# Patient Record
Sex: FEMALE | Race: WHITE | NOT HISPANIC OR LATINO | Employment: FULL TIME | URBAN - METROPOLITAN AREA
[De-identification: names, ages, dates, MRNs, and addresses within clinical notes are randomized per-mention and may not be internally consistent; named-entity substitution may affect disease eponyms.]

---

## 2017-02-01 ENCOUNTER — TRANSCRIBE ORDERS (OUTPATIENT)
Dept: ADMINISTRATIVE | Facility: HOSPITAL | Age: 62
End: 2017-02-01

## 2017-02-01 DIAGNOSIS — Z12.31 SCREENING MAMMOGRAM, ENCOUNTER FOR: Primary | ICD-10-CM

## 2017-03-30 ENCOUNTER — HOSPITAL ENCOUNTER (OUTPATIENT)
Dept: MAMMOGRAPHY | Facility: HOSPITAL | Age: 62
Discharge: HOME | End: 2017-03-30
Attending: NURSE PRACTITIONER | Admitting: NURSE PRACTITIONER

## 2017-03-30 DIAGNOSIS — Z12.31 SCREENING MAMMOGRAM, ENCOUNTER FOR: ICD-10-CM

## 2017-03-30 PROCEDURE — 77063 BREAST TOMOSYNTHESIS BI: CPT

## 2017-03-30 PROCEDURE — G0202 SCR MAMMO BI INCL CAD: HCPCS

## 2017-05-01 ENCOUNTER — APPOINTMENT (OUTPATIENT)
Dept: GENERAL RADIOLOGY | Facility: HOSPITAL | Age: 62
End: 2017-05-01

## 2017-05-01 ENCOUNTER — HOSPITAL ENCOUNTER (INPATIENT)
Facility: HOSPITAL | Age: 62
LOS: 6 days | Discharge: HOME | End: 2017-05-07
Attending: FAMILY MEDICINE | Admitting: FAMILY MEDICINE

## 2017-05-01 PROBLEM — K52.9 GE (GASTROENTERITIS): Status: ACTIVE | Noted: 2017-05-01

## 2017-05-01 LAB
ALBUMIN SERPL-MCNC: 3.2 G/DL (ref 3.5–5.2)
ALBUMIN/GLOB SERPL: 1 G/DL
ALP SERPL-CCNC: 98 U/L (ref 40–129)
ALT SERPL W P-5'-P-CCNC: <5 U/L (ref 5–33)
ANION GAP SERPL CALCULATED.3IONS-SCNC: 13.2 MMOL/L
AST SERPL-CCNC: 7 U/L (ref 5–32)
BASOPHILS # BLD AUTO: 0.08 10*3/MM3 (ref 0–0.2)
BASOPHILS NFR BLD AUTO: 0.4 % (ref 0–2)
BILIRUB SERPL-MCNC: 0.3 MG/DL (ref 0.2–1.2)
BUN BLD-MCNC: 5 MG/DL (ref 6–20)
BUN/CREAT SERPL: 6.9 (ref 7–25)
CALCIUM SPEC-SCNC: 8.3 MG/DL (ref 8.6–10.5)
CHLORIDE SERPL-SCNC: 98 MMOL/L (ref 98–107)
CO2 SERPL-SCNC: 25.8 MMOL/L (ref 22–29)
CREAT BLD-MCNC: 0.72 MG/DL (ref 0.57–1)
D-LACTATE SERPL-SCNC: 0.9 MMOL/L (ref 0.5–2)
DEPRECATED RDW RBC AUTO: 45 FL (ref 37–54)
EOSINOPHIL # BLD AUTO: 0.28 10*3/MM3 (ref 0.1–0.3)
EOSINOPHIL NFR BLD AUTO: 1.5 % (ref 0–4)
ERYTHROCYTE [DISTWIDTH] IN BLOOD BY AUTOMATED COUNT: 12.9 % (ref 11.5–14.5)
GFR SERPL CREATININE-BSD FRML MDRD: 83 ML/MIN/1.73
GLOBULIN UR ELPH-MCNC: 3.2 GM/DL
GLUCOSE BLD-MCNC: 107 MG/DL (ref 65–99)
HCT VFR BLD AUTO: 33.5 % (ref 37–47)
HGB BLD-MCNC: 11 G/DL (ref 12–16)
IMM GRANULOCYTES # BLD: 0.06 10*3/MM3 (ref 0–0.03)
IMM GRANULOCYTES NFR BLD: 0.3 % (ref 0–0.5)
LYMPHOCYTES # BLD AUTO: 2.81 10*3/MM3 (ref 0.6–4.8)
LYMPHOCYTES NFR BLD AUTO: 15.6 % (ref 20–45)
MCH RBC QN AUTO: 30.8 PG (ref 27–31)
MCHC RBC AUTO-ENTMCNC: 32.8 G/DL (ref 31–37)
MCV RBC AUTO: 93.8 FL (ref 81–99)
MONOCYTES # BLD AUTO: 1.15 10*3/MM3 (ref 0–1)
MONOCYTES NFR BLD AUTO: 6.4 % (ref 3–8)
NEUTROPHILS # BLD AUTO: 13.69 10*3/MM3 (ref 1.5–8.3)
NEUTROPHILS NFR BLD AUTO: 75.8 % (ref 45–70)
NRBC BLD MANUAL-RTO: 0 /100 WBC (ref 0–0)
PLATELET # BLD AUTO: 379 10*3/MM3 (ref 140–500)
PMV BLD AUTO: 9.5 FL (ref 7.4–10.4)
POTASSIUM BLD-SCNC: 3 MMOL/L (ref 3.5–5.2)
PROT SERPL-MCNC: 6.4 G/DL (ref 6–8.5)
RBC # BLD AUTO: 3.57 10*6/MM3 (ref 4.2–5.4)
SODIUM BLD-SCNC: 137 MMOL/L (ref 136–145)
WBC NRBC COR # BLD: 18.07 10*3/MM3 (ref 4.8–10.8)

## 2017-05-01 PROCEDURE — 25810000003 SODIUM CHLORIDE 0.9 % WITH KCL 40 MEQ/L 40-0.9 MEQ/L-% SOLUTION 1,000 ML FLEX CONT: Performed by: FAMILY MEDICINE

## 2017-05-01 PROCEDURE — 83605 ASSAY OF LACTIC ACID: CPT | Performed by: FAMILY MEDICINE

## 2017-05-01 PROCEDURE — 25010000002 ENOXAPARIN PER 10 MG: Performed by: FAMILY MEDICINE

## 2017-05-01 PROCEDURE — 94799 UNLISTED PULMONARY SVC/PX: CPT

## 2017-05-01 PROCEDURE — 80053 COMPREHEN METABOLIC PANEL: CPT | Performed by: FAMILY MEDICINE

## 2017-05-01 PROCEDURE — 87493 C DIFF AMPLIFIED PROBE: CPT | Performed by: FAMILY MEDICINE

## 2017-05-01 PROCEDURE — 25810000003 SODIUM CHLORIDE 0.9 % WITH KCL 40 MEQ/L 40-0.9 MEQ/L-% SOLUTION

## 2017-05-01 PROCEDURE — 25010000002 ONDANSETRON PER 1 MG: Performed by: FAMILY MEDICINE

## 2017-05-01 PROCEDURE — 85025 COMPLETE CBC W/AUTO DIFF WBC: CPT | Performed by: FAMILY MEDICINE

## 2017-05-01 PROCEDURE — 74022 RADEX COMPL AQT ABD SERIES: CPT

## 2017-05-01 PROCEDURE — G0378 HOSPITAL OBSERVATION PER HR: HCPCS

## 2017-05-01 RX ORDER — ONDANSETRON 4 MG/1
4 TABLET, FILM COATED ORAL EVERY 6 HOURS PRN
Status: DISCONTINUED | OUTPATIENT
Start: 2017-05-01 | End: 2017-05-02

## 2017-05-01 RX ORDER — SODIUM CHLORIDE 0.9 % (FLUSH) 0.9 %
1-10 SYRINGE (ML) INJECTION AS NEEDED
Status: CANCELLED | OUTPATIENT
Start: 2017-05-01

## 2017-05-01 RX ORDER — SODIUM CHLORIDE AND POTASSIUM CHLORIDE 150; 900 MG/100ML; MG/100ML
100 INJECTION, SOLUTION INTRAVENOUS CONTINUOUS
Status: DISCONTINUED | OUTPATIENT
Start: 2017-05-01 | End: 2017-05-01

## 2017-05-01 RX ORDER — ACETAMINOPHEN 325 MG/1
650 TABLET ORAL EVERY 4 HOURS PRN
Status: CANCELLED | OUTPATIENT
Start: 2017-05-01

## 2017-05-01 RX ORDER — ONDANSETRON 4 MG/1
4 TABLET, ORALLY DISINTEGRATING ORAL EVERY 6 HOURS PRN
Status: DISCONTINUED | OUTPATIENT
Start: 2017-05-01 | End: 2017-05-02

## 2017-05-01 RX ORDER — DULOXETIN HYDROCHLORIDE 60 MG/1
60 CAPSULE, DELAYED RELEASE ORAL NIGHTLY
Status: DISCONTINUED | OUTPATIENT
Start: 2017-05-01 | End: 2017-05-07 | Stop reason: HOSPADM

## 2017-05-01 RX ORDER — ONDANSETRON 2 MG/ML
4 INJECTION INTRAMUSCULAR; INTRAVENOUS EVERY 6 HOURS PRN
Status: CANCELLED | OUTPATIENT
Start: 2017-05-01

## 2017-05-01 RX ORDER — IPRATROPIUM BROMIDE AND ALBUTEROL SULFATE 2.5; .5 MG/3ML; MG/3ML
3 SOLUTION RESPIRATORY (INHALATION)
Status: CANCELLED | OUTPATIENT
Start: 2017-05-01

## 2017-05-01 RX ORDER — DIAZEPAM 5 MG/1
10 TABLET ORAL NIGHTLY
Status: DISCONTINUED | OUTPATIENT
Start: 2017-05-01 | End: 2017-05-07 | Stop reason: HOSPADM

## 2017-05-01 RX ORDER — CALCIUM CARBONATE 200(500)MG
1 TABLET,CHEWABLE ORAL 2 TIMES DAILY PRN
Status: CANCELLED | OUTPATIENT
Start: 2017-05-01

## 2017-05-01 RX ORDER — HYDROCODONE BITARTRATE AND ACETAMINOPHEN 10; 325 MG/1; MG/1
1 TABLET ORAL EVERY 6 HOURS PRN
Status: DISCONTINUED | OUTPATIENT
Start: 2017-05-01 | End: 2017-05-07 | Stop reason: HOSPADM

## 2017-05-01 RX ORDER — ONDANSETRON 4 MG/1
4 TABLET, FILM COATED ORAL EVERY 6 HOURS PRN
Status: CANCELLED | OUTPATIENT
Start: 2017-05-01

## 2017-05-01 RX ORDER — SODIUM CHLORIDE AND POTASSIUM CHLORIDE 150; 900 MG/100ML; MG/100ML
100 INJECTION, SOLUTION INTRAVENOUS CONTINUOUS
Status: CANCELLED | OUTPATIENT
Start: 2017-05-01

## 2017-05-01 RX ORDER — POTASSIUM CHLORIDE 20 MEQ/1
40 TABLET, EXTENDED RELEASE ORAL ONCE
Status: COMPLETED | OUTPATIENT
Start: 2017-05-01 | End: 2017-05-01

## 2017-05-01 RX ORDER — ONDANSETRON 2 MG/ML
4 INJECTION INTRAMUSCULAR; INTRAVENOUS EVERY 6 HOURS PRN
Status: DISCONTINUED | OUTPATIENT
Start: 2017-05-01 | End: 2017-05-02

## 2017-05-01 RX ORDER — DULOXETIN HYDROCHLORIDE 60 MG/1
60 CAPSULE, DELAYED RELEASE ORAL NIGHTLY
COMMUNITY
End: 2020-08-04

## 2017-05-01 RX ORDER — SODIUM CHLORIDE 0.9 % (FLUSH) 0.9 %
1-10 SYRINGE (ML) INJECTION AS NEEDED
Status: DISCONTINUED | OUTPATIENT
Start: 2017-05-01 | End: 2017-05-07 | Stop reason: HOSPADM

## 2017-05-01 RX ORDER — BISACODYL 5 MG/1
5 TABLET, DELAYED RELEASE ORAL DAILY PRN
Status: CANCELLED | OUTPATIENT
Start: 2017-05-01

## 2017-05-01 RX ORDER — ALENDRONATE SODIUM 70 MG/1
70 TABLET ORAL
COMMUNITY
Start: 2017-05-04 | End: 2020-08-04

## 2017-05-01 RX ORDER — CALCIUM CARBONATE 200(500)MG
1 TABLET,CHEWABLE ORAL 2 TIMES DAILY PRN
Status: DISCONTINUED | OUTPATIENT
Start: 2017-05-01 | End: 2017-05-07 | Stop reason: HOSPADM

## 2017-05-01 RX ORDER — PANTOPRAZOLE SODIUM 40 MG/10ML
40 INJECTION, POWDER, LYOPHILIZED, FOR SOLUTION INTRAVENOUS EVERY MORNING
Status: DISCONTINUED | OUTPATIENT
Start: 2017-05-01 | End: 2017-05-04

## 2017-05-01 RX ORDER — HYDROCODONE BITARTRATE AND ACETAMINOPHEN 10; 325 MG/1; MG/1
1 TABLET ORAL 3 TIMES DAILY PRN
COMMUNITY

## 2017-05-01 RX ORDER — ONDANSETRON 4 MG/1
4 TABLET, ORALLY DISINTEGRATING ORAL EVERY 6 HOURS PRN
Status: CANCELLED | OUTPATIENT
Start: 2017-05-01

## 2017-05-01 RX ORDER — IPRATROPIUM BROMIDE AND ALBUTEROL SULFATE 2.5; .5 MG/3ML; MG/3ML
3 SOLUTION RESPIRATORY (INHALATION)
Status: DISCONTINUED | OUTPATIENT
Start: 2017-05-01 | End: 2017-05-04

## 2017-05-01 RX ORDER — ACETAMINOPHEN 325 MG/1
650 TABLET ORAL EVERY 4 HOURS PRN
Status: DISCONTINUED | OUTPATIENT
Start: 2017-05-01 | End: 2017-05-07 | Stop reason: HOSPADM

## 2017-05-01 RX ORDER — PANTOPRAZOLE SODIUM 40 MG/10ML
40 INJECTION, POWDER, LYOPHILIZED, FOR SOLUTION INTRAVENOUS
Status: CANCELLED | OUTPATIENT
Start: 2017-05-02

## 2017-05-01 RX ORDER — BISACODYL 5 MG/1
5 TABLET, DELAYED RELEASE ORAL DAILY PRN
Status: DISCONTINUED | OUTPATIENT
Start: 2017-05-01 | End: 2017-05-07 | Stop reason: HOSPADM

## 2017-05-01 RX ORDER — SODIUM CHLORIDE AND POTASSIUM CHLORIDE 300; 900 MG/100ML; MG/100ML
INJECTION, SOLUTION INTRAVENOUS
Status: COMPLETED
Start: 2017-05-01 | End: 2017-05-01

## 2017-05-01 RX ORDER — DIAZEPAM 10 MG/1
10 TABLET ORAL 2 TIMES DAILY PRN
COMMUNITY

## 2017-05-01 RX ADMIN — DULOXETINE HYDROCHLORIDE 60 MG: 60 CAPSULE, DELAYED RELEASE ORAL at 21:56

## 2017-05-01 RX ADMIN — ENOXAPARIN SODIUM 40 MG: 40 INJECTION SUBCUTANEOUS at 14:43

## 2017-05-01 RX ADMIN — POTASSIUM CHLORIDE 40 MEQ: 1500 TABLET, EXTENDED RELEASE ORAL at 14:42

## 2017-05-01 RX ADMIN — ONDANSETRON 4 MG: 2 INJECTION, SOLUTION INTRAMUSCULAR; INTRAVENOUS at 20:37

## 2017-05-01 RX ADMIN — IPRATROPIUM BROMIDE AND ALBUTEROL SULFATE 3 ML: .5; 3 SOLUTION RESPIRATORY (INHALATION) at 15:38

## 2017-05-01 RX ADMIN — PANTOPRAZOLE SODIUM 40 MG: 40 INJECTION, POWDER, FOR SOLUTION INTRAVENOUS at 14:43

## 2017-05-01 RX ADMIN — IPRATROPIUM BROMIDE AND ALBUTEROL SULFATE 3 ML: .5; 3 SOLUTION RESPIRATORY (INHALATION) at 19:51

## 2017-05-01 RX ADMIN — ONDANSETRON 4 MG: 2 INJECTION, SOLUTION INTRAMUSCULAR; INTRAVENOUS at 12:59

## 2017-05-01 RX ADMIN — DIAZEPAM 10 MG: 5 TABLET ORAL at 21:56

## 2017-05-01 RX ADMIN — POTASSIUM CHLORIDE AND SODIUM CHLORIDE 1000 ML: 900; 300 INJECTION, SOLUTION INTRAVENOUS at 14:14

## 2017-05-01 RX ADMIN — SODIUM CHLORIDE, POTASSIUM CHLORIDE, SODIUM LACTATE AND CALCIUM CHLORIDE 500 ML: 600; 310; 30; 20 INJECTION, SOLUTION INTRAVENOUS at 12:54

## 2017-05-01 RX ADMIN — HYDROCODONE BITARTRATE AND ACETAMINOPHEN 1 TABLET: 10; 325 TABLET ORAL at 20:42

## 2017-05-01 RX ADMIN — POTASSIUM CHLORIDE AND SODIUM CHLORIDE 100 ML/HR: 900; 300 INJECTION, SOLUTION INTRAVENOUS at 14:17

## 2017-05-02 ENCOUNTER — APPOINTMENT (OUTPATIENT)
Dept: CT IMAGING | Facility: HOSPITAL | Age: 62
End: 2017-05-02
Attending: FAMILY MEDICINE

## 2017-05-02 PROBLEM — K52.9 COLITIS: Status: ACTIVE | Noted: 2017-05-02

## 2017-05-02 LAB
ANION GAP SERPL CALCULATED.3IONS-SCNC: 12.8 MMOL/L
BACTERIA UR QL AUTO: ABNORMAL /HPF
BILIRUB UR QL STRIP: NEGATIVE
BUN BLD-MCNC: 4 MG/DL (ref 6–20)
BUN/CREAT SERPL: 5.9 (ref 7–25)
C DIFF TOX GENS STL QL NAA+PROBE: POSITIVE
CALCIUM SPEC-SCNC: 7.9 MG/DL (ref 8.6–10.5)
CHLORIDE SERPL-SCNC: 105 MMOL/L (ref 98–107)
CLARITY UR: CLEAR
CO2 SERPL-SCNC: 22.2 MMOL/L (ref 22–29)
COLOR UR: YELLOW
CREAT BLD-MCNC: 0.68 MG/DL (ref 0.57–1)
DEPRECATED RDW RBC AUTO: 46.2 FL (ref 37–54)
EOSINOPHIL # BLD MANUAL: 0.57 10*3/MM3 (ref 0.1–0.3)
EOSINOPHIL NFR BLD MANUAL: 3 % (ref 0–4)
ERYTHROCYTE [DISTWIDTH] IN BLOOD BY AUTOMATED COUNT: 13 % (ref 11.5–14.5)
GFR SERPL CREATININE-BSD FRML MDRD: 89 ML/MIN/1.73
GLUCOSE BLD-MCNC: 88 MG/DL (ref 65–99)
GLUCOSE UR STRIP-MCNC: NEGATIVE MG/DL
HCT VFR BLD AUTO: 34.9 % (ref 37–47)
HGB BLD-MCNC: 11.4 G/DL (ref 12–16)
HGB UR QL STRIP.AUTO: ABNORMAL
HYALINE CASTS UR QL AUTO: ABNORMAL /LPF
KETONES UR QL STRIP: NEGATIVE
LEUKOCYTE ESTERASE UR QL STRIP.AUTO: ABNORMAL
LYMPHOCYTES # BLD MANUAL: 2.46 10*3/MM3 (ref 0.6–4.8)
LYMPHOCYTES NFR BLD MANUAL: 13 % (ref 20–45)
LYMPHOCYTES NFR BLD MANUAL: 8 % (ref 3–8)
MCH RBC QN AUTO: 31.3 PG (ref 27–31)
MCHC RBC AUTO-ENTMCNC: 32.7 G/DL (ref 31–37)
MCV RBC AUTO: 95.9 FL (ref 81–99)
MONOCYTES # BLD AUTO: 1.52 10*3/MM3 (ref 0–1)
NEUTROPHILS # BLD AUTO: 14.4 10*3/MM3 (ref 1.5–8.3)
NEUTROPHILS NFR BLD MANUAL: 76 % (ref 45–70)
NITRITE UR QL STRIP: NEGATIVE
PH UR STRIP.AUTO: <=5 [PH] (ref 4.5–8)
PLAT MORPH BLD: NORMAL
PLATELET # BLD AUTO: 375 10*3/MM3 (ref 140–500)
PMV BLD AUTO: 10.2 FL (ref 7.4–10.4)
POTASSIUM BLD-SCNC: 4.5 MMOL/L (ref 3.5–5.2)
PROT UR QL STRIP: NEGATIVE
RBC # BLD AUTO: 3.64 10*6/MM3 (ref 4.2–5.4)
RBC # UR: ABNORMAL /HPF
RBC MORPH BLD: NORMAL
REF LAB TEST METHOD: ABNORMAL
SODIUM BLD-SCNC: 140 MMOL/L (ref 136–145)
SP GR UR STRIP: <=1.005 (ref 1–1.03)
SQUAMOUS #/AREA URNS HPF: ABNORMAL /HPF
UROBILINOGEN UR QL STRIP: ABNORMAL
WBC MORPH BLD: NORMAL
WBC NRBC COR # BLD: 18.95 10*3/MM3 (ref 4.8–10.8)
WBC UR QL AUTO: ABNORMAL /HPF

## 2017-05-02 PROCEDURE — 25810000003 SODIUM CHLORIDE 0.9 % WITH KCL 40 MEQ/L 40-0.9 MEQ/L-% SOLUTION: Performed by: FAMILY MEDICINE

## 2017-05-02 PROCEDURE — 25810000003 SODIUM CHLORIDE 0.9 % WITH KCL 40 MEQ/L 40-0.9 MEQ/L-% SOLUTION 1,000 ML FLEX CONT: Performed by: FAMILY MEDICINE

## 2017-05-02 PROCEDURE — 25010000002 ENOXAPARIN PER 10 MG: Performed by: FAMILY MEDICINE

## 2017-05-02 PROCEDURE — 85025 COMPLETE CBC W/AUTO DIFF WBC: CPT | Performed by: FAMILY MEDICINE

## 2017-05-02 PROCEDURE — 81001 URINALYSIS AUTO W/SCOPE: CPT | Performed by: FAMILY MEDICINE

## 2017-05-02 PROCEDURE — 80048 BASIC METABOLIC PNL TOTAL CA: CPT | Performed by: FAMILY MEDICINE

## 2017-05-02 PROCEDURE — 74176 CT ABD & PELVIS W/O CONTRAST: CPT

## 2017-05-02 PROCEDURE — 85007 BL SMEAR W/DIFF WBC COUNT: CPT | Performed by: FAMILY MEDICINE

## 2017-05-02 PROCEDURE — 94799 UNLISTED PULMONARY SVC/PX: CPT

## 2017-05-02 PROCEDURE — 25010000002 ONDANSETRON PER 1 MG: Performed by: FAMILY MEDICINE

## 2017-05-02 RX ORDER — ONDANSETRON 4 MG/1
4 TABLET, ORALLY DISINTEGRATING ORAL EVERY 6 HOURS PRN
Status: DISCONTINUED | OUTPATIENT
Start: 2017-05-02 | End: 2017-05-07 | Stop reason: HOSPADM

## 2017-05-02 RX ORDER — ONDANSETRON 2 MG/ML
4 INJECTION INTRAMUSCULAR; INTRAVENOUS EVERY 4 HOURS PRN
Status: DISCONTINUED | OUTPATIENT
Start: 2017-05-02 | End: 2017-05-02

## 2017-05-02 RX ORDER — ONDANSETRON 4 MG/1
4 TABLET, FILM COATED ORAL EVERY 4 HOURS PRN
Status: DISCONTINUED | OUTPATIENT
Start: 2017-05-02 | End: 2017-05-07 | Stop reason: HOSPADM

## 2017-05-02 RX ORDER — SODIUM CHLORIDE AND POTASSIUM CHLORIDE 300; 900 MG/100ML; MG/100ML
75 INJECTION, SOLUTION INTRAVENOUS CONTINUOUS
Status: DISCONTINUED | OUTPATIENT
Start: 2017-05-02 | End: 2017-05-04

## 2017-05-02 RX ORDER — ONDANSETRON 2 MG/ML
4 INJECTION INTRAMUSCULAR; INTRAVENOUS EVERY 4 HOURS PRN
Status: DISCONTINUED | OUTPATIENT
Start: 2017-05-02 | End: 2017-05-07 | Stop reason: HOSPADM

## 2017-05-02 RX ORDER — ONDANSETRON 4 MG/1
4 TABLET, FILM COATED ORAL EVERY 6 HOURS PRN
Status: DISCONTINUED | OUTPATIENT
Start: 2017-05-02 | End: 2017-05-02

## 2017-05-02 RX ORDER — ONDANSETRON 4 MG/1
4 TABLET, ORALLY DISINTEGRATING ORAL EVERY 6 HOURS PRN
Status: DISCONTINUED | OUTPATIENT
Start: 2017-05-02 | End: 2017-05-02

## 2017-05-02 RX ADMIN — IPRATROPIUM BROMIDE AND ALBUTEROL SULFATE 3 ML: .5; 3 SOLUTION RESPIRATORY (INHALATION) at 11:51

## 2017-05-02 RX ADMIN — POTASSIUM CHLORIDE AND SODIUM CHLORIDE 100 ML/HR: 900; 300 INJECTION, SOLUTION INTRAVENOUS at 08:56

## 2017-05-02 RX ADMIN — PANTOPRAZOLE SODIUM 40 MG: 40 INJECTION, POWDER, FOR SOLUTION INTRAVENOUS at 06:21

## 2017-05-02 RX ADMIN — IPRATROPIUM BROMIDE AND ALBUTEROL SULFATE 3 ML: .5; 3 SOLUTION RESPIRATORY (INHALATION) at 19:21

## 2017-05-02 RX ADMIN — POTASSIUM CHLORIDE AND SODIUM CHLORIDE 75 ML/HR: 900; 300 INJECTION, SOLUTION INTRAVENOUS at 22:01

## 2017-05-02 RX ADMIN — DIAZEPAM 10 MG: 5 TABLET ORAL at 22:14

## 2017-05-02 RX ADMIN — ONDANSETRON 4 MG: 2 INJECTION, SOLUTION INTRAMUSCULAR; INTRAVENOUS at 22:58

## 2017-05-02 RX ADMIN — POTASSIUM CHLORIDE AND SODIUM CHLORIDE 100 ML/HR: 900; 300 INJECTION, SOLUTION INTRAVENOUS at 00:15

## 2017-05-02 RX ADMIN — ACETAMINOPHEN 650 MG: 325 TABLET, FILM COATED ORAL at 22:15

## 2017-05-02 RX ADMIN — METRONIDAZOLE 500 MG: 500 INJECTION, SOLUTION INTRAVENOUS at 16:54

## 2017-05-02 RX ADMIN — IPRATROPIUM BROMIDE AND ALBUTEROL SULFATE 3 ML: .5; 3 SOLUTION RESPIRATORY (INHALATION) at 15:42

## 2017-05-02 RX ADMIN — ONDANSETRON 4 MG: 2 INJECTION, SOLUTION INTRAMUSCULAR; INTRAVENOUS at 10:25

## 2017-05-02 RX ADMIN — METRONIDAZOLE 500 MG: 500 INJECTION, SOLUTION INTRAVENOUS at 08:56

## 2017-05-02 RX ADMIN — DULOXETINE HYDROCHLORIDE 60 MG: 60 CAPSULE, DELAYED RELEASE ORAL at 22:15

## 2017-05-02 RX ADMIN — HYDROCODONE BITARTRATE AND ACETAMINOPHEN 1 TABLET: 10; 325 TABLET ORAL at 04:40

## 2017-05-02 RX ADMIN — ENOXAPARIN SODIUM 40 MG: 40 INJECTION SUBCUTANEOUS at 14:55

## 2017-05-02 RX ADMIN — ONDANSETRON 4 MG: 2 INJECTION, SOLUTION INTRAMUSCULAR; INTRAVENOUS at 04:40

## 2017-05-02 RX ADMIN — ONDANSETRON 4 MG: 2 INJECTION, SOLUTION INTRAMUSCULAR; INTRAVENOUS at 18:50

## 2017-05-03 LAB
ANION GAP SERPL CALCULATED.3IONS-SCNC: 11.4 MMOL/L
BUN BLD-MCNC: 3 MG/DL (ref 6–20)
BUN/CREAT SERPL: 5 (ref 7–25)
CALCIUM SPEC-SCNC: 8.1 MG/DL (ref 8.6–10.5)
CHLORIDE SERPL-SCNC: 104 MMOL/L (ref 98–107)
CO2 SERPL-SCNC: 23.6 MMOL/L (ref 22–29)
CREAT BLD-MCNC: 0.6 MG/DL (ref 0.57–1)
DEPRECATED RDW RBC AUTO: 44.7 FL (ref 37–54)
ERYTHROCYTE [DISTWIDTH] IN BLOOD BY AUTOMATED COUNT: 13 % (ref 11.5–14.5)
GFR SERPL CREATININE-BSD FRML MDRD: 102 ML/MIN/1.73
GLUCOSE BLD-MCNC: 113 MG/DL (ref 65–99)
HCT VFR BLD AUTO: 32.1 % (ref 37–47)
HGB BLD-MCNC: 10.7 G/DL (ref 12–16)
MCH RBC QN AUTO: 31.4 PG (ref 27–31)
MCHC RBC AUTO-ENTMCNC: 33.3 G/DL (ref 31–37)
MCV RBC AUTO: 94.1 FL (ref 81–99)
PLATELET # BLD AUTO: 379 10*3/MM3 (ref 140–500)
PMV BLD AUTO: 9.8 FL (ref 7.4–10.4)
POTASSIUM BLD-SCNC: 4.2 MMOL/L (ref 3.5–5.2)
RBC # BLD AUTO: 3.41 10*6/MM3 (ref 4.2–5.4)
SODIUM BLD-SCNC: 139 MMOL/L (ref 136–145)
WBC NRBC COR # BLD: 24.57 10*3/MM3 (ref 4.8–10.8)

## 2017-05-03 PROCEDURE — 25810000003 SODIUM CHLORIDE 0.9 % WITH KCL 40 MEQ/L 40-0.9 MEQ/L-% SOLUTION: Performed by: FAMILY MEDICINE

## 2017-05-03 PROCEDURE — 80048 BASIC METABOLIC PNL TOTAL CA: CPT | Performed by: FAMILY MEDICINE

## 2017-05-03 PROCEDURE — 94799 UNLISTED PULMONARY SVC/PX: CPT

## 2017-05-03 PROCEDURE — 25010000002 ONDANSETRON PER 1 MG: Performed by: FAMILY MEDICINE

## 2017-05-03 PROCEDURE — 25010000002 ENOXAPARIN PER 10 MG: Performed by: FAMILY MEDICINE

## 2017-05-03 PROCEDURE — 85027 COMPLETE CBC AUTOMATED: CPT | Performed by: FAMILY MEDICINE

## 2017-05-03 RX ORDER — CHOLESTYRAMINE LIGHT 4 G/5.7G
1 POWDER, FOR SUSPENSION ORAL EVERY 8 HOURS SCHEDULED
Status: DISCONTINUED | OUTPATIENT
Start: 2017-05-03 | End: 2017-05-07 | Stop reason: HOSPADM

## 2017-05-03 RX ADMIN — ONDANSETRON 4 MG: 2 INJECTION, SOLUTION INTRAMUSCULAR; INTRAVENOUS at 03:54

## 2017-05-03 RX ADMIN — POTASSIUM CHLORIDE AND SODIUM CHLORIDE 75 ML/HR: 900; 300 INJECTION, SOLUTION INTRAVENOUS at 12:58

## 2017-05-03 RX ADMIN — Medication 250 MG: at 08:48

## 2017-05-03 RX ADMIN — HYDROCODONE BITARTRATE AND ACETAMINOPHEN 1 TABLET: 10; 325 TABLET ORAL at 20:41

## 2017-05-03 RX ADMIN — CHOLESTYRAMINE 4 G: 4 POWDER, FOR SUSPENSION ORAL at 08:48

## 2017-05-03 RX ADMIN — IPRATROPIUM BROMIDE AND ALBUTEROL SULFATE 3 ML: .5; 3 SOLUTION RESPIRATORY (INHALATION) at 07:52

## 2017-05-03 RX ADMIN — DIAZEPAM 10 MG: 5 TABLET ORAL at 22:02

## 2017-05-03 RX ADMIN — METRONIDAZOLE 500 MG: 500 INJECTION, SOLUTION INTRAVENOUS at 08:48

## 2017-05-03 RX ADMIN — CHOLESTYRAMINE 4 G: 4 POWDER, FOR SUSPENSION ORAL at 22:03

## 2017-05-03 RX ADMIN — Medication 250 MG: at 13:04

## 2017-05-03 RX ADMIN — CHOLESTYRAMINE 4 G: 4 POWDER, FOR SUSPENSION ORAL at 13:01

## 2017-05-03 RX ADMIN — METRONIDAZOLE 500 MG: 500 INJECTION, SOLUTION INTRAVENOUS at 00:32

## 2017-05-03 RX ADMIN — DULOXETINE HYDROCHLORIDE 60 MG: 60 CAPSULE, DELAYED RELEASE ORAL at 22:03

## 2017-05-03 RX ADMIN — HYDROCODONE BITARTRATE AND ACETAMINOPHEN 1 TABLET: 10; 325 TABLET ORAL at 11:43

## 2017-05-03 RX ADMIN — PANTOPRAZOLE SODIUM 40 MG: 40 INJECTION, POWDER, FOR SOLUTION INTRAVENOUS at 06:15

## 2017-05-03 RX ADMIN — Medication 250 MG: at 17:29

## 2017-05-03 RX ADMIN — METRONIDAZOLE 500 MG: 500 INJECTION, SOLUTION INTRAVENOUS at 16:01

## 2017-05-03 RX ADMIN — ENOXAPARIN SODIUM 40 MG: 40 INJECTION SUBCUTANEOUS at 11:45

## 2017-05-04 LAB
ANION GAP SERPL CALCULATED.3IONS-SCNC: 11 MMOL/L
BUN BLD-MCNC: 2 MG/DL (ref 6–20)
BUN/CREAT SERPL: 3.3 (ref 7–25)
CALCIUM SPEC-SCNC: 8.6 MG/DL (ref 8.6–10.5)
CHLORIDE SERPL-SCNC: 106 MMOL/L (ref 98–107)
CO2 SERPL-SCNC: 24 MMOL/L (ref 22–29)
CREAT BLD-MCNC: 0.61 MG/DL (ref 0.57–1)
DEPRECATED RDW RBC AUTO: 45.3 FL (ref 37–54)
ERYTHROCYTE [DISTWIDTH] IN BLOOD BY AUTOMATED COUNT: 13.1 % (ref 11.5–14.5)
GFR SERPL CREATININE-BSD FRML MDRD: 100 ML/MIN/1.73
GLUCOSE BLD-MCNC: 89 MG/DL (ref 65–99)
HCT VFR BLD AUTO: 33.5 % (ref 37–47)
HGB BLD-MCNC: 11 G/DL (ref 12–16)
MCH RBC QN AUTO: 31.1 PG (ref 27–31)
MCHC RBC AUTO-ENTMCNC: 32.8 G/DL (ref 31–37)
MCV RBC AUTO: 94.6 FL (ref 81–99)
PLATELET # BLD AUTO: 403 10*3/MM3 (ref 140–500)
PMV BLD AUTO: 10 FL (ref 7.4–10.4)
POTASSIUM BLD-SCNC: 4.2 MMOL/L (ref 3.5–5.2)
RBC # BLD AUTO: 3.54 10*6/MM3 (ref 4.2–5.4)
SODIUM BLD-SCNC: 141 MMOL/L (ref 136–145)
WBC NRBC COR # BLD: 14.4 10*3/MM3 (ref 4.8–10.8)

## 2017-05-04 PROCEDURE — 94799 UNLISTED PULMONARY SVC/PX: CPT

## 2017-05-04 PROCEDURE — 85027 COMPLETE CBC AUTOMATED: CPT | Performed by: FAMILY MEDICINE

## 2017-05-04 PROCEDURE — 25810000003 SODIUM CHLORIDE 0.9 % WITH KCL 40 MEQ/L 40-0.9 MEQ/L-% SOLUTION: Performed by: FAMILY MEDICINE

## 2017-05-04 PROCEDURE — 25010000002 ENOXAPARIN PER 10 MG: Performed by: FAMILY MEDICINE

## 2017-05-04 PROCEDURE — 80048 BASIC METABOLIC PNL TOTAL CA: CPT | Performed by: FAMILY MEDICINE

## 2017-05-04 PROCEDURE — 25010000002 ONDANSETRON PER 1 MG: Performed by: FAMILY MEDICINE

## 2017-05-04 RX ORDER — BUDESONIDE AND FORMOTEROL FUMARATE DIHYDRATE 160; 4.5 UG/1; UG/1
2 AEROSOL RESPIRATORY (INHALATION)
Status: DISCONTINUED | OUTPATIENT
Start: 2017-05-04 | End: 2017-05-07 | Stop reason: HOSPADM

## 2017-05-04 RX ORDER — IPRATROPIUM BROMIDE AND ALBUTEROL SULFATE 2.5; .5 MG/3ML; MG/3ML
3 SOLUTION RESPIRATORY (INHALATION) EVERY 4 HOURS PRN
Status: DISCONTINUED | OUTPATIENT
Start: 2017-05-04 | End: 2017-05-07 | Stop reason: HOSPADM

## 2017-05-04 RX ADMIN — METRONIDAZOLE 500 MG: 500 INJECTION, SOLUTION INTRAVENOUS at 00:21

## 2017-05-04 RX ADMIN — ENOXAPARIN SODIUM 40 MG: 40 INJECTION SUBCUTANEOUS at 12:05

## 2017-05-04 RX ADMIN — ONDANSETRON 4 MG: 2 INJECTION, SOLUTION INTRAMUSCULAR; INTRAVENOUS at 12:06

## 2017-05-04 RX ADMIN — DIAZEPAM 10 MG: 5 TABLET ORAL at 21:38

## 2017-05-04 RX ADMIN — ONDANSETRON 4 MG: 2 INJECTION, SOLUTION INTRAMUSCULAR; INTRAVENOUS at 03:22

## 2017-05-04 RX ADMIN — Medication 250 MG: at 12:05

## 2017-05-04 RX ADMIN — CHOLESTYRAMINE 4 G: 4 POWDER, FOR SUSPENSION ORAL at 14:01

## 2017-05-04 RX ADMIN — Medication 250 MG: at 00:21

## 2017-05-04 RX ADMIN — PANTOPRAZOLE SODIUM 40 MG: 40 INJECTION, POWDER, FOR SOLUTION INTRAVENOUS at 06:20

## 2017-05-04 RX ADMIN — METRONIDAZOLE 500 MG: 500 INJECTION, SOLUTION INTRAVENOUS at 16:19

## 2017-05-04 RX ADMIN — POTASSIUM CHLORIDE AND SODIUM CHLORIDE 75 ML/HR: 900; 300 INJECTION, SOLUTION INTRAVENOUS at 03:16

## 2017-05-04 RX ADMIN — HYDROCODONE BITARTRATE AND ACETAMINOPHEN 1 TABLET: 10; 325 TABLET ORAL at 13:52

## 2017-05-04 RX ADMIN — Medication 250 MG: at 18:12

## 2017-05-04 RX ADMIN — Medication 250 MG: at 06:19

## 2017-05-04 RX ADMIN — DULOXETINE HYDROCHLORIDE 60 MG: 60 CAPSULE, DELAYED RELEASE ORAL at 21:38

## 2017-05-04 RX ADMIN — METRONIDAZOLE 500 MG: 500 INJECTION, SOLUTION INTRAVENOUS at 08:21

## 2017-05-04 RX ADMIN — BUDESONIDE AND FORMOTEROL FUMARATE DIHYDRATE 2 PUFF: 160; 4.5 AEROSOL RESPIRATORY (INHALATION) at 10:52

## 2017-05-04 RX ADMIN — CHOLESTYRAMINE 4 G: 4 POWDER, FOR SUSPENSION ORAL at 06:20

## 2017-05-04 RX ADMIN — BUDESONIDE AND FORMOTEROL FUMARATE DIHYDRATE 2 PUFF: 160; 4.5 AEROSOL RESPIRATORY (INHALATION) at 20:45

## 2017-05-05 LAB
DEPRECATED RDW RBC AUTO: 44.7 FL (ref 37–54)
ERYTHROCYTE [DISTWIDTH] IN BLOOD BY AUTOMATED COUNT: 13 % (ref 11.5–14.5)
HCT VFR BLD AUTO: 36.6 % (ref 37–47)
HGB BLD-MCNC: 11.9 G/DL (ref 12–16)
MCH RBC QN AUTO: 30.7 PG (ref 27–31)
MCHC RBC AUTO-ENTMCNC: 32.5 G/DL (ref 31–37)
MCV RBC AUTO: 94.6 FL (ref 81–99)
PLATELET # BLD AUTO: 497 10*3/MM3 (ref 140–500)
PMV BLD AUTO: 9.9 FL (ref 7.4–10.4)
RBC # BLD AUTO: 3.87 10*6/MM3 (ref 4.2–5.4)
WBC NRBC COR # BLD: 11.03 10*3/MM3 (ref 4.8–10.8)

## 2017-05-05 PROCEDURE — 25010000002 ENOXAPARIN PER 10 MG: Performed by: FAMILY MEDICINE

## 2017-05-05 PROCEDURE — 94799 UNLISTED PULMONARY SVC/PX: CPT

## 2017-05-05 PROCEDURE — 85027 COMPLETE CBC AUTOMATED: CPT | Performed by: FAMILY MEDICINE

## 2017-05-05 RX ADMIN — BUDESONIDE AND FORMOTEROL FUMARATE DIHYDRATE 2 PUFF: 160; 4.5 AEROSOL RESPIRATORY (INHALATION) at 20:20

## 2017-05-05 RX ADMIN — Medication 250 MG: at 18:27

## 2017-05-05 RX ADMIN — METRONIDAZOLE 500 MG: 500 INJECTION, SOLUTION INTRAVENOUS at 00:30

## 2017-05-05 RX ADMIN — Medication 250 MG: at 12:14

## 2017-05-05 RX ADMIN — ENOXAPARIN SODIUM 40 MG: 40 INJECTION SUBCUTANEOUS at 12:15

## 2017-05-05 RX ADMIN — HYDROCODONE BITARTRATE AND ACETAMINOPHEN 1 TABLET: 10; 325 TABLET ORAL at 18:27

## 2017-05-05 RX ADMIN — Medication 250 MG: at 23:21

## 2017-05-05 RX ADMIN — CHOLESTYRAMINE 4 G: 4 POWDER, FOR SUSPENSION ORAL at 06:29

## 2017-05-05 RX ADMIN — DIAZEPAM 10 MG: 5 TABLET ORAL at 21:01

## 2017-05-05 RX ADMIN — DULOXETINE HYDROCHLORIDE 60 MG: 60 CAPSULE, DELAYED RELEASE ORAL at 21:01

## 2017-05-05 RX ADMIN — CHOLESTYRAMINE 4 G: 4 POWDER, FOR SUSPENSION ORAL at 14:31

## 2017-05-05 RX ADMIN — Medication 250 MG: at 06:27

## 2017-05-05 RX ADMIN — CHOLESTYRAMINE 4 G: 4 POWDER, FOR SUSPENSION ORAL at 21:01

## 2017-05-05 RX ADMIN — METRONIDAZOLE 500 MG: 500 INJECTION, SOLUTION INTRAVENOUS at 16:25

## 2017-05-05 RX ADMIN — HYDROCODONE BITARTRATE AND ACETAMINOPHEN 1 TABLET: 10; 325 TABLET ORAL at 07:30

## 2017-05-05 RX ADMIN — BUDESONIDE AND FORMOTEROL FUMARATE DIHYDRATE 2 PUFF: 160; 4.5 AEROSOL RESPIRATORY (INHALATION) at 09:25

## 2017-05-05 RX ADMIN — Medication 250 MG: at 00:30

## 2017-05-05 RX ADMIN — METRONIDAZOLE 500 MG: 500 INJECTION, SOLUTION INTRAVENOUS at 23:21

## 2017-05-05 RX ADMIN — METRONIDAZOLE 500 MG: 500 INJECTION, SOLUTION INTRAVENOUS at 07:58

## 2017-05-06 PROCEDURE — 94799 UNLISTED PULMONARY SVC/PX: CPT

## 2017-05-06 PROCEDURE — 25010000002 ENOXAPARIN PER 10 MG: Performed by: FAMILY MEDICINE

## 2017-05-06 RX ORDER — METRONIDAZOLE 500 MG/1
500 TABLET ORAL EVERY 8 HOURS SCHEDULED
Status: DISCONTINUED | OUTPATIENT
Start: 2017-05-06 | End: 2017-05-07 | Stop reason: HOSPADM

## 2017-05-06 RX ADMIN — Medication 250 MG: at 13:12

## 2017-05-06 RX ADMIN — METRONIDAZOLE 500 MG: 500 TABLET ORAL at 22:50

## 2017-05-06 RX ADMIN — ENOXAPARIN SODIUM 40 MG: 40 INJECTION SUBCUTANEOUS at 13:13

## 2017-05-06 RX ADMIN — METRONIDAZOLE 500 MG: 500 TABLET ORAL at 13:12

## 2017-05-06 RX ADMIN — BUDESONIDE AND FORMOTEROL FUMARATE DIHYDRATE 2 PUFF: 160; 4.5 AEROSOL RESPIRATORY (INHALATION) at 09:06

## 2017-05-06 RX ADMIN — CHOLESTYRAMINE 4 G: 4 POWDER, FOR SUSPENSION ORAL at 22:50

## 2017-05-06 RX ADMIN — DULOXETINE HYDROCHLORIDE 60 MG: 60 CAPSULE, DELAYED RELEASE ORAL at 20:33

## 2017-05-06 RX ADMIN — Medication 250 MG: at 18:20

## 2017-05-06 RX ADMIN — HYDROCODONE BITARTRATE AND ACETAMINOPHEN 1 TABLET: 10; 325 TABLET ORAL at 18:23

## 2017-05-06 RX ADMIN — HYDROCODONE BITARTRATE AND ACETAMINOPHEN 1 TABLET: 10; 325 TABLET ORAL at 09:47

## 2017-05-06 RX ADMIN — DIAZEPAM 10 MG: 5 TABLET ORAL at 20:33

## 2017-05-06 RX ADMIN — CHOLESTYRAMINE 4 G: 4 POWDER, FOR SUSPENSION ORAL at 06:05

## 2017-05-06 RX ADMIN — Medication 250 MG: at 06:05

## 2017-05-06 RX ADMIN — BUDESONIDE AND FORMOTEROL FUMARATE DIHYDRATE 2 PUFF: 160; 4.5 AEROSOL RESPIRATORY (INHALATION) at 19:52

## 2017-05-06 RX ADMIN — CHOLESTYRAMINE 4 G: 4 POWDER, FOR SUSPENSION ORAL at 15:15

## 2017-05-06 RX ADMIN — METRONIDAZOLE 500 MG: 500 INJECTION, SOLUTION INTRAVENOUS at 09:47

## 2017-05-07 VITALS
HEART RATE: 77 BPM | BODY MASS INDEX: 19.53 KG/M2 | DIASTOLIC BLOOD PRESSURE: 74 MMHG | WEIGHT: 106.1 LBS | SYSTOLIC BLOOD PRESSURE: 117 MMHG | TEMPERATURE: 97.1 F | OXYGEN SATURATION: 97 % | RESPIRATION RATE: 16 BRPM | HEIGHT: 62 IN

## 2017-05-07 PROCEDURE — 94799 UNLISTED PULMONARY SVC/PX: CPT

## 2017-05-07 RX ORDER — CHOLESTYRAMINE LIGHT 4 G/5.7G
1 POWDER, FOR SUSPENSION ORAL 3 TIMES DAILY
Qty: 60 PACKET | Refills: 0 | Status: ON HOLD | OUTPATIENT
Start: 2017-05-07 | End: 2020-09-22

## 2017-05-07 RX ORDER — VANCOMYCIN HYDROCHLORIDE 250 MG/1
250 CAPSULE ORAL 4 TIMES DAILY
Qty: 40 CAPSULE | Refills: 0 | Status: SHIPPED | OUTPATIENT
Start: 2017-05-07 | End: 2020-08-04

## 2017-05-07 RX ORDER — BUDESONIDE AND FORMOTEROL FUMARATE DIHYDRATE 160; 4.5 UG/1; UG/1
2 AEROSOL RESPIRATORY (INHALATION)
Refills: 12
Start: 2017-05-07

## 2017-05-07 RX ORDER — METRONIDAZOLE 500 MG/1
500 TABLET ORAL 3 TIMES DAILY
Qty: 30 TABLET | Refills: 0 | Status: SHIPPED | OUTPATIENT
Start: 2017-05-07 | End: 2020-08-04

## 2017-05-07 RX ADMIN — Medication 250 MG: at 05:19

## 2017-05-07 RX ADMIN — CHOLESTYRAMINE 4 G: 4 POWDER, FOR SUSPENSION ORAL at 05:20

## 2017-05-07 RX ADMIN — METRONIDAZOLE 500 MG: 500 TABLET ORAL at 05:20

## 2017-05-07 RX ADMIN — BUDESONIDE AND FORMOTEROL FUMARATE DIHYDRATE 2 PUFF: 160; 4.5 AEROSOL RESPIRATORY (INHALATION) at 09:54

## 2017-05-07 RX ADMIN — Medication 250 MG: at 00:34

## 2017-05-07 RX ADMIN — HYDROCODONE BITARTRATE AND ACETAMINOPHEN 1 TABLET: 10; 325 TABLET ORAL at 10:09

## 2017-06-02 ENCOUNTER — TRANSCRIBE ORDERS (OUTPATIENT)
Dept: ADMINISTRATIVE | Facility: HOSPITAL | Age: 62
End: 2017-06-02

## 2017-06-02 ENCOUNTER — LAB (OUTPATIENT)
Dept: LAB | Facility: HOSPITAL | Age: 62
End: 2017-06-02
Attending: FAMILY MEDICINE

## 2017-06-02 DIAGNOSIS — A04.72 CLOSTRIDIUM DIFFICILE COLITIS: Primary | ICD-10-CM

## 2017-06-02 DIAGNOSIS — A04.72 CLOSTRIDIUM DIFFICILE COLITIS: ICD-10-CM

## 2017-06-02 LAB — C DIFF TOX GENS STL QL NAA+PROBE: POSITIVE

## 2017-06-02 PROCEDURE — 87493 C DIFF AMPLIFIED PROBE: CPT

## 2017-09-27 ENCOUNTER — TRANSCRIBE ORDERS (OUTPATIENT)
Dept: ADMINISTRATIVE | Facility: HOSPITAL | Age: 62
End: 2017-09-27

## 2017-09-27 ENCOUNTER — LAB (OUTPATIENT)
Dept: LAB | Facility: HOSPITAL | Age: 62
End: 2017-09-27
Attending: FAMILY MEDICINE

## 2017-09-27 DIAGNOSIS — K52.9 COLITIS: ICD-10-CM

## 2017-09-27 DIAGNOSIS — R19.7 DIARRHEA, UNSPECIFIED TYPE: ICD-10-CM

## 2017-09-27 DIAGNOSIS — K52.9 GE (GASTROENTERITIS): Primary | ICD-10-CM

## 2017-09-27 DIAGNOSIS — R19.7 DIARRHEA, UNSPECIFIED TYPE: Primary | ICD-10-CM

## 2017-09-27 LAB — C DIFF TOX GENS STL QL NAA+PROBE: NEGATIVE

## 2017-09-27 PROCEDURE — 87493 C DIFF AMPLIFIED PROBE: CPT

## 2018-07-17 ENCOUNTER — TRANSCRIBE ORDERS (OUTPATIENT)
Dept: ADMINISTRATIVE | Facility: HOSPITAL | Age: 63
End: 2018-07-17

## 2018-07-17 DIAGNOSIS — Z12.31 VISIT FOR SCREENING MAMMOGRAM: Primary | ICD-10-CM

## 2018-07-20 ENCOUNTER — HOSPITAL ENCOUNTER (OUTPATIENT)
Dept: MAMMOGRAPHY | Facility: HOSPITAL | Age: 63
Discharge: HOME | End: 2018-07-20
Attending: FAMILY MEDICINE | Admitting: FAMILY MEDICINE

## 2018-07-20 DIAGNOSIS — Z12.31 VISIT FOR SCREENING MAMMOGRAM: ICD-10-CM

## 2018-07-20 PROCEDURE — 77063 BREAST TOMOSYNTHESIS BI: CPT

## 2018-07-20 PROCEDURE — 77067 SCR MAMMO BI INCL CAD: CPT

## 2018-09-04 ENCOUNTER — HOSPITAL ENCOUNTER (OUTPATIENT)
Dept: GENERAL RADIOLOGY | Facility: HOSPITAL | Age: 63
Discharge: HOME | End: 2018-09-04
Attending: FAMILY MEDICINE | Admitting: FAMILY MEDICINE

## 2018-09-04 ENCOUNTER — TRANSCRIBE ORDERS (OUTPATIENT)
Dept: ADMINISTRATIVE | Facility: HOSPITAL | Age: 63
End: 2018-09-04

## 2018-09-04 DIAGNOSIS — R07.9 CHEST PAIN, UNSPECIFIED TYPE: ICD-10-CM

## 2018-09-04 DIAGNOSIS — J18.9 PNEUMONIA DUE TO INFECTIOUS ORGANISM, UNSPECIFIED LATERALITY, UNSPECIFIED PART OF LUNG: ICD-10-CM

## 2018-09-04 DIAGNOSIS — J18.9 PNEUMONIA DUE TO INFECTIOUS ORGANISM, UNSPECIFIED LATERALITY, UNSPECIFIED PART OF LUNG: Primary | ICD-10-CM

## 2018-09-04 PROCEDURE — 71046 X-RAY EXAM CHEST 2 VIEWS: CPT

## 2018-09-09 ENCOUNTER — APPOINTMENT (OUTPATIENT)
Dept: GENERAL RADIOLOGY | Facility: HOSPITAL | Age: 63
End: 2018-09-09

## 2018-09-09 ENCOUNTER — APPOINTMENT (OUTPATIENT)
Dept: CT IMAGING | Facility: HOSPITAL | Age: 63
End: 2018-09-09

## 2018-09-09 ENCOUNTER — HOSPITAL ENCOUNTER (EMERGENCY)
Facility: HOSPITAL | Age: 63
Discharge: HOME | End: 2018-09-09
Attending: EMERGENCY MEDICINE | Admitting: EMERGENCY MEDICINE

## 2018-09-09 VITALS
DIASTOLIC BLOOD PRESSURE: 84 MMHG | BODY MASS INDEX: 25.69 KG/M2 | TEMPERATURE: 98.6 F | HEIGHT: 63 IN | HEART RATE: 64 BPM | OXYGEN SATURATION: 97 % | SYSTOLIC BLOOD PRESSURE: 128 MMHG | WEIGHT: 145 LBS | RESPIRATION RATE: 20 BRPM

## 2018-09-09 DIAGNOSIS — S20.212A CONTUSION OF LEFT CHEST WALL, INITIAL ENCOUNTER: ICD-10-CM

## 2018-09-09 DIAGNOSIS — S16.1XXA STRAIN OF NECK MUSCLE, INITIAL ENCOUNTER: ICD-10-CM

## 2018-09-09 DIAGNOSIS — S80.11XA CONTUSION OF RIGHT LEG, INITIAL ENCOUNTER: ICD-10-CM

## 2018-09-09 DIAGNOSIS — S06.0X1A CONCUSSION WITH LOSS OF CONSCIOUSNESS OF 30 MINUTES OR LESS, INITIAL ENCOUNTER: Primary | ICD-10-CM

## 2018-09-09 DIAGNOSIS — S29.019A STRAIN OF THORACIC REGION, INITIAL ENCOUNTER: ICD-10-CM

## 2018-09-09 DIAGNOSIS — S50.01XA CONTUSION OF RIGHT ELBOW, INITIAL ENCOUNTER: ICD-10-CM

## 2018-09-09 PROCEDURE — 73080 X-RAY EXAM OF ELBOW: CPT

## 2018-09-09 PROCEDURE — 99284 EMERGENCY DEPT VISIT MOD MDM: CPT

## 2018-09-09 PROCEDURE — 96372 THER/PROPH/DIAG INJ SC/IM: CPT

## 2018-09-09 PROCEDURE — 72170 X-RAY EXAM OF PELVIS: CPT

## 2018-09-09 PROCEDURE — 25010000002 KETOROLAC TROMETHAMINE PER 15 MG: Performed by: EMERGENCY MEDICINE

## 2018-09-09 PROCEDURE — 72125 CT NECK SPINE W/O DYE: CPT

## 2018-09-09 PROCEDURE — 99284 EMERGENCY DEPT VISIT MOD MDM: CPT | Performed by: EMERGENCY MEDICINE

## 2018-09-09 PROCEDURE — 70450 CT HEAD/BRAIN W/O DYE: CPT

## 2018-09-09 PROCEDURE — 71101 X-RAY EXAM UNILAT RIBS/CHEST: CPT

## 2018-09-09 PROCEDURE — 72072 X-RAY EXAM THORAC SPINE 3VWS: CPT

## 2018-09-09 RX ORDER — KETOROLAC TROMETHAMINE 30 MG/ML
30 INJECTION, SOLUTION INTRAMUSCULAR; INTRAVENOUS ONCE
Status: COMPLETED | OUTPATIENT
Start: 2018-09-09 | End: 2018-09-09

## 2018-09-09 RX ORDER — CYCLOBENZAPRINE HCL 10 MG
10 TABLET ORAL 3 TIMES DAILY PRN
Qty: 21 TABLET | Refills: 0 | Status: SHIPPED | OUTPATIENT
Start: 2018-09-09 | End: 2018-09-16

## 2018-09-09 RX ORDER — NABUMETONE 750 MG/1
750 TABLET, FILM COATED ORAL 2 TIMES DAILY PRN
Qty: 14 TABLET | Refills: 0 | Status: SHIPPED | OUTPATIENT
Start: 2018-09-09 | End: 2018-09-16

## 2018-09-09 RX ORDER — CYCLOBENZAPRINE HCL 10 MG
10 TABLET ORAL ONCE
Status: COMPLETED | OUTPATIENT
Start: 2018-09-09 | End: 2018-09-09

## 2018-09-09 RX ADMIN — CYCLOBENZAPRINE HYDROCHLORIDE 10 MG: 10 TABLET, FILM COATED ORAL at 18:22

## 2018-09-09 RX ADMIN — KETOROLAC TROMETHAMINE 30 MG: 30 INJECTION, SOLUTION INTRAMUSCULAR; INTRAVENOUS at 16:06

## 2018-09-09 NOTE — DISCHARGE INSTRUCTIONS
Medications as directed.  Apply ice to areas of pain and swelling.  Apply heat to sore stiff muscles.  Gentle stretching.  Follow up with Dr. De Paz as above.  Return to ED for any signs of worsening concussion, mental status changes, protracted nausea and vomiting medical emergencies.

## 2018-09-09 NOTE — ED PROVIDER NOTES
Subjective   Ms. Pili Arechiga is a 64 yo WF who resent secondary to injury sustained from a fall.  Patient was standing in a chair when she fell striking her head on the concrete floor.  Patient had a brief loss of consciousness.  She complains of headache and a knot in the occipital region of her scalp.  Pain in left ribs, pain in right elbow.  Patient is also concerned about her left hip as she has undergone arthroplasty.  She presents for evaluation.        History provided by:  Patient  Fall   Mechanism of injury: fall    Injury location:  Head/neck, shoulder/arm and torso  Head/neck injury location:  Head  Shoulder/arm injury location:  R elbow  Torso injury location:  L chest  Incident location:  Home  Arrived directly from scene: yes    Fall:     Fall occurred: patient was standing in a chair at time of fall.    Impact surface:  [place]    Point of impact:  Head    Entrapped after fall: no    Suspicion of alcohol use: no    Suspicion of drug use: no    Prior to arrival data:     Patient ambulatory at scene: yes      Blood loss:  None    Loss of consciousness: yes      LOC duration: 1 minute as reported to patient by her son.    Amnesic to event: no    Associated symptoms: chest pain and loss of consciousness    Associated symptoms: no abdominal pain, no back pain, no blindness, no difficulty breathing, no headaches, no hearing loss, no nausea, no seizures and no vomiting    Risk factors: COPD    Risk factors: no AICD, no anticoagulation therapy, no asthma, no beta blocker therapy, no CABG, no CAD, no CHF, no diabetes, no dialysis, no past MI and no steroid use        Review of Systems   Constitutional: Negative.  Negative for appetite change, diaphoresis and fever.   HENT: Negative.  Negative for hearing loss.    Eyes: Negative.  Negative for blindness.   Respiratory: Negative for cough, chest tightness, shortness of breath and wheezing.    Cardiovascular: Positive for chest pain. Negative for palpitations and  leg swelling.   Gastrointestinal: Negative for abdominal pain, nausea and vomiting.   Genitourinary: Negative.  Negative for difficulty urinating, flank pain, frequency and hematuria.   Musculoskeletal: Negative.  Negative for back pain.   Skin: Negative.  Negative for color change, pallor and rash.   Neurological: Positive for loss of consciousness. Negative for dizziness, seizures, syncope and headaches.   Psychiatric/Behavioral: Negative.  Negative for agitation, behavioral problems and hallucinations.   All other systems reviewed and are negative.      Past Medical History:   Diagnosis Date   • COPD (chronic obstructive pulmonary disease) (CMS/[facility])        Allergies   Allergen Reactions   • Codeine GI Intolerance       Past Surgical History:   Procedure Laterality Date   •  SECTION     • CHOLECYSTECTOMY     • ECTOPIC PREGNANCY SURGERY     • TONSILLECTOMY     • TOTAL HIP ARTHROPLASTY         No family history on file.    Social History     Social History   • Marital status:      Social History Main Topics   • Smoking status: Current Every Day Smoker     Packs/day: 1.00     Types: Cigarettes   • Alcohol use No      Comment: once a year    • Drug use: No   • Sexual activity: Defer     Other Topics Concern   • Not on file           Objective   Physical Exam   Constitutional: She is oriented to person, place, and time. She appears well-developed and well-nourished. No distress.   64 yo WF lying in bed.  Patient is tearful due to pain.  She appears in good health for age.   HENT:   Head: Normocephalic. Head is with contusion. Head is without raccoon's eyes, without Garcia's sign, without abrasion, without laceration, without right periorbital erythema and without left periorbital erythema. Hair is normal.       Right Ear: External ear normal.   Left Ear: External ear normal.   Nose: Nose normal.   Mouth/Throat: Oropharynx is clear and moist.   Eyes: Conjunctivae and EOM are normal. Right pupil is round  "and reactive. Left pupil is not reactive. Left pupil is round. Pupils are unequal.   Patient reports her pupils are never equal.  States she went to Dr. Puri several years ago for an eye exam.  They dilated her eyes and her left eye never \"went back to normal\".   Neck: Neck supple. Spinous process tenderness present. No neck rigidity. Decreased range of motion present. No edema and no erythema present.       Cardiovascular: Normal rate, regular rhythm, normal heart sounds and intact distal pulses.  Exam reveals no gallop and no friction rub.    No murmur heard.  Pulmonary/Chest: Effort normal and breath sounds normal.   Abdominal: Soft. Bowel sounds are normal. She exhibits no distension. There is no tenderness.   Musculoskeletal:        Right elbow: She exhibits normal range of motion, no swelling and no deformity. Tenderness found. Medial epicondyle tenderness noted. No radial head, no lateral epicondyle and no olecranon process tenderness noted.        Thoracic back: She exhibits tenderness, bony tenderness and pain. She exhibits normal range of motion, no swelling, no edema and no deformity.        Back:         Arms:       Right lower leg: She exhibits tenderness (minimal soft tissue). She exhibits no bony tenderness, no swelling, no edema and no deformity.        Legs:  Neurological: She is alert and oriented to person, place, and time. She has normal reflexes.   Skin: Skin is warm and dry. She is not diaphoretic.   Psychiatric: She has a normal mood and affect. Her behavior is normal.   Nursing note and vitals reviewed.      Procedures           ED Course  ED Course as of Sep 10 0006   Sun Sep 09, 2018   1553 Patient has scalp contusion.  Pain and tenderness in C-spine and T-spine, left ribs and right elbow.  Giving Toradol for pain.  [SS]   1810 CT and x-rays are all unremarkable.  No acute injury seen.  With loss of consciousness patient has a mild concussion.  Patient lives with her son.  He can keep an " eye on her tonight.  Patient has taken Flexeril before without any sedation.  Will prescribe as she'll have considerable musculoskeletal pain the next couple of days.  Also prescribe NSAIDs for home.  Discussed at length with patient all results, diagnosis and treatment.  [SS]      ED Course User Index  [SS] Husam Rodriguez MD                  MDM  Number of Diagnoses or Management Options  Concussion with loss of consciousness of 30 minutes or less, initial encounter: new and requires workup  Contusion of left chest wall, initial encounter: new and requires workup  Contusion of right elbow, initial encounter: new and requires workup  Contusion of right leg, initial encounter: new and does not require workup  Strain of neck muscle, initial encounter: new and requires workup  Strain of thoracic region, initial encounter: new and requires workup     Amount and/or Complexity of Data Reviewed  Tests in the radiology section of CPT®: reviewed and ordered  Independent visualization of images, tracings, or specimens: yes (X-rays independently read and interpreted by me.)    Risk of Complications, Morbidity, and/or Mortality  Presenting problems: moderate  Diagnostic procedures: high  Management options: moderate    Patient Progress  Patient progress: improved        Final diagnoses:   Concussion with loss of consciousness of 30 minutes or less, initial encounter   Strain of neck muscle, initial encounter   Strain of thoracic region, initial encounter   Contusion of left chest wall, initial encounter   Contusion of right elbow, initial encounter   Contusion of right leg, initial encounter            Husam Rodriguez MD  09/10/18 0006

## 2018-09-24 ENCOUNTER — HOSPITAL ENCOUNTER (OUTPATIENT)
Dept: GENERAL RADIOLOGY | Facility: HOSPITAL | Age: 63
Discharge: HOME | End: 2018-09-24
Attending: FAMILY MEDICINE | Admitting: FAMILY MEDICINE

## 2018-09-24 ENCOUNTER — TRANSCRIBE ORDERS (OUTPATIENT)
Dept: ADMINISTRATIVE | Facility: HOSPITAL | Age: 63
End: 2018-09-24

## 2018-09-24 DIAGNOSIS — R05.9 COUGH: Primary | ICD-10-CM

## 2018-09-24 DIAGNOSIS — R05.9 COUGH: ICD-10-CM

## 2018-09-24 DIAGNOSIS — R07.9 CHEST PAIN, UNSPECIFIED TYPE: ICD-10-CM

## 2018-09-24 PROCEDURE — 71046 X-RAY EXAM CHEST 2 VIEWS: CPT

## 2019-09-05 ENCOUNTER — TRANSCRIBE ORDERS (OUTPATIENT)
Dept: ADMINISTRATIVE | Facility: HOSPITAL | Age: 64
End: 2019-09-05

## 2019-09-05 DIAGNOSIS — Z12.39 SCREENING BREAST EXAMINATION: Primary | ICD-10-CM

## 2019-09-17 ENCOUNTER — HOSPITAL ENCOUNTER (OUTPATIENT)
Dept: MAMMOGRAPHY | Facility: HOSPITAL | Age: 64
Discharge: HOME | End: 2019-09-17
Admitting: FAMILY MEDICINE

## 2019-09-17 DIAGNOSIS — Z12.39 SCREENING BREAST EXAMINATION: ICD-10-CM

## 2019-09-17 PROCEDURE — 77063 BREAST TOMOSYNTHESIS BI: CPT

## 2019-09-17 PROCEDURE — 77067 SCR MAMMO BI INCL CAD: CPT

## 2020-02-26 ENCOUNTER — TRANSCRIBE ORDERS (OUTPATIENT)
Dept: ADMINISTRATIVE | Facility: HOSPITAL | Age: 65
End: 2020-02-26

## 2020-02-26 DIAGNOSIS — Z78.0 MENOPAUSE: Primary | ICD-10-CM

## 2020-06-29 ENCOUNTER — APPOINTMENT (OUTPATIENT)
Dept: BONE DENSITY | Facility: HOSPITAL | Age: 65
End: 2020-06-29

## 2020-06-29 DIAGNOSIS — Z78.0 MENOPAUSE: ICD-10-CM

## 2020-06-29 PROCEDURE — 77080 DXA BONE DENSITY AXIAL: CPT

## 2020-08-04 ENCOUNTER — HOSPITAL ENCOUNTER (OUTPATIENT)
Dept: INFUSION THERAPY | Facility: HOSPITAL | Age: 65
Discharge: HOME | End: 2020-08-04
Admitting: FAMILY MEDICINE

## 2020-08-04 VITALS
TEMPERATURE: 97.6 F | SYSTOLIC BLOOD PRESSURE: 97 MMHG | RESPIRATION RATE: 16 BRPM | HEIGHT: 63 IN | BODY MASS INDEX: 20.55 KG/M2 | WEIGHT: 116 LBS | OXYGEN SATURATION: 92 % | HEART RATE: 80 BPM | DIASTOLIC BLOOD PRESSURE: 61 MMHG

## 2020-08-04 DIAGNOSIS — M81.0 AGE RELATED OSTEOPOROSIS, UNSPECIFIED PATHOLOGICAL FRACTURE PRESENCE: Primary | ICD-10-CM

## 2020-08-04 PROCEDURE — 25010000002 DENOSUMAB 60 MG/ML SOLUTION PREFILLED SYRINGE: Performed by: FAMILY MEDICINE

## 2020-08-04 PROCEDURE — 96372 THER/PROPH/DIAG INJ SC/IM: CPT

## 2020-08-04 RX ADMIN — DENOSUMAB 60 MG: 60 INJECTION SUBCUTANEOUS at 09:21

## 2020-08-04 NOTE — NURSING NOTE
NURSING PROGRESS NOTE:    PATIENT ARRIVED AMBULATORY TO [facility] AT 0825 FOR SCHEDULED INJECTION.  PROCEDURE PERFORMED WITHOUT INCIDENT. AVS REVIEWED WITH PATIENT AND A COPY PROVIDED.  DISCHARGED HOME AT 0945.  KAYCEE ROMERO

## 2020-08-04 NOTE — PATIENT INSTRUCTIONS
"[facility] [phone] if you have any problems or concerns.    We know you have a Choice in healthcare and appreciate you using [facility].  Our purpose is to provide you \"Excellent Care\".  We hope that you will always choose us in the future and continue to recommend us to your family and friends.    Denosumab injection  What is this medicine?  DENOSUMAB (den oh josé mab) slows bone breakdown. Prolia is used to treat osteoporosis in women after menopause and in men, and in people who are taking corticosteroids for 6 months or more. Xgeva is used to treat a high calcium level due to cancer and to prevent bone fractures and other bone problems caused by multiple myeloma or cancer bone metastases. Xgeva is also used to treat giant cell tumor of the bone.  This medicine may be used for other purposes; ask your health care provider or pharmacist if you have questions.  COMMON BRAND NAME(S): Prolia, XGEVA  What should I tell my health care provider before I take this medicine?  They need to know if you have any of these conditions:  · dental disease  · having surgery or tooth extraction  · infection  · kidney disease  · low levels of calcium or Vitamin D in the blood  · malnutrition  · on hemodialysis  · skin conditions or sensitivity  · thyroid or parathyroid disease  · an unusual reaction to denosumab, other medicines, foods, dyes, or preservatives  · pregnant or trying to get pregnant  · breast-feeding  How should I use this medicine?  This medicine is for injection under the skin. It is given by a health care professional in a hospital or clinic setting.  A special MedGuide will be given to you before each treatment. Be sure to read this information carefully each time.  For Prolia, talk to your pediatrician regarding the use of this medicine in children. Special care may be needed. For Xgeva, talk to your pediatrician regarding the use of this medicine " in children. While this drug may be prescribed for children as young as 13 years for selected conditions, precautions do apply.  Overdosage: If you think you have taken too much of this medicine contact a poison control center or emergency room at once.  NOTE: This medicine is only for you. Do not share this medicine with others.  What if I miss a dose?  It is important not to miss your dose. Call your doctor or health care professional if you are unable to keep an appointment.  What may interact with this medicine?  Do not take this medicine with any of the following medications:  · other medicines containing denosumab  This medicine may also interact with the following medications:  · medicines that lower your chance of fighting infection  · steroid medicines like prednisone or cortisone  This list may not describe all possible interactions. Give your health care provider a list of all the medicines, herbs, non-prescription drugs, or dietary supplements you use. Also tell them if you smoke, drink alcohol, or use illegal drugs. Some items may interact with your medicine.  What should I watch for while using this medicine?  Visit your doctor or health care professional for regular checks on your progress. Your doctor or health care professional may order blood tests and other tests to see how you are doing.  Call your doctor or health care professional for advice if you get a fever, chills or sore throat, or other symptoms of a cold or flu. Do not treat yourself. This drug may decrease your body's ability to fight infection. Try to avoid being around people who are sick.  You should make sure you get enough calcium and vitamin D while you are taking this medicine, unless your doctor tells you not to. Discuss the foods you eat and the vitamins you take with your health care professional.  See your dentist regularly. Brush and floss your teeth as directed. Before you have any dental work done, tell your dentist you  are receiving this medicine.  Do not become pregnant while taking this medicine or for 5 months after stopping it. Talk with your doctor or health care professional about your birth control options while taking this medicine. Women should inform their doctor if they wish to become pregnant or think they might be pregnant. There is a potential for serious side effects to an unborn child. Talk to your health care professional or pharmacist for more information.  What side effects may I notice from receiving this medicine?  Side effects that you should report to your doctor or health care professional as soon as possible:  · allergic reactions like skin rash, itching or hives, swelling of the face, lips, or tongue  · bone pain  · breathing problems  · dizziness  · jaw pain, especially after dental work  · redness, blistering, peeling of the skin  · signs and symptoms of infection like fever or chills; cough; sore throat; pain or trouble passing urine  · signs of low calcium like fast heartbeat, muscle cramps or muscle pain; pain, tingling, numbness in the hands or feet; seizures  · unusual bleeding or bruising  · unusually weak or tired  Side effects that usually do not require medical attention (report to your doctor or health care professional if they continue or are bothersome):  · constipation  · diarrhea  · headache  · joint pain  · loss of appetite  · muscle pain  · runny nose  · tiredness  · upset stomach  This list may not describe all possible side effects. Call your doctor for medical advice about side effects. You may report side effects to FDA at 3-021-FDA-3513.  Where should I keep my medicine?  This medicine is only given in a clinic, doctor's office, or other health care setting and will not be stored at home.  NOTE: This sheet is a summary. It may not cover all possible information. If you have questions about this medicine, talk to your doctor, pharmacist, or health care provider.  © 2020 [employer] (2019-04-26 16:10:44)

## 2020-09-14 ENCOUNTER — HOSPITAL ENCOUNTER (OUTPATIENT)
Dept: CT IMAGING | Facility: HOSPITAL | Age: 65
Discharge: HOME | End: 2020-09-14

## 2020-09-14 ENCOUNTER — CLINICAL SUPPORT (OUTPATIENT)
Dept: OTHER | Facility: HOSPITAL | Age: 65
End: 2020-09-14

## 2020-09-14 VITALS — BODY MASS INDEX: 19.85 KG/M2 | HEIGHT: 64 IN | WEIGHT: 116.3 LBS

## 2020-09-14 DIAGNOSIS — Z12.2 SCREENING FOR MALIGNANT NEOPLASM OF RESPIRATORY ORGAN: ICD-10-CM

## 2020-09-14 DIAGNOSIS — Z12.2 SCREENING FOR MALIGNANT NEOPLASM OF RESPIRATORY ORGAN: Primary | ICD-10-CM

## 2020-09-14 DIAGNOSIS — F17.210 SMOKING GREATER THAN 30 PACK YEARS: ICD-10-CM

## 2020-09-14 PROCEDURE — G0296 VISIT TO DETERM LDCT ELIG: HCPCS | Performed by: CLINICAL NURSE SPECIALIST

## 2020-09-14 PROCEDURE — G0297 LDCT FOR LUNG CA SCREEN: HCPCS

## 2020-09-15 NOTE — PROGRESS NOTES
[facility] Low-Dose Lung Cancer CT Screening Visit    Pili Arechiga is a pleasant 65 y.o. female seen today at the request of Dr. De Paz in our Multidisciplinary Clinic, being seen for Lung Cancer Screening Counseling and a Shared Decision Making Visit prior to Low-Dose Lung Cancer Screening CT exam.    SMOKING HISTORY:   Social History     Tobacco Use   Smoking Status Current Every Day Smoker   • Packs/day: 1.00   • Types: Cigarettes   Smokeless Tobacco Never Used   Tobacco Comment    Began smoking at age 11.  Smoked .5 ppd for 6 years, 2.5 ppd for a year, 2 ppd for 5 years, and 1 ppd for the past 43 years for a 58.5 pack year history.       Pili Arechiga is currently smoking 1 pack per day with a 58.5 pack year history.  Reports no use of alternate forms of tobacco, electronic cigarettes, marijuana or other substances.  Based on the recommendation of the [place] Preventive Services Task Force, this patient is at high risk for lung cancer and a low-dose CT screening scan is recommended.     We discussed the connection between Radon and Lung Cancer and the availability of free test kits.  She has a walk-out basement that has never been tested for Radon.  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  Some second-hand smoke exposure as a child with her father smoking in their home.  She is currently exposed to second-hand smoke as well.    The patient has had no hemoptysis, unintentional weight loss or increasing shortness of breath. The patient is asymptomatic and has no signs or symptoms of lung cancer.   The patient reports NO personal history of cancer.  Additionally, reports family history of lung cancer in her father.  She reports having pneumonia and chronic bronchitis annually.  We discussed the findings on her most recent CT chest of mild emphysema and a stable 6 mm lung  nodule in the right lower lobe.    Together we discussed the potential benefits and potential harms of being screened for lung cancer including the potential for follow-up diagnostic testing, risk for over diagnosis, false positive rate and total radiation exposure using the [facility] standardized decision aid. We reviewed the patient's smoking history and counseled on the importance and health benefits of quitting smoking.    Smoking Cessation  DISCUSSION HELD TODAY:     We discussed that there are a number of resources and tobacco cessation interventions to assist with smoking cessation including the 1-800-Quit Now line, Health Department programs, [place] [facility] resources, and use of the U.S. Department of Health and Human Services five keys for quitting and quit plan worksheet.  On a scale of zero to ten, the patient rates their motivation and readiness to quit at a 6 or 7 out of 10 today.  She is quite stressed now so she does not think this is the right time.  Her son is incarcerated and she has been taking care of his home and business for the past three years until he returns.  She works daily trying to sustain his [employer] business.    Recommendations for continued lung cancer screening:      We discussed the NCCN guidelines for lung cancer screening and the patient verbalized understanding that annual screening is recommended until fifteen years beyond smoking as long as they have no other disease or comorbidity that would prevent them from receiving cancer treatments such as surgery should a lung cancer be detected. The [facility] Lung Cancer Screening Shared Decision-Making Tool was utilized as an aid in discussing whether or not screening was right. The patient has decided to proceed with a Low Dose Lung Cancer Screening CT today. The patient is aware that the results of his screening will be shared with the referring provider or PCP as well.       The patient verbalizes  understanding that results of this low dose lung CT exam will be called and that assistance will be provided in arranging any necessary follow-up.    After review of the NCCN guidelines and recommendations for ongoing screening, the patient verbalized understanding of recommendations for follow-up. We discussed the importance of adherence to continued annual screening until 15 years beyond smoking or until other life-limiting comorbidities are present. We discussed the impact of comorbidities and ability and willing to undergo diagnosis and treatment.    The patient has been counseled on the health benefits of quitting tobacco, smoking cessation strategies and resources, as well as the importance of adherence to annual lung cancer low-dose CT screening.     Aylin Davis, MSN, APRN, ACNS-BC, AOCN, CHPN  Clinical Nurse Specialist  [facility]

## 2020-09-17 NOTE — PROGRESS NOTES
I have called the patient in follow-up with results - LungRADS Category 1 Negative.  Nothing concerning for cancer but we discussed the findings of emphysema and fibrosis as well as the slight increase in the right lower lobe opacity/nodule from 2014 thought to be chronic inflammation.  She verbalizes understanding that her next low-dose lung cancer screening CT is due in 12 months.  She should be screened annually through age 79.

## 2020-09-22 ENCOUNTER — APPOINTMENT (OUTPATIENT)
Dept: CT IMAGING | Facility: HOSPITAL | Age: 65
End: 2020-09-22

## 2020-09-22 ENCOUNTER — HOSPITAL ENCOUNTER (INPATIENT)
Facility: HOSPITAL | Age: 65
LOS: 6 days | Discharge: HOME | End: 2020-09-28
Attending: EMERGENCY MEDICINE | Admitting: FAMILY MEDICINE

## 2020-09-22 ENCOUNTER — APPOINTMENT (OUTPATIENT)
Dept: GENERAL RADIOLOGY | Facility: HOSPITAL | Age: 65
End: 2020-09-22

## 2020-09-22 DIAGNOSIS — R65.20 SEPSIS WITH ACUTE RENAL FAILURE WITHOUT SEPTIC SHOCK, DUE TO UNSPECIFIED ORGANISM, UNSPECIFIED ACUTE RENAL FAILURE TYPE (HCC): ICD-10-CM

## 2020-09-22 DIAGNOSIS — J18.9 PNEUMONIA OF BOTH LUNGS DUE TO INFECTIOUS ORGANISM, UNSPECIFIED PART OF LUNG: Primary | ICD-10-CM

## 2020-09-22 DIAGNOSIS — A41.9 SEPSIS WITH ACUTE RENAL FAILURE WITHOUT SEPTIC SHOCK, DUE TO UNSPECIFIED ORGANISM, UNSPECIFIED ACUTE RENAL FAILURE TYPE (HCC): ICD-10-CM

## 2020-09-22 DIAGNOSIS — N17.9 SEPSIS WITH ACUTE RENAL FAILURE WITHOUT SEPTIC SHOCK, DUE TO UNSPECIFIED ORGANISM, UNSPECIFIED ACUTE RENAL FAILURE TYPE (HCC): ICD-10-CM

## 2020-09-22 LAB
ALBUMIN SERPL-MCNC: 3.7 G/DL (ref 3.5–5.2)
ALBUMIN/GLOB SERPL: 1 G/DL
ALP SERPL-CCNC: 108 U/L (ref 39–117)
ALT SERPL W P-5'-P-CCNC: 13 U/L (ref 1–33)
ANION GAP SERPL CALCULATED.3IONS-SCNC: 15.2 MMOL/L (ref 5–15)
ANION GAP SERPL CALCULATED.3IONS-SCNC: 15.9 MMOL/L (ref 5–15)
ARTERIAL PATENCY WRIST A: ABNORMAL
AST SERPL-CCNC: 48 U/L (ref 1–32)
ATMOSPHERIC PRESS: 744 MMHG
B PARAPERT DNA SPEC QL NAA+PROBE: NOT DETECTED
B PERT DNA SPEC QL NAA+PROBE: NOT DETECTED
BASE EXCESS BLDA CALC-SCNC: -1.4 MMOL/L (ref 0–2)
BASOPHILS # BLD AUTO: 0.04 10*3/MM3 (ref 0–0.2)
BASOPHILS # BLD AUTO: 0.04 10*3/MM3 (ref 0–0.2)
BASOPHILS NFR BLD AUTO: 0.2 % (ref 0–1.5)
BASOPHILS NFR BLD AUTO: 0.2 % (ref 0–1.5)
BDY SITE: ABNORMAL
BILIRUB SERPL-MCNC: 1.1 MG/DL (ref 0–1.2)
BODY TEMPERATURE: 37 C
BUN SERPL-MCNC: 25 MG/DL (ref 8–23)
BUN SERPL-MCNC: 27 MG/DL (ref 8–23)
BUN/CREAT SERPL: 13.8 (ref 7–25)
BUN/CREAT SERPL: 15.8 (ref 7–25)
C PNEUM DNA NPH QL NAA+NON-PROBE: NOT DETECTED
CALCIUM SPEC-SCNC: 7.1 MG/DL (ref 8.6–10.5)
CALCIUM SPEC-SCNC: 7.6 MG/DL (ref 8.6–10.5)
CHLORIDE SERPL-SCNC: 89 MMOL/L (ref 98–107)
CHLORIDE SERPL-SCNC: 93 MMOL/L (ref 98–107)
CO2 SERPL-SCNC: 22.8 MMOL/L (ref 22–29)
CO2 SERPL-SCNC: 25.1 MMOL/L (ref 22–29)
CREAT SERPL-MCNC: 1.71 MG/DL (ref 0.57–1)
CREAT SERPL-MCNC: 1.81 MG/DL (ref 0.57–1)
CRP SERPL-MCNC: 47.74 MG/DL (ref 0–0.5)
D DIMER PPP FEU-MCNC: 2.25 MCGFEU/ML (ref 0–0.46)
D-LACTATE SERPL-SCNC: 1 MMOL/L (ref 0.5–2)
DEPRECATED RDW RBC AUTO: 45.9 FL (ref 37–54)
DEPRECATED RDW RBC AUTO: 46.9 FL (ref 37–54)
EOSINOPHIL # BLD AUTO: 0 10*3/MM3 (ref 0–0.4)
EOSINOPHIL # BLD AUTO: 0.02 10*3/MM3 (ref 0–0.4)
EOSINOPHIL NFR BLD AUTO: 0 % (ref 0.3–6.2)
EOSINOPHIL NFR BLD AUTO: 0.1 % (ref 0.3–6.2)
ERYTHROCYTE [DISTWIDTH] IN BLOOD BY AUTOMATED COUNT: 13 % (ref 12.3–15.4)
ERYTHROCYTE [DISTWIDTH] IN BLOOD BY AUTOMATED COUNT: 13.2 % (ref 12.3–15.4)
FERRITIN SERPL-MCNC: 409 NG/ML (ref 13–150)
FLUAV H1 2009 PAND RNA NPH QL NAA+PROBE: NOT DETECTED
FLUAV H1 HA GENE NPH QL NAA+PROBE: NOT DETECTED
FLUAV H3 RNA NPH QL NAA+PROBE: NOT DETECTED
FLUAV SUBTYP SPEC NAA+PROBE: NOT DETECTED
FLUBV RNA ISLT QL NAA+PROBE: NOT DETECTED
GAS FLOW AIRWAY: 3 LPM
GFR SERPL CREATININE-BSD FRML MDRD: 28 ML/MIN/1.73
GFR SERPL CREATININE-BSD FRML MDRD: 30 ML/MIN/1.73
GLOBULIN UR ELPH-MCNC: 3.6 GM/DL
GLUCOSE SERPL-MCNC: 117 MG/DL (ref 65–99)
GLUCOSE SERPL-MCNC: 122 MG/DL (ref 65–99)
HADV DNA SPEC NAA+PROBE: NOT DETECTED
HCO3 BLDA-SCNC: 23.7 MMOL/L (ref 20–26)
HCOV 229E RNA SPEC QL NAA+PROBE: NOT DETECTED
HCOV HKU1 RNA SPEC QL NAA+PROBE: NOT DETECTED
HCOV NL63 RNA SPEC QL NAA+PROBE: NOT DETECTED
HCOV OC43 RNA SPEC QL NAA+PROBE: NOT DETECTED
HCT VFR BLD AUTO: 35.7 % (ref 34–46.6)
HCT VFR BLD AUTO: 39.4 % (ref 34–46.6)
HGB BLD-MCNC: 11.3 G/DL (ref 12–15.9)
HGB BLD-MCNC: 12.7 G/DL (ref 12–15.9)
HGB BLDA-MCNC: 11.5 G/DL (ref 13.5–17.5)
HMPV RNA NPH QL NAA+NON-PROBE: NOT DETECTED
HOLD SPECIMEN: NORMAL
HOLD SPECIMEN: NORMAL
HPIV1 RNA SPEC QL NAA+PROBE: NOT DETECTED
HPIV2 RNA SPEC QL NAA+PROBE: NOT DETECTED
HPIV3 RNA NPH QL NAA+PROBE: NOT DETECTED
HPIV4 P GENE NPH QL NAA+PROBE: NOT DETECTED
IMM GRANULOCYTES # BLD AUTO: 0.04 10*3/MM3 (ref 0–0.05)
IMM GRANULOCYTES # BLD AUTO: 0.08 10*3/MM3 (ref 0–0.05)
IMM GRANULOCYTES NFR BLD AUTO: 0.2 % (ref 0–0.5)
IMM GRANULOCYTES NFR BLD AUTO: 0.5 % (ref 0–0.5)
LYMPHOCYTES # BLD AUTO: 0.83 10*3/MM3 (ref 0.7–3.1)
LYMPHOCYTES # BLD AUTO: 2.11 10*3/MM3 (ref 0.7–3.1)
LYMPHOCYTES NFR BLD AUTO: 10.7 % (ref 19.6–45.3)
LYMPHOCYTES NFR BLD AUTO: 5.1 % (ref 19.6–45.3)
Lab: ABNORMAL
M PNEUMO IGG SER IA-ACNC: NOT DETECTED
MCH RBC QN AUTO: 30.5 PG (ref 26.6–33)
MCH RBC QN AUTO: 30.5 PG (ref 26.6–33)
MCHC RBC AUTO-ENTMCNC: 31.7 G/DL (ref 31.5–35.7)
MCHC RBC AUTO-ENTMCNC: 32.2 G/DL (ref 31.5–35.7)
MCV RBC AUTO: 94.5 FL (ref 79–97)
MCV RBC AUTO: 96.2 FL (ref 79–97)
MODALITY: ABNORMAL
MONOCYTES # BLD AUTO: 0.41 10*3/MM3 (ref 0.1–0.9)
MONOCYTES # BLD AUTO: 1.19 10*3/MM3 (ref 0.1–0.9)
MONOCYTES NFR BLD AUTO: 2.5 % (ref 5–12)
MONOCYTES NFR BLD AUTO: 6 % (ref 5–12)
NEUTROPHILS NFR BLD AUTO: 14.83 10*3/MM3 (ref 1.7–7)
NEUTROPHILS NFR BLD AUTO: 16.29 10*3/MM3 (ref 1.7–7)
NEUTROPHILS NFR BLD AUTO: 82.8 % (ref 42.7–76)
NEUTROPHILS NFR BLD AUTO: 91.7 % (ref 42.7–76)
NOTIFIED BY: ABNORMAL
NOTIFIED WHO: ABNORMAL
NRBC BLD AUTO-RTO: 0 /100 WBC (ref 0–0.2)
NT-PROBNP SERPL-MCNC: ABNORMAL PG/ML (ref 0–900)
PCO2 BLDA: 40.3 MM HG (ref 35–45)
PCO2 TEMP ADJ BLD: 40.3 MM HG (ref 35–45)
PH BLDA: 7.38 PH UNITS (ref 7.35–7.45)
PH, TEMP CORRECTED: 7.38 PH UNITS (ref 7.35–7.45)
PLATELET # BLD AUTO: 235 10*3/MM3 (ref 140–450)
PLATELET # BLD AUTO: 269 10*3/MM3 (ref 140–450)
PMV BLD AUTO: 10 FL (ref 6–12)
PMV BLD AUTO: 10.2 FL (ref 6–12)
PO2 BLDA: 53.8 MM HG (ref 83–108)
PO2 TEMP ADJ BLD: 53.8 MM HG (ref 83–108)
POTASSIUM SERPL-SCNC: 4.6 MMOL/L (ref 3.5–5.2)
POTASSIUM SERPL-SCNC: 4.7 MMOL/L (ref 3.5–5.2)
PROCALCITONIN SERPL-MCNC: 1.87 NG/ML (ref 0–0.25)
PROT SERPL-MCNC: 7.3 G/DL (ref 6–8.5)
RBC # BLD AUTO: 3.71 10*6/MM3 (ref 3.77–5.28)
RBC # BLD AUTO: 4.17 10*6/MM3 (ref 3.77–5.28)
RHINOVIRUS RNA SPEC NAA+PROBE: NOT DETECTED
RSV RNA NPH QL NAA+NON-PROBE: NOT DETECTED
SAO2 % BLDCOA: 88.4 % (ref 94–99)
SARS-COV-2 RNA PNL SPEC NAA+PROBE: NOT DETECTED
SODIUM SERPL-SCNC: 130 MMOL/L (ref 136–145)
SODIUM SERPL-SCNC: 131 MMOL/L (ref 136–145)
TROPONIN T SERPL-MCNC: 0.02 NG/ML (ref 0–0.03)
TROPONIN T SERPL-MCNC: 0.03 NG/ML (ref 0–0.03)
VENTILATOR MODE: ABNORMAL
WBC # BLD AUTO: 16.19 10*3/MM3 (ref 3.4–10.8)
WBC # BLD AUTO: 19.69 10*3/MM3 (ref 3.4–10.8)
WHOLE BLOOD HOLD SPECIMEN: NORMAL
WHOLE BLOOD HOLD SPECIMEN: NORMAL

## 2020-09-22 PROCEDURE — G0378 HOSPITAL OBSERVATION PER HR: HCPCS

## 2020-09-22 PROCEDURE — 25010000002 ENOXAPARIN PER 10 MG: Performed by: FAMILY MEDICINE

## 2020-09-22 PROCEDURE — 85025 COMPLETE CBC W/AUTO DIFF WBC: CPT

## 2020-09-22 PROCEDURE — 87635 SARS-COV-2 COVID-19 AMP PRB: CPT

## 2020-09-22 PROCEDURE — 83880 ASSAY OF NATRIURETIC PEPTIDE: CPT

## 2020-09-22 PROCEDURE — 84145 PROCALCITONIN (PCT): CPT | Performed by: EMERGENCY MEDICINE

## 2020-09-22 PROCEDURE — 25010000002 METHYLPREDNISOLONE PER 125 MG: Performed by: FAMILY MEDICINE

## 2020-09-22 PROCEDURE — 94799 UNLISTED PULMONARY SVC/PX: CPT

## 2020-09-22 PROCEDURE — 94640 AIRWAY INHALATION TREATMENT: CPT

## 2020-09-22 PROCEDURE — 86140 C-REACTIVE PROTEIN: CPT | Performed by: EMERGENCY MEDICINE

## 2020-09-22 PROCEDURE — 82728 ASSAY OF FERRITIN: CPT | Performed by: EMERGENCY MEDICINE

## 2020-09-22 PROCEDURE — 85379 FIBRIN DEGRADATION QUANT: CPT | Performed by: EMERGENCY MEDICINE

## 2020-09-22 PROCEDURE — 93010 ELECTROCARDIOGRAM REPORT: CPT | Performed by: INTERNAL MEDICINE

## 2020-09-22 PROCEDURE — 83605 ASSAY OF LACTIC ACID: CPT | Performed by: EMERGENCY MEDICINE

## 2020-09-22 PROCEDURE — 87040 BLOOD CULTURE FOR BACTERIA: CPT | Performed by: EMERGENCY MEDICINE

## 2020-09-22 PROCEDURE — 99285 EMERGENCY DEPT VISIT HI MDM: CPT

## 2020-09-22 PROCEDURE — 25010000002 METHYLPREDNISOLONE PER 125 MG: Performed by: EMERGENCY MEDICINE

## 2020-09-22 PROCEDURE — 85025 COMPLETE CBC W/AUTO DIFF WBC: CPT | Performed by: FAMILY MEDICINE

## 2020-09-22 PROCEDURE — 80048 BASIC METABOLIC PNL TOTAL CA: CPT | Performed by: FAMILY MEDICINE

## 2020-09-22 PROCEDURE — 25010000002 CEFTRIAXONE SODIUM-DEXTROSE 1-3.74 GM-%(50ML) RECONSTITUTED SOLUTION: Performed by: EMERGENCY MEDICINE

## 2020-09-22 PROCEDURE — 87070 CULTURE OTHR SPECIMN AEROBIC: CPT | Performed by: EMERGENCY MEDICINE

## 2020-09-22 PROCEDURE — 84484 ASSAY OF TROPONIN QUANT: CPT | Performed by: EMERGENCY MEDICINE

## 2020-09-22 PROCEDURE — 87077 CULTURE AEROBIC IDENTIFY: CPT | Performed by: EMERGENCY MEDICINE

## 2020-09-22 PROCEDURE — 80053 COMPREHEN METABOLIC PANEL: CPT

## 2020-09-22 PROCEDURE — 84484 ASSAY OF TROPONIN QUANT: CPT

## 2020-09-22 PROCEDURE — 82803 BLOOD GASES ANY COMBINATION: CPT

## 2020-09-22 PROCEDURE — 93005 ELECTROCARDIOGRAM TRACING: CPT | Performed by: EMERGENCY MEDICINE

## 2020-09-22 PROCEDURE — 0100U HC BIOFIRE FILMARRAY RESP PANEL 2: CPT | Performed by: FAMILY MEDICINE

## 2020-09-22 PROCEDURE — 71045 X-RAY EXAM CHEST 1 VIEW: CPT

## 2020-09-22 PROCEDURE — 87205 SMEAR GRAM STAIN: CPT | Performed by: EMERGENCY MEDICINE

## 2020-09-22 PROCEDURE — 87185 SC STD ENZYME DETCJ PER NZM: CPT | Performed by: EMERGENCY MEDICINE

## 2020-09-22 PROCEDURE — 94761 N-INVAS EAR/PLS OXIMETRY MLT: CPT

## 2020-09-22 PROCEDURE — 93005 ELECTROCARDIOGRAM TRACING: CPT

## 2020-09-22 PROCEDURE — 36600 WITHDRAWAL OF ARTERIAL BLOOD: CPT

## 2020-09-22 PROCEDURE — 71250 CT THORAX DX C-: CPT

## 2020-09-22 PROCEDURE — 99291 CRITICAL CARE FIRST HOUR: CPT | Performed by: EMERGENCY MEDICINE

## 2020-09-22 RX ORDER — ACETAMINOPHEN 325 MG/1
650 TABLET ORAL EVERY 4 HOURS PRN
Status: DISCONTINUED | OUTPATIENT
Start: 2020-09-22 | End: 2020-09-28 | Stop reason: HOSPADM

## 2020-09-22 RX ORDER — SODIUM CHLORIDE 9 MG/ML
75 INJECTION, SOLUTION INTRAVENOUS CONTINUOUS
Status: DISCONTINUED | OUTPATIENT
Start: 2020-09-22 | End: 2020-09-24

## 2020-09-22 RX ORDER — CEFTRIAXONE 1 G/50ML
1 INJECTION, SOLUTION INTRAVENOUS ONCE
Status: COMPLETED | OUTPATIENT
Start: 2020-09-22 | End: 2020-09-22

## 2020-09-22 RX ORDER — SODIUM CHLORIDE 9 MG/ML
INJECTION, SOLUTION INTRAVENOUS
Status: DISPENSED
Start: 2020-09-22 | End: 2020-09-23

## 2020-09-22 RX ORDER — MIRTAZAPINE 15 MG/1
15 TABLET, FILM COATED ORAL NIGHTLY
Status: DISCONTINUED | OUTPATIENT
Start: 2020-09-22 | End: 2020-09-28 | Stop reason: HOSPADM

## 2020-09-22 RX ORDER — ACETAMINOPHEN 650 MG/1
650 SUPPOSITORY RECTAL EVERY 4 HOURS PRN
Status: DISCONTINUED | OUTPATIENT
Start: 2020-09-22 | End: 2020-09-28 | Stop reason: HOSPADM

## 2020-09-22 RX ORDER — METHYLPREDNISOLONE SODIUM SUCCINATE 125 MG/2ML
60 INJECTION, POWDER, LYOPHILIZED, FOR SOLUTION INTRAMUSCULAR; INTRAVENOUS EVERY 8 HOURS
Status: DISCONTINUED | OUTPATIENT
Start: 2020-09-22 | End: 2020-09-24

## 2020-09-22 RX ORDER — DIAZEPAM 5 MG/1
10 TABLET ORAL NIGHTLY
Status: DISCONTINUED | OUTPATIENT
Start: 2020-09-22 | End: 2020-09-24

## 2020-09-22 RX ORDER — CEFTRIAXONE 1 G/50ML
1 INJECTION, SOLUTION INTRAVENOUS EVERY 24 HOURS
Status: DISCONTINUED | OUTPATIENT
Start: 2020-09-22 | End: 2020-09-22

## 2020-09-22 RX ORDER — METHYLPREDNISOLONE SODIUM SUCCINATE 125 MG/2ML
125 INJECTION, POWDER, LYOPHILIZED, FOR SOLUTION INTRAMUSCULAR; INTRAVENOUS ONCE
Status: COMPLETED | OUTPATIENT
Start: 2020-09-22 | End: 2020-09-22

## 2020-09-22 RX ORDER — GUAIFENESIN 600 MG/1
600 TABLET, EXTENDED RELEASE ORAL EVERY 12 HOURS SCHEDULED
Status: DISCONTINUED | OUTPATIENT
Start: 2020-09-22 | End: 2020-09-28 | Stop reason: HOSPADM

## 2020-09-22 RX ORDER — IPRATROPIUM BROMIDE AND ALBUTEROL SULFATE 2.5; .5 MG/3ML; MG/3ML
3 SOLUTION RESPIRATORY (INHALATION)
Status: DISCONTINUED | OUTPATIENT
Start: 2020-09-22 | End: 2020-09-22 | Stop reason: ALTCHOICE

## 2020-09-22 RX ORDER — SODIUM CHLORIDE 0.9 % (FLUSH) 0.9 %
10 SYRINGE (ML) INJECTION AS NEEDED
Status: DISCONTINUED | OUTPATIENT
Start: 2020-09-22 | End: 2020-09-28 | Stop reason: HOSPADM

## 2020-09-22 RX ORDER — FAMOTIDINE 10 MG/ML
20 INJECTION, SOLUTION INTRAVENOUS EVERY 12 HOURS SCHEDULED
Status: DISCONTINUED | OUTPATIENT
Start: 2020-09-22 | End: 2020-09-24

## 2020-09-22 RX ORDER — HYDROCODONE BITARTRATE AND ACETAMINOPHEN 10; 325 MG/1; MG/1
1 TABLET ORAL EVERY 6 HOURS PRN
Status: DISCONTINUED | OUTPATIENT
Start: 2020-09-22 | End: 2020-09-28 | Stop reason: HOSPADM

## 2020-09-22 RX ORDER — MIRTAZAPINE 15 MG/1
15 TABLET, FILM COATED ORAL NIGHTLY
Status: ON HOLD | COMMUNITY
Start: 2014-12-01 | End: 2023-01-21

## 2020-09-22 RX ORDER — ACETAMINOPHEN 160 MG/5ML
650 SOLUTION ORAL EVERY 4 HOURS PRN
Status: DISCONTINUED | OUTPATIENT
Start: 2020-09-22 | End: 2020-09-28 | Stop reason: HOSPADM

## 2020-09-22 RX ORDER — BUDESONIDE AND FORMOTEROL FUMARATE DIHYDRATE 160; 4.5 UG/1; UG/1
2 AEROSOL RESPIRATORY (INHALATION)
Status: DISCONTINUED | OUTPATIENT
Start: 2020-09-22 | End: 2020-09-24

## 2020-09-22 RX ORDER — CALCIUM CARBONATE 200(500)MG
1 TABLET,CHEWABLE ORAL 2 TIMES DAILY PRN
Status: DISCONTINUED | OUTPATIENT
Start: 2020-09-22 | End: 2020-09-27

## 2020-09-22 RX ORDER — SODIUM CHLORIDE 9 MG/ML
40 INJECTION, SOLUTION INTRAVENOUS AS NEEDED
Status: DISCONTINUED | OUTPATIENT
Start: 2020-09-22 | End: 2020-09-28 | Stop reason: HOSPADM

## 2020-09-22 RX ORDER — ALBUTEROL SULFATE 90 UG/1
2 AEROSOL, METERED RESPIRATORY (INHALATION)
Status: DISCONTINUED | OUTPATIENT
Start: 2020-09-22 | End: 2020-09-24

## 2020-09-22 RX ORDER — ALBUTEROL SULFATE 90 UG/1
4 AEROSOL, METERED RESPIRATORY (INHALATION)
Status: DISCONTINUED | OUTPATIENT
Start: 2020-09-22 | End: 2020-09-28 | Stop reason: HOSPADM

## 2020-09-22 RX ORDER — NITROGLYCERIN 0.4 MG/1
0.4 TABLET SUBLINGUAL
Status: DISCONTINUED | OUTPATIENT
Start: 2020-09-22 | End: 2020-09-28 | Stop reason: HOSPADM

## 2020-09-22 RX ORDER — SODIUM CHLORIDE 0.9 % (FLUSH) 0.9 %
1-10 SYRINGE (ML) INJECTION AS NEEDED
Status: DISCONTINUED | OUTPATIENT
Start: 2020-09-22 | End: 2020-09-28 | Stop reason: HOSPADM

## 2020-09-22 RX ORDER — ONDANSETRON 4 MG/1
4 TABLET, FILM COATED ORAL EVERY 6 HOURS PRN
Status: DISCONTINUED | OUTPATIENT
Start: 2020-09-22 | End: 2020-09-28 | Stop reason: HOSPADM

## 2020-09-22 RX ORDER — CEFTRIAXONE 1 G/50ML
1 INJECTION, SOLUTION INTRAVENOUS EVERY 24 HOURS
Status: DISCONTINUED | OUTPATIENT
Start: 2020-09-23 | End: 2020-09-27

## 2020-09-22 RX ORDER — ARFORMOTEROL TARTRATE 15 UG/2ML
15 SOLUTION RESPIRATORY (INHALATION)
Status: DISCONTINUED | OUTPATIENT
Start: 2020-09-22 | End: 2020-09-22 | Stop reason: CLARIF

## 2020-09-22 RX ORDER — ASPIRIN 81 MG/1
324 TABLET, CHEWABLE ORAL ONCE
Status: COMPLETED | OUTPATIENT
Start: 2020-09-22 | End: 2020-09-22

## 2020-09-22 RX ORDER — BISACODYL 5 MG/1
5 TABLET, DELAYED RELEASE ORAL DAILY PRN
Status: DISCONTINUED | OUTPATIENT
Start: 2020-09-22 | End: 2020-09-28 | Stop reason: HOSPADM

## 2020-09-22 RX ORDER — ONDANSETRON 2 MG/ML
4 INJECTION INTRAMUSCULAR; INTRAVENOUS EVERY 6 HOURS PRN
Status: DISCONTINUED | OUTPATIENT
Start: 2020-09-22 | End: 2020-09-28 | Stop reason: HOSPADM

## 2020-09-22 RX ORDER — SODIUM CHLORIDE 0.9 % (FLUSH) 0.9 %
10 SYRINGE (ML) INJECTION EVERY 12 HOURS SCHEDULED
Status: DISCONTINUED | OUTPATIENT
Start: 2020-09-22 | End: 2020-09-28 | Stop reason: HOSPADM

## 2020-09-22 RX ORDER — ALUMINA, MAGNESIA, AND SIMETHICONE 2400; 2400; 240 MG/30ML; MG/30ML; MG/30ML
15 SUSPENSION ORAL EVERY 6 HOURS PRN
Status: DISCONTINUED | OUTPATIENT
Start: 2020-09-22 | End: 2020-09-28 | Stop reason: HOSPADM

## 2020-09-22 RX ADMIN — ALBUTEROL SULFATE 2 PUFF: 90 AEROSOL, METERED RESPIRATORY (INHALATION) at 20:05

## 2020-09-22 RX ADMIN — FAMOTIDINE 20 MG: 10 INJECTION INTRAVENOUS at 20:32

## 2020-09-22 RX ADMIN — IPRATROPIUM BROMIDE 2 PUFF: 17 AEROSOL, METERED RESPIRATORY (INHALATION) at 20:04

## 2020-09-22 RX ADMIN — ASPIRIN 81 MG CHEWABLE TABLET 324 MG: 81 TABLET CHEWABLE at 12:54

## 2020-09-22 RX ADMIN — DIAZEPAM 10 MG: 5 TABLET ORAL at 20:32

## 2020-09-22 RX ADMIN — ENOXAPARIN SODIUM 30 MG: 30 INJECTION SUBCUTANEOUS at 20:34

## 2020-09-22 RX ADMIN — DOXYCYCLINE 100 MG: 100 INJECTION, POWDER, LYOPHILIZED, FOR SOLUTION INTRAVENOUS at 12:58

## 2020-09-22 RX ADMIN — CEFTRIAXONE 1 G: 1 INJECTION, SOLUTION INTRAVENOUS at 15:07

## 2020-09-22 RX ADMIN — ALBUTEROL SULFATE 4 PUFF: 90 AEROSOL, METERED RESPIRATORY (INHALATION) at 12:27

## 2020-09-22 RX ADMIN — METHYLPREDNISOLONE SODIUM SUCCINATE 125 MG: 125 INJECTION, POWDER, FOR SOLUTION INTRAMUSCULAR; INTRAVENOUS at 12:11

## 2020-09-22 RX ADMIN — BUDESONIDE AND FORMOTEROL FUMARATE DIHYDRATE 2 PUFF: 160; 4.5 AEROSOL RESPIRATORY (INHALATION) at 20:10

## 2020-09-22 RX ADMIN — GUAIFENESIN 600 MG: 600 TABLET, EXTENDED RELEASE ORAL at 20:32

## 2020-09-22 RX ADMIN — METHYLPREDNISOLONE SODIUM SUCCINATE 60 MG: 125 INJECTION, POWDER, FOR SOLUTION INTRAMUSCULAR; INTRAVENOUS at 20:33

## 2020-09-22 RX ADMIN — IPRATROPIUM BROMIDE 2 PUFF: 17 AEROSOL, METERED RESPIRATORY (INHALATION) at 23:53

## 2020-09-22 RX ADMIN — ALBUTEROL SULFATE 2 PUFF: 90 AEROSOL, METERED RESPIRATORY (INHALATION) at 23:53

## 2020-09-22 RX ADMIN — MIRTAZAPINE 15 MG: 15 TABLET, FILM COATED ORAL at 20:33

## 2020-09-22 RX ADMIN — SODIUM CHLORIDE 1000 ML: 9 INJECTION, SOLUTION INTRAVENOUS at 12:10

## 2020-09-23 ENCOUNTER — APPOINTMENT (OUTPATIENT)
Dept: ULTRASOUND IMAGING | Facility: HOSPITAL | Age: 65
End: 2020-09-23

## 2020-09-23 LAB
ANION GAP SERPL CALCULATED.3IONS-SCNC: 14.1 MMOL/L (ref 5–15)
BASOPHILS # BLD AUTO: 0.01 10*3/MM3 (ref 0–0.2)
BASOPHILS NFR BLD AUTO: 0.1 % (ref 0–1.5)
BUN SERPL-MCNC: 33 MG/DL (ref 8–23)
BUN/CREAT SERPL: 21 (ref 7–25)
CALCIUM SPEC-SCNC: 6.8 MG/DL (ref 8.6–10.5)
CHLORIDE SERPL-SCNC: 100 MMOL/L (ref 98–107)
CHLORIDE UR-SCNC: 20 MMOL/L
CO2 SERPL-SCNC: 21.9 MMOL/L (ref 22–29)
CREAT SERPL-MCNC: 1.57 MG/DL (ref 0.57–1)
DEPRECATED RDW RBC AUTO: 48.5 FL (ref 37–54)
EOSINOPHIL # BLD AUTO: 0 10*3/MM3 (ref 0–0.4)
EOSINOPHIL NFR BLD AUTO: 0 % (ref 0.3–6.2)
ERYTHROCYTE [DISTWIDTH] IN BLOOD BY AUTOMATED COUNT: 13.5 % (ref 12.3–15.4)
FERRITIN SERPL-MCNC: 395 NG/ML (ref 13–150)
GFR SERPL CREATININE-BSD FRML MDRD: 33 ML/MIN/1.73
GLUCOSE SERPL-MCNC: 140 MG/DL (ref 65–99)
HCT VFR BLD AUTO: 36.2 % (ref 34–46.6)
HGB BLD-MCNC: 11.3 G/DL (ref 12–15.9)
IMM GRANULOCYTES # BLD AUTO: 0.05 10*3/MM3 (ref 0–0.05)
IMM GRANULOCYTES NFR BLD AUTO: 0.4 % (ref 0–0.5)
LYMPHOCYTES # BLD AUTO: 0.92 10*3/MM3 (ref 0.7–3.1)
LYMPHOCYTES NFR BLD AUTO: 7.1 % (ref 19.6–45.3)
MCH RBC QN AUTO: 30.2 PG (ref 26.6–33)
MCHC RBC AUTO-ENTMCNC: 31.2 G/DL (ref 31.5–35.7)
MCV RBC AUTO: 96.8 FL (ref 79–97)
MONOCYTES # BLD AUTO: 0.5 10*3/MM3 (ref 0.1–0.9)
MONOCYTES NFR BLD AUTO: 3.9 % (ref 5–12)
NEUTROPHILS NFR BLD AUTO: 11.39 10*3/MM3 (ref 1.7–7)
NEUTROPHILS NFR BLD AUTO: 88.5 % (ref 42.7–76)
NRBC BLD AUTO-RTO: 0 /100 WBC (ref 0–0.2)
PLATELET # BLD AUTO: 244 10*3/MM3 (ref 140–450)
PMV BLD AUTO: 10.1 FL (ref 6–12)
POTASSIUM SERPL-SCNC: 5.2 MMOL/L (ref 3.5–5.2)
POTASSIUM UR-SCNC: 28.5 MMOL/L
PROCALCITONIN SERPL-MCNC: 1.59 NG/ML (ref 0–0.25)
RBC # BLD AUTO: 3.74 10*6/MM3 (ref 3.77–5.28)
SARS-COV-2 RNA PNL SPEC NAA+PROBE: NOT DETECTED
SODIUM SERPL-SCNC: 136 MMOL/L (ref 136–145)
SODIUM UR-SCNC: <20 MMOL/L
WBC # BLD AUTO: 12.87 10*3/MM3 (ref 3.4–10.8)

## 2020-09-23 PROCEDURE — 94799 UNLISTED PULMONARY SVC/PX: CPT

## 2020-09-23 PROCEDURE — 87635 SARS-COV-2 COVID-19 AMP PRB: CPT | Performed by: FAMILY MEDICINE

## 2020-09-23 PROCEDURE — 76775 US EXAM ABDO BACK WALL LIM: CPT

## 2020-09-23 PROCEDURE — 82436 ASSAY OF URINE CHLORIDE: CPT | Performed by: FAMILY MEDICINE

## 2020-09-23 PROCEDURE — 80048 BASIC METABOLIC PNL TOTAL CA: CPT | Performed by: FAMILY MEDICINE

## 2020-09-23 PROCEDURE — 82728 ASSAY OF FERRITIN: CPT | Performed by: FAMILY MEDICINE

## 2020-09-23 PROCEDURE — 25010000002 CEFTRIAXONE SODIUM-DEXTROSE 1-3.74 GM-%(50ML) RECONSTITUTED SOLUTION: Performed by: FAMILY MEDICINE

## 2020-09-23 PROCEDURE — 84133 ASSAY OF URINE POTASSIUM: CPT | Performed by: FAMILY MEDICINE

## 2020-09-23 PROCEDURE — 25010000002 ENOXAPARIN PER 10 MG: Performed by: FAMILY MEDICINE

## 2020-09-23 PROCEDURE — 84300 ASSAY OF URINE SODIUM: CPT | Performed by: FAMILY MEDICINE

## 2020-09-23 PROCEDURE — 25010000002 METHYLPREDNISOLONE PER 125 MG: Performed by: FAMILY MEDICINE

## 2020-09-23 PROCEDURE — 84145 PROCALCITONIN (PCT): CPT | Performed by: FAMILY MEDICINE

## 2020-09-23 PROCEDURE — 80306 DRUG TEST PRSMV INSTRMNT: CPT | Performed by: FAMILY MEDICINE

## 2020-09-23 PROCEDURE — 85025 COMPLETE CBC W/AUTO DIFF WBC: CPT | Performed by: FAMILY MEDICINE

## 2020-09-23 RX ADMIN — IPRATROPIUM BROMIDE 2 PUFF: 17 AEROSOL, METERED RESPIRATORY (INHALATION) at 15:36

## 2020-09-23 RX ADMIN — DIAZEPAM 10 MG: 5 TABLET ORAL at 20:13

## 2020-09-23 RX ADMIN — ALBUTEROL SULFATE 2 PUFF: 90 AEROSOL, METERED RESPIRATORY (INHALATION) at 23:21

## 2020-09-23 RX ADMIN — DOXYCYCLINE 100 MG: 100 INJECTION, POWDER, LYOPHILIZED, FOR SOLUTION INTRAVENOUS at 01:34

## 2020-09-23 RX ADMIN — ALBUTEROL SULFATE 2 PUFF: 90 AEROSOL, METERED RESPIRATORY (INHALATION) at 15:36

## 2020-09-23 RX ADMIN — METHYLPREDNISOLONE SODIUM SUCCINATE 60 MG: 125 INJECTION, POWDER, FOR SOLUTION INTRAMUSCULAR; INTRAVENOUS at 12:45

## 2020-09-23 RX ADMIN — IPRATROPIUM BROMIDE 2 PUFF: 17 AEROSOL, METERED RESPIRATORY (INHALATION) at 07:49

## 2020-09-23 RX ADMIN — GUAIFENESIN 600 MG: 600 TABLET, EXTENDED RELEASE ORAL at 20:13

## 2020-09-23 RX ADMIN — BUDESONIDE AND FORMOTEROL FUMARATE DIHYDRATE 2 PUFF: 160; 4.5 AEROSOL RESPIRATORY (INHALATION) at 20:07

## 2020-09-23 RX ADMIN — HYDROCODONE BITARTRATE AND ACETAMINOPHEN 1 TABLET: 10; 325 TABLET ORAL at 11:04

## 2020-09-23 RX ADMIN — DOXYCYCLINE 100 MG: 100 INJECTION, POWDER, LYOPHILIZED, FOR SOLUTION INTRAVENOUS at 12:53

## 2020-09-23 RX ADMIN — IPRATROPIUM BROMIDE 2 PUFF: 17 AEROSOL, METERED RESPIRATORY (INHALATION) at 23:21

## 2020-09-23 RX ADMIN — ALBUTEROL SULFATE 2 PUFF: 90 AEROSOL, METERED RESPIRATORY (INHALATION) at 11:15

## 2020-09-23 RX ADMIN — GUAIFENESIN 600 MG: 600 TABLET, EXTENDED RELEASE ORAL at 09:11

## 2020-09-23 RX ADMIN — METHYLPREDNISOLONE SODIUM SUCCINATE 60 MG: 125 INJECTION, POWDER, FOR SOLUTION INTRAMUSCULAR; INTRAVENOUS at 20:14

## 2020-09-23 RX ADMIN — FAMOTIDINE 20 MG: 10 INJECTION INTRAVENOUS at 09:11

## 2020-09-23 RX ADMIN — BUDESONIDE AND FORMOTEROL FUMARATE DIHYDRATE 2 PUFF: 160; 4.5 AEROSOL RESPIRATORY (INHALATION) at 07:51

## 2020-09-23 RX ADMIN — ALBUTEROL SULFATE 2 PUFF: 90 AEROSOL, METERED RESPIRATORY (INHALATION) at 20:06

## 2020-09-23 RX ADMIN — IPRATROPIUM BROMIDE 2 PUFF: 17 AEROSOL, METERED RESPIRATORY (INHALATION) at 20:07

## 2020-09-23 RX ADMIN — ALBUTEROL SULFATE 2 PUFF: 90 AEROSOL, METERED RESPIRATORY (INHALATION) at 07:49

## 2020-09-23 RX ADMIN — IPRATROPIUM BROMIDE 2 PUFF: 17 AEROSOL, METERED RESPIRATORY (INHALATION) at 11:15

## 2020-09-23 RX ADMIN — SODIUM CHLORIDE 250 ML: 9 INJECTION, SOLUTION INTRAVENOUS at 09:11

## 2020-09-23 RX ADMIN — SODIUM CHLORIDE, PRESERVATIVE FREE 10 ML: 5 INJECTION INTRAVENOUS at 09:11

## 2020-09-23 RX ADMIN — MIRTAZAPINE 15 MG: 15 TABLET, FILM COATED ORAL at 20:13

## 2020-09-23 RX ADMIN — ENOXAPARIN SODIUM 30 MG: 30 INJECTION SUBCUTANEOUS at 15:24

## 2020-09-23 RX ADMIN — METHYLPREDNISOLONE SODIUM SUCCINATE 60 MG: 125 INJECTION, POWDER, FOR SOLUTION INTRAMUSCULAR; INTRAVENOUS at 03:48

## 2020-09-23 RX ADMIN — CEFTRIAXONE 1 G: 1 INJECTION, SOLUTION INTRAVENOUS at 15:26

## 2020-09-23 RX ADMIN — SODIUM CHLORIDE 100 ML/HR: 9 INJECTION, SOLUTION INTRAVENOUS at 10:29

## 2020-09-24 ENCOUNTER — APPOINTMENT (OUTPATIENT)
Dept: GENERAL RADIOLOGY | Facility: HOSPITAL | Age: 65
End: 2020-09-24

## 2020-09-24 ENCOUNTER — APPOINTMENT (OUTPATIENT)
Dept: CT IMAGING | Facility: HOSPITAL | Age: 65
End: 2020-09-24

## 2020-09-24 LAB
ALBUMIN SERPL-MCNC: 3.2 G/DL (ref 3.5–5.2)
ALBUMIN/GLOB SERPL: 1 G/DL
ALP SERPL-CCNC: 86 U/L (ref 39–117)
ALT SERPL W P-5'-P-CCNC: 12 U/L (ref 1–33)
AMPHET+METHAMPHET UR QL: NEGATIVE
AMPHET+METHAMPHET UR QL: NEGATIVE
AMPHETAMINES UR QL: NEGATIVE
AMPHETAMINES UR QL: NEGATIVE
ANION GAP SERPL CALCULATED.3IONS-SCNC: 10.5 MMOL/L (ref 5–15)
ARTERIAL PATENCY WRIST A: POSITIVE
AST SERPL-CCNC: 30 U/L (ref 1–32)
ATMOSPHERIC PRESS: 737 MMHG
BACTERIA SPEC RESP CULT: ABNORMAL
BACTERIA SPEC RESP CULT: ABNORMAL
BARBITURATES UR QL SCN: NEGATIVE
BARBITURATES UR QL SCN: NEGATIVE
BASE EXCESS BLDA CALC-SCNC: -3.2 MMOL/L (ref 0–2)
BASOPHILS # BLD AUTO: 0.04 10*3/MM3 (ref 0–0.2)
BASOPHILS NFR BLD AUTO: 0.2 % (ref 0–1.5)
BDY SITE: ABNORMAL
BENZODIAZ UR QL SCN: POSITIVE
BENZODIAZ UR QL SCN: POSITIVE
BILIRUB SERPL-MCNC: 0.2 MG/DL (ref 0–1.2)
BODY TEMPERATURE: 37 C
BUN SERPL-MCNC: 27 MG/DL (ref 8–23)
BUN/CREAT SERPL: 22.3 (ref 7–25)
BUPRENORPHINE SERPL-MCNC: NEGATIVE NG/ML
BUPRENORPHINE SERPL-MCNC: NEGATIVE NG/ML
CALCIUM SPEC-SCNC: 6.4 MG/DL (ref 8.6–10.5)
CANNABINOIDS SERPL QL: NEGATIVE
CANNABINOIDS SERPL QL: NEGATIVE
CHLORIDE SERPL-SCNC: 105 MMOL/L (ref 98–107)
CO2 SERPL-SCNC: 21.5 MMOL/L (ref 22–29)
COCAINE UR QL: NEGATIVE
COCAINE UR QL: NEGATIVE
CREAT SERPL-MCNC: 1.21 MG/DL (ref 0.57–1)
CRP SERPL-MCNC: 16.54 MG/DL (ref 0–0.5)
D DIMER PPP FEU-MCNC: 1.18 MCGFEU/ML (ref 0–0.46)
DEPRECATED RDW RBC AUTO: 50.3 FL (ref 37–54)
EOSINOPHIL # BLD AUTO: 0.03 10*3/MM3 (ref 0–0.4)
EOSINOPHIL NFR BLD AUTO: 0.1 % (ref 0.3–6.2)
ERYTHROCYTE [DISTWIDTH] IN BLOOD BY AUTOMATED COUNT: 13.9 % (ref 12.3–15.4)
FERRITIN SERPL-MCNC: 327 NG/ML (ref 13–150)
GAS FLOW AIRWAY: 5 LPM
GFR SERPL CREATININE-BSD FRML MDRD: 45 ML/MIN/1.73
GLOBULIN UR ELPH-MCNC: 3.3 GM/DL
GLUCOSE BLDC GLUCOMTR-MCNC: 121 MG/DL (ref 70–130)
GLUCOSE BLDC GLUCOMTR-MCNC: 125 MG/DL (ref 70–130)
GLUCOSE BLDC GLUCOMTR-MCNC: 134 MG/DL (ref 70–130)
GLUCOSE BLDC GLUCOMTR-MCNC: 147 MG/DL (ref 70–130)
GLUCOSE SERPL-MCNC: 154 MG/DL (ref 65–99)
GRAM STN SPEC: ABNORMAL
HCO3 BLDA-SCNC: 22.9 MMOL/L (ref 20–26)
HCT VFR BLD AUTO: 35 % (ref 34–46.6)
HGB BLD-MCNC: 10.9 G/DL (ref 12–15.9)
HGB BLDA-MCNC: 10.2 G/DL (ref 13.5–17.5)
IMM GRANULOCYTES # BLD AUTO: 0.3 10*3/MM3 (ref 0–0.05)
IMM GRANULOCYTES NFR BLD AUTO: 1.4 % (ref 0–0.5)
LDH SERPL-CCNC: 698 U/L (ref 135–214)
LYMPHOCYTES # BLD AUTO: 0.67 10*3/MM3 (ref 0.7–3.1)
LYMPHOCYTES NFR BLD AUTO: 3.2 % (ref 19.6–45.3)
Lab: ABNORMAL
MCH RBC QN AUTO: 30.9 PG (ref 26.6–33)
MCHC RBC AUTO-ENTMCNC: 31.1 G/DL (ref 31.5–35.7)
MCV RBC AUTO: 99.2 FL (ref 79–97)
METHADONE UR QL SCN: NEGATIVE
METHADONE UR QL SCN: NEGATIVE
MODALITY: ABNORMAL
MONOCYTES # BLD AUTO: 0.74 10*3/MM3 (ref 0.1–0.9)
MONOCYTES NFR BLD AUTO: 3.6 % (ref 5–12)
NEUTROPHILS NFR BLD AUTO: 19 10*3/MM3 (ref 1.7–7)
NEUTROPHILS NFR BLD AUTO: 91.5 % (ref 42.7–76)
NOTIFIED BY: ABNORMAL
NOTIFIED WHO: ABNORMAL
NRBC BLD AUTO-RTO: 0.1 /100 WBC (ref 0–0.2)
OPIATES UR QL: POSITIVE
OPIATES UR QL: POSITIVE
OXYCODONE UR QL SCN: NEGATIVE
OXYCODONE UR QL SCN: NEGATIVE
PCO2 BLDA: 45.1 MM HG (ref 35–45)
PCO2 TEMP ADJ BLD: 45.1 MM HG (ref 35–45)
PCP UR QL SCN: NEGATIVE
PCP UR QL SCN: NEGATIVE
PH BLDA: 7.32 PH UNITS (ref 7.35–7.45)
PH, TEMP CORRECTED: 7.32 PH UNITS (ref 7.35–7.45)
PLATELET # BLD AUTO: 296 10*3/MM3 (ref 140–450)
PMV BLD AUTO: 10.3 FL (ref 6–12)
PO2 BLDA: 34.1 MM HG (ref 83–108)
PO2 TEMP ADJ BLD: 34.1 MM HG (ref 83–108)
POTASSIUM SERPL-SCNC: 4.8 MMOL/L (ref 3.5–5.2)
PROPOXYPH UR QL: NEGATIVE
PROPOXYPH UR QL: NEGATIVE
PROT SERPL-MCNC: 6.5 G/DL (ref 6–8.5)
RBC # BLD AUTO: 3.53 10*6/MM3 (ref 3.77–5.28)
SAO2 % BLDCOA: 65.1 % (ref 94–99)
SODIUM SERPL-SCNC: 137 MMOL/L (ref 136–145)
TRICYCLICS UR QL SCN: NEGATIVE
TRICYCLICS UR QL SCN: NEGATIVE
VENTILATOR MODE: ABNORMAL
WBC # BLD AUTO: 20.78 10*3/MM3 (ref 3.4–10.8)

## 2020-09-24 PROCEDURE — 25010000002 ENOXAPARIN PER 10 MG: Performed by: FAMILY MEDICINE

## 2020-09-24 PROCEDURE — 82803 BLOOD GASES ANY COMBINATION: CPT

## 2020-09-24 PROCEDURE — 85025 COMPLETE CBC W/AUTO DIFF WBC: CPT | Performed by: FAMILY MEDICINE

## 2020-09-24 PROCEDURE — 94640 AIRWAY INHALATION TREATMENT: CPT

## 2020-09-24 PROCEDURE — 94799 UNLISTED PULMONARY SVC/PX: CPT

## 2020-09-24 PROCEDURE — 71045 X-RAY EXAM CHEST 1 VIEW: CPT

## 2020-09-24 PROCEDURE — 25010000002 HALOPERIDOL LACTATE PER 5 MG: Performed by: FAMILY MEDICINE

## 2020-09-24 PROCEDURE — 83615 LACTATE (LD) (LDH) ENZYME: CPT | Performed by: FAMILY MEDICINE

## 2020-09-24 PROCEDURE — 25010000002 LORAZEPAM PER 2 MG: Performed by: FAMILY MEDICINE

## 2020-09-24 PROCEDURE — 80306 DRUG TEST PRSMV INSTRMNT: CPT | Performed by: FAMILY MEDICINE

## 2020-09-24 PROCEDURE — 86140 C-REACTIVE PROTEIN: CPT | Performed by: FAMILY MEDICINE

## 2020-09-24 PROCEDURE — 82728 ASSAY OF FERRITIN: CPT | Performed by: FAMILY MEDICINE

## 2020-09-24 PROCEDURE — 85379 FIBRIN DEGRADATION QUANT: CPT | Performed by: FAMILY MEDICINE

## 2020-09-24 PROCEDURE — 36600 WITHDRAWAL OF ARTERIAL BLOOD: CPT

## 2020-09-24 PROCEDURE — 82962 GLUCOSE BLOOD TEST: CPT

## 2020-09-24 PROCEDURE — 25010000002 METHYLPREDNISOLONE PER 40 MG: Performed by: FAMILY MEDICINE

## 2020-09-24 PROCEDURE — 70450 CT HEAD/BRAIN W/O DYE: CPT

## 2020-09-24 PROCEDURE — 25010000002 THIAMINE PER 100 MG: Performed by: FAMILY MEDICINE

## 2020-09-24 PROCEDURE — 80053 COMPREHEN METABOLIC PANEL: CPT | Performed by: FAMILY MEDICINE

## 2020-09-24 PROCEDURE — 94770: CPT

## 2020-09-24 PROCEDURE — 25010000002 CEFTRIAXONE SODIUM-DEXTROSE 1-3.74 GM-%(50ML) RECONSTITUTED SOLUTION: Performed by: FAMILY MEDICINE

## 2020-09-24 PROCEDURE — 25010000002 METHYLPREDNISOLONE PER 125 MG: Performed by: FAMILY MEDICINE

## 2020-09-24 RX ORDER — HALOPERIDOL 5 MG/ML
1 INJECTION INTRAMUSCULAR EVERY 4 HOURS PRN
Status: DISCONTINUED | OUTPATIENT
Start: 2020-09-24 | End: 2020-09-25

## 2020-09-24 RX ORDER — OLANZAPINE 2.5 MG/1
2.5 TABLET ORAL NIGHTLY
Status: DISCONTINUED | OUTPATIENT
Start: 2020-09-24 | End: 2020-09-24

## 2020-09-24 RX ORDER — LORAZEPAM 2 MG/ML
1 INJECTION INTRAMUSCULAR EVERY 8 HOURS
Status: DISCONTINUED | OUTPATIENT
Start: 2020-09-30 | End: 2020-09-24

## 2020-09-24 RX ORDER — LORAZEPAM 2 MG/ML
1 INJECTION INTRAMUSCULAR EVERY 4 HOURS PRN
Status: DISCONTINUED | OUTPATIENT
Start: 2020-09-24 | End: 2020-09-24

## 2020-09-24 RX ORDER — DEXTROSE MONOHYDRATE 25 G/50ML
25 INJECTION, SOLUTION INTRAVENOUS
Status: DISCONTINUED | OUTPATIENT
Start: 2020-09-24 | End: 2020-09-27

## 2020-09-24 RX ORDER — METHYLPREDNISOLONE SODIUM SUCCINATE 40 MG/ML
40 INJECTION, POWDER, LYOPHILIZED, FOR SOLUTION INTRAMUSCULAR; INTRAVENOUS EVERY 8 HOURS
Status: DISCONTINUED | OUTPATIENT
Start: 2020-09-24 | End: 2020-09-25

## 2020-09-24 RX ORDER — LORAZEPAM 2 MG/ML
1 INJECTION INTRAMUSCULAR EVERY 8 HOURS
Status: DISCONTINUED | OUTPATIENT
Start: 2020-09-25 | End: 2020-09-24

## 2020-09-24 RX ORDER — ALBUTEROL SULFATE 2.5 MG/3ML
2.5 SOLUTION RESPIRATORY (INHALATION) ONCE AS NEEDED
Status: DISCONTINUED | OUTPATIENT
Start: 2020-09-24 | End: 2020-09-28 | Stop reason: HOSPADM

## 2020-09-24 RX ORDER — LORAZEPAM 2 MG/ML
1 INJECTION INTRAMUSCULAR EVERY 6 HOURS
Status: DISCONTINUED | OUTPATIENT
Start: 2020-09-24 | End: 2020-09-24

## 2020-09-24 RX ORDER — IPRATROPIUM BROMIDE AND ALBUTEROL SULFATE 2.5; .5 MG/3ML; MG/3ML
3 SOLUTION RESPIRATORY (INHALATION)
Status: DISCONTINUED | OUTPATIENT
Start: 2020-09-24 | End: 2020-09-28 | Stop reason: HOSPADM

## 2020-09-24 RX ORDER — SODIUM CHLORIDE FOR INHALATION 7 %
4 VIAL, NEBULIZER (ML) INHALATION ONCE
Status: COMPLETED | OUTPATIENT
Start: 2020-09-24 | End: 2020-09-24

## 2020-09-24 RX ORDER — IPRATROPIUM BROMIDE AND ALBUTEROL SULFATE 2.5; .5 MG/3ML; MG/3ML
3 SOLUTION RESPIRATORY (INHALATION) EVERY 4 HOURS PRN
Status: DISCONTINUED | OUTPATIENT
Start: 2020-09-24 | End: 2020-09-28 | Stop reason: HOSPADM

## 2020-09-24 RX ORDER — ARFORMOTEROL TARTRATE 15 UG/2ML
15 SOLUTION RESPIRATORY (INHALATION)
Status: DISCONTINUED | OUTPATIENT
Start: 2020-09-24 | End: 2020-09-28 | Stop reason: HOSPADM

## 2020-09-24 RX ORDER — NICOTINE POLACRILEX 4 MG
15 LOZENGE BUCCAL
Status: DISCONTINUED | OUTPATIENT
Start: 2020-09-24 | End: 2020-09-27

## 2020-09-24 RX ADMIN — HALOPERIDOL LACTATE 1 MG: 5 INJECTION, SOLUTION INTRAMUSCULAR at 21:47

## 2020-09-24 RX ADMIN — FOLIC ACID 75 ML/HR: 5 INJECTION, SOLUTION INTRAMUSCULAR; INTRAVENOUS; SUBCUTANEOUS at 10:21

## 2020-09-24 RX ADMIN — SODIUM CHLORIDE, PRESERVATIVE FREE 10 ML: 5 INJECTION INTRAVENOUS at 08:51

## 2020-09-24 RX ADMIN — METHYLPREDNISOLONE SODIUM SUCCINATE 60 MG: 125 INJECTION, POWDER, FOR SOLUTION INTRAMUSCULAR; INTRAVENOUS at 07:30

## 2020-09-24 RX ADMIN — FAMOTIDINE 20 MG: 10 INJECTION INTRAVENOUS at 01:08

## 2020-09-24 RX ADMIN — METHYLPREDNISOLONE SODIUM SUCCINATE 40 MG: 40 INJECTION, POWDER, FOR SOLUTION INTRAMUSCULAR; INTRAVENOUS at 20:21

## 2020-09-24 RX ADMIN — BUDESONIDE AND FORMOTEROL FUMARATE DIHYDRATE 2 PUFF: 160; 4.5 AEROSOL RESPIRATORY (INHALATION) at 09:49

## 2020-09-24 RX ADMIN — ALBUTEROL SULFATE 2 PUFF: 90 AEROSOL, METERED RESPIRATORY (INHALATION) at 11:19

## 2020-09-24 RX ADMIN — CEFTRIAXONE 1 G: 1 INJECTION, SOLUTION INTRAVENOUS at 16:06

## 2020-09-24 RX ADMIN — ENOXAPARIN SODIUM 30 MG: 30 INJECTION SUBCUTANEOUS at 16:06

## 2020-09-24 RX ADMIN — FAMOTIDINE 20 MG: 10 INJECTION INTRAVENOUS at 10:18

## 2020-09-24 RX ADMIN — DOXYCYCLINE 100 MG: 100 INJECTION, POWDER, LYOPHILIZED, FOR SOLUTION INTRAVENOUS at 01:15

## 2020-09-24 RX ADMIN — ALBUTEROL SULFATE 2 PUFF: 90 AEROSOL, METERED RESPIRATORY (INHALATION) at 07:12

## 2020-09-24 RX ADMIN — METHYLPREDNISOLONE SODIUM SUCCINATE 40 MG: 40 INJECTION, POWDER, FOR SOLUTION INTRAMUSCULAR; INTRAVENOUS at 12:53

## 2020-09-24 RX ADMIN — IPRATROPIUM BROMIDE 2 PUFF: 17 AEROSOL, METERED RESPIRATORY (INHALATION) at 07:12

## 2020-09-24 RX ADMIN — GUAIFENESIN 600 MG: 600 TABLET, EXTENDED RELEASE ORAL at 20:21

## 2020-09-24 RX ADMIN — IPRATROPIUM BROMIDE AND ALBUTEROL SULFATE 3 ML: .5; 3 SOLUTION RESPIRATORY (INHALATION) at 20:56

## 2020-09-24 RX ADMIN — IPRATROPIUM BROMIDE 2 PUFF: 17 AEROSOL, METERED RESPIRATORY (INHALATION) at 15:26

## 2020-09-24 RX ADMIN — LORAZEPAM 1 MG: 2 INJECTION INTRAMUSCULAR; INTRAVENOUS at 08:48

## 2020-09-24 RX ADMIN — MIRTAZAPINE 15 MG: 15 TABLET, FILM COATED ORAL at 20:21

## 2020-09-24 RX ADMIN — IPRATROPIUM BROMIDE 2 PUFF: 17 AEROSOL, METERED RESPIRATORY (INHALATION) at 11:19

## 2020-09-24 RX ADMIN — SODIUM CHLORIDE 4 ML: 7 NEBU SOLN,3 % NEBU at 20:58

## 2020-09-24 RX ADMIN — ALBUTEROL SULFATE 2 PUFF: 90 AEROSOL, METERED RESPIRATORY (INHALATION) at 15:26

## 2020-09-24 RX ADMIN — GUAIFENESIN 600 MG: 600 TABLET, EXTENDED RELEASE ORAL at 08:48

## 2020-09-24 RX ADMIN — ARFORMOTEROL TARTRATE 15 MCG: 15 SOLUTION RESPIRATORY (INHALATION) at 20:56

## 2020-09-24 RX ADMIN — SODIUM CHLORIDE, PRESERVATIVE FREE 10 ML: 5 INJECTION INTRAVENOUS at 20:21

## 2020-09-25 LAB
ANION GAP SERPL CALCULATED.3IONS-SCNC: 11.3 MMOL/L (ref 5–15)
BASOPHILS # BLD AUTO: 0.02 10*3/MM3 (ref 0–0.2)
BASOPHILS NFR BLD AUTO: 0.1 % (ref 0–1.5)
BILIRUB UR QL STRIP: NEGATIVE
BUN SERPL-MCNC: 18 MG/DL (ref 8–23)
BUN/CREAT SERPL: 18.8 (ref 7–25)
CALCIUM SPEC-SCNC: 7 MG/DL (ref 8.6–10.5)
CHLORIDE SERPL-SCNC: 106 MMOL/L (ref 98–107)
CLARITY UR: CLEAR
CO2 SERPL-SCNC: 22.7 MMOL/L (ref 22–29)
COLOR UR: YELLOW
CREAT SERPL-MCNC: 0.96 MG/DL (ref 0.57–1)
DEPRECATED RDW RBC AUTO: 51.7 FL (ref 37–54)
EOSINOPHIL # BLD AUTO: 0 10*3/MM3 (ref 0–0.4)
EOSINOPHIL NFR BLD AUTO: 0 % (ref 0.3–6.2)
ERYTHROCYTE [DISTWIDTH] IN BLOOD BY AUTOMATED COUNT: 14.2 % (ref 12.3–15.4)
GFR SERPL CREATININE-BSD FRML MDRD: 59 ML/MIN/1.73
GLUCOSE BLDC GLUCOMTR-MCNC: 114 MG/DL (ref 70–130)
GLUCOSE BLDC GLUCOMTR-MCNC: 173 MG/DL (ref 70–130)
GLUCOSE SERPL-MCNC: 120 MG/DL (ref 65–99)
GLUCOSE UR STRIP-MCNC: NEGATIVE MG/DL
HCT VFR BLD AUTO: 35.8 % (ref 34–46.6)
HGB BLD-MCNC: 10.9 G/DL (ref 12–15.9)
HGB UR QL STRIP.AUTO: NEGATIVE
IMM GRANULOCYTES # BLD AUTO: 0.12 10*3/MM3 (ref 0–0.05)
IMM GRANULOCYTES NFR BLD AUTO: 0.8 % (ref 0–0.5)
KETONES UR QL STRIP: NEGATIVE
LEUKOCYTE ESTERASE UR QL STRIP.AUTO: NEGATIVE
LYMPHOCYTES # BLD AUTO: 1 10*3/MM3 (ref 0.7–3.1)
LYMPHOCYTES NFR BLD AUTO: 6.3 % (ref 19.6–45.3)
MCH RBC QN AUTO: 30.1 PG (ref 26.6–33)
MCHC RBC AUTO-ENTMCNC: 30.4 G/DL (ref 31.5–35.7)
MCV RBC AUTO: 98.9 FL (ref 79–97)
MONOCYTES # BLD AUTO: 0.92 10*3/MM3 (ref 0.1–0.9)
MONOCYTES NFR BLD AUTO: 5.8 % (ref 5–12)
NEUTROPHILS NFR BLD AUTO: 13.79 10*3/MM3 (ref 1.7–7)
NEUTROPHILS NFR BLD AUTO: 87 % (ref 42.7–76)
NITRITE UR QL STRIP: NEGATIVE
NRBC BLD AUTO-RTO: 0.2 /100 WBC (ref 0–0.2)
NT-PROBNP SERPL-MCNC: ABNORMAL PG/ML (ref 0–900)
PH UR STRIP.AUTO: 6 [PH] (ref 4.5–8)
PLATELET # BLD AUTO: 339 10*3/MM3 (ref 140–450)
PMV BLD AUTO: 9.5 FL (ref 6–12)
POTASSIUM SERPL-SCNC: 3.9 MMOL/L (ref 3.5–5.2)
PROCALCITONIN SERPL-MCNC: 0.39 NG/ML (ref 0–0.25)
PROT UR QL STRIP: NEGATIVE
RBC # BLD AUTO: 3.62 10*6/MM3 (ref 3.77–5.28)
SODIUM SERPL-SCNC: 140 MMOL/L (ref 136–145)
SP GR UR STRIP: 1.01 (ref 1–1.03)
UROBILINOGEN UR QL STRIP: NORMAL
WBC # BLD AUTO: 15.85 10*3/MM3 (ref 3.4–10.8)

## 2020-09-25 PROCEDURE — 80048 BASIC METABOLIC PNL TOTAL CA: CPT | Performed by: FAMILY MEDICINE

## 2020-09-25 PROCEDURE — 25010000002 METHYLPREDNISOLONE PER 40 MG: Performed by: FAMILY MEDICINE

## 2020-09-25 PROCEDURE — 94799 UNLISTED PULMONARY SVC/PX: CPT

## 2020-09-25 PROCEDURE — 25010000002 ENOXAPARIN PER 10 MG: Performed by: FAMILY MEDICINE

## 2020-09-25 PROCEDURE — 84145 PROCALCITONIN (PCT): CPT | Performed by: FAMILY MEDICINE

## 2020-09-25 PROCEDURE — 25010000002 HALOPERIDOL LACTATE PER 5 MG: Performed by: FAMILY MEDICINE

## 2020-09-25 PROCEDURE — 83880 ASSAY OF NATRIURETIC PEPTIDE: CPT | Performed by: FAMILY MEDICINE

## 2020-09-25 PROCEDURE — 63710000001 INSULIN ASPART PER 5 UNITS: Performed by: FAMILY MEDICINE

## 2020-09-25 PROCEDURE — 25010000002 CEFTRIAXONE SODIUM-DEXTROSE 1-3.74 GM-%(50ML) RECONSTITUTED SOLUTION: Performed by: FAMILY MEDICINE

## 2020-09-25 PROCEDURE — 82962 GLUCOSE BLOOD TEST: CPT

## 2020-09-25 PROCEDURE — 81003 URINALYSIS AUTO W/O SCOPE: CPT | Performed by: FAMILY MEDICINE

## 2020-09-25 PROCEDURE — 85025 COMPLETE CBC W/AUTO DIFF WBC: CPT | Performed by: FAMILY MEDICINE

## 2020-09-25 PROCEDURE — 25010000002 LORAZEPAM PER 2 MG: Performed by: FAMILY MEDICINE

## 2020-09-25 RX ORDER — PREDNISONE 10 MG/1
10 TABLET ORAL 3 TIMES DAILY
Status: DISCONTINUED | OUTPATIENT
Start: 2020-09-25 | End: 2020-09-28 | Stop reason: HOSPADM

## 2020-09-25 RX ORDER — LORAZEPAM 2 MG/ML
1 INJECTION INTRAMUSCULAR
Status: DISCONTINUED | OUTPATIENT
Start: 2020-09-25 | End: 2020-09-25

## 2020-09-25 RX ORDER — HALOPERIDOL 5 MG/ML
5 INJECTION INTRAMUSCULAR ONCE
Status: DISCONTINUED | OUTPATIENT
Start: 2020-09-25 | End: 2020-09-25

## 2020-09-25 RX ORDER — DIAZEPAM 5 MG/1
5 TABLET ORAL EVERY 6 HOURS PRN
Status: DISCONTINUED | OUTPATIENT
Start: 2020-09-25 | End: 2020-09-28 | Stop reason: HOSPADM

## 2020-09-25 RX ORDER — HALOPERIDOL 5 MG/ML
5 INJECTION INTRAMUSCULAR ONCE
Status: COMPLETED | OUTPATIENT
Start: 2020-09-25 | End: 2020-09-25

## 2020-09-25 RX ADMIN — HALOPERIDOL LACTATE 1 MG: 5 INJECTION, SOLUTION INTRAMUSCULAR at 06:00

## 2020-09-25 RX ADMIN — CEFTRIAXONE 1 G: 1 INJECTION, SOLUTION INTRAVENOUS at 15:26

## 2020-09-25 RX ADMIN — METHYLPREDNISOLONE SODIUM SUCCINATE 40 MG: 40 INJECTION, POWDER, FOR SOLUTION INTRAMUSCULAR; INTRAVENOUS at 13:35

## 2020-09-25 RX ADMIN — HALOPERIDOL LACTATE 5 MG: 5 INJECTION, SOLUTION INTRAMUSCULAR at 07:06

## 2020-09-25 RX ADMIN — METHYLPREDNISOLONE SODIUM SUCCINATE 40 MG: 40 INJECTION, POWDER, FOR SOLUTION INTRAMUSCULAR; INTRAVENOUS at 04:01

## 2020-09-25 RX ADMIN — GUAIFENESIN 600 MG: 600 TABLET, EXTENDED RELEASE ORAL at 08:42

## 2020-09-25 RX ADMIN — IPRATROPIUM BROMIDE AND ALBUTEROL SULFATE 3 ML: .5; 3 SOLUTION RESPIRATORY (INHALATION) at 15:30

## 2020-09-25 RX ADMIN — DIAZEPAM 5 MG: 5 TABLET ORAL at 17:54

## 2020-09-25 RX ADMIN — IPRATROPIUM BROMIDE AND ALBUTEROL SULFATE 3 ML: .5; 3 SOLUTION RESPIRATORY (INHALATION) at 11:51

## 2020-09-25 RX ADMIN — ARFORMOTEROL TARTRATE 15 MCG: 15 SOLUTION RESPIRATORY (INHALATION) at 19:39

## 2020-09-25 RX ADMIN — SODIUM CHLORIDE, PRESERVATIVE FREE 10 ML: 5 INJECTION INTRAVENOUS at 21:31

## 2020-09-25 RX ADMIN — INSULIN ASPART 2 UNITS: 100 INJECTION, SOLUTION INTRAVENOUS; SUBCUTANEOUS at 17:54

## 2020-09-25 RX ADMIN — ARFORMOTEROL TARTRATE 15 MCG: 15 SOLUTION RESPIRATORY (INHALATION) at 09:25

## 2020-09-25 RX ADMIN — MIRTAZAPINE 15 MG: 15 TABLET, FILM COATED ORAL at 21:31

## 2020-09-25 RX ADMIN — IPRATROPIUM BROMIDE AND ALBUTEROL SULFATE 3 ML: .5; 3 SOLUTION RESPIRATORY (INHALATION) at 07:18

## 2020-09-25 RX ADMIN — ENOXAPARIN SODIUM 30 MG: 30 INJECTION SUBCUTANEOUS at 15:25

## 2020-09-25 RX ADMIN — LORAZEPAM 1 MG: 2 INJECTION INTRAMUSCULAR; INTRAVENOUS at 14:19

## 2020-09-25 RX ADMIN — IPRATROPIUM BROMIDE AND ALBUTEROL SULFATE 3 ML: .5; 3 SOLUTION RESPIRATORY (INHALATION) at 19:33

## 2020-09-25 RX ADMIN — GUAIFENESIN 600 MG: 600 TABLET, EXTENDED RELEASE ORAL at 21:31

## 2020-09-25 RX ADMIN — HYDROCODONE BITARTRATE AND ACETAMINOPHEN 1 TABLET: 10; 325 TABLET ORAL at 21:38

## 2020-09-25 RX ADMIN — SODIUM CHLORIDE, PRESERVATIVE FREE 10 ML: 5 INJECTION INTRAVENOUS at 08:43

## 2020-09-25 RX ADMIN — HALOPERIDOL LACTATE 1 MG: 5 INJECTION, SOLUTION INTRAMUSCULAR at 01:40

## 2020-09-26 ENCOUNTER — APPOINTMENT (OUTPATIENT)
Dept: GENERAL RADIOLOGY | Facility: HOSPITAL | Age: 65
End: 2020-09-26

## 2020-09-26 LAB
ALBUMIN SERPL-MCNC: 3 G/DL (ref 3.5–5.2)
ALBUMIN/GLOB SERPL: 1.2 G/DL
ALP SERPL-CCNC: 67 U/L (ref 39–117)
ALT SERPL W P-5'-P-CCNC: 10 U/L (ref 1–33)
ANION GAP SERPL CALCULATED.3IONS-SCNC: 7.6 MMOL/L (ref 5–15)
AST SERPL-CCNC: 16 U/L (ref 1–32)
BASOPHILS # BLD AUTO: 0.02 10*3/MM3 (ref 0–0.2)
BASOPHILS NFR BLD AUTO: 0.2 % (ref 0–1.5)
BILIRUB SERPL-MCNC: 0.2 MG/DL (ref 0–1.2)
BUN SERPL-MCNC: 12 MG/DL (ref 8–23)
BUN/CREAT SERPL: 14.1 (ref 7–25)
CALCIUM SPEC-SCNC: 6.7 MG/DL (ref 8.6–10.5)
CHLORIDE SERPL-SCNC: 110 MMOL/L (ref 98–107)
CO2 SERPL-SCNC: 26.4 MMOL/L (ref 22–29)
CREAT SERPL-MCNC: 0.85 MG/DL (ref 0.57–1)
DEPRECATED RDW RBC AUTO: 50.1 FL (ref 37–54)
EOSINOPHIL # BLD AUTO: 0.04 10*3/MM3 (ref 0–0.4)
EOSINOPHIL NFR BLD AUTO: 0.3 % (ref 0.3–6.2)
ERYTHROCYTE [DISTWIDTH] IN BLOOD BY AUTOMATED COUNT: 14.1 % (ref 12.3–15.4)
GFR SERPL CREATININE-BSD FRML MDRD: 68 ML/MIN/1.73
GLOBULIN UR ELPH-MCNC: 2.6 GM/DL
GLUCOSE BLDC GLUCOMTR-MCNC: 219 MG/DL (ref 70–130)
GLUCOSE SERPL-MCNC: 85 MG/DL (ref 65–99)
HCT VFR BLD AUTO: 30.5 % (ref 34–46.6)
HGB BLD-MCNC: 9.7 G/DL (ref 12–15.9)
IMM GRANULOCYTES # BLD AUTO: 0.08 10*3/MM3 (ref 0–0.05)
IMM GRANULOCYTES NFR BLD AUTO: 0.7 % (ref 0–0.5)
LYMPHOCYTES # BLD AUTO: 3.02 10*3/MM3 (ref 0.7–3.1)
LYMPHOCYTES NFR BLD AUTO: 25.5 % (ref 19.6–45.3)
MCH RBC QN AUTO: 30.9 PG (ref 26.6–33)
MCHC RBC AUTO-ENTMCNC: 31.8 G/DL (ref 31.5–35.7)
MCV RBC AUTO: 97.1 FL (ref 79–97)
MONOCYTES # BLD AUTO: 0.78 10*3/MM3 (ref 0.1–0.9)
MONOCYTES NFR BLD AUTO: 6.6 % (ref 5–12)
NEUTROPHILS NFR BLD AUTO: 66.7 % (ref 42.7–76)
NEUTROPHILS NFR BLD AUTO: 7.91 10*3/MM3 (ref 1.7–7)
NRBC BLD AUTO-RTO: 0 /100 WBC (ref 0–0.2)
PLATELET # BLD AUTO: 341 10*3/MM3 (ref 140–450)
PMV BLD AUTO: 9.4 FL (ref 6–12)
POTASSIUM SERPL-SCNC: 3.9 MMOL/L (ref 3.5–5.2)
PROT SERPL-MCNC: 5.6 G/DL (ref 6–8.5)
RBC # BLD AUTO: 3.14 10*6/MM3 (ref 3.77–5.28)
SODIUM SERPL-SCNC: 144 MMOL/L (ref 136–145)
WBC # BLD AUTO: 11.85 10*3/MM3 (ref 3.4–10.8)

## 2020-09-26 PROCEDURE — 63710000001 PREDNISONE PER 5 MG: Performed by: FAMILY MEDICINE

## 2020-09-26 PROCEDURE — 85025 COMPLETE CBC W/AUTO DIFF WBC: CPT | Performed by: FAMILY MEDICINE

## 2020-09-26 PROCEDURE — 87147 CULTURE TYPE IMMUNOLOGIC: CPT | Performed by: FAMILY MEDICINE

## 2020-09-26 PROCEDURE — 63710000001 INSULIN ASPART PER 5 UNITS: Performed by: FAMILY MEDICINE

## 2020-09-26 PROCEDURE — 87070 CULTURE OTHR SPECIMN AEROBIC: CPT | Performed by: FAMILY MEDICINE

## 2020-09-26 PROCEDURE — 25010000002 ENOXAPARIN PER 10 MG: Performed by: FAMILY MEDICINE

## 2020-09-26 PROCEDURE — 87205 SMEAR GRAM STAIN: CPT | Performed by: FAMILY MEDICINE

## 2020-09-26 PROCEDURE — 94799 UNLISTED PULMONARY SVC/PX: CPT

## 2020-09-26 PROCEDURE — 82962 GLUCOSE BLOOD TEST: CPT

## 2020-09-26 PROCEDURE — 80053 COMPREHEN METABOLIC PANEL: CPT | Performed by: FAMILY MEDICINE

## 2020-09-26 PROCEDURE — 71046 X-RAY EXAM CHEST 2 VIEWS: CPT

## 2020-09-26 PROCEDURE — 25010000002 CEFTRIAXONE SODIUM-DEXTROSE 1-3.74 GM-%(50ML) RECONSTITUTED SOLUTION: Performed by: FAMILY MEDICINE

## 2020-09-26 RX ADMIN — IPRATROPIUM BROMIDE AND ALBUTEROL SULFATE 3 ML: .5; 3 SOLUTION RESPIRATORY (INHALATION) at 15:34

## 2020-09-26 RX ADMIN — MIRTAZAPINE 15 MG: 15 TABLET, FILM COATED ORAL at 21:26

## 2020-09-26 RX ADMIN — IPRATROPIUM BROMIDE AND ALBUTEROL SULFATE 3 ML: .5; 3 SOLUTION RESPIRATORY (INHALATION) at 11:08

## 2020-09-26 RX ADMIN — SODIUM CHLORIDE, PRESERVATIVE FREE 10 ML: 5 INJECTION INTRAVENOUS at 09:23

## 2020-09-26 RX ADMIN — ARFORMOTEROL TARTRATE 15 MCG: 15 SOLUTION RESPIRATORY (INHALATION) at 11:09

## 2020-09-26 RX ADMIN — CEFTRIAXONE 1 G: 1 INJECTION, SOLUTION INTRAVENOUS at 15:43

## 2020-09-26 RX ADMIN — GUAIFENESIN 600 MG: 600 TABLET, EXTENDED RELEASE ORAL at 21:26

## 2020-09-26 RX ADMIN — SODIUM CHLORIDE, PRESERVATIVE FREE 10 ML: 5 INJECTION INTRAVENOUS at 21:00

## 2020-09-26 RX ADMIN — PREDNISONE 10 MG: 10 TABLET ORAL at 21:26

## 2020-09-26 RX ADMIN — DIAZEPAM 5 MG: 5 TABLET ORAL at 21:26

## 2020-09-26 RX ADMIN — ARFORMOTEROL TARTRATE 15 MCG: 15 SOLUTION RESPIRATORY (INHALATION) at 19:42

## 2020-09-26 RX ADMIN — HYDROCODONE BITARTRATE AND ACETAMINOPHEN 1 TABLET: 10; 325 TABLET ORAL at 21:26

## 2020-09-26 RX ADMIN — IPRATROPIUM BROMIDE AND ALBUTEROL SULFATE 3 ML: .5; 3 SOLUTION RESPIRATORY (INHALATION) at 19:36

## 2020-09-26 RX ADMIN — PREDNISONE 10 MG: 10 TABLET ORAL at 15:43

## 2020-09-26 RX ADMIN — INSULIN ASPART 3 UNITS: 100 INJECTION, SOLUTION INTRAVENOUS; SUBCUTANEOUS at 17:45

## 2020-09-26 RX ADMIN — ENOXAPARIN SODIUM 30 MG: 30 INJECTION SUBCUTANEOUS at 15:43

## 2020-09-26 RX ADMIN — GUAIFENESIN 600 MG: 600 TABLET, EXTENDED RELEASE ORAL at 09:24

## 2020-09-26 RX ADMIN — PREDNISONE 10 MG: 10 TABLET ORAL at 09:24

## 2020-09-27 LAB
ANION GAP SERPL CALCULATED.3IONS-SCNC: 9.6 MMOL/L (ref 5–15)
BACTERIA SPEC AEROBE CULT: NORMAL
BACTERIA SPEC AEROBE CULT: NORMAL
BASOPHILS # BLD AUTO: 0.01 10*3/MM3 (ref 0–0.2)
BASOPHILS NFR BLD AUTO: 0.1 % (ref 0–1.5)
BUN SERPL-MCNC: 7 MG/DL (ref 8–23)
BUN/CREAT SERPL: 8.5 (ref 7–25)
CALCIUM SPEC-SCNC: 6.7 MG/DL (ref 8.6–10.5)
CHLORIDE SERPL-SCNC: 105 MMOL/L (ref 98–107)
CO2 SERPL-SCNC: 27.4 MMOL/L (ref 22–29)
CREAT SERPL-MCNC: 0.82 MG/DL (ref 0.57–1)
DEPRECATED RDW RBC AUTO: 49.9 FL (ref 37–54)
EOSINOPHIL # BLD AUTO: 0.03 10*3/MM3 (ref 0–0.4)
EOSINOPHIL NFR BLD AUTO: 0.2 % (ref 0.3–6.2)
ERYTHROCYTE [DISTWIDTH] IN BLOOD BY AUTOMATED COUNT: 13.9 % (ref 12.3–15.4)
GFR SERPL CREATININE-BSD FRML MDRD: 70 ML/MIN/1.73
GLUCOSE SERPL-MCNC: 122 MG/DL (ref 65–99)
HCT VFR BLD AUTO: 34.2 % (ref 34–46.6)
HGB BLD-MCNC: 10.8 G/DL (ref 12–15.9)
IMM GRANULOCYTES # BLD AUTO: 0.1 10*3/MM3 (ref 0–0.05)
IMM GRANULOCYTES NFR BLD AUTO: 0.8 % (ref 0–0.5)
LYMPHOCYTES # BLD AUTO: 1.36 10*3/MM3 (ref 0.7–3.1)
LYMPHOCYTES NFR BLD AUTO: 11 % (ref 19.6–45.3)
MCH RBC QN AUTO: 30.5 PG (ref 26.6–33)
MCHC RBC AUTO-ENTMCNC: 31.6 G/DL (ref 31.5–35.7)
MCV RBC AUTO: 96.6 FL (ref 79–97)
MONOCYTES # BLD AUTO: 0.59 10*3/MM3 (ref 0.1–0.9)
MONOCYTES NFR BLD AUTO: 4.8 % (ref 5–12)
NEUTROPHILS NFR BLD AUTO: 10.3 10*3/MM3 (ref 1.7–7)
NEUTROPHILS NFR BLD AUTO: 83.1 % (ref 42.7–76)
NRBC BLD AUTO-RTO: 0 /100 WBC (ref 0–0.2)
PLATELET # BLD AUTO: 417 10*3/MM3 (ref 140–450)
PMV BLD AUTO: 9.4 FL (ref 6–12)
POTASSIUM SERPL-SCNC: 4.3 MMOL/L (ref 3.5–5.2)
RBC # BLD AUTO: 3.54 10*6/MM3 (ref 3.77–5.28)
SODIUM SERPL-SCNC: 142 MMOL/L (ref 136–145)
WBC # BLD AUTO: 12.39 10*3/MM3 (ref 3.4–10.8)

## 2020-09-27 PROCEDURE — 63710000001 PREDNISONE PER 5 MG: Performed by: FAMILY MEDICINE

## 2020-09-27 PROCEDURE — 94799 UNLISTED PULMONARY SVC/PX: CPT

## 2020-09-27 PROCEDURE — 25010000002 ENOXAPARIN PER 10 MG: Performed by: FAMILY MEDICINE

## 2020-09-27 PROCEDURE — 85025 COMPLETE CBC W/AUTO DIFF WBC: CPT | Performed by: FAMILY MEDICINE

## 2020-09-27 PROCEDURE — 80048 BASIC METABOLIC PNL TOTAL CA: CPT | Performed by: FAMILY MEDICINE

## 2020-09-27 RX ORDER — L.ACID,PARA/B.BIFIDUM/S.THERM 8B CELL
1 CAPSULE ORAL 2 TIMES DAILY
Status: DISCONTINUED | OUTPATIENT
Start: 2020-09-27 | End: 2020-09-28 | Stop reason: HOSPADM

## 2020-09-27 RX ORDER — CEFDINIR 300 MG/1
300 CAPSULE ORAL EVERY 12 HOURS SCHEDULED
Status: DISCONTINUED | OUTPATIENT
Start: 2020-09-27 | End: 2020-09-28 | Stop reason: HOSPADM

## 2020-09-27 RX ORDER — NICOTINE 21 MG/24HR
1 PATCH, TRANSDERMAL 24 HOURS TRANSDERMAL
Status: DISCONTINUED | OUTPATIENT
Start: 2020-09-27 | End: 2020-09-28 | Stop reason: HOSPADM

## 2020-09-27 RX ADMIN — IPRATROPIUM BROMIDE AND ALBUTEROL SULFATE 3 ML: .5; 3 SOLUTION RESPIRATORY (INHALATION) at 07:25

## 2020-09-27 RX ADMIN — SODIUM CHLORIDE, PRESERVATIVE FREE 10 ML: 5 INJECTION INTRAVENOUS at 21:31

## 2020-09-27 RX ADMIN — IPRATROPIUM BROMIDE AND ALBUTEROL SULFATE 3 ML: .5; 3 SOLUTION RESPIRATORY (INHALATION) at 19:47

## 2020-09-27 RX ADMIN — DIAZEPAM 5 MG: 5 TABLET ORAL at 21:24

## 2020-09-27 RX ADMIN — IPRATROPIUM BROMIDE AND ALBUTEROL SULFATE 3 ML: .5; 3 SOLUTION RESPIRATORY (INHALATION) at 10:59

## 2020-09-27 RX ADMIN — MIRTAZAPINE 15 MG: 15 TABLET, FILM COATED ORAL at 21:24

## 2020-09-27 RX ADMIN — HYDROCODONE BITARTRATE AND ACETAMINOPHEN 1 TABLET: 10; 325 TABLET ORAL at 19:00

## 2020-09-27 RX ADMIN — CEFDINIR 300 MG: 300 CAPSULE ORAL at 21:36

## 2020-09-27 RX ADMIN — GUAIFENESIN 600 MG: 600 TABLET, EXTENDED RELEASE ORAL at 21:23

## 2020-09-27 RX ADMIN — Medication 1 CAPSULE: at 21:36

## 2020-09-27 RX ADMIN — Medication 1 CAPSULE: at 13:30

## 2020-09-27 RX ADMIN — CEFDINIR 300 MG: 300 CAPSULE ORAL at 13:30

## 2020-09-27 RX ADMIN — SODIUM CHLORIDE, PRESERVATIVE FREE 10 ML: 5 INJECTION INTRAVENOUS at 09:00

## 2020-09-27 RX ADMIN — PREDNISONE 10 MG: 10 TABLET ORAL at 16:21

## 2020-09-27 RX ADMIN — ARFORMOTEROL TARTRATE 15 MCG: 15 SOLUTION RESPIRATORY (INHALATION) at 22:52

## 2020-09-27 RX ADMIN — PREDNISONE 10 MG: 10 TABLET ORAL at 21:23

## 2020-09-27 RX ADMIN — PREDNISONE 10 MG: 10 TABLET ORAL at 09:00

## 2020-09-27 RX ADMIN — ARFORMOTEROL TARTRATE 15 MCG: 15 SOLUTION RESPIRATORY (INHALATION) at 07:33

## 2020-09-27 RX ADMIN — IPRATROPIUM BROMIDE AND ALBUTEROL SULFATE 3 ML: .5; 3 SOLUTION RESPIRATORY (INHALATION) at 14:59

## 2020-09-27 RX ADMIN — ENOXAPARIN SODIUM 40 MG: 40 INJECTION SUBCUTANEOUS at 16:21

## 2020-09-27 RX ADMIN — NICOTINE 1 PATCH: 21 PATCH, EXTENDED RELEASE TRANSDERMAL at 08:59

## 2020-09-27 RX ADMIN — GUAIFENESIN 600 MG: 600 TABLET, EXTENDED RELEASE ORAL at 08:59

## 2020-09-28 VITALS
SYSTOLIC BLOOD PRESSURE: 114 MMHG | OXYGEN SATURATION: 91 % | HEART RATE: 68 BPM | RESPIRATION RATE: 18 BRPM | HEIGHT: 64 IN | TEMPERATURE: 98.4 F | WEIGHT: 118 LBS | BODY MASS INDEX: 20.14 KG/M2 | DIASTOLIC BLOOD PRESSURE: 59 MMHG

## 2020-09-28 PROBLEM — J44.1 COPD WITH ACUTE EXACERBATION: Status: ACTIVE | Noted: 2020-09-28

## 2020-09-28 PROBLEM — K52.9 COLITIS: Status: RESOLVED | Noted: 2017-05-02 | Resolved: 2020-09-28

## 2020-09-28 PROBLEM — K52.9 GE (GASTROENTERITIS): Status: RESOLVED | Noted: 2017-05-01 | Resolved: 2020-09-28

## 2020-09-28 LAB
BACTERIA SPEC RESP CULT: NORMAL
GRAM STN SPEC: NORMAL

## 2020-09-28 PROCEDURE — 94618 PULMONARY STRESS TESTING: CPT

## 2020-09-28 PROCEDURE — 63710000001 PREDNISONE PER 5 MG: Performed by: FAMILY MEDICINE

## 2020-09-28 PROCEDURE — 94799 UNLISTED PULMONARY SVC/PX: CPT

## 2020-09-28 RX ORDER — NICOTINE 21 MG/24HR
1 PATCH, TRANSDERMAL 24 HOURS TRANSDERMAL
Qty: 30 PATCH | Refills: 0 | Status: SHIPPED | OUTPATIENT
Start: 2020-09-28 | End: 2022-02-14

## 2020-09-28 RX ORDER — CEFDINIR 300 MG/1
300 CAPSULE ORAL EVERY 12 HOURS SCHEDULED
Qty: 12 CAPSULE | Refills: 0 | Status: SHIPPED | OUTPATIENT
Start: 2020-09-28 | End: 2020-10-04

## 2020-09-28 RX ORDER — PREDNISONE 10 MG/1
10 TABLET ORAL 3 TIMES DAILY
Qty: 9 TABLET | Refills: 0 | Status: SHIPPED | OUTPATIENT
Start: 2020-09-28 | End: 2021-02-10

## 2020-09-28 RX ADMIN — ARFORMOTEROL TARTRATE 15 MCG: 15 SOLUTION RESPIRATORY (INHALATION) at 07:38

## 2020-09-28 RX ADMIN — PREDNISONE 10 MG: 10 TABLET ORAL at 08:56

## 2020-09-28 RX ADMIN — GUAIFENESIN 600 MG: 600 TABLET, EXTENDED RELEASE ORAL at 08:56

## 2020-09-28 RX ADMIN — Medication 1 CAPSULE: at 08:56

## 2020-09-28 RX ADMIN — NICOTINE 1 PATCH: 21 PATCH, EXTENDED RELEASE TRANSDERMAL at 08:57

## 2020-09-28 RX ADMIN — IPRATROPIUM BROMIDE AND ALBUTEROL SULFATE 3 ML: .5; 3 SOLUTION RESPIRATORY (INHALATION) at 07:19

## 2020-09-28 RX ADMIN — IPRATROPIUM BROMIDE AND ALBUTEROL SULFATE 3 ML: .5; 3 SOLUTION RESPIRATORY (INHALATION) at 11:21

## 2020-09-28 RX ADMIN — CEFDINIR 300 MG: 300 CAPSULE ORAL at 08:56

## 2020-09-29 ENCOUNTER — READMISSION MANAGEMENT (OUTPATIENT)
Dept: CALL CENTER | Facility: HOSPITAL | Age: 65
End: 2020-09-29

## 2020-09-29 NOTE — OUTREACH NOTE
Prep Survey      Responses   [facility] facility patient discharged from?  [place]   Is LACE score < 7 ?  No   Eligibility  Readm Mgmt   Discharge diagnosis  Bilateral interstitial pneumonia highly suspicious for his covid   COVID-19 Test Status  Negative   Does the patient have one of the following disease processes/diagnoses(primary or secondary)?  COPD/Pneumonia   Does the patient have Home health ordered?  No   Is there a DME ordered?  Yes   What DME was ordered?  Oxygen per [facility]    Prep survey completed?  Yes          Candace Banks RN

## 2020-10-01 ENCOUNTER — READMISSION MANAGEMENT (OUTPATIENT)
Dept: CALL CENTER | Facility: HOSPITAL | Age: 65
End: 2020-10-01

## 2020-10-01 NOTE — OUTREACH NOTE
COPD/PN Week 1 Survey      Responses   [facility] patient discharged from?  [place]   Does the patient have one of the following disease processes/diagnoses(primary or secondary)?  COPD/Pneumonia   Week 1 attempt successful?  No   Unsuccessful attempts  Attempt 1          Radha Taylor RN

## 2020-10-04 NOTE — DISCHARGE SUMMARY
Pili Arechiga  1955  [id number]        Discharge Summary    Date of Admission: 9/22/2020  Date of Discharge:  9/28/2020      Primary Discharge Diagnoses:     Interstitial pneumonia COVID-19 infection rule out  Acute hypoxic respiratory failure  Acute kidney injury resolved  COPD exacerbation  Metabolic encephalopathy resolved    Secondary Discharge Diagnoses:     Lumbar degenerative disc disease  Hyperlipidemia  Osteoporosis  Tobacco abuse    PCP  Patient Care Team:  Nigel De Paz MD as PCP - General  Nigel De Paz MD as PCP - Family Medicine  Aylin Davis APRN as Nurse Practitioner (Oncology)    Consults:   Consults     Date and Time Order Name Status Description    9/22/2020 1542 Inpatient Infectious Diseases Consult Completed             History of Present Illness:    65-year-old white female with a history of COPD presented emergency room with a 48-hour history of cough congestion fever chills shortness of breath and sweats.  Patient states Saturday evening she felt bad had some chills and broke out in a sweat and was extremely fatigued and stayed in bed for much her whole day on Sunday.  She continues have drenching sweats and extreme fatigue with nonproductive cough progressive shortness of breath which got worse throughout the day on Monday therefore she came to emergency room.  She has had generalized aches and pleuritic chest pain.  She felt febrile but did not take her temperature until this morning was 103 at home.  She denies any exposure to covid.  She did have a low-dose CT chest screening a week ago.  She is on Symbicort and albuterol at home and continued use of but she continued to be short of breath.    Hospital Course       Patient admitted to telemetry bed started pulmonary toilet duo nebs IV steroids IV Rocephin and doxycycline.  Code testing done initially was negative was repeated next day still negative.  She continued be hypoxic requiring 4 to 5 L of  O2.  Infectious disease was consulted and concurred with our management.  Her acute kidney injury was treated with IV fluids and completely resolved likely due to hypotension and decreased p.o. intake.  Evening of 924 patient started becoming confused about the same.  There is some question of alcohol use at home she was started on PRN IV Ativan her confusion persisted and worsened over the next 24 to 48 hours.  She had been given several dose of IV Haldol without any improvement.  She had a CT scan done which was negative.  Films were reviewed and denied the patient consume any severe amounts of alcohol at home.  Thought metabolic encephalopathy was likely due to her hypoxia and her underlying pneumonia.  Hypoxemia improved and she is down to 2 L at time of discharge.  Her shortness of breath improved chest x-ray showed improvement her sputum cultures came back with Haemophilus influenza.  She was changed over to p.o. Omnicef which should complete a 5-day course.  Patient due to desaturation on exercise and required 1 L of O2 to maintain sats above 90 she is being discharged on 1 L.      Operations and Procedures Performed:       Xr Chest 2 View    Result Date: 9/26/2020  Narrative: CR Chest 2 Vws INDICATION:  Follow-up pneumonia COMPARISON:  9/24/2020 FINDINGS: PA and lateral views of the chest.  Marked improvement but persistent diffuse interstitial infiltrates. Atelectasis at the lung bases. Cardiomediastinal contours are within normal limits. No pleural effusion or pneumothorax. Surgical clips in the right upper quadrant. No acute bony pathology.     Impression: Persistent but improved diffuse interstitial infiltrates. Signer Name: GALO GONZALEZ MD  Signed: 9/26/2020 12:30 PM  Workstation Name: [email or web address]    Ct Head Without Contrast    Result Date: 9/24/2020  Narrative: CT Head WO HISTORY: Acute onset of confusion today TECHNIQUE: Axial unenhanced head CT. Radiation dose  reduction techniques included automated exposure control or exposure modulation based on body size. Count of known CT and cardiac nuc med studies performed in previous 12 months: 2. Time of scan: 4:43 PM COMPARISON: September 9, 2018 FINDINGS: No intracranial hemorrhage, mass, or infarct. No hydrocephalus or extra-axial fluid collection. Brain parenchymal density is normal. The skull base, calvarium, and extracranial soft tissues are normal. Mastoid air cells are clear. Visualized paranasal sinuses are clear.     Impression: No acute intracranial findings. No significant interval change from prior study. Signer Name: Nikita Cote MD  Signed: 9/24/2020 5:22 PM  Workstation Name: [id number]  [facility]    Ct Chest Without Contrast    Result Date: 9/22/2020  Narrative: CT Chest WO INDICATION: 2 day history of fever and shortness of breath. Negative COVID test. Long-term smoker TECHNIQUE: CT of the thorax without IV contrast. Coronal and sagittal reconstructions were obtained.  Radiation dose reduction techniques included automated exposure control or exposure modulation based on body size. Count of known CT and cardiac nuc med studies performed in previous 12 months: 1. COMPARISON: September 14, 2020 FINDINGS: Today's exam demonstrates extensive bilateral groundglass infiltrates. Some of these infiltrates are peripheral. The left lung is slightly more involved than the right. These infiltrates have developed in the interval from 9/14/2020. Commonly reported imaging features of COVID-19 pneumonia are present. Other processes such as influenza pneumonia and organizing pneumonia can cause a similar imaging pattern. No pleural fluid is seen. No pneumothorax. The heart size is within normal limits. No pericardial fluid. No threshold mediastinal or hilar adenopathy. No endobronchial lesions.     Impression: Extensive bilateral groundglass infiltrates, left side slightly greater than right.  Commonly reported imaging features of COVID-19 pneumonia are present. Other processes such as influenza pneumonia and organizing pneumonia can cause a similar imaging pattern. These findings have developed in the interval from the prior study of 9/14/2020. Signer Name: Nikita Cote MD  Signed: 9/22/2020 4:02 PM  Workstation Name: [id number]  [facility]    Xr Chest 1 View    Result Date: 9/24/2020  Narrative: AP PORTABLE CHEST, 09/24/2020  HISTORY: Pneumonia, shortness of air cough today  COMPARISON: 09/22/2020  TECHNIQUE: AP portable chest x-ray.  FINDINGS: Heart size normal. There are diffuse fairly dense interstitial infiltrates which have some slightly more pronounced on the prior study. There are no effusions. Bones are normal      Impression: Slight increase in diffuse interstitial infiltrates as compared with 09/22/2020  This report was finalized on 9/24/2020 8:46 AM by Dr. Ashish Hunter MD.      Xr Chest 1 View    Result Date: 9/22/2020  Narrative: CHEST X-RAY, 09/22/2020     HISTORY: 65-year-old female the ED with 2 day history shortness of air and fever. COVID-19 testing has been initiated, result pending.  TECHNIQUE: AP portable upright chest x-ray.  FINDINGS: Moderately dense diffuse patchy airspace infiltrate is present throughout both lungs. The findings are compatible with diffuse multifocal pneumonitis.  Heart size normal. No visible pleural effusion. Of note, multifocal pulmonary infiltrates were not present on the recent CT study of 09/14/2020.      Impression: Moderately dense diffuse multifocal pulmonary airspace infiltrates concerning for COVID-19 pneumonia.  This report was finalized on 9/22/2020 12:39 PM by Dr. Wesley Jimenez MD.      Us Renal Bilateral    Result Date: 9/23/2020  Narrative: BILATERAL RENAL ULTRASOUND, 09/23/2020  HISTORY: 65-year-old female hospital inpatient with acute kidney injury.  TECHNIQUE: Grayscale ultrasound imaging of both kidneys and the  urinary bladder was performed.  FINDINGS: Both kidneys are normal in size and ultrasound appearance. No evidence of urinary obstruction. No visible nephrolithiasis, renal mass, perinephric fluid collection or renal parenchymal scarring. The urinary bladder is incompletely filled but grossly negative. No bladder distention.  Right renal length: 10.2 cm. Left renal length: 10.2 cm.      Impression: Negative bilateral renal ultrasound examination.  This report was finalized on 9/23/2020 10:02 AM by Dr. Wesley Jimenez MD.      Ct Chest Low Dose Wo    Result Date: 9/14/2020  Narrative: CT CHEST LOW DOSE WO HISTORY:       65-year-old female current smoker  referred for LDCT screening. 59 pack year smoking history. Ordering physician has documented shared decision making process including risks and benefits of LDCT screening and smoking cessation counseling if appropriate. TECHNIQUE: Low dose CT examination of the chest was performed without IV contrast.  Coronal and Sagittal MPR reconstructions were obtained. CTDIvol: 2.74 mGy.  DLP: 83.19 mGy-cm. Radiation dose reduction techniques included automated exposure control or exposure modulation based on body size. Count of known CT and cardiac nuc med studies performed in previous 12 months: 0. COMPARISON: CT chest 12/18/2014 FINDINGS: This is a negative lung cancer screening. No suspicious pulmonary findings. Diffuse lung disease with pulmonary hyperinflation with scattered cystic areas compatible with emphysema and fibrosis. Subpleural parenchymal opacity/nodule right lower lobe has minimally increased from 2014 but imaging features are not suggestive of neoplasm. No active pneumonitis. No effusions. Trachea and bronchi appear normal. Stable apical pleural thickening bilaterally. Heart size within normal limits. Calcified left hilar and mediastinal nodes. Aorta and pulmonary vessels unremarkable. Upper abdomen unremarkable. No aggressive bone lesion.     Impression:  Negative lung cancer screening. Diffuse lung disease consistent with background emphysema and fibrosis. Subpleural parenchymal opacity/nodule right lower lobe has minimally increased 2014 but likely represents chronic inflammatory change and atelectasis. ACR Lung-RADS category: 1: Negative lung cancer screening. RECOMMENDATIONS: Annual lung cancer screening with a low dose chest CT. Signer Name: ADAN Carvajal MD  Signed: 9/14/2020 4:37 PM  Workstation Name: [id number]  [facility] [place]      Labs:    Labs:             Results from last 7 days   Lab Units 09/27/20  0408 09/26/20  0426 09/25/20  0507 09/24/20  0506 09/23/20  0450 09/22/20  1628 09/22/20  1200   WBC 10*3/mm3 12.39* 11.85* 15.85* 20.78* 12.87* 16.19* 19.69*   HEMOGLOBIN g/dL 10.8* 9.7* 10.9* 10.9* 11.3* 11.3* 12.7   HEMATOCRIT % 34.2 30.5* 35.8 35.0 36.2 35.7 39.4   PLATELETS 10*3/mm3 417 341 339 296 244 235 269                 Results from last 7 days   Lab Units 09/27/20  0408 09/26/20  0426 09/25/20  0507 09/24/20  0506 09/23/20  0450 09/22/20  1353 09/22/20  1200   SODIUM mmol/L 142 144 140 137 136 131* 130*   POTASSIUM mmol/L 4.3 3.9 3.9 4.8 5.2 4.6 4.7   CHLORIDE mmol/L 105 110* 106 105 100 93* 89*   CO2 mmol/L 27.4 26.4 22.7 21.5* 21.9* 22.8 25.1   BUN mg/dL 7* 12 18 27* 33* 27* 25*   CREATININE mg/dL 0.82 0.85 0.96 1.21* 1.57* 1.71* 1.81*   CALCIUM mg/dL 6.7* 6.7* 7.0* 6.4* 6.8* 7.1* 7.6*   BILIRUBIN mg/dL  --  0.2  --  0.2  --   --  1.1   ALK PHOS U/L  --  67  --  86  --   --  108   ALT (SGPT) U/L  --  10  --  12  --   --  13   AST (SGOT) U/L  --  16  --  30  --   --  48*   GLUCOSE mg/dL 122* 85 120* 154* 140* 122* 117*                      Lab Results (last 24 hours)      Procedure Component Value Units Date/Time     Blood Culture - Blood, Arm, Right [[id number]] Collected: 09/22/20 1200     Specimen: Blood from Arm, Right Updated: 09/27/20 1330       Blood Culture No growth at 5 days     Blood Culture - Blood, Arm, Right  [[id number]] Collected: 09/22/20 1205     Specimen: Blood from Arm, Right Updated: 09/27/20 1215       Blood Culture No growth at 5 days                 Results from last 7 days   Lab Units 09/24/20  0506 09/22/20  1353 09/22/20  1200   TROPONIN T ng/mL  --  0.022 0.033*   D DIMER QUANT MCGFEU/mL 1.18*  --  2.25*                Results from last 7 days   Lab Units 09/25/20  0507 09/22/20  1200   PROBNP pg/mL 10,186.0* 10,819.0*                     Invalid input(s): LDLCALC        Results from last 7 days   Lab Units 09/24/20  1855 09/22/20  1345   PH, ARTERIAL pH units 7.315* 7.378   PCO2, ARTERIAL mm Hg 45.1* 40.3   PO2 ART mm Hg 34.1* 53.8*   HCO3 ART mmol/L 22.9 23.7                Glucose   Date/Time Value Ref Range Status   09/26/2020 1739 219 (H) 70 - 130 mg/dL Final   09/25/2020 1640 173 (H) 70 - 130 mg/dL Final   09/25/2020 0802 114 70 - 130 mg/dL Final             Results from last 7 days   Lab Units 09/25/20  0507 09/23/20  0450 09/22/20  1200   PROCALCITONIN ng/mL 0.39* 1.59* 1.87*   LACTATE mmol/L  --   --  1.0             Results from last 7 days   Lab Units 09/22/20  1618 09/22/20  1205 09/22/20  1200   BLOODCX    --  No growth at 5 days No growth at 5 days   RESPCX   Heavy growth (4+) Haemophilus parainfluenzae*  Light growth (2+) Normal Respiratory Lili: NO S.aureus/MRSA or Pseudomonas aeruginosa  --   --            Results from last 7 days   Lab Units 09/25/20  1857   NITRITE UA   Negative           Results from last 7 days   Lab Units 09/22/20  1630   ADENOVIRUS DETECTION BY PCR   Not Detected   CORONAVIRUS 229E   Not Detected   CORONAVIRUS HKU1   Not Detected   CORONAVIRUS NL63   Not Detected   CORONAVIRUS OC43   Not Detected   HUMAN METAPNEUMOVIRUS   Not Detected   HUMAN RHINOVIRUS/ENTEROVIRUS   Not Detected   INFLUENZA B PCR   Not Detected   PARAINFLUENZA 1   Not Detected   PARAINFLUENZA VIRUS 2   Not Detected   PARAINFLUENZA VIRUS 3   Not Detected   PARAINFLUENZA VIRUS 4   Not Detected    BORDETELLA PERTUSSIS PCR   Not Detected   CHLAMYDOPHILA PNEUMONIAE PCR   Not Detected   MYCOPLAMA PNEUMO PCR   Not Detected   INFLUENZA A PCR   Not Detected   INFLUENZA A H3   Not Detected   INFLUENZA A H1   Not Detected   RSV, PCR   Not Detected                   PROCEDURES          Allergies:  is allergic to codeine.      Discharge Medications:     Your medication list      START taking these medications      Instructions Last Dose Given Next Dose Due   cefdinir 300 MG capsule  Commonly known as: OMNICEF      Take 1 capsule by mouth Every 12 (Twelve) Hours for 12 doses. Indications: Pneumonia       nicotine 21 MG/24HR patch  Commonly known as: NICODERM CQ      Place 1 patch on the skin as directed by provider Daily.       predniSONE 10 MG tablet  Commonly known as: DELTASONE      Take 1 tablet by mouth 3 (Three) Times a Day.          CONTINUE taking these medications      Instructions Last Dose Given Next Dose Due   budesonide-formoterol 160-4.5 MCG/ACT inhaler  Commonly known as: SYMBICORT      Inhale 2 puffs 2 (Two) Times a Day.       diazePAM 10 MG tablet  Commonly known as: VALIUM      Take 10 mg by mouth Every Night.       HYDROcodone-acetaminophen  MG per tablet  Commonly known as: NORCO      Take 1 tablet by mouth Every 6 (Six) Hours As Needed for Moderate Pain (4-6).       mirtazapine 15 MG tablet  Commonly known as: REMERON      Take 15 mg by mouth Every Night.       mometasone-formoterol 100-5 MCG/ACT inhaler  Commonly known as: DULERA 100      Inhale 2 puffs As Needed.       vitamin D3 125 MCG (5000 UT) capsule capsule      Take 5,000 Units by mouth 1 (One) Time Per Week.             Where to Get Your Medications      These medications were sent to [facility] [address] [facility] [place] [employer] [facility] [phone] [facility] [phone]   [address] [id number]    Phone: [phone]   · cefdinir 300 MG capsule  · nicotine 21 MG/24HR  patch  · predniSONE 10 MG tablet           Condition on Discharge: Stable    Discharge Disposition  Home    Visiting Nurse:    No     Home PT/OT:  No     Home Safety Evaluation:  No     DME  None    Discharge Diet:        Activity at Discharge:    As tolerated      Follow-up Appointments:  Additional Instructions for the Follow-ups that You Need to Schedule     Call MD for problems / concerns.   As directed      Discharge Follow-up with PCP   As directed       Currently Documented PCP:    Nigel De Paz MD    PCP Phone Number:    [phone]     Follow Up Details: thursday pm            Contact information for follow-up providers     Nigel De Paz MD .    Specialty: Family Medicine  Why: thursday pm  Contact information:  [address]  [place] [address] [phone]             Nigel De Paz MD .    Specialty: Family Medicine  Contact information:  [address]  [place] [address] [phone]                   Contact information for after-discharge care     Durable Medical Equipment     [facility] .    Service: Durable Medical Equipment  Contact information:  [id number]  [facility] [address]  [phone]                             Test Results Pending at Discharge       Nigel De Paz MD  10/04/20  12:43 EDT    Much of this encounter note is an electronic transcription/translation of spoken language to printed text using Dragon Software

## 2020-10-05 ENCOUNTER — HOSPITAL ENCOUNTER (OUTPATIENT)
Dept: MAMMOGRAPHY | Facility: HOSPITAL | Age: 65
Discharge: HOME | End: 2020-10-05

## 2020-10-05 ENCOUNTER — HOSPITAL ENCOUNTER (OUTPATIENT)
Dept: GENERAL RADIOLOGY | Facility: HOSPITAL | Age: 65
Discharge: HOME | End: 2020-10-05

## 2020-10-05 ENCOUNTER — READMISSION MANAGEMENT (OUTPATIENT)
Dept: CALL CENTER | Facility: HOSPITAL | Age: 65
End: 2020-10-05

## 2020-10-05 ENCOUNTER — TRANSCRIBE ORDERS (OUTPATIENT)
Dept: ADMINISTRATIVE | Facility: HOSPITAL | Age: 65
End: 2020-10-05

## 2020-10-05 DIAGNOSIS — J84.9 TROPICAL PULMONARY ALVEOLITIS (HCC): ICD-10-CM

## 2020-10-05 DIAGNOSIS — Z12.39 SCREENING BREAST EXAMINATION: Primary | ICD-10-CM

## 2020-10-05 DIAGNOSIS — Z12.39 SCREENING BREAST EXAMINATION: ICD-10-CM

## 2020-10-05 PROCEDURE — 77063 BREAST TOMOSYNTHESIS BI: CPT

## 2020-10-05 PROCEDURE — 71046 X-RAY EXAM CHEST 2 VIEWS: CPT

## 2020-10-05 PROCEDURE — 77067 SCR MAMMO BI INCL CAD: CPT

## 2020-10-05 NOTE — OUTREACH NOTE
COPD/PN Week 1 Survey      Responses   [facility] facility patient discharged from?  [place]   Does the patient have one of the following disease processes/diagnoses(primary or secondary)?  COPD/Pneumonia   Was the primary reason for admission:  Pneumonia   Week 1 attempt successful?  No   Unsuccessful attempts  Attempt 2          Gabrielle Griffin RN

## 2020-10-09 ENCOUNTER — READMISSION MANAGEMENT (OUTPATIENT)
Dept: CALL CENTER | Facility: HOSPITAL | Age: 65
End: 2020-10-09

## 2020-10-09 NOTE — OUTREACH NOTE
COPD/PN Week 1 Survey      Responses   [facility] patient discharged from?  [place]   Does the patient have one of the following disease processes/diagnoses(primary or secondary)?  COPD/Pneumonia   Was the primary reason for admission:  Pneumonia   Week 1 attempt successful?  No   Unsuccessful attempts  Attempt 3   Rescheduled  Revoked   Revoke  Decline to participate          Raina Ortez RN

## 2021-02-04 ENCOUNTER — TRANSCRIBE ORDERS (OUTPATIENT)
Dept: ADMINISTRATIVE | Facility: HOSPITAL | Age: 66
End: 2021-02-04

## 2021-02-04 ENCOUNTER — HOSPITAL ENCOUNTER (OUTPATIENT)
Dept: GENERAL RADIOLOGY | Facility: HOSPITAL | Age: 66
Discharge: HOME | End: 2021-02-04
Admitting: FAMILY MEDICINE

## 2021-02-04 DIAGNOSIS — R05.9 COUGH: ICD-10-CM

## 2021-02-04 DIAGNOSIS — R05.9 COUGH: Primary | ICD-10-CM

## 2021-02-04 PROCEDURE — 71046 X-RAY EXAM CHEST 2 VIEWS: CPT

## 2021-02-09 NOTE — PATIENT INSTRUCTIONS
"Call Dr. Nigel De Paz, [facility] at [phone] if you have any problems or concerns.    We know you have a Choice in healthcare and appreciate you using [facility].  Our purpose is to provide you \"Excellent Care\".  We hope that you will always choose us in the future and continue to recommend us to your family and friends.      "

## 2021-02-10 ENCOUNTER — HOSPITAL ENCOUNTER (OUTPATIENT)
Dept: INFUSION THERAPY | Facility: HOSPITAL | Age: 66
Discharge: HOME | End: 2021-02-10
Admitting: FAMILY MEDICINE

## 2021-02-10 VITALS
BODY MASS INDEX: 23.55 KG/M2 | OXYGEN SATURATION: 97 % | RESPIRATION RATE: 16 BRPM | TEMPERATURE: 98 F | WEIGHT: 128 LBS | HEIGHT: 62 IN | DIASTOLIC BLOOD PRESSURE: 68 MMHG | SYSTOLIC BLOOD PRESSURE: 112 MMHG | HEART RATE: 73 BPM

## 2021-02-10 DIAGNOSIS — M81.0 AGE RELATED OSTEOPOROSIS, UNSPECIFIED PATHOLOGICAL FRACTURE PRESENCE: Primary | ICD-10-CM

## 2021-02-10 PROCEDURE — 25010000002 DENOSUMAB 60 MG/ML SOLUTION PREFILLED SYRINGE: Performed by: FAMILY MEDICINE

## 2021-02-10 PROCEDURE — 96372 THER/PROPH/DIAG INJ SC/IM: CPT

## 2021-02-10 RX ORDER — ASPIRIN 325 MG
325 TABLET ORAL DAILY
COMMUNITY

## 2021-02-10 RX ADMIN — DENOSUMAB 60 MG: 60 INJECTION SUBCUTANEOUS at 09:23

## 2021-02-10 NOTE — NURSING NOTE
NURSING PROGRESS NOTE      0900 Pt to [facility] per  scheduled appointment.  Pt ID verified by (2) identifiers. Allergies, medications and medical history reviewed and verified. Tolerated prescribed treatment without incident. Patient received AVS, Pt verbalized understanding.  Discharged to home @ 0945. Condition Satisfactory .  Roseanne Cason RN

## 2021-04-07 ENCOUNTER — TRANSCRIBE ORDERS (OUTPATIENT)
Dept: ADMINISTRATIVE | Facility: HOSPITAL | Age: 66
End: 2021-04-07

## 2021-04-07 DIAGNOSIS — H91.20 SUDDEN HEARING LOSS, UNSPECIFIED LATERALITY: Primary | ICD-10-CM

## 2021-04-07 DIAGNOSIS — H93.19 TINNITUS, UNSPECIFIED LATERALITY: ICD-10-CM

## 2021-04-21 ENCOUNTER — HOSPITAL ENCOUNTER (OUTPATIENT)
Dept: MRI IMAGING | Facility: HOSPITAL | Age: 66
Discharge: HOME | End: 2021-04-21
Admitting: OTOLARYNGOLOGY

## 2021-04-21 DIAGNOSIS — H91.20 SUDDEN HEARING LOSS, UNSPECIFIED LATERALITY: ICD-10-CM

## 2021-04-21 DIAGNOSIS — H93.19 TINNITUS, UNSPECIFIED LATERALITY: ICD-10-CM

## 2021-04-21 LAB — CREAT BLDA-MCNC: 1.2 MG/DL (ref 0.6–1.3)

## 2021-04-21 PROCEDURE — A9577 INJ MULTIHANCE: HCPCS | Performed by: OTOLARYNGOLOGY

## 2021-04-21 PROCEDURE — 0 GADOBENATE DIMEGLUMINE 529 MG/ML SOLUTION: Performed by: OTOLARYNGOLOGY

## 2021-04-21 PROCEDURE — 82565 ASSAY OF CREATININE: CPT

## 2021-04-21 PROCEDURE — 70553 MRI BRAIN STEM W/O & W/DYE: CPT

## 2021-04-21 RX ADMIN — GADOBENATE DIMEGLUMINE 11 ML: 529 INJECTION, SOLUTION INTRAVENOUS at 10:59

## 2021-08-11 ENCOUNTER — HOSPITAL ENCOUNTER (OUTPATIENT)
Dept: INFUSION THERAPY | Facility: HOSPITAL | Age: 66
Discharge: HOME | End: 2021-08-11
Admitting: FAMILY MEDICINE

## 2021-08-11 VITALS
SYSTOLIC BLOOD PRESSURE: 125 MMHG | BODY MASS INDEX: 24.84 KG/M2 | RESPIRATION RATE: 20 BRPM | OXYGEN SATURATION: 94 % | HEIGHT: 62 IN | HEART RATE: 62 BPM | DIASTOLIC BLOOD PRESSURE: 71 MMHG | TEMPERATURE: 97.5 F | WEIGHT: 135 LBS

## 2021-08-11 DIAGNOSIS — M81.0 AGE RELATED OSTEOPOROSIS, UNSPECIFIED PATHOLOGICAL FRACTURE PRESENCE: Primary | ICD-10-CM

## 2021-08-11 PROCEDURE — 96372 THER/PROPH/DIAG INJ SC/IM: CPT

## 2021-08-11 PROCEDURE — 25010000002 DENOSUMAB 60 MG/ML SOLUTION PREFILLED SYRINGE: Performed by: FAMILY MEDICINE

## 2021-08-11 RX ADMIN — DENOSUMAB 60 MG: 60 INJECTION SUBCUTANEOUS at 13:19

## 2021-08-11 NOTE — NURSING NOTE
1250  Pt here ambulatory to [facility] for Prolia injection.  Dr. De Paz office sent labwork.  1325  Pt discharged ambulatory and does not want paperwork.    Reminded pt to call Dr Maldonado in Feb for next injection.

## 2021-08-11 NOTE — PATIENT INSTRUCTIONS
"[facility] at [phone] if you have any problems or concerns.    We know you have a Choice in healthcare and appreciate you using [facility].  Our purpose is to provide you \"Excellent Care\".  We hope that you will always choose us in the future and continue to recommend us to your family and friends.    Denosumab injection  What is this medicine?  DENOSUMAB (den oh josé mab) slows bone breakdown. Prolia is used to treat osteoporosis in women after menopause and in men, and in people who are taking corticosteroids for 6 months or more. Xgeva is used to treat a high calcium level due to cancer and to prevent bone fractures and other bone problems caused by multiple myeloma or cancer bone metastases. Xgeva is also used to treat giant cell tumor of the bone.  This medicine may be used for other purposes; ask your health care provider or pharmacist if you have questions.  COMMON BRAND NAME(S): Prolia, XGEVA  What should I tell my health care provider before I take this medicine?  They need to know if you have any of these conditions:  · dental disease  · having surgery or tooth extraction  · infection  · kidney disease  · low levels of calcium or Vitamin D in the blood  · malnutrition  · on hemodialysis  · skin conditions or sensitivity  · thyroid or parathyroid disease  · an unusual reaction to denosumab, other medicines, foods, dyes, or preservatives  · pregnant or trying to get pregnant  · breast-feeding  How should I use this medicine?  This medicine is for injection under the skin. It is given by a health care professional in a hospital or clinic setting.  A special MedGuide will be given to you before each treatment. Be sure to read this information carefully each time.  For Prolia, talk to your pediatrician regarding the use of this medicine in children. Special care may be needed. For Xgeva, talk to your pediatrician regarding the use of this medicine " in children. While this drug may be prescribed for children as young as 13 years for selected conditions, precautions do apply.  Overdosage: If you think you have taken too much of this medicine contact a poison control center or emergency room at once.  NOTE: This medicine is only for you. Do not share this medicine with others.  What if I miss a dose?  It is important not to miss your dose. Call your doctor or health care professional if you are unable to keep an appointment.  What may interact with this medicine?  Do not take this medicine with any of the following medications:  · other medicines containing denosumab  This medicine may also interact with the following medications:  · medicines that lower your chance of fighting infection  · steroid medicines like prednisone or cortisone  This list may not describe all possible interactions. Give your health care provider a list of all the medicines, herbs, non-prescription drugs, or dietary supplements you use. Also tell them if you smoke, drink alcohol, or use illegal drugs. Some items may interact with your medicine.  What should I watch for while using this medicine?  Visit your doctor or health care professional for regular checks on your progress. Your doctor or health care professional may order blood tests and other tests to see how you are doing.  Call your doctor or health care professional for advice if you get a fever, chills or sore throat, or other symptoms of a cold or flu. Do not treat yourself. This drug may decrease your body's ability to fight infection. Try to avoid being around people who are sick.  You should make sure you get enough calcium and vitamin D while you are taking this medicine, unless your doctor tells you not to. Discuss the foods you eat and the vitamins you take with your health care professional.  See your dentist regularly. Brush and floss your teeth as directed. Before you have any dental work done, tell your dentist you  are receiving this medicine.  Do not become pregnant while taking this medicine or for 5 months after stopping it. Talk with your doctor or health care professional about your birth control options while taking this medicine. Women should inform their doctor if they wish to become pregnant or think they might be pregnant. There is a potential for serious side effects to an unborn child. Talk to your health care professional or pharmacist for more information.  What side effects may I notice from receiving this medicine?  Side effects that you should report to your doctor or health care professional as soon as possible:  · allergic reactions like skin rash, itching or hives, swelling of the face, lips, or tongue  · bone pain  · breathing problems  · dizziness  · jaw pain, especially after dental work  · redness, blistering, peeling of the skin  · signs and symptoms of infection like fever or chills; cough; sore throat; pain or trouble passing urine  · signs of low calcium like fast heartbeat, muscle cramps or muscle pain; pain, tingling, numbness in the hands or feet; seizures  · unusual bleeding or bruising  · unusually weak or tired  Side effects that usually do not require medical attention (report to your doctor or health care professional if they continue or are bothersome):  · constipation  · diarrhea  · headache  · joint pain  · loss of appetite  · muscle pain  · runny nose  · tiredness  · upset stomach  This list may not describe all possible side effects. Call your doctor for medical advice about side effects. You may report side effects to FDA at 0-830-FDA-1037.  Where should I keep my medicine?  This medicine is only given in a clinic, doctor's office, or other health care setting and will not be stored at home.  NOTE: This sheet is a summary. It may not cover all possible information. If you have questions about this medicine, talk to your doctor, pharmacist, or health care provider.  © 2021 [employer] (2019-04-26 16:10:44)

## 2021-09-27 ENCOUNTER — APPOINTMENT (OUTPATIENT)
Dept: OTHER | Facility: HOSPITAL | Age: 66
End: 2021-09-27

## 2021-10-11 ENCOUNTER — APPOINTMENT (OUTPATIENT)
Dept: OTHER | Facility: HOSPITAL | Age: 66
End: 2021-10-11

## 2022-02-14 ENCOUNTER — HOSPITAL ENCOUNTER (OUTPATIENT)
Dept: CT IMAGING | Facility: HOSPITAL | Age: 67
Discharge: HOME | End: 2022-02-14

## 2022-02-14 ENCOUNTER — CLINICAL SUPPORT (OUTPATIENT)
Dept: OTHER | Facility: HOSPITAL | Age: 67
End: 2022-02-14

## 2022-02-14 ENCOUNTER — HOSPITAL ENCOUNTER (OUTPATIENT)
Dept: INFUSION THERAPY | Facility: HOSPITAL | Age: 67
Setting detail: INFUSION SERIES
Discharge: HOME | End: 2022-02-14

## 2022-02-14 VITALS — BODY MASS INDEX: 18.98 KG/M2 | WEIGHT: 113.9 LBS | HEIGHT: 65 IN

## 2022-02-14 VITALS
DIASTOLIC BLOOD PRESSURE: 71 MMHG | SYSTOLIC BLOOD PRESSURE: 106 MMHG | TEMPERATURE: 97 F | HEART RATE: 85 BPM | RESPIRATION RATE: 20 BRPM | OXYGEN SATURATION: 95 %

## 2022-02-14 DIAGNOSIS — F17.210 SMOKING GREATER THAN 30 PACK YEARS: ICD-10-CM

## 2022-02-14 DIAGNOSIS — M81.0 AGE RELATED OSTEOPOROSIS, UNSPECIFIED PATHOLOGICAL FRACTURE PRESENCE: Primary | ICD-10-CM

## 2022-02-14 DIAGNOSIS — Z12.2 SCREENING FOR MALIGNANT NEOPLASM OF RESPIRATORY ORGAN: Primary | ICD-10-CM

## 2022-02-14 DIAGNOSIS — Z12.2 SCREENING FOR MALIGNANT NEOPLASM OF RESPIRATORY ORGAN: ICD-10-CM

## 2022-02-14 PROCEDURE — G0296 VISIT TO DETERM LDCT ELIG: HCPCS | Performed by: CLINICAL NURSE SPECIALIST

## 2022-02-14 PROCEDURE — 96372 THER/PROPH/DIAG INJ SC/IM: CPT

## 2022-02-14 PROCEDURE — 71271 CT THORAX LUNG CANCER SCR C-: CPT

## 2022-02-14 PROCEDURE — 25010000002 DENOSUMAB 60 MG/ML SOLUTION PREFILLED SYRINGE: Performed by: FAMILY MEDICINE

## 2022-02-14 RX ADMIN — DENOSUMAB 60 MG: 60 INJECTION SUBCUTANEOUS at 12:17

## 2022-02-14 NOTE — PROGRESS NOTES
[facility] Low-Dose Lung Cancer CT Screening Visit    Pili Arechiga is a pleasant 66 y.o. female seen today at the request of Dr. De Paz in our Lung Cancer Screening Clinic, being seen for Lung Cancer Screening Counseling and a Shared Decision Making Visit prior to Low-Dose Lung Cancer Screening CT exam.    SMOKING HISTORY:   Social History     Tobacco Use   Smoking Status Current Every Day Smoker   • Packs/day: 1.00   • Types: Cigarettes   Smokeless Tobacco Never Used   Tobacco Comment    Began smoking at age 11.  Smoked .5 ppd for 6 years, 2.5 ppd for a year, 2 ppd for 5 years, and 1 ppd for the past 43 years for a 58.5 pack year history.       Pili Arechiga is currently smoking 1 pack per day with a 58.5 pack year history.  Reports no use of alternate forms of tobacco, electronic cigarettes, marijuana or other substances.  Based on the recommendation of the United [place] Preventive Services Task Force, this patient is at high risk for lung cancer and a low-dose CT screening scan is recommended.     We discussed the connection between Radon and Lung Cancer and the availability of free test kits.  She has a basement that has never been tested.  She is interested in obtaining a test kit from her local health department and I have provided her that information.  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  Some second-hand smoke exposure as a child with her father smoking in their home.  She is currently around second-hand smoke as well.    The patient has had no hemoptysis, unintentional weight loss or increasing shortness of breath. The patient is asymptomatic and has no signs or symptoms of lung cancer.   The patient reports NO personal history of cancer.  Additionally, reports family history of lung cancer in a niece who  of the disease at age 50.  We reviewed the findings on a CT chest that she had 2020  including pneumonia, extensive bilateral groundglass infiltrates with left greater than right.  I asked if she had been able to get her COVID-19 vaccinations.  She stated that she has been unable to schedule since she has no computer access.  Together we scheduled an appointment at the [facility] in [place] to receive her first Moderna vaccination at 2pm today.    Together we discussed the potential benefits and potential harms of being screened for lung cancer including the potential for follow-up diagnostic testing, risk for over diagnosis, false positive rate and total radiation exposure using the [facility] standardized decision aid. We reviewed the patient's smoking history and counseled on the importance and health benefits of quitting smoking.    Smoking Cessation  DISCUSSION HELD TODAY:     We discussed that there are a number of resources and tobacco cessation interventions to assist with smoking cessation including the 1-800-Quit Now line, Health Department programs, [place] [facility] resources, and use of the U.S. Department of Health and Human Services five keys for quitting and quit plan worksheet.  On a scale of zero to ten, the patient rates their motivation and readiness to quit at a 0 out of 10 today.  She is not interested in developing a tobacco quit plan currently.    Recommendations for continued lung cancer screening:      We discussed the NCCN guidelines for lung cancer screening and the patient verbalized understanding that annual screening is recommended until fifteen years beyond smoking as long as they have no other disease or comorbidity that would prevent them from receiving cancer treatments such as surgery should a lung cancer be detected. The [facility] Lung Cancer Screening Shared Decision-Making Tool was utilized as an aid in discussing whether or not screening was right. The patient has decided to proceed with a Low Dose Lung Cancer Screening CT today. The  patient is aware that the results of his screening will be shared with the referring provider or PCP as well.       The patient verbalizes understanding that results of this low dose lung CT exam will be called and that assistance will be provided in arranging any necessary follow-up.    After review of the NCCN guidelines and recommendations for ongoing screening, the patient verbalized understanding of recommendations for follow-up. We discussed the importance of adherence to continued annual screening until 15 years beyond smoking or until other life-limiting comorbidities are present. We discussed the impact of comorbidities and ability and willing to undergo diagnosis and treatment.    The patient has been counseled on the health benefits of quitting tobacco, smoking cessation strategies and resources, as well as the importance of adherence to annual lung cancer low-dose CT screening.     Aylin Davis, MSN, APRN, ACNS-BC, AOCN, CHPN  Clinical Nurse Specialist  [facility]

## 2022-02-14 NOTE — NURSING NOTE
1205  Pt here ambulatory to [facility] for Prolia injection.  Pt had forgotten about appt this am. Pt has a  and brought at this time.  1225  Pt discharged ambulatory post injection and next appt given.  Pt denies any adverse effects from medication.  Pt escorted to ER entrance for discharge.

## 2022-02-14 NOTE — PATIENT INSTRUCTIONS
Patient verbalizes understanding that annual low-dose lung cancer screening CT scans are recommended until 15 years beyond smoking or age 80 as she is at high risk for lung cancer development based on her tobacco use history.  She is aware of her appointment at the [facility] for her first COVID-19 vaccination.

## 2022-02-18 NOTE — PROGRESS NOTES
I have called the patient several times in an attempt to discuss the results of her scan however with her permission, I did leave a message today that there was a slight irregularity in one area that will need a repeat CT chest in 6 months.  She is aware that I have spoken with Dr. De Paz and he plans to schedule that for her.  She has known COPD and coronary artery calcifications are present. She is not ready to develop a tobacco quit plan currently.

## 2022-06-09 ENCOUNTER — TRANSCRIBE ORDERS (OUTPATIENT)
Dept: CT IMAGING | Facility: HOSPITAL | Age: 67
End: 2022-06-09

## 2022-06-09 DIAGNOSIS — K52.9 DIARRHEA, SECRETORY: Primary | ICD-10-CM

## 2022-06-22 ENCOUNTER — HOSPITAL ENCOUNTER (OUTPATIENT)
Dept: CT IMAGING | Facility: HOSPITAL | Age: 67
Discharge: HOME | End: 2022-06-22
Admitting: FAMILY MEDICINE

## 2022-06-22 DIAGNOSIS — K52.9 DIARRHEA, SECRETORY: ICD-10-CM

## 2022-06-22 LAB — CREAT BLDA-MCNC: 0.7 MG/DL (ref 0.6–1.3)

## 2022-06-22 PROCEDURE — 82565 ASSAY OF CREATININE: CPT

## 2022-06-22 PROCEDURE — 74177 CT ABD & PELVIS W/CONTRAST: CPT

## 2022-06-22 PROCEDURE — 0 DIATRIZOATE MEGLUMINE & SODIUM PER 1 ML: Performed by: FAMILY MEDICINE

## 2022-06-22 RX ADMIN — DIATRIZOATE MEGLUMINE AND DIATRIZOATE SODIUM 30 ML: 600; 100 SOLUTION ORAL; RECTAL at 07:54

## 2022-06-23 ENCOUNTER — LAB (OUTPATIENT)
Dept: LAB | Facility: HOSPITAL | Age: 67
End: 2022-06-23

## 2022-06-23 ENCOUNTER — TRANSCRIBE ORDERS (OUTPATIENT)
Dept: ADMINISTRATIVE | Facility: HOSPITAL | Age: 67
End: 2022-06-23

## 2022-06-23 DIAGNOSIS — K52.9 COLITIS: ICD-10-CM

## 2022-06-23 DIAGNOSIS — K52.9 COLITIS: Primary | ICD-10-CM

## 2022-06-23 LAB
ADV 40+41 DNA STL QL NAA+NON-PROBE: NOT DETECTED
ASTRO TYP 1-8 RNA STL QL NAA+NON-PROBE: NOT DETECTED
C CAYETANENSIS DNA STL QL NAA+NON-PROBE: NOT DETECTED
C COLI+JEJ+UPSA DNA STL QL NAA+NON-PROBE: NOT DETECTED
C DIFF GDH STL QL: POSITIVE
CRYPTOSP DNA STL QL NAA+NON-PROBE: NOT DETECTED
E HISTOLYT DNA STL QL NAA+NON-PROBE: NOT DETECTED
EAEC PAA PLAS AGGR+AATA ST NAA+NON-PRB: NOT DETECTED
EC STX1+STX2 GENES STL QL NAA+NON-PROBE: NOT DETECTED
EPEC EAE GENE STL QL NAA+NON-PROBE: NOT DETECTED
ETEC LTA+ST1A+ST1B TOX ST NAA+NON-PROBE: NOT DETECTED
G LAMBLIA DNA STL QL NAA+NON-PROBE: NOT DETECTED
LACTOFERRIN STL QL LA: NEGATIVE
NOROVIRUS GI+II RNA STL QL NAA+NON-PROBE: NOT DETECTED
P SHIGELLOIDES DNA STL QL NAA+NON-PROBE: NOT DETECTED
RVA RNA STL QL NAA+NON-PROBE: NOT DETECTED
S ENT+BONG DNA STL QL NAA+NON-PROBE: DETECTED
SAPO I+II+IV+V RNA STL QL NAA+NON-PROBE: NOT DETECTED
SHIGELLA SP+EIEC IPAH ST NAA+NON-PROBE: NOT DETECTED
V CHOL+PARA+VUL DNA STL QL NAA+NON-PROBE: NOT DETECTED
V CHOLERAE DNA STL QL NAA+NON-PROBE: NOT DETECTED
Y ENTEROCOL DNA STL QL NAA+NON-PROBE: NOT DETECTED

## 2022-06-23 PROCEDURE — 87081 CULTURE SCREEN ONLY: CPT

## 2022-06-23 PROCEDURE — 87507 IADNA-DNA/RNA PROBE TQ 12-25: CPT

## 2022-06-23 PROCEDURE — 83630 LACTOFERRIN FECAL (QUAL): CPT

## 2022-06-23 PROCEDURE — 87324 CLOSTRIDIUM AG IA: CPT

## 2022-06-23 PROCEDURE — 87449 NOS EACH ORGANISM AG IA: CPT

## 2022-06-23 PROCEDURE — 87077 CULTURE AEROBIC IDENTIFY: CPT

## 2022-07-15 ENCOUNTER — HOSPITAL ENCOUNTER (OUTPATIENT)
Dept: ULTRASOUND IMAGING | Facility: HOSPITAL | Age: 67
Discharge: HOME | End: 2022-07-15
Admitting: FAMILY MEDICINE

## 2022-07-15 ENCOUNTER — TRANSCRIBE ORDERS (OUTPATIENT)
Dept: ADMINISTRATIVE | Facility: HOSPITAL | Age: 67
End: 2022-07-15

## 2022-07-15 DIAGNOSIS — R60.0 LOCALIZED EDEMA: Primary | ICD-10-CM

## 2022-07-15 DIAGNOSIS — R60.0 LOCALIZED EDEMA: ICD-10-CM

## 2022-07-15 PROCEDURE — 93971 EXTREMITY STUDY: CPT

## 2022-08-04 ENCOUNTER — LAB (OUTPATIENT)
Dept: LAB | Facility: HOSPITAL | Age: 67
End: 2022-08-04

## 2022-08-04 ENCOUNTER — HOSPITAL ENCOUNTER (OUTPATIENT)
Dept: GENERAL RADIOLOGY | Facility: HOSPITAL | Age: 67
Discharge: HOME | End: 2022-08-04

## 2022-08-04 ENCOUNTER — TRANSCRIBE ORDERS (OUTPATIENT)
Dept: ADMINISTRATIVE | Facility: HOSPITAL | Age: 67
End: 2022-08-04

## 2022-08-04 DIAGNOSIS — R07.9 CHEST PAIN, UNSPECIFIED TYPE: Primary | ICD-10-CM

## 2022-08-04 LAB — TROPONIN T SERPL-MCNC: <0.01 NG/ML (ref 0–0.03)

## 2022-08-04 PROCEDURE — 71046 X-RAY EXAM CHEST 2 VIEWS: CPT

## 2022-08-04 PROCEDURE — 84484 ASSAY OF TROPONIN QUANT: CPT | Performed by: FAMILY MEDICINE

## 2022-08-04 PROCEDURE — 36415 COLL VENOUS BLD VENIPUNCTURE: CPT | Performed by: FAMILY MEDICINE

## 2022-08-22 ENCOUNTER — LAB (OUTPATIENT)
Dept: LAB | Facility: HOSPITAL | Age: 67
End: 2022-08-22

## 2022-08-22 ENCOUNTER — TRANSCRIBE ORDERS (OUTPATIENT)
Dept: ADMINISTRATIVE | Facility: HOSPITAL | Age: 67
End: 2022-08-22

## 2022-08-22 DIAGNOSIS — K52.9 CHRONIC DIARRHEA: Primary | ICD-10-CM

## 2022-08-22 DIAGNOSIS — K52.9 CHRONIC DIARRHEA: ICD-10-CM

## 2022-08-22 LAB
ADV 40+41 DNA STL QL NAA+NON-PROBE: NOT DETECTED
ASTRO TYP 1-8 RNA STL QL NAA+NON-PROBE: NOT DETECTED
C CAYETANENSIS DNA STL QL NAA+NON-PROBE: NOT DETECTED
C COLI+JEJ+UPSA DNA STL QL NAA+NON-PROBE: NOT DETECTED
C DIFF TOX GENS STL QL NAA+PROBE: NEGATIVE
CRYPTOSP DNA STL QL NAA+NON-PROBE: NOT DETECTED
E HISTOLYT DNA STL QL NAA+NON-PROBE: NOT DETECTED
EAEC PAA PLAS AGGR+AATA ST NAA+NON-PRB: NOT DETECTED
EC STX1+STX2 GENES STL QL NAA+NON-PROBE: NOT DETECTED
EPEC EAE GENE STL QL NAA+NON-PROBE: NOT DETECTED
ETEC LTA+ST1A+ST1B TOX ST NAA+NON-PROBE: NOT DETECTED
G LAMBLIA DNA STL QL NAA+NON-PROBE: NOT DETECTED
NOROVIRUS GI+II RNA STL QL NAA+NON-PROBE: DETECTED
P SHIGELLOIDES DNA STL QL NAA+NON-PROBE: NOT DETECTED
RVA RNA STL QL NAA+NON-PROBE: NOT DETECTED
S ENT+BONG DNA STL QL NAA+NON-PROBE: NOT DETECTED
SAPO I+II+IV+V RNA STL QL NAA+NON-PROBE: NOT DETECTED
SHIGELLA SP+EIEC IPAH ST NAA+NON-PROBE: NOT DETECTED
V CHOL+PARA+VUL DNA STL QL NAA+NON-PROBE: NOT DETECTED
V CHOLERAE DNA STL QL NAA+NON-PROBE: NOT DETECTED
Y ENTEROCOL DNA STL QL NAA+NON-PROBE: NOT DETECTED

## 2022-08-22 PROCEDURE — 87507 IADNA-DNA/RNA PROBE TQ 12-25: CPT

## 2022-08-22 PROCEDURE — 87493 C DIFF AMPLIFIED PROBE: CPT

## 2022-10-12 ENCOUNTER — TRANSCRIBE ORDERS (OUTPATIENT)
Dept: ADMINISTRATIVE | Facility: HOSPITAL | Age: 67
End: 2022-10-12

## 2022-10-12 DIAGNOSIS — Z12.31 SCREENING MAMMOGRAM, ENCOUNTER FOR: Primary | ICD-10-CM

## 2022-10-20 ENCOUNTER — TRANSCRIBE ORDERS (OUTPATIENT)
Dept: CT IMAGING | Facility: HOSPITAL | Age: 67
End: 2022-10-20

## 2022-10-20 ENCOUNTER — TRANSCRIBE ORDERS (OUTPATIENT)
Dept: BONE DENSITY | Facility: HOSPITAL | Age: 67
End: 2022-10-20

## 2022-10-20 DIAGNOSIS — R91.1 PULMONARY NODULE, LEFT: Primary | ICD-10-CM

## 2022-10-20 DIAGNOSIS — Z78.0 MENOPAUSE: Primary | ICD-10-CM

## 2022-10-27 ENCOUNTER — APPOINTMENT (OUTPATIENT)
Dept: BONE DENSITY | Facility: HOSPITAL | Age: 67
End: 2022-10-27

## 2022-10-27 ENCOUNTER — HOSPITAL ENCOUNTER (OUTPATIENT)
Dept: CT IMAGING | Facility: HOSPITAL | Age: 67
Discharge: HOME | End: 2022-10-27

## 2022-10-27 DIAGNOSIS — R91.1 PULMONARY NODULE, LEFT: ICD-10-CM

## 2022-10-27 DIAGNOSIS — Z78.0 MENOPAUSE: ICD-10-CM

## 2022-10-27 PROCEDURE — 71260 CT THORAX DX C+: CPT

## 2022-10-27 PROCEDURE — 0 IOPAMIDOL PER 1 ML: Performed by: FAMILY MEDICINE

## 2022-10-27 PROCEDURE — 77080 DXA BONE DENSITY AXIAL: CPT

## 2022-10-27 PROCEDURE — 82565 ASSAY OF CREATININE: CPT | Performed by: FAMILY MEDICINE

## 2022-10-27 RX ADMIN — IOPAMIDOL 100 ML: 755 INJECTION, SOLUTION INTRAVENOUS at 17:53

## 2022-10-28 LAB — CREAT BLDA-MCNC: 1.1 MG/DL (ref 0.6–1.3)

## 2022-11-17 ENCOUNTER — HOSPITAL ENCOUNTER (OUTPATIENT)
Dept: MAMMOGRAPHY | Facility: HOSPITAL | Age: 67
Discharge: HOME | End: 2022-11-17
Admitting: FAMILY MEDICINE

## 2022-11-17 DIAGNOSIS — Z12.31 SCREENING MAMMOGRAM, ENCOUNTER FOR: ICD-10-CM

## 2022-11-17 PROCEDURE — 77067 SCR MAMMO BI INCL CAD: CPT

## 2022-11-17 PROCEDURE — 77063 BREAST TOMOSYNTHESIS BI: CPT

## 2022-11-21 ENCOUNTER — TRANSCRIBE ORDERS (OUTPATIENT)
Dept: MAMMOGRAPHY | Facility: HOSPITAL | Age: 67
End: 2022-11-21

## 2022-11-21 DIAGNOSIS — R92.8 ABNORMAL MAMMOGRAM OF LEFT BREAST: Primary | ICD-10-CM

## 2022-11-22 ENCOUNTER — TRANSCRIBE ORDERS (OUTPATIENT)
Dept: ADMINISTRATIVE | Facility: HOSPITAL | Age: 67
End: 2022-11-22

## 2022-11-22 DIAGNOSIS — R92.8 ABNORMAL MAMMOGRAM: Primary | ICD-10-CM

## 2022-12-13 ENCOUNTER — HOSPITAL ENCOUNTER (OUTPATIENT)
Dept: MAMMOGRAPHY | Facility: HOSPITAL | Age: 67
Discharge: HOME | End: 2022-12-13

## 2022-12-13 ENCOUNTER — HOSPITAL ENCOUNTER (OUTPATIENT)
Dept: ULTRASOUND IMAGING | Facility: HOSPITAL | Age: 67
Discharge: HOME | End: 2022-12-13

## 2022-12-13 DIAGNOSIS — R92.8 ABNORMAL MAMMOGRAM OF LEFT BREAST: ICD-10-CM

## 2022-12-13 DIAGNOSIS — R92.8 ABNORMAL MAMMOGRAM: ICD-10-CM

## 2022-12-13 PROCEDURE — G0279 TOMOSYNTHESIS, MAMMO: HCPCS

## 2022-12-13 PROCEDURE — 76642 ULTRASOUND BREAST LIMITED: CPT

## 2022-12-13 PROCEDURE — 77065 DX MAMMO INCL CAD UNI: CPT

## 2022-12-20 ENCOUNTER — TRANSCRIBE ORDERS (OUTPATIENT)
Dept: HOSPITALIST | Facility: HOSPITAL | Age: 67
End: 2022-12-20

## 2022-12-20 ENCOUNTER — HOSPITAL ENCOUNTER (OUTPATIENT)
Dept: GENERAL RADIOLOGY | Facility: HOSPITAL | Age: 67
Discharge: HOME | End: 2022-12-20
Admitting: FAMILY MEDICINE

## 2022-12-20 DIAGNOSIS — J40 BRONCHITIS: Primary | ICD-10-CM

## 2022-12-20 DIAGNOSIS — J40 BRONCHITIS: ICD-10-CM

## 2022-12-20 PROCEDURE — 71046 X-RAY EXAM CHEST 2 VIEWS: CPT

## 2023-01-21 ENCOUNTER — HOSPITAL ENCOUNTER (OUTPATIENT)
Facility: HOSPITAL | Age: 68
Setting detail: OBSERVATION
Discharge: HOME | End: 2023-01-24
Attending: FAMILY MEDICINE | Admitting: FAMILY MEDICINE
Payer: MEDICARE

## 2023-01-21 ENCOUNTER — PREP FOR SURGERY (OUTPATIENT)
Dept: OTHER | Facility: HOSPITAL | Age: 68
End: 2023-01-21
Payer: MEDICARE

## 2023-01-21 ENCOUNTER — APPOINTMENT (OUTPATIENT)
Dept: CT IMAGING | Facility: HOSPITAL | Age: 68
End: 2023-01-21
Payer: MEDICARE

## 2023-01-21 PROBLEM — D72.829 LEUCOCYTOSIS: Status: ACTIVE | Noted: 2023-01-21

## 2023-01-21 LAB
ALBUMIN SERPL-MCNC: 4.1 G/DL (ref 3.5–5.2)
ALBUMIN/GLOB SERPL: 1.4 G/DL
ALP SERPL-CCNC: 88 U/L (ref 39–117)
ALT SERPL W P-5'-P-CCNC: 8 U/L (ref 1–33)
ANION GAP SERPL CALCULATED.3IONS-SCNC: 11.2 MMOL/L (ref 5–15)
AST SERPL-CCNC: 13 U/L (ref 1–32)
BASOPHILS # BLD AUTO: 0.04 10*3/MM3 (ref 0–0.2)
BASOPHILS NFR BLD AUTO: 0.3 % (ref 0–1.5)
BILIRUB SERPL-MCNC: 0.2 MG/DL (ref 0–1.2)
BUN SERPL-MCNC: 12 MG/DL (ref 8–23)
BUN/CREAT SERPL: 11.7 (ref 7–25)
CALCIUM SPEC-SCNC: 9.1 MG/DL (ref 8.6–10.5)
CHLORIDE SERPL-SCNC: 92 MMOL/L (ref 98–107)
CO2 SERPL-SCNC: 28.8 MMOL/L (ref 22–29)
CREAT SERPL-MCNC: 1.03 MG/DL (ref 0.57–1)
D-LACTATE SERPL-SCNC: 1.7 MMOL/L (ref 0.5–2)
DEPRECATED RDW RBC AUTO: 46.5 FL (ref 37–54)
EGFRCR SERPLBLD CKD-EPI 2021: 60.5 ML/MIN/1.73
EOSINOPHIL # BLD AUTO: 0.22 10*3/MM3 (ref 0–0.4)
EOSINOPHIL NFR BLD AUTO: 1.4 % (ref 0.3–6.2)
ERYTHROCYTE [DISTWIDTH] IN BLOOD BY AUTOMATED COUNT: 12.3 % (ref 12.3–15.4)
ERYTHROCYTE [SEDIMENTATION RATE] IN BLOOD: 23 MM/HR (ref 0–30)
GLOBULIN UR ELPH-MCNC: 3 GM/DL
GLUCOSE SERPL-MCNC: 103 MG/DL (ref 65–99)
HCT VFR BLD AUTO: 42.2 % (ref 34–46.6)
HGB BLD-MCNC: 13.4 G/DL (ref 12–15.9)
IMM GRANULOCYTES # BLD AUTO: 0.02 10*3/MM3 (ref 0–0.05)
IMM GRANULOCYTES NFR BLD AUTO: 0.1 % (ref 0–0.5)
LYMPHOCYTES # BLD AUTO: 4.23 10*3/MM3 (ref 0.7–3.1)
LYMPHOCYTES NFR BLD AUTO: 26.5 % (ref 19.6–45.3)
MCH RBC QN AUTO: 31.7 PG (ref 26.6–33)
MCHC RBC AUTO-ENTMCNC: 31.8 G/DL (ref 31.5–35.7)
MCV RBC AUTO: 99.8 FL (ref 79–97)
MONOCYTES # BLD AUTO: 0.82 10*3/MM3 (ref 0.1–0.9)
MONOCYTES NFR BLD AUTO: 5.1 % (ref 5–12)
NEUTROPHILS NFR BLD AUTO: 10.65 10*3/MM3 (ref 1.7–7)
NEUTROPHILS NFR BLD AUTO: 66.6 % (ref 42.7–76)
PLATELET # BLD AUTO: 219 10*3/MM3 (ref 140–450)
PMV BLD AUTO: 9.9 FL (ref 6–12)
POTASSIUM SERPL-SCNC: 3.6 MMOL/L (ref 3.5–5.2)
PROCALCITONIN SERPL-MCNC: 8.79 NG/ML (ref 0–0.25)
PROT SERPL-MCNC: 7.1 G/DL (ref 6–8.5)
RBC # BLD AUTO: 4.23 10*6/MM3 (ref 3.77–5.28)
SODIUM SERPL-SCNC: 132 MMOL/L (ref 136–145)
WBC NRBC COR # BLD: 15.98 10*3/MM3 (ref 3.4–10.8)

## 2023-01-21 PROCEDURE — 86140 C-REACTIVE PROTEIN: CPT | Performed by: FAMILY MEDICINE

## 2023-01-21 PROCEDURE — 96367 TX/PROPH/DG ADDL SEQ IV INF: CPT

## 2023-01-21 PROCEDURE — 94640 AIRWAY INHALATION TREATMENT: CPT

## 2023-01-21 PROCEDURE — G0379 DIRECT REFER HOSPITAL OBSERV: HCPCS

## 2023-01-21 PROCEDURE — 87070 CULTURE OTHR SPECIMN AEROBIC: CPT | Performed by: FAMILY MEDICINE

## 2023-01-21 PROCEDURE — 83605 ASSAY OF LACTIC ACID: CPT | Performed by: FAMILY MEDICINE

## 2023-01-21 PROCEDURE — 87040 BLOOD CULTURE FOR BACTERIA: CPT | Performed by: FAMILY MEDICINE

## 2023-01-21 PROCEDURE — 71260 CT THORAX DX C+: CPT

## 2023-01-21 PROCEDURE — 87641 MR-STAPH DNA AMP PROBE: CPT | Performed by: FAMILY MEDICINE

## 2023-01-21 PROCEDURE — 0202U NFCT DS 22 TRGT SARS-COV-2: CPT | Performed by: FAMILY MEDICINE

## 2023-01-21 PROCEDURE — 85025 COMPLETE CBC W/AUTO DIFF WBC: CPT | Performed by: FAMILY MEDICINE

## 2023-01-21 PROCEDURE — 85652 RBC SED RATE AUTOMATED: CPT | Performed by: FAMILY MEDICINE

## 2023-01-21 PROCEDURE — 25010000002 CEFTRIAXONE SODIUM-DEXTROSE 1-3.74 GM-%(50ML) RECONSTITUTED SOLUTION: Performed by: FAMILY MEDICINE

## 2023-01-21 PROCEDURE — 94799 UNLISTED PULMONARY SVC/PX: CPT

## 2023-01-21 PROCEDURE — G0378 HOSPITAL OBSERVATION PER HR: HCPCS

## 2023-01-21 PROCEDURE — 80053 COMPREHEN METABOLIC PANEL: CPT | Performed by: FAMILY MEDICINE

## 2023-01-21 PROCEDURE — 87186 SC STD MICRODIL/AGAR DIL: CPT | Performed by: FAMILY MEDICINE

## 2023-01-21 PROCEDURE — 96365 THER/PROPH/DIAG IV INF INIT: CPT

## 2023-01-21 PROCEDURE — 0 IOPAMIDOL PER 1 ML: Performed by: FAMILY MEDICINE

## 2023-01-21 PROCEDURE — 87205 SMEAR GRAM STAIN: CPT | Performed by: FAMILY MEDICINE

## 2023-01-21 PROCEDURE — 84145 PROCALCITONIN (PCT): CPT | Performed by: FAMILY MEDICINE

## 2023-01-21 RX ORDER — SODIUM CHLORIDE 0.9 % (FLUSH) 0.9 %
1-10 SYRINGE (ML) INJECTION AS NEEDED
Status: DISCONTINUED | OUTPATIENT
Start: 2023-01-21 | End: 2023-01-24 | Stop reason: HOSPADM

## 2023-01-21 RX ORDER — ONDANSETRON 4 MG/1
4 TABLET, FILM COATED ORAL EVERY 6 HOURS PRN
Status: CANCELLED | OUTPATIENT
Start: 2023-01-21

## 2023-01-21 RX ORDER — HYDROCODONE BITARTRATE AND ACETAMINOPHEN 10; 325 MG/1; MG/1
1 TABLET ORAL 3 TIMES DAILY PRN
Status: DISCONTINUED | OUTPATIENT
Start: 2023-01-21 | End: 2023-01-23

## 2023-01-21 RX ORDER — AMOXICILLIN 250 MG
2 CAPSULE ORAL NIGHTLY PRN
Status: CANCELLED | OUTPATIENT
Start: 2023-01-21

## 2023-01-21 RX ORDER — CEFTRIAXONE 1 G/50ML
1 INJECTION, SOLUTION INTRAVENOUS EVERY 24 HOURS
Status: DISCONTINUED | OUTPATIENT
Start: 2023-01-21 | End: 2023-01-21

## 2023-01-21 RX ORDER — AMOXICILLIN 250 MG
2 CAPSULE ORAL NIGHTLY PRN
Status: DISCONTINUED | OUTPATIENT
Start: 2023-01-21 | End: 2023-01-24 | Stop reason: HOSPADM

## 2023-01-21 RX ORDER — IPRATROPIUM BROMIDE AND ALBUTEROL SULFATE 2.5; .5 MG/3ML; MG/3ML
3 SOLUTION RESPIRATORY (INHALATION) EVERY 6 HOURS PRN
Status: DISCONTINUED | OUTPATIENT
Start: 2023-01-21 | End: 2023-01-24 | Stop reason: HOSPADM

## 2023-01-21 RX ORDER — ACETAMINOPHEN 325 MG/1
650 TABLET ORAL EVERY 4 HOURS PRN
Status: DISCONTINUED | OUTPATIENT
Start: 2023-01-21 | End: 2023-01-24 | Stop reason: HOSPADM

## 2023-01-21 RX ORDER — BISACODYL 10 MG
10 SUPPOSITORY, RECTAL RECTAL DAILY PRN
Status: CANCELLED | OUTPATIENT
Start: 2023-01-21

## 2023-01-21 RX ORDER — SODIUM CHLORIDE 0.9 % (FLUSH) 0.9 %
10 SYRINGE (ML) INJECTION EVERY 12 HOURS SCHEDULED
Status: DISCONTINUED | OUTPATIENT
Start: 2023-01-21 | End: 2023-01-24 | Stop reason: HOSPADM

## 2023-01-21 RX ORDER — ALBUTEROL SULFATE 90 UG/1
2 AEROSOL, METERED RESPIRATORY (INHALATION) EVERY 4 HOURS PRN
COMMUNITY

## 2023-01-21 RX ORDER — ONDANSETRON 2 MG/ML
4 INJECTION INTRAMUSCULAR; INTRAVENOUS EVERY 6 HOURS PRN
Status: DISCONTINUED | OUTPATIENT
Start: 2023-01-21 | End: 2023-01-24 | Stop reason: HOSPADM

## 2023-01-21 RX ORDER — CEFTRIAXONE 1 G/50ML
1 INJECTION, SOLUTION INTRAVENOUS EVERY 24 HOURS
Status: DISCONTINUED | OUTPATIENT
Start: 2023-01-21 | End: 2023-01-23

## 2023-01-21 RX ORDER — BISACODYL 10 MG
10 SUPPOSITORY, RECTAL RECTAL DAILY PRN
Status: DISCONTINUED | OUTPATIENT
Start: 2023-01-21 | End: 2023-01-24 | Stop reason: HOSPADM

## 2023-01-21 RX ORDER — ASPIRIN 325 MG
325 TABLET ORAL DAILY
Status: DISCONTINUED | OUTPATIENT
Start: 2023-01-22 | End: 2023-01-24 | Stop reason: HOSPADM

## 2023-01-21 RX ORDER — L.ACID,PARA/B.BIFIDUM/S.THERM 8B CELL
1 CAPSULE ORAL 2 TIMES DAILY
Status: DISCONTINUED | OUTPATIENT
Start: 2023-01-21 | End: 2023-01-24 | Stop reason: HOSPADM

## 2023-01-21 RX ORDER — ACETAMINOPHEN 325 MG/1
650 TABLET ORAL EVERY 4 HOURS PRN
Status: CANCELLED | OUTPATIENT
Start: 2023-01-21

## 2023-01-21 RX ORDER — ENOXAPARIN SODIUM 100 MG/ML
30 INJECTION SUBCUTANEOUS DAILY
Status: CANCELLED | OUTPATIENT
Start: 2023-01-21

## 2023-01-21 RX ORDER — SODIUM CHLORIDE 9 MG/ML
40 INJECTION, SOLUTION INTRAVENOUS AS NEEDED
Status: DISCONTINUED | OUTPATIENT
Start: 2023-01-21 | End: 2023-01-24 | Stop reason: HOSPADM

## 2023-01-21 RX ORDER — ENOXAPARIN SODIUM 100 MG/ML
30 INJECTION SUBCUTANEOUS DAILY
Status: DISCONTINUED | OUTPATIENT
Start: 2023-01-22 | End: 2023-01-24 | Stop reason: HOSPADM

## 2023-01-21 RX ORDER — CALCIUM CARBONATE 200(500)MG
1 TABLET,CHEWABLE ORAL 2 TIMES DAILY PRN
Status: CANCELLED | OUTPATIENT
Start: 2023-01-21

## 2023-01-21 RX ORDER — BISACODYL 5 MG/1
5 TABLET, DELAYED RELEASE ORAL DAILY PRN
Status: CANCELLED | OUTPATIENT
Start: 2023-01-21

## 2023-01-21 RX ORDER — ACETAMINOPHEN 160 MG/5ML
650 SOLUTION ORAL EVERY 4 HOURS PRN
Status: DISCONTINUED | OUTPATIENT
Start: 2023-01-21 | End: 2023-01-24 | Stop reason: HOSPADM

## 2023-01-21 RX ORDER — NICOTINE 21 MG/24HR
1 PATCH, TRANSDERMAL 24 HOURS TRANSDERMAL
Status: DISCONTINUED | OUTPATIENT
Start: 2023-01-22 | End: 2023-01-24 | Stop reason: HOSPADM

## 2023-01-21 RX ORDER — ALUMINA, MAGNESIA, AND SIMETHICONE 2400; 2400; 240 MG/30ML; MG/30ML; MG/30ML
15 SUSPENSION ORAL EVERY 6 HOURS PRN
Status: CANCELLED | OUTPATIENT
Start: 2023-01-21

## 2023-01-21 RX ORDER — POLYETHYLENE GLYCOL 3350 17 G/17G
17 POWDER, FOR SOLUTION ORAL DAILY PRN
Status: CANCELLED | OUTPATIENT
Start: 2023-01-21

## 2023-01-21 RX ORDER — BUDESONIDE AND FORMOTEROL FUMARATE DIHYDRATE 160; 4.5 UG/1; UG/1
2 AEROSOL RESPIRATORY (INHALATION)
Status: DISCONTINUED | OUTPATIENT
Start: 2023-01-21 | End: 2023-01-24 | Stop reason: HOSPADM

## 2023-01-21 RX ORDER — DIAZEPAM 5 MG/1
10 TABLET ORAL 2 TIMES DAILY PRN
Status: DISCONTINUED | OUTPATIENT
Start: 2023-01-21 | End: 2023-01-24 | Stop reason: HOSPADM

## 2023-01-21 RX ORDER — SODIUM CHLORIDE 0.9 % (FLUSH) 0.9 %
10 SYRINGE (ML) INJECTION EVERY 12 HOURS SCHEDULED
Status: CANCELLED | OUTPATIENT
Start: 2023-01-21

## 2023-01-21 RX ORDER — SODIUM CHLORIDE 0.9 % (FLUSH) 0.9 %
1-10 SYRINGE (ML) INJECTION AS NEEDED
Status: CANCELLED | OUTPATIENT
Start: 2023-01-21

## 2023-01-21 RX ORDER — PANTOPRAZOLE SODIUM 40 MG/10ML
40 INJECTION, POWDER, LYOPHILIZED, FOR SOLUTION INTRAVENOUS
Status: DISCONTINUED | OUTPATIENT
Start: 2023-01-22 | End: 2023-01-24 | Stop reason: HOSPADM

## 2023-01-21 RX ORDER — POLYETHYLENE GLYCOL 3350 17 G/17G
17 POWDER, FOR SOLUTION ORAL DAILY PRN
Status: DISCONTINUED | OUTPATIENT
Start: 2023-01-21 | End: 2023-01-24 | Stop reason: HOSPADM

## 2023-01-21 RX ORDER — ACETAMINOPHEN 650 MG/1
650 SUPPOSITORY RECTAL EVERY 4 HOURS PRN
Status: CANCELLED | OUTPATIENT
Start: 2023-01-21

## 2023-01-21 RX ORDER — ALUMINA, MAGNESIA, AND SIMETHICONE 2400; 2400; 240 MG/30ML; MG/30ML; MG/30ML
15 SUSPENSION ORAL EVERY 6 HOURS PRN
Status: DISCONTINUED | OUTPATIENT
Start: 2023-01-21 | End: 2023-01-24 | Stop reason: HOSPADM

## 2023-01-21 RX ORDER — BISACODYL 5 MG/1
5 TABLET, DELAYED RELEASE ORAL DAILY PRN
Status: DISCONTINUED | OUTPATIENT
Start: 2023-01-21 | End: 2023-01-24 | Stop reason: HOSPADM

## 2023-01-21 RX ORDER — SODIUM CHLORIDE 9 MG/ML
40 INJECTION, SOLUTION INTRAVENOUS AS NEEDED
Status: CANCELLED | OUTPATIENT
Start: 2023-01-21

## 2023-01-21 RX ORDER — MIDODRINE HYDROCHLORIDE 5 MG/1
2.5 TABLET ORAL
Status: DISCONTINUED | OUTPATIENT
Start: 2023-01-22 | End: 2023-01-24 | Stop reason: HOSPADM

## 2023-01-21 RX ORDER — ONDANSETRON 4 MG/1
4 TABLET, FILM COATED ORAL EVERY 6 HOURS PRN
Status: DISCONTINUED | OUTPATIENT
Start: 2023-01-21 | End: 2023-01-24 | Stop reason: HOSPADM

## 2023-01-21 RX ORDER — ACETAMINOPHEN 160 MG/5ML
650 SOLUTION ORAL EVERY 4 HOURS PRN
Status: CANCELLED | OUTPATIENT
Start: 2023-01-21

## 2023-01-21 RX ORDER — MIDODRINE HYDROCHLORIDE 2.5 MG/1
2.5 TABLET ORAL
COMMUNITY

## 2023-01-21 RX ORDER — ONDANSETRON 2 MG/ML
4 INJECTION INTRAMUSCULAR; INTRAVENOUS EVERY 6 HOURS PRN
Status: CANCELLED | OUTPATIENT
Start: 2023-01-21

## 2023-01-21 RX ORDER — DOXYCYCLINE 100 MG/10ML
INJECTION, POWDER, LYOPHILIZED, FOR SOLUTION INTRAVENOUS
Status: DISPENSED
Start: 2023-01-21 | End: 2023-01-22

## 2023-01-21 RX ORDER — CALCIUM CARBONATE 200(500)MG
1 TABLET,CHEWABLE ORAL 2 TIMES DAILY PRN
Status: DISCONTINUED | OUTPATIENT
Start: 2023-01-21 | End: 2023-01-24 | Stop reason: HOSPADM

## 2023-01-21 RX ORDER — ACETAMINOPHEN 650 MG/1
650 SUPPOSITORY RECTAL EVERY 4 HOURS PRN
Status: DISCONTINUED | OUTPATIENT
Start: 2023-01-21 | End: 2023-01-24 | Stop reason: HOSPADM

## 2023-01-21 RX ADMIN — DOXYCYCLINE 100 MG: 100 INJECTION, POWDER, LYOPHILIZED, FOR SOLUTION INTRAVENOUS at 23:06

## 2023-01-21 RX ADMIN — DIAZEPAM 10 MG: 5 TABLET ORAL at 22:35

## 2023-01-21 RX ADMIN — Medication 1 CAPSULE: at 22:35

## 2023-01-21 RX ADMIN — Medication 10 ML: at 21:38

## 2023-01-21 RX ADMIN — CEFTRIAXONE 1 G: 1 INJECTION, SOLUTION INTRAVENOUS at 22:36

## 2023-01-21 RX ADMIN — IOPAMIDOL 100 ML: 755 INJECTION, SOLUTION INTRAVENOUS at 20:55

## 2023-01-21 RX ADMIN — HYDROCODONE BITARTRATE AND ACETAMINOPHEN 1 TABLET: 10; 325 TABLET ORAL at 22:35

## 2023-01-21 RX ADMIN — BUDESONIDE AND FORMOTEROL FUMARATE DIHYDRATE 2 PUFF: 160; 4.5 AEROSOL RESPIRATORY (INHALATION) at 21:27

## 2023-01-22 LAB
ANION GAP SERPL CALCULATED.3IONS-SCNC: 9.4 MMOL/L (ref 5–15)
B PARAPERT DNA SPEC QL NAA+PROBE: NOT DETECTED
B PERT DNA SPEC QL NAA+PROBE: NOT DETECTED
BASOPHILS # BLD AUTO: 0.04 10*3/MM3 (ref 0–0.2)
BASOPHILS NFR BLD AUTO: 0.4 % (ref 0–1.5)
BUN SERPL-MCNC: 9 MG/DL (ref 8–23)
BUN/CREAT SERPL: 9.6 (ref 7–25)
C PNEUM DNA NPH QL NAA+NON-PROBE: NOT DETECTED
CALCIUM SPEC-SCNC: 9.1 MG/DL (ref 8.6–10.5)
CHLORIDE SERPL-SCNC: 100 MMOL/L (ref 98–107)
CO2 SERPL-SCNC: 28.6 MMOL/L (ref 22–29)
CREAT SERPL-MCNC: 0.94 MG/DL (ref 0.57–1)
CRP SERPL-MCNC: 17.34 MG/DL (ref 0–0.5)
DEPRECATED RDW RBC AUTO: 46.6 FL (ref 37–54)
EGFRCR SERPLBLD CKD-EPI 2021: 67.5 ML/MIN/1.73
EOSINOPHIL # BLD AUTO: 0.23 10*3/MM3 (ref 0–0.4)
EOSINOPHIL NFR BLD AUTO: 2.3 % (ref 0.3–6.2)
ERYTHROCYTE [DISTWIDTH] IN BLOOD BY AUTOMATED COUNT: 12.5 % (ref 12.3–15.4)
FLUAV SUBTYP SPEC NAA+PROBE: NOT DETECTED
FLUBV RNA ISLT QL NAA+PROBE: NOT DETECTED
GLUCOSE SERPL-MCNC: 85 MG/DL (ref 65–99)
HADV DNA SPEC NAA+PROBE: NOT DETECTED
HCOV 229E RNA SPEC QL NAA+PROBE: NOT DETECTED
HCOV HKU1 RNA SPEC QL NAA+PROBE: NOT DETECTED
HCOV NL63 RNA SPEC QL NAA+PROBE: NOT DETECTED
HCOV OC43 RNA SPEC QL NAA+PROBE: NOT DETECTED
HCT VFR BLD AUTO: 40.3 % (ref 34–46.6)
HGB BLD-MCNC: 12.7 G/DL (ref 12–15.9)
HMPV RNA NPH QL NAA+NON-PROBE: NOT DETECTED
HPIV1 RNA ISLT QL NAA+PROBE: NOT DETECTED
HPIV2 RNA SPEC QL NAA+PROBE: NOT DETECTED
HPIV3 RNA NPH QL NAA+PROBE: NOT DETECTED
HPIV4 P GENE NPH QL NAA+PROBE: NOT DETECTED
IMM GRANULOCYTES # BLD AUTO: 0.01 10*3/MM3 (ref 0–0.05)
IMM GRANULOCYTES NFR BLD AUTO: 0.1 % (ref 0–0.5)
LYMPHOCYTES # BLD AUTO: 3.21 10*3/MM3 (ref 0.7–3.1)
LYMPHOCYTES NFR BLD AUTO: 31.6 % (ref 19.6–45.3)
M PNEUMO IGG SER IA-ACNC: NOT DETECTED
MCH RBC QN AUTO: 31.8 PG (ref 26.6–33)
MCHC RBC AUTO-ENTMCNC: 31.5 G/DL (ref 31.5–35.7)
MCV RBC AUTO: 100.8 FL (ref 79–97)
MONOCYTES # BLD AUTO: 0.7 10*3/MM3 (ref 0.1–0.9)
MONOCYTES NFR BLD AUTO: 6.9 % (ref 5–12)
MRSA DNA SPEC QL NAA+PROBE: NORMAL
NEUTROPHILS NFR BLD AUTO: 5.98 10*3/MM3 (ref 1.7–7)
NEUTROPHILS NFR BLD AUTO: 58.7 % (ref 42.7–76)
PLATELET # BLD AUTO: 224 10*3/MM3 (ref 140–450)
PMV BLD AUTO: 9.7 FL (ref 6–12)
POTASSIUM SERPL-SCNC: 4.5 MMOL/L (ref 3.5–5.2)
RBC # BLD AUTO: 4 10*6/MM3 (ref 3.77–5.28)
RHINOVIRUS RNA SPEC NAA+PROBE: NOT DETECTED
RSV RNA NPH QL NAA+NON-PROBE: NOT DETECTED
SARS-COV-2 RNA NPH QL NAA+NON-PROBE: NOT DETECTED
SODIUM SERPL-SCNC: 138 MMOL/L (ref 136–145)
WBC NRBC COR # BLD: 10.17 10*3/MM3 (ref 3.4–10.8)

## 2023-01-22 PROCEDURE — G0378 HOSPITAL OBSERVATION PER HR: HCPCS

## 2023-01-22 PROCEDURE — 80048 BASIC METABOLIC PNL TOTAL CA: CPT | Performed by: FAMILY MEDICINE

## 2023-01-22 PROCEDURE — 96372 THER/PROPH/DIAG INJ SC/IM: CPT

## 2023-01-22 PROCEDURE — 85025 COMPLETE CBC W/AUTO DIFF WBC: CPT | Performed by: FAMILY MEDICINE

## 2023-01-22 PROCEDURE — 94799 UNLISTED PULMONARY SVC/PX: CPT

## 2023-01-22 PROCEDURE — 96375 TX/PRO/DX INJ NEW DRUG ADDON: CPT

## 2023-01-22 PROCEDURE — 25010000002 CEFTRIAXONE SODIUM-DEXTROSE 1-3.74 GM-%(50ML) RECONSTITUTED SOLUTION: Performed by: FAMILY MEDICINE

## 2023-01-22 PROCEDURE — 25010000002 ENOXAPARIN PER 10 MG: Performed by: FAMILY MEDICINE

## 2023-01-22 PROCEDURE — 96366 THER/PROPH/DIAG IV INF ADDON: CPT

## 2023-01-22 RX ADMIN — Medication 1 CAPSULE: at 08:20

## 2023-01-22 RX ADMIN — DIAZEPAM 10 MG: 5 TABLET ORAL at 22:19

## 2023-01-22 RX ADMIN — ENOXAPARIN SODIUM 30 MG: 30 INJECTION SUBCUTANEOUS at 08:20

## 2023-01-22 RX ADMIN — BUDESONIDE AND FORMOTEROL FUMARATE DIHYDRATE 2 PUFF: 160; 4.5 AEROSOL RESPIRATORY (INHALATION) at 20:54

## 2023-01-22 RX ADMIN — CEFTRIAXONE 1 G: 1 INJECTION, SOLUTION INTRAVENOUS at 21:45

## 2023-01-22 RX ADMIN — ASPIRIN 325 MG: 325 TABLET ORAL at 08:21

## 2023-01-22 RX ADMIN — Medication 1 CAPSULE: at 20:06

## 2023-01-22 RX ADMIN — Medication 10 ML: at 20:06

## 2023-01-22 RX ADMIN — MIDODRINE HYDROCHLORIDE 2.5 MG: 5 TABLET ORAL at 08:20

## 2023-01-22 RX ADMIN — DOXYCYCLINE 100 MG: 100 INJECTION, POWDER, LYOPHILIZED, FOR SOLUTION INTRAVENOUS at 22:19

## 2023-01-22 RX ADMIN — MIDODRINE HYDROCHLORIDE 2.5 MG: 5 TABLET ORAL at 11:14

## 2023-01-22 RX ADMIN — BUDESONIDE AND FORMOTEROL FUMARATE DIHYDRATE 2 PUFF: 160; 4.5 AEROSOL RESPIRATORY (INHALATION) at 09:13

## 2023-01-22 RX ADMIN — PANTOPRAZOLE SODIUM 40 MG: 40 INJECTION, POWDER, FOR SOLUTION INTRAVENOUS at 05:15

## 2023-01-22 RX ADMIN — Medication 1 PATCH: at 08:20

## 2023-01-22 RX ADMIN — HYDROCODONE BITARTRATE AND ACETAMINOPHEN 1 TABLET: 10; 325 TABLET ORAL at 08:24

## 2023-01-22 RX ADMIN — HYDROCODONE BITARTRATE AND ACETAMINOPHEN 1 TABLET: 10; 325 TABLET ORAL at 16:30

## 2023-01-22 RX ADMIN — DOXYCYCLINE 100 MG: 100 INJECTION, POWDER, LYOPHILIZED, FOR SOLUTION INTRAVENOUS at 11:15

## 2023-01-22 RX ADMIN — MIDODRINE HYDROCHLORIDE 2.5 MG: 5 TABLET ORAL at 16:30

## 2023-01-22 RX ADMIN — Medication 10 ML: at 08:21

## 2023-01-23 ENCOUNTER — APPOINTMENT (OUTPATIENT)
Dept: GENERAL RADIOLOGY | Facility: HOSPITAL | Age: 68
End: 2023-01-23
Payer: MEDICARE

## 2023-01-23 PROBLEM — E43 SEVERE MALNUTRITION: Status: ACTIVE | Noted: 2023-01-23

## 2023-01-23 LAB
ANION GAP SERPL CALCULATED.3IONS-SCNC: 9.5 MMOL/L (ref 5–15)
BUN SERPL-MCNC: 11 MG/DL (ref 8–23)
BUN/CREAT SERPL: 11.8 (ref 7–25)
CALCIUM SPEC-SCNC: 9 MG/DL (ref 8.6–10.5)
CHLORIDE SERPL-SCNC: 99 MMOL/L (ref 98–107)
CO2 SERPL-SCNC: 27.5 MMOL/L (ref 22–29)
CREAT SERPL-MCNC: 0.93 MG/DL (ref 0.57–1)
DEPRECATED RDW RBC AUTO: 47.4 FL (ref 37–54)
EGFRCR SERPLBLD CKD-EPI 2021: 68.3 ML/MIN/1.73
ERYTHROCYTE [DISTWIDTH] IN BLOOD BY AUTOMATED COUNT: 12.4 % (ref 12.3–15.4)
GLUCOSE SERPL-MCNC: 91 MG/DL (ref 65–99)
HCT VFR BLD AUTO: 38.9 % (ref 34–46.6)
HGB BLD-MCNC: 12.3 G/DL (ref 12–15.9)
MCH RBC QN AUTO: 32.1 PG (ref 26.6–33)
MCHC RBC AUTO-ENTMCNC: 31.6 G/DL (ref 31.5–35.7)
MCV RBC AUTO: 101.6 FL (ref 79–97)
PLATELET # BLD AUTO: 237 10*3/MM3 (ref 140–450)
PMV BLD AUTO: 9.7 FL (ref 6–12)
POTASSIUM SERPL-SCNC: 4.5 MMOL/L (ref 3.5–5.2)
PROCALCITONIN SERPL-MCNC: 3.44 NG/ML (ref 0–0.25)
RBC # BLD AUTO: 3.83 10*6/MM3 (ref 3.77–5.28)
SODIUM SERPL-SCNC: 136 MMOL/L (ref 136–145)
WBC NRBC COR # BLD: 7.96 10*3/MM3 (ref 3.4–10.8)

## 2023-01-23 PROCEDURE — 71046 X-RAY EXAM CHEST 2 VIEWS: CPT

## 2023-01-23 PROCEDURE — 84145 PROCALCITONIN (PCT): CPT | Performed by: FAMILY MEDICINE

## 2023-01-23 PROCEDURE — G0378 HOSPITAL OBSERVATION PER HR: HCPCS

## 2023-01-23 PROCEDURE — 96366 THER/PROPH/DIAG IV INF ADDON: CPT

## 2023-01-23 PROCEDURE — 96376 TX/PRO/DX INJ SAME DRUG ADON: CPT

## 2023-01-23 PROCEDURE — 80048 BASIC METABOLIC PNL TOTAL CA: CPT | Performed by: FAMILY MEDICINE

## 2023-01-23 PROCEDURE — 96372 THER/PROPH/DIAG INJ SC/IM: CPT

## 2023-01-23 PROCEDURE — 85027 COMPLETE CBC AUTOMATED: CPT | Performed by: FAMILY MEDICINE

## 2023-01-23 PROCEDURE — 94799 UNLISTED PULMONARY SVC/PX: CPT

## 2023-01-23 PROCEDURE — 25010000002 ENOXAPARIN PER 10 MG: Performed by: FAMILY MEDICINE

## 2023-01-23 PROCEDURE — 94664 DEMO&/EVAL PT USE INHALER: CPT

## 2023-01-23 RX ORDER — HYDROCODONE BITARTRATE AND ACETAMINOPHEN 10; 325 MG/1; MG/1
1 TABLET ORAL EVERY 6 HOURS PRN
Status: DISCONTINUED | OUTPATIENT
Start: 2023-01-23 | End: 2023-01-24 | Stop reason: HOSPADM

## 2023-01-23 RX ORDER — CEFDINIR 300 MG/1
300 CAPSULE ORAL EVERY 12 HOURS SCHEDULED
Status: DISCONTINUED | OUTPATIENT
Start: 2023-01-23 | End: 2023-01-24 | Stop reason: HOSPADM

## 2023-01-23 RX ORDER — CEFDINIR 300 MG/1
300 CAPSULE ORAL EVERY 12 HOURS SCHEDULED
Status: DISCONTINUED | OUTPATIENT
Start: 2023-01-23 | End: 2023-01-23

## 2023-01-23 RX ORDER — CEFTRIAXONE 1 G/50ML
1 INJECTION, SOLUTION INTRAVENOUS EVERY 24 HOURS
Status: DISCONTINUED | OUTPATIENT
Start: 2023-01-23 | End: 2023-01-23

## 2023-01-23 RX ADMIN — MIDODRINE HYDROCHLORIDE 2.5 MG: 5 TABLET ORAL at 12:22

## 2023-01-23 RX ADMIN — HYDROCODONE BITARTRATE AND ACETAMINOPHEN 1 TABLET: 10; 325 TABLET ORAL at 20:51

## 2023-01-23 RX ADMIN — BUDESONIDE AND FORMOTEROL FUMARATE DIHYDRATE 2 PUFF: 160; 4.5 AEROSOL RESPIRATORY (INHALATION) at 08:05

## 2023-01-23 RX ADMIN — Medication 1 CAPSULE: at 20:41

## 2023-01-23 RX ADMIN — CEFDINIR 300 MG: 300 CAPSULE ORAL at 14:46

## 2023-01-23 RX ADMIN — DIAZEPAM 10 MG: 5 TABLET ORAL at 20:51

## 2023-01-23 RX ADMIN — CEFDINIR 300 MG: 300 CAPSULE ORAL at 20:41

## 2023-01-23 RX ADMIN — PANTOPRAZOLE SODIUM 40 MG: 40 INJECTION, POWDER, FOR SOLUTION INTRAVENOUS at 06:46

## 2023-01-23 RX ADMIN — Medication 1 PATCH: at 08:42

## 2023-01-23 RX ADMIN — DOXYCYCLINE 100 MG: 100 INJECTION, POWDER, LYOPHILIZED, FOR SOLUTION INTRAVENOUS at 09:34

## 2023-01-23 RX ADMIN — BUDESONIDE AND FORMOTEROL FUMARATE DIHYDRATE 2 PUFF: 160; 4.5 AEROSOL RESPIRATORY (INHALATION) at 20:33

## 2023-01-23 RX ADMIN — Medication 10 ML: at 08:40

## 2023-01-23 RX ADMIN — Medication 1 CAPSULE: at 08:40

## 2023-01-23 RX ADMIN — HYDROCODONE BITARTRATE AND ACETAMINOPHEN 1 TABLET: 10; 325 TABLET ORAL at 09:33

## 2023-01-23 RX ADMIN — MIDODRINE HYDROCHLORIDE 2.5 MG: 5 TABLET ORAL at 17:20

## 2023-01-23 RX ADMIN — ASPIRIN 325 MG: 325 TABLET ORAL at 08:40

## 2023-01-23 RX ADMIN — MIDODRINE HYDROCHLORIDE 2.5 MG: 5 TABLET ORAL at 06:45

## 2023-01-23 RX ADMIN — ENOXAPARIN SODIUM 30 MG: 30 INJECTION SUBCUTANEOUS at 08:40

## 2023-01-23 RX ADMIN — HYDROCODONE BITARTRATE AND ACETAMINOPHEN 1 TABLET: 10; 325 TABLET ORAL at 03:23

## 2023-01-24 VITALS
TEMPERATURE: 97.9 F | WEIGHT: 94.6 LBS | RESPIRATION RATE: 20 BRPM | OXYGEN SATURATION: 94 % | HEIGHT: 62 IN | DIASTOLIC BLOOD PRESSURE: 82 MMHG | BODY MASS INDEX: 17.41 KG/M2 | HEART RATE: 64 BPM | SYSTOLIC BLOOD PRESSURE: 128 MMHG

## 2023-01-24 PROBLEM — E43 SEVERE MALNUTRITION: Status: RESOLVED | Noted: 2023-01-23 | Resolved: 2023-01-24

## 2023-01-24 LAB
ANION GAP SERPL CALCULATED.3IONS-SCNC: 9.4 MMOL/L (ref 5–15)
BUN SERPL-MCNC: 9 MG/DL (ref 8–23)
BUN/CREAT SERPL: 9.5 (ref 7–25)
CALCIUM SPEC-SCNC: 9.3 MG/DL (ref 8.6–10.5)
CHLORIDE SERPL-SCNC: 99 MMOL/L (ref 98–107)
CO2 SERPL-SCNC: 28.6 MMOL/L (ref 22–29)
CREAT SERPL-MCNC: 0.95 MG/DL (ref 0.57–1)
DEPRECATED RDW RBC AUTO: 45.3 FL (ref 37–54)
EGFRCR SERPLBLD CKD-EPI 2021: 66.6 ML/MIN/1.73
ERYTHROCYTE [DISTWIDTH] IN BLOOD BY AUTOMATED COUNT: 12.4 % (ref 12.3–15.4)
GLUCOSE SERPL-MCNC: 82 MG/DL (ref 65–99)
HCT VFR BLD AUTO: 38.1 % (ref 34–46.6)
HGB BLD-MCNC: 12.6 G/DL (ref 12–15.9)
MCH RBC QN AUTO: 32.6 PG (ref 26.6–33)
MCHC RBC AUTO-ENTMCNC: 33.1 G/DL (ref 31.5–35.7)
MCV RBC AUTO: 98.4 FL (ref 79–97)
PLATELET # BLD AUTO: 262 10*3/MM3 (ref 140–450)
PMV BLD AUTO: 10 FL (ref 6–12)
POTASSIUM SERPL-SCNC: 4.7 MMOL/L (ref 3.5–5.2)
RBC # BLD AUTO: 3.87 10*6/MM3 (ref 3.77–5.28)
SODIUM SERPL-SCNC: 137 MMOL/L (ref 136–145)
WBC NRBC COR # BLD: 8.18 10*3/MM3 (ref 3.4–10.8)

## 2023-01-24 PROCEDURE — 25010000002 ENOXAPARIN PER 10 MG: Performed by: FAMILY MEDICINE

## 2023-01-24 PROCEDURE — G0378 HOSPITAL OBSERVATION PER HR: HCPCS

## 2023-01-24 PROCEDURE — 94799 UNLISTED PULMONARY SVC/PX: CPT

## 2023-01-24 PROCEDURE — 80048 BASIC METABOLIC PNL TOTAL CA: CPT | Performed by: FAMILY MEDICINE

## 2023-01-24 PROCEDURE — 85027 COMPLETE CBC AUTOMATED: CPT | Performed by: FAMILY MEDICINE

## 2023-01-24 PROCEDURE — 96372 THER/PROPH/DIAG INJ SC/IM: CPT

## 2023-01-24 RX ORDER — L.ACID,PARA/B.BIFIDUM/S.THERM 8B CELL
1 CAPSULE ORAL 2 TIMES DAILY
Qty: 60 CAPSULE | Refills: 0 | Status: SHIPPED | OUTPATIENT
Start: 2023-01-24

## 2023-01-24 RX ORDER — GUAIFENESIN 600 MG/1
1200 TABLET, EXTENDED RELEASE ORAL 2 TIMES DAILY
Qty: 60 TABLET | Refills: 0 | Status: SHIPPED | OUTPATIENT
Start: 2023-01-24 | End: 2023-03-31

## 2023-01-24 RX ORDER — CEFDINIR 300 MG/1
300 CAPSULE ORAL EVERY 12 HOURS SCHEDULED
Qty: 12 CAPSULE | Refills: 0 | Status: SHIPPED | OUTPATIENT
Start: 2023-01-24 | End: 2023-01-30

## 2023-01-24 RX ADMIN — ASPIRIN 325 MG: 325 TABLET ORAL at 08:36

## 2023-01-24 RX ADMIN — MIDODRINE HYDROCHLORIDE 2.5 MG: 5 TABLET ORAL at 08:35

## 2023-01-24 RX ADMIN — CEFDINIR 300 MG: 300 CAPSULE ORAL at 08:36

## 2023-01-24 RX ADMIN — ENOXAPARIN SODIUM 30 MG: 30 INJECTION SUBCUTANEOUS at 08:36

## 2023-01-24 RX ADMIN — BUDESONIDE AND FORMOTEROL FUMARATE DIHYDRATE 2 PUFF: 160; 4.5 AEROSOL RESPIRATORY (INHALATION) at 08:46

## 2023-01-24 RX ADMIN — HYDROCODONE BITARTRATE AND ACETAMINOPHEN 1 TABLET: 10; 325 TABLET ORAL at 08:36

## 2023-01-24 RX ADMIN — Medication 1 PATCH: at 08:35

## 2023-01-24 RX ADMIN — Medication 1 CAPSULE: at 08:36

## 2023-01-25 ENCOUNTER — READMISSION MANAGEMENT (OUTPATIENT)
Dept: CALL CENTER | Facility: HOSPITAL | Age: 68
End: 2023-01-25
Payer: MEDICARE

## 2023-01-25 LAB
BACTERIA SPEC RESP CULT: ABNORMAL
BACTERIA SPEC RESP CULT: ABNORMAL
GRAM STN SPEC: ABNORMAL
GRAM STN SPEC: ABNORMAL

## 2023-01-25 NOTE — OUTREACH NOTE
Prep Survey    Flowsheet Row Responses   [facility] facility patient discharged from? [place]   Is LACE score < 7 ? Yes   Eligibility Readm Mgmt   Discharge diagnosis Night sweats, fatigue, productive coughleft lower lobe pneumonia    Does the patient have one of the following disease processes/diagnoses(primary or secondary)? Pneumonia   Does the patient have Home health ordered? No   Is there a DME ordered? No   Prep survey completed? Yes          LAURA MCGARRY - Registered Nurse

## 2023-01-26 LAB — BACTERIA SPEC AEROBE CULT: NORMAL

## 2023-02-20 ENCOUNTER — TRANSCRIBE ORDERS (OUTPATIENT)
Dept: ADMINISTRATIVE | Facility: HOSPITAL | Age: 68
End: 2023-02-20
Payer: MEDICARE

## 2023-02-20 ENCOUNTER — HOSPITAL ENCOUNTER (OUTPATIENT)
Dept: GENERAL RADIOLOGY | Facility: HOSPITAL | Age: 68
Discharge: HOME | End: 2023-02-20
Admitting: FAMILY MEDICINE
Payer: MEDICARE

## 2023-02-20 DIAGNOSIS — J18.9 PNEUMONIA DUE TO INFECTIOUS ORGANISM, UNSPECIFIED LATERALITY, UNSPECIFIED PART OF LUNG: Primary | ICD-10-CM

## 2023-02-20 DIAGNOSIS — J18.9 PNEUMONIA DUE TO INFECTIOUS ORGANISM, UNSPECIFIED LATERALITY, UNSPECIFIED PART OF LUNG: ICD-10-CM

## 2023-02-20 PROCEDURE — 71046 X-RAY EXAM CHEST 2 VIEWS: CPT

## 2023-03-09 ENCOUNTER — TRANSCRIBE ORDERS (OUTPATIENT)
Dept: ADMINISTRATIVE | Facility: HOSPITAL | Age: 68
End: 2023-03-09
Payer: MEDICARE

## 2023-03-09 ENCOUNTER — HOSPITAL ENCOUNTER (OUTPATIENT)
Dept: GENERAL RADIOLOGY | Facility: HOSPITAL | Age: 68
Discharge: HOME | End: 2023-03-09
Admitting: FAMILY MEDICINE
Payer: MEDICARE

## 2023-03-09 DIAGNOSIS — R05.9 COUGH, UNSPECIFIED TYPE: ICD-10-CM

## 2023-03-09 DIAGNOSIS — R05.9 COUGH, UNSPECIFIED TYPE: Primary | ICD-10-CM

## 2023-03-09 PROCEDURE — 71046 X-RAY EXAM CHEST 2 VIEWS: CPT

## 2023-03-31 ENCOUNTER — HOSPITAL ENCOUNTER (OUTPATIENT)
Dept: INFUSION THERAPY | Facility: HOSPITAL | Age: 68
Discharge: HOME | End: 2023-03-31
Admitting: FAMILY MEDICINE
Payer: MEDICARE

## 2023-03-31 VITALS
WEIGHT: 102 LBS | OXYGEN SATURATION: 94 % | SYSTOLIC BLOOD PRESSURE: 130 MMHG | BODY MASS INDEX: 18.77 KG/M2 | TEMPERATURE: 97.6 F | HEART RATE: 72 BPM | DIASTOLIC BLOOD PRESSURE: 76 MMHG | RESPIRATION RATE: 16 BRPM | HEIGHT: 62 IN

## 2023-03-31 DIAGNOSIS — M81.0 AGE RELATED OSTEOPOROSIS, UNSPECIFIED PATHOLOGICAL FRACTURE PRESENCE: Primary | ICD-10-CM

## 2023-03-31 PROCEDURE — 25010000002 DENOSUMAB 60 MG/ML SOLUTION PREFILLED SYRINGE: Performed by: FAMILY MEDICINE

## 2023-03-31 PROCEDURE — 96372 THER/PROPH/DIAG INJ SC/IM: CPT

## 2023-03-31 RX ADMIN — DENOSUMAB 60 MG: 60 INJECTION SUBCUTANEOUS at 15:03

## 2023-03-31 NOTE — NURSING NOTE
Patient arrived to [facility] at 1450.  History and medications reviewed with patient.  Medication administered as ordered without complications.  AVS refused by patient.  Patient discharged at 1514 in stable condition.

## 2023-05-02 ENCOUNTER — HOSPITAL ENCOUNTER (EMERGENCY)
Facility: HOSPITAL | Age: 68
Discharge: HOME | End: 2023-05-02
Attending: EMERGENCY MEDICINE | Admitting: EMERGENCY MEDICINE
Payer: MEDICARE

## 2023-05-02 ENCOUNTER — APPOINTMENT (OUTPATIENT)
Dept: GENERAL RADIOLOGY | Facility: HOSPITAL | Age: 68
End: 2023-05-02
Payer: MEDICARE

## 2023-05-02 VITALS
DIASTOLIC BLOOD PRESSURE: 76 MMHG | SYSTOLIC BLOOD PRESSURE: 133 MMHG | TEMPERATURE: 98.7 F | RESPIRATION RATE: 18 BRPM | HEART RATE: 79 BPM | WEIGHT: 102 LBS | HEIGHT: 62 IN | OXYGEN SATURATION: 95 % | BODY MASS INDEX: 18.77 KG/M2

## 2023-05-02 DIAGNOSIS — M25.532 LEFT WRIST PAIN: Primary | ICD-10-CM

## 2023-05-02 PROCEDURE — 99283 EMERGENCY DEPT VISIT LOW MDM: CPT

## 2023-05-02 PROCEDURE — 73110 X-RAY EXAM OF WRIST: CPT

## 2023-05-02 PROCEDURE — 73130 X-RAY EXAM OF HAND: CPT

## 2023-05-02 RX ORDER — OXYCODONE AND ACETAMINOPHEN 10; 325 MG/1; MG/1
1 TABLET ORAL ONCE
Status: DISCONTINUED | OUTPATIENT
Start: 2023-05-02 | End: 2023-05-02

## 2023-05-02 NOTE — ED PROVIDER NOTES
Subjective   History of Present Illness  66 yo female with PMHX of chronic back pain and COPD presents to the ER with c/o L forearm pain when dog tripped her and she had FOOSH injury to her L wrist.  Pt denies other injury. No LOC.        Review of Systems   Constitutional: Negative for activity change, appetite change, chills, diaphoresis, fatigue and fever.   HENT: Negative for congestion, rhinorrhea and sore throat.    Eyes: Negative for photophobia and visual disturbance.   Respiratory: Negative for cough and shortness of breath.    Cardiovascular: Negative for chest pain, palpitations and leg swelling.   Gastrointestinal: Negative for abdominal distention, abdominal pain, diarrhea, nausea and vomiting.   Genitourinary: Negative for dysuria and flank pain.   Musculoskeletal: Negative for arthralgias and back pain.        L wrist pain   Skin: Negative for rash.   Neurological: Negative for dizziness, weakness and headaches.   Psychiatric/Behavioral: Negative for agitation and behavioral problems.       Past Medical History:   Diagnosis Date   • COPD (chronic obstructive pulmonary disease)    • COPD with acute exacerbation 2020       Allergies   Allergen Reactions   • Codeine GI Intolerance   • Percocet [Oxycodone-Acetaminophen] Hives       Past Surgical History:   Procedure Laterality Date   •  SECTION     • CHOLECYSTECTOMY     • COLONOSCOPY     • ECTOPIC PREGNANCY SURGERY     • HYSTERECTOMY     • TONSILLECTOMY     • TOTAL HIP ARTHROPLASTY Left        Family History   Problem Relation Age of Onset   • Lung cancer Niece 50   • Throat cancer Father    • Breast cancer Neg Hx        Social History     Socioeconomic History   • Marital status:    Tobacco Use   • Smoking status: Every Day     Packs/day: 1.00     Types: Cigarettes   • Smokeless tobacco: Never   • Tobacco comments:     Began smoking at age 11.  Smoked .5 ppd for 6 years, 2.5 ppd for a year, 2 ppd for 5 years, and 1 ppd for the past 43  years for a 58.5 pack year history.   Vaping Use   • Vaping Use: Some days   • Substances: Nicotine, Flavoring   • Devices: Disposable   Substance and Sexual Activity   • Alcohol use: Yes     Comment: once a year    • Drug use: No   • Sexual activity: Defer           Objective   Physical Exam  Constitutional:       General: She is not in acute distress.     Appearance: Normal appearance. She is not ill-appearing, toxic-appearing or diaphoretic.   HENT:      Head: Normocephalic and atraumatic.      Nose: Nose normal.      Mouth/Throat:      Mouth: Mucous membranes are moist.   Eyes:      Conjunctiva/sclera: Conjunctivae normal.   Cardiovascular:      Rate and Rhythm: Normal rate and regular rhythm.      Pulses: Normal pulses.   Pulmonary:      Effort: Pulmonary effort is normal.      Breath sounds: Normal breath sounds.   Abdominal:      General: Abdomen is flat. There is no distension.      Tenderness: There is no abdominal tenderness.   Musculoskeletal:         General: No swelling. Normal range of motion.        Arms:       Cervical back: Normal range of motion and neck supple.   Skin:     General: Skin is warm and dry.   Neurological:      General: No focal deficit present.      Mental Status: She is alert and oriented to person, place, and time.   Psychiatric:         Mood and Affect: Mood normal.         Procedures           ED Course  ED Course as of 05/02/23 2036   Tue May 02, 2023   2035 X-ray left wrist:  IMPRESSION:  No acute traumatic findings. Advanced first CMC joint  degenerative arthropathy.    [BH]   2035 X-ray left hand:  IMPRESSION:  No acute traumatic findings. Advanced first CMC joint  degenerative arthropathy. [BH]   2035 Discussed with patient radiologic findings specifically no fracture or dislocation noted.  Will place patient in Velcro splint for comfort and protection and follow-up with primary care as needed.  Patient states she understands agrees with plan.  Patient can return emergency  room for new persistent or worsening symptoms.  Patient has pain medication at home which she takes for her back she can continue to use this for her wrist pain as well. []      ED Course User Index  [] Santosh Macias MD                                           [facility]    Final diagnoses:   Left wrist pain       ED Disposition  ED Disposition     ED Disposition   Discharge    Condition   Stable    Comment   --             Nigel De Paz MD  [address]  [address]  [facility] [address] [phone]    Schedule an appointment as soon as possible for a visit   As needed    [facility] Emergency Department  [address] [facility] [address] [phone]  Go to   As needed         Medication List      No changes were made to your prescriptions during this visit.          Santosh Macias MD  05/02/23 2036

## 2023-05-02 NOTE — ED TRIAGE NOTES
Pt to ED via [facility]. Pt c/o left forearm pain after getting tripped by dog and landing on left side. Pt denies hitting head, denies loc, denies blood thinners.     Pt states took prescribed norco 10mg PTA.

## 2023-05-16 ENCOUNTER — OFFICE VISIT (OUTPATIENT)
Dept: ORTHOPEDIC SURGERY | Facility: CLINIC | Age: 68
End: 2023-05-16
Payer: MEDICARE

## 2023-05-16 VITALS
HEART RATE: 71 BPM | HEIGHT: 63 IN | SYSTOLIC BLOOD PRESSURE: 119 MMHG | DIASTOLIC BLOOD PRESSURE: 74 MMHG | BODY MASS INDEX: 18.43 KG/M2 | WEIGHT: 104 LBS

## 2023-05-16 DIAGNOSIS — S52.572A OTHER CLOSED INTRA-ARTICULAR FRACTURE OF DISTAL END OF LEFT RADIUS, INITIAL ENCOUNTER: Primary | ICD-10-CM

## 2023-05-16 NOTE — PROGRESS NOTES
Subjective:     Patient ID: Pili Arechiga is a 67 y.o. female.    Chief Complaint:    History of Present Illness  Pili Arechiga presents to clinic today for evaluation of left wrist injury sustained on 2023 when she tripped over her son's dog.  She fell directly onto an outstretched left hand.  The patient is right-hand dominant.  She went to the ER and had x-rays which were negative she was given a removable Velcro brace and told to follow-up with orthopedics.  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  She says that she is able to extend and flex her fingers and thumb much better than before but still is unable to move her wrist.  She denies any numbness or tingling in that hand.  She has been taking ibuprofen and her Lortabs prescribed by Dr. De Paz     Social History     Occupational History   • Not on file   Tobacco Use   • Smoking status: Every Day     Packs/day: 1.00     Years: 30.00     Pack years: 30.00     Types: Cigarettes     Passive exposure: Current   • Smokeless tobacco: Never   • Tobacco comments:     Began smoking at age 11.  Smoked .5 ppd for 6 years, 2.5 ppd for a year, 2 ppd for 5 years, and 1 ppd for the past 43 years for a 58.5 pack year history.   Vaping Use   • Vaping Use: Former   • Substances: Nicotine, Flavoring   • Devices: Disposable   Substance and Sexual Activity   • Alcohol use: Yes     Comment: once a year    • Drug use: No   • Sexual activity: Defer      Past Medical History:   Diagnosis Date   • COPD (chronic obstructive pulmonary disease)    • COPD with acute exacerbation 2020     Past Surgical History:   Procedure Laterality Date   •  SECTION     • CHOLECYSTECTOMY     • COLONOSCOPY     • ECTOPIC PREGNANCY SURGERY     • HYSTERECTOMY     • TONSILLECTOMY     • TOTAL HIP ARTHROPLASTY Left        Family History   Problem Relation Age of Onset   • Lung cancer Niece 50   • Throat  "cancer Father    • Breast cancer Neg Hx                  Objective:  Vitals:    05/16/23 1411   BP: 119/74   Pulse: 71   Weight: 47.2 kg (104 lb)   Height: 160 cm (63\")         05/16/23  1411   Weight: 47.2 kg (104 lb)     Body mass index is 18.42 kg/m².        Left Hand Exam     Tenderness   The patient is experiencing tenderness in the radial area, dorsal area and palmar area.     Range of Motion   Wrist   Extension: abnormal   Flexion: abnormal   Pronation: normal   Supination: normal     Muscle Strength   Wrist extension: 3/5   Wrist flexion: 3/5   :  3/5     Other   Erythema: absent  Scars: absent  Sensation: normal  Pulse: present    Comments:  Tenderness to palpation directly over distal radius volarly and dorsally.  Mild bruising no significant swelling no numbness or tingling no deformity               Imaging: 3 views of the left wrist taken on 5/2/2023 and left hand were reviewed by myself in the office today  Indication: Left wrist injury  Findings: X-rays demonstrate a nondisplaced fracture of the lunate facet of the distal radius without significant displacement and no angulation.  This is evident on the AP and oblique views of both wrist and the hand and is quite subtle.    2 views the left wrist were ordered and reviewed by myself in the office today  Indication: Left wrist fracture  Findings: X-rays demonstrate no increased displacement of the lunate facet fracture of the distal radius compared to x-rays taken on 5/2/2023    XR Wrist 3+ View Left    Result Date: 5/2/2023  No acute traumatic findings. Advanced first CMC joint degenerative arthropathy.  This report was finalized on 5/2/2023 8:12 PM by Dr. DANNY Carvajal MD.      XR Hand 3+ View Left    Result Date: 5/2/2023  No acute traumatic findings. Advanced first CMC joint degenerative arthropathy.  This report was finalized on 5/2/2023 8:12 PM by Dr. DANNY Carvajal MD.          Assessment:        1. Other closed intra-articular " fracture of distal end of left radius, initial encounter           Plan:          1. Discussed treatment options at length with patient at today's visit.  I discussed with the patient that at this point in time she has sustained a distal radial fracture that is well displaced.  I recommend that she ice and elevate her wrist and we will place her in a short arm Exos brace today.  I would like the patient to return to work with one-handed duty or light duty.  I discussed with her that she should continue taking the ibuprofen and the Lortabs prescribed by Dr. De Paz as needed for pain.  I am not significantly worried about the potential for fracture displacement since it is already been 2 weeks and the fracture has not migrated in a suboptimal splint.  Expected to make a full recovery but will need to follow her closely.  2. Follow up: 2 to 3 weeks with 3 views of the left wrist out of the Exos brace      Pili Arechiga was in agreement with plan and had all questions answered.     Medications:  No orders of the defined types were placed in this encounter.      Followup:  No follow-ups on file.          Dictated utilizing Dragon dictation

## 2023-05-31 ENCOUNTER — TELEPHONE (OUTPATIENT)
Dept: ORTHOPEDIC SURGERY | Facility: CLINIC | Age: 68
End: 2023-05-31

## 2023-05-31 NOTE — TELEPHONE ENCOUNTER
Called patient to reschedule her appointment with Dr. Serna from 6-6-23 to 6-13-23 at 10:15, due to Dr. Serna being out of office that week. Told patient if that time and date did not work, to call us back and she can be rescheduled.

## 2023-06-02 ENCOUNTER — TRANSCRIBE ORDERS (OUTPATIENT)
Dept: ADMINISTRATIVE | Facility: HOSPITAL | Age: 68
End: 2023-06-02
Payer: MEDICARE

## 2023-06-02 ENCOUNTER — HOSPITAL ENCOUNTER (OUTPATIENT)
Dept: GENERAL RADIOLOGY | Facility: HOSPITAL | Age: 68
Discharge: HOME | End: 2023-06-02

## 2023-06-02 DIAGNOSIS — J40 BRONCHITIS, NOT SPECIFIED AS ACUTE OR CHRONIC: ICD-10-CM

## 2023-06-02 DIAGNOSIS — J40 BRONCHITIS, NOT SPECIFIED AS ACUTE OR CHRONIC: Primary | ICD-10-CM

## 2023-06-02 PROCEDURE — 71046 X-RAY EXAM CHEST 2 VIEWS: CPT

## 2023-06-13 ENCOUNTER — OFFICE VISIT (OUTPATIENT)
Dept: ORTHOPEDIC SURGERY | Facility: CLINIC | Age: 68
End: 2023-06-13
Payer: MEDICARE

## 2023-06-13 VITALS — WEIGHT: 104 LBS | HEIGHT: 63 IN | BODY MASS INDEX: 18.43 KG/M2

## 2023-06-13 DIAGNOSIS — S52.572A OTHER CLOSED INTRA-ARTICULAR FRACTURE OF DISTAL END OF LEFT RADIUS, INITIAL ENCOUNTER: Primary | ICD-10-CM

## 2023-06-13 RX ORDER — NALOXONE HYDROCHLORIDE 4 MG/.1ML
SPRAY NASAL
COMMUNITY
Start: 2023-05-22

## 2023-06-13 NOTE — PROGRESS NOTES
Subjective:     Patient ID: Pili Arechiga is a 67 y.o. female.    Chief Complaint:    History of Present Illness  Pili Arechiga returns to clinic today for evaluation of left wrist injury sustained roughly 6 weeks ago.  The patient was seen by myself 4 weeks ago and was placed into a Exos short arm splint.  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  She states that the pain and swelling have significantly decreased and she has been taking the brace off intermittently to work on hand and wrist range of motion exercises.  She denies any numbness or tingling in her hand.  Overall she is very pleased with the outcome up to this point.  She is hopeful to discontinue the brace today.     Social History     Occupational History    Not on file   Tobacco Use    Smoking status: Every Day     Packs/day: 1.00     Years: 30.00     Pack years: 30.00     Types: Cigarettes     Passive exposure: Current    Smokeless tobacco: Never    Tobacco comments:     Began smoking at age 11.  Smoked .5 ppd for 6 years, 2.5 ppd for a year, 2 ppd for 5 years, and 1 ppd for the past 43 years for a 58.5 pack year history.   Vaping Use    Vaping Use: Former    Substances: Nicotine, Flavoring    Devices: Disposable   Substance and Sexual Activity    Alcohol use: Yes     Comment: once a year     Drug use: No    Sexual activity: Defer      Past Medical History:   Diagnosis Date    COPD (chronic obstructive pulmonary disease)     COPD with acute exacerbation 2020     Past Surgical History:   Procedure Laterality Date     SECTION      CHOLECYSTECTOMY      COLONOSCOPY      ECTOPIC PREGNANCY SURGERY      HYSTERECTOMY      TONSILLECTOMY      TOTAL HIP ARTHROPLASTY Left        Family History   Problem Relation Age of Onset    Lung cancer Niece 50    Throat cancer Father     Breast cancer Neg Hx                  Objective:  Vitals:    23 1031   Weight:  "47.2 kg (104 lb)   Height: 160 cm (63\")         06/13/23  1031   Weight: 47.2 kg (104 lb)     Body mass index is 18.42 kg/m².        Left Hand Exam     Tenderness   The patient is experiencing no tenderness.     Range of Motion   Wrist   Extension:  normal   Flexion:  normal   Pronation:  normal   Supination:  normal   Hand   MP Thumb: abnormal   DIP Thumb: abnormal     Muscle Strength   Wrist extension: 4/5   Wrist flexion: 4/5   :  4/5     Other   Erythema: absent  Scars: absent  Sensation: normal  Pulse: present    Comments:  Mild global weakness 4+ out of 5.  Obvious deformity at base of first CMC from chronic OA.             Imaging: 3 views of the left wrist were ordered and reviewed by myself in the office today  Indication: Left wrist injury  Findings: X-rays demonstrate a healed nondisplaced lunate facet fracture with no signs of displacement.  There is severe first CMC joint osteoarthritis.  No new acute osseous abnormality  Comparative studies: X-rays on 5/16/2023    Assessment:        1. Other closed intra-articular fracture of distal end of left radius, initial encounter           Plan:          Discussed treatment options at length with patient at today's visit.  Discussed with the patient that at this point time she is more than 6 weeks out from her injury and she has no pain.  The patient may discontinue the use of the Exos brace.  I offered occupational therapy to her but she did not wish to pursue that option at this point.  She states that she think she has great motion and minimal pain because she is still been working in the factory manufacturing small parts.  I discussed with her that at this point time she is doing so well she does not need to schedule further follow-up.  I discussed with her that I am happy to see her back at any point time if issues arise she stated that she agreed with the plan since is difficult to get time off work.  Follow up: As needed      Pili Arechiga was in " agreement with plan and had all questions answered.     Medications:  No orders of the defined types were placed in this encounter.      Followup:  No follow-ups on file.    Diagnoses and all orders for this visit:    1. Other closed intra-articular fracture of distal end of left radius, initial encounter (Primary)  -     XR Wrist 3+ View Left          Dictated utilizing Dragon dictation

## 2023-07-20 ENCOUNTER — TRANSCRIBE ORDERS (OUTPATIENT)
Dept: ADMINISTRATIVE | Facility: HOSPITAL | Age: 68
End: 2023-07-20
Payer: MEDICARE

## 2023-07-20 DIAGNOSIS — J18.9 PNEUMONIA DUE TO INFECTIOUS ORGANISM, UNSPECIFIED LATERALITY, UNSPECIFIED PART OF LUNG: Primary | ICD-10-CM

## 2023-07-24 ENCOUNTER — TRANSCRIBE ORDERS (OUTPATIENT)
Dept: CT IMAGING | Facility: HOSPITAL | Age: 68
End: 2023-07-24
Payer: MEDICARE

## 2023-07-24 DIAGNOSIS — J18.9 PERSISTENT PNEUMONIA: Primary | ICD-10-CM

## 2023-07-28 ENCOUNTER — HOSPITAL ENCOUNTER (OUTPATIENT)
Dept: CT IMAGING | Facility: HOSPITAL | Age: 68
Discharge: HOME | End: 2023-07-28
Admitting: FAMILY MEDICINE
Payer: MEDICARE

## 2023-07-28 DIAGNOSIS — J18.9 PERSISTENT PNEUMONIA: ICD-10-CM

## 2023-07-28 LAB — CREAT BLDA-MCNC: 0.9 MG/DL (ref 0.6–1.3)

## 2023-07-28 PROCEDURE — 25510000001 IOPAMIDOL PER 1 ML: Performed by: FAMILY MEDICINE

## 2023-07-28 PROCEDURE — 71260 CT THORAX DX C+: CPT

## 2023-07-28 PROCEDURE — 82565 ASSAY OF CREATININE: CPT

## 2023-07-28 RX ADMIN — IOPAMIDOL 100 ML: 755 INJECTION, SOLUTION INTRAVENOUS at 15:33

## 2023-08-01 ENCOUNTER — TRANSCRIBE ORDERS (OUTPATIENT)
Dept: PET IMAGING | Facility: HOSPITAL | Age: 68
End: 2023-08-01
Payer: MEDICARE

## 2023-08-01 DIAGNOSIS — R91.1 PULMONARY NODULE, LEFT: Primary | ICD-10-CM

## 2023-08-09 ENCOUNTER — HOSPITAL ENCOUNTER (OUTPATIENT)
Dept: PET IMAGING | Facility: HOSPITAL | Age: 68
Discharge: HOME | End: 2023-08-09
Payer: MEDICARE

## 2023-08-09 DIAGNOSIS — R91.1 PULMONARY NODULE, LEFT: ICD-10-CM

## 2023-08-09 LAB — GLUCOSE BLDC GLUCOMTR-MCNC: 89 MG/DL (ref 70–130)

## 2023-08-09 PROCEDURE — 78815 PET IMAGE W/CT SKULL-THIGH: CPT

## 2023-08-09 PROCEDURE — 82948 REAGENT STRIP/BLOOD GLUCOSE: CPT

## 2023-08-09 PROCEDURE — A9552 F18 FDG: HCPCS | Performed by: FAMILY MEDICINE

## 2023-08-09 PROCEDURE — 0 FLUDEOXYGLUCOSE F18 SOLUTION: Performed by: FAMILY MEDICINE

## 2023-08-09 RX ADMIN — FLUDEOXYGLUCOSE F18 1 DOSE: 300 INJECTION INTRAVENOUS at 07:55

## 2023-08-16 ENCOUNTER — TELEPHONE (OUTPATIENT)
Dept: SURGERY | Facility: CLINIC | Age: 68
End: 2023-08-16
Payer: MEDICARE

## 2023-08-16 NOTE — TELEPHONE ENCOUNTER
CALLED REGARDING NEW PATIENT APPOINTMENT ON 8/21. NO ANSWER LEFT MESSAGE WITH APPOINTMENT DATE TIME AND ADDRESS. ALSO LEFT CALL BACK NUMBER FOR HER TO CONFIRM OR RESCHEDULE IF NEEDED.

## 2023-08-21 ENCOUNTER — OFFICE VISIT (OUTPATIENT)
Dept: SURGERY | Facility: CLINIC | Age: 68
End: 2023-08-21
Payer: MEDICARE

## 2023-08-21 VITALS
OXYGEN SATURATION: 90 % | HEIGHT: 63 IN | HEART RATE: 74 BPM | SYSTOLIC BLOOD PRESSURE: 102 MMHG | WEIGHT: 102 LBS | DIASTOLIC BLOOD PRESSURE: 62 MMHG | BODY MASS INDEX: 18.07 KG/M2

## 2023-08-21 DIAGNOSIS — R59.0 MEDIASTINAL LYMPHADENOPATHY: Primary | ICD-10-CM

## 2023-08-21 PROCEDURE — 99204 OFFICE O/P NEW MOD 45 MIN: CPT | Performed by: THORACIC SURGERY (CARDIOTHORACIC VASCULAR SURGERY)

## 2023-08-21 PROCEDURE — 1160F RVW MEDS BY RX/DR IN RCRD: CPT | Performed by: THORACIC SURGERY (CARDIOTHORACIC VASCULAR SURGERY)

## 2023-08-21 PROCEDURE — 1159F MED LIST DOCD IN RCRD: CPT | Performed by: THORACIC SURGERY (CARDIOTHORACIC VASCULAR SURGERY)

## 2023-08-21 NOTE — PROGRESS NOTES
"Chief Complaint  Lymphadenopathy, lung nodule    Subjective        Pili Arechiga presents to [facility] THORACIC SURGERY  History of Present Illness  Ms. Pili Arechiga is a pleasant 67-year-old lady who presents with hypermetabolic mediastinal and hilar lymphadenopathy as well as a pleural based opacity in the left chest that is quite small. She is accompanied by her brother.    The patient reports she had a CT scan and a PET scan underwent by Dr. Figueora. She states when he talked to the lung specialist, they told her it is where they are unable to do it with the biopsy and that is when he set her up with me. She reports she does not know what is going on. She states she was told it could be in her lung or in her lymph nodes. The patient's brother states she has a bad history of pneumonia. She states she still has pneumonia. She notes she was told her right lung was full; however, she notes it is better now. She reports she has been fighting it since the second week of 05/2023. She states she was in the hospital for 2 weeks or a week before that and was given 8 bags of antibiotics. He notes she does not like water. She reports she has a history of COPD and is on inhalers. She states she still smokes. She denies any trouble with anesthesia. She states she has a family history of lung cancer in her niece. She reports her father had throat cancer. She states when he passed away, it was all over his body. She notes when they found out, they said it was in his throat.   Objective   Vital Signs:  /62 (BP Location: Left arm, Patient Position: Sitting, Cuff Size: Adult)   Pulse 74   Ht 160 cm (63\")   Wt 46.3 kg (102 lb)   SpO2 90%   BMI 18.07 kg/m²   Estimated body mass index is 18.07 kg/m² as calculated from the following:    Height as of this encounter: 160 cm (63\").    Weight as of this encounter: 46.3 kg (102 lb).             Physical Exam  Vitals and nursing note reviewed.   Constitutional:  "      Appearance: She is well-developed.   HENT:      Head: Normocephalic and atraumatic.      Nose: Nose normal.   Eyes:      Conjunctiva/sclera: Conjunctivae normal.   Cardiovascular:      Rate and Rhythm: Normal rate.   Pulmonary:      Effort: Pulmonary effort is normal.   Abdominal:      Palpations: Abdomen is soft.   Musculoskeletal:      Cervical back: Neck supple.   Skin:     General: Skin is warm and dry.   Neurological:      Mental Status: She is alert and oriented to person, place, and time.   Psychiatric:         Behavior: Behavior normal.         Thought Content: Thought content normal.         Judgment: Judgment normal.      Result Review :        Result Review   I have independently reviewed the PET CT scan performed on 09/09/2023 which demonstrates hypermetabolic left hilar and left lower paratracheal lymphadenopathy with an SUV of 4.7. There is a 1.1 cm pleural based nodular in the posterior aspect of the left lower lobe with an SUV of 2.8.           Assessment and Plan   Diagnoses and all orders for this visit:    1. Mediastinal lymphadenopathy (Primary)      Ms. Pili Arechiga is a pleasant 67-year-old lady with an approximately 40-pack-year history of tobacco use and a current everyday smoker who presents with mediastinal-hilar lymphadenopathy that is hypermetabolic as well as a 1 cm pleural based nodule that is mildly hypermetabolic. This pleural based nodule will be difficult to biopsy with IR robotic bronchoscopy as well as with CT guided bronchoscopy given its location she would do well with. I have spoken to my colleague, Dr. Jeremie Rao, who is our interventional pulmonologist, and he would be amenable to biopsying her mediastinal and hilar lymph nodes with endobronchial ultrasound and he would be able to do this Friday.  I will plan to see her back after her biopsy.     I spent 45 minutes caring for Pili on this date of service. This time includes time spent by me in the following  activities:preparing for the visit, reviewing tests, obtaining and/or reviewing a separately obtained history, performing a medically appropriate examination and/or evaluation , counseling and educating the patient/family/caregiver, ordering medications, tests, or procedures, documenting information in the medical record, and independently interpreting results and communicating that information with the patient/family/caregiver  Follow Up   No follow-ups on file.  Patient was given instructions and counseling regarding her condition or for health maintenance advice. Please see specific information pulled into the AVS if appropriate.         Transcribed from ambient dictation for Alexia Velásquez MD by Jasper Vann.  08/21/23   16:56 EDT    Patient or patient representative verbalized consent to the visit recording.  I have personally performed the services described in this document as transcribed by the above individual, and it is both accurate and complete.

## 2023-08-21 NOTE — Clinical Note
August 21, 2023     Nigel De Paz MD  [address]  [address]  [place] [address]    Patient: Pili Arechiga   YOB: 1955   Date of Visit: 8/21/2023       Dear Nigel De Paz MD,    Pili Arechiga was in my office today. Below are the relevant portions of my assessment and plan of care.           If you have questions, please do not hesitate to call me. I look forward to following Pili along with you.         Sincerely,        Alexia Velásquez MD        CC: No Recipients

## 2023-08-21 NOTE — LETTER
September 9, 2023     Nigel De Paz MD  [address]  [address]  [place] [address]    Patient: Pili Arechiga   YOB: 1955   Date of Visit: 8/21/2023     Dear Nigel De Paz MD:       Thank you for referring Pili Arechiga to me for evaluation. Below are the relevant portions of my assessment and plan of care.    If you have questions, please do not hesitate to call me. I look forward to following Pili along with you.         Sincerely,        Alexia Velásquez MD        CC: No Recipients    Alexia Velásquez MD  09/09/23 1115  Sign when Signing Visit  Chief Complaint  Lymphadenopathy, lung nodule    Subjective        Pili Arechiga presents to [facility] THORACIC SURGERY  History of Present Illness  Ms. Pili Arechiga is a pleasant 67-year-old lady who presents with hypermetabolic mediastinal and hilar lymphadenopathy as well as a pleural based opacity in the left chest that is quite small. She is accompanied by her brother.    The patient reports she had a CT scan and a PET scan underwent by Dr. Figueroa. She states when he talked to the lung specialist, they told her it is where they are unable to do it with the biopsy and that is when he set her up with me. She reports she does not know what is going on. She states she was told it could be in her lung or in her lymph nodes. The patient's brother states she has a bad history of pneumonia. She states she still has pneumonia. She notes she was told her right lung was full; however, she notes it is better now. She reports she has been fighting it since the second week of 05/2023. She states she was in the hospital for 2 weeks or a week before that and was given 8 bags of antibiotics. He notes she does not like water. She reports she has a history of COPD and is on inhalers. She states she still smokes. She denies any trouble with anesthesia. She states she has a family history of lung cancer in her niece. She reports her father  "had throat cancer. She states when he passed away, it was all over his body. She notes when they found out, they said it was in his throat.   Objective   Vital Signs:  /62 (BP Location: Left arm, Patient Position: Sitting, Cuff Size: Adult)   Pulse 74   Ht 160 cm (63\")   Wt 46.3 kg (102 lb)   SpO2 90%   BMI 18.07 kg/m²   Estimated body mass index is 18.07 kg/m² as calculated from the following:    Height as of this encounter: 160 cm (63\").    Weight as of this encounter: 46.3 kg (102 lb).             Physical Exam  Vitals and nursing note reviewed.   Constitutional:       Appearance: She is well-developed.   HENT:      Head: Normocephalic and atraumatic.      Nose: Nose normal.   Eyes:      Conjunctiva/sclera: Conjunctivae normal.   Cardiovascular:      Rate and Rhythm: Normal rate.   Pulmonary:      Effort: Pulmonary effort is normal.   Abdominal:      Palpations: Abdomen is soft.   Musculoskeletal:      Cervical back: Neck supple.   Skin:     General: Skin is warm and dry.   Neurological:      Mental Status: She is alert and oriented to person, place, and time.   Psychiatric:         Behavior: Behavior normal.         Thought Content: Thought content normal.         Judgment: Judgment normal.      Result Review :        Result Review   I have independently reviewed the PET CT scan performed on 09/09/2023 which demonstrates hypermetabolic left hilar and left lower paratracheal lymphadenopathy with an SUV of 4.7. There is a 1.1 cm pleural based nodular in the posterior aspect of the left lower lobe with an SUV of 2.8.           Assessment and Plan   Diagnoses and all orders for this visit:    1. Mediastinal lymphadenopathy (Primary)      Ms. Pili Arechiga is a pleasant 67-year-old lady with an approximately 40-pack-year history of tobacco use and a current everyday smoker who presents with mediastinal-hilar lymphadenopathy that is hypermetabolic as well as a 1 cm pleural based nodule that is mildly " hypermetabolic. This pleural based nodule will be difficult to biopsy with IR robotic bronchoscopy as well as with CT guided bronchoscopy given its location she would do well with. I have spoken to my colleague, Dr. Jeremie Rao, who is our interventional pulmonologist, and he would be amenable to biopsying her mediastinal and hilar lymph nodes with endobronchial ultrasound and he would be able to do this Friday.  I will plan to see her back after her biopsy.     I spent 45 minutes caring for Pili on this date of service. This time includes time spent by me in the following activities:preparing for the visit, reviewing tests, obtaining and/or reviewing a separately obtained history, performing a medically appropriate examination and/or evaluation , counseling and educating the patient/family/caregiver, ordering medications, tests, or procedures, documenting information in the medical record, and independently interpreting results and communicating that information with the patient/family/caregiver  Follow Up   No follow-ups on file.  Patient was given instructions and counseling regarding her condition or for health maintenance advice. Please see specific information pulled into the AVS if appropriate.         Transcribed from ambient dictation for Alexia Velásquez MD by Jasper Vann.  08/21/23   16:56 EDT    Patient or patient representative verbalized consent to the visit recording.  I have personally performed the services described in this document as transcribed by the above individual, and it is both accurate and complete.

## 2023-08-25 ENCOUNTER — HOSPITAL ENCOUNTER (OUTPATIENT)
Facility: HOSPITAL | Age: 68
Setting detail: HOSPITAL OUTPATIENT SURGERY
Discharge: HOME | End: 2023-08-25
Attending: HOSPITALIST | Admitting: HOSPITALIST
Payer: MEDICARE

## 2023-08-25 ENCOUNTER — ANESTHESIA (OUTPATIENT)
Dept: GASTROENTEROLOGY | Facility: HOSPITAL | Age: 68
End: 2023-08-25
Payer: MEDICARE

## 2023-08-25 ENCOUNTER — ANESTHESIA EVENT (OUTPATIENT)
Dept: GASTROENTEROLOGY | Facility: HOSPITAL | Age: 68
End: 2023-08-25
Payer: MEDICARE

## 2023-08-25 ENCOUNTER — PATIENT ROUNDING (BHMG ONLY) (OUTPATIENT)
Dept: SURGERY | Facility: CLINIC | Age: 68
End: 2023-08-25
Payer: MEDICARE

## 2023-08-25 ENCOUNTER — APPOINTMENT (OUTPATIENT)
Dept: GENERAL RADIOLOGY | Facility: HOSPITAL | Age: 68
End: 2023-08-25
Payer: MEDICARE

## 2023-08-25 VITALS
HEART RATE: 78 BPM | DIASTOLIC BLOOD PRESSURE: 62 MMHG | WEIGHT: 99 LBS | BODY MASS INDEX: 17.54 KG/M2 | OXYGEN SATURATION: 95 % | SYSTOLIC BLOOD PRESSURE: 116 MMHG | TEMPERATURE: 98.3 F | RESPIRATION RATE: 16 BRPM | HEIGHT: 63 IN

## 2023-08-25 DIAGNOSIS — R59.1 LYMPHADENOPATHY: ICD-10-CM

## 2023-08-25 PROCEDURE — 25010000002 PHENYLEPHRINE 10 MG/ML SOLUTION: Performed by: ANESTHESIOLOGY

## 2023-08-25 PROCEDURE — 88305 TISSUE EXAM BY PATHOLOGIST: CPT | Performed by: HOSPITALIST

## 2023-08-25 PROCEDURE — 71045 X-RAY EXAM CHEST 1 VIEW: CPT

## 2023-08-25 PROCEDURE — 25010000002 PROPOFOL 10 MG/ML EMULSION: Performed by: ANESTHESIOLOGY

## 2023-08-25 PROCEDURE — 88173 CYTOPATH EVAL FNA REPORT: CPT | Performed by: HOSPITALIST

## 2023-08-25 RX ORDER — LIDOCAINE HYDROCHLORIDE 20 MG/ML
INJECTION, SOLUTION INFILTRATION; PERINEURAL AS NEEDED
Status: DISCONTINUED | OUTPATIENT
Start: 2023-08-25 | End: 2023-08-25 | Stop reason: SURG

## 2023-08-25 RX ORDER — PROPOFOL 10 MG/ML
VIAL (ML) INTRAVENOUS CONTINUOUS PRN
Status: DISCONTINUED | OUTPATIENT
Start: 2023-08-25 | End: 2023-08-25 | Stop reason: SURG

## 2023-08-25 RX ORDER — PHENYLEPHRINE HYDROCHLORIDE 10 MG/ML
INJECTION INTRAVENOUS AS NEEDED
Status: DISCONTINUED | OUTPATIENT
Start: 2023-08-25 | End: 2023-08-25 | Stop reason: SURG

## 2023-08-25 RX ORDER — SODIUM CHLORIDE, SODIUM LACTATE, POTASSIUM CHLORIDE, CALCIUM CHLORIDE 600; 310; 30; 20 MG/100ML; MG/100ML; MG/100ML; MG/100ML
1000 INJECTION, SOLUTION INTRAVENOUS CONTINUOUS
Status: DISCONTINUED | OUTPATIENT
Start: 2023-08-25 | End: 2023-08-25 | Stop reason: HOSPADM

## 2023-08-25 RX ORDER — LIDOCAINE HYDROCHLORIDE 20 MG/ML
INJECTION, SOLUTION EPIDURAL; INFILTRATION; INTRACAUDAL; PERINEURAL AS NEEDED
Status: DISCONTINUED | OUTPATIENT
Start: 2023-08-25 | End: 2023-08-25 | Stop reason: HOSPADM

## 2023-08-25 RX ADMIN — PHENYLEPHRINE HYDROCHLORIDE 100 MCG: 10 INJECTION INTRAVENOUS at 11:06

## 2023-08-25 RX ADMIN — LIDOCAINE HYDROCHLORIDE 60 MG: 20 INJECTION, SOLUTION INFILTRATION; PERINEURAL at 10:39

## 2023-08-25 RX ADMIN — SODIUM CHLORIDE, POTASSIUM CHLORIDE, SODIUM LACTATE AND CALCIUM CHLORIDE 1000 ML: 600; 310; 30; 20 INJECTION, SOLUTION INTRAVENOUS at 10:22

## 2023-08-25 RX ADMIN — PROPOFOL 100 MCG/KG/MIN: 10 INJECTION, EMULSION INTRAVENOUS at 10:40

## 2023-08-25 RX ADMIN — PHENYLEPHRINE HYDROCHLORIDE 100 MCG: 10 INJECTION INTRAVENOUS at 11:10

## 2023-08-25 RX ADMIN — PHENYLEPHRINE HYDROCHLORIDE 100 MCG: 10 INJECTION INTRAVENOUS at 10:57

## 2023-08-25 RX ADMIN — PHENYLEPHRINE HYDROCHLORIDE 100 MCG: 10 INJECTION INTRAVENOUS at 10:43

## 2023-08-25 RX ADMIN — PHENYLEPHRINE HYDROCHLORIDE 100 MCG: 10 INJECTION INTRAVENOUS at 10:46

## 2023-08-25 RX ADMIN — PHENYLEPHRINE HYDROCHLORIDE 100 MCG: 10 INJECTION INTRAVENOUS at 10:51

## 2023-08-25 NOTE — DISCHARGE INSTRUCTIONS
For the next 24 hours patient needs to be with a responsible adult.    For 24 hours DO NOT drive, operate machinery, appliances, drink alcohol, make important decisions or sign legal documents.    Start with a light or bland diet if you are feeling sick to your stomach otherwise advance to regular diet as tolerated.    Follow recommendations on procedure report if provided by your doctor.    Call Dr Mccarthy for problems [phone]    Problems may include but not limited to: large amounts of bleeding, trouble breathing, repeated vomiting, severe unrelieved pain, fever or chills.

## 2023-08-25 NOTE — ANESTHESIA PREPROCEDURE EVALUATION
Anesthesia Evaluation     NPO Solid Status: > 8 hours  NPO Liquid Status: > 8 hours           Airway   Mallampati: I  TM distance: >3 FB  Neck ROM: full  No difficulty expected  Dental      Comment: 4 lower teeth only    Pulmonary    (+) pneumonia , a smoker, COPD,  Cardiovascular         Neuro/Psych  GI/Hepatic/Renal/Endo      Musculoskeletal     (+) back pain, chronic pain  Abdominal    Substance History      OB/GYN          Other                      Anesthesia Plan    ASA 3     general     intravenous induction     Anesthetic plan, risks, benefits, and alternatives have been provided, discussed and informed consent has been obtained with: patient.    CODE STATUS:

## 2023-08-25 NOTE — ANESTHESIA POSTPROCEDURE EVALUATION
Patient: Pili Arechiga    Procedure Summary       Date: 08/25/23 Room / Location:  [facility]    Anesthesia Start: 1030 Anesthesia Stop: 1222    Procedure: BRONCHOSCOPY WITH ENDOBRONCHIAL ULTRASOUND (EBUS) with FNA (Bronchus) Diagnosis:     Surgeons: Coy Mccarthy MD Provider: Aylin Hooks MD    Anesthesia Type: general ASA Status: 3            Anesthesia Type: general    Vitals  No vitals data found for the desired time range.          Post Anesthesia Care and Evaluation    Patient location during evaluation: bedside  Patient participation: complete - patient participated  Level of consciousness: awake  Pain management: adequate    Airway patency: patent  Anesthetic complications: No anesthetic complications    Cardiovascular status: acceptable  Respiratory status: acceptable  Hydration status: acceptable

## 2023-08-25 NOTE — H&P
[facility]  HISTORY AND PHYSICAL   [facility]        Patient Identification:  Name: Pili Arechiga  Age: 67 y.o.  Sex: female  :  1955  MRN: [id number]                     Primary Care Physician: Nigel De Paz MD    History of Present Illness:   Ms. Pili Arechiga is a 67 year old female with chronic obstructive pulmonary disease and tobacco use who presents for bronchoscopy to evaluate hypermetabolic mediastinal and hilar lymphadenopathy.    Past Medical History:  Past Medical History:   Diagnosis Date    COPD (chronic obstructive pulmonary disease)     COPD with acute exacerbation 2020     Past Surgical History:  Past Surgical History:   Procedure Laterality Date     SECTION      CHOLECYSTECTOMY      COLONOSCOPY      ECTOPIC PREGNANCY SURGERY      HYSTERECTOMY      TONSILLECTOMY      TOTAL HIP ARTHROPLASTY Left       Home Meds:  Medications Prior to Admission   Medication Sig Dispense Refill Last Dose    albuterol sulfate  (90 Base) MCG/ACT inhaler Inhale 2 puffs Every 4 (Four) Hours As Needed for Wheezing. Takes as needed.       aspirin 325 MG tablet Take 1 tablet by mouth Daily. Not taking for 6 months       budesonide-formoterol (SYMBICORT) 160-4.5 MCG/ACT inhaler Inhale 2 puffs 2 (Two) Times a Day.  12     Cholecalciferol (VITAMIN D3) 125 MCG (5000 UT) capsule capsule Take 1 capsule by mouth 1 (One) Time Per Week. Takes two pills weekly but doesn't know the units.       diazePAM (VALIUM) 10 MG tablet Take 1 tablet by mouth 2 (Two) Times a Day As Needed for Anxiety (RLS.). Takes 20 mg at bedtime at times when she needs.       HYDROcodone-acetaminophen (NORCO)  MG per tablet Take 1 tablet by mouth 3 (Three) Times a Day As Needed for Moderate Pain.       lactobacillus acidophilus (RISAQUAD) capsule capsule Take 1 capsule by mouth 2 (Two) Times a Day. 60 capsule 0     midodrine (PROAMATINE) 2.5 MG tablet Take 1 tablet by mouth 3 (Three) Times  a Day Before Meals.       naloxone (NARCAN) 4 MG/0.1ML nasal spray  (Patient not taking: Reported on 8/21/2023)          Allergies:  Allergies   Allergen Reactions    Codeine GI Intolerance    Percocet [Oxycodone-Acetaminophen] Hives     Immunizations:  Immunization History   Administered Date(s) Administered    Influenza, Unspecified 11/04/2016, 10/22/2019, 09/24/2021, 01/20/2023     Social History:   Social History     Social History Narrative    Not on file     Social History     Tobacco Use    Smoking status: Every Day     Packs/day: 1.00     Years: 30.00     Pack years: 30.00     Types: Cigarettes     Passive exposure: Current    Smokeless tobacco: Never    Tobacco comments:     Began smoking at age 11.  Smoked .5 ppd for 6 years, 2.5 ppd for a year, 2 ppd for 5 years, and 1 ppd for the past 43 years for a 58.5 pack year history.   Substance Use Topics    Alcohol use: Yes     Comment: once a year      Family History:  Family History   Problem Relation Age of Onset    Lung cancer Niece 50    Throat cancer Father     Breast cancer Neg Hx       Objective:  tMax 24 hrs: No data recorded.    Vitals Ranges:      Exam:  There were no vitals taken for this visit.    General: Alert, nontoxic, NAD  HEENT: NC/AT, EOMI, MMM  Neck: Supple, trachea midline  Cardiac: RRR, no murmur, gallops, rubs  Pulmonary: Diminished bilaterally  GI: Soft, non-tender, non-distended, normal bowel sounds  Extremities: Warm, well perfused, no LE edema  Skin: no visible rash  Neuro: CN II - XII grossly intact  Psychiatry: Normal mood and affect    Data Review:  Chest imaging reviewed    Assessment / Plan:    Left upper lobe pleural-based nodule  Mediastinal and hilar lymphadenopathy - PET avid  Obstructive pulmonary disease  Tobacco use disorder  History of pneumonia    Patient with left upper lobe pleural-based nodule which would be difficult to biopsy from IR robotic standpoint and CT-guided standpoint.  Patient was initially seen by   Alexia Velásquez in thoracic clinic and referred to IP for bronchoscopy and EBUS/TBNA to ascertain diagnosis.  High risk for malignancy based on 40+ pack year smoking history.    Proceed with bronchoscopy today.    Coy Mccarthy MD  [facility], [facility]  Pulmonary and Critical Care Medicine, Interventional Pulmonology    Parts of this note may be an electronic transcription/translation of spoken language to printed text using the Dragon dictation system.

## 2023-08-25 NOTE — PROGRESS NOTES
August 25, 2023    DONE IN OFFICE                    We're always looking for ways to make our patients' experiences even better. Do you have recommendations on ways we may improve?  no    Overall were you satisfied with your first visit to our practice? yes

## 2023-08-28 LAB
CYTO UR: NORMAL
LAB AP CASE REPORT: NORMAL
LAB AP DIAGNOSIS COMMENT: NORMAL
LAB AP NON-GYN SPECIMEN ADEQUACY: NORMAL
PATH REPORT.FINAL DX SPEC: NORMAL
PATH REPORT.GROSS SPEC: NORMAL

## 2023-09-01 ENCOUNTER — TRANSCRIBE ORDERS (OUTPATIENT)
Dept: ADMINISTRATIVE | Facility: HOSPITAL | Age: 68
End: 2023-09-01
Payer: MEDICARE

## 2023-09-01 ENCOUNTER — TELEPHONE (OUTPATIENT)
Dept: SURGERY | Facility: CLINIC | Age: 68
End: 2023-09-01
Payer: MEDICARE

## 2023-09-01 DIAGNOSIS — J18.9 PNEUMONIA DUE TO INFECTIOUS ORGANISM, UNSPECIFIED LATERALITY, UNSPECIFIED PART OF LUNG: Primary | ICD-10-CM

## 2023-09-05 ENCOUNTER — OFFICE VISIT (OUTPATIENT)
Dept: SURGERY | Facility: CLINIC | Age: 68
End: 2023-09-05
Payer: MEDICARE

## 2023-09-05 VITALS
HEART RATE: 65 BPM | WEIGHT: 102 LBS | DIASTOLIC BLOOD PRESSURE: 68 MMHG | BODY MASS INDEX: 18.07 KG/M2 | OXYGEN SATURATION: 93 % | SYSTOLIC BLOOD PRESSURE: 106 MMHG

## 2023-09-05 DIAGNOSIS — R59.0 MEDIASTINAL LYMPHADENOPATHY: Primary | ICD-10-CM

## 2023-09-05 DIAGNOSIS — R91.1 LUNG NODULE: ICD-10-CM

## 2023-09-05 PROCEDURE — 99213 OFFICE O/P EST LOW 20 MIN: CPT | Performed by: THORACIC SURGERY (CARDIOTHORACIC VASCULAR SURGERY)

## 2023-09-05 PROCEDURE — 1159F MED LIST DOCD IN RCRD: CPT | Performed by: THORACIC SURGERY (CARDIOTHORACIC VASCULAR SURGERY)

## 2023-09-05 PROCEDURE — 1160F RVW MEDS BY RX/DR IN RCRD: CPT | Performed by: THORACIC SURGERY (CARDIOTHORACIC VASCULAR SURGERY)

## 2023-09-05 NOTE — PROGRESS NOTES
"Chief Complaint  Hilar and mediastinal lymphadenopathy    Subjective        Pili Arechiga presents to [facility] THORACIC SURGERY  History of Present Illness    Pili Arechiga presents with hypermetabolic mediastinal and hilar lymphadenopathy as well as a pleural based opacity in the left chest. She underwent an endobronchial ultrasound.     The patient reports Dr. Rao wants her to have another CT scan done prior to her visit with him on 11/06/2023. Dr. Rao told her after the CT scan he and another doctor would conference with this office and then he would talk to the patient at her next visit.     She is still smoking, but states she is trying to \"slack off.\" If the patient has not stopped smoking by her next appointment here in 11/2023, she will be referred to the nurse practitioner for smoking cessation counseling.     The patient will return in 8 weeks after her CT scan.     Objective   Vital Signs:  /68 (BP Location: Left arm, Patient Position: Sitting, Cuff Size: Adult)   Pulse 65   Ht (P) 160 cm (63\")   Wt 46.3 kg (102 lb)   SpO2 93%   BMI (P) 18.07 kg/m²   Estimated body mass index is 18.07 kg/m² (pended) as calculated from the following:    Height as of this encounter: (P) 160 cm (63\").    Weight as of this encounter: 46.3 kg (102 lb).             Physical Exam  Vitals and nursing note reviewed.   Constitutional:       Appearance: She is well-developed.   HENT:      Head: Normocephalic and atraumatic.      Nose: Nose normal.   Eyes:      Conjunctiva/sclera: Conjunctivae normal.   Cardiovascular:      Rate and Rhythm: Normal rate.   Pulmonary:      Effort: Pulmonary effort is normal.   Abdominal:      Palpations: Abdomen is soft.   Musculoskeletal:      Cervical back: Neck supple.   Skin:     General: Skin is warm and dry.   Neurological:      Mental Status: She is alert and oriented to person, place, and time.   Psychiatric:         Behavior: Behavior normal.         Thought " Content: Thought content normal.         Judgment: Judgment normal.      Result Review :          I have independently reviewed the PET CT scan performed on 8/9/2023, which demonstrated hypermetabolic left hilar and left level four lymph nodes the pleural based opacity is not hypermetabolic.    EBUS pathology demonstrates no evidence of malignant cells and station 11 R, station 4R, station seven, station 10 L station 11 L and station 12 L.           Assessment and Plan   Diagnoses and all orders for this visit:    1. Mediastinal lymphadenopathy (Primary)  -     CT Chest Without Contrast; Future    2. Lung nodule  -     CT Chest Without Contrast; Future      Miss Arechiga is a very pleasant 68-year-old lady with a lung nodule, mediastinal lymphadenopathy. All of her biopsies have been negative for malignancy. She will need close surveillance secondary to this. We will plan to see her again in 8 weeks with a CT of the chest.         I spent 20 minutes caring for Pili on this date of service. This time includes time spent by me in the following activities:preparing for the visit, reviewing tests, obtaining and/or reviewing a separately obtained history, performing a medically appropriate examination and/or evaluation , counseling and educating the patient/family/caregiver, ordering medications, tests, or procedures, documenting information in the medical record, and independently interpreting results and communicating that information with the patient/family/caregiver  Follow Up   No follow-ups on file.  Patient was given instructions and counseling regarding her condition or for health maintenance advice. Please see specific information pulled into the AVS if appropriate.       Transcribed from ambient dictation for Alexia Velásquez MD by Anibal Glez.  09/05/23   17:11 EDT    Patient or patient representative verbalized consent to the visit recording.  I have personally performed the services described in this document as  transcribed by the above individual, and it is both accurate and complete.

## 2023-09-05 NOTE — LETTER
"September 10, 2023     Nigel De Paz MD  [address]  [address]  [place] [address]    Patient: Pili Arechiga   YOB: 1955   Date of Visit: 9/5/2023     Dear Nigel De Paz MD:       Thank you for referring Pili Arechiga to me for evaluation. Below are the relevant portions of my assessment and plan of care.    If you have questions, please do not hesitate to call me. I look forward to following Pili along with you.         Sincerely,        Alexia Velásquez MD        CC: No Recipients    Alexia Velásquez MD  09/10/23 1402  Sign when Signing Visit  Chief Complaint  Hilar and mediastinal lymphadenopathy    Subjective        Pili Arechiga presents to [facility] THORACIC SURGERY  History of Present Illness    Pili Arechiga presents with hypermetabolic mediastinal and hilar lymphadenopathy as well as a pleural based opacity in the left chest. She underwent an endobronchial ultrasound.     The patient reports Dr. Rao wants her to have another CT scan done prior to her visit with him on 11/06/2023. Dr. Rao told her after the CT scan he and another doctor would conference with this office and then he would talk to the patient at her next visit.     She is still smoking, but states she is trying to \"slack off.\" If the patient has not stopped smoking by her next appointment here in 11/2023, she will be referred to the nurse practitioner for smoking cessation counseling.     The patient will return in 8 weeks after her CT scan.     Objective   Vital Signs:  /68 (BP Location: Left arm, Patient Position: Sitting, Cuff Size: Adult)   Pulse 65   Ht (P) 160 cm (63\")   Wt 46.3 kg (102 lb)   SpO2 93%   BMI (P) 18.07 kg/m²   Estimated body mass index is 18.07 kg/m² (pended) as calculated from the following:    Height as of this encounter: (P) 160 cm (63\").    Weight as of this encounter: 46.3 kg (102 lb).             Physical Exam  Vitals and nursing note reviewed. "   Constitutional:       Appearance: She is well-developed.   HENT:      Head: Normocephalic and atraumatic.      Nose: Nose normal.   Eyes:      Conjunctiva/sclera: Conjunctivae normal.   Cardiovascular:      Rate and Rhythm: Normal rate.   Pulmonary:      Effort: Pulmonary effort is normal.   Abdominal:      Palpations: Abdomen is soft.   Musculoskeletal:      Cervical back: Neck supple.   Skin:     General: Skin is warm and dry.   Neurological:      Mental Status: She is alert and oriented to person, place, and time.   Psychiatric:         Behavior: Behavior normal.         Thought Content: Thought content normal.         Judgment: Judgment normal.      Result Review :          I have independently reviewed the PET CT scan performed on 8/9/2023, which demonstrated hypermetabolic left hilar and left level four lymph nodes the pleural based opacity is not hypermetabolic.    EBUS pathology demonstrates no evidence of malignant cells and station 11 R, station 4R, station seven, station 10 L station 11 L and station 12 L.           Assessment and Plan   Diagnoses and all orders for this visit:    1. Mediastinal lymphadenopathy (Primary)  -     CT Chest Without Contrast; Future    2. Lung nodule  -     CT Chest Without Contrast; Future      Miss Arechiga is a very pleasant 68-year-old lady with a lung nodule, mediastinal lymphadenopathy. All of her biopsies have been negative for malignancy. She will need close surveillance secondary to this. We will plan to see her again in 8 weeks with a CT of the chest.         I spent 20 minutes caring for Pili on this date of service. This time includes time spent by me in the following activities:preparing for the visit, reviewing tests, obtaining and/or reviewing a separately obtained history, performing a medically appropriate examination and/or evaluation , counseling and educating the patient/family/caregiver, ordering medications, tests, or procedures, documenting information in  the medical record, and independently interpreting results and communicating that information with the patient/family/caregiver  Follow Up   No follow-ups on file.  Patient was given instructions and counseling regarding her condition or for health maintenance advice. Please see specific information pulled into the AVS if appropriate.       Transcribed from ambient dictation for Alexia Velásquez MD by Anibal Glez.  09/05/23   17:11 EDT    Patient or patient representative verbalized consent to the visit recording.  I have personally performed the services described in this document as transcribed by the above individual, and it is both accurate and complete.

## 2023-09-06 NOTE — OP NOTE
Bronchoscopy Procedure Note    Procedure:  Bronchoscopy, Diagnostic  EBUS/TBNA    Pre-Operative Diagnosis:  Mediastinal / Hilar Lymphadenopathy    Post-Operative Diagnosis: Mediastinal / Hilar Lymphadenopathy    Indication:  Mediastinal / Hilar Lymphadenopathy    Anesthesia: General Anesthesia    Procedure Details: Patient was consented for the procedure with all risk and benefit of the procedure explained in detail.  Patient was given the opportunity to ask questions and all concerns were answered.  The bronchocope was inserted into the main airway via the LMA. An anatomical survey was done of the main airways and the subsegmental bronchus to at least the first subsegmental level of all five lobes of both lungs.  The findings are reported below.     Findings:  Bronchoscope passed through LMA to the level of the vocal cords.  Lidocaine used for local anesthetic over vocal cords.  Bronchoscope was passed between the vocal cords into the trachea.  All airways were visualized to at least the first subsegment level of all 5 lobes of both lungs.  Airways were of normal size and caliber.  No endobronchial lesions seen. Next bronchoscope was removed and linear EBUS scope was introduced. Mediastinal survey was performed which demonstrated enlarged lymph nodes at station 11Ri, 11Rs, 4R, 7, 10L, 11L, and 12L. All lymph nodes were greater then 8 mm. 4 passes were obtained at each station and 11Ri and 11Rs were combined. No evidence of bleeding at completion of procedure and bronchoscope was removed.     Estimated Blood Loss:  less than 50 mL           Specimens:  FNA of LN station 11Ri, 11Rs, 4R, 7, 10L, 11L, and 12L                Complications:  None; patient tolerated the procedure well.           Disposition: PACU - hemodynamically stable.    Patient tolerated the procedure well.    Coy Mccarthy MD  [facility], [facility]  Pulmonary and Critical Care Medicine, Interventional Pulmonology

## 2023-10-06 ENCOUNTER — HOSPITAL ENCOUNTER (OUTPATIENT)
Dept: INFUSION THERAPY | Facility: HOSPITAL | Age: 68
Discharge: HOME | End: 2023-10-06
Payer: MEDICARE

## 2023-10-06 VITALS
WEIGHT: 100 LBS | SYSTOLIC BLOOD PRESSURE: 145 MMHG | RESPIRATION RATE: 20 BRPM | OXYGEN SATURATION: 93 % | HEART RATE: 78 BPM | DIASTOLIC BLOOD PRESSURE: 86 MMHG | BODY MASS INDEX: 17.71 KG/M2 | TEMPERATURE: 97.2 F

## 2023-10-06 DIAGNOSIS — M81.0 AGE RELATED OSTEOPOROSIS, UNSPECIFIED PATHOLOGICAL FRACTURE PRESENCE: Primary | ICD-10-CM

## 2023-10-06 PROCEDURE — 96372 THER/PROPH/DIAG INJ SC/IM: CPT

## 2023-10-06 PROCEDURE — 25010000002 DENOSUMAB 60 MG/ML SOLUTION PREFILLED SYRINGE: Performed by: FAMILY MEDICINE

## 2023-10-06 RX ADMIN — DENOSUMAB 60 MG: 60 INJECTION SUBCUTANEOUS at 14:31

## 2023-11-01 ENCOUNTER — HOSPITAL ENCOUNTER (OUTPATIENT)
Dept: CT IMAGING | Facility: HOSPITAL | Age: 68
Discharge: HOME | End: 2023-11-01
Admitting: THORACIC SURGERY (CARDIOTHORACIC VASCULAR SURGERY)
Payer: MEDICARE

## 2023-11-01 DIAGNOSIS — R91.1 LUNG NODULE: ICD-10-CM

## 2023-11-01 DIAGNOSIS — R59.0 MEDIASTINAL LYMPHADENOPATHY: ICD-10-CM

## 2023-11-01 PROCEDURE — 71250 CT THORAX DX C-: CPT

## 2023-11-09 ENCOUNTER — TELEPHONE (OUTPATIENT)
Dept: OTHER | Facility: HOSPITAL | Age: 68
End: 2023-11-09
Payer: MEDICARE

## 2023-11-13 ENCOUNTER — OFFICE VISIT (OUTPATIENT)
Dept: SURGERY | Facility: CLINIC | Age: 68
End: 2023-11-13
Payer: MEDICARE

## 2023-11-13 VITALS
HEART RATE: 80 BPM | SYSTOLIC BLOOD PRESSURE: 98 MMHG | WEIGHT: 103 LBS | HEIGHT: 63 IN | OXYGEN SATURATION: 91 % | BODY MASS INDEX: 18.25 KG/M2 | DIASTOLIC BLOOD PRESSURE: 72 MMHG

## 2023-11-13 DIAGNOSIS — R91.1 LUNG NODULE: Primary | ICD-10-CM

## 2023-11-13 PROCEDURE — 99213 OFFICE O/P EST LOW 20 MIN: CPT | Performed by: THORACIC SURGERY (CARDIOTHORACIC VASCULAR SURGERY)

## 2023-11-13 PROCEDURE — 1159F MED LIST DOCD IN RCRD: CPT | Performed by: THORACIC SURGERY (CARDIOTHORACIC VASCULAR SURGERY)

## 2023-11-13 PROCEDURE — 1160F RVW MEDS BY RX/DR IN RCRD: CPT | Performed by: THORACIC SURGERY (CARDIOTHORACIC VASCULAR SURGERY)

## 2023-11-13 NOTE — LETTER
"November 25, 2023     Nigel De Paz MD  [address]  [address]  [place] [address]    Patient: Pili Arechiga   YOB: 1955   Date of Visit: 11/13/2023     Dear Nigel De Paz MD:       Thank you for referring Pili Arechiga to me for evaluation. Below are the relevant portions of my assessment and plan of care.    If you have questions, please do not hesitate to call me. I look forward to following Pili along with you.         Sincerely,        Alexia Velásquez MD        CC: No Recipients    Alexia Velásquez MD  11/25/23 1451  Sign when Signing Visit  Chief Complaint  Mediastinal lymphadenopathy    Subjective       Pili Arechiga presents to [facility] THORACIC SURGERY  History of Present Illness  Ms. Pili Arechiga is a pleasant 68-year-old lady who is status post endobronchial ultrasound and biopsy for mediastinal lymphadenopathy. She presents in follow-up with a CT of the chest.    The patient reports she is doing well. She states it has been a long time since she has had a sore throat. She notes she has pneumonia and bronchitis every fall.   Objective  Vital Signs:  BP 98/72 (BP Location: Left arm, Patient Position: Sitting, Cuff Size: Adult)   Pulse 80   Ht 160 cm (63\")   Wt 46.7 kg (103 lb)   SpO2 91%   BMI 18.25 kg/m²   Estimated body mass index is 18.25 kg/m² as calculated from the following:    Height as of this encounter: 160 cm (63\").    Weight as of this encounter: 46.7 kg (103 lb).             Physical Exam  Vitals and nursing note reviewed.   Constitutional:       Appearance: She is well-developed.   HENT:      Head: Normocephalic and atraumatic.      Nose: Nose normal.   Eyes:      Conjunctiva/sclera: Conjunctivae normal.   Cardiovascular:      Rate and Rhythm: Normal rate.   Pulmonary:      Effort: Pulmonary effort is normal.   Abdominal:      Palpations: Abdomen is soft.   Musculoskeletal:      Cervical back: Neck supple.   Skin:     General: Skin is warm " and dry.   Neurological:      Mental Status: She is alert and oriented to person, place, and time.   Psychiatric:         Behavior: Behavior normal.         Thought Content: Thought content normal.         Judgment: Judgment normal.        Result Review:        I have independently reviewed the CT of the chest performed on 11/01/2023 which demonstrates stable mediastinal lymphadenopathy, stable pleural base left lower lobe nodule, and micro nodule in the right side.           Assessment and Plan   Diagnoses and all orders for this visit:    1. Lung nodule (Primary)  -     CT Chest Without Contrast; Future    Ms. Arechiga is a pleasant 68-year-old lady with mediastinal lymphadenopathy that was biopsied and was benign. Her CT of the chest with short term surveillance does not demonstrate significant change. We will plan to see her in 4 months with a CT of the chest for continued surveillance.       I spent 20 minutes caring for Pili on this date of service. This time includes time spent by me in the following activities:preparing for the visit, reviewing tests, obtaining and/or reviewing a separately obtained history, performing a medically appropriate examination and/or evaluation , counseling and educating the patient/family/caregiver, ordering medications, tests, or procedures, documenting information in the medical record, and independently interpreting results and communicating that information with the patient/family/caregiver  Follow Up   No follow-ups on file.  Patient was given instructions and counseling regarding her condition or for health maintenance advice. Please see specific information pulled into the AVS if appropriate.       Transcribed from ambient dictation for Alexia Velásquez MD by Mae Abbott.  11/13/23   16:32 EST    Patient or patient representative verbalized consent to the visit recording.  I have personally performed the services described in this document as transcribed by the above  individual, and it is both accurate and complete.

## 2023-11-13 NOTE — PROGRESS NOTES
"Chief Complaint  Mediastinal lymphadenopathy    Subjective        Pili Arechiga presents to [facility] THORACIC SURGERY  History of Present Illness  Ms. Pili Arechiga is a pleasant 68-year-old lady who is status post endobronchial ultrasound and biopsy for mediastinal lymphadenopathy. She presents in follow-up with a CT of the chest.    The patient reports she is doing well. She states it has been a long time since she has had a sore throat. She notes she has pneumonia and bronchitis every fall.   Objective   Vital Signs:  BP 98/72 (BP Location: Left arm, Patient Position: Sitting, Cuff Size: Adult)   Pulse 80   Ht 160 cm (63\")   Wt 46.7 kg (103 lb)   SpO2 91%   BMI 18.25 kg/m²   Estimated body mass index is 18.25 kg/m² as calculated from the following:    Height as of this encounter: 160 cm (63\").    Weight as of this encounter: 46.7 kg (103 lb).             Physical Exam  Vitals and nursing note reviewed.   Constitutional:       Appearance: She is well-developed.   HENT:      Head: Normocephalic and atraumatic.      Nose: Nose normal.   Eyes:      Conjunctiva/sclera: Conjunctivae normal.   Cardiovascular:      Rate and Rhythm: Normal rate.   Pulmonary:      Effort: Pulmonary effort is normal.   Abdominal:      Palpations: Abdomen is soft.   Musculoskeletal:      Cervical back: Neck supple.   Skin:     General: Skin is warm and dry.   Neurological:      Mental Status: She is alert and oriented to person, place, and time.   Psychiatric:         Behavior: Behavior normal.         Thought Content: Thought content normal.         Judgment: Judgment normal.        Result Review :        I have independently reviewed the CT of the chest performed on 11/01/2023 which demonstrates stable mediastinal lymphadenopathy, stable pleural base left lower lobe nodule, and micro nodule in the right side.           Assessment and Plan   Diagnoses and all orders for this visit:    1. Lung nodule (Primary)  -     CT " Chest Without Contrast; Future    Ms. Arechiga is a pleasant 68-year-old lady with mediastinal lymphadenopathy that was biopsied and was benign. Her CT of the chest with short term surveillance does not demonstrate significant change. We will plan to see her in 4 months with a CT of the chest for continued surveillance.       I spent 20 minutes caring for Pili on this date of service. This time includes time spent by me in the following activities:preparing for the visit, reviewing tests, obtaining and/or reviewing a separately obtained history, performing a medically appropriate examination and/or evaluation , counseling and educating the patient/family/caregiver, ordering medications, tests, or procedures, documenting information in the medical record, and independently interpreting results and communicating that information with the patient/family/caregiver  Follow Up   No follow-ups on file.  Patient was given instructions and counseling regarding her condition or for health maintenance advice. Please see specific information pulled into the AVS if appropriate.       Transcribed from ambient dictation for Alexia Velásquez MD by Mae Abbott.  11/13/23   16:32 EST    Patient or patient representative verbalized consent to the visit recording.  I have personally performed the services described in this document as transcribed by the above individual, and it is both accurate and complete.

## 2024-02-06 ENCOUNTER — HOSPITAL ENCOUNTER (OUTPATIENT)
Dept: CT IMAGING | Facility: HOSPITAL | Age: 69
Discharge: HOME | End: 2024-02-06
Admitting: THORACIC SURGERY (CARDIOTHORACIC VASCULAR SURGERY)
Payer: MEDICARE

## 2024-02-06 DIAGNOSIS — R91.1 LUNG NODULE: ICD-10-CM

## 2024-02-06 PROCEDURE — 71250 CT THORAX DX C-: CPT

## 2024-02-22 ENCOUNTER — OFFICE VISIT (OUTPATIENT)
Dept: OTHER | Facility: HOSPITAL | Age: 69
End: 2024-02-22
Payer: MEDICARE

## 2024-02-22 ENCOUNTER — PREP FOR SURGERY (OUTPATIENT)
Dept: OTHER | Facility: HOSPITAL | Age: 69
End: 2024-02-22
Payer: MEDICARE

## 2024-02-22 ENCOUNTER — LAB (OUTPATIENT)
Dept: LAB | Facility: HOSPITAL | Age: 69
End: 2024-02-22
Payer: MEDICARE

## 2024-02-22 ENCOUNTER — HOSPITAL ENCOUNTER (OUTPATIENT)
Dept: PET IMAGING | Facility: HOSPITAL | Age: 69
Discharge: HOME | End: 2024-02-22
Payer: MEDICARE

## 2024-02-22 ENCOUNTER — HOSPITAL ENCOUNTER (OUTPATIENT)
Dept: CARDIOLOGY | Facility: HOSPITAL | Age: 69
Discharge: HOME | End: 2024-02-22
Payer: MEDICARE

## 2024-02-22 VITALS
OXYGEN SATURATION: 94 % | HEART RATE: 80 BPM | HEIGHT: 63 IN | DIASTOLIC BLOOD PRESSURE: 66 MMHG | WEIGHT: 106.8 LBS | BODY MASS INDEX: 18.92 KG/M2 | SYSTOLIC BLOOD PRESSURE: 105 MMHG

## 2024-02-22 DIAGNOSIS — R91.1 LUNG NODULE: Primary | ICD-10-CM

## 2024-02-22 DIAGNOSIS — R91.1 LUNG NODULE: ICD-10-CM

## 2024-02-22 DIAGNOSIS — F17.210 SMOKING GREATER THAN 40 PACK YEARS: ICD-10-CM

## 2024-02-22 LAB
DEPRECATED RDW RBC AUTO: 40.9 FL (ref 37–54)
ERYTHROCYTE [DISTWIDTH] IN BLOOD BY AUTOMATED COUNT: 11.8 % (ref 12.3–15.4)
HCT VFR BLD AUTO: 40 % (ref 34–46.6)
HGB BLD-MCNC: 13.6 G/DL (ref 12–15.9)
MCH RBC QN AUTO: 32.3 PG (ref 26.6–33)
MCHC RBC AUTO-ENTMCNC: 34 G/DL (ref 31.5–35.7)
MCV RBC AUTO: 95 FL (ref 79–97)
PLATELET # BLD AUTO: 320 10*3/MM3 (ref 140–450)
PMV BLD AUTO: 9.9 FL (ref 6–12)
QT INTERVAL: 403 MS
QTC INTERVAL: 438 MS
RBC # BLD AUTO: 4.21 10*6/MM3 (ref 3.77–5.28)
WBC NRBC COR # BLD AUTO: 12.44 10*3/MM3 (ref 3.4–10.8)

## 2024-02-22 PROCEDURE — 1159F MED LIST DOCD IN RCRD: CPT | Performed by: THORACIC SURGERY (CARDIOTHORACIC VASCULAR SURGERY)

## 2024-02-22 PROCEDURE — 93005 ELECTROCARDIOGRAM TRACING: CPT | Performed by: THORACIC SURGERY (CARDIOTHORACIC VASCULAR SURGERY)

## 2024-02-22 PROCEDURE — 1160F RVW MEDS BY RX/DR IN RCRD: CPT | Performed by: THORACIC SURGERY (CARDIOTHORACIC VASCULAR SURGERY)

## 2024-02-22 PROCEDURE — 99214 OFFICE O/P EST MOD 30 MIN: CPT | Performed by: THORACIC SURGERY (CARDIOTHORACIC VASCULAR SURGERY)

## 2024-02-22 PROCEDURE — 93010 ELECTROCARDIOGRAM REPORT: CPT | Performed by: INTERNAL MEDICINE

## 2024-02-22 PROCEDURE — G0463 HOSPITAL OUTPT CLINIC VISIT: HCPCS

## 2024-02-22 PROCEDURE — 71250 CT THORAX DX C-: CPT

## 2024-02-22 PROCEDURE — 85027 COMPLETE CBC AUTOMATED: CPT

## 2024-02-22 RX ORDER — SODIUM CHLORIDE 0.9 % (FLUSH) 0.9 %
3-10 SYRINGE (ML) INJECTION AS NEEDED
OUTPATIENT
Start: 2024-02-22

## 2024-02-22 RX ORDER — SODIUM CHLORIDE 0.9 % (FLUSH) 0.9 %
3 SYRINGE (ML) INJECTION EVERY 12 HOURS SCHEDULED
OUTPATIENT
Start: 2024-02-22

## 2024-02-22 RX ORDER — SODIUM CHLORIDE 9 MG/ML
40 INJECTION, SOLUTION INTRAVENOUS AS NEEDED
OUTPATIENT
Start: 2024-02-22

## 2024-02-22 NOTE — LETTER
"February 28, 2024       No Recipients    Patient: Pili Arechiga   YOB: 1955   Date of Visit: 2/22/2024     Dear Nigel De Paz MD:       Thank you for referring Pili Arechiga to me for evaluation. Below are the relevant portions of my assessment and plan of care.    If you have questions, please do not hesitate to call me. I look forward to following Pili along with you.         Sincerely,        Alexia Velásquez MD        CC:   No Recipients    Alexia Velásquez MD  02/28/24 0837  Sign when Signing Visit  Chief Complaint  Enlarging lung nodule    Subjective       Pili Arechiga presents to [facility]  History of Present Illness    The patient is a 68-year-old female who presents to the clinic for a follow-up of lung nodules.    She has been very tired. She has experienced a throbbing pain in her chest occasionally.     The patient continues to smoke cigarettes. She has tried to quit smoking, but when she does not smoke, she shakes and becomes upset. She has not met with our smoking cessation nurse navigator, Gege Waite DNP.     Objective  Vital Signs:  /66 (BP Location: Left arm, Patient Position: Sitting, Cuff Size: Adult)   Pulse 80   Ht 160 cm (63\")   Wt 48.4 kg (106 lb 12.8 oz)   SpO2 94%   BMI 18.92 kg/m²   Estimated body mass index is 18.92 kg/m² as calculated from the following:    Height as of this encounter: 160 cm (63\").    Weight as of this encounter: 48.4 kg (106 lb 12.8 oz).       BMI is within normal parameters. No other follow-up for BMI required.      Physical Exam  Vitals and nursing note reviewed.   Constitutional:       Appearance: She is well-developed.   HENT:      Head: Normocephalic and atraumatic.      Nose: Nose normal.   Eyes:      Conjunctiva/sclera: Conjunctivae normal.   Cardiovascular:      Rate and Rhythm: Normal rate.   Pulmonary:      Effort: Pulmonary effort is normal.   Abdominal:      Palpations: Abdomen is " soft.   Musculoskeletal:      Cervical back: Neck supple.   Skin:     General: Skin is warm and dry.   Neurological:      Mental Status: She is alert and oriented to person, place, and time.   Psychiatric:         Behavior: Behavior normal.         Thought Content: Thought content normal.         Judgment: Judgment normal.        Result Review:    Imaging  I have independently reviewed the CT of the chest performed on 02/06/2024, which demonstrates an enlarging left lower lobe lung nodule measuring 1.6 x 0.9 cm from 1.1 cm previously. There is a fissural lymph node that has slightly increased in size, and there is a precarinal lymph node measuring 1.6 cm.                   Assessment and Plan     Diagnoses and all orders for this visit:    1. Lung nodule (Primary)  -     NM PET/CT Skull Base to Mid Thigh; Future  -     Complete PFT - Pre & Post Bronchodilator; Future  -     Hemoglobin; Future  -     CT Chest Without Contrast; Future    2. Smoking greater than 40 pack years  -     Ambulatory Referral to Multi-Disciplinary Clinic      The patient is a 68-year-old female who presents to the clinic for a follow-up of lung nodules  with an enlarging left lower lobe nodule concerning for bronchogenic malignancy.    1. Left lower lobe pleural based nodule.  - She will need a work-up with a biopsy, PET scan, and pulmonary function studies.     2. Nicotine dependence.   - I will refer her to Gege Waite DNP, to discuss smoking cessation.         I spent 30 minutes caring for Pili on this date of service. This time includes time spent by me in the following activities:preparing for the visit, reviewing tests, obtaining and/or reviewing a separately obtained history, performing a medically appropriate examination and/or evaluation , counseling and educating the patient/family/caregiver, ordering medications, tests, or procedures, documenting information in the medical record, and independently interpreting results and  communicating that information with the patient/family/caregiver  Follow Up     No follow-ups on file.  Patient was given instructions and counseling regarding her condition or for health maintenance advice. Please see specific information pulled into the AVS if appropriate.         Transcribed from ambient dictation for Alexia Velásquez MD by Griselda Madden.  02/22/24   14:54 EST    Patient or patient representative verbalized consent to the visit recording.  I have personally performed the services described in this document as transcribed by the above individual, and it is both accurate and complete.

## 2024-02-22 NOTE — H&P (VIEW-ONLY)
"Chief Complaint  Enlarging lung nodule    Subjective        Pili Arechiga presents to [facility]  History of Present Illness    The patient is a 68-year-old female who presents to the clinic for a follow-up of lung nodules.    She has been very tired. She has experienced a throbbing pain in her chest occasionally.     The patient continues to smoke cigarettes. She has tried to quit smoking, but when she does not smoke, she shakes and becomes upset. She has not met with our smoking cessation nurse navigator, Gege Waite DNP.     Objective   Vital Signs:  /66 (BP Location: Left arm, Patient Position: Sitting, Cuff Size: Adult)   Pulse 80   Ht 160 cm (63\")   Wt 48.4 kg (106 lb 12.8 oz)   SpO2 94%   BMI 18.92 kg/m²   Estimated body mass index is 18.92 kg/m² as calculated from the following:    Height as of this encounter: 160 cm (63\").    Weight as of this encounter: 48.4 kg (106 lb 12.8 oz).       BMI is within normal parameters. No other follow-up for BMI required.      Physical Exam  Vitals and nursing note reviewed.   Constitutional:       Appearance: She is well-developed.   HENT:      Head: Normocephalic and atraumatic.      Nose: Nose normal.   Eyes:      Conjunctiva/sclera: Conjunctivae normal.   Cardiovascular:      Rate and Rhythm: Normal rate.   Pulmonary:      Effort: Pulmonary effort is normal.   Abdominal:      Palpations: Abdomen is soft.   Musculoskeletal:      Cervical back: Neck supple.   Skin:     General: Skin is warm and dry.   Neurological:      Mental Status: She is alert and oriented to person, place, and time.   Psychiatric:         Behavior: Behavior normal.         Thought Content: Thought content normal.         Judgment: Judgment normal.        Result Review :    Imaging  I have independently reviewed the CT of the chest performed on 02/06/2024, which demonstrates an enlarging left lower lobe lung nodule measuring 1.6 x 0.9 cm from 1.1 cm " previously. There is a fissural lymph node that has slightly increased in size, and there is a precarinal lymph node measuring 1.6 cm.                   Assessment and Plan     Diagnoses and all orders for this visit:    1. Lung nodule (Primary)  -     NM PET/CT Skull Base to Mid Thigh; Future  -     Complete PFT - Pre & Post Bronchodilator; Future  -     Hemoglobin; Future  -     CT Chest Without Contrast; Future    2. Smoking greater than 40 pack years  -     Ambulatory Referral to Multi-Disciplinary Clinic      The patient is a 68-year-old female who presents to the clinic for a follow-up of lung nodules  with an enlarging left lower lobe nodule concerning for bronchogenic malignancy.    1. Left lower lobe pleural based nodule.  - She will need a work-up with a biopsy, PET scan, and pulmonary function studies.     2. Nicotine dependence.   - I will refer her to Gege Waite DNP, to discuss smoking cessation.         I spent 30 minutes caring for Pili on this date of service. This time includes time spent by me in the following activities:preparing for the visit, reviewing tests, obtaining and/or reviewing a separately obtained history, performing a medically appropriate examination and/or evaluation , counseling and educating the patient/family/caregiver, ordering medications, tests, or procedures, documenting information in the medical record, and independently interpreting results and communicating that information with the patient/family/caregiver  Follow Up     No follow-ups on file.  Patient was given instructions and counseling regarding her condition or for health maintenance advice. Please see specific information pulled into the AVS if appropriate.         Transcribed from ambient dictation for Alexia Velásquez MD by Griselda Madden.  02/22/24   14:54 EST    Patient or patient representative verbalized consent to the visit recording.  I have personally performed the services described in this  document as transcribed by the above individual, and it is both accurate and complete.

## 2024-02-22 NOTE — PROGRESS NOTES
"Chief Complaint  No chief complaint on file.    Subjective    {Problem List  Visit Diagnosis   Encounters  Notes  Medications  Labs  Result Review Imaging  Media :23}    Pili Arechiga presents to [facility]  History of Present Illness    The patient is a 68-year-old female who presents to the clinic for a follow-up of lung nodules.    She has been very tired. She has experienced a throbbing pain in her chest occasionally.     The patient continues to smoke cigarettes. She has tried to quit smoking, but when she does not smoke, she shakes and becomes upset. She has not met with our smoking cessation nurse navigator, Gege Waite DNP.     Objective   Vital Signs:  /66 (BP Location: Left arm, Patient Position: Sitting, Cuff Size: Adult)   Pulse 80   Ht 160 cm (63\")   Wt 48.4 kg (106 lb 12.8 oz)   SpO2 94%   BMI 18.92 kg/m²   Estimated body mass index is 18.92 kg/m² as calculated from the following:    Height as of this encounter: 160 cm (63\").    Weight as of this encounter: 48.4 kg (106 lb 12.8 oz).       BMI is within normal parameters. No other follow-up for BMI required.      Physical Exam   Result Review :{Labs  Result Review  Imaging  Med Tab  Media  Procedures :23}    Imaging  I have independently reviewed the CT of the chest performed on 02/06/2024, which demonstrates an enlarging left lower lobe lung nodule measuring 1.6 x 0.9 cm from 1.1 cm previously. There is a fissural lymph node that has slightly increased in size, and there is a precarinal lymph node measuring 1.6 cm.    {The following data was reviewed by (Optional):94957}  {Ambulatory Labs (Optional):93717}  {Data reviewed (Optional):87947:::1}           Assessment and Plan {CC Problem List  Visit Diagnosis   ROS  Review (Popup)  [facility] Maintenance  Quality  BestPractice  Medications  SmartSets  SnapShot Encounters  Media :23}    Diagnoses and all orders for this visit:    1. " "Lung nodule (Primary)  -     NM PET/CT Skull Base to Mid Thigh; Future  -     Complete PFT - Pre & Post Bronchodilator; Future  -     Hemoglobin; Future  -     CT Chest Without Contrast; Future    2. Smoking greater than 40 pack years  -     Ambulatory Referral to Multi-Disciplinary Clinic      The patient is a 68-year-old female who presents to the clinic for a follow-up of lung nodules.    1. Left lower lobe pleural based nodule.  - She will need a work-up with a biopsy, PET scan, and pulmonary function studies.     2. Nicotine dependence.   - I will refer her to Gege Waite DNP, to discuss smoking cessation.         {Time Spent (Optional):27483}  Follow Up {Instructions Charge Capture  Follow-up Communications :23}    No follow-ups on file.  Patient was given instructions and counseling regarding her condition or for health maintenance advice. Please see specific information pulled into the AVS if appropriate.         Transcribed from ambient dictation for Alexia Velásquez MD by Griselda Madden.  02/22/24   14:54 EST    {JEREMY Provider Statement:23566::\"Patient or patient representative verbalized consent to the visit recording.\",\"I have personally performed the services described in this document as transcribed by the above individual, and it is both accurate and complete.\"}   " document as transcribed by the above individual, and it is both accurate and complete.

## 2024-02-23 NOTE — PAT
Pt had cmp at her PCP.  Call to PCP, s/w Dr De Paz who stated he will give message to medical records

## 2024-02-27 ENCOUNTER — HOSPITAL ENCOUNTER (OUTPATIENT)
Dept: PULMONOLOGY | Facility: HOSPITAL | Age: 69
Discharge: HOME | End: 2024-02-27
Admitting: THORACIC SURGERY (CARDIOTHORACIC VASCULAR SURGERY)
Payer: MEDICARE

## 2024-02-27 DIAGNOSIS — R91.1 LUNG NODULE: ICD-10-CM

## 2024-02-27 PROCEDURE — 94729 DIFFUSING CAPACITY: CPT

## 2024-02-27 PROCEDURE — 94726 PLETHYSMOGRAPHY LUNG VOLUMES: CPT

## 2024-02-27 PROCEDURE — 94060 EVALUATION OF WHEEZING: CPT

## 2024-02-27 RX ORDER — ALBUTEROL SULFATE 2.5 MG/3ML
2.5 SOLUTION RESPIRATORY (INHALATION) ONCE
Status: COMPLETED | OUTPATIENT
Start: 2024-02-27 | End: 2024-02-27

## 2024-02-27 RX ADMIN — ALBUTEROL SULFATE 2.5 MG: 0.83 SOLUTION RESPIRATORY (INHALATION) at 14:53

## 2024-02-28 ENCOUNTER — HOSPITAL ENCOUNTER (INPATIENT)
Facility: HOSPITAL | Age: 69
LOS: 1 days | Discharge: HOME | DRG: 200 | End: 2024-02-29
Attending: THORACIC SURGERY (CARDIOTHORACIC VASCULAR SURGERY) | Admitting: THORACIC SURGERY (CARDIOTHORACIC VASCULAR SURGERY)
Payer: MEDICARE

## 2024-02-28 ENCOUNTER — APPOINTMENT (OUTPATIENT)
Dept: GENERAL RADIOLOGY | Facility: HOSPITAL | Age: 69
DRG: 200 | End: 2024-02-28
Payer: MEDICARE

## 2024-02-28 ENCOUNTER — ANESTHESIA EVENT (OUTPATIENT)
Dept: GASTROENTEROLOGY | Facility: HOSPITAL | Age: 69
End: 2024-02-28
Payer: MEDICARE

## 2024-02-28 ENCOUNTER — ANESTHESIA (OUTPATIENT)
Dept: GASTROENTEROLOGY | Facility: HOSPITAL | Age: 69
End: 2024-02-28
Payer: MEDICARE

## 2024-02-28 DIAGNOSIS — R91.1 LUNG NODULE: ICD-10-CM

## 2024-02-28 PROBLEM — J93.9 PNEUMOTHORAX: Status: ACTIVE | Noted: 2024-02-28

## 2024-02-28 PROCEDURE — 25010000002 MIDAZOLAM PER 1 MG: Performed by: NURSE ANESTHETIST, CERTIFIED REGISTERED

## 2024-02-28 PROCEDURE — 94799 UNLISTED PULMONARY SVC/PX: CPT

## 2024-02-28 PROCEDURE — 71045 X-RAY EXAM CHEST 1 VIEW: CPT

## 2024-02-28 PROCEDURE — 31629 BRONCHOSCOPY/NEEDLE BX EACH: CPT | Performed by: THORACIC SURGERY (CARDIOTHORACIC VASCULAR SURGERY)

## 2024-02-28 PROCEDURE — 88172 CYTP DX EVAL FNA 1ST EA SITE: CPT | Performed by: THORACIC SURGERY (CARDIOTHORACIC VASCULAR SURGERY)

## 2024-02-28 PROCEDURE — 88177 CYTP FNA EVAL EA ADDL: CPT | Performed by: THORACIC SURGERY (CARDIOTHORACIC VASCULAR SURGERY)

## 2024-02-28 PROCEDURE — 25010000002 ONDANSETRON PER 1 MG: Performed by: NURSE ANESTHETIST, CERTIFIED REGISTERED

## 2024-02-28 PROCEDURE — 25010000002 GLYCOPYRROLATE 1 MG/5ML SOLUTION: Performed by: NURSE ANESTHETIST, CERTIFIED REGISTERED

## 2024-02-28 PROCEDURE — 25810000003 SODIUM CHLORIDE 0.9 % SOLUTION 1,000 ML FLEX CONT: Performed by: THORACIC SURGERY (CARDIOTHORACIC VASCULAR SURGERY)

## 2024-02-28 PROCEDURE — 31627 NAVIGATIONAL BRONCHOSCOPY: CPT | Performed by: THORACIC SURGERY (CARDIOTHORACIC VASCULAR SURGERY)

## 2024-02-28 PROCEDURE — 88305 TISSUE EXAM BY PATHOLOGIST: CPT | Performed by: THORACIC SURGERY (CARDIOTHORACIC VASCULAR SURGERY)

## 2024-02-28 PROCEDURE — 25010000002 SUCCINYLCHOLINE PER 20 MG: Performed by: NURSE ANESTHETIST, CERTIFIED REGISTERED

## 2024-02-28 PROCEDURE — 88108 CYTOPATH CONCENTRATE TECH: CPT | Performed by: THORACIC SURGERY (CARDIOTHORACIC VASCULAR SURGERY)

## 2024-02-28 PROCEDURE — 31641 BRONCHOSCOPY TREAT BLOCKAGE: CPT | Performed by: THORACIC SURGERY (CARDIOTHORACIC VASCULAR SURGERY)

## 2024-02-28 PROCEDURE — 88341 IMHCHEM/IMCYTCHM EA ADD ANTB: CPT | Performed by: THORACIC SURGERY (CARDIOTHORACIC VASCULAR SURGERY)

## 2024-02-28 PROCEDURE — 88342 IMHCHEM/IMCYTCHM 1ST ANTB: CPT | Performed by: THORACIC SURGERY (CARDIOTHORACIC VASCULAR SURGERY)

## 2024-02-28 PROCEDURE — 94664 DEMO&/EVAL PT USE INHALER: CPT

## 2024-02-28 PROCEDURE — 0B9B8ZX DRAINAGE OF LEFT LOWER LOBE BRONCHUS, VIA NATURAL OR ARTIFICIAL OPENING ENDOSCOPIC, DIAGNOSTIC: ICD-10-PCS | Performed by: THORACIC SURGERY (CARDIOTHORACIC VASCULAR SURGERY)

## 2024-02-28 PROCEDURE — 25010000002 DEXAMETHASONE PER 1 MG: Performed by: NURSE ANESTHETIST, CERTIFIED REGISTERED

## 2024-02-28 PROCEDURE — 88333 PATH CONSLTJ SURG CYTO XM 1: CPT | Performed by: THORACIC SURGERY (CARDIOTHORACIC VASCULAR SURGERY)

## 2024-02-28 PROCEDURE — 25010000002 SUGAMMADEX 200 MG/2ML SOLUTION: Performed by: NURSE ANESTHETIST, CERTIFIED REGISTERED

## 2024-02-28 PROCEDURE — 25810000003 SODIUM CHLORIDE 0.9 % SOLUTION: Performed by: NURSE ANESTHETIST, CERTIFIED REGISTERED

## 2024-02-28 PROCEDURE — 07D78ZX EXTRACTION OF THORAX LYMPHATIC, VIA NATURAL OR ARTIFICIAL OPENING ENDOSCOPIC, DIAGNOSTIC: ICD-10-PCS | Performed by: THORACIC SURGERY (CARDIOTHORACIC VASCULAR SURGERY)

## 2024-02-28 PROCEDURE — 94640 AIRWAY INHALATION TREATMENT: CPT

## 2024-02-28 PROCEDURE — 25010000002 PROPOFOL 10 MG/ML EMULSION: Performed by: NURSE ANESTHETIST, CERTIFIED REGISTERED

## 2024-02-28 PROCEDURE — 88334 PATH CONSLTJ SURG CYTO XM EA: CPT | Performed by: THORACIC SURGERY (CARDIOTHORACIC VASCULAR SURGERY)

## 2024-02-28 PROCEDURE — 31624 DX BRONCHOSCOPE/LAVAGE: CPT | Performed by: THORACIC SURGERY (CARDIOTHORACIC VASCULAR SURGERY)

## 2024-02-28 PROCEDURE — 76000 FLUOROSCOPY <1 HR PHYS/QHP: CPT

## 2024-02-28 PROCEDURE — C1729 CATH, DRAINAGE: HCPCS | Performed by: THORACIC SURGERY (CARDIOTHORACIC VASCULAR SURGERY)

## 2024-02-28 PROCEDURE — 25010000002 FENTANYL CITRATE (PF) 100 MCG/2ML SOLUTION: Performed by: NURSE ANESTHETIST, CERTIFIED REGISTERED

## 2024-02-28 PROCEDURE — 8E0W8CZ ROBOTIC ASSISTED PROCEDURE OF TRUNK REGION, VIA NATURAL OR ARTIFICIAL OPENING ENDOSCOPIC: ICD-10-PCS | Performed by: THORACIC SURGERY (CARDIOTHORACIC VASCULAR SURGERY)

## 2024-02-28 PROCEDURE — 0BDJ8ZX EXTRACTION OF LEFT LOWER LUNG LOBE, VIA NATURAL OR ARTIFICIAL OPENING ENDOSCOPIC, DIAGNOSTIC: ICD-10-PCS | Performed by: THORACIC SURGERY (CARDIOTHORACIC VASCULAR SURGERY)

## 2024-02-28 RX ORDER — DIAZEPAM 5 MG/1
20 TABLET ORAL NIGHTLY PRN
Status: DISCONTINUED | OUTPATIENT
Start: 2024-02-28 | End: 2024-02-29 | Stop reason: HOSPADM

## 2024-02-28 RX ORDER — IPRATROPIUM BROMIDE AND ALBUTEROL SULFATE 2.5; .5 MG/3ML; MG/3ML
3 SOLUTION RESPIRATORY (INHALATION) ONCE AS NEEDED
Status: DISCONTINUED | OUTPATIENT
Start: 2024-02-28 | End: 2024-02-28

## 2024-02-28 RX ORDER — ONDANSETRON 2 MG/ML
4 INJECTION INTRAMUSCULAR; INTRAVENOUS ONCE AS NEEDED
Status: DISCONTINUED | OUTPATIENT
Start: 2024-02-28 | End: 2024-02-28

## 2024-02-28 RX ORDER — SODIUM CHLORIDE 0.9 % (FLUSH) 0.9 %
3-10 SYRINGE (ML) INJECTION AS NEEDED
Status: DISCONTINUED | OUTPATIENT
Start: 2024-02-28 | End: 2024-02-28

## 2024-02-28 RX ORDER — HYDRALAZINE HYDROCHLORIDE 20 MG/ML
5 INJECTION INTRAMUSCULAR; INTRAVENOUS
Status: DISCONTINUED | OUTPATIENT
Start: 2024-02-28 | End: 2024-02-28

## 2024-02-28 RX ORDER — BUDESONIDE AND FORMOTEROL FUMARATE DIHYDRATE 160; 4.5 UG/1; UG/1
2 AEROSOL RESPIRATORY (INHALATION)
Status: DISCONTINUED | OUTPATIENT
Start: 2024-02-28 | End: 2024-02-28

## 2024-02-28 RX ORDER — PROPOFOL 10 MG/ML
VIAL (ML) INTRAVENOUS AS NEEDED
Status: DISCONTINUED | OUTPATIENT
Start: 2024-02-28 | End: 2024-02-28 | Stop reason: SURG

## 2024-02-28 RX ORDER — LABETALOL HYDROCHLORIDE 5 MG/ML
5 INJECTION, SOLUTION INTRAVENOUS
Status: DISCONTINUED | OUTPATIENT
Start: 2024-02-28 | End: 2024-02-28

## 2024-02-28 RX ORDER — GLYCOPYRROLATE 0.2 MG/ML
INJECTION INTRAMUSCULAR; INTRAVENOUS AS NEEDED
Status: DISCONTINUED | OUTPATIENT
Start: 2024-02-28 | End: 2024-02-28 | Stop reason: SURG

## 2024-02-28 RX ORDER — MIDAZOLAM HYDROCHLORIDE 1 MG/ML
INJECTION INTRAMUSCULAR; INTRAVENOUS AS NEEDED
Status: DISCONTINUED | OUTPATIENT
Start: 2024-02-28 | End: 2024-02-28 | Stop reason: SURG

## 2024-02-28 RX ORDER — ONDANSETRON 2 MG/ML
4 INJECTION INTRAMUSCULAR; INTRAVENOUS EVERY 6 HOURS PRN
Status: DISCONTINUED | OUTPATIENT
Start: 2024-02-28 | End: 2024-02-29 | Stop reason: HOSPADM

## 2024-02-28 RX ORDER — AMOXICILLIN 250 MG
2 CAPSULE ORAL NIGHTLY
Status: DISCONTINUED | OUTPATIENT
Start: 2024-02-28 | End: 2024-02-29 | Stop reason: HOSPADM

## 2024-02-28 RX ORDER — HEPARIN SODIUM 5000 [USP'U]/ML
5000 INJECTION, SOLUTION INTRAVENOUS; SUBCUTANEOUS EVERY 8 HOURS SCHEDULED
Status: DISCONTINUED | OUTPATIENT
Start: 2024-02-29 | End: 2024-02-29 | Stop reason: HOSPADM

## 2024-02-28 RX ORDER — DIAZEPAM 5 MG/1
10 TABLET ORAL EVERY 12 HOURS PRN
Status: DISCONTINUED | OUTPATIENT
Start: 2024-02-28 | End: 2024-02-29 | Stop reason: HOSPADM

## 2024-02-28 RX ORDER — SODIUM CHLORIDE 9 MG/ML
40 INJECTION, SOLUTION INTRAVENOUS AS NEEDED
Status: DISCONTINUED | OUTPATIENT
Start: 2024-02-28 | End: 2024-02-28

## 2024-02-28 RX ORDER — SODIUM CHLORIDE 0.9 % (FLUSH) 0.9 %
3 SYRINGE (ML) INJECTION EVERY 12 HOURS SCHEDULED
Status: DISCONTINUED | OUTPATIENT
Start: 2024-02-28 | End: 2024-02-28

## 2024-02-28 RX ORDER — DEXTROSE MONOHYDRATE, SODIUM CHLORIDE, AND POTASSIUM CHLORIDE 50; 1.49; 4.5 G/1000ML; G/1000ML; G/1000ML
125 INJECTION, SOLUTION INTRAVENOUS CONTINUOUS
Status: DISPENSED | OUTPATIENT
Start: 2024-02-28 | End: 2024-02-28

## 2024-02-28 RX ORDER — FLUMAZENIL 0.1 MG/ML
0.2 INJECTION INTRAVENOUS AS NEEDED
Status: DISCONTINUED | OUTPATIENT
Start: 2024-02-28 | End: 2024-02-28

## 2024-02-28 RX ORDER — BUDESONIDE AND FORMOTEROL FUMARATE DIHYDRATE 160; 4.5 UG/1; UG/1
2 AEROSOL RESPIRATORY (INHALATION)
Status: DISCONTINUED | OUTPATIENT
Start: 2024-02-28 | End: 2024-02-29 | Stop reason: HOSPADM

## 2024-02-28 RX ORDER — ONDANSETRON 2 MG/ML
INJECTION INTRAMUSCULAR; INTRAVENOUS AS NEEDED
Status: DISCONTINUED | OUTPATIENT
Start: 2024-02-28 | End: 2024-02-28 | Stop reason: SURG

## 2024-02-28 RX ORDER — NITROGLYCERIN 0.4 MG/1
0.4 TABLET SUBLINGUAL
Status: DISCONTINUED | OUTPATIENT
Start: 2024-02-28 | End: 2024-02-29 | Stop reason: HOSPADM

## 2024-02-28 RX ORDER — DIPHENHYDRAMINE HYDROCHLORIDE 50 MG/ML
12.5 INJECTION INTRAMUSCULAR; INTRAVENOUS ONCE AS NEEDED
Status: DISCONTINUED | OUTPATIENT
Start: 2024-02-28 | End: 2024-02-28

## 2024-02-28 RX ORDER — MIDODRINE HYDROCHLORIDE 5 MG/1
2.5 TABLET ORAL
Status: DISCONTINUED | OUTPATIENT
Start: 2024-02-28 | End: 2024-02-29 | Stop reason: HOSPADM

## 2024-02-28 RX ORDER — ROCURONIUM BROMIDE 10 MG/ML
INJECTION, SOLUTION INTRAVENOUS AS NEEDED
Status: DISCONTINUED | OUTPATIENT
Start: 2024-02-28 | End: 2024-02-28 | Stop reason: SURG

## 2024-02-28 RX ORDER — EPHEDRINE SULFATE 5 MG/ML
5 INJECTION INTRAVENOUS ONCE AS NEEDED
Status: DISCONTINUED | OUTPATIENT
Start: 2024-02-28 | End: 2024-02-28

## 2024-02-28 RX ORDER — DIPHENHYDRAMINE HYDROCHLORIDE 50 MG/ML
12.5 INJECTION INTRAMUSCULAR; INTRAVENOUS
Status: DISCONTINUED | OUTPATIENT
Start: 2024-02-28 | End: 2024-02-28

## 2024-02-28 RX ORDER — DEXAMETHASONE SODIUM PHOSPHATE 4 MG/ML
INJECTION, SOLUTION INTRA-ARTICULAR; INTRALESIONAL; INTRAMUSCULAR; INTRAVENOUS; SOFT TISSUE AS NEEDED
Status: DISCONTINUED | OUTPATIENT
Start: 2024-02-28 | End: 2024-02-28 | Stop reason: SURG

## 2024-02-28 RX ORDER — LIDOCAINE HYDROCHLORIDE 20 MG/ML
INJECTION, SOLUTION INFILTRATION; PERINEURAL AS NEEDED
Status: DISCONTINUED | OUTPATIENT
Start: 2024-02-28 | End: 2024-02-28 | Stop reason: SURG

## 2024-02-28 RX ORDER — LIDOCAINE 50 MG/G
OINTMENT TOPICAL AS NEEDED
Status: DISCONTINUED | OUTPATIENT
Start: 2024-02-28 | End: 2024-02-28 | Stop reason: HOSPADM

## 2024-02-28 RX ORDER — SUCCINYLCHOLINE CHLORIDE 20 MG/ML
INJECTION INTRAMUSCULAR; INTRAVENOUS AS NEEDED
Status: DISCONTINUED | OUTPATIENT
Start: 2024-02-28 | End: 2024-02-28 | Stop reason: SURG

## 2024-02-28 RX ORDER — NALOXONE HCL 0.4 MG/ML
0.4 VIAL (ML) INJECTION AS NEEDED
Status: DISCONTINUED | OUTPATIENT
Start: 2024-02-28 | End: 2024-02-28

## 2024-02-28 RX ORDER — BISACODYL 10 MG
10 SUPPOSITORY, RECTAL RECTAL DAILY PRN
Status: DISCONTINUED | OUTPATIENT
Start: 2024-02-28 | End: 2024-02-29 | Stop reason: HOSPADM

## 2024-02-28 RX ORDER — ALBUTEROL SULFATE 2.5 MG/3ML
2.5 SOLUTION RESPIRATORY (INHALATION) EVERY 4 HOURS PRN
Status: DISCONTINUED | OUTPATIENT
Start: 2024-02-28 | End: 2024-02-29 | Stop reason: HOSPADM

## 2024-02-28 RX ORDER — SODIUM CHLORIDE 9 MG/ML
INJECTION, SOLUTION INTRAVENOUS CONTINUOUS PRN
Status: DISCONTINUED | OUTPATIENT
Start: 2024-02-28 | End: 2024-02-28 | Stop reason: SURG

## 2024-02-28 RX ORDER — IPRATROPIUM BROMIDE AND ALBUTEROL SULFATE 2.5; .5 MG/3ML; MG/3ML
3 SOLUTION RESPIRATORY (INHALATION)
Status: DISCONTINUED | OUTPATIENT
Start: 2024-02-28 | End: 2024-02-29 | Stop reason: HOSPADM

## 2024-02-28 RX ORDER — HYDROCODONE BITARTRATE AND ACETAMINOPHEN 10; 325 MG/1; MG/1
1 TABLET ORAL 3 TIMES DAILY PRN
Status: DISCONTINUED | OUTPATIENT
Start: 2024-02-28 | End: 2024-02-29 | Stop reason: HOSPADM

## 2024-02-28 RX ORDER — NITROGLYCERIN 0.4 MG/1
0.4 TABLET SUBLINGUAL
Status: DISCONTINUED | OUTPATIENT
Start: 2024-02-28 | End: 2024-02-28

## 2024-02-28 RX ORDER — FENTANYL CITRATE 50 UG/ML
INJECTION, SOLUTION INTRAMUSCULAR; INTRAVENOUS AS NEEDED
Status: DISCONTINUED | OUTPATIENT
Start: 2024-02-28 | End: 2024-02-28 | Stop reason: SURG

## 2024-02-28 RX ORDER — ONDANSETRON 4 MG/1
4 TABLET, ORALLY DISINTEGRATING ORAL EVERY 6 HOURS PRN
Status: DISCONTINUED | OUTPATIENT
Start: 2024-02-28 | End: 2024-02-29 | Stop reason: HOSPADM

## 2024-02-28 RX ORDER — ACETAMINOPHEN 500 MG
1000 TABLET ORAL 3 TIMES DAILY
Status: DISCONTINUED | OUTPATIENT
Start: 2024-02-28 | End: 2024-02-29 | Stop reason: HOSPADM

## 2024-02-28 RX ADMIN — MIDAZOLAM HYDROCHLORIDE 2 MG: 2 INJECTION, SOLUTION INTRAMUSCULAR; INTRAVENOUS at 09:05

## 2024-02-28 RX ADMIN — MIDODRINE HYDROCHLORIDE 2.5 MG: 5 TABLET ORAL at 12:55

## 2024-02-28 RX ADMIN — ROCURONIUM BROMIDE 20 MG: 10 INJECTION, SOLUTION INTRAVENOUS at 09:38

## 2024-02-28 RX ADMIN — SODIUM CHLORIDE: 9 INJECTION, SOLUTION INTRAVENOUS at 08:54

## 2024-02-28 RX ADMIN — POTASSIUM CHLORIDE, DEXTROSE MONOHYDRATE AND SODIUM CHLORIDE 125 ML/HR: 150; 5; 450 INJECTION, SOLUTION INTRAVENOUS at 12:55

## 2024-02-28 RX ADMIN — DEXAMETHASONE SODIUM PHOSPHATE 8 MG: 4 INJECTION, SOLUTION INTRAMUSCULAR; INTRAVENOUS at 09:07

## 2024-02-28 RX ADMIN — ROCURONIUM BROMIDE 50 MG: 10 INJECTION, SOLUTION INTRAVENOUS at 09:00

## 2024-02-28 RX ADMIN — DIAZEPAM 20 MG: 5 TABLET ORAL at 21:23

## 2024-02-28 RX ADMIN — ACETAMINOPHEN 1000 MG: 500 TABLET, FILM COATED ORAL at 21:23

## 2024-02-28 RX ADMIN — PROPOFOL 150 MG: 10 INJECTION, EMULSION INTRAVENOUS at 08:57

## 2024-02-28 RX ADMIN — SUGAMMADEX 200 MG: 100 INJECTION, SOLUTION INTRAVENOUS at 10:13

## 2024-02-28 RX ADMIN — ROCURONIUM BROMIDE 10 MG: 10 INJECTION, SOLUTION INTRAVENOUS at 09:53

## 2024-02-28 RX ADMIN — FENTANYL CITRATE 50 MCG: 50 INJECTION, SOLUTION INTRAMUSCULAR; INTRAVENOUS at 09:52

## 2024-02-28 RX ADMIN — LIDOCAINE HYDROCHLORIDE 100 MG: 20 INJECTION, SOLUTION INFILTRATION; PERINEURAL at 08:57

## 2024-02-28 RX ADMIN — MIDODRINE HYDROCHLORIDE 2.5 MG: 5 TABLET ORAL at 17:29

## 2024-02-28 RX ADMIN — Medication 10 ML: at 07:25

## 2024-02-28 RX ADMIN — HYDROCODONE BITARTRATE AND ACETAMINOPHEN 1 TABLET: 10; 325 TABLET ORAL at 12:54

## 2024-02-28 RX ADMIN — IPRATROPIUM BROMIDE AND ALBUTEROL SULFATE 3 ML: .5; 3 SOLUTION RESPIRATORY (INHALATION) at 23:12

## 2024-02-28 RX ADMIN — ACETAMINOPHEN 1000 MG: 500 TABLET, FILM COATED ORAL at 17:29

## 2024-02-28 RX ADMIN — FENTANYL CITRATE 50 MCG: 50 INJECTION, SOLUTION INTRAMUSCULAR; INTRAVENOUS at 09:36

## 2024-02-28 RX ADMIN — SODIUM CHLORIDE 40 ML: 9 INJECTION, SOLUTION INTRAVENOUS at 07:52

## 2024-02-28 RX ADMIN — IPRATROPIUM BROMIDE AND ALBUTEROL SULFATE 3 ML: .5; 3 SOLUTION RESPIRATORY (INHALATION) at 15:46

## 2024-02-28 RX ADMIN — SUCCINYLCHOLINE CHLORIDE 160 MG: 20 INJECTION, SOLUTION INTRAMUSCULAR; INTRAVENOUS at 08:57

## 2024-02-28 RX ADMIN — HYDROCODONE BITARTRATE AND ACETAMINOPHEN 1 TABLET: 10; 325 TABLET ORAL at 21:23

## 2024-02-28 RX ADMIN — PROPOFOL 130 MCG/KG/MIN: 10 INJECTION, EMULSION INTRAVENOUS at 08:57

## 2024-02-28 RX ADMIN — GLYCOPYRROLATE 0.2 MG: 0.2 INJECTION INTRAMUSCULAR; INTRAVENOUS at 08:55

## 2024-02-28 RX ADMIN — ONDANSETRON 4 MG: 2 INJECTION INTRAMUSCULAR; INTRAVENOUS at 09:07

## 2024-02-28 NOTE — DISCHARGE INSTRUCTIONS
Endoscopic Bronchial Ultrasound (EBUS)  Endoscopic Bronchial Ultrasound (EBUS) allows for sampling of lymph nodes in your chest during a bronchoscopy procedure.  Samples (biopsies) of the lymph nodes are taken from inside the lungs and sent for diagnostic testing.  Final results of your biopsy take up to 5 business days to process; your endoscopic physician will contact you with your results.  EBUS is recommended in the following instances:  To diagnose different types of lung disorders (such as sarcoidosis or tuberculosis)  To diagnose or 'stage' cancer   To investigate enlarged lymph  nodes in the chest  Due to effects of sedation, do not drive or operate heavy machinery for 24 hours.  Avoid heavy lifting (>10 lbs.) or strenuous activity for 48 hours.  Continue to avoid non-steroidal anti-inflammatory medications (NSAIDS) for 5-7 days after the procedure unless told otherwise by your endoscopic and primary care physicians.  Some patients have a temporary sore throat after the procedure; this is a normal finding and over-the-counter lozenges help soothe symptoms.  You may resume normal diet once you are discharged.   Avoid red-colored foods for 24 hours.   You may resume your blood thinner medications (if applicable) as directed by your endoscopic and primary care physicians.  It is normal to have a very small amount of blood-tinged sputum immediately after the procedure. This should resolve within a few hours.  Although complications are rare, there is a small risk of pain, collapsed lung, bleeding and infection. Contact your endoscopic physician if you have these signs or symptoms (Dr. Velásquez @ [phone]):  Temperature greater than 102°F  Worsening pain not relieved by medications  Go to your nearest emergency room is you experience:  Shaking, chills or a temperature over 102°F.    New, sudden difficulty breathing.    New pain when taking a deep breath.    New, sudden chest pain.  Worsening cough that produces  large amounts of blood.

## 2024-02-28 NOTE — PROCEDURES
Chest Tube Insertion Procedure Note    Indications:  Clinically significant Pneumothorax    Pre-op Diagnosis:   Lung nodule [R91.1]    Post-Op Diagnosis Codes:     * Lung nodule [R91.1]    Surgeon:    Assistant:    Anesthesia: Local    Procedure Details:   Informed consent was obtained for the procedure, including sedation.  Risks of lung perforation, hemorrhage, arrhythmia, and adverse drug reaction were discussed.     After sterile skin prep, using standard technique, a 14 [place] tube was placed in the left anterior 3rd rib space.    Findings:  Rush of air    Estimated Blood Loss:  Minimal           Specimens: None                Complications:  None; patient tolerated the procedure well.           Disposition:  To the floor for monitoring           Condition: stable

## 2024-02-28 NOTE — ANESTHESIA PROCEDURE NOTES
Airway  Urgency: elective    Date/Time: 2/28/2024 8:58 AM  Airway not difficult    General Information and Staff    Patient location during procedure: OR  CRNA/CAA: Jasmina Smith CRNA    Indications and Patient Condition  Indications for airway management: airway protection    Preoxygenated: yes  Mask difficulty assessment: 0 - not attempted    Final Airway Details  Final airway type: endotracheal airway      Successful airway: ETT  Cuffed: yes   Successful intubation technique: direct laryngoscopy  Facilitating devices/methods: cricoid pressure  Endotracheal tube insertion site: oral  Blade: Loyd  Blade size: 2  ETT size (mm): 8.5  Cormack-Lehane Classification: grade I - full view of glottis  Placement verified by: chest auscultation and capnometry   Measured from: gums  Number of attempts at approach: 1  Assessment: lips, teeth, and gum same as pre-op and atraumatic intubation

## 2024-02-28 NOTE — PLAN OF CARE
Goal Outcome Evaluation:                   New admission to [facility], stable with a chest tube, complaints of pain in chest wall, able to make needs known. Plan of care ongoin.

## 2024-02-28 NOTE — ANESTHESIA PREPROCEDURE EVALUATION
Anesthesia Evaluation     Patient summary reviewed and Nursing notes reviewed   NPO Solid Status: > 8 hours  NPO Liquid Status: > 8 hours           Airway   Mallampati: II  TM distance: >3 FB  Neck ROM: full  No difficulty expected  Dental - normal exam     Pulmonary    (+) COPD,  Cardiovascular         Neuro/Psych  GI/Hepatic/Renal/Endo      Musculoskeletal     Abdominal    Substance History      OB/GYN          Other                    Anesthesia Plan    ASA 3     general     intravenous induction     Anesthetic plan, risks, benefits, and alternatives have been provided, discussed and informed consent has been obtained with: patient.    Plan discussed with CRNA.    CODE STATUS:

## 2024-02-28 NOTE — OP NOTE
BRONCHOSCOPY WITH ION ROBOT  Procedure Report    Patient Name:  Pili Arechiga  YOB: 1955    Date of Surgery:  2/28/2024     Indications:  enlarging left lower lobe nodule    Pre-op Diagnosis:   Lung nodule [R91.1]       Post-Op Diagnosis Codes:     * Lung nodule [R91.1]    Procedure/CPT® Codes:      Procedure(s):  BRONCHOSCOPY WITH ION ROBOT,  ENDOBRONCHIAL ULTRASOUND, FINE NEEDLE ASPIRATIONS,  CRYOTHERAPY BIOPSIES, AND LEFT LOWER LOBE BRONCHOALVEOLAR LAVAGE.    Staff:  Surgeon(s):  Alexia Velásquez MD    Anesthesia: General    Estimated Blood Loss: minimal    Implants:    Nothing was implanted during the procedure    Specimen:          Specimens       ID Source Type Tests Collected By Collected At Frozen?    A Lung, Left Lower Lobe Fine Needle Aspirate FINE NEEDLE ASPIRATION   Alexia Velásquez MD 2/28/24 0913 No    Description: slides    B Lung, Left Lower Lobe Fine Needle Aspirate FINE NEEDLE ASPIRATION   Alexia Velásquez MD 2/28/24 0913 No    Description: formalin    C Lung, Left Lower Lobe Tissue TISSUE PATHOLOGY EXAM   Alexia Velásquez MD 2/28/24 0917 No    Description: LLL cryo biopsies    D Lung, Left Lower Lobe Lavage NON-GYNECOLOGIC CYTOLOGY   Alexia Velásquez MD 2/28/24 0933 No    Description: left lower lobe BAL    E Lung, Left Lower Lobe Fine Needle Aspirate FINE NEEDLE ASPIRATION   Alexia Velásquez MD 2/28/24 0959 No    Description: level 4 lymph node-slides    F Lung, Left Lower Lobe Fine Needle Aspirate FINE NEEDLE ASPIRATION   Alexia Velásquez MD 2/28/24 1000 No    Description: level 4 lymph node-formalin              Findings: DAHIANA with lesional cells, definitive diagnosis pending    Complications: None    Description of Procedure:  Pili Arechiga was identified in the preoperative holding area and again her consent for the procedure was verified.  Prior to bringing the patient to the endoscopy suite, planning was performed to allow navigation to the left lower lobe mass.  She was  transferred to the endoscopy suite placed on the endoscopy table in supine position.  A general anesthetic was successfully administered and She was intubated without difficulty.  A timeout was performed.    The Olympus endoscope was introduced in the patient's airway and examination was conducted to the secondary xiomara.  All the secretions were evacuated to facilitate robotic navigation.  The Ion robotic navigation system was docked to the patient in standard fashion.  The airway was registered.  We were able to navigate to the lesion.  Radial endobronchial ultrasound was used to confirm that we were in the lesion we had good concentric signal.  Multiple biopsies of the mass were performed using a 21-gauge biopsy needle as well as cryobiopsy in a concentric location around the entire mass.  Fluoroscopy was used while passing instruments and tools.  Rapid onsite evaluation confirmed malignancy.  A bronchoalveolar lavage was performed.    The scope was withdrawn and the endobronchial ultrasound scope was placed in patient's airway.  The left paratracheal space and subcarinal space were examined and there was no significantly enlarged lymph node in either space.  Biopsies were taken of the left paratracheal space.    The endobronchial ultrasound scope was withdrawn and replaced with the Olympus video endoscope.  A small amount of blood was evacuated from the airway.  There was no evidence of significant bleeding.  The patient tolerated this procedure well, was extubated and transferred to recovery room in stable condition.      Alexia Velásquez MD     Date: 2/28/2024  Time: 10:12 EST

## 2024-02-28 NOTE — ANESTHESIA POSTPROCEDURE EVALUATION
Patient: Pili Arechiga    Procedure Summary       Date: 02/28/24 Room / Location: [facility] ENDOSCOPY 3 / [facility] ENDOSCOPY    Anesthesia Start: 0854 Anesthesia Stop: 1026    Procedures:       BRONCHOSCOPY WITH ION ROBOT,  ENDOBRONCHIAL ULTRASOUND, FINE NEEDLE ASPIRATIONS,  CRYOTHERAPY BIOPSIES, AND LEFT LOWER LOBE BRONCHOALVEOLAR LAVAGE. (Bronchus)      CHEST TUBE INSERTION AT BEDSIDE (Left: Chest) Diagnosis:       Lung nodule      (Lung nodule [R91.1])    Surgeons: Alexia Velásquez MD Provider: Augusto Hunter MD    Anesthesia Type: general ASA Status: 3            Anesthesia Type: general    Vitals  Vitals Value Taken Time   /73 02/28/24 1146   Temp 98.4 °F (36.9 °C) 02/28/24 1100   Pulse 67 02/28/24 1148   Resp 16 02/28/24 1110   SpO2 98 % 02/28/24 1148   Vitals shown include unfiled device data.        Post Anesthesia Care and Evaluation    Patient location during evaluation: PACU  Patient participation: complete - patient participated  Level of consciousness: awake  Pain scale: See nurse's notes for pain score.  Pain management: adequate    Airway patency: patent  Anesthetic complications: No anesthetic complications  PONV Status: none  Cardiovascular status: acceptable  Respiratory status: acceptable and spontaneous ventilation  Hydration status: acceptable    Comments: Patient seen and examined postoperatively; vital signs stable; SpO2 greater than or equal to 90%; cardiopulmonary status stable; nausea/vomiting adequately controlled; pain adequately controlled; no apparent anesthesia complications; patient discharged from anesthesia care when discharge criteria were met

## 2024-02-29 ENCOUNTER — APPOINTMENT (OUTPATIENT)
Dept: GENERAL RADIOLOGY | Facility: HOSPITAL | Age: 69
DRG: 200 | End: 2024-02-29
Payer: MEDICARE

## 2024-02-29 VITALS
BODY MASS INDEX: 18.91 KG/M2 | RESPIRATION RATE: 16 BRPM | SYSTOLIC BLOOD PRESSURE: 92 MMHG | DIASTOLIC BLOOD PRESSURE: 56 MMHG | HEART RATE: 67 BPM | TEMPERATURE: 97.8 F | WEIGHT: 106.7 LBS | OXYGEN SATURATION: 93 % | HEIGHT: 63 IN

## 2024-02-29 LAB
ANION GAP SERPL CALCULATED.3IONS-SCNC: 7 MMOL/L (ref 5–15)
BASOPHILS # BLD AUTO: 0 10*3/MM3 (ref 0–0.2)
BASOPHILS NFR BLD AUTO: 0.2 % (ref 0–1.5)
BEAKER LAB AP INTRAOPERATIVE CONSULTATION: NORMAL
BUN SERPL-MCNC: 7 MG/DL (ref 8–23)
BUN/CREAT SERPL: 8.9 (ref 7–25)
CALCIUM SPEC-SCNC: 8.5 MG/DL (ref 8.6–10.5)
CHLORIDE SERPL-SCNC: 97 MMOL/L (ref 98–107)
CO2 SERPL-SCNC: 26 MMOL/L (ref 22–29)
CREAT SERPL-MCNC: 0.79 MG/DL (ref 0.57–1)
DEPRECATED RDW RBC AUTO: 50.3 FL (ref 37–54)
EGFRCR SERPLBLD CKD-EPI 2021: 82.6 ML/MIN/1.73
EOSINOPHIL # BLD AUTO: 0 10*3/MM3 (ref 0–0.4)
EOSINOPHIL NFR BLD AUTO: 0.1 % (ref 0.3–6.2)
ERYTHROCYTE [DISTWIDTH] IN BLOOD BY AUTOMATED COUNT: 13.4 % (ref 12.3–15.4)
GLUCOSE SERPL-MCNC: 101 MG/DL (ref 65–99)
HCT VFR BLD AUTO: 42.9 % (ref 34–46.6)
HGB BLD-MCNC: 13.4 G/DL (ref 12–15.9)
LAB AP CASE REPORT: NORMAL
LAB AP CASE REPORT: NORMAL
LAB AP DIAGNOSIS COMMENT: NORMAL
LYMPHOCYTES # BLD AUTO: 2.3 10*3/MM3 (ref 0.7–3.1)
LYMPHOCYTES NFR BLD AUTO: 11.1 % (ref 19.6–45.3)
Lab: NORMAL
MCH RBC QN AUTO: 31.4 PG (ref 26.6–33)
MCHC RBC AUTO-ENTMCNC: 31.3 G/DL (ref 31.5–35.7)
MCV RBC AUTO: 100.4 FL (ref 79–97)
MONOCYTES # BLD AUTO: 0.9 10*3/MM3 (ref 0.1–0.9)
MONOCYTES NFR BLD AUTO: 4.5 % (ref 5–12)
NEUTROPHILS NFR BLD AUTO: 17.4 10*3/MM3 (ref 1.7–7)
NEUTROPHILS NFR BLD AUTO: 84.1 % (ref 42.7–76)
NRBC BLD AUTO-RTO: 0.1 /100 WBC (ref 0–0.2)
PATH REPORT.FINAL DX SPEC: NORMAL
PATH REPORT.FINAL DX SPEC: NORMAL
PATH REPORT.GROSS SPEC: NORMAL
PATH REPORT.GROSS SPEC: NORMAL
PLATELET # BLD AUTO: 361 10*3/MM3 (ref 140–450)
PMV BLD AUTO: 6.9 FL (ref 6–12)
POTASSIUM SERPL-SCNC: 5.1 MMOL/L (ref 3.5–5.2)
RBC # BLD AUTO: 4.28 10*6/MM3 (ref 3.77–5.28)
SODIUM SERPL-SCNC: 130 MMOL/L (ref 136–145)
WBC NRBC COR # BLD AUTO: 20.7 10*3/MM3 (ref 3.4–10.8)

## 2024-02-29 PROCEDURE — 94799 UNLISTED PULMONARY SVC/PX: CPT

## 2024-02-29 PROCEDURE — 25010000002 HEPARIN (PORCINE) PER 1000 UNITS: Performed by: THORACIC SURGERY (CARDIOTHORACIC VASCULAR SURGERY)

## 2024-02-29 PROCEDURE — 80048 BASIC METABOLIC PNL TOTAL CA: CPT | Performed by: THORACIC SURGERY (CARDIOTHORACIC VASCULAR SURGERY)

## 2024-02-29 PROCEDURE — 25010000002 HYDROMORPHONE 1 MG/ML SOLUTION: Performed by: THORACIC SURGERY (CARDIOTHORACIC VASCULAR SURGERY)

## 2024-02-29 PROCEDURE — 85025 COMPLETE CBC W/AUTO DIFF WBC: CPT | Performed by: THORACIC SURGERY (CARDIOTHORACIC VASCULAR SURGERY)

## 2024-02-29 PROCEDURE — 71045 X-RAY EXAM CHEST 1 VIEW: CPT

## 2024-02-29 PROCEDURE — 94664 DEMO&/EVAL PT USE INHALER: CPT

## 2024-02-29 PROCEDURE — 94761 N-INVAS EAR/PLS OXIMETRY MLT: CPT

## 2024-02-29 RX ADMIN — HYDROCODONE BITARTRATE AND ACETAMINOPHEN 1 TABLET: 10; 325 TABLET ORAL at 05:30

## 2024-02-29 RX ADMIN — BUDESONIDE AND FORMOTEROL FUMARATE DIHYDRATE 2 PUFF: 160; 4.5 AEROSOL RESPIRATORY (INHALATION) at 08:16

## 2024-02-29 RX ADMIN — HYDROMORPHONE HYDROCHLORIDE 0.5 MG: 1 INJECTION, SOLUTION INTRAMUSCULAR; INTRAVENOUS; SUBCUTANEOUS at 09:48

## 2024-02-29 RX ADMIN — IPRATROPIUM BROMIDE AND ALBUTEROL SULFATE 3 ML: .5; 3 SOLUTION RESPIRATORY (INHALATION) at 08:08

## 2024-02-29 RX ADMIN — MIDODRINE HYDROCHLORIDE 2.5 MG: 5 TABLET ORAL at 09:43

## 2024-02-29 RX ADMIN — HEPARIN SODIUM 5000 UNITS: 5000 INJECTION INTRAVENOUS; SUBCUTANEOUS at 05:13

## 2024-02-29 RX ADMIN — ACETAMINOPHEN 1000 MG: 500 TABLET, FILM COATED ORAL at 09:43

## 2024-02-29 NOTE — PLAN OF CARE
Goal Outcome Evaluation:       Pt complaints of pain managed with PO medication. Pt ambulated to bathroom. Chest tube changed to water seal at 0300. Plan of care ongoing.

## 2024-02-29 NOTE — DISCHARGE INSTR - ACTIVITY
No heavy lifting, pushing, pulling greater than 10 pounds.  No driving up until 2 weeks after surgery and no longer taking narcotics.  Resume home diet as tolerated.  Continue incentive spirometer at least 4 times per day.  Remove dressing from post chest tube site after 48 hours, may shower and clean surgical sites with antibacterial soap or hydrogen peroxide, and apply gauze dressing or band-aid as needed for any drainage.  No dressing needed once no longer draining

## 2024-02-29 NOTE — DISCHARGE SUMMARY
Patient Care Team:  Nigel De Paz MD as PCP - General (Family Medicine)    Date of Admission: 2/28/2024   Date of Discharge:  2/29/2024    Discharge Diagnosis: Postprocedure pneumothorax    Presenting Problem  Lung nodule [R91.1]  Pneumothorax [J93.9]     History of Present Illness  Pili Arechiga is a 68 y.o. female who presented with is a 68-year-old lady who is an established patient of Dr. Velásquez, she underwent a robotic bronchoscopy and biopsy of an enlarging left lower lobe pulmonary nodule.  Due to Ms. Arechiga's significant smoking history this was concerning for a primary lung malignancy.  She presented to [facility] on 2/28/2024 for a scheduled robotic bronchoscopy and biopsy of this enlarging left lower lobe nodule.  Hospital Course  Ms. Arechiga underwent robotic bronchoscopy and biopsy on 2/28/2024 for an enlarging left lower lobe nodule.  Unfortunately, her postprocedural x-ray showed a significant left sided pneumothorax.  This was treated with a small bore chest tube placement with excellent reexpansion of her left lung.  She was placed on waterseal at 3 AM on 2/29/2024.  Her a.m. chest x-ray on 2/29/2024 was on waterseal and showed adequate reexpansion of her lung with a minuscule apical pneumothorax.  She was satting appropriately.  On physical exam on the morning of 2/29/2024 she did not appreciate an air leak on her chest tube.  Her chest tube was removed at the bedside without issue.  She had a slight bump in her leukocytosis on a.m. labs, this is attributed to the biopsy and dropped lung yesterday with procedure involving chest tube placement.  She is asymptomatic without fever.  She is appropriate for discharge on 2/29/2024.  She will follow-up in clinic next week to discuss pathology results.    Procedures Performed  Procedure(s):  BRONCHOSCOPY WITH ION ROBOT,  ENDOBRONCHIAL ULTRASOUND, FINE NEEDLE ASPIRATIONS,  CRYOTHERAPY BIOPSIES, AND LEFT LOWER LOBE BRONCHOALVEOLAR  LAVAGE.  CHEST TUBE INSERTION AT BEDSIDE  02/28 1149 Note By: Alexia Velásquez MD  02/28 0850 BRONCHOSCOPY    Consults:   Consults       No orders found for last 30 day(s).            Pertinent Test Results:     Imaging Results (Last 24 Hours)       Procedure Component Value Units Date/Time    XR Chest 1 View [[id number]] Collected: 02/29/24 0907     Updated: 02/29/24 0909    Narrative:      XR CHEST 1 VW    Date of Exam: 2/29/2024 5:35 AM EST    Indication: chest tube management    Comparison: AP pleural chest 2/28/2024.    Findings:  Left chest pigtail drainage catheter appears similarly positioned. The left apical pneumothorax appears resolved in the interval. There is mild left basilar atelectasis. Aeration throughout the left mid lung and left lower lung zone appears significantly   improved. The right lung remains clear. The heart size is normal. No pneumomediastinum. No significant pleural fluid collection. Osseous structures are normal.      Impression:      Impression:  Significantly improved aeration in the left lung compared to 1 day prior with some mild residual left basilar atelectasis.  Interval resolution of left apical pneumothorax.      Electronically Signed: Marj Spence MD    2/29/2024 9:07 AM EST    Workstation ID: [id number]    FL < 1 Hour [[id number]] Resulted: 02/28/24 1258     Updated: 02/28/24 1258    Narrative:      This procedure was auto-finalized with no dictation required.    XR Chest 1 View [[id number]] Collected: 02/28/24 1202     Updated: 02/28/24 1207    Narrative:      XR CHEST 1 VW    Date of Exam: 2/28/2024 11:52 AM EST    Indication: chest tube management    Comparison: AP portable chest 2/28/2024 at 1025.    Findings:  Small bore pigtail catheter has been placed, looped over the left mid thorax abutting the mediastinum. There has been interval reduction of the large left pneumothorax, although moderate left apical component remains. There is dense airspace disease   within the  left lung, although aeration has improved, suggesting partial reexpansion of compressive left lung atelectasis. Right lung remains clear. No mediastinal shift. No significant pleural fluid collection is identified. No subcutaneous gas is   evident        Impression:      Impression:    1. Suspected moderate residual left apical pneumothorax. The pneumothorax has been significantly reduced since placement of the smallbore pigtail drain.  2. Density opacification in the left lung, most likely representing atelectasis, with partial reexpansion since today's earlier exam      Electronically Signed: Marj Spence MD    2/28/2024 12:04 PM EST    Workstation ID: [id number]            Lab Results (last 24 hours)       Procedure Component Value Units Date/Time    Non-gynecologic Cytology [[id number]] Collected: 02/28/24 0933    Specimen: Lavage from Lung, Left Lower Lobe Updated: 02/29/24 0841    Basic Metabolic Panel [[id number]]  (Abnormal) Collected: 02/29/24 0534    Specimen: Blood Updated: 02/29/24 0606     Glucose 101 mg/dL      BUN 7 mg/dL      Creatinine 0.79 mg/dL      Sodium 130 mmol/L      Potassium 5.1 mmol/L      Comment: Slight hemolysis detected by analyzer. Result may be falsely elevated.        Chloride 97 mmol/L      CO2 26.0 mmol/L      Calcium 8.5 mg/dL      BUN/Creatinine Ratio 8.9     Anion Gap 7.0 mmol/L      eGFR 82.6 mL/min/1.73     Narrative:      GFR Normal >60  Chronic Kidney Disease <60  Kidney Failure <15      CBC & Differential [[id number]]  (Abnormal) Collected: 02/29/24 0534    Specimen: Blood Updated: 02/29/24 0541    Narrative:      The following orders were created for panel order CBC & Differential.  Procedure                               Abnormality         Status                     ---------                               -----------         ------                     CBC Auto Differential[[id number]]        Abnormal            Final result                 Please view results for these  tests on the individual orders.    CBC Auto Differential [[id number]]  (Abnormal) Collected: 02/29/24 0534    Specimen: Blood Updated: 02/29/24 0541     WBC 20.70 10*3/mm3      RBC 4.28 10*6/mm3      Hemoglobin 13.4 g/dL      Hematocrit 42.9 %      .4 fL      MCH 31.4 pg      MCHC 31.3 g/dL      RDW 13.4 %      RDW-SD 50.3 fl      MPV 6.9 fL      Platelets 361 10*3/mm3      Neutrophil % 84.1 %      Lymphocyte % 11.1 %      Monocyte % 4.5 %      Eosinophil % 0.1 %      Basophil % 0.2 %      Neutrophils, Absolute 17.40 10*3/mm3      Lymphocytes, Absolute 2.30 10*3/mm3      Monocytes, Absolute 0.90 10*3/mm3      Eosinophils, Absolute 0.00 10*3/mm3      Basophils, Absolute 0.00 10*3/mm3      nRBC 0.1 /100 WBC     Tissue Pathology Exam [[id number]] Collected: 02/28/24 0917    Specimen: Tissue from Lung, Left Lower Lobe Updated: 02/28/24 1402     Case Report --     Surgical Pathology Report                         Case: [id number]                                  Authorizing Provider:  Alexia Velásquez MD        Collected:           02/28/2024 09:17 AM          Ordering Location:     [facility]  Received:            02/28/2024 11:35 AM                                 SUITES                                                                       Pathologist:           Nikita Herrera MD                                                             Intraop:               Fernando Nava MD                                                           Specimen:    Lung, Left Lower Lobe, LLL cryo biopsies                                                    Synoptic Checklist --     Intraoperative Consultation --     Touch prep diagnosis #1: Histiocytes and inflammation, no evidence of malignancy   Touch prep diagnosis #2: Histiocytes and inflammation, no evidence of malignancy  Touch prep diagnosis #3: Histiocytes and inflammation, no evidence of malignancy  Touch prep diagnosis #4: Lesional    Dr. Herrera      Fine  Needle Aspiration [[id number]] Collected: 02/28/24 0913    Specimen: Fine Needle Aspirate from Lung, Left Lower Lobe; Fine Needle Aspirate from Lung, Left Lower Lobe; Fine Needle Aspirate from Lung, Left Lower Lobe; Fine Needle Aspirate from Lung, Left Lower Lobe Updated: 02/28/24 1130              Condition on Discharge:  Stable     Vital Signs  Temp:  [97.3 °F (36.3 °C)-98.4 °F (36.9 °C)] 97.8 °F (36.6 °C)  Heart Rate:  [48-80] 67  Resp:  [11-24] 16  BP: ()/(54-92) 92/56    Physical Exam:    General Appearance:  Alert, cooperative, in no acute distress   Head:  Normocephalic, without obvious abnormality, atraumatic   Eyes:  Lids and lashes normal, conjunctivae and sclerae normal, no icterus, no pallor, corneas clear, PERRLA   Ears:  Ears appear intact with no abnormalities noted   Throat:  No oral lesions, no thrush, oral mucosa moist   Neck:  No adenopathy, supple, trachea midline, no thyromegaly, no carotid bruit, no JVD   Back:  No kyphosis present, no scoliosis present, no skin lesions, erythema or scars, no tenderness to percussion, or palpation, range of motion normal   Lungs:  Clear to auscultation,respirations regular, even and unlabored    Heart:  Regular rhythm and normal rate, normal S1 and S2, no murmur, no gallop, no rub, no click   Breast Exam:  Deferred   Abdomen:  Normal bowel sounds, no masses, no organomegaly, soft non-tender, non-distended, no guarding, no rebound tenderness   Genitalia:  Deferred   Extremities:  Moves all extremities well, no edema, no cyanosis, no redness   Pulses:  Pulses palpable and equal bilaterally   Skin:  No bleeding, bruising or rash   Lymph nodes:  No palpable adenopathy   Neurologic:  Cranial nerves 2 - 12 grossly intact, sensation intact, DTR present and equal bilaterally       Discharge Disposition  Home today    Discharge Medications     Discharge Medications        Continue These Medications        Instructions Start Date   albuterol sulfate  (90  Base) MCG/ACT inhaler  Commonly known as: PROVENTIL HFA;VENTOLIN HFA;PROAIR HFA   2 puffs, Inhalation, Every 4 Hours PRN, Takes as needed.      budesonide-formoterol 160-4.5 MCG/ACT inhaler  Commonly known as: SYMBICORT   2 puffs, Inhalation, 2 Times Daily - RT      diazePAM 10 MG tablet  Commonly known as: VALIUM   10 mg, Oral, 2 Times Daily PRN, Takes 20 mg at bedtime at times when she needs.      HYDROcodone-acetaminophen  MG per tablet  Commonly known as: NORCO   1 tablet, Oral, 3 Times Daily PRN      midodrine 2.5 MG tablet  Commonly known as: PROAMATINE   2.5 mg, Oral, 3 Times Daily Before Meals      naloxone 4 MG/0.1ML nasal spray  Commonly known as: NARCAN   1 spray into the nostril(s) as directed by provider As Needed.               Discharge Instructions:  No heavy lifting, pushing, pulling greater than 10 pounds.  No driving up until 2 weeks after surgery and no longer taking narcotics.  Resume home diet as tolerated.  Continue incentive spirometer at least 4 times per day.  Remove dressing from post chest tube site after 48 hours, may shower and clean surgical sites with antibacterial soap or hydrogen peroxide, and apply gauze dressing or band-aid as needed for any drainage.  No dressing needed once no longer draining.          Follow-up Appointments  No future appointments.  Additional Instructions for the Follow-ups that You Need to Schedule       Discharge Follow-up with Specified Provider: Christen; 1 Week   As directed      To: Christen   Follow Up: 1 Week                Test Results Pending at Discharge  Pending Labs       Order Current Status    Fine Needle Aspiration In process    Non-gynecologic Cytology In process    Tissue Pathology Exam Preliminary result            For any questions regarding patient's stay, please refer to patient's chart.    Vineet Eric MD PhD  [facility]  02/29/24  11:19 EST

## 2024-02-29 NOTE — CASE MANAGEMENT/SOCIAL WORK
Discharge Planning Assessment   Nave[place]     Patient Name: Pili Arechiga  MRN: [id number]  Today's Date: 2/29/2024    Admit Date: 2/28/2024    Plan: Home. Family can transport at discharge.   Discharge Needs Assessment       Row Name 02/29/24 1359       Living Environment    People in Home child(gulshan), adult    Name(s) of People in Home son    Current Living Arrangements home    Potentially Unsafe Housing Conditions none    In the past 12 months has the electric, gas, oil, or water company threatened to shut off services in your home? No    Primary Care Provided by self    Provides Primary Care For no one    Family Caregiver if Needed child(gulshan), adult    Family Caregiver Names son    Quality of Family Relationships helpful;involved;supportive    Able to Return to Prior Arrangements yes       Resource/Environmental Concerns    Resource/Environmental Concerns none    Transportation Concerns none       Transportation Needs    In the past 12 months, has lack of transportation kept you from medical appointments or from getting medications? no    In the past 12 months, has lack of transportation kept you from meetings, work, or from getting things needed for daily living? No       Food Insecurity    Within the past 12 months, you worried that your food would run out before you got the money to buy more. Never true    Within the past 12 months, the food you bought just didn't last and you didn't have money to get more. Never true       Transition Planning    Patient/Family Anticipates Transition to home    Patient/Family Anticipated Services at Transition none    Transportation Anticipated car, drives self;family or friend will provide       Discharge Needs Assessment    Readmission Within the Last 30 Days no previous admission in last 30 days    Equipment Currently Used at Home none    Concerns to be Addressed care coordination/care conferences;discharge planning;denies needs/concerns at this time    Anticipated Changes  Related to Illness none    Equipment Needed After Discharge none    Provided Post Acute Provider List? N/A    N/A Provider List Comment denies dc needs                   Discharge Plan       Row Name 02/29/24 1400       Plan    Plan Home. Family can transport at discharge.    Patient/Family in Agreement with Plan yes    Plan Comments Patient lives at home with son  Patient drives, family  will transport at discharge. Patient performs ADL. PCP and pharmacy confirmed. Denies financial assistance needs for medication and/or food. Denies HH and/or rehab needs.                  Continued Care and Services - Admitted Since 2/28/2024    Coordination has not been started for this encounter.       Expected Discharge Date and Time       Expected Discharge Date Expected Discharge Time    Feb 29, 2024            Demographic Summary       Row Name 02/29/24 135       General Information    Admission Type inpatient    Arrived From home    Required Notices Provided Important Message from Medicare    Referral Source admission list    Reason for Consult discharge planning    Preferred Language English       Contact Information    Permission Granted to Share Info With                    Functional Status       Row Name 02/29/24 5142       Functional Status    Usual Activity Tolerance excellent    Current Activity Tolerance excellent       Functional Status, IADL    Medications independent    Meal Preparation independent    Housekeeping independent    Laundry independent    Shopping independent       Mental Status    General Appearance WDL WDL       Mental Status Summary    Recent Changes in Mental Status/Cognitive Functioning no changes                    Katiuska Ty, RN

## 2024-02-29 NOTE — PLAN OF CARE
Goal Outcome Evaluation:           Progress: no change  Outcome Evaluation: pt. to be d/c home this shift. Family to transport at d/c

## 2024-02-29 NOTE — CASE MANAGEMENT/SOCIAL WORK
Case Management Discharge Note      Final Note: HOME    Provided Post Acute Provider List?: N/A  N/A Provider List Comment: denies dc needs    Selected Continued Care - Discharged on 2/29/2024 Admission date: 2/28/2024 - Discharge disposition: Home or Self Care            Transportation Services  Private: Car    Final Discharge Disposition Code: 01 - home or self-care

## 2024-03-01 LAB
LAB AP CASE REPORT: NORMAL
PATH REPORT.FINAL DX SPEC: NORMAL
PATH REPORT.GROSS SPEC: NORMAL

## 2024-03-01 NOTE — PROGRESS NOTES
"Enter Query Response Below      Query Response: underweight             If applicable, please update the problem list.     Patient: Pili Arechiga \"Ivet\"        : 1955  Account: [id number]           Admit Date: 2024        How to Respond to this query:       a. Click New Note     b. Answer query within the yellow box.                c. Update the Problem List, if applicable.      If you have any questions about this query contact me at: [email or web address]     Dr. Velásquez,    68-year-old female admitted with lung nodule that underwent robotic bronchoscopy and biopsy. H&P documents \"Height 160 cm (63\") and weight 48.4 kg (106lb 12.8 oz) with BMI 18.92, physical exam appearance: she is well-developed, BMI is within normal parameters and no other follow-up for BMI required.\" The patient was treated with a regular diet.    Please clarify if patient treated/monitored for the following:    - Underweight  - Normal body habitus  - Other- specify ___________  - Unable to determine    By submitting this query, we are merely seeking further clarification of documentation to accurately reflect all conditions that you are monitoring, evaluating, treating or that extend the hospitalization or utilize additional resources of care. Please utilize your independent clinical judgment when addressing the question(s) above.     This query and your response, once completed, will be entered into the legal medical record.    Sincerely,  Bernard Louie RN   Clinical Documentation Integrity Program   "

## 2024-03-01 NOTE — PROGRESS NOTES
"Enter Query Response Below      Query Response: yes             If applicable, please update the problem list.     Patient: Pili Arechiga \"Ivet\"        : 1955  Account: [id number]           Admit Date: 2024        How to Respond to this query:       a. Click New Note     b. Answer query within the yellow box.                c. Update the Problem List, if applicable.      If you have any questions about this query contact me at: [email or web address]     Dr. Velásquez,     Based on inpatient coding guidelines, [facility] are not allowed to use diagnoses from pathology reports as the sole basis for billing.  Any findings noted on a pathology report must be affirmed by the treating physician before billing.     The pathologist reported that the specimen shows \"Lung, left lower lobe, Ion guided cryobiopsy:  Positive for malignancy, small cell carcinoma and Lymph node Positive for malignancy, small cell carcinoma.\"    The patient was treated with bronchoscopy with ION robot, endobronchial ultrasound, fine needle aspirationsm cryotherapy biopsies and left lower lobe bronchoalveolar lavage.     Is this finding consistent with your clinical impression and treatment plan for this patient?    - Yes, left lower lobe lung: positive for malignancy, small cell carcinoma and lymph node positive for malignancy, small cell carcinoma  - No, please specify ___________  - Other explanation for clinical impression and treatment plan ____________  - Unable to determine    By submitting this query, we are merely seeking further clarification of documentation to accurately reflect all conditions that you are monitoring, evaluating, treating or that extend the hospitalization or utilize additional resources of care. Please utilize your independent clinical judgment when addressing the question(s) above.     This query and your response, once completed, will be entered into the legal medical " record.    Sincerely,  Bernard Louie RN   Clinical Documentation Integrity Program

## 2024-03-06 ENCOUNTER — PATIENT OUTREACH (OUTPATIENT)
Dept: OTHER | Facility: HOSPITAL | Age: 69
End: 2024-03-06
Payer: MEDICARE

## 2024-03-06 DIAGNOSIS — R91.1 LUNG NODULE: Primary | ICD-10-CM

## 2024-03-07 ENCOUNTER — OFFICE VISIT (OUTPATIENT)
Dept: OTHER | Facility: HOSPITAL | Age: 69
End: 2024-03-07
Payer: MEDICARE

## 2024-03-07 ENCOUNTER — HOSPITAL ENCOUNTER (OUTPATIENT)
Dept: PET IMAGING | Facility: HOSPITAL | Age: 69
Discharge: HOME | End: 2024-03-07
Payer: MEDICARE

## 2024-03-07 ENCOUNTER — PATIENT OUTREACH (OUTPATIENT)
Dept: OTHER | Facility: HOSPITAL | Age: 69
End: 2024-03-07
Payer: MEDICARE

## 2024-03-07 ENCOUNTER — PREP FOR SURGERY (OUTPATIENT)
Dept: OTHER | Facility: HOSPITAL | Age: 69
End: 2024-03-07
Payer: MEDICARE

## 2024-03-07 VITALS
TEMPERATURE: 96.6 F | HEART RATE: 63 BPM | WEIGHT: 103.5 LBS | OXYGEN SATURATION: 98 % | DIASTOLIC BLOOD PRESSURE: 67 MMHG | RESPIRATION RATE: 16 BRPM | BODY MASS INDEX: 18.33 KG/M2 | SYSTOLIC BLOOD PRESSURE: 116 MMHG

## 2024-03-07 VITALS
HEART RATE: 63 BPM | TEMPERATURE: 96.6 F | BODY MASS INDEX: 18.33 KG/M2 | DIASTOLIC BLOOD PRESSURE: 67 MMHG | WEIGHT: 103.5 LBS | RESPIRATION RATE: 16 BRPM | OXYGEN SATURATION: 98 % | SYSTOLIC BLOOD PRESSURE: 116 MMHG

## 2024-03-07 DIAGNOSIS — C34.92 SMALL CELL CARCINOMA OF LEFT LUNG: Primary | ICD-10-CM

## 2024-03-07 DIAGNOSIS — C80.1 SMALL CELL CARCINOMA: Primary | ICD-10-CM

## 2024-03-07 DIAGNOSIS — R91.1 LUNG NODULE: ICD-10-CM

## 2024-03-07 LAB — GLUCOSE BLDC GLUCOMTR-MCNC: 95 MG/DL (ref 70–130)

## 2024-03-07 PROCEDURE — 78815 PET IMAGE W/CT SKULL-THIGH: CPT

## 2024-03-07 PROCEDURE — 1126F AMNT PAIN NOTED NONE PRSNT: CPT | Performed by: INTERNAL MEDICINE

## 2024-03-07 PROCEDURE — 1160F RVW MEDS BY RX/DR IN RCRD: CPT | Performed by: THORACIC SURGERY (CARDIOTHORACIC VASCULAR SURGERY)

## 2024-03-07 PROCEDURE — 0 FLUDEOXYGLUCOSE F18 SOLUTION: Performed by: THORACIC SURGERY (CARDIOTHORACIC VASCULAR SURGERY)

## 2024-03-07 PROCEDURE — 82948 REAGENT STRIP/BLOOD GLUCOSE: CPT

## 2024-03-07 PROCEDURE — A9552 F18 FDG: HCPCS | Performed by: THORACIC SURGERY (CARDIOTHORACIC VASCULAR SURGERY)

## 2024-03-07 PROCEDURE — 1159F MED LIST DOCD IN RCRD: CPT | Performed by: THORACIC SURGERY (CARDIOTHORACIC VASCULAR SURGERY)

## 2024-03-07 RX ORDER — SODIUM CHLORIDE 9 MG/ML
40 INJECTION, SOLUTION INTRAVENOUS AS NEEDED
OUTPATIENT
Start: 2024-03-07

## 2024-03-07 RX ORDER — SODIUM CHLORIDE 0.9 % (FLUSH) 0.9 %
3 SYRINGE (ML) INJECTION EVERY 12 HOURS SCHEDULED
OUTPATIENT
Start: 2024-03-07

## 2024-03-07 RX ORDER — CEFAZOLIN SODIUM 2 G/100ML
2000 INJECTION, SOLUTION INTRAVENOUS ONCE
OUTPATIENT
Start: 2024-03-07 | End: 2024-03-07

## 2024-03-07 RX ORDER — SODIUM CHLORIDE 0.9 % (FLUSH) 0.9 %
3-10 SYRINGE (ML) INJECTION AS NEEDED
OUTPATIENT
Start: 2024-03-07

## 2024-03-07 RX ADMIN — FLUDEOXYGLUCOSE F 18 1 DOSE: 200 INJECTION, SOLUTION INTRAVENOUS at 09:12

## 2024-03-07 NOTE — PROGRESS NOTES
Referral received from Dr. Velásquez.    Met patient and granddaughter on [facility] today.  The patient met with Dr. Burns and Dr. 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 Dr. Burns noted that the patient needs additional staging with a PET scan which is scheduled later this morning and MRI of the brain, scheduled 3/13 in [place].  Dr. Burns also noted that if the patient has disease limited to the chest she would be a candidate for definitive chemoradiation. If she extensive disease on the chest she would be a candidate for palliative therapy utilizing chemotherapy combined with immunotherapy. Dr. Burns will contact the patient following her scans to discuss results and further treatment recommendations.  The patient has a good understanding of her pathology; she was able to teach back the next steps in her care.     The patient is alert and oriented. She ambulates without assistive devices, denies falls and is independent with ADLs.  The patient lives with her so in [place].     The patient is currently smoking 1 ppd.  She reports smoking since age 11.  We discussed smoking cessation visit with [facility] (scheduled 3/11) and the [place] quit line.     The patient has Medicare and [employer].  She denies any current BHE claim issues. We discussed financial navigation, cost estimates and possible financial assistance if needed in the future.     The patient denies transportation, financial or other resource needs at this time.     The patient states her weight dropped to 98 lbs following a hospitalization 1 year ago.  Her weight had increased to 108 as of 2/22, although was 103.5 lbs today.  We discussed a referral to oncology nutrition, which se declined.    The patient has an ACP on file.    The patient is processing through her diagnosis; she is visibly upset after being informed of her lung cancer diagnosis. Provided active  listening and emotional support, validating and normalizing patient's feelings. We discussed OSW and Behavioral oncology services.  Patient expressed gratitude for my support and denied any additional needs at this time. We also discussed [employer]'s [employer] and [employer] [facility]. The patient declined referrals to either organization at this time.    We discussed integrative therapies and other services at the [facility].  She received a navigation folder with the following information at her appointment today: [facility], Cancer and Restorative Exercise - CARE, [facility] exercise program, Living with a Diagnosis of Lung Cancer, Lung Cancer [place] Support Services, [facility], Message Therapy, Reiki Therapy, [employer] [employer] [facility], Cancer Nutrition, Smoking Cessation and Survivorship Clinic.      I will call in a few weeks; provided my name and number and encouraged patient to call if any needs arise.

## 2024-03-07 NOTE — PROGRESS NOTES
"Chief Complaint  No chief complaint on file.    Subjective    {Problem List  Visit Diagnosis   Encounters  Notes  Medications  Labs  Result Review Imaging  Media :23}    Pili Arechiga presents to [facility]  History of Present Illness    The patient is a 68-year-old female who presents to the clinic for a follow-up after [place] bronchoscopy. She is accompanied by an adult female.    Her breathing has been well since she had the tube removed. The last time they did the biopsy on the lymph node, it was not there. She is scheduled for a PET scan.    She has a family history of cancer.    Objective   Vital Signs:  /67   Pulse 63   Temp 96.6 °F (35.9 °C)   Resp 16   Wt 46.9 kg (103 lb 8 oz)   SpO2 98%   BMI 18.33 kg/m²   Estimated body mass index is 18.33 kg/m² as calculated from the following:    Height as of 2/28/24: 160 cm (63\").    Weight as of this encounter: 46.9 kg (103 lb 8 oz).       {BMI is below normal parameters (malnutrition). Recommendations (Optional):21397}      Physical Exam   Result Review :{Labs  Result Review  Imaging  Med Tab  Media  Procedures :23}    Imaging  I have independently reviewed the CT of the chest performed on 02/22/2024 which demonstrates mediastinal and hilar lymphadenopathy with a subpleural solid pulmonary nodule in the superior segment of the left lower lobe. Lung biopsy as well as a level 4 lymph node demonstrates small cell carcinoma.    {The following data was reviewed by (Optional):91175}  {Ambulatory Labs (Optional):96391}  {Data reviewed (Optional):76905:::1}           Assessment and Plan {CC Problem List  Visit Diagnosis   ROS  Review (Popup)  Health Maintenance  Quality  BestPractice  Medications  SmartSets  SnapShot Encounters  Media :23}    The patient is a 68-year-old female with following up for mediastinal lymphadenopathy. She is status post biopsy which demonstrates small cell " "carcinoma.    Mediastinal lymphadenopathy.  - She will need a port placement, chemotherapy, and an MRI of the brain.     There are no diagnoses linked to this encounter.       {Time Spent (Optional):11394}  Follow Up {Instructions Charge Capture  Follow-up Communications :23}    No follow-ups on file.  Patient was given instructions and counseling regarding her condition or for health maintenance advice. Please see specific information pulled into the AVS if appropriate.         Transcribed from ambient dictation for Alexia Velásquez MD by Griselda Madden.  03/07/24   09:44 EST    {JEREMY Provider Statement:46820::\"Patient or patient representative verbalized consent to the visit recording.\",\"I have personally performed the services described in this document as transcribed by the above individual, and it is both accurate and complete.\"}   "

## 2024-03-07 NOTE — LETTER
"March 21, 2024       No Recipients    Patient: Pili Arechiga   YOB: 1955   Date of Visit: 3/7/2024     Dear Nigel De Paz MD:       Thank you for referring Pili Arechiga to me for evaluation. Below are the relevant portions of my assessment and plan of care.    If you have questions, please do not hesitate to call me. I look forward to following Pili along with you.         Sincerely,        Alexia Velásquez MD        CC:   No Recipients    Alexia Velásquez MD  03/21/24 1728  Sign when Signing Visit  Chief Complaint  No chief complaint on file.    Subjective       Pili Arechiga presents to [facility] CLINIC  History of Present Illness    The patient is a 68-year-old female who presents to the clinic for a follow-up after [place] bronchoscopy. She is accompanied by an adult female.    Her breathing has been well since she had the tube removed. The last time they did the biopsy on the lymph node, it was not there. She is scheduled for a PET scan.    She has a family history of cancer.    Objective  Vital Signs:  /67   Pulse 63   Temp 96.6 °F (35.9 °C)   Resp 16   Wt 46.9 kg (103 lb 8 oz)   SpO2 98%   BMI 18.33 kg/m²   Estimated body mass index is 18.33 kg/m² as calculated from the following:    Height as of 2/28/24: 160 cm (63\").    Weight as of this encounter: 46.9 kg (103 lb 8 oz).             Physical Exam  Vitals and nursing note reviewed.   Constitutional:       Appearance: She is well-developed.   HENT:      Head: Normocephalic and atraumatic.      Nose: Nose normal.   Eyes:      Conjunctiva/sclera: Conjunctivae normal.   Cardiovascular:      Rate and Rhythm: Normal rate.   Pulmonary:      Effort: Pulmonary effort is normal.   Abdominal:      Palpations: Abdomen is soft.   Musculoskeletal:      Cervical back: Neck supple.   Skin:     General: Skin is warm and dry.   Neurological:      Mental Status: She is alert and oriented to person, place, and time. "   Psychiatric:         Behavior: Behavior normal.         Thought Content: Thought content normal.         Judgment: Judgment normal.        Result Review:    Imaging  I have independently reviewed the CT of the chest performed on 02/22/2024 which demonstrates mediastinal and hilar lymphadenopathy with a subpleural solid pulmonary nodule in the superior segment of the left lower lobe. Lung biopsy as well as a level 4 lymph node demonstrates small cell carcinoma.                   Assessment and Plan     The patient is a 68-year-old female with following up for mediastinal lymphadenopathy in our multidisciplinary clinic. She is status post biopsy which demonstrates small cell carcinoma.    Mediastinal lymphadenopathy.  - She will need a port placement, chemotherapy, and an MRI of the brain.   Risks of the port were discussed with her today and she would like to proceed.    Diagnoses and all orders for this visit:    1. Small cell carcinoma (Primary)           I spent 30 minutes caring for Pili on this date of service. This time includes time spent by me in the following activities:preparing for the visit, reviewing tests, obtaining and/or reviewing a separately obtained history, performing a medically appropriate examination and/or evaluation , counseling and educating the patient/family/caregiver, ordering medications, tests, or procedures, documenting information in the medical record, and independently interpreting results and communicating that information with the patient/family/caregiver  Follow Up     No follow-ups on file.  Patient was given instructions and counseling regarding her condition or for health maintenance advice. Please see specific information pulled into the AVS if appropriate.         Transcribed from ambient dictation for Alexia Velásquez MD by Griselda Madden.  03/07/24   09:44 EST    Patient or patient representative verbalized consent to the visit recording.  I have personally performed  the services described in this document as transcribed by the above individual, and it is both accurate and complete.

## 2024-03-07 NOTE — PROGRESS NOTES
Subjective     REASON FOR CONSULTATION:  small cell lung cancer  Provide an opinion on any further workup or treatment                             REQUESTING PHYSICIAN:  James    RECORDS OBTAINED:  Records of the patients history including those obtained from the referring provider were reviewed and summarized in detail.    HISTORY OF PRESENT ILLNESS:  The patient is a 68 y.o. year old female who is here for an opinion about the above issue.    History of Present Illness   This is a 68-year-old woman with long history of tobacco use and COPD, degenerative disease of the spine on narcotic therapy, depression/anxiety, orthostatic hypotension, hyperlipidemia who has been followed with serial imaging for a left lower lobe lung nodule and mediastinal lymphadenopathy.  A CT of the chest 1/21/2023 showed a masslike consolidation in the left lower lobe 2.5 x 2.6 cm in size with a potential cavitary focus centrally surrounding haziness and otherwise groundglass opacities in the right middle lobe and right lower lobe and enlarged mediastinal lymph nodes up to 10 mm.  The findings were favored to be infectious or inflammatory.  A follow-up CT chest 7/28/2023 showed pleural-based area of density in the left upper lung 11 x 5 mm new from the previous exam and resolution of the consolidation in the left lower lobe.  A PET scan was performed 8/9/2023 which showed the 1.1 cm pleural-based density in the posterior left lower lobe to blend into dependent atelectasis but have more intense activity than the atelectasis with maximum SUV 2.8.  There was hypermetabolic lymphadenopathy in the left hilum and left lower paratracheal regions for example lymph node posterior to the left pulmonary artery SUV 4.7 measuring 1.6 cm and another area of soft tissue lateral to the left mainstem bronchus SUV 4.4, ill-defined lymphadenopathy left lower paratracheal region SUV 3.7.  She underwent bronchoscopy with biopsies on 8/25/2023  with samples from stations 11 R, 4R, 7, 10 L, 11 L, 12 L all negative for malignancy; station 4R showed rare atypical reactive epithelial cells.  A follow-up CT of the chest on 2024 showed the left lower lobe nodule to be enlarging at 1.6 x 0.9 cm and enlarging precarinal lymphadenopathy concerning for metastatic disease.  The patient underwent repeat bronchoscopy with Ion navigation on 2024; biopsies from the left lower lobe nodule were positive for small cell carcinoma and a level 4 lymph node biopsied also positive for small cell carcinoma.    The patient has comorbidities of COPD but minimally symptomatic, no known cardiac disease, kidney disease, liver disease, diabetes etc.  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      Past Medical History:   Diagnosis Date    COPD (chronic obstructive pulmonary disease)     COPD with acute exacerbation 2020        Past Surgical History:   Procedure Laterality Date    BRONCHOSCOPY N/A 2023    Procedure: BRONCHOSCOPY WITH ENDOBRONCHIAL ULTRASOUND (EBUS) with FNA;  Surgeon: Coy Mccarthy MD;  Location: [facility] ENDOSCOPY;  Service: Pulmonary;  Laterality: N/A;  Pre/Post - mediastinal and hilar lymphadenopathy.    BRONCHOSCOPY WITH ION ROBOTIC ASSIST N/A 2024    Procedure: BRONCHOSCOPY WITH ION ROBOT,  ENDOBRONCHIAL ULTRASOUND, FINE NEEDLE ASPIRATIONS,  CRYOTHERAPY BIOPSIES, AND LEFT LOWER LOBE BRONCHOALVEOLAR LAVAGE.;  Surgeon: Alexia Velásquez MD;  Location: [facility] ENDOSCOPY;  Service: Robotics - Pulmonary;  Laterality: N/A;     SECTION      CHEST TUBE INSERTION Left 2024    Procedure: CHEST TUBE INSERTION AT BEDSIDE;  Surgeon: Alexia Velásquez MD;  Location: [facility] ENDOSCOPY;  Service: Thoracic;  Laterality: Left;    CHOLECYSTECTOMY      COLONOSCOPY      ECTOPIC PREGNANCY SURGERY      HYSTERECTOMY      TONSILLECTOMY      TOTAL HIP ARTHROPLASTY Left         Current Outpatient Medications on File Prior to Visit   Medication Sig Dispense  Refill    albuterol sulfate  (90 Base) MCG/ACT inhaler Inhale 2 puffs Every 4 (Four) Hours As Needed for Wheezing. Takes as needed.      budesonide-formoterol (SYMBICORT) 160-4.5 MCG/ACT inhaler Inhale 2 puffs 2 (Two) Times a Day.  12    diazePAM (VALIUM) 10 MG tablet Take 1 tablet by mouth 2 (Two) Times a Day As Needed for Anxiety (RLS.). Takes 20 mg at bedtime at times when she needs.      HYDROcodone-acetaminophen (NORCO)  MG per tablet Take 1 tablet by mouth 3 (Three) Times a Day As Needed for Moderate Pain.      midodrine (PROAMATINE) 2.5 MG tablet Take 1 tablet by mouth 3 (Three) Times a Day Before Meals.      naloxone (NARCAN) 4 MG/0.1ML nasal spray 1 spray into the nostril(s) as directed by provider As Needed.       No current facility-administered medications on file prior to visit.        ALLERGIES:    Allergies   Allergen Reactions    Codeine GI Intolerance    Percocet [Oxycodone-Acetaminophen] Hives        Social History     Socioeconomic History    Marital status:    Tobacco Use    Smoking status: Every Day     Current packs/day: 1.00     Average packs/day: 1 pack/day for 30.0 years (30.0 ttl pk-yrs)     Types: Cigarettes     Passive exposure: Current    Smokeless tobacco: Never    Tobacco comments:     Began smoking at age 11.  Smoked .5 ppd for 6 years, 2.5 ppd for a year, 2 ppd for 5 years, and 1 ppd for the past 43 years for a 58.5 pack year history.   Vaping Use    Vaping status: Former    Substances: Nicotine, Flavoring    Devices: Disposable   Substance and Sexual Activity    Alcohol use: Yes     Comment: once a year     Drug use: No    Sexual activity: Defer        Family History   Problem Relation Age of Onset    Lung cancer Niece 50    Throat cancer Father     Breast cancer Neg Hx         Review of Systems   Constitutional: Negative.    HENT: Negative.     Eyes: Negative.    Respiratory: Negative.     Cardiovascular: Negative.    Neurological: Negative.    Hematological:  Negative.    Psychiatric/Behavioral:  Positive for dysphoric mood. The patient is nervous/anxious.         Objective     There were no vitals filed for this visit.       No data to display                Physical Exam    CONSTITUTIONAL: pleasant well-developed adult woman  HEENT: no icterus, no thrush, moist membranes  LYMPH: no cervical or supraclavicular lad  CV: RRR, S1S2, no murmur  RESP: cta bilat, no wheezing, no rales  GI: soft, non-tender, no splenomegaly, +bs  MUSC: no edema, normal gait  NEURO: alert and oriented x3, normal strength  PSYCH: normal mood and affect for situation  Skin: No rash or induration noted      RECENT LABS:  Hematology WBC   Date Value Ref Range Status   02/29/2024 20.70 (H) 3.40 - 10.80 10*3/mm3 Final     RBC   Date Value Ref Range Status   02/29/2024 4.28 3.77 - 5.28 10*6/mm3 Final     Hemoglobin   Date Value Ref Range Status   02/29/2024 13.4 12.0 - 15.9 g/dL Final     Hematocrit   Date Value Ref Range Status   02/29/2024 42.9 34.0 - 46.6 % Final     Platelets   Date Value Ref Range Status   02/29/2024 361 140 - 450 10*3/mm3 Final        Lab Results   Component Value Date    GLUCOSE 101 (H) 02/29/2024    BUN 7 (L) 02/29/2024    CREATININE 0.79 02/29/2024    EGFR 82.6 02/29/2024    BCR 8.9 02/29/2024    K 5.1 02/29/2024    CO2 26.0 02/29/2024    CALCIUM 8.5 (L) 02/29/2024    ALBUMIN 4.1 01/21/2023    BILITOT 0.2 01/21/2023    AST 13 01/21/2023    ALT 8 01/21/2023     F-18 FDG PET SKULL BASE TO MID THIGH WITH PET CT FUSION.     HISTORY: 67-year-old female with a left lower lobe pulmonary nodule.     TECHNIQUE: Radiation dose reduction techniques were utilized, including  automated exposure control and exposure modulation based on body size.   Blood glucose level at time of injection was 89 mg/dL. 6.5 mCi of F-18  FDG were injected and PET was performed from skull base to mid thigh. CT  was obtained for localization and attenuation correction. Time at  injection 7:45 AM. PET start  time 9:06 AM. Compared with outside  facility chest CT 07/28/2023.     FINDINGS: The 1.1 cm pleural-based nodular density at the posterior  aspect of the left lower lobe blends into the dependent atelectatic  change adjacently, but does have slightly more intense activity than the  dependent atelectasis with a maximal SUV of 2.8. There is hypermetabolic  lymphadenopathy at the left hilum and left lower paratracheal regions.  The 1.6 x 1.6 cm node posterior to the left pulmonary artery has a  maximal SUV of 4.7 and a smaller ill-defined soft tissue lateral to the  left mainstem bronchus has a maximal SUV of 4.4 and ill-defined  lymphadenopathy in the left lower paratracheal region has a maximal SUV  of 3.7. There is no suspicious hypermetabolic activity at the right  aspect of the chest. There is no suspicious hypermetabolic activity at  the supraclavicular regions or neck. There is no suspicious  hypermetabolic activity at the abdomen or pelvis. There is no  hypermetabolic lymphadenopathy. There is nonspecific bowel activity.  There is very extensive sigmoid diverticulosis without evidence for  diverticulitis.     IMPRESSION:  1. Hypermetabolic left hilar and left lower paratracheal lymphadenopathy  as described. There is otherwise no hypermetabolic lymphadenopathy at  the chest, neck, abdomen, or pelvis.  2. The pleural-based opacity at the left lower lobe blends in with  dependent atelectatic change, but does have slightly more hypermetabolic  activity which is nonspecific. Conservative surveillance is recommended  with a follow-up chest CT in 3 months.    CT chest 2/22/2024:  FINDINGS:      Chest wall: No lymphadenopathy.     Thyroid: Normal.     Mediastinum: Three-vessel coronary artery atherosclerotic  calcifications. Heart is normal in size. No pericardial effusion.  Calcified mediastinal and left hilar lymph nodes, consistent with prior  granulomatous infection. Enlarged 4L lymph node, series 2, axial  mage  139, measures 2.5 cm, previously 2.6 cm. Enlarged AP window lymph node,  series 2, axial image 137, measures 1.4 cm, previously 1.2 cm. Poorly  defined left hilar lymphadenopathy appears stable. Right  pericardiophrenic angle lymph node, series 2, axial image 311, measures  9 mm in short axis, unchanged.     Lung/pleura: No effusions. Secretions in the xiomara, extending into the  left central airways. Airways wall thickening. Mild centrilobular and  paraseptal emphysema. Biapical pleural-parenchymal thickening. Calcified  pulmonary nodules in the superior segment right lower lobe, consistent  with prior granulomatous infection, benign. Solid pulmonary nodule in  the anterior left upper lobe, series 3, axial image 185, measures 3 mm,  unchanged. Subpleural pulmonary nodule in the superior segment left  lower lobe, with broad base abutment to the pleura, series 3, axial mage  188, measures 1.7 cm, previously measuring 1.6 cm, not significantly  changed in the interval. Adjacent subpleural solid pulmonary nodule seen  more medially, series 3, axial mage 195, measures 5 mm, unchanged.  Juxtapleural solid pulmonary nodule along the left major fissure, series  3, axial mage 186, measures 1.0 cm, unchanged, suspect fissural lymph  node. Subpleural interlobular septal thickening with distortion and  areas of traction bronchiolectasis, suspect fibrotic changes, appears  diffuse. Scattered poorly defined groundglass lung opacities seen in the  lower lungs, most pronounced in the anterior lateral basilar left lower  lobe, series 3, axial image 244, more pronounced in the interval.     Upper abdomen: Unremarkable.     Osseous structures: Severe spondylosis/degenerative disc disease at  C6/C7.     IMPRESSION:     1. Airways disease and emphysema with superimposed fibrotic lung  changes.  2. Mediastinal and left hilar lymphadenopathy is stable in the interval  though suspicious for kylah metastatic disease. Consider  sampling.  3. Subpleural solid pulmonary nodule in the superior segment left lower  lobe is stable in the interval though suspicious for primary lung  malignancy. Consider sampling.  4. Poorly defined groundglass opacity seen in the lower lungs, most  pronounced in the basilar left lower lobe is more conspicuous in the  interval. Favor infectious or inflammatory etiology though follow-up to  resolution advised.  I personally reviewed the CT chest 2/22/2024-there are emphysematous changes in the lungs.  There are enlarged left hilar and mediastinal lymph nodes and a nodule in the left lower lobe of the lung measuring 1.7 cm    Assessment & Plan     *Small cell lung cancer left lower lobe of the lung with mediastinal involvement  CT chest 2/22/2024-enlarged for ill lymph nodes 2.5 cm, enlarged AP window lymph node 1.4 cm, poorly defined left hilar lymphadenopathy, left lower lobe pulmonary nodule 1.7 cm  Bronchoscopy with Ion navigation performed 2/28/2024-pathology positive for small cell carcinoma from the left lower lobe and station 4L  *Comorbidities of tobacco abuse/COPD, anxiety, atherosclerotic calcifications by CT but no definitive diagnosis of CAD, degenerative disease of the spine, hyperlipidemia; no known autoimmune disorders    Oncology plan/recommendations:  Case discussed with Dr. Velásquez thoracic oncology and multidisciplinary clinic today.  Imaging and pathology discussed with the patient and her granddaughter.  The patient has small cell lung cancer.  She needs additional staging with a PET scan which is scheduled later this morning and MRI of the brain.  If the patient has disease limited to the chest she would be a candidate for definitive chemoradiation.  If she has disease extensive stage B on the chest she would be a candidate for palliative therapy utilizing chemotherapy combined with immunotherapy.  I will await further staging and contact the patient with results and further treatment  recommendations.  The patient would need a port assuming she decides to proceed with treatment  We will consider a cardiology evaluation for the atherosclerotic coronary disease seen on CT pending her decision regarding treatment    Thank you for allowing me to participate in the care of this pleasant patient.

## 2024-03-11 ENCOUNTER — TELEPHONE (OUTPATIENT)
Dept: ONCOLOGY | Facility: CLINIC | Age: 69
End: 2024-03-11
Payer: MEDICARE

## 2024-03-11 NOTE — TELEPHONE ENCOUNTER
Patient verbalized understanding of Dr. Burns's message. She'd like to know if Dr. Burns would be willing to see her on Wednesday after her Brain MRI so she can talk with him directly about this.

## 2024-03-11 NOTE — TELEPHONE ENCOUNTER
----- Message from Nikita Burns MD sent at 3/11/2024  9:46 AM EDT -----  PIP her PET in addition to the lung nodule and lymph nodes we knew about from CT, shows a small nodule in the right lung that could be spread of her cancer but unclear because so small.  I would recommend to treat with chemo-immunotherapy without radiation to start and rescan after two cycles of treatment.  We could always add radiation later if needed.  If she is agreeable she would need port with dr. Velásquez or we can send to surgeon in [facility], [place] if she prefers.  If she wants treatment would need chemo ed and set up for carbo/-16/atezo (3 day regiment) after port placed see MD/NP day of first treatment in [facility], [place].  ----- Message -----  From: Nikita Burns MD  Sent: 3/7/2024   9:07 AM EDT  To: Nikita Burns MD    PET/MRI and treatment plan-call pt

## 2024-03-11 NOTE — PROGRESS NOTES
Subjective     REASON FOR CONSULTATION:  small cell lung cancer  Provide an opinion on any further workup or treatment                             REQUESTING PHYSICIAN:  James    RECORDS OBTAINED:  Records of the patients history including those obtained from the referring provider were reviewed and summarized in detail.    HISTORY OF PRESENT ILLNESS:  The patient is a 68 y.o. year old female who is here for an opinion about the above issue.    History of Present Illness   This is a 68-year-old woman with long history of tobacco use and COPD, degenerative disease of the spine on narcotic therapy, depression/anxiety, orthostatic hypotension, hyperlipidemia who has been followed with serial imaging for a left lower lobe lung nodule and mediastinal lymphadenopathy.  A CT of the chest 1/21/2023 showed a masslike consolidation in the left lower lobe 2.5 x 2.6 cm in size with a potential cavitary focus centrally surrounding haziness and otherwise groundglass opacities in the right middle lobe and right lower lobe and enlarged mediastinal lymph nodes up to 10 mm.  The findings were favored to be infectious or inflammatory.  A follow-up CT chest 7/28/2023 showed pleural-based area of density in the left upper lung 11 x 5 mm new from the previous exam and resolution of the consolidation in the left lower lobe.  A PET scan was performed 8/9/2023 which showed the 1.1 cm pleural-based density in the posterior left lower lobe to blend into dependent atelectasis but have more intense activity than the atelectasis with maximum SUV 2.8.  There was hypermetabolic lymphadenopathy in the left hilum and left lower paratracheal regions for example lymph node posterior to the left pulmonary artery SUV 4.7 measuring 1.6 cm and another area of soft tissue lateral to the left mainstem bronchus SUV 4.4, ill-defined lymphadenopathy left lower paratracheal region SUV 3.7.  She underwent bronchoscopy with biopsies on 8/25/2023  with samples from stations 11 R, 4R, 7, 10 L, 11 L, 12 L all negative for malignancy; station 4R showed rare atypical reactive epithelial cells.  A follow-up CT of the chest on 2/6/2024 showed the left lower lobe nodule to be enlarging at 1.6 x 0.9 cm and enlarging precarinal lymphadenopathy concerning for metastatic disease.  The patient underwent repeat bronchoscopy with Ion navigation on 2/28/2024; biopsies from the left lower lobe nodule were positive for small cell carcinoma and a level 4 lymph node biopsied also positive for small cell carcinoma.    Full body PET scan on 3/7/2024 showed significantly avid bilateral hilar and mediastinal lymph nodes for example kylah conglomerate AP window 8.3 SUV measuring 2.5 x 1.8 cm, left hilar lymph node SUV 7 1.4 cm, pleural-based nodularity left major fissure newly hypermetabolic SUV 2.2, pleural-based partially cavitary mass left lower lobe SUV five 1.5 x 1 cm, new right lung nodule 8 mm in the right lower lobe suspicious.  MRI of the brain negative.    The patient has comorbidities of COPD but minimally symptomatic, no known cardiac disease, kidney disease, liver disease, diabetes etc.  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      Past Medical History:   Diagnosis Date    COPD (chronic obstructive pulmonary disease)     COPD with acute exacerbation 09/28/2020    Small cell carcinoma 3/13/2024        Past Surgical History:   Procedure Laterality Date    BRONCHOSCOPY N/A 8/25/2023    Procedure: BRONCHOSCOPY WITH ENDOBRONCHIAL ULTRASOUND (EBUS) with FNA;  Surgeon: Coy Mccarthy MD;  Location: [facility];  Service: Pulmonary;  Laterality: N/A;  Pre/Post - mediastinal and hilar lymphadenopathy.    BRONCHOSCOPY WITH ION ROBOTIC ASSIST N/A 2/28/2024    Procedure: BRONCHOSCOPY WITH ION ROBOT,  ENDOBRONCHIAL ULTRASOUND, FINE NEEDLE ASPIRATIONS,  CRYOTHERAPY BIOPSIES, AND LEFT LOWER LOBE BRONCHOALVEOLAR LAVAGE.;  Surgeon: Alexia Velásquez MD;  Location: [facility] ENDOSCOPY;   Service: Robotics - Pulmonary;  Laterality: N/A;     SECTION      CHEST TUBE INSERTION Left 2024    Procedure: CHEST TUBE INSERTION;  Surgeon: Alexia Velásquez MD;  Location: [facility] ENDOSCOPY;  Service: Thoracic;  Laterality: Left;    CHOLECYSTECTOMY      COLONOSCOPY      ECTOPIC PREGNANCY SURGERY      HYSTERECTOMY      TONSILLECTOMY      TOTAL HIP ARTHROPLASTY Left         Current Outpatient Medications on File Prior to Visit   Medication Sig Dispense Refill    albuterol sulfate  (90 Base) MCG/ACT inhaler Inhale 2 puffs Every 4 (Four) Hours As Needed for Wheezing. Takes as needed.      budesonide-formoterol (SYMBICORT) 160-4.5 MCG/ACT inhaler Inhale 2 puffs 2 (Two) Times a Day.  12    diazePAM (VALIUM) 10 MG tablet Take 1 tablet by mouth 2 (Two) Times a Day As Needed for Anxiety (RLS.). Takes 20 mg at bedtime at times when she needs.      HYDROcodone-acetaminophen (NORCO)  MG per tablet Take 1 tablet by mouth 3 (Three) Times a Day As Needed for Moderate Pain.      midodrine (PROAMATINE) 2.5 MG tablet Take 1 tablet by mouth 3 (Three) Times a Day Before Meals.      naloxone (NARCAN) 4 MG/0.1ML nasal spray 1 spray into the nostril(s) as directed by provider As Needed.       Current Facility-Administered Medications on File Prior to Visit   Medication Dose Route Frequency Provider Last Rate Last Admin    [COMPLETED] gadobenate dimeglumine (MULTIHANCE) injection 9 mL  9 mL Intravenous Once in imaging Alexia Velásquez MD   9 mL at 24 1223        ALLERGIES:    Allergies   Allergen Reactions    Codeine GI Intolerance    Percocet [Oxycodone-Acetaminophen] Hives        Social History     Socioeconomic History    Marital status:    Tobacco Use    Smoking status: Every Day     Current packs/day: 1.00     Average packs/day: 1 pack/day for 30.0 years (30.0 ttl pk-yrs)     Types: Cigarettes     Passive exposure: Current    Smokeless tobacco: Never    Tobacco comments:     Began smoking at  "age 11.  Smoked .5 ppd for 6 years, 2.5 ppd for a year, 2 ppd for 5 years, and 1 ppd for the past 43 years for a 58.5 pack year history.   Vaping Use    Vaping status: Former    Substances: Nicotine, Flavoring    Devices: Disposable   Substance and Sexual Activity    Alcohol use: Yes     Comment: once a year     Drug use: No    Sexual activity: Defer        Family History   Problem Relation Age of Onset    Lung cancer Niece 50    Throat cancer Father     Breast cancer Neg Hx         Review of Systems   Constitutional: Negative.    HENT: Negative.     Eyes: Negative.    Respiratory: Negative.     Cardiovascular: Negative.    Neurological: Negative.    Hematological: Negative.    Psychiatric/Behavioral:  Positive for dysphoric mood. The patient is nervous/anxious.    ROS unchanged-3/13/2024    Objective     Vitals:    03/13/24 1326   BP: 112/52   Pulse: 85   Resp: 16   Temp: 98.2 °F (36.8 °C)   TempSrc: Infrared   SpO2: 95%   Weight: 48 kg (105 lb 12.8 oz)   Height: 160 cm (62.99\")   PainSc: 0-No pain  Comment: recently took a pain med         3/13/2024     1:26 PM   Current Status   ECOG score 0       Physical Exam    CONSTITUTIONAL: pleasant well-developed adult woman  HEENT: no icterus, no thrush, moist membranes, anisocoria  LYMPH: no cervical or supraclavicular lad  CV: RRR, S1S2, no murmur  RESP: cta bilat, no wheezing, no rales  GI: soft, non-tender, no splenomegaly, +bs  MUSC: no edema, normal gait  NEURO: alert and oriented x3, normal strength  PSYCH: normal mood and affect for situation  Skin: No rash or induration noted      RECENT LABS:  Hematology WBC   Date Value Ref Range Status   02/29/2024 20.70 (H) 3.40 - 10.80 10*3/mm3 Final     RBC   Date Value Ref Range Status   02/29/2024 4.28 3.77 - 5.28 10*6/mm3 Final     Hemoglobin   Date Value Ref Range Status   02/29/2024 13.4 12.0 - 15.9 g/dL Final     Hematocrit   Date Value Ref Range Status   02/29/2024 42.9 34.0 - 46.6 % Final     Platelets   Date Value " Ref Range Status   02/29/2024 361 140 - 450 10*3/mm3 Final        Lab Results   Component Value Date    GLUCOSE 101 (H) 02/29/2024    BUN 7 (L) 02/29/2024    CREATININE 0.79 02/29/2024    EGFR 82.6 02/29/2024    BCR 8.9 02/29/2024    K 5.1 02/29/2024    CO2 26.0 02/29/2024    CALCIUM 8.5 (L) 02/29/2024    ALBUMIN 4.1 01/21/2023    BILITOT 0.2 01/21/2023    AST 13 01/21/2023    ALT 8 01/21/2023     F-18 FDG PET SKULL BASE TO MID THIGH WITH PET CT FUSION.     HISTORY: 67-year-old female with a left lower lobe pulmonary nodule.     TECHNIQUE: Radiation dose reduction techniques were utilized, including  automated exposure control and exposure modulation based on body size.   Blood glucose level at time of injection was 89 mg/dL. 6.5 mCi of F-18  FDG were injected and PET was performed from skull base to mid thigh. CT  was obtained for localization and attenuation correction. Time at  injection 7:45 AM. PET start time 9:06 AM. Compared with outside  facility chest CT 07/28/2023.     FINDINGS: The 1.1 cm pleural-based nodular density at the posterior  aspect of the left lower lobe blends into the dependent atelectatic  change adjacently, but does have slightly more intense activity than the  dependent atelectasis with a maximal SUV of 2.8. There is hypermetabolic  lymphadenopathy at the left hilum and left lower paratracheal regions.  The 1.6 x 1.6 cm node posterior to the left pulmonary artery has a  maximal SUV of 4.7 and a smaller ill-defined soft tissue lateral to the  left mainstem bronchus has a maximal SUV of 4.4 and ill-defined  lymphadenopathy in the left lower paratracheal region has a maximal SUV  of 3.7. There is no suspicious hypermetabolic activity at the right  aspect of the chest. There is no suspicious hypermetabolic activity at  the supraclavicular regions or neck. There is no suspicious  hypermetabolic activity at the abdomen or pelvis. There is no  hypermetabolic lymphadenopathy. There is nonspecific  bowel activity.  There is very extensive sigmoid diverticulosis without evidence for  diverticulitis.     IMPRESSION:  1. Hypermetabolic left hilar and left lower paratracheal lymphadenopathy  as described. There is otherwise no hypermetabolic lymphadenopathy at  the chest, neck, abdomen, or pelvis.  2. The pleural-based opacity at the left lower lobe blends in with  dependent atelectatic change, but does have slightly more hypermetabolic  activity which is nonspecific. Conservative surveillance is recommended  with a follow-up chest CT in 3 months.    CT chest 2/22/2024:  FINDINGS:      Chest wall: No lymphadenopathy.     Thyroid: Normal.     Mediastinum: Three-vessel coronary artery atherosclerotic  calcifications. Heart is normal in size. No pericardial effusion.  Calcified mediastinal and left hilar lymph nodes, consistent with prior  granulomatous infection. Enlarged 4L lymph node, series 2, axial mage  139, measures 2.5 cm, previously 2.6 cm. Enlarged AP window lymph node,  series 2, axial image 137, measures 1.4 cm, previously 1.2 cm. Poorly  defined left hilar lymphadenopathy appears stable. Right  pericardiophrenic angle lymph node, series 2, axial image 311, measures  9 mm in short axis, unchanged.     Lung/pleura: No effusions. Secretions in the xiomara, extending into the  left central airways. Airways wall thickening. Mild centrilobular and  paraseptal emphysema. Biapical pleural-parenchymal thickening. Calcified  pulmonary nodules in the superior segment right lower lobe, consistent  with prior granulomatous infection, benign. Solid pulmonary nodule in  the anterior left upper lobe, series 3, axial image 185, measures 3 mm,  unchanged. Subpleural pulmonary nodule in the superior segment left  lower lobe, with broad base abutment to the pleura, series 3, axial mage  188, measures 1.7 cm, previously measuring 1.6 cm, not significantly  changed in the interval. Adjacent subpleural solid pulmonary nodule  seen  more medially, series 3, axial mage 195, measures 5 mm, unchanged.  Juxtapleural solid pulmonary nodule along the left major fissure, series  3, axial mage 186, measures 1.0 cm, unchanged, suspect fissural lymph  node. Subpleural interlobular septal thickening with distortion and  areas of traction bronchiolectasis, suspect fibrotic changes, appears  diffuse. Scattered poorly defined groundglass lung opacities seen in the  lower lungs, most pronounced in the anterior lateral basilar left lower  lobe, series 3, axial image 244, more pronounced in the interval.     Upper abdomen: Unremarkable.     Osseous structures: Severe spondylosis/degenerative disc disease at  C6/C7.     IMPRESSION:     1. Airways disease and emphysema with superimposed fibrotic lung  changes.  2. Mediastinal and left hilar lymphadenopathy is stable in the interval  though suspicious for kylah metastatic disease. Consider sampling.  3. Subpleural solid pulmonary nodule in the superior segment left lower  lobe is stable in the interval though suspicious for primary lung  malignancy. Consider sampling.  4. Poorly defined groundglass opacity seen in the lower lungs, most  pronounced in the basilar left lower lobe is more conspicuous in the  interval. Favor infectious or inflammatory etiology though follow-up to  resolution advised.  I personally reviewed the CT chest 2/22/2024-there are emphysematous changes in the lungs.  There are enlarged left hilar and mediastinal lymph nodes and a nodule in the left lower lobe of the lung measuring 1.7 cm    Assessment & Plan     *Small cell lung cancer left lower lobe of the lung with mediastinal involvement  CT chest 2/22/2024-enlarged for ill lymph nodes 2.5 cm, enlarged AP window lymph node 1.4 cm, poorly defined left hilar lymphadenopathy, left lower lobe pulmonary nodule 1.7 cm  Bronchoscopy with Ion navigation performed 2/28/2024-pathology positive for small cell carcinoma from the left lower lobe  and station 4L  PET scan on 3/7/2024 showed significantly avid bilateral hilar and mediastinal lymph nodes for example kylah conglomerate AP window 8.3 SUV measuring 2.5 x 1.8 cm, left hilar lymph node SUV 7 1.4 cm, pleural-based nodularity left major fissure newly hypermetabolic SUV 2.2, pleural-based partially cavitary mass left lower lobe SUV five 1.5 x 1 cm, new right lung nodule 8 mm in the right lower lobe suspicious.-Results discussed with Dr. Velásquez and we both feel the contralateral right lung nodule is highly suspicious for metastatic disease  MRI of the brain negative.  *Comorbidities of tobacco abuse/COPD, anxiety, atherosclerotic calcifications by CT but no definitive diagnosis of CAD, degenerative disease of the spine, hyperlipidemia; no known autoimmune disorders, chronic pain on opioid medicines    Oncology plan/recommendations:  Imaging further reviewed with the patient and her granddaughter today in clinic.  She likely has extensive stage small cell lung cancer with new development of a contralateral right lung nodule.  Even in absence of the right lung nodule field of treatment in the left lung for a radiation field would be extremely large and have high toxicity.  I recommended to the patient palliative treatment with chemoimmunotherapy utilizing carboplatin/-16/Tecentriq every 3 weeks for 4 cycles with repeat imaging after 2 to ensure responsive disease.  If she had excellent response to upfront chemotherapy consolidative radiation and PCI to be considered.  I discussed with the patient and her granddaughter possible risk and side effects of chemoimmunotherapy and formal education also requested.  The patient has poor venous access and I discussed with Dr. Eldon Garner port placement  Hopefully we will be able to start the patient on treatment next week; weekly CBC after chemotherapy for chiki check  Referral for smoking cessation made previously  I spent 60 minutes of time today on this case  reviewing the patient's imaging studies, pathology again, discussing treatment recommendations, side effects of treatment, intent of treatment, writing chemotherapy orders, documenting care etc.

## 2024-03-13 ENCOUNTER — LAB (OUTPATIENT)
Dept: LAB | Facility: HOSPITAL | Age: 69
End: 2024-03-13
Payer: MEDICARE

## 2024-03-13 ENCOUNTER — OFFICE VISIT (OUTPATIENT)
Dept: ONCOLOGY | Facility: CLINIC | Age: 69
End: 2024-03-13
Payer: MEDICARE

## 2024-03-13 ENCOUNTER — HOSPITAL ENCOUNTER (OUTPATIENT)
Dept: MRI IMAGING | Facility: HOSPITAL | Age: 69
Discharge: HOME | End: 2024-03-13
Payer: MEDICARE

## 2024-03-13 ENCOUNTER — TELEPHONE (OUTPATIENT)
Dept: SURGERY | Facility: CLINIC | Age: 69
End: 2024-03-13
Payer: MEDICARE

## 2024-03-13 VITALS
SYSTOLIC BLOOD PRESSURE: 112 MMHG | TEMPERATURE: 98.2 F | RESPIRATION RATE: 16 BRPM | DIASTOLIC BLOOD PRESSURE: 52 MMHG | WEIGHT: 105.8 LBS | OXYGEN SATURATION: 95 % | BODY MASS INDEX: 18.75 KG/M2 | HEIGHT: 63 IN | HEART RATE: 85 BPM

## 2024-03-13 DIAGNOSIS — C80.1 SMALL CELL CARCINOMA: Primary | ICD-10-CM

## 2024-03-13 DIAGNOSIS — Z79.899 ENCOUNTER FOR LONG-TERM (CURRENT) USE OF OTHER MEDICATIONS: Primary | ICD-10-CM

## 2024-03-13 DIAGNOSIS — C80.1 SMALL CELL CARCINOMA: ICD-10-CM

## 2024-03-13 PROCEDURE — 70553 MRI BRAIN STEM W/O & W/DYE: CPT

## 2024-03-13 PROCEDURE — A9577 INJ MULTIHANCE: HCPCS | Performed by: THORACIC SURGERY (CARDIOTHORACIC VASCULAR SURGERY)

## 2024-03-13 PROCEDURE — 0 GADOBENATE DIMEGLUMINE 529 MG/ML SOLUTION: Performed by: THORACIC SURGERY (CARDIOTHORACIC VASCULAR SURGERY)

## 2024-03-13 RX ADMIN — GADOBENATE DIMEGLUMINE 9 ML: 529 INJECTION, SOLUTION INTRAVENOUS at 12:23

## 2024-03-13 NOTE — TELEPHONE ENCOUNTER
Left message informing patient of surgery instructions for tomorrow. Her instructions were also emailed to her [facility] account.

## 2024-03-14 ENCOUNTER — HOSPITAL ENCOUNTER (OUTPATIENT)
Facility: HOSPITAL | Age: 69
Setting detail: HOSPITAL OUTPATIENT SURGERY
Discharge: HOME | End: 2024-03-14
Attending: SURGERY | Admitting: SURGERY
Payer: MEDICARE

## 2024-03-14 ENCOUNTER — ANESTHESIA (OUTPATIENT)
Dept: PERIOP | Facility: HOSPITAL | Age: 69
End: 2024-03-14
Payer: MEDICARE

## 2024-03-14 ENCOUNTER — ANESTHESIA EVENT (OUTPATIENT)
Dept: PERIOP | Facility: HOSPITAL | Age: 69
End: 2024-03-14
Payer: MEDICARE

## 2024-03-14 ENCOUNTER — APPOINTMENT (OUTPATIENT)
Dept: GENERAL RADIOLOGY | Facility: HOSPITAL | Age: 69
End: 2024-03-14
Payer: MEDICARE

## 2024-03-14 ENCOUNTER — TELEPHONE (OUTPATIENT)
Dept: ONCOLOGY | Facility: CLINIC | Age: 69
End: 2024-03-14
Payer: MEDICARE

## 2024-03-14 VITALS
SYSTOLIC BLOOD PRESSURE: 122 MMHG | OXYGEN SATURATION: 90 % | BODY MASS INDEX: 19.07 KG/M2 | DIASTOLIC BLOOD PRESSURE: 72 MMHG | WEIGHT: 107.6 LBS | RESPIRATION RATE: 22 BRPM | HEIGHT: 63 IN | TEMPERATURE: 98.2 F | HEART RATE: 63 BPM

## 2024-03-14 DIAGNOSIS — C80.1 SMALL CELL CARCINOMA: ICD-10-CM

## 2024-03-14 PROCEDURE — 25010000002 CEFAZOLIN PER 500 MG: Performed by: SURGERY

## 2024-03-14 PROCEDURE — 25810000003 LACTATED RINGERS PER 1000 ML

## 2024-03-14 PROCEDURE — 25010000002 DEXAMETHASONE PER 1 MG

## 2024-03-14 PROCEDURE — 25010000002 HEPARIN (PORCINE) PER 1000 UNITS: Performed by: SURGERY

## 2024-03-14 PROCEDURE — S0260 H&P FOR SURGERY: HCPCS | Performed by: SURGERY

## 2024-03-14 PROCEDURE — 25010000002 ONDANSETRON PER 1 MG

## 2024-03-14 PROCEDURE — 76000 FLUOROSCOPY <1 HR PHYS/QHP: CPT

## 2024-03-14 PROCEDURE — C1788 PORT, INDWELLING, IMP: HCPCS | Performed by: SURGERY

## 2024-03-14 PROCEDURE — 36561 INSERT TUNNELED CV CATH: CPT | Performed by: SURGERY

## 2024-03-14 PROCEDURE — 25010000002 PROPOFOL 200 MG/20ML EMULSION: Performed by: NURSE ANESTHETIST, CERTIFIED REGISTERED

## 2024-03-14 PROCEDURE — 25010000002 MIDAZOLAM PER 1MG

## 2024-03-14 PROCEDURE — 76937 US GUIDE VASCULAR ACCESS: CPT | Performed by: SURGERY

## 2024-03-14 PROCEDURE — 77001 FLUOROGUIDE FOR VEIN DEVICE: CPT | Performed by: SURGERY

## 2024-03-14 DEVICE — PRT INTRO VASC/INTERV VORTEX FILL/HL DETACH/POLYURET/CATH 8F: Type: IMPLANTABLE DEVICE | Site: CLAVICLE | Status: FUNCTIONAL

## 2024-03-14 RX ORDER — LIDOCAINE HYDROCHLORIDE 20 MG/ML
INJECTION, SOLUTION EPIDURAL; INFILTRATION; INTRACAUDAL; PERINEURAL AS NEEDED
Status: DISCONTINUED | OUTPATIENT
Start: 2024-03-14 | End: 2024-03-14 | Stop reason: SURG

## 2024-03-14 RX ORDER — SODIUM CHLORIDE, SODIUM LACTATE, POTASSIUM CHLORIDE, CALCIUM CHLORIDE 600; 310; 30; 20 MG/100ML; MG/100ML; MG/100ML; MG/100ML
100 INJECTION, SOLUTION INTRAVENOUS ONCE
Status: DISCONTINUED | OUTPATIENT
Start: 2024-03-14 | End: 2024-03-14 | Stop reason: HOSPADM

## 2024-03-14 RX ORDER — SODIUM CHLORIDE 0.9 % (FLUSH) 0.9 %
10 SYRINGE (ML) INJECTION EVERY 12 HOURS SCHEDULED
Status: DISCONTINUED | OUTPATIENT
Start: 2024-03-14 | End: 2024-03-14 | Stop reason: HOSPADM

## 2024-03-14 RX ORDER — LIDOCAINE HYDROCHLORIDE 10 MG/ML
0.5 INJECTION, SOLUTION INFILTRATION; PERINEURAL ONCE AS NEEDED
Status: DISCONTINUED | OUTPATIENT
Start: 2024-03-14 | End: 2024-03-14 | Stop reason: HOSPADM

## 2024-03-14 RX ORDER — BUPIVACAINE HYDROCHLORIDE AND EPINEPHRINE 5; 5 MG/ML; UG/ML
INJECTION, SOLUTION EPIDURAL; INTRACAUDAL; PERINEURAL AS NEEDED
Status: DISCONTINUED | OUTPATIENT
Start: 2024-03-14 | End: 2024-03-14 | Stop reason: HOSPADM

## 2024-03-14 RX ORDER — MIDAZOLAM HYDROCHLORIDE 2 MG/2ML
0.5 INJECTION, SOLUTION INTRAMUSCULAR; INTRAVENOUS
Status: DISCONTINUED | OUTPATIENT
Start: 2024-03-14 | End: 2024-03-14 | Stop reason: HOSPADM

## 2024-03-14 RX ORDER — ONDANSETRON 2 MG/ML
4 INJECTION INTRAMUSCULAR; INTRAVENOUS ONCE AS NEEDED
Status: DISCONTINUED | OUTPATIENT
Start: 2024-03-14 | End: 2024-03-14 | Stop reason: HOSPADM

## 2024-03-14 RX ORDER — SODIUM CHLORIDE 9 MG/ML
40 INJECTION, SOLUTION INTRAVENOUS AS NEEDED
Status: DISCONTINUED | OUTPATIENT
Start: 2024-03-14 | End: 2024-03-14 | Stop reason: HOSPADM

## 2024-03-14 RX ORDER — DEXMEDETOMIDINE HYDROCHLORIDE 100 UG/ML
INJECTION, SOLUTION INTRAVENOUS AS NEEDED
Status: DISCONTINUED | OUTPATIENT
Start: 2024-03-14 | End: 2024-03-14 | Stop reason: SURG

## 2024-03-14 RX ORDER — SODIUM CHLORIDE, SODIUM LACTATE, POTASSIUM CHLORIDE, CALCIUM CHLORIDE 600; 310; 30; 20 MG/100ML; MG/100ML; MG/100ML; MG/100ML
9 INJECTION, SOLUTION INTRAVENOUS CONTINUOUS
Status: DISCONTINUED | OUTPATIENT
Start: 2024-03-14 | End: 2024-03-14 | Stop reason: HOSPADM

## 2024-03-14 RX ORDER — FAMOTIDINE 10 MG/ML
20 INJECTION, SOLUTION INTRAVENOUS
Status: COMPLETED | OUTPATIENT
Start: 2024-03-14 | End: 2024-03-14

## 2024-03-14 RX ORDER — SODIUM CHLORIDE 0.9 % (FLUSH) 0.9 %
10 SYRINGE (ML) INJECTION AS NEEDED
Status: DISCONTINUED | OUTPATIENT
Start: 2024-03-14 | End: 2024-03-14 | Stop reason: HOSPADM

## 2024-03-14 RX ORDER — ONDANSETRON 2 MG/ML
4 INJECTION INTRAMUSCULAR; INTRAVENOUS ONCE AS NEEDED
Status: COMPLETED | OUTPATIENT
Start: 2024-03-14 | End: 2024-03-14

## 2024-03-14 RX ORDER — FENTANYL CITRATE 50 UG/ML
50 INJECTION, SOLUTION INTRAMUSCULAR; INTRAVENOUS
Status: DISCONTINUED | OUTPATIENT
Start: 2024-03-14 | End: 2024-03-14 | Stop reason: HOSPADM

## 2024-03-14 RX ORDER — DEXAMETHASONE SODIUM PHOSPHATE 4 MG/ML
8 INJECTION, SOLUTION INTRA-ARTICULAR; INTRALESIONAL; INTRAMUSCULAR; INTRAVENOUS; SOFT TISSUE ONCE AS NEEDED
Status: COMPLETED | OUTPATIENT
Start: 2024-03-14 | End: 2024-03-14

## 2024-03-14 RX ORDER — PROPOFOL 10 MG/ML
INJECTION, EMULSION INTRAVENOUS AS NEEDED
Status: DISCONTINUED | OUTPATIENT
Start: 2024-03-14 | End: 2024-03-14 | Stop reason: SURG

## 2024-03-14 RX ADMIN — SODIUM CHLORIDE, POTASSIUM CHLORIDE, SODIUM LACTATE AND CALCIUM CHLORIDE: 600; 310; 30; 20 INJECTION, SOLUTION INTRAVENOUS at 15:47

## 2024-03-14 RX ADMIN — LIDOCAINE HYDROCHLORIDE 40 MG: 20 INJECTION, SOLUTION EPIDURAL; INFILTRATION; INTRACAUDAL; PERINEURAL at 15:47

## 2024-03-14 RX ADMIN — PROPOFOL 30 MG: 10 INJECTION, EMULSION INTRAVENOUS at 15:47

## 2024-03-14 RX ADMIN — CEFAZOLIN 2000 MG: 2 INJECTION, POWDER, FOR SOLUTION INTRAVENOUS at 15:49

## 2024-03-14 RX ADMIN — ONDANSETRON 4 MG: 2 INJECTION INTRAMUSCULAR; INTRAVENOUS at 15:33

## 2024-03-14 RX ADMIN — PROPOFOL 100 MCG/KG/MIN: 10 INJECTION, EMULSION INTRAVENOUS at 15:48

## 2024-03-14 RX ADMIN — FAMOTIDINE 20 MG: 10 INJECTION, SOLUTION INTRAVENOUS at 15:33

## 2024-03-14 RX ADMIN — DEXMEDETOMIDINE 8 MCG: 100 INJECTION, SOLUTION INTRAVENOUS at 15:47

## 2024-03-14 RX ADMIN — DEXAMETHASONE SODIUM PHOSPHATE 8 MG: 4 INJECTION, SOLUTION INTRAMUSCULAR; INTRAVENOUS at 15:33

## 2024-03-14 RX ADMIN — MIDAZOLAM HYDROCHLORIDE 0.5 MG: 1 INJECTION, SOLUTION INTRAMUSCULAR; INTRAVENOUS at 15:33

## 2024-03-14 NOTE — ANESTHESIA PREPROCEDURE EVALUATION
Anesthesia Evaluation     Patient summary reviewed and Nursing notes reviewed   NPO Solid Status: > 8 hours  NPO Liquid Status: > 2 hours           Airway   Mallampati: II  TM distance: >3 FB  Neck ROM: full  No difficulty expected  Dental - normal exam     Comment: Missing all teeth except 4 on bottom row      Pulmonary - normal exam   (+) a smoker, COPD,    ROS comment: Last cigarette last PM. Uses both inhalers intermittently about x2 per week.   Cardiovascular - normal exam  Exercise tolerance: poor (<4 METS)    ECG reviewed  Rhythm: regular  Rate: normal    (+) hyperlipidemia    ROS comment: Date and Time of Study: 2024-02-22 16:55:59  HEART RATE= 71  bpm  RR Interval= 848  ms  NV Interval= 217  ms  P Horizontal Axis= -14  deg  P Front Axis= 74  deg  QRSD Interval= 88  ms  QT Interval= 403  ms  QTcB= 438  ms  QRS Axis= 10  deg  T Wave Axis= 39  deg  - BORDERLINE ECG -  Sinus rhythm  Borderline prolonged NV interval  Probable left atrial enlargement  No change from prior tracing    Takes midodrine for low blood pressure        Neuro/Psych  (+) psychiatric history Anxiety  GI/Hepatic/Renal/Endo      Musculoskeletal     (+) back pain      ROS comment: Takes hydrocodone for back and left leg  Abdominal  - normal exam   Substance History - negative use     OB/GYN negative ob/gyn ROS         Other      history of cancer      Other Comment: Small cell carcinoma              Anesthesia Plan    ASA 3     MAC     intravenous induction     Anesthetic plan, risks, benefits, and alternatives have been provided, discussed and informed consent has been obtained with: patient.    Use of blood products discussed with patient  Consented to blood products.    Plan discussed with CRNA.    CODE STATUS:

## 2024-03-14 NOTE — H&P
Colorectal & General Surgery  History and Physical    Patient: Pili Arechiga  YOB: 1955  MRN: [id number]      Assessment  Pili Arechiga is a 68 y.o. female with small cell lung cancer with mediastinal involvement in need of intravenous access for chemotherapy.  We discussed the role of port placement.  We discussed the risk, benefits, alternatives, occluding the risk of pneumothorax.  Informed consent was obtained we will proceed with port placement today.    Chief Complaint: Intravenous access for chemotherapy    History of Present Illness   Pili Arechiga is a 68 y.o. female who was recently diagnosed with small cell lung cancer with mediastinal involvement and now needs intravenous access for chemotherapy.  She had some questions about a port today that we discussed.  Otherwise, she feels well.    Past Medical History   Past Medical History:   Diagnosis Date    COPD (chronic obstructive pulmonary disease)     COPD with acute exacerbation 2020    Small cell carcinoma 3/13/2024        Past Surgical History   Past Surgical History:   Procedure Laterality Date    BRONCHOSCOPY N/A 2023    Procedure: BRONCHOSCOPY WITH ENDOBRONCHIAL ULTRASOUND (EBUS) with FNA;  Surgeon: Coy Mccarthy MD;  Location: [facility] ENDOSCOPY;  Service: Pulmonary;  Laterality: N/A;  Pre/Post - mediastinal and hilar lymphadenopathy.    BRONCHOSCOPY WITH ION ROBOTIC ASSIST N/A 2024    Procedure: BRONCHOSCOPY WITH ION ROBOT,  ENDOBRONCHIAL ULTRASOUND, FINE NEEDLE ASPIRATIONS,  CRYOTHERAPY BIOPSIES, AND LEFT LOWER LOBE BRONCHOALVEOLAR LAVAGE.;  Surgeon: Alexia Velásquez MD;  Location: [facility] ENDOSCOPY;  Service: Robotics - Pulmonary;  Laterality: N/A;     SECTION      CHEST TUBE INSERTION Left 2024    Procedure: CHEST TUBE INSERTION;  Surgeon: Alexia Velásquez MD;  Location: [facility] ENDOSCOPY;  Service: Thoracic;  Laterality: Left;    CHOLECYSTECTOMY      COLONOSCOPY      ECTOPIC PREGNANCY SURGERY      HYSTERECTOMY       TONSILLECTOMY      TOTAL HIP ARTHROPLASTY Left        Social History  Social History     Socioeconomic History    Marital status:    Tobacco Use    Smoking status: Every Day     Current packs/day: 1.00     Average packs/day: 1 pack/day for 30.0 years (30.0 ttl pk-yrs)     Types: Cigarettes     Passive exposure: Current    Smokeless tobacco: Never    Tobacco comments:     Began smoking at age 11.  Smoked .5 ppd for 6 years, 2.5 ppd for a year, 2 ppd for 5 years, and 1 ppd for the past 43 years for a 58.5 pack year history.   Vaping Use    Vaping status: Former    Substances: Nicotine, Flavoring    Devices: Disposable   Substance and Sexual Activity    Alcohol use: Yes     Comment: once a year     Drug use: No    Sexual activity: Defer       Family History  Family History   Problem Relation Age of Onset    Lung cancer Niece 50    Throat cancer Father     Breast cancer Neg Hx        Colorectal cancer family history: None    Review of Systems  Negative except as documented in the HPI.     Allergies  Allergies   Allergen Reactions    Codeine GI Intolerance    Percocet [Oxycodone-Acetaminophen] Hives       Medications    Current Facility-Administered Medications:     ceFAZolin 2000 mg IVPB in 100 mL NS (MBP), 2,000 mg, Intravenous, Once, Jonas Garner MD    dexAMETHasone (DECADRON) injection 8 mg, 8 mg, Intravenous, Once PRN, Lexa, Aashish, CRNA    famotidine (PEPCID) injection 20 mg, 20 mg, Intravenous, 60 Min Pre-Op, Lexa, Aashish, CRNA    lactated ringers infusion, 9 mL/hr, Intravenous, Continuous, Lexa, Aashish, CRNA    lidocaine (XYLOCAINE) 1 % injection 0.5 mL, 0.5 mL, Intradermal, Once PRN, Lexa, Aashish, CRNA    Midazolam HCl (PF) (VERSED) injection 0.5 mg, 0.5 mg, Intravenous, Q10 Min PRN, Lexa, Aashish, CRNA    ondansetron (ZOFRAN) injection 4 mg, 4 mg, Intravenous, Once PRN, [place], Aashish, CRNA    sodium chloride 0.9 % flush 10 mL, 10 mL, Intravenous, Q12H, Lexa, Aashish, CRNA    sodium chloride  0.9 % flush 10 mL, 10 mL, Intravenous, PRN, Lexa, Aashish, CRNA    sodium chloride 0.9 % infusion 40 mL, 40 mL, Intravenous, PRN, Lexa, Aashish, CRNA    Vital Signs  Vitals:    03/14/24 1409   BP: 120/83   Pulse: 67   Resp: 10   Temp: 98 °F (36.7 °C)   SpO2: 95%        Physical Exam  Constitutional: Resting comfortably, no acute distress  Neck: Supple, trachea midline  Respiratory: No increased work of breathing, Symmetric excursion  Cardiovascular: Well pefursed, no jugular venous distention evident   Abdominal: Soft, non-tender, non-distended  Lymphatics: No cervical or suprascapular adenopathy  Skin: Warm, dry, no rash on visualized skin surfaces  Musculoskeletal: Symmetric strength, no obvious gross abnormalities  Psychiatric: Alert and oriented ×3, normal affect   BMI is within normal parameters. No other follow-up for BMI required.       Laboratory Results  I have personally reviewed CBC with WC 20, hemoglobin 13, platelets 361.  BMP with creatinine 0.79, bicarb 26.         Eldon Garner MD  Colorectal & General Surgery  [facility]    [address], [address]  [place], [place], [address]  P: [phone]  F: [phone]

## 2024-03-14 NOTE — TELEPHONE ENCOUNTER
Informed patient that Dr. Burns wants her to now her MRI Brain was negative for cancer. She verbalized understanding and relief.

## 2024-03-14 NOTE — OP NOTE
Colorectal & General Surgery  Operative Report    PREOPERATIVE DIAGNOSIS:  Small cell lung cancer    POSTOPERATIVE DIAGNOSIS:  Same    PROCEDURE:  Powerport placement with fluoroscopic guidance  Ultrasound guided access of the right internal jugular vein    DATE OF PROCEDURE:   03/14/24     SURGEON:  Eldon Garner MD    ASSISTANT:  None    ANESTHESIA:  Monitored anesthesia care  Local anesthetic    EBL:  Minimal    SPECIMEN:  None    DESCRIPTION:  All risks, benefits, and alternatives were explained and informed consent was obtained.  The patient was taken to the operating room under the care of the nursing staff.  The patient was placed on the operating room table in the supine position where anesthesia was induced.  The patient was then prepped and draped in the usual sterile fashion.  Local anesthesic was administered.  The right internal jugular vein was accessed with an 18-gauge needle under ultrasound guiddance, and a guidewire was inserted under fluoroscopic guidance into the superior vena cava.  A subcutaneous pocket was then created with electrocautery on the right chest wall. The port was placed in the pocket and secured in two points with 2-0 PDS. The tubing was then tunneled from the subcutaneous pocket to the location of the wire. The vein dilator and sheath were introduced, and the dilator and guidewire were removed.  The port tubing was inserted to the cavoatrial junction, and position of tip was confirmed with fluoroscopic guidance.  Venous blood was easily aspirated from the port, and the port was flushed with heparinized saline. There was good hemostasis noted. The skin was then closed with 3-0 Vicryl deep dermal and 4-0 monocryl subcuticular sutures. Dermabond was placed over the wound. The patient tolerated the procedure well and was transferred to the recovery area in stable condition. Recovery room CXR was ordered to confirm placement.    Eldon Garner M.D.  Colorectal & General  Surgery  [facility]    [address], [address]  [place], [place], [address]  P: [phone]  F: [phone]

## 2024-03-14 NOTE — ANESTHESIA POSTPROCEDURE EVALUATION
Patient: Pili Arechiga    Procedure Summary       Date: 03/14/24 Room / Location:  [facility] 2 /  [facility]    Anesthesia Start: 1544 Anesthesia Stop: 1632    Procedure: INSERTION VENOUS ACCESS DEVICE Diagnosis:       Small cell carcinoma      (Small cell carcinoma [C80.1])    Surgeons: Jonas Garner MD Provider: Danuta Maynard CRNA    Anesthesia Type: MAC ASA Status: 3            Anesthesia Type: MAC    Vitals  Vitals Value Taken Time   /66 03/14/24 1650   Temp     Pulse 66 03/14/24 1654   Resp 22 03/14/24 1636   SpO2 94 % 03/14/24 1654   Vitals shown include unfiled device data.        Post Anesthesia Care and Evaluation    Patient location during evaluation: PHASE II  Patient participation: complete - patient participated  Level of consciousness: awake and alert  Pain score: 0  Pain management: adequate    Airway patency: patent  Anesthetic complications: No anesthetic complications  PONV Status: none  Cardiovascular status: acceptable  Respiratory status: acceptable  Hydration status: acceptable

## 2024-03-14 NOTE — DISCHARGE INSTRUCTIONS
POST OP RECOMMENDATIONS  Dr. Eldon Garner  [phone]  Discharge Instructions for Port Placement    Go home, rest and take it easy today; however, you should get up and move about several times today to reduce the risk of developing a blood clot in your legs.   You may experience some dizziness or memory loss from the anesthesia. This may last for the next 24 hours. Someone should plan on staying with you for the first 24 hours for your safety.   Do not make any important legal decisions or sign any legal papers for the next 24 hours.   Eat and drink lightly today. Start off with liquids, jello, soup, crackers or other bland foods at first. You may advance your diet tomorrow as tolerated as long as you do not experience any nausea or vomiting.   If present, you may remove your outer dressings in 3 days. The white tapes called steri-strips should stay in place. They will fall off on their own in 1-2 weeks. Do not worry if they come off sooner. Otherwise, glue covering your incisions will fall off in the next 1-2 weeks.  You may notice some bleeding/drainage on your outer dressings. A little bloody drainage is normal. If the bleeding/drainage is such that the bandage cannot absorb it, remove the dressing, apply clean gauze and apply firm pressure for a full 15 minutes. If the bleeding continues, please call me.   You may shower tomorrow. No tub baths until your incisions are completely healed.   You will have some pain and discomfort after surgery.  You will not be totally pain free, but tylenol and ibuprofen should make the pain more tolerable.  Unless you have been previously instructed to not take tylenol or ibuprofen, you can alternate these medications every 4 hours and take as instructed on each bottle. Please take any ibuprofen with food to avoid nausea and GI upset. If you are having severe pain that cannot be controlled by OTC pain medicine, please contact me.   You have also received a prescription for an  anti-nausea medicine. Please take this as prescribed for any nausea or vomiting. Nausea could be a result of the anesthesia. If you experience severe nausea and vomiting that cannot be controlled by the nausea medicine, please call me.   No driving for 24 hours and until you fell comfortable making sudden movements with your neck and arms.   If the port is to be used within 10-14 days of placement, no surgical follow-up is needed. Otherwise, you should call the office at [phone] to schedule a follow-up in about 1 week.   Remember to contact me for any of the following:   Fever > 101 degrees  Severe pain that cannot be controlled by taking your pain pills  Severe nausea or vomiting that cannot be controlled by taking your nausea pills  Significant bleeding of your incisions  Drainage that has a bad smell or is yellow or green in appearance  Any other questions or concerns

## 2024-03-15 ENCOUNTER — TELEMEDICINE (OUTPATIENT)
Dept: ONCOLOGY | Facility: CLINIC | Age: 69
End: 2024-03-15
Payer: MEDICARE

## 2024-03-15 DIAGNOSIS — C80.1 SMALL CELL CARCINOMA: Primary | ICD-10-CM

## 2024-03-15 DIAGNOSIS — Z79.899 ENCOUNTER FOR LONG-TERM (CURRENT) USE OF OTHER MEDICATIONS: ICD-10-CM

## 2024-03-15 RX ORDER — ONDANSETRON HYDROCHLORIDE 8 MG/1
8 TABLET, FILM COATED ORAL 3 TIMES DAILY PRN
Qty: 30 TABLET | Refills: 5 | Status: SHIPPED | OUTPATIENT
Start: 2024-03-15

## 2024-03-15 RX ORDER — OLANZAPINE 5 MG/1
5 TABLET ORAL NIGHTLY
Qty: 4 TABLET | Refills: 3 | Status: SHIPPED | OUTPATIENT
Start: 2024-03-15

## 2024-03-15 NOTE — PROGRESS NOTES
TREATMENT  PREPARATION    This was an audio and video enabled telemedicine encounter.    Pili Arechiga  [id number]  1955    Chief Complaint: Treatment preparation and needs assessment    History of present illness:  Pili Arechiga is a 68 y.o. year old female who is here today for treatment preparation and needs assessment.  The patient has been diagnosed with   Encounter Diagnoses   Name Primary?    Small cell carcinoma Yes    Encounter for long-term (current) use of other medications     and is scheduled to begin treatment with:     Oncology History:    Oncology/Hematology History   Small cell carcinoma   3/13/2024 Initial Diagnosis    Small cell carcinoma     3/19/2024 -  Chemotherapy    OP LUNG Atezolizumab / CARBOplatin AUC=5 / Etoposide (SCLC)         The current medication list and allergy list were reviewed and reconciled.     Past Medical History, Past Surgical History, Social History, Family History have been reviewed and are without significant changes except as mentioned.    Physical Exam:    There were no vitals filed for this visit.  There were no vitals filed for this visit.     ECOG score: 0             Physical Exam      NEEDS ASSESSMENTS    Genetics  The patient's new diagnosis and family history have been reviewed for genetic counseling needs. The patient will not be referred..     Psychosocial and Barriers to care  The patient has completed a PHQ-9 Depression Screening and the Distress Thermometer (DT) today.  PHQ-9 results show PHQ-2 Total Score:   PHQ-9 Total Score: PHQ-9 Total Score:       The patient scored their distress today as   on a scale of 0-10 with 0 being no distress and 10 being extreme distress. Problems marked by the patient as being an issue for them within the last week include   .      Results were reviewed along with psychosocial resources offered by our cancer center.  Our Supportive Oncology team will be flagged for a score of 4 or above, and a same day call will be made for a  score of 9 or 10.  A mental health referral is offered at that time. Patients who score less than 4 have been educated on our support services and can be referred to our  upon request.  The patient will not be referred to our .       Nutrition  The patient has completed the malnutrition screening today. They scored Malnutrition Screening Tool  Have you recently lost weight without trying?  If yes, how much weight have you lost?: 0--> No  Have you been eating poorly because of a decreased appetite?: 0--> No  MST score: 0   with a score of 0-1 meaning not at risk in a score of 2 or greater meaning at risk.  Patients with a score of 3 or higher will be referred to our oncology dietitian for support. Patients beginning at risk treatment regimens or who have dietary concerns will also be referred to our oncology dietitian. The patient will not be referred.    Functional Assessment  Persons who are age 70 or greater will be screened for qualification of a comprehensive geriatric assessment by our survivorship nurse practitioner.  Older adults with cancer face unique challenges. These may include an increased risk of drug reactions, financial burdens, and caregiver stress. The patient scored   . Patients scoring 14 or lower will referred for an older adult functional assessment with the survivorship advanced practice registered nurse to ensure all needed support is provided as patients plan for their treatments. NOT APPLICABLE    Intravenous Access Assessment  The patient and I discussed planned intravenous chemo/biotherapy as well as other IV treatments that are often needed throughout the course of treatment. These may include, but are not limited to blood transfusions, antibiotics, and IV hydration. Discussed that depending on selected treatment and vein assessment, patient may require venous access device (VAD) which could include but not limited to a Mediport or PICC line. Risks and benefits  "of VADs reviewed. The patient will be treated via Port.    Reproductive/Sexual Activity   People should avoid becoming pregnant and should not get a partner pregnant while undergoing chemo/biotherapy.  People of childbearing age should use effective contraception during active therapy. The best recommendation for all people is to use a barrier method for a minimum of 1 week after the last infusion of chemo/biotherapy to prevent your partner being exposed to byproducts from treatment medications in bodily fluids. Effective contraception should be discussed with your oncology team to make sure it is safe to take based on your diagnosis. Possible options include oral contraceptives, barrier methods. Chemo/biotherapy can change your ability to reproduce children in the future.  There are options for fertility preservation. NOT APPLICABLE    Advanced Care Planning  Advance Care Planning   The patient and I discussed advanced care planning, \"Conversations that Matter\".   This service is offered for development of advance directives with a certified ACP facilitator.  The patient does have an up-to-date advanced directive. This document is on file with our office. The patient is not interested in an appointment with one of our facilitators to create or update their advanced directives.    Have you reviewed your Advance Directive and is it valid for this stay?: Not applicable  Patient Requests Assistance on Advance Directives: Patient Declined          Smoking cessation  Tobacco Use: High Risk (3/15/2024)    Patient History     Smoking Tobacco Use: Every Day     Smokeless Tobacco Use: Never     Passive Exposure: Current       Patient and I discussed their tobacco use history. Referral has be made for smoking cessation.      Palliative Care  When appropriate, the patient and I discussed the availability palliative care services and when appropriate Hospice care. Palliative care is not the same as Hospice care which was " explained to the patient.NOT APPLICABLE.    Survivorship   When appropriate, we discussed that we will refer the patient to survivorship clinic to discuss next steps following completion of planned treatment.  Reviewed this visit will include assessment of your physical, psychological, functional, and spiritual needs as a survivor and the need at attend this visit when scheduled.    TREATMENT EDUCATION    Today I met with the patient to discuss the chemo/biotherapy regimen recommended for treatment of Small cell carcinoma  - OLANZapine (zyPREXA) 5 MG tablet  - ondansetron (ZOFRAN) 8 MG tablet    Encounter for long-term (current) use of other medications  - OLANZapine (zyPREXA) 5 MG tablet  - ondansetron (ZOFRAN) 8 MG tablet  .  The patient was given explanation of treatment premed side effects including office policy that prohibits patients to drive if sedating medications are administered, MD explanation given regarding benefits, side effects, toxicities and goals of treatment.  The patient received a Chemotherapy/Biotherapy Plan Summary including diagnosis and explanation of specific treatment plan.    SIDE EFFECTS:  Common side effects were discussed with the patient and/or significant other.  Discussion included where applicable hair loss/discoloration, anemia/fatigue, infection/chills/fever, appetite, bleeding risk/precautions, constipation, diarrhea, mouth sores, taste alteration, loss of appetite, nausea/vomiting, peripheral neuropathy, skin/nail changes, rash, muscle aches/weakness, photosensitivity, weight gain/loss, hearing loss, dizziness, menopausal symptoms, menstrual irregularity, sterility, high blood pressure, heart damage, liver damage, lung damage, kidney damage, DVT/PE risk, fluid retention, pleural/pericardial effusion, somnolence, electrolyte/LFT imbalance, vein exercises and/or the possible need for vascular access/port placement.  The patient was advised that although uncommon, leakage of an  infused medication from the vein or venous access device may lead to skin breakdown and/or other tissue damage.  The patient was advised that he/she may have pain, bleeding, and/or bruising from the insertion of a needle in their vein or venous access device (port).  The patient was further advised that, in spite of proper technique, infection with redness and irritation may rarely occur at the site where the needle was inserted.  The patient was advised that if complications occur, additional medical treatment is available.  Finally, where applicable we have reviewed rare but potential immune mediated side effects including shortness of breath, cough, chest pain (pneumonitis), abdominal pain, diarrhea (colitis), thyroiditis (hypothyroid or hyperthyroid), hepatitis and liver dysfunction, nephritis and renal dysfunction.    Discussion also included side effects specific to drugs in the treatment plan, specifically:    Treatment Plans       Name Type Plan Dates Plan Provider         Active    OP LUNG Atezolizumab / CARBOplatin AUC=5 / Etoposide (SCLC) ONCOLOGY TREATMENT  3/15/2024 - Present Nikita Burns MD                      Questions answered and additional information discussed on topics including:  Anemia, Thrombocytopenia, Neutropenia, Nutrition and appetite changes, Constipation, Diarrhea, Nausea & vomiting, Mouth sores, Alopecia, Nervous system changes, Pain, Skin & nail changes, and Organ toxicities       Assessment and Plan:    Diagnoses and all orders for this visit:    1. Small cell carcinoma (Primary)  -     OLANZapine (zyPREXA) 5 MG tablet; Take 1 tablet by mouth Every Night. Take nightly x 4 starting night of chemotherapy.  Dispense: 4 tablet; Refill: 3  -     ondansetron (ZOFRAN) 8 MG tablet; Take 1 tablet by mouth 3 (Three) Times a Day As Needed for Nausea or Vomiting.  Dispense: 30 tablet; Refill: 5    2. Encounter for long-term (current) use of other medications  -     OLANZapine (zyPREXA) 5 MG  tablet; Take 1 tablet by mouth Every Night. Take nightly x 4 starting night of chemotherapy.  Dispense: 4 tablet; Refill: 3  -     ondansetron (ZOFRAN) 8 MG tablet; Take 1 tablet by mouth 3 (Three) Times a Day As Needed for Nausea or Vomiting.  Dispense: 30 tablet; Refill: 5      No orders of the defined types were placed in this encounter.        The patient and I have reviewed their diagnosis and scheduled treatment plan. Needs assessment was completed where applicable including genetics, psychosocial needs, barriers to care, VAD evaluation, advanced care planning, survivorship, and palliative care services where indicated. Referrals have been ordered as appropriate based upon evaluation today and patient desires.   Chemo/biotherapy teaching was completed today and consent obtained. See separate documentation for further details.  Adequate time was given to answer questions.  Patient made aware of their care team members and contact information if they have questions or problems throughout the treatment course.  Discussion held and written information provided describing frequency of office visits and ongoing monitoring throughout the treatment plan.     Reviewed with patient any prescribed medication sent to pharmacy.  Education provided regarding proper storage, safe handling, and proper disposal of unused medication.  Proper handling of body fluids and waste discussed and written information provided.  If appropriate, patient had pretreatment labs drawn today.    Learning assessment completed at initial patient encounter. See separate flowsheet. Chemo/biotherapy education comprehension assessed at today's visit.    I spent 40 minutes caring for Pili on this date of service. This time includes time spent by me in the following activities: preparing for the visit, reviewing tests, obtaining and/or reviewing a separately obtained history, performing a medically appropriate examination and/or evaluation,  counseling and educating the patient/family/caregiver, ordering medications, tests, or procedures, and documenting information in the medical record.     Louise Hammer, APRN   03/15/24      Mode of Visit: Video  Location of patient: home  You have chosen to receive care through a telehealth visit.  Does the patient consent to use a video/audio connection for your medical care today? Yes  The visit included audio and video interaction. No technical issues occurred during this visit.

## 2024-03-18 ENCOUNTER — TELEPHONE (OUTPATIENT)
Dept: OTHER | Facility: HOSPITAL | Age: 69
End: 2024-03-18
Payer: MEDICARE

## 2024-03-18 PROBLEM — F17.210 SMOKING GREATER THAN 40 PACK YEARS: Status: ACTIVE | Noted: 2024-03-18

## 2024-03-19 ENCOUNTER — PATIENT OUTREACH (OUTPATIENT)
Dept: OTHER | Facility: HOSPITAL | Age: 69
End: 2024-03-19
Payer: MEDICARE

## 2024-03-19 ENCOUNTER — INFUSION (OUTPATIENT)
Dept: ONCOLOGY | Facility: HOSPITAL | Age: 69
End: 2024-03-19
Payer: MEDICARE

## 2024-03-19 VITALS
HEART RATE: 79 BPM | DIASTOLIC BLOOD PRESSURE: 73 MMHG | SYSTOLIC BLOOD PRESSURE: 109 MMHG | OXYGEN SATURATION: 96 % | TEMPERATURE: 98.4 F | WEIGHT: 105.4 LBS | BODY MASS INDEX: 18.67 KG/M2

## 2024-03-19 DIAGNOSIS — Z79.899 ENCOUNTER FOR LONG-TERM (CURRENT) USE OF OTHER MEDICATIONS: ICD-10-CM

## 2024-03-19 DIAGNOSIS — Z45.2 FITTING AND ADJUSTMENT OF VASCULAR CATHETER: Primary | ICD-10-CM

## 2024-03-19 DIAGNOSIS — C80.1 SMALL CELL CARCINOMA: ICD-10-CM

## 2024-03-19 LAB
ALBUMIN SERPL-MCNC: 3.8 G/DL (ref 3.5–5.2)
ALBUMIN/GLOB SERPL: 1.4 G/DL
ALP SERPL-CCNC: 83 U/L (ref 39–117)
ALT SERPL W P-5'-P-CCNC: 5 U/L (ref 1–33)
ANION GAP SERPL CALCULATED.3IONS-SCNC: 9.2 MMOL/L (ref 5–15)
AST SERPL-CCNC: 11 U/L (ref 1–32)
BASOPHILS # BLD AUTO: 0.14 10*3/MM3 (ref 0–0.2)
BASOPHILS NFR BLD AUTO: 1.1 % (ref 0–1.5)
BILIRUB SERPL-MCNC: 0.2 MG/DL (ref 0–1.2)
BUN SERPL-MCNC: 5 MG/DL (ref 8–23)
BUN/CREAT SERPL: 5.4 (ref 7–25)
CALCIUM SPEC-SCNC: 8.7 MG/DL (ref 8.6–10.5)
CHLORIDE SERPL-SCNC: 97 MMOL/L (ref 98–107)
CO2 SERPL-SCNC: 26.8 MMOL/L (ref 22–29)
CREAT SERPL-MCNC: 0.92 MG/DL (ref 0.57–1)
DEPRECATED RDW RBC AUTO: 43.7 FL (ref 37–54)
EGFRCR SERPLBLD CKD-EPI 2021: 68.8 ML/MIN/1.73
EOSINOPHIL # BLD AUTO: 0.34 10*3/MM3 (ref 0–0.4)
EOSINOPHIL NFR BLD AUTO: 2.8 % (ref 0.3–6.2)
ERYTHROCYTE [DISTWIDTH] IN BLOOD BY AUTOMATED COUNT: 12.1 % (ref 12.3–15.4)
GLOBULIN UR ELPH-MCNC: 2.7 GM/DL
GLUCOSE SERPL-MCNC: 93 MG/DL (ref 65–99)
HCT VFR BLD AUTO: 43 % (ref 34–46.6)
HGB BLD-MCNC: 14.4 G/DL (ref 12–15.9)
IMM GRANULOCYTES # BLD AUTO: 0.07 10*3/MM3 (ref 0–0.05)
IMM GRANULOCYTES NFR BLD AUTO: 0.6 % (ref 0–0.5)
LYMPHOCYTES # BLD AUTO: 3.84 10*3/MM3 (ref 0.7–3.1)
LYMPHOCYTES NFR BLD AUTO: 31.4 % (ref 19.6–45.3)
MCH RBC QN AUTO: 32.6 PG (ref 26.6–33)
MCHC RBC AUTO-ENTMCNC: 33.5 G/DL (ref 31.5–35.7)
MCV RBC AUTO: 97.3 FL (ref 79–97)
MONOCYTES # BLD AUTO: 1.08 10*3/MM3 (ref 0.1–0.9)
MONOCYTES NFR BLD AUTO: 8.8 % (ref 5–12)
NEUTROPHILS NFR BLD AUTO: 55.3 % (ref 42.7–76)
NEUTROPHILS NFR BLD AUTO: 6.77 10*3/MM3 (ref 1.7–7)
NRBC BLD AUTO-RTO: 0 /100 WBC (ref 0–0.2)
PLATELET # BLD AUTO: 367 10*3/MM3 (ref 140–450)
PMV BLD AUTO: 9.1 FL (ref 6–12)
POTASSIUM SERPL-SCNC: 4.2 MMOL/L (ref 3.5–5.2)
PROT SERPL-MCNC: 6.5 G/DL (ref 6–8.5)
RBC # BLD AUTO: 4.42 10*6/MM3 (ref 3.77–5.28)
SODIUM SERPL-SCNC: 133 MMOL/L (ref 136–145)
T3FREE SERPL-MCNC: 4.13 PG/ML (ref 2–4.4)
T4 FREE SERPL-MCNC: 1.31 NG/DL (ref 0.93–1.7)
TSH SERPL DL<=0.05 MIU/L-ACNC: 3.24 UIU/ML (ref 0.27–4.2)
WBC NRBC COR # BLD AUTO: 12.24 10*3/MM3 (ref 3.4–10.8)

## 2024-03-19 PROCEDURE — 96417 CHEMO IV INFUS EACH ADDL SEQ: CPT

## 2024-03-19 PROCEDURE — 96413 CHEMO IV INFUSION 1 HR: CPT

## 2024-03-19 PROCEDURE — 25010000002 DEXAMETHASONE SODIUM PHOSPHATE 100 MG/10ML SOLUTION: Performed by: INTERNAL MEDICINE

## 2024-03-19 PROCEDURE — 96367 TX/PROPH/DG ADDL SEQ IV INF: CPT

## 2024-03-19 PROCEDURE — 25810000003 SODIUM CHLORIDE 0.9 % SOLUTION 500 ML FLEX CONT: Performed by: INTERNAL MEDICINE

## 2024-03-19 PROCEDURE — 85025 COMPLETE CBC W/AUTO DIFF WBC: CPT | Performed by: INTERNAL MEDICINE

## 2024-03-19 PROCEDURE — 25010000002 HEPARIN LOCK FLUSH PER 10 UNITS: Performed by: INTERNAL MEDICINE

## 2024-03-19 PROCEDURE — 84481 FREE ASSAY (FT-3): CPT | Performed by: INTERNAL MEDICINE

## 2024-03-19 PROCEDURE — 25010000002 ATEZOLIZUMAB 1200 MG/20ML SOLUTION 20 ML VIAL: Performed by: INTERNAL MEDICINE

## 2024-03-19 PROCEDURE — 84439 ASSAY OF FREE THYROXINE: CPT | Performed by: INTERNAL MEDICINE

## 2024-03-19 PROCEDURE — 84443 ASSAY THYROID STIM HORMONE: CPT | Performed by: INTERNAL MEDICINE

## 2024-03-19 PROCEDURE — 96375 TX/PRO/DX INJ NEW DRUG ADDON: CPT

## 2024-03-19 PROCEDURE — 80053 COMPREHEN METABOLIC PANEL: CPT | Performed by: INTERNAL MEDICINE

## 2024-03-19 PROCEDURE — 25010000002 CARBOPLATIN PER 50 MG: Performed by: INTERNAL MEDICINE

## 2024-03-19 PROCEDURE — 25010000002 FOSAPREPITANT PER 1 MG: Performed by: INTERNAL MEDICINE

## 2024-03-19 PROCEDURE — 25010000002 ETOPOSIDE 1 GM/50ML SOLUTION 50 ML VIAL: Performed by: INTERNAL MEDICINE

## 2024-03-19 PROCEDURE — 25010000002 PALONOSETRON 0.25 MG/5ML SOLUTION PREFILLED SYRINGE: Performed by: INTERNAL MEDICINE

## 2024-03-19 PROCEDURE — 25810000003 SODIUM CHLORIDE 0.9 % SOLUTION 250 ML FLEX CONT: Performed by: INTERNAL MEDICINE

## 2024-03-19 PROCEDURE — 25810000003 SODIUM CHLORIDE 0.9 % SOLUTION: Performed by: INTERNAL MEDICINE

## 2024-03-19 RX ORDER — DIPHENHYDRAMINE HYDROCHLORIDE 50 MG/ML
50 INJECTION INTRAMUSCULAR; INTRAVENOUS AS NEEDED
Status: CANCELLED | OUTPATIENT
Start: 2024-03-19

## 2024-03-19 RX ORDER — HEPARIN SODIUM (PORCINE) LOCK FLUSH IV SOLN 100 UNIT/ML 100 UNIT/ML
500 SOLUTION INTRAVENOUS AS NEEDED
Status: DISCONTINUED | OUTPATIENT
Start: 2024-03-19 | End: 2024-03-19 | Stop reason: HOSPADM

## 2024-03-19 RX ORDER — SODIUM CHLORIDE 0.9 % (FLUSH) 0.9 %
10 SYRINGE (ML) INJECTION AS NEEDED
Status: DISCONTINUED | OUTPATIENT
Start: 2024-03-19 | End: 2024-03-19 | Stop reason: HOSPADM

## 2024-03-19 RX ORDER — SODIUM CHLORIDE 9 MG/ML
20 INJECTION, SOLUTION INTRAVENOUS ONCE
Status: CANCELLED | OUTPATIENT
Start: 2024-03-19

## 2024-03-19 RX ORDER — PALONOSETRON 0.05 MG/ML
0.25 INJECTION, SOLUTION INTRAVENOUS ONCE
Status: CANCELLED | OUTPATIENT
Start: 2024-03-19

## 2024-03-19 RX ORDER — PROCHLORPERAZINE MALEATE 10 MG
10 TABLET ORAL ONCE
Status: CANCELLED | OUTPATIENT
Start: 2024-03-21 | End: 2024-03-21

## 2024-03-19 RX ORDER — PROCHLORPERAZINE MALEATE 10 MG
10 TABLET ORAL ONCE
Status: CANCELLED | OUTPATIENT
Start: 2024-03-20 | End: 2024-03-20

## 2024-03-19 RX ORDER — SODIUM CHLORIDE 9 MG/ML
20 INJECTION, SOLUTION INTRAVENOUS ONCE
Status: CANCELLED | OUTPATIENT
Start: 2024-03-20

## 2024-03-19 RX ORDER — SODIUM CHLORIDE 9 MG/ML
20 INJECTION, SOLUTION INTRAVENOUS ONCE
Status: COMPLETED | OUTPATIENT
Start: 2024-03-19 | End: 2024-03-19

## 2024-03-19 RX ORDER — SODIUM CHLORIDE 9 MG/ML
20 INJECTION, SOLUTION INTRAVENOUS ONCE
Status: CANCELLED | OUTPATIENT
Start: 2024-03-21

## 2024-03-19 RX ORDER — HEPARIN SODIUM (PORCINE) LOCK FLUSH IV SOLN 100 UNIT/ML 100 UNIT/ML
300 SOLUTION INTRAVENOUS ONCE
Status: CANCELLED | OUTPATIENT
Start: 2024-03-19

## 2024-03-19 RX ORDER — HEPARIN SODIUM (PORCINE) LOCK FLUSH IV SOLN 100 UNIT/ML 100 UNIT/ML
500 SOLUTION INTRAVENOUS AS NEEDED
Status: CANCELLED | OUTPATIENT
Start: 2024-03-19

## 2024-03-19 RX ORDER — PALONOSETRON 0.05 MG/ML
0.25 INJECTION, SOLUTION INTRAVENOUS ONCE
Status: COMPLETED | OUTPATIENT
Start: 2024-03-19 | End: 2024-03-19

## 2024-03-19 RX ORDER — FAMOTIDINE 10 MG/ML
20 INJECTION, SOLUTION INTRAVENOUS AS NEEDED
Status: CANCELLED | OUTPATIENT
Start: 2024-03-19

## 2024-03-19 RX ORDER — SODIUM CHLORIDE 0.9 % (FLUSH) 0.9 %
10 SYRINGE (ML) INJECTION AS NEEDED
Status: CANCELLED | OUTPATIENT
Start: 2024-03-19

## 2024-03-19 RX ADMIN — Medication 10 ML: at 13:25

## 2024-03-19 RX ADMIN — SODIUM CHLORIDE 20 ML/HR: 9 INJECTION, SOLUTION INTRAVENOUS at 09:38

## 2024-03-19 RX ADMIN — PALONOSETRON 0.25 MG: 0.25 INJECTION, SOLUTION INTRAVENOUS at 10:43

## 2024-03-19 RX ADMIN — DEXAMETHASONE SODIUM PHOSPHATE 12 MG: 10 INJECTION, SOLUTION INTRAMUSCULAR; INTRAVENOUS at 11:19

## 2024-03-19 RX ADMIN — ATEZOLIZUMAB 1200 MG: 1200 INJECTION, SOLUTION INTRAVENOUS at 09:38

## 2024-03-19 RX ADMIN — CARBOPLATIN 350 MG: 10 INJECTION, SOLUTION INTRAVENOUS at 11:37

## 2024-03-19 RX ADMIN — SODIUM CHLORIDE 100 ML: 9 INJECTION, SOLUTION INTRAVENOUS at 10:44

## 2024-03-19 RX ADMIN — HEPARIN 500 UNITS: 100 SYRINGE at 13:25

## 2024-03-19 RX ADMIN — SODIUM CHLORIDE 90 MG: 0.9 INJECTION, SOLUTION INTRAVENOUS at 12:16

## 2024-03-19 NOTE — PROGRESS NOTES
Reviewed chart. Patient with extensive stage small cell lung cancer with new development of a contralateral right lung nodule. Met with Dr. Burns 3/13. MRI twan negative. Dr. Burns recommended palliative treatment with chemoimmunotherapy utilizing carboplatin/-16/Tecentriq every 3 weeks for 4 cycles with repeat imaging after 2 to ensure responsive disease. He noted, if patient had excellent response to upfront chemotherapy consolidative radiation and PCI to be considered. Port placed 3/14, chemo teaching 3/15. Rec'd Cycle 1 day 1 today    Called patient.  We discussed her recent appts.  She is scheduled for day 2 of treatment tomorrow. The patient denies questions or concerns.  She was able to teach back her plan of care.    We again discussed integrative therapies and [facility]. Patient expressed gratitude for my support and denied any additional needs at this time. I will call in several weeks; encouraged patient to call as needed.

## 2024-03-20 ENCOUNTER — INFUSION (OUTPATIENT)
Dept: ONCOLOGY | Facility: HOSPITAL | Age: 69
End: 2024-03-20
Payer: MEDICARE

## 2024-03-20 VITALS
BODY MASS INDEX: 19.31 KG/M2 | SYSTOLIC BLOOD PRESSURE: 102 MMHG | TEMPERATURE: 97.3 F | DIASTOLIC BLOOD PRESSURE: 64 MMHG | OXYGEN SATURATION: 93 % | WEIGHT: 109 LBS | HEART RATE: 76 BPM

## 2024-03-20 DIAGNOSIS — C80.1 SMALL CELL CARCINOMA: Primary | ICD-10-CM

## 2024-03-20 DIAGNOSIS — Z45.2 FITTING AND ADJUSTMENT OF VASCULAR CATHETER: ICD-10-CM

## 2024-03-20 PROCEDURE — 25010000002 ETOPOSIDE 1 GM/50ML SOLUTION 50 ML VIAL: Performed by: INTERNAL MEDICINE

## 2024-03-20 PROCEDURE — 25810000003 SODIUM CHLORIDE 0.9 % SOLUTION: Performed by: INTERNAL MEDICINE

## 2024-03-20 PROCEDURE — 25010000002 HEPARIN LOCK FLUSH PER 10 UNITS: Performed by: INTERNAL MEDICINE

## 2024-03-20 PROCEDURE — 96413 CHEMO IV INFUSION 1 HR: CPT

## 2024-03-20 PROCEDURE — 63710000001 PROCHLORPERAZINE MALEATE PER 5 MG: Performed by: INTERNAL MEDICINE

## 2024-03-20 PROCEDURE — 25810000003 SODIUM CHLORIDE 0.9 % SOLUTION 500 ML FLEX CONT: Performed by: INTERNAL MEDICINE

## 2024-03-20 RX ORDER — SODIUM CHLORIDE 9 MG/ML
20 INJECTION, SOLUTION INTRAVENOUS ONCE
Status: COMPLETED | OUTPATIENT
Start: 2024-03-20 | End: 2024-03-20

## 2024-03-20 RX ORDER — HEPARIN SODIUM (PORCINE) LOCK FLUSH IV SOLN 100 UNIT/ML 100 UNIT/ML
500 SOLUTION INTRAVENOUS AS NEEDED
Status: DISCONTINUED | OUTPATIENT
Start: 2024-03-20 | End: 2024-03-20 | Stop reason: HOSPADM

## 2024-03-20 RX ORDER — SODIUM CHLORIDE 0.9 % (FLUSH) 0.9 %
10 SYRINGE (ML) INJECTION AS NEEDED
Status: CANCELLED | OUTPATIENT
Start: 2024-03-20

## 2024-03-20 RX ORDER — SODIUM CHLORIDE 0.9 % (FLUSH) 0.9 %
10 SYRINGE (ML) INJECTION AS NEEDED
Status: DISCONTINUED | OUTPATIENT
Start: 2024-03-20 | End: 2024-03-20 | Stop reason: HOSPADM

## 2024-03-20 RX ORDER — PROCHLORPERAZINE MALEATE 5 MG/1
10 TABLET ORAL ONCE
Status: COMPLETED | OUTPATIENT
Start: 2024-03-20 | End: 2024-03-20

## 2024-03-20 RX ORDER — HEPARIN SODIUM (PORCINE) LOCK FLUSH IV SOLN 100 UNIT/ML 100 UNIT/ML
500 SOLUTION INTRAVENOUS AS NEEDED
Status: CANCELLED | OUTPATIENT
Start: 2024-03-20

## 2024-03-20 RX ADMIN — Medication 10 ML: at 09:36

## 2024-03-20 RX ADMIN — PROCHLORPERAZINE MALEATE 10 MG: 5 TABLET ORAL at 08:07

## 2024-03-20 RX ADMIN — SODIUM CHLORIDE 20 ML/HR: 9 INJECTION, SOLUTION INTRAVENOUS at 08:02

## 2024-03-20 RX ADMIN — SODIUM CHLORIDE 90 MG: 0.9 INJECTION, SOLUTION INTRAVENOUS at 08:25

## 2024-03-20 RX ADMIN — HEPARIN 500 UNITS: 100 SYRINGE at 09:36

## 2024-03-20 NOTE — NURSING NOTE
Pt c/o dizziness since awakening this morning. Nurse s/w MITESH Huerta who recommends that the pt take 1/2 tablet of Zyprexa 5mg nightly for the next 3 nights instead of the prescribed 5mg. The pt and granddaughter was informed and v/u.

## 2024-03-21 ENCOUNTER — INFUSION (OUTPATIENT)
Dept: ONCOLOGY | Facility: HOSPITAL | Age: 69
End: 2024-03-21
Payer: MEDICARE

## 2024-03-21 ENCOUNTER — NUTRITION (OUTPATIENT)
Dept: OTHER | Facility: HOSPITAL | Age: 69
End: 2024-03-21
Payer: MEDICARE

## 2024-03-21 VITALS
WEIGHT: 109.4 LBS | DIASTOLIC BLOOD PRESSURE: 68 MMHG | BODY MASS INDEX: 19.38 KG/M2 | TEMPERATURE: 97.5 F | SYSTOLIC BLOOD PRESSURE: 113 MMHG | HEART RATE: 67 BPM

## 2024-03-21 DIAGNOSIS — Z45.2 FITTING AND ADJUSTMENT OF VASCULAR CATHETER: ICD-10-CM

## 2024-03-21 DIAGNOSIS — C80.1 SMALL CELL CARCINOMA: Primary | ICD-10-CM

## 2024-03-21 PROCEDURE — 25010000002 HEPARIN LOCK FLUSH PER 10 UNITS: Performed by: INTERNAL MEDICINE

## 2024-03-21 PROCEDURE — 25010000002 ETOPOSIDE 100 MG/5ML SOLUTION 5 ML VIAL: Performed by: INTERNAL MEDICINE

## 2024-03-21 PROCEDURE — 63710000001 PROCHLORPERAZINE MALEATE PER 5 MG: Performed by: INTERNAL MEDICINE

## 2024-03-21 PROCEDURE — 96413 CHEMO IV INFUSION 1 HR: CPT

## 2024-03-21 PROCEDURE — 25810000003 SODIUM CHLORIDE 0.9 % SOLUTION 500 ML FLEX CONT: Performed by: INTERNAL MEDICINE

## 2024-03-21 PROCEDURE — 25810000003 SODIUM CHLORIDE 0.9 % SOLUTION: Performed by: INTERNAL MEDICINE

## 2024-03-21 RX ORDER — SODIUM CHLORIDE 0.9 % (FLUSH) 0.9 %
10 SYRINGE (ML) INJECTION AS NEEDED
OUTPATIENT
Start: 2024-03-21

## 2024-03-21 RX ORDER — SODIUM CHLORIDE 9 MG/ML
20 INJECTION, SOLUTION INTRAVENOUS ONCE
Status: COMPLETED | OUTPATIENT
Start: 2024-03-21 | End: 2024-03-21

## 2024-03-21 RX ORDER — SODIUM CHLORIDE 0.9 % (FLUSH) 0.9 %
10 SYRINGE (ML) INJECTION AS NEEDED
Status: DISCONTINUED | OUTPATIENT
Start: 2024-03-21 | End: 2024-03-21 | Stop reason: HOSPADM

## 2024-03-21 RX ORDER — HEPARIN SODIUM (PORCINE) LOCK FLUSH IV SOLN 100 UNIT/ML 100 UNIT/ML
500 SOLUTION INTRAVENOUS AS NEEDED
OUTPATIENT
Start: 2024-03-21

## 2024-03-21 RX ORDER — PROCHLORPERAZINE MALEATE 5 MG/1
10 TABLET ORAL ONCE
Status: COMPLETED | OUTPATIENT
Start: 2024-03-21 | End: 2024-03-21

## 2024-03-21 RX ORDER — HEPARIN SODIUM (PORCINE) LOCK FLUSH IV SOLN 100 UNIT/ML 100 UNIT/ML
500 SOLUTION INTRAVENOUS AS NEEDED
Status: DISCONTINUED | OUTPATIENT
Start: 2024-03-21 | End: 2024-03-21 | Stop reason: HOSPADM

## 2024-03-21 RX ADMIN — ETOPOSIDE 90 MG: 20 INJECTION INTRAVENOUS at 08:35

## 2024-03-21 RX ADMIN — PROCHLORPERAZINE MALEATE 10 MG: 5 TABLET ORAL at 08:24

## 2024-03-21 RX ADMIN — SODIUM CHLORIDE 20 ML/HR: 9 INJECTION, SOLUTION INTRAVENOUS at 08:24

## 2024-03-21 RX ADMIN — Medication 10 ML: at 09:39

## 2024-03-21 RX ADMIN — HEPARIN 500 UNITS: 100 SYRINGE at 09:39

## 2024-03-21 NOTE — PROGRESS NOTES
OUTPATIENT ONCOLOGY NUTRITION ASSESSMENT    Patient Name: Pili Arechiga  YOB: 1955  MRN: [id number]  Assessment Date: 3/21/2024    COMMENTS: Met with pt in infusion area, pt receiving -16 today. Pt reports a good appetite, no issues with eating or drinking. Pt denies side effects from treatment that limit her PO intake; no nausea, constipation, diarrhea, or taste changes. Pt did report some dizziness from zyprexa, took 1/2 pill last night and feels slightly less dizzy today. Encouraged pt to continue with good PO intake and reach out for any questions or concerns.         Reason for Assessment New assessment     Diagnosis/Problem   Small cell lung ca   Treatment Plan Chemotherapy; carboplatin, -16, tecentriq    Frequency Every 3 weeks    Goal of cancer treatment Disease control     Encounter Information        Nutrition/Diet History:  Pt has a good appetite, no issues with eating or drinking    Oral Nutrition Supplements:    Factors/Symptoms Affecting Intake: No factors at this time   Comments:      Medical/Surgical History Past Medical History:   Diagnosis Date    COPD (chronic obstructive pulmonary disease)     COPD with acute exacerbation 2020    Small cell carcinoma 3/13/2024     Past Surgical History:   Procedure Laterality Date    BRONCHOSCOPY N/A 2023    Procedure: BRONCHOSCOPY WITH ENDOBRONCHIAL ULTRASOUND (EBUS) with FNA;  Surgeon: Coy Mccarthy MD;  Location: [facility] ENDOSCOPY;  Service: Pulmonary;  Laterality: N/A;  Pre/Post - mediastinal and hilar lymphadenopathy.    BRONCHOSCOPY WITH ION ROBOTIC ASSIST N/A 2024    Procedure: BRONCHOSCOPY WITH ION ROBOT,  ENDOBRONCHIAL ULTRASOUND, FINE NEEDLE ASPIRATIONS,  CRYOTHERAPY BIOPSIES, AND LEFT LOWER LOBE BRONCHOALVEOLAR LAVAGE.;  Surgeon: Alexia Velásquez MD;  Location: [facility] ENDOSCOPY;  Service: Robotics - Pulmonary;  Laterality: N/A;     SECTION      CHEST TUBE INSERTION Left 2024    Procedure: CHEST TUBE  "INSERTION;  Surgeon: Alexia Velásquez MD;  Location: [facility] ENDOSCOPY;  Service: Thoracic;  Laterality: Left;    CHOLECYSTECTOMY      COLONOSCOPY      ECTOPIC PREGNANCY SURGERY      HYSTERECTOMY      TONSILLECTOMY      TOTAL HIP ARTHROPLASTY Left     VENOUS ACCESS DEVICE (PORT) INSERTION N/A 3/14/2024    Procedure: INSERTION VENOUS ACCESS DEVICE;  Surgeon: Jonas Garner MD;  Location: [facility] OR;  Service: General;  Laterality: N/A;        Anthropometrics        Current Height Ht Readings from Last 1 Encounters:   03/14/24 160 cm (63\")      Current Weight Wt Readings from Last 1 Encounters:   03/21/24 49.6 kg (109 lb 6.4 oz)      BMI  19.3   Ideal Body Weight (IBW) 115#   Usual Body Weight (UBW) ~106-110#   Weight Change/Trend Stable   Weight History Wt Readings from Last 30 Encounters:   03/21/24 0825 49.6 kg (109 lb 6.4 oz)   03/20/24 0754 49.4 kg (109 lb)   03/19/24 0804 47.8 kg (105 lb 6.4 oz)   03/14/24 1409 48.8 kg (107 lb 9.6 oz)   03/13/24 1326 48 kg (105 lb 12.8 oz)   03/12/24 1129 46.7 kg (103 lb)   03/07/24 0758 46.9 kg (103 lb 8 oz)   03/07/24 0804 46.9 kg (103 lb 8 oz)   02/28/24 0715 48.4 kg (106 lb 11.2 oz)   02/22/24 0933 48.4 kg (106 lb 12.8 oz)   11/13/23 1511 46.7 kg (103 lb)   10/06/23 1412 45.4 kg (100 lb)   09/05/23 1355 46.3 kg (102 lb)   08/25/23 0950 44.9 kg (99 lb)   08/21/23 1449 46.3 kg (102 lb)   06/13/23 1031 47.2 kg (104 lb)   05/16/23 1411 47.2 kg (104 lb)   05/02/23 1928 46.3 kg (102 lb)   03/31/23 1452 46.3 kg (102 lb)   01/24/23 0550 42.9 kg (94 lb 9.6 oz)   01/23/23 0558 43 kg (94 lb 12.8 oz)   01/22/23 0522 42.5 kg (93 lb 9.6 oz)   02/14/22 1509 51.7 kg (113 lb 14.4 oz)   08/11/21 1306 61.2 kg (135 lb)   04/21/21 1041 59 kg (130 lb)   02/10/21 0912 58.1 kg (128 lb)   09/25/20 0430 53.5 kg (118 lb)   09/23/20 0628 53.7 kg (118 lb 6.4 oz)   09/22/20 1555 53.4 kg (117 lb 12.8 oz)   09/22/20 1145 49.5 kg (109 lb 1.6 oz)   09/14/20 1330 52.8 kg (116 lb 4.8 oz)   08/04/20 " "0906 52.6 kg (116 lb)   09/09/18 1530 65.8 kg (145 lb)   05/07/17 0623 48.1 kg (106 lb 1.6 oz)   05/06/17 0630 47.7 kg (105 lb 4 oz)   05/05/17 0623 47.7 kg (105 lb 2 oz)   05/04/17 0657 47.9 kg (105 lb 8 oz)   05/03/17 0752 48.3 kg (106 lb 6.4 oz)   05/02/17 0625 46.8 kg (103 lb 3.2 oz)   05/01/17 1222 47.1 kg (103 lb 12.8 oz)   01/02/15 0843 45 kg (99 lb 2.3 oz)          Medications           Current medications: Denosumab, HYDROcodone-acetaminophen, OLANZapine, albuterol sulfate HFA, budesonide-formoterol, diazePAM, midodrine, naloxone, and ondansetron     Tests/Procedures        Tests/Procedures Chemotherapy     Labs       Pertinent Labs    Results from last 7 days   Lab Units 03/19/24  0754   SODIUM mmol/L 133*   POTASSIUM mmol/L 4.2   CHLORIDE mmol/L 97*   CO2 mmol/L 26.8   BUN mg/dL 5*   CREATININE mg/dL 0.92   CALCIUM mg/dL 8.7   BILIRUBIN mg/dL 0.2   ALK PHOS U/L 83   ALT (SGPT) U/L 5   AST (SGOT) U/L 11   GLUCOSE mg/dL 93     Results from last 7 days   Lab Units 03/19/24  0754   HEMOGLOBIN g/dL 14.4   HEMATOCRIT % 43.0   WBC 10*3/mm3 12.24*   ALBUMIN g/dL 3.8     Results from last 7 days   Lab Units 03/19/24  0754   PLATELETS 10*3/mm3 367     COVID19   Date Value Ref Range Status   01/21/2023 Not Detected Not Detected - Ref. Range Final     No results found for: \"HGBA1C\"       Physical Findings        Physical Appearance alert, oriented     Edema  no edema   Gastrointestinal None   Tubes/Drains implantable port   Oral/Mouth Cavity WNL     Estimated/Assessed Needs        Energy Requirements    Height for Calculation   63\"    Weight for Calculation 49.6 kg    Method for Estimation  30 kcal/kg   EST Needs (kcal/day) 1500 kcal/day       Protein Requirements    Weight for Calculation 49.6 kg   EST Protein Needs (g/kg) 1.2 gm/kg   EST Daily Needs (g/day) 60 g/day       Fluid Requirements     Method for Estimation 1 mL/kcal    Estimated Needs (mL/day) 1500 mL/day           PES STATEMENT / NUTRITION DIAGNOSIS    "   Nutrition Dx Problem Problem:    NutritionDiagnosisProblem: No Nutrition Diagnosis at this Time and Nutrition Appropriate for Condition at this Time    Etiology:  Medical diagnosis: Lung cancer     NUTRITION INTERVENTION / PLAN OF CARE      Intervention Goal(s) Maintain nutrition status, Tolerate PO , Maintain intake, Maintain weight, and No significant weight loss         RD Intervention/Action Encouraged intake, Follow Tx progress         Recommendations:       PO Diet Continue current      Supplements       Snacks       Other          Monitor/Evaluation PO intake, Weight, Symptoms   Education Will provide eduction as needed   --    RD to follow     Electronically signed by:  Tracy Mcgee RD  03/21/24 11:17 EDT

## 2024-03-27 ENCOUNTER — CLINICAL SUPPORT (OUTPATIENT)
Dept: ONCOLOGY | Facility: HOSPITAL | Age: 69
End: 2024-03-27
Payer: MEDICARE

## 2024-03-27 ENCOUNTER — LAB (OUTPATIENT)
Dept: LAB | Facility: HOSPITAL | Age: 69
End: 2024-03-27
Payer: MEDICARE

## 2024-03-27 DIAGNOSIS — C80.1 SMALL CELL CARCINOMA: ICD-10-CM

## 2024-03-27 LAB
BASOPHILS # BLD AUTO: 0.05 10*3/MM3 (ref 0–0.2)
BASOPHILS NFR BLD AUTO: 0.9 % (ref 0–1.5)
DEPRECATED RDW RBC AUTO: 40.3 FL (ref 37–54)
EOSINOPHIL # BLD AUTO: 0.15 10*3/MM3 (ref 0–0.4)
EOSINOPHIL NFR BLD AUTO: 2.7 % (ref 0.3–6.2)
ERYTHROCYTE [DISTWIDTH] IN BLOOD BY AUTOMATED COUNT: 11.8 % (ref 12.3–15.4)
HCT VFR BLD AUTO: 36.8 % (ref 34–46.6)
HGB BLD-MCNC: 12.6 G/DL (ref 12–15.9)
IMM GRANULOCYTES # BLD AUTO: 0.1 10*3/MM3 (ref 0–0.05)
IMM GRANULOCYTES NFR BLD AUTO: 1.8 % (ref 0–0.5)
LYMPHOCYTES # BLD AUTO: 2.07 10*3/MM3 (ref 0.7–3.1)
LYMPHOCYTES NFR BLD AUTO: 36.6 % (ref 19.6–45.3)
MCH RBC QN AUTO: 32 PG (ref 26.6–33)
MCHC RBC AUTO-ENTMCNC: 34.2 G/DL (ref 31.5–35.7)
MCV RBC AUTO: 93.4 FL (ref 79–97)
MONOCYTES # BLD AUTO: 0.13 10*3/MM3 (ref 0.1–0.9)
MONOCYTES NFR BLD AUTO: 2.3 % (ref 5–12)
NEUTROPHILS NFR BLD AUTO: 3.15 10*3/MM3 (ref 1.7–7)
NEUTROPHILS NFR BLD AUTO: 55.7 % (ref 42.7–76)
NRBC BLD AUTO-RTO: 0 /100 WBC (ref 0–0.2)
PLATELET # BLD AUTO: 176 10*3/MM3 (ref 140–450)
PMV BLD AUTO: 9.4 FL (ref 6–12)
RBC # BLD AUTO: 3.94 10*6/MM3 (ref 3.77–5.28)
WBC NRBC COR # BLD AUTO: 5.65 10*3/MM3 (ref 3.4–10.8)

## 2024-03-27 PROCEDURE — 85025 COMPLETE CBC W/AUTO DIFF WBC: CPT | Performed by: INTERNAL MEDICINE

## 2024-03-27 PROCEDURE — 36416 COLLJ CAPILLARY BLOOD SPEC: CPT | Performed by: INTERNAL MEDICINE

## 2024-03-27 RX ORDER — PANTOPRAZOLE SODIUM 40 MG/1
40 TABLET, DELAYED RELEASE ORAL DAILY
Qty: 30 TABLET | Refills: 3 | Status: SHIPPED | OUTPATIENT
Start: 2024-03-27

## 2024-03-27 NOTE — PROGRESS NOTES
Patient and family member are here for lab review with RN. CBC reviewed, counts are stable for this patient at this time. Patient mentions complains of what sounds like acid reflux. Dr. Burns ordered Protonix. Copy of labs given to patient and follow up appointment reviewed. Patient is instructed to call the office with any concerns or new symptoms prior to next visit. Patient verbalized understanding and discharged in stable condition.    Component      Latest Ref Rng 3/27/2024   WBC      3.40 - 10.80 10*3/mm3 5.65    RBC      3.77 - 5.28 10*6/mm3 3.94    Hemoglobin      12.0 - 15.9 g/dL 12.6    Hematocrit      34.0 - 46.6 % 36.8    MCV      79.0 - 97.0 fL 93.4    MCH      26.6 - 33.0 pg 32.0    MCHC      31.5 - 35.7 g/dL 34.2    RDW      12.3 - 15.4 % 11.8 (L)    RDW-SD      37.0 - 54.0 fl 40.3    MPV      6.0 - 12.0 fL 9.4    Platelets      140 - 450 10*3/mm3 176    Neutrophil Rel %      42.7 - 76.0 % 55.7    Lymphocyte Rel %      19.6 - 45.3 % 36.6    Monocyte Rel %      5.0 - 12.0 % 2.3 (L)    Eosinophil Rel %      0.3 - 6.2 % 2.7    Basophil Rel %      0.0 - 1.5 % 0.9    Immature Granulocyte Rel %      0.0 - 0.5 % 1.8 (H)    Neutrophils Absolute      1.70 - 7.00 10*3/mm3 3.15    Lymphocytes Absolute      0.70 - 3.10 10*3/mm3 2.07    Monocytes Absolute      0.10 - 0.90 10*3/mm3 0.13    Eosinophils Absolute      0.00 - 0.40 10*3/mm3 0.15    Basophils Absolute      0.00 - 0.20 10*3/mm3 0.05    Immature Grans, Absolute      0.00 - 0.05 10*3/mm3 0.10 (H)    nRBC      0.0 - 0.2 /100 WBC 0.0       Legend:  (L) Low  (H) High

## 2024-03-29 ENCOUNTER — TELEPHONE (OUTPATIENT)
Dept: ONCOLOGY | Facility: CLINIC | Age: 69
End: 2024-03-29

## 2024-03-29 NOTE — TELEPHONE ENCOUNTER
"Caller: Pili Arechiga \"THEODORE\"    Relationship: Self    Best call back number: [phone]    What form or medical record are you requesting: [facility]    Who is requesting this form or medical record from you: [facility]    How would you like to receive the form or medical records (pick-up, mail, fax): EMAIL THAT WE SHOULD HAVE ON FILE.      Timeframe paperwork needed: ASAP    Additional notes: PT STATES [facility]  PAPERWORK WAS TO BE EMAILED [facility] ON WEDS. BUT HAS NOT BEEN RECEIVED BY THEM.  THE  AT [facility] IS [place] - [phone].  PT WILL NOT HAVE A JOB IF PAPERWORK IS NOT RCV'D TODAY. PLEASE ADVISE PT WHEN TAKEN CARE OF.          "

## 2024-04-03 ENCOUNTER — CLINICAL SUPPORT (OUTPATIENT)
Dept: ONCOLOGY | Facility: HOSPITAL | Age: 69
End: 2024-04-03
Payer: MEDICARE

## 2024-04-03 ENCOUNTER — LAB (OUTPATIENT)
Dept: LAB | Facility: HOSPITAL | Age: 69
End: 2024-04-03
Payer: MEDICARE

## 2024-04-03 VITALS
TEMPERATURE: 97.3 F | SYSTOLIC BLOOD PRESSURE: 109 MMHG | RESPIRATION RATE: 18 BRPM | HEART RATE: 75 BPM | OXYGEN SATURATION: 97 % | DIASTOLIC BLOOD PRESSURE: 70 MMHG

## 2024-04-03 DIAGNOSIS — C80.1 SMALL CELL CARCINOMA: ICD-10-CM

## 2024-04-03 LAB
BASOPHILS # BLD AUTO: 0.03 10*3/MM3 (ref 0–0.2)
BASOPHILS NFR BLD AUTO: 0.7 % (ref 0–1.5)
DEPRECATED RDW RBC AUTO: 39.6 FL (ref 37–54)
EOSINOPHIL # BLD AUTO: 0.06 10*3/MM3 (ref 0–0.4)
EOSINOPHIL NFR BLD AUTO: 1.5 % (ref 0.3–6.2)
ERYTHROCYTE [DISTWIDTH] IN BLOOD BY AUTOMATED COUNT: 11.7 % (ref 12.3–15.4)
HCT VFR BLD AUTO: 34.4 % (ref 34–46.6)
HGB BLD-MCNC: 12.1 G/DL (ref 12–15.9)
IMM GRANULOCYTES # BLD AUTO: 0.04 10*3/MM3 (ref 0–0.05)
IMM GRANULOCYTES NFR BLD AUTO: 1 % (ref 0–0.5)
LYMPHOCYTES # BLD AUTO: 1.95 10*3/MM3 (ref 0.7–3.1)
LYMPHOCYTES NFR BLD AUTO: 48.1 % (ref 19.6–45.3)
MCH RBC QN AUTO: 32.7 PG (ref 26.6–33)
MCHC RBC AUTO-ENTMCNC: 35.2 G/DL (ref 31.5–35.7)
MCV RBC AUTO: 93 FL (ref 79–97)
MONOCYTES # BLD AUTO: 0.34 10*3/MM3 (ref 0.1–0.9)
MONOCYTES NFR BLD AUTO: 8.4 % (ref 5–12)
NEUTROPHILS NFR BLD AUTO: 1.63 10*3/MM3 (ref 1.7–7)
NEUTROPHILS NFR BLD AUTO: 40.3 % (ref 42.7–76)
NRBC BLD AUTO-RTO: 0 /100 WBC (ref 0–0.2)
PLATELET # BLD AUTO: 169 10*3/MM3 (ref 140–450)
PMV BLD AUTO: 9.1 FL (ref 6–12)
RBC # BLD AUTO: 3.7 10*6/MM3 (ref 3.77–5.28)
WBC NRBC COR # BLD AUTO: 4.05 10*3/MM3 (ref 3.4–10.8)

## 2024-04-03 PROCEDURE — 85025 COMPLETE CBC W/AUTO DIFF WBC: CPT | Performed by: INTERNAL MEDICINE

## 2024-04-03 PROCEDURE — 36416 COLLJ CAPILLARY BLOOD SPEC: CPT | Performed by: INTERNAL MEDICINE

## 2024-04-03 PROCEDURE — G0463 HOSPITAL OUTPT CLINIC VISIT: HCPCS | Performed by: INTERNAL MEDICINE

## 2024-04-03 NOTE — NURSING NOTE
Pt to infusion room for CBC and RN review  Pt c/o intermittent abdominal cramping - states she is taking protonix every day. Pt denies diarrhea or constipation. Pt denies N/V   States she is eating and drinking fairly well,   She does report intermittent esophageal burning after eating.     Pt also c/o pain left chest area , unrelieved with current pain regimen . Pt denies SOA , reports occasional productive cough, sputum clear. No fever or chills .    Pt reports she did take a marijuana gummy once during the past week to help her with pain and sleep . Pt states she was able to get some sleep after this.  VSS    Lab Results   Component Value Date    WBC 4.05 04/03/2024    HGB 12.1 04/03/2024    HCT 34.4 04/03/2024    MCV 93.0 04/03/2024     04/03/2024         Discussed with Deanna Zepeda NP.  Per Deanna TSE , have pt increase pain medication to 4 times a day(she is currently taking 3 times a day, prescribed by her PCP) until she sees Dr Burns next week. Pt has appt 04/09 with Dr Burns.    Also advised patient to try maalox or mylanta as needed and to avoid foods that are spicy or causes cramping or reflux.     Discussed all with Patient and daughter  They V/U and understand to call for further issues  Pt d/c'd home stable

## 2024-04-03 NOTE — PROGRESS NOTES
Subjective     REASON FOR CONSULTATION:  small cell lung cancer  Provide an opinion on any further workup or treatment                             REQUESTING PHYSICIAN:  James    RECORDS OBTAINED:  Records of the patients history including those obtained from the referring provider were reviewed and summarized in detail.    HISTORY OF PRESENT ILLNESS:  The patient is a 68 y.o. year old female who is here for an opinion about the above issue.    History of Present Illness   The patient initiated chemoimmunotherapy with carboplatin/etoposide/atezolizumab 3/19/2024.  The patient tolerated the first cycle of treatment reasonably well.  She did have increased esophagitis symptoms for which she was prescribed PPI but also had to take an additional dose of her pain medication for treatment.  She complains of queasy stomach.    Oncology History:  This is a 68-year-old woman with long history of tobacco use and COPD, degenerative disease of the spine on narcotic therapy, depression/anxiety, orthostatic hypotension, hyperlipidemia who has been followed with serial imaging for a left lower lobe lung nodule and mediastinal lymphadenopathy.  A CT of the chest 1/21/2023 showed a masslike consolidation in the left lower lobe 2.5 x 2.6 cm in size with a potential cavitary focus centrally surrounding haziness and otherwise groundglass opacities in the right middle lobe and right lower lobe and enlarged mediastinal lymph nodes up to 10 mm.  The findings were favored to be infectious or inflammatory.  A follow-up CT chest 7/28/2023 showed pleural-based area of density in the left upper lung 11 x 5 mm new from the previous exam and resolution of the consolidation in the left lower lobe.  A PET scan was performed 8/9/2023 which showed the 1.1 cm pleural-based density in the posterior left lower lobe to blend into dependent atelectasis but have more intense activity than the atelectasis with maximum SUV 2.8.  There was  hypermetabolic lymphadenopathy in the left hilum and left lower paratracheal regions for example lymph node posterior to the left pulmonary artery SUV 4.7 measuring 1.6 cm and another area of soft tissue lateral to the left mainstem bronchus SUV 4.4, ill-defined lymphadenopathy left lower paratracheal region SUV 3.7.  She underwent bronchoscopy with biopsies on 8/25/2023 with samples from stations 11 R, 4R, 7, 10 L, 11 L, 12 L all negative for malignancy; station 4R showed rare atypical reactive epithelial cells.  A follow-up CT of the chest on 2/6/2024 showed the left lower lobe nodule to be enlarging at 1.6 x 0.9 cm and enlarging precarinal lymphadenopathy concerning for metastatic disease.  The patient underwent repeat bronchoscopy with Ion navigation on 2/28/2024; biopsies from the left lower lobe nodule were positive for small cell carcinoma and a level 4 lymph node biopsied also positive for small cell carcinoma.    Full body PET scan on 3/7/2024 showed significantly avid bilateral hilar and mediastinal lymph nodes for example kylah conglomerate AP window 8.3 SUV measuring 2.5 x 1.8 cm, left hilar lymph node SUV 7 1.4 cm, pleural-based nodularity left major fissure newly hypermetabolic SUV 2.2, pleural-based partially cavitary mass left lower lobe SUV five 1.5 x 1 cm, new right lung nodule 8 mm in the right lower lobe suspicious.  MRI of the brain negative.    The patient has comorbidities of COPD but minimally symptomatic, no known cardiac disease, kidney disease, liver disease, diabetes etc.  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    The patient initiated chemoimmunotherapy with carboplatin/etoposide/atezolizumab 3/19/2024.    Past Medical History:   Diagnosis Date    COPD (chronic obstructive pulmonary disease)     COPD with acute exacerbation 09/28/2020    Small cell carcinoma 3/13/2024        Past Surgical History:   Procedure Laterality Date    BRONCHOSCOPY N/A 8/25/2023    Procedure: BRONCHOSCOPY WITH  ENDOBRONCHIAL ULTRASOUND (EBUS) with FNA;  Surgeon: Coy Mccarthy MD;  Location:  [facility];  Service: Pulmonary;  Laterality: N/A;  Pre/Post - mediastinal and hilar lymphadenopathy.    BRONCHOSCOPY WITH ION ROBOTIC ASSIST N/A 2024    Procedure: BRONCHOSCOPY WITH ION ROBOT,  ENDOBRONCHIAL ULTRASOUND, FINE NEEDLE ASPIRATIONS,  CRYOTHERAPY BIOPSIES, AND LEFT LOWER LOBE BRONCHOALVEOLAR LAVAGE.;  Surgeon: Alexia Velásquez MD;  Location:  [facility];  Service: Robotics - Pulmonary;  Laterality: N/A;     SECTION      CHEST TUBE INSERTION Left 2024    Procedure: CHEST TUBE INSERTION;  Surgeon: Alexia Velásquez MD;  Location:  [facility];  Service: Thoracic;  Laterality: Left;    CHOLECYSTECTOMY      COLONOSCOPY      ECTOPIC PREGNANCY SURGERY      HYSTERECTOMY      TONSILLECTOMY      TOTAL HIP ARTHROPLASTY Left     VENOUS ACCESS DEVICE (PORT) INSERTION N/A 3/14/2024    Procedure: INSERTION VENOUS ACCESS DEVICE;  Surgeon: Jonas Garner MD;  Location:  [facility];  Service: General;  Laterality: N/A;        Current Outpatient Medications on File Prior to Visit   Medication Sig Dispense Refill    albuterol sulfate  (90 Base) MCG/ACT inhaler Inhale 2 puffs Every 4 (Four) Hours As Needed for Wheezing. Takes as needed.      budesonide-formoterol (SYMBICORT) 160-4.5 MCG/ACT inhaler Inhale 2 puffs 2 (Two) Times a Day.  12    Denosumab (PROLIA SC) Inject  under the skin into the appropriate area as directed Every 6 (Six) Months.      diazePAM (VALIUM) 10 MG tablet Take 1 tablet by mouth 2 (Two) Times a Day As Needed for Anxiety (RLS.). Takes 20 mg at bedtime at times when she needs.      HYDROcodone-acetaminophen (NORCO)  MG per tablet Take 1 tablet by mouth 3 (Three) Times a Day As Needed for Moderate Pain.      midodrine (PROAMATINE) 2.5 MG tablet Take 1 tablet by mouth 3 (Three) Times a Day Before Meals.      naloxone (NARCAN) 4 MG/0.1ML nasal spray 1 spray into the  nostril(s) as directed by provider As Needed.      OLANZapine (zyPREXA) 5 MG tablet Take 1 tablet by mouth Every Night. Take nightly x 4 starting night of chemotherapy. 4 tablet 3    ondansetron (ZOFRAN) 8 MG tablet Take 1 tablet by mouth 3 (Three) Times a Day As Needed for Nausea or Vomiting. 30 tablet 5    pantoprazole (PROTONIX) 40 MG EC tablet Take 1 tablet by mouth Daily. 30 tablet 3     No current facility-administered medications on file prior to visit.        ALLERGIES:    Allergies   Allergen Reactions    Codeine GI Intolerance    Percocet [Oxycodone-Acetaminophen] Hives        Social History     Socioeconomic History    Marital status:    Tobacco Use    Smoking status: Every Day     Current packs/day: 1.00     Average packs/day: 1 pack/day for 30.0 years (30.0 ttl pk-yrs)     Types: Cigarettes     Passive exposure: Current    Smokeless tobacco: Never    Tobacco comments:     Began smoking at age 11.  Smoked .5 ppd for 6 years, 2.5 ppd for a year, 2 ppd for 5 years, and 1 ppd for the past 43 years for a 58.5 pack year history.   Vaping Use    Vaping status: Former    Substances: Nicotine, Flavoring    Devices: Disposable   Substance and Sexual Activity    Alcohol use: Yes     Comment: once a year     Drug use: No    Sexual activity: Defer        Family History   Problem Relation Age of Onset    Lung cancer Niece 50    Throat cancer Father     Breast cancer Neg Hx         Review of Systems   Constitutional: Negative.    HENT: Negative.     Eyes: Negative.    Respiratory: Negative.     Cardiovascular: Negative.    Gastrointestinal:         Esophagitis symptoms   Musculoskeletal:  Positive for back pain.   Neurological: Negative.    Hematological: Negative.    Psychiatric/Behavioral:  Positive for dysphoric mood. The patient is nervous/anxious.    ROS unchanged-3/13/2024    Objective     Vitals:    04/09/24 0808   BP: 93/62   Pulse: 84   Resp: 16   Temp: 97.8 °F (36.6 °C)   TempSrc: Temporal   SpO2: 95%  "  Weight: 49.6 kg (109 lb 6.4 oz)   Height: 160 cm (62.99\")   PainSc: 0-No pain           3/19/2024     8:04 AM   Current Status   ECOG score 0       Physical Exam    CONSTITUTIONAL: pleasant well-developed adult woman  HEENT: no icterus, no thrush, moist membranes, anisocoria  LYMPH: no cervical or supraclavicular lad  CV: RRR, S1S2, no murmur  RESP: cta bilat, no wheezing, no rales  GI: soft, non-tender, no splenomegaly, +bs  MUSC: no edema, normal gait  NEURO: alert and oriented x3, normal strength  PSYCH: normal mood and affect for situation  Skin: No rash or induration noted, developing alopecia      RECENT LABS:  Hematology WBC   Date Value Ref Range Status   04/09/2024 6.68 3.40 - 10.80 10*3/mm3 Final     RBC   Date Value Ref Range Status   04/09/2024 3.74 (L) 3.77 - 5.28 10*6/mm3 Final     Hemoglobin   Date Value Ref Range Status   04/09/2024 12.1 12.0 - 15.9 g/dL Final     Hematocrit   Date Value Ref Range Status   04/09/2024 35.7 34.0 - 46.6 % Final     Platelets   Date Value Ref Range Status   04/09/2024 313 140 - 450 10*3/mm3 Final        Lab Results   Component Value Date    GLUCOSE 73 04/09/2024    BUN 5 (L) 04/09/2024    CREATININE 1.07 (H) 04/09/2024    EGFR 57.4 (L) 04/09/2024    BCR 4.7 (L) 04/09/2024    K 4.1 04/09/2024    CO2 29.4 (H) 04/09/2024    CALCIUM 9.3 04/09/2024    ALBUMIN 4.0 04/09/2024    BILITOT <0.2 04/09/2024    AST 11 04/09/2024    ALT 5 04/09/2024     F-18 FDG PET SKULL BASE TO MID THIGH WITH PET CT FUSION.     HISTORY: 67-year-old female with a left lower lobe pulmonary nodule.     TECHNIQUE: Radiation dose reduction techniques were utilized, including  automated exposure control and exposure modulation based on body size.   Blood glucose level at time of injection was 89 mg/dL. 6.5 mCi of F-18  FDG were injected and PET was performed from skull base to mid thigh. CT  was obtained for localization and attenuation correction. Time at  injection 7:45 AM. PET start time 9:06 AM. " Compared with outside  facility chest CT 07/28/2023.     FINDINGS: The 1.1 cm pleural-based nodular density at the posterior  aspect of the left lower lobe blends into the dependent atelectatic  change adjacently, but does have slightly more intense activity than the  dependent atelectasis with a maximal SUV of 2.8. There is hypermetabolic  lymphadenopathy at the left hilum and left lower paratracheal regions.  The 1.6 x 1.6 cm node posterior to the left pulmonary artery has a  maximal SUV of 4.7 and a smaller ill-defined soft tissue lateral to the  left mainstem bronchus has a maximal SUV of 4.4 and ill-defined  lymphadenopathy in the left lower paratracheal region has a maximal SUV  of 3.7. There is no suspicious hypermetabolic activity at the right  aspect of the chest. There is no suspicious hypermetabolic activity at  the supraclavicular regions or neck. There is no suspicious  hypermetabolic activity at the abdomen or pelvis. There is no  hypermetabolic lymphadenopathy. There is nonspecific bowel activity.  There is very extensive sigmoid diverticulosis without evidence for  diverticulitis.     IMPRESSION:  1. Hypermetabolic left hilar and left lower paratracheal lymphadenopathy  as described. There is otherwise no hypermetabolic lymphadenopathy at  the chest, neck, abdomen, or pelvis.  2. The pleural-based opacity at the left lower lobe blends in with  dependent atelectatic change, but does have slightly more hypermetabolic  activity which is nonspecific. Conservative surveillance is recommended  with a follow-up chest CT in 3 months.    CT chest 2/22/2024:  FINDINGS:      Chest wall: No lymphadenopathy.     Thyroid: Normal.     Mediastinum: Three-vessel coronary artery atherosclerotic  calcifications. Heart is normal in size. No pericardial effusion.  Calcified mediastinal and left hilar lymph nodes, consistent with prior  granulomatous infection. Enlarged 4L lymph node, series 2, axial mage  139, measures  2.5 cm, previously 2.6 cm. Enlarged AP window lymph node,  series 2, axial image 137, measures 1.4 cm, previously 1.2 cm. Poorly  defined left hilar lymphadenopathy appears stable. Right  pericardiophrenic angle lymph node, series 2, axial image 311, measures  9 mm in short axis, unchanged.     Lung/pleura: No effusions. Secretions in the xiomara, extending into the  left central airways. Airways wall thickening. Mild centrilobular and  paraseptal emphysema. Biapical pleural-parenchymal thickening. Calcified  pulmonary nodules in the superior segment right lower lobe, consistent  with prior granulomatous infection, benign. Solid pulmonary nodule in  the anterior left upper lobe, series 3, axial image 185, measures 3 mm,  unchanged. Subpleural pulmonary nodule in the superior segment left  lower lobe, with broad base abutment to the pleura, series 3, axial mage  188, measures 1.7 cm, previously measuring 1.6 cm, not significantly  changed in the interval. Adjacent subpleural solid pulmonary nodule seen  more medially, series 3, axial mage 195, measures 5 mm, unchanged.  Juxtapleural solid pulmonary nodule along the left major fissure, series  3, axial mage 186, measures 1.0 cm, unchanged, suspect fissural lymph  node. Subpleural interlobular septal thickening with distortion and  areas of traction bronchiolectasis, suspect fibrotic changes, appears  diffuse. Scattered poorly defined groundglass lung opacities seen in the  lower lungs, most pronounced in the anterior lateral basilar left lower  lobe, series 3, axial image 244, more pronounced in the interval.     Upper abdomen: Unremarkable.     Osseous structures: Severe spondylosis/degenerative disc disease at  C6/C7.     IMPRESSION:     1. Airways disease and emphysema with superimposed fibrotic lung  changes.  2. Mediastinal and left hilar lymphadenopathy is stable in the interval  though suspicious for kylah metastatic disease. Consider sampling.  3. Subpleural  solid pulmonary nodule in the superior segment left lower  lobe is stable in the interval though suspicious for primary lung  malignancy. Consider sampling.  4. Poorly defined groundglass opacity seen in the lower lungs, most  pronounced in the basilar left lower lobe is more conspicuous in the  interval. Favor infectious or inflammatory etiology though follow-up to  resolution advised.  I personally reviewed the CT chest 2/22/2024-there are emphysematous changes in the lungs.  There are enlarged left hilar and mediastinal lymph nodes and a nodule in the left lower lobe of the lung measuring 1.7 cm    Assessment & Plan     *Small cell lung cancer left lower lobe of the lung with mediastinal involvement  CT chest 2/22/2024-enlarged for ill lymph nodes 2.5 cm, enlarged AP window lymph node 1.4 cm, poorly defined left hilar lymphadenopathy, left lower lobe pulmonary nodule 1.7 cm  Bronchoscopy with Ion navigation performed 2/28/2024-pathology positive for small cell carcinoma from the left lower lobe and station 4L  PET scan on 3/7/2024 showed significantly avid bilateral hilar and mediastinal lymph nodes for example kylah conglomerate AP window 8.3 SUV measuring 2.5 x 1.8 cm, left hilar lymph node SUV 7 1.4 cm, pleural-based nodularity left major fissure newly hypermetabolic SUV 2.2, pleural-based partially cavitary mass left lower lobe SUV five 1.5 x 1 cm, new right lung nodule 8 mm in the right lower lobe suspicious.-Results discussed with Dr. Velásquez and we both feel the contralateral right lung nodule is highly suspicious for metastatic disease  MRI of the brain negative.  3/19/2024.initiated chemoimmunotherapy with carboplatin/etoposide/atezolizumab   *Esophagitis secondary to chemotherapy  *Comorbidities of tobacco abuse/COPD, anxiety, atherosclerotic calcifications by CT but no definitive diagnosis of CAD, degenerative disease of the spine, hyperlipidemia; no known autoimmune disorders, chronic pain on opioid  "medicines    Oncology plan/recommendations:  Proceed with cycle 2 of carboplatin/-16/Tecentriq  2 weeks CBC and CT chest abdomen pelvis with IV contrast  Continue Protonix 40 mg daily; add Carafate 1 g 4 times daily  The patient has been on long-term Norco 3 times a day from her PCP but is using an extra tablet per day because of cancer/esophagitis pain so will run out sooner than expected; she also has been using THC Gummies for chemo-induced nausea.  She is worried her PCP will \"cut her off\".  I will discuss with Dr. De Paz her PCP pain management.  3-week MD visit for follow-up scan review and continued treatment     "

## 2024-04-09 ENCOUNTER — INFUSION (OUTPATIENT)
Dept: ONCOLOGY | Facility: HOSPITAL | Age: 69
End: 2024-04-09
Payer: MEDICARE

## 2024-04-09 ENCOUNTER — OFFICE VISIT (OUTPATIENT)
Dept: ONCOLOGY | Facility: CLINIC | Age: 69
End: 2024-04-09
Payer: MEDICARE

## 2024-04-09 VITALS
BODY MASS INDEX: 19.38 KG/M2 | RESPIRATION RATE: 16 BRPM | WEIGHT: 109.4 LBS | HEIGHT: 63 IN | OXYGEN SATURATION: 95 % | SYSTOLIC BLOOD PRESSURE: 93 MMHG | DIASTOLIC BLOOD PRESSURE: 62 MMHG | TEMPERATURE: 97.8 F | HEART RATE: 84 BPM

## 2024-04-09 DIAGNOSIS — C80.1 SMALL CELL CARCINOMA: ICD-10-CM

## 2024-04-09 DIAGNOSIS — C80.1 SMALL CELL CARCINOMA: Primary | ICD-10-CM

## 2024-04-09 DIAGNOSIS — Z45.2 FITTING AND ADJUSTMENT OF VASCULAR CATHETER: Primary | ICD-10-CM

## 2024-04-09 DIAGNOSIS — Z45.2 FITTING AND ADJUSTMENT OF VASCULAR CATHETER: ICD-10-CM

## 2024-04-09 DIAGNOSIS — Z79.899 ENCOUNTER FOR LONG-TERM (CURRENT) USE OF OTHER MEDICATIONS: ICD-10-CM

## 2024-04-09 LAB
ALBUMIN SERPL-MCNC: 4 G/DL (ref 3.5–5.2)
ALBUMIN/GLOB SERPL: 1.7 G/DL
ALP SERPL-CCNC: 75 U/L (ref 39–117)
ALT SERPL W P-5'-P-CCNC: 5 U/L (ref 1–33)
ANION GAP SERPL CALCULATED.3IONS-SCNC: 8.6 MMOL/L (ref 5–15)
AST SERPL-CCNC: 11 U/L (ref 1–32)
BASOPHILS # BLD AUTO: 0.06 10*3/MM3 (ref 0–0.2)
BASOPHILS NFR BLD AUTO: 0.9 % (ref 0–1.5)
BILIRUB SERPL-MCNC: <0.2 MG/DL (ref 0–1.2)
BUN SERPL-MCNC: 5 MG/DL (ref 8–23)
BUN/CREAT SERPL: 4.7 (ref 7–25)
CALCIUM SPEC-SCNC: 9.3 MG/DL (ref 8.6–10.5)
CHLORIDE SERPL-SCNC: 93 MMOL/L (ref 98–107)
CO2 SERPL-SCNC: 29.4 MMOL/L (ref 22–29)
CREAT SERPL-MCNC: 1.07 MG/DL (ref 0.57–1)
DEPRECATED RDW RBC AUTO: 42.6 FL (ref 37–54)
EGFRCR SERPLBLD CKD-EPI 2021: 57.4 ML/MIN/1.73
EOSINOPHIL # BLD AUTO: 0.08 10*3/MM3 (ref 0–0.4)
EOSINOPHIL NFR BLD AUTO: 1.2 % (ref 0.3–6.2)
ERYTHROCYTE [DISTWIDTH] IN BLOOD BY AUTOMATED COUNT: 12.5 % (ref 12.3–15.4)
GLOBULIN UR ELPH-MCNC: 2.4 GM/DL
GLUCOSE SERPL-MCNC: 73 MG/DL (ref 65–99)
HCT VFR BLD AUTO: 35.7 % (ref 34–46.6)
HGB BLD-MCNC: 12.1 G/DL (ref 12–15.9)
IMM GRANULOCYTES # BLD AUTO: 0.02 10*3/MM3 (ref 0–0.05)
IMM GRANULOCYTES NFR BLD AUTO: 0.3 % (ref 0–0.5)
LYMPHOCYTES # BLD AUTO: 2.84 10*3/MM3 (ref 0.7–3.1)
LYMPHOCYTES NFR BLD AUTO: 42.5 % (ref 19.6–45.3)
MCH RBC QN AUTO: 32.4 PG (ref 26.6–33)
MCHC RBC AUTO-ENTMCNC: 33.9 G/DL (ref 31.5–35.7)
MCV RBC AUTO: 95.5 FL (ref 79–97)
MONOCYTES # BLD AUTO: 0.78 10*3/MM3 (ref 0.1–0.9)
MONOCYTES NFR BLD AUTO: 11.7 % (ref 5–12)
NEUTROPHILS NFR BLD AUTO: 2.9 10*3/MM3 (ref 1.7–7)
NEUTROPHILS NFR BLD AUTO: 43.4 % (ref 42.7–76)
NRBC BLD AUTO-RTO: 0 /100 WBC (ref 0–0.2)
PLATELET # BLD AUTO: 313 10*3/MM3 (ref 140–450)
PMV BLD AUTO: 8.4 FL (ref 6–12)
POTASSIUM SERPL-SCNC: 4.1 MMOL/L (ref 3.5–5.2)
PROT SERPL-MCNC: 6.4 G/DL (ref 6–8.5)
RBC # BLD AUTO: 3.74 10*6/MM3 (ref 3.77–5.28)
SODIUM SERPL-SCNC: 131 MMOL/L (ref 136–145)
WBC NRBC COR # BLD AUTO: 6.68 10*3/MM3 (ref 3.4–10.8)

## 2024-04-09 PROCEDURE — 80053 COMPREHEN METABOLIC PANEL: CPT | Performed by: INTERNAL MEDICINE

## 2024-04-09 PROCEDURE — 25010000002 HEPARIN LOCK FLUSH PER 10 UNITS: Performed by: INTERNAL MEDICINE

## 2024-04-09 PROCEDURE — 25810000003 SODIUM CHLORIDE 0.9 % SOLUTION 500 ML FLEX CONT: Performed by: INTERNAL MEDICINE

## 2024-04-09 PROCEDURE — 99214 OFFICE O/P EST MOD 30 MIN: CPT | Performed by: INTERNAL MEDICINE

## 2024-04-09 PROCEDURE — 25010000002 ETOPOSIDE 500 MG/25ML SOLUTION 25 ML VIAL: Performed by: INTERNAL MEDICINE

## 2024-04-09 PROCEDURE — 25010000002 ATEZOLIZUMAB 1200 MG/20ML SOLUTION 20 ML VIAL: Performed by: INTERNAL MEDICINE

## 2024-04-09 PROCEDURE — 1126F AMNT PAIN NOTED NONE PRSNT: CPT | Performed by: INTERNAL MEDICINE

## 2024-04-09 PROCEDURE — 25010000002 CARBOPLATIN PER 50 MG: Performed by: INTERNAL MEDICINE

## 2024-04-09 PROCEDURE — 25010000002 DEXAMETHASONE SODIUM PHOSPHATE 100 MG/10ML SOLUTION: Performed by: INTERNAL MEDICINE

## 2024-04-09 PROCEDURE — 25010000002 FOSAPREPITANT PER 1 MG: Performed by: INTERNAL MEDICINE

## 2024-04-09 PROCEDURE — 25810000003 SODIUM CHLORIDE 0.9 % SOLUTION: Performed by: INTERNAL MEDICINE

## 2024-04-09 PROCEDURE — 96413 CHEMO IV INFUSION 1 HR: CPT

## 2024-04-09 PROCEDURE — 96375 TX/PRO/DX INJ NEW DRUG ADDON: CPT

## 2024-04-09 PROCEDURE — 96417 CHEMO IV INFUS EACH ADDL SEQ: CPT

## 2024-04-09 PROCEDURE — 25810000003 SODIUM CHLORIDE 0.9 % SOLUTION 250 ML FLEX CONT: Performed by: INTERNAL MEDICINE

## 2024-04-09 PROCEDURE — 25010000002 PALONOSETRON 0.25 MG/5ML SOLUTION PREFILLED SYRINGE: Performed by: INTERNAL MEDICINE

## 2024-04-09 PROCEDURE — 85025 COMPLETE CBC W/AUTO DIFF WBC: CPT | Performed by: INTERNAL MEDICINE

## 2024-04-09 PROCEDURE — 96367 TX/PROPH/DG ADDL SEQ IV INF: CPT

## 2024-04-09 PROCEDURE — G2211 COMPLEX E/M VISIT ADD ON: HCPCS | Performed by: INTERNAL MEDICINE

## 2024-04-09 RX ORDER — PALONOSETRON 0.05 MG/ML
0.25 INJECTION, SOLUTION INTRAVENOUS ONCE
Status: COMPLETED | OUTPATIENT
Start: 2024-04-09 | End: 2024-04-09

## 2024-04-09 RX ORDER — SODIUM CHLORIDE 0.9 % (FLUSH) 0.9 %
10 SYRINGE (ML) INJECTION AS NEEDED
Status: CANCELLED | OUTPATIENT
Start: 2024-04-09

## 2024-04-09 RX ORDER — PALONOSETRON 0.05 MG/ML
0.25 INJECTION, SOLUTION INTRAVENOUS ONCE
Status: CANCELLED | OUTPATIENT
Start: 2024-04-09

## 2024-04-09 RX ORDER — DIPHENHYDRAMINE HYDROCHLORIDE 50 MG/ML
50 INJECTION INTRAMUSCULAR; INTRAVENOUS AS NEEDED
Status: CANCELLED | OUTPATIENT
Start: 2024-04-09

## 2024-04-09 RX ORDER — FAMOTIDINE 10 MG/ML
20 INJECTION, SOLUTION INTRAVENOUS AS NEEDED
Status: CANCELLED | OUTPATIENT
Start: 2024-04-09

## 2024-04-09 RX ORDER — HEPARIN SODIUM (PORCINE) LOCK FLUSH IV SOLN 100 UNIT/ML 100 UNIT/ML
500 SOLUTION INTRAVENOUS AS NEEDED
Status: CANCELLED | OUTPATIENT
Start: 2024-04-09

## 2024-04-09 RX ORDER — SODIUM CHLORIDE 0.9 % (FLUSH) 0.9 %
10 SYRINGE (ML) INJECTION AS NEEDED
Status: DISCONTINUED | OUTPATIENT
Start: 2024-04-09 | End: 2024-04-09 | Stop reason: HOSPADM

## 2024-04-09 RX ORDER — PROCHLORPERAZINE MALEATE 10 MG
10 TABLET ORAL ONCE
Status: CANCELLED | OUTPATIENT
Start: 2024-04-11 | End: 2024-04-11

## 2024-04-09 RX ORDER — SODIUM CHLORIDE 9 MG/ML
20 INJECTION, SOLUTION INTRAVENOUS ONCE
Status: CANCELLED | OUTPATIENT
Start: 2024-04-10

## 2024-04-09 RX ORDER — SUCRALFATE 1 G/1
1 TABLET ORAL 4 TIMES DAILY
Qty: 120 TABLET | Refills: 3 | Status: SHIPPED | OUTPATIENT
Start: 2024-04-09

## 2024-04-09 RX ORDER — HEPARIN SODIUM (PORCINE) LOCK FLUSH IV SOLN 100 UNIT/ML 100 UNIT/ML
500 SOLUTION INTRAVENOUS AS NEEDED
Status: DISCONTINUED | OUTPATIENT
Start: 2024-04-09 | End: 2024-04-09 | Stop reason: HOSPADM

## 2024-04-09 RX ORDER — SODIUM CHLORIDE 9 MG/ML
20 INJECTION, SOLUTION INTRAVENOUS ONCE
Status: CANCELLED | OUTPATIENT
Start: 2024-04-09

## 2024-04-09 RX ORDER — OLANZAPINE 5 MG/1
5 TABLET ORAL NIGHTLY
Qty: 4 TABLET | Refills: 3 | Status: SHIPPED | OUTPATIENT
Start: 2024-04-09 | End: 2024-04-13 | Stop reason: HOSPADM

## 2024-04-09 RX ORDER — SODIUM CHLORIDE 9 MG/ML
20 INJECTION, SOLUTION INTRAVENOUS ONCE
Status: CANCELLED | OUTPATIENT
Start: 2024-04-11

## 2024-04-09 RX ORDER — SODIUM CHLORIDE 9 MG/ML
20 INJECTION, SOLUTION INTRAVENOUS ONCE
Status: COMPLETED | OUTPATIENT
Start: 2024-04-09 | End: 2024-04-09

## 2024-04-09 RX ORDER — PROCHLORPERAZINE MALEATE 10 MG
10 TABLET ORAL ONCE
Status: CANCELLED | OUTPATIENT
Start: 2024-04-10 | End: 2024-04-10

## 2024-04-09 RX ADMIN — Medication 10 ML: at 12:51

## 2024-04-09 RX ADMIN — HEPARIN 500 UNITS: 100 SYRINGE at 12:51

## 2024-04-09 RX ADMIN — FOSAPREPITANT DIMEGLUMINE 100 ML: 150 INJECTION, POWDER, LYOPHILIZED, FOR SOLUTION INTRAVENOUS at 09:27

## 2024-04-09 RX ADMIN — SODIUM CHLORIDE 20 ML/HR: 9 INJECTION, SOLUTION INTRAVENOUS at 09:26

## 2024-04-09 RX ADMIN — DEXAMETHASONE SODIUM PHOSPHATE 12 MG: 10 INJECTION, SOLUTION INTRAMUSCULAR; INTRAVENOUS at 10:41

## 2024-04-09 RX ADMIN — CARBOPLATIN 330 MG: 10 INJECTION, SOLUTION INTRAVENOUS at 11:02

## 2024-04-09 RX ADMIN — ATEZOLIZUMAB 1200 MG: 1200 INJECTION, SOLUTION INTRAVENOUS at 10:04

## 2024-04-09 RX ADMIN — PALONOSETRON 0.25 MG: 0.25 INJECTION, SOLUTION INTRAVENOUS at 10:40

## 2024-04-09 RX ADMIN — ETOPOSIDE 110 MG: 20 INJECTION, SOLUTION INTRAVENOUS at 11:39

## 2024-04-09 NOTE — NURSING NOTE
Per Dr Burns, okay to proceed with treatment today with creatinine clearance less than 50 and etoposide dose at 75mg/m2.

## 2024-04-10 ENCOUNTER — INFUSION (OUTPATIENT)
Dept: ONCOLOGY | Facility: HOSPITAL | Age: 69
End: 2024-04-10
Payer: MEDICARE

## 2024-04-10 VITALS
TEMPERATURE: 97.3 F | DIASTOLIC BLOOD PRESSURE: 63 MMHG | SYSTOLIC BLOOD PRESSURE: 121 MMHG | HEART RATE: 56 BPM | OXYGEN SATURATION: 93 %

## 2024-04-10 DIAGNOSIS — Z45.2 FITTING AND ADJUSTMENT OF VASCULAR CATHETER: ICD-10-CM

## 2024-04-10 DIAGNOSIS — C80.1 SMALL CELL CARCINOMA: Primary | ICD-10-CM

## 2024-04-10 PROCEDURE — 63710000001 PROCHLORPERAZINE MALEATE PER 5 MG: Performed by: INTERNAL MEDICINE

## 2024-04-10 PROCEDURE — 25010000002 ETOPOSIDE 500 MG/25ML SOLUTION 25 ML VIAL: Performed by: INTERNAL MEDICINE

## 2024-04-10 PROCEDURE — 25810000003 SODIUM CHLORIDE 0.9 % SOLUTION: Performed by: INTERNAL MEDICINE

## 2024-04-10 PROCEDURE — 25810000003 SODIUM CHLORIDE 0.9 % SOLUTION 500 ML FLEX CONT: Performed by: INTERNAL MEDICINE

## 2024-04-10 PROCEDURE — 25010000002 HEPARIN LOCK FLUSH PER 10 UNITS: Performed by: INTERNAL MEDICINE

## 2024-04-10 PROCEDURE — 96413 CHEMO IV INFUSION 1 HR: CPT

## 2024-04-10 RX ORDER — PROCHLORPERAZINE MALEATE 5 MG/1
10 TABLET ORAL ONCE
Status: COMPLETED | OUTPATIENT
Start: 2024-04-10 | End: 2024-04-10

## 2024-04-10 RX ORDER — SODIUM CHLORIDE 0.9 % (FLUSH) 0.9 %
10 SYRINGE (ML) INJECTION AS NEEDED
Status: DISCONTINUED | OUTPATIENT
Start: 2024-04-10 | End: 2024-04-10 | Stop reason: HOSPADM

## 2024-04-10 RX ORDER — SODIUM CHLORIDE 9 MG/ML
20 INJECTION, SOLUTION INTRAVENOUS ONCE
Status: COMPLETED | OUTPATIENT
Start: 2024-04-10 | End: 2024-04-10

## 2024-04-10 RX ORDER — SODIUM CHLORIDE 0.9 % (FLUSH) 0.9 %
10 SYRINGE (ML) INJECTION AS NEEDED
OUTPATIENT
Start: 2024-04-10

## 2024-04-10 RX ORDER — HEPARIN SODIUM (PORCINE) LOCK FLUSH IV SOLN 100 UNIT/ML 100 UNIT/ML
500 SOLUTION INTRAVENOUS AS NEEDED
Status: DISCONTINUED | OUTPATIENT
Start: 2024-04-10 | End: 2024-04-10 | Stop reason: HOSPADM

## 2024-04-10 RX ORDER — HEPARIN SODIUM (PORCINE) LOCK FLUSH IV SOLN 100 UNIT/ML 100 UNIT/ML
500 SOLUTION INTRAVENOUS AS NEEDED
OUTPATIENT
Start: 2024-04-10

## 2024-04-10 RX ADMIN — ETOPOSIDE 110 MG: 20 INJECTION, SOLUTION INTRAVENOUS at 08:53

## 2024-04-10 RX ADMIN — SODIUM CHLORIDE 20 ML/HR: 9 INJECTION, SOLUTION INTRAVENOUS at 08:33

## 2024-04-10 RX ADMIN — HEPARIN 500 UNITS: 100 SYRINGE at 09:58

## 2024-04-10 RX ADMIN — Medication 10 ML: at 09:58

## 2024-04-10 RX ADMIN — PROCHLORPERAZINE MALEATE 10 MG: 5 TABLET ORAL at 08:33

## 2024-04-11 ENCOUNTER — APPOINTMENT (OUTPATIENT)
Dept: MRI IMAGING | Facility: HOSPITAL | Age: 69
DRG: 064 | End: 2024-04-11
Payer: MEDICARE

## 2024-04-11 ENCOUNTER — APPOINTMENT (OUTPATIENT)
Dept: CARDIOLOGY | Facility: HOSPITAL | Age: 69
DRG: 064 | End: 2024-04-11
Payer: MEDICARE

## 2024-04-11 ENCOUNTER — HOSPITAL ENCOUNTER (INPATIENT)
Facility: HOSPITAL | Age: 69
LOS: 2 days | Discharge: HOME | DRG: 064 | End: 2024-04-13
Attending: EMERGENCY MEDICINE | Admitting: FAMILY MEDICINE
Payer: MEDICARE

## 2024-04-11 ENCOUNTER — APPOINTMENT (OUTPATIENT)
Dept: GENERAL RADIOLOGY | Facility: HOSPITAL | Age: 69
DRG: 064 | End: 2024-04-11
Payer: MEDICARE

## 2024-04-11 ENCOUNTER — APPOINTMENT (OUTPATIENT)
Dept: CT IMAGING | Facility: HOSPITAL | Age: 69
DRG: 064 | End: 2024-04-11
Payer: MEDICARE

## 2024-04-11 ENCOUNTER — APPOINTMENT (OUTPATIENT)
Dept: ULTRASOUND IMAGING | Facility: HOSPITAL | Age: 69
DRG: 064 | End: 2024-04-11
Payer: MEDICARE

## 2024-04-11 ENCOUNTER — INFUSION (OUTPATIENT)
Dept: ONCOLOGY | Facility: HOSPITAL | Age: 69
End: 2024-04-11
Payer: MEDICARE

## 2024-04-11 DIAGNOSIS — R09.02 HYPOXIA: ICD-10-CM

## 2024-04-11 DIAGNOSIS — T67.1XXA HEAT SYNCOPE, INITIAL ENCOUNTER: ICD-10-CM

## 2024-04-11 DIAGNOSIS — R40.0 SOMNOLENCE: ICD-10-CM

## 2024-04-11 DIAGNOSIS — I63.9 CEREBROVASCULAR ACCIDENT (CVA), UNSPECIFIED MECHANISM: ICD-10-CM

## 2024-04-11 DIAGNOSIS — N17.9 AKI (ACUTE KIDNEY INJURY): Primary | ICD-10-CM

## 2024-04-11 DIAGNOSIS — I63.113 CEREBROVASCULAR ACCIDENT (CVA) DUE TO BILATERAL EMBOLISM OF VERTEBRAL ARTERIES: ICD-10-CM

## 2024-04-11 PROBLEM — R41.82 AMS (ALTERED MENTAL STATUS): Status: ACTIVE | Noted: 2024-04-11

## 2024-04-11 PROBLEM — I61.9 CVA (CEREBROVASCULAR ACCIDENT DUE TO INTRACEREBRAL HEMORRHAGE): Status: ACTIVE | Noted: 2024-04-11

## 2024-04-11 LAB
ALBUMIN SERPL-MCNC: 4.1 G/DL (ref 3.5–5.2)
ALBUMIN/GLOB SERPL: 1.8 G/DL
ALP SERPL-CCNC: 68 U/L (ref 39–117)
ALT SERPL W P-5'-P-CCNC: 8 U/L (ref 1–33)
AMPHET+METHAMPHET UR QL: NEGATIVE
AMPHETAMINES UR QL: NEGATIVE
ANION GAP SERPL CALCULATED.3IONS-SCNC: 11.2 MMOL/L (ref 5–15)
AORTIC DIMENSIONLESS INDEX: 0.8 (DI)
APAP SERPL-MCNC: <5 MCG/ML (ref 0–30)
ARTERIAL PATENCY WRIST A: POSITIVE
AST SERPL-CCNC: 14 U/L (ref 1–32)
ATMOSPHERIC PRESS: 723 MMHG
BARBITURATES UR QL SCN: NEGATIVE
BASE EXCESS BLDA CALC-SCNC: 3 MMOL/L (ref 0–2)
BASOPHILS # BLD AUTO: 0.01 10*3/MM3 (ref 0–0.2)
BASOPHILS NFR BLD AUTO: 0.1 % (ref 0–1.5)
BDY SITE: ABNORMAL
BENZODIAZ UR QL SCN: POSITIVE
BH CV ECHO MEAS - ACS: 1.83 CM
BH CV ECHO MEAS - AO MAX PG: 6.1 MMHG
BH CV ECHO MEAS - AO MEAN PG: 3 MMHG
BH CV ECHO MEAS - AO V2 MAX: 123 CM/SEC
BH CV ECHO MEAS - AO V2 VTI: 27.9 CM
BH CV ECHO MEAS - AVA(I,D): 2.6 CM2
BH CV ECHO MEAS - EDV(CUBED): 97.3 ML
BH CV ECHO MEAS - EDV(MOD-SP2): 60 ML
BH CV ECHO MEAS - EDV(MOD-SP4): 73 ML
BH CV ECHO MEAS - EF(MOD-BP): 51.8 %
BH CV ECHO MEAS - EF(MOD-SP2): 56.7 %
BH CV ECHO MEAS - EF(MOD-SP4): 47.9 %
BH CV ECHO MEAS - ESV(CUBED): 19 ML
BH CV ECHO MEAS - ESV(MOD-SP2): 26 ML
BH CV ECHO MEAS - ESV(MOD-SP4): 38 ML
BH CV ECHO MEAS - FS: 42 %
BH CV ECHO MEAS - IVS/LVPW: 1.13 CM
BH CV ECHO MEAS - IVSD: 0.9 CM
BH CV ECHO MEAS - LAT PEAK E' VEL: 9.1 CM/SEC
BH CV ECHO MEAS - LV DIASTOLIC VOL/BSA (35-75): 49 CM2
BH CV ECHO MEAS - LV MASS(C)D: 127.7 GRAMS
BH CV ECHO MEAS - LV MAX PG: 4.2 MMHG
BH CV ECHO MEAS - LV MEAN PG: 2 MMHG
BH CV ECHO MEAS - LV SYSTOLIC VOL/BSA (12-30): 25.5 CM2
BH CV ECHO MEAS - LV V1 MAX: 102 CM/SEC
BH CV ECHO MEAS - LV V1 VTI: 21.8 CM
BH CV ECHO MEAS - LVIDD: 4.6 CM
BH CV ECHO MEAS - LVIDS: 2.7 CM
BH CV ECHO MEAS - LVOT AREA: 3.3 CM2
BH CV ECHO MEAS - LVOT DIAM: 2.06 CM
BH CV ECHO MEAS - LVPWD: 0.8 CM
BH CV ECHO MEAS - MED PEAK E' VEL: 9.3 CM/SEC
BH CV ECHO MEAS - MV A MAX VEL: 79.1 CM/SEC
BH CV ECHO MEAS - MV DEC SLOPE: 360.6 CM/SEC2
BH CV ECHO MEAS - MV DEC TIME: 0.23 SEC
BH CV ECHO MEAS - MV E MAX VEL: 62.9 CM/SEC
BH CV ECHO MEAS - MV E/A: 0.8
BH CV ECHO MEAS - MV MAX PG: 3.2 MMHG
BH CV ECHO MEAS - MV MEAN PG: 1.24 MMHG
BH CV ECHO MEAS - MV P1/2T: 75.3 MSEC
BH CV ECHO MEAS - MV V2 VTI: 27.4 CM
BH CV ECHO MEAS - MVA(P1/2T): 2.9 CM2
BH CV ECHO MEAS - MVA(VTI): 2.7 CM2
BH CV ECHO MEAS - PA ACC TIME: 0.13 SEC
BH CV ECHO MEAS - PA V2 MAX: 81.1 CM/SEC
BH CV ECHO MEAS - QP/QS: 1.39
BH CV ECHO MEAS - RAP SYSTOLE: 3 MMHG
BH CV ECHO MEAS - RV MAX PG: 1.35 MMHG
BH CV ECHO MEAS - RV V1 MAX: 58.2 CM/SEC
BH CV ECHO MEAS - RV V1 VTI: 17.5 CM
BH CV ECHO MEAS - RVOT DIAM: 2.7 CM
BH CV ECHO MEAS - RVSP: 24 MMHG
BH CV ECHO MEAS - SV(LVOT): 72.9 ML
BH CV ECHO MEAS - SV(MOD-SP2): 34 ML
BH CV ECHO MEAS - SV(MOD-SP4): 35 ML
BH CV ECHO MEAS - SV(RVOT): 101.6 ML
BH CV ECHO MEAS - SVI(MOD-SP2): 22.8 ML/M2
BH CV ECHO MEAS - SVI(MOD-SP4): 23.5 ML/M2
BH CV ECHO MEAS - TR MAX PG: 21.3 MMHG
BH CV ECHO MEAS - TR MAX VEL: 231 CM/SEC
BH CV ECHO MEASUREMENTS AVERAGE E/E' RATIO: 6.84
BH CV ECHO SHUNT ASSESSMENT PERFORMED (HIDDEN SCRIPTING): 1
BH CV XLRA - RV BASE: 3.5 CM
BH CV XLRA - RV LENGTH: 7.6 CM
BH CV XLRA - RV MID: 2.8 CM
BH CV XLRA - TDI S': 11.2 CM/SEC
BILIRUB SERPL-MCNC: <0.2 MG/DL (ref 0–1.2)
BODY TEMPERATURE: 37
BUN SERPL-MCNC: 17 MG/DL (ref 8–23)
BUN/CREAT SERPL: 10.2 (ref 7–25)
BUPRENORPHINE SERPL-MCNC: NEGATIVE NG/ML
CALCIUM SPEC-SCNC: 9.7 MG/DL (ref 8.6–10.5)
CANNABINOIDS SERPL QL: POSITIVE
CHLORIDE SERPL-SCNC: 95 MMOL/L (ref 98–107)
CHOLEST SERPL-MCNC: 207 MG/DL (ref 0–200)
CO2 SERPL-SCNC: 24.8 MMOL/L (ref 22–29)
COCAINE UR QL: NEGATIVE
CREAT SERPL-MCNC: 1.67 MG/DL (ref 0.57–1)
DEPRECATED RDW RBC AUTO: 46.9 FL (ref 37–54)
EGFRCR SERPLBLD CKD-EPI 2021: 33.6 ML/MIN/1.73
EOSINOPHIL # BLD AUTO: 0 10*3/MM3 (ref 0–0.4)
EOSINOPHIL NFR BLD AUTO: 0 % (ref 0.3–6.2)
ERYTHROCYTE [DISTWIDTH] IN BLOOD BY AUTOMATED COUNT: 12.8 % (ref 12.3–15.4)
ETHANOL BLD-MCNC: <10 MG/DL (ref 0–10)
ETHANOL UR QL: <0.01 %
GAS FLOW AIRWAY: 2 LPM
GLOBULIN UR ELPH-MCNC: 2.3 GM/DL
GLUCOSE SERPL-MCNC: 90 MG/DL (ref 65–99)
HBA1C MFR BLD: 5.3 % (ref 4.8–5.6)
HCO3 BLDA-SCNC: 29.3 MMOL/L (ref 20–26)
HCT VFR BLD AUTO: 34.6 % (ref 34–46.6)
HDLC SERPL-MCNC: 54 MG/DL (ref 40–60)
HGB BLD-MCNC: 11 G/DL (ref 12–15.9)
HGB BLDA-MCNC: 11.2 G/DL (ref 13.5–17.5)
IMM GRANULOCYTES # BLD AUTO: 0.04 10*3/MM3 (ref 0–0.05)
IMM GRANULOCYTES NFR BLD AUTO: 0.3 % (ref 0–0.5)
LDLC SERPL CALC-MCNC: 124 MG/DL (ref 0–100)
LDLC/HDLC SERPL: 2.22 {RATIO}
LEFT ATRIUM VOLUME INDEX: 21.1 ML/M2
LYMPHOCYTES # BLD AUTO: 1.81 10*3/MM3 (ref 0.7–3.1)
LYMPHOCYTES NFR BLD AUTO: 13.4 % (ref 19.6–45.3)
Lab: ABNORMAL
MAGNESIUM SERPL-MCNC: 2.1 MG/DL (ref 1.6–2.4)
MCH RBC QN AUTO: 31.9 PG (ref 26.6–33)
MCHC RBC AUTO-ENTMCNC: 31.8 G/DL (ref 31.5–35.7)
MCV RBC AUTO: 100.3 FL (ref 79–97)
METHADONE UR QL SCN: NEGATIVE
MODALITY: ABNORMAL
MONOCYTES # BLD AUTO: 0.94 10*3/MM3 (ref 0.1–0.9)
MONOCYTES NFR BLD AUTO: 7 % (ref 5–12)
NEUTROPHILS NFR BLD AUTO: 10.7 10*3/MM3 (ref 1.7–7)
NEUTROPHILS NFR BLD AUTO: 79.2 % (ref 42.7–76)
OPIATES UR QL: POSITIVE
OXYCODONE UR QL SCN: NEGATIVE
PCO2 BLDA: 51.6 MM HG (ref 35–45)
PCO2 TEMP ADJ BLD: 51.6 MM HG (ref 35–45)
PCP UR QL SCN: NEGATIVE
PH BLDA: 7.36 PH UNITS (ref 7.35–7.45)
PH, TEMP CORRECTED: 7.36 PH UNITS (ref 7.35–7.45)
PHOSPHATE SERPL-MCNC: 4.6 MG/DL (ref 2.5–4.5)
PLATELET # BLD AUTO: 318 10*3/MM3 (ref 140–450)
PMV BLD AUTO: 8.5 FL (ref 6–12)
PO2 BLDA: 62.4 MM HG (ref 83–108)
PO2 TEMP ADJ BLD: 62.4 MM HG (ref 83–108)
POTASSIUM SERPL-SCNC: 4 MMOL/L (ref 3.5–5.2)
PROT SERPL-MCNC: 6.4 G/DL (ref 6–8.5)
RBC # BLD AUTO: 3.45 10*6/MM3 (ref 3.77–5.28)
SALICYLATES SERPL-MCNC: <0.3 MG/DL
SAO2 % BLDCOA: 93 % (ref 94–99)
SINUS: 3 CM
SODIUM SERPL-SCNC: 131 MMOL/L (ref 136–145)
TRICYCLICS UR QL SCN: NEGATIVE
TRIGL SERPL-MCNC: 165 MG/DL (ref 0–150)
TSH SERPL DL<=0.05 MIU/L-ACNC: 0.45 UIU/ML (ref 0.27–4.2)
VLDLC SERPL-MCNC: 29 MG/DL (ref 5–40)
WBC NRBC COR # BLD AUTO: 13.5 10*3/MM3 (ref 3.4–10.8)

## 2024-04-11 PROCEDURE — 94761 N-INVAS EAR/PLS OXIMETRY MLT: CPT

## 2024-04-11 PROCEDURE — 80306 DRUG TEST PRSMV INSTRMNT: CPT | Performed by: EMERGENCY MEDICINE

## 2024-04-11 PROCEDURE — 25010000002 ENOXAPARIN PER 10 MG: Performed by: PSYCHIATRY & NEUROLOGY

## 2024-04-11 PROCEDURE — 93306 TTE W/DOPPLER COMPLETE: CPT | Performed by: INTERNAL MEDICINE

## 2024-04-11 PROCEDURE — 25010000002 NALOXONE HCL 2 MG/2ML SOLUTION PREFILLED SYRINGE: Performed by: EMERGENCY MEDICINE

## 2024-04-11 PROCEDURE — 80143 DRUG ASSAY ACETAMINOPHEN: CPT | Performed by: EMERGENCY MEDICINE

## 2024-04-11 PROCEDURE — 0 GADOBENATE DIMEGLUMINE 529 MG/ML SOLUTION: Performed by: EMERGENCY MEDICINE

## 2024-04-11 PROCEDURE — 80053 COMPREHEN METABOLIC PANEL: CPT | Performed by: EMERGENCY MEDICINE

## 2024-04-11 PROCEDURE — 80061 LIPID PANEL: CPT | Performed by: PSYCHIATRY & NEUROLOGY

## 2024-04-11 PROCEDURE — 84100 ASSAY OF PHOSPHORUS: CPT | Performed by: EMERGENCY MEDICINE

## 2024-04-11 PROCEDURE — 70553 MRI BRAIN STEM W/O & W/DYE: CPT

## 2024-04-11 PROCEDURE — 85025 COMPLETE CBC W/AUTO DIFF WBC: CPT | Performed by: EMERGENCY MEDICINE

## 2024-04-11 PROCEDURE — 80179 DRUG ASSAY SALICYLATE: CPT | Performed by: EMERGENCY MEDICINE

## 2024-04-11 PROCEDURE — 83735 ASSAY OF MAGNESIUM: CPT | Performed by: EMERGENCY MEDICINE

## 2024-04-11 PROCEDURE — 99222 1ST HOSP IP/OBS MODERATE 55: CPT | Performed by: PSYCHIATRY & NEUROLOGY

## 2024-04-11 PROCEDURE — 83036 HEMOGLOBIN GLYCOSYLATED A1C: CPT | Performed by: PSYCHIATRY & NEUROLOGY

## 2024-04-11 PROCEDURE — 94799 UNLISTED PULMONARY SVC/PX: CPT

## 2024-04-11 PROCEDURE — 25810000003 SODIUM CHLORIDE 0.9 % SOLUTION: Performed by: FAMILY MEDICINE

## 2024-04-11 PROCEDURE — 36415 COLL VENOUS BLD VENIPUNCTURE: CPT

## 2024-04-11 PROCEDURE — 94640 AIRWAY INHALATION TREATMENT: CPT

## 2024-04-11 PROCEDURE — 93880 EXTRACRANIAL BILAT STUDY: CPT

## 2024-04-11 PROCEDURE — 70450 CT HEAD/BRAIN W/O DYE: CPT

## 2024-04-11 PROCEDURE — A9577 INJ MULTIHANCE: HCPCS | Performed by: EMERGENCY MEDICINE

## 2024-04-11 PROCEDURE — 93306 TTE W/DOPPLER COMPLETE: CPT

## 2024-04-11 PROCEDURE — 82803 BLOOD GASES ANY COMBINATION: CPT

## 2024-04-11 PROCEDURE — P9612 CATHETERIZE FOR URINE SPEC: HCPCS

## 2024-04-11 PROCEDURE — 99285 EMERGENCY DEPT VISIT HI MDM: CPT

## 2024-04-11 PROCEDURE — 82077 ASSAY SPEC XCP UR&BREATH IA: CPT | Performed by: EMERGENCY MEDICINE

## 2024-04-11 PROCEDURE — 84443 ASSAY THYROID STIM HORMONE: CPT | Performed by: EMERGENCY MEDICINE

## 2024-04-11 PROCEDURE — 71045 X-RAY EXAM CHEST 1 VIEW: CPT

## 2024-04-11 PROCEDURE — 36600 WITHDRAWAL OF ARTERIAL BLOOD: CPT

## 2024-04-11 RX ORDER — MIDODRINE HYDROCHLORIDE 5 MG/1
2.5 TABLET ORAL
Status: DISCONTINUED | OUTPATIENT
Start: 2024-04-11 | End: 2024-04-13 | Stop reason: HOSPADM

## 2024-04-11 RX ORDER — PANTOPRAZOLE SODIUM 40 MG/1
40 TABLET, DELAYED RELEASE ORAL EVERY MORNING
Status: DISCONTINUED | OUTPATIENT
Start: 2024-04-12 | End: 2024-04-13 | Stop reason: HOSPADM

## 2024-04-11 RX ORDER — OLANZAPINE 5 MG/1
5 TABLET ORAL NIGHTLY
Status: DISCONTINUED | OUTPATIENT
Start: 2024-04-11 | End: 2024-04-11

## 2024-04-11 RX ORDER — SODIUM CHLORIDE 0.9 % (FLUSH) 0.9 %
10 SYRINGE (ML) INJECTION EVERY 12 HOURS SCHEDULED
Status: DISCONTINUED | OUTPATIENT
Start: 2024-04-11 | End: 2024-04-13 | Stop reason: HOSPADM

## 2024-04-11 RX ORDER — ALUMINA, MAGNESIA, AND SIMETHICONE 2400; 2400; 240 MG/30ML; MG/30ML; MG/30ML
15 SUSPENSION ORAL EVERY 6 HOURS PRN
Status: DISCONTINUED | OUTPATIENT
Start: 2024-04-11 | End: 2024-04-13 | Stop reason: HOSPADM

## 2024-04-11 RX ORDER — BISACODYL 5 MG/1
5 TABLET, DELAYED RELEASE ORAL DAILY PRN
Status: DISCONTINUED | OUTPATIENT
Start: 2024-04-11 | End: 2024-04-13 | Stop reason: HOSPADM

## 2024-04-11 RX ORDER — HYDROCODONE BITARTRATE AND ACETAMINOPHEN 10; 325 MG/1; MG/1
1 TABLET ORAL 3 TIMES DAILY PRN
Status: DISCONTINUED | OUTPATIENT
Start: 2024-04-11 | End: 2024-04-13 | Stop reason: HOSPADM

## 2024-04-11 RX ORDER — IPRATROPIUM BROMIDE AND ALBUTEROL SULFATE 2.5; .5 MG/3ML; MG/3ML
3 SOLUTION RESPIRATORY (INHALATION) EVERY 4 HOURS PRN
Status: DISCONTINUED | OUTPATIENT
Start: 2024-04-11 | End: 2024-04-13 | Stop reason: HOSPADM

## 2024-04-11 RX ORDER — CALCIUM CARBONATE 500 MG/1
1 TABLET, CHEWABLE ORAL 2 TIMES DAILY PRN
Status: DISCONTINUED | OUTPATIENT
Start: 2024-04-11 | End: 2024-04-13 | Stop reason: HOSPADM

## 2024-04-11 RX ORDER — AMOXICILLIN 250 MG
2 CAPSULE ORAL 2 TIMES DAILY PRN
Status: DISCONTINUED | OUTPATIENT
Start: 2024-04-11 | End: 2024-04-13 | Stop reason: HOSPADM

## 2024-04-11 RX ORDER — ACETAMINOPHEN 650 MG/1
650 SUPPOSITORY RECTAL EVERY 4 HOURS PRN
Status: DISCONTINUED | OUTPATIENT
Start: 2024-04-11 | End: 2024-04-13 | Stop reason: HOSPADM

## 2024-04-11 RX ORDER — SUCRALFATE 1 G/1
1 TABLET ORAL 4 TIMES DAILY
Status: DISCONTINUED | OUTPATIENT
Start: 2024-04-11 | End: 2024-04-13 | Stop reason: HOSPADM

## 2024-04-11 RX ORDER — ACETAMINOPHEN 325 MG/1
650 TABLET ORAL EVERY 4 HOURS PRN
Status: DISCONTINUED | OUTPATIENT
Start: 2024-04-11 | End: 2024-04-13 | Stop reason: HOSPADM

## 2024-04-11 RX ORDER — BUDESONIDE AND FORMOTEROL FUMARATE DIHYDRATE 160; 4.5 UG/1; UG/1
2 AEROSOL RESPIRATORY (INHALATION)
Status: DISCONTINUED | OUTPATIENT
Start: 2024-04-11 | End: 2024-04-13 | Stop reason: HOSPADM

## 2024-04-11 RX ORDER — NALOXONE HYDROCHLORIDE 1 MG/ML
2 INJECTION INTRAMUSCULAR; INTRAVENOUS; SUBCUTANEOUS ONCE
Status: COMPLETED | OUTPATIENT
Start: 2024-04-11 | End: 2024-04-11

## 2024-04-11 RX ORDER — ASPIRIN 300 MG/1
300 SUPPOSITORY RECTAL DAILY
Status: DISCONTINUED | OUTPATIENT
Start: 2024-04-11 | End: 2024-04-11

## 2024-04-11 RX ORDER — BISACODYL 10 MG
10 SUPPOSITORY, RECTAL RECTAL DAILY PRN
Status: DISCONTINUED | OUTPATIENT
Start: 2024-04-11 | End: 2024-04-13 | Stop reason: HOSPADM

## 2024-04-11 RX ORDER — SODIUM CHLORIDE 0.9 % (FLUSH) 0.9 %
10 SYRINGE (ML) INJECTION AS NEEDED
Status: DISCONTINUED | OUTPATIENT
Start: 2024-04-11 | End: 2024-04-13 | Stop reason: HOSPADM

## 2024-04-11 RX ORDER — SODIUM CHLORIDE 9 MG/ML
40 INJECTION, SOLUTION INTRAVENOUS AS NEEDED
Status: DISCONTINUED | OUTPATIENT
Start: 2024-04-11 | End: 2024-04-13 | Stop reason: HOSPADM

## 2024-04-11 RX ORDER — CLOPIDOGREL BISULFATE 75 MG/1
75 TABLET ORAL DAILY
Status: DISCONTINUED | OUTPATIENT
Start: 2024-04-11 | End: 2024-04-12

## 2024-04-11 RX ORDER — ONDANSETRON 2 MG/ML
4 INJECTION INTRAMUSCULAR; INTRAVENOUS EVERY 6 HOURS PRN
Status: DISCONTINUED | OUTPATIENT
Start: 2024-04-11 | End: 2024-04-13 | Stop reason: HOSPADM

## 2024-04-11 RX ORDER — ONDANSETRON 4 MG/1
4 TABLET, ORALLY DISINTEGRATING ORAL EVERY 6 HOURS PRN
Status: DISCONTINUED | OUTPATIENT
Start: 2024-04-11 | End: 2024-04-13 | Stop reason: HOSPADM

## 2024-04-11 RX ORDER — ENOXAPARIN SODIUM 100 MG/ML
0.7 INJECTION SUBCUTANEOUS EVERY 24 HOURS
Status: DISCONTINUED | OUTPATIENT
Start: 2024-04-11 | End: 2024-04-11

## 2024-04-11 RX ORDER — ENOXAPARIN SODIUM 100 MG/ML
0.7 INJECTION SUBCUTANEOUS EVERY 24 HOURS
Status: DISCONTINUED | OUTPATIENT
Start: 2024-04-11 | End: 2024-04-12

## 2024-04-11 RX ORDER — SODIUM CHLORIDE 9 MG/ML
75 INJECTION, SOLUTION INTRAVENOUS CONTINUOUS
Status: DISCONTINUED | OUTPATIENT
Start: 2024-04-11 | End: 2024-04-13

## 2024-04-11 RX ORDER — POLYETHYLENE GLYCOL 3350 17 G/17G
17 POWDER, FOR SOLUTION ORAL DAILY PRN
Status: DISCONTINUED | OUTPATIENT
Start: 2024-04-11 | End: 2024-04-13 | Stop reason: HOSPADM

## 2024-04-11 RX ORDER — ACETAMINOPHEN 160 MG/5ML
650 SOLUTION ORAL EVERY 4 HOURS PRN
Status: DISCONTINUED | OUTPATIENT
Start: 2024-04-11 | End: 2024-04-13 | Stop reason: HOSPADM

## 2024-04-11 RX ADMIN — OLANZAPINE 5 MG: 5 TABLET, FILM COATED ORAL at 21:31

## 2024-04-11 RX ADMIN — ENOXAPARIN SODIUM 40 MG: 40 INJECTION SUBCUTANEOUS at 17:38

## 2024-04-11 RX ADMIN — HYDROCODONE BITARTRATE AND ACETAMINOPHEN 1 TABLET: 10; 325 TABLET ORAL at 17:36

## 2024-04-11 RX ADMIN — SUCRALFATE 1 G: 1 TABLET ORAL at 17:36

## 2024-04-11 RX ADMIN — SUCRALFATE 1 G: 1 TABLET ORAL at 21:31

## 2024-04-11 RX ADMIN — MIDODRINE HYDROCHLORIDE 2.5 MG: 5 TABLET ORAL at 17:36

## 2024-04-11 RX ADMIN — Medication 10 ML: at 21:31

## 2024-04-11 RX ADMIN — NALOXONE HYDROCHLORIDE 2 MG: 1 INJECTION, SOLUTION INTRAMUSCULAR; INTRAVENOUS; SUBCUTANEOUS at 09:23

## 2024-04-11 RX ADMIN — BUDESONIDE AND FORMOTEROL FUMARATE DIHYDRATE 2 PUFF: 160; 4.5 AEROSOL RESPIRATORY (INHALATION) at 19:14

## 2024-04-11 RX ADMIN — GADOBENATE DIMEGLUMINE 9 ML: 529 INJECTION, SOLUTION INTRAVENOUS at 14:16

## 2024-04-11 RX ADMIN — SODIUM CHLORIDE 75 ML/HR: 9 INJECTION, SOLUTION INTRAVENOUS at 17:35

## 2024-04-11 RX ADMIN — CLOPIDOGREL BISULFATE 75 MG: 75 TABLET ORAL at 17:36

## 2024-04-11 NOTE — CONSULTS
"  Patient Identification:  NAME:  Pili Arechiga  Age:  68 y.o.   Sex:  female   :  1955   MRN:  [id number]       Chief complaint: She does not have one, reason for consult lethargy confusion history of small cell lung cancer    History of present illness: Patient is a 68-year-old right-handed white female with a history of longstanding COPD recurrent acute exacerbations of COPD and small cell carcinoma for which she is receiving some type of chemotherapy she has had an MRI of the brain that was read as normal about 1 month ago.  She now presents after being found down and poorly responsive at home.  Duration of this is unknown.  Quality is as described location is not pertinent.  She still moves all extremities.  She has been's with associated symptoms of hypotension and hypoxia at the time of presentation.  Despite the \"normal\".  MRI from 1 month ago.  I did recommend an MRI with and without contrast and by my independent eyeball review.  She appears to have several diffusion-weighted abnormalities in the bilateral occipital lobes consistent with acute bihemispheric stroke involving the bilateral occipital regions.  The final result has not been read but I did not see any obvious areas of enhancement to suggest metastatic disease.  The final result is still pending.      Past medical history:  Past Medical History:   Diagnosis Date    COPD (chronic obstructive pulmonary disease)     COPD with acute exacerbation 2020    Small cell carcinoma 3/13/2024       Past surgical history:  Past Surgical History:   Procedure Laterality Date    BRONCHOSCOPY N/A 2023    Procedure: BRONCHOSCOPY WITH ENDOBRONCHIAL ULTRASOUND (EBUS) with FNA;  Surgeon: Coy Mccarthy MD;  Location: [facility] ENDOSCOPY;  Service: Pulmonary;  Laterality: N/A;  Pre/Post - mediastinal and hilar lymphadenopathy.    BRONCHOSCOPY WITH ION ROBOTIC ASSIST N/A 2024    Procedure: BRONCHOSCOPY WITH ION ROBOT,  ENDOBRONCHIAL ULTRASOUND, " FINE NEEDLE ASPIRATIONS,  CRYOTHERAPY BIOPSIES, AND LEFT LOWER LOBE BRONCHOALVEOLAR LAVAGE.;  Surgeon: Alexia Velásquez MD;  Location: [facility] ENDOSCOPY;  Service: Robotics - Pulmonary;  Laterality: N/A;     SECTION      CHEST TUBE INSERTION Left 2024    Procedure: CHEST TUBE INSERTION;  Surgeon: Alexia Velásquez MD;  Location: [facility] ENDOSCOPY;  Service: Thoracic;  Laterality: Left;    CHOLECYSTECTOMY      COLONOSCOPY      ECTOPIC PREGNANCY SURGERY      HYSTERECTOMY      TONSILLECTOMY      TOTAL HIP ARTHROPLASTY Left     VENOUS ACCESS DEVICE (PORT) INSERTION N/A 3/14/2024    Procedure: INSERTION VENOUS ACCESS DEVICE;  Surgeon: Jonas Garner MD;  Location:  [facility] OR;  Service: General;  Laterality: N/A;       Allergies:  Codeine and Percocet [oxycodone-acetaminophen]    Home medications:  (Not in a hospital admission)       Hospital medications:          [COMPLETED] Insert Peripheral IV **AND** sodium chloride    Family history:  Family History   Problem Relation Age of Onset    Lung cancer Niece 50    Throat cancer Father     Breast cancer Neg Hx        Social history:  Social History     Tobacco Use    Smoking status: Every Day     Current packs/day: 1.00     Average packs/day: 1 pack/day for 30.0 years (30.0 ttl pk-yrs)     Types: Cigarettes     Passive exposure: Current    Smokeless tobacco: Never    Tobacco comments:     Began smoking at age 11.  Smoked .5 ppd for 6 years, 2.5 ppd for a year, 2 ppd for 5 years, and 1 ppd for the past 43 years for a 58.5 pack year history.   Vaping Use    Vaping status: Former    Substances: Nicotine, Flavoring    Devices: Disposable   Substance Use Topics    Alcohol use: Yes     Comment: once a year     Drug use: No       Review of systems:    She really could not give me any pertinent review of systems at this time.  No family members are present for this.  I reviewed the chart fully    Objective:  Vitals Ranges:   Temp:  [98.4 °F (36.9 °C)] 98.4 °F (36.9  °C)  Heart Rate:  [] 65  Resp:  [16-24] 16  BP: (107-145)/(54-92) 108/54      Physical Exam:  Lethargic but yawns and awakens for me.  She is able to count 4 fingers at 3 feet.  Fund of knowledge poor attention span concentration fair recent and remote memory really could not be tested language function.  I believe is within normal limits.  She is not severely dysarthric and there is no evidence of aphasia.  Again, she can answer my questions but does seem a bit confused.  Pupils are 3 and half constricting slightly bilaterally eyes are conjugate.  Nasolabial folds are symmetrical tongue is midline that is all the cranial nerves.  She would give me motor fair  strength and toe wiggle and I do not think she has a pronator drift.  She does have bilateral asterixis in the upper extremities.  Some distal atrophy no fasciculations reflexes trace throughout symmetrical toes definitely downgoing bilaterally.  She could not follow sensation coordination Station or gait.  Heart is regular without murmur neck supple without bruits extremities no clubbing cyanosis there may be some mild peripheral edema in the feet and ankles.  Visual acuity was hard to check visual fields but she easily counts 4 fingers at 3 feet results review:   I reviewed the patient's new clinical results.    Data review:  Lab Results (last 24 hours)       Procedure Component Value Units Date/Time    TSH [[id number]]  (Normal) Collected: 04/11/24 1012    Specimen: Blood Updated: 04/11/24 1111     TSH 0.450 uIU/mL     Comprehensive Metabolic Panel [[id number]]  (Abnormal) Collected: 04/11/24 1012    Specimen: Blood Updated: 04/11/24 1102     Glucose 90 mg/dL      BUN 17 mg/dL      Creatinine 1.67 mg/dL      Sodium 131 mmol/L      Potassium 4.0 mmol/L      Chloride 95 mmol/L      CO2 24.8 mmol/L      Calcium 9.7 mg/dL      Total Protein 6.4 g/dL      Albumin 4.1 g/dL      ALT (SGPT) 8 U/L      AST (SGOT) 14 U/L      Alkaline Phosphatase 68 U/L       Total Bilirubin <0.2 mg/dL      Globulin 2.3 gm/dL      A/G Ratio 1.8 g/dL      BUN/Creatinine Ratio 10.2     Anion Gap 11.2 mmol/L      eGFR 33.6 mL/min/1.73     Narrative:      GFR Normal >60  Chronic Kidney Disease <60  Kidney Failure <15      Magnesium [[id number]]  (Normal) Collected: 04/11/24 1012    Specimen: Blood Updated: 04/11/24 1102     Magnesium 2.1 mg/dL     CBC & Differential [[id number]]  (Abnormal) Collected: 04/11/24 1012    Specimen: Blood Updated: 04/11/24 1101    Narrative:      The following orders were created for panel order CBC & Differential.  Procedure                               Abnormality         Status                     ---------                               -----------         ------                     CBC Auto Differential[[id number]]        Abnormal            Final result                 Please view results for these tests on the individual orders.    CBC Auto Differential [[id number]]  (Abnormal) Collected: 04/11/24 1012    Specimen: Blood Updated: 04/11/24 1101     WBC 13.50 10*3/mm3      RBC 3.45 10*6/mm3      Hemoglobin 11.0 g/dL      Hematocrit 34.6 %      .3 fL      MCH 31.9 pg      MCHC 31.8 g/dL      RDW 12.8 %      RDW-SD 46.9 fl      MPV 8.5 fL      Platelets 318 10*3/mm3      Neutrophil % 79.2 %      Lymphocyte % 13.4 %      Monocyte % 7.0 %      Eosinophil % 0.0 %      Basophil % 0.1 %      Immature Grans % 0.3 %      Neutrophils, Absolute 10.70 10*3/mm3      Lymphocytes, Absolute 1.81 10*3/mm3      Monocytes, Absolute 0.94 10*3/mm3      Eosinophils, Absolute 0.00 10*3/mm3      Basophils, Absolute 0.01 10*3/mm3      Immature Grans, Absolute 0.04 10*3/mm3     Phosphorus [[id number]]  (Abnormal) Collected: 04/11/24 1012    Specimen: Blood Updated: 04/11/24 1100     Phosphorus 4.6 mg/dL     Urine Drug Screen - Straight Cath [[id number]]  (Abnormal) Collected: 04/11/24 1019    Specimen: Urine from Straight Cath Updated: 04/11/24 1041     THC, Screen, Urine Positive      Phencyclidine (PCP), Urine Negative     Cocaine Screen, Urine Negative     Methamphetamine, Ur Negative     Opiate Screen Positive     Amphetamine Screen, Urine Negative     Benzodiazepine Screen, Urine Positive     Tricyclic Antidepressants Screen Negative     Methadone Screen, Urine Negative     Barbiturates Screen, Urine Negative     Oxycodone Screen, Urine Negative     Buprenorphine, Screen, Urine Negative    Narrative:      Cutoff For Drugs Screened:    Amphetamines               500 ng/ml  Barbiturates               200 ng/ml  Benzodiazepines            150 ng/ml  Cocaine                    150 ng/ml  Methadone                  200 ng/ml  Opiates                    100 ng/ml  Phencyclidine               25 ng/ml  THC                         50 ng/ml  Methamphetamine            500 ng/ml  Tricyclic Antidepressants  300 ng/ml  Oxycodone                  100 ng/ml  Buprenorphine               10 ng/ml    The normal value for all drugs tested is negative. This report includes unconfirmed screening results, with the cutoff values listed, to be used for medical treatment purposes only.  Unconfirmed results must not be used for non-medical purposes such as employment or legal testing.  Clinical consideration should be applied to any drug of abuse test, particularly when unconfirmed results are used.      Ethanol [[id number]] Collected: 04/11/24 1012    Specimen: Blood Updated: 04/11/24 1041     Ethanol <10 mg/dL      Ethanol % <0.010 %     Acetaminophen Level [[id number]]  (Normal) Collected: 04/11/24 1012    Specimen: Blood Updated: 04/11/24 1041     Acetaminophen <5.0 mcg/mL     Salicylate Level [[id number]]  (Normal) Collected: 04/11/24 1012    Specimen: Blood Updated: 04/11/24 1041     Salicylate <0.3 mg/dL              Imaging:  Imaging Results (Last 24 Hours)       Procedure Component Value Units Date/Time    MRI Brain With & Without Contrast [[id number]] Resulted: 04/11/24 1313     Updated: 04/11/24 1313    CT  Head Without Contrast [[id number]] Collected: 04/11/24 0943     Updated: 04/11/24 0950    Narrative:      CT HEAD WO CONTRAST    Date of Exam: 4/11/2024 9:19 AM EDT    Indication: AMS.    Comparison: MRI brain 3/13/2024    Technique: Axial CT images were obtained of the head without contrast administration.  Coronal reconstructions were performed.  Automated exposure control and iterative reconstruction methods were used.      Findings:  There are small areas of low-attenuation within the globes bilaterally bilaterally. Findings are nonspecific but can be seen with anoxic injury or carboximide poisoning as well as other metabolic abnormalities.    No evidence of acute intracranial hemorrhage. No abnormal extra-axial fluid collections are seen. No mass effect or hydrocephalus.    Visualized paranasal sinuses and mastoid air cells are clear. The globes and orbits appear within normal limits.      Impression:      Impression:    1. Small subtle areas of low-attenuation within the bilateral basal ganglia. These are nonspecific but can be seen in the setting of anoxic injury, carbon monoxide poisoning, and other metabolic abnormalities. Clinical correlation recommended.  2. No evidence of acute intracranial hemorrhage.      Electronically Signed: Anup Bragg MD    4/11/2024 9:47 AM EDT    Workstation ID: [id number]               Assessment and Plan:     This patient presents after being found down on the floor and has had hypotension and hypoxia.  This definitely would present as a metabolic encephalopathy.  Nonetheless I recommended an MRI of the brain with and without contrast and she appears to have acute bilateral occipital lobe infarcts.  Although these areas of abnormality are at the top of the vertebrobasilar system, this most likely has been an embolic phenomena.  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  I will order carotid Dopplers that will also include vertebrobasilar flow in the study, as well as an echocardiogram.  She does not appear to have been on any blood thinners at home so I am going to start her on daily rectal aspirin as well as low-dose Lovenox, 0.7 mg/kg subcutaneously every 24 hours, for the time being.  There does not appear to be any history of A-fib.  The benefits of this Lovenox and rectal aspirin greatly outweigh any risks.  So, baby aspirin when she is able to swallow, low-dose Lovenox check carotid Dopplers and echocardiogram, lipid panel and hemoglobin A1c, and go from there          Chris Yañez MD  04/11/24  14:36 EDT

## 2024-04-11 NOTE — ED TRIAGE NOTES
Pt via [facility] from [facility].   Per EMS, family said they went to pick her up for chemo this AM and found pt unresponsive; LSN by family was 1600.  Upon EMS arrival blood sugar was 125 and pt was hypotensive, mid 70s on room air, placed on 12L NRB which brought pt to 98%;pt given 100cc of NS in route.   Upon arrival pt was was responsive to painful stimuli.

## 2024-04-11 NOTE — ED PROVIDER NOTES
Subjective   History of Present Illness  Patient presents with altered mental status.  Patient's family member went to pick her up this morning and patient was found unresponsive in her room slumped over in the bed.  EMS was called.  On EMS arrival patient was hypotensive and had a O2 sat in the 70s.  Blood sugar was read to be around 125.  We immediately placed the patient on a nonrebreather and the oxygen sat came up to around 98% and route.  Patient had an IV started and 100 cc of normal saline was given.  Patient was only arousable to painful stimuli and even then she did not open her eyes.  She only moaned.  No known trauma or injury.  Patient last seen by family was around 1600 yesterday.  There were people at the house that said that patient seemed to get like this around 2300 last night.  No known trauma or injury.  Patient was doing chemotherapy for small cell lung cancer and this was her third round.  Patient sees Dr. Burns.  Patient seen previous day was at her health baseline with no known fever, cough, rash, chest pain, shortness of breath.      Review of Systems   All other systems reviewed and are negative.      Past Medical History:   Diagnosis Date    COPD (chronic obstructive pulmonary disease)     COPD with acute exacerbation 09/28/2020    Small cell carcinoma 3/13/2024       Allergies   Allergen Reactions    Codeine GI Intolerance    Percocet [Oxycodone-Acetaminophen] Hives       Past Surgical History:   Procedure Laterality Date    BRONCHOSCOPY N/A 8/25/2023    Procedure: BRONCHOSCOPY WITH ENDOBRONCHIAL ULTRASOUND (EBUS) with FNA;  Surgeon: Coy Mccarthy MD;  Location: [facility] ENDOSCOPY;  Service: Pulmonary;  Laterality: N/A;  Pre/Post - mediastinal and hilar lymphadenopathy.    BRONCHOSCOPY WITH ION ROBOTIC ASSIST N/A 2/28/2024    Procedure: BRONCHOSCOPY WITH ION ROBOT,  ENDOBRONCHIAL ULTRASOUND, FINE NEEDLE ASPIRATIONS,  CRYOTHERAPY BIOPSIES, AND LEFT LOWER LOBE BRONCHOALVEOLAR LAVAGE.;   Surgeon: Alexia Velásquez MD;  Location: [facility] ENDOSCOPY;  Service: Robotics - Pulmonary;  Laterality: N/A;     SECTION      CHEST TUBE INSERTION Left 2024    Procedure: CHEST TUBE INSERTION;  Surgeon: Alexia Velásquez MD;  Location: [facility] ENDOSCOPY;  Service: Thoracic;  Laterality: Left;    CHOLECYSTECTOMY      COLONOSCOPY      ECTOPIC PREGNANCY SURGERY      HYSTERECTOMY      TONSILLECTOMY      TOTAL HIP ARTHROPLASTY Left     VENOUS ACCESS DEVICE (PORT) INSERTION N/A 3/14/2024    Procedure: INSERTION VENOUS ACCESS DEVICE;  Surgeon: Jonas Garner MD;  Location: [facility] OR;  Service: General;  Laterality: N/A;       Family History   Problem Relation Age of Onset    Lung cancer Niece 50    Throat cancer Father     Breast cancer Neg Hx        Social History     Socioeconomic History    Marital status:    Tobacco Use    Smoking status: Every Day     Current packs/day: 1.00     Average packs/day: 1 pack/day for 30.0 years (30.0 ttl pk-yrs)     Types: Cigarettes     Passive exposure: Current    Smokeless tobacco: Never    Tobacco comments:     Began smoking at age 11.  Smoked .5 ppd for 6 years, 2.5 ppd for a year, 2 ppd for 5 years, and 1 ppd for the past 43 years for a 58.5 pack year history.   Vaping Use    Vaping status: Former    Substances: Nicotine, Flavoring    Devices: Disposable   Substance and Sexual Activity    Alcohol use: Yes     Comment: once a year     Drug use: No    Sexual activity: Defer           Objective   Physical Exam  Vitals and nursing note reviewed.   Constitutional:       Comments: Patient lying in bed drowsy but arousable.  Patient will follow commands.  Patient had to have painful stimuli to get her to answer questions and open her eyes.   HENT:      Head: Normocephalic and atraumatic.      Mouth/Throat:      Mouth: Mucous membranes are moist. Mucous membranes are dry. No oral lesions.   Eyes:      Extraocular Movements: Extraocular movements intact.    Cardiovascular:      Rate and Rhythm: Normal rate and regular rhythm.      Pulses:           Radial pulses are 2+ on the right side and 2+ on the left side.        Dorsalis pedis pulses are 2+ on the right side and 2+ on the left side.        Posterior tibial pulses are 2+ on the right side and 2+ on the left side.      Heart sounds: S1 normal and S2 normal.   Pulmonary:      Effort: Pulmonary effort is normal.      Breath sounds: Normal breath sounds. No stridor. No wheezing, rhonchi or rales.   Abdominal:      General: There is no distension.      Palpations: Abdomen is soft.   Musculoskeletal:         General: No tenderness.      Cervical back: Neck supple. No rigidity.      Right lower leg: No edema.      Left lower leg: No edema.   Lymphadenopathy:      Cervical: No cervical adenopathy.   Skin:     General: Skin is warm and dry.      Capillary Refill: Capillary refill takes 2 to 3 seconds.   Neurological:      Mental Status: She is lethargic.      GCS: GCS eye subscore is 2. GCS verbal subscore is 5. GCS motor subscore is 6.      Cranial Nerves: No dysarthria or facial asymmetry.      Sensory: No sensory deficit.      Motor: No weakness.   Psychiatric:         Speech: Speech normal.         Behavior: Behavior is cooperative.         Cognition and Memory: Cognition normal.         Procedures           ED Course  ED Course as of 04/12/24 0919   Thu Apr 11, 2024   2565 Discussed patient with Dr. Yañez of neurology.  He advised getting a  MRI brain with without.  He is aware of the 1 done a month ago but with the abnormal findings on CT he feels it is necessary.  He is also concerned that patient could have possibly had a seizure and would like to also get an EEG.  [AW]   1258 Dr. De Paz has been paged for admission. [AW]   1258 I spoke with the EEG tech and they can do a test today or tomorrow. [AW]   1324 Dr. ENGLAND requested an ABG and CXR be added on. [AW]      ED Course User Index  [AW] Luis Alberto Chaudhari,  MD                          Total (NIH Stroke Scale): 1                  Medical Decision Making  Ddx CVA, head bleed, ACS, sepsis, electrolyte abnormality, dehydration, drug overdose    1015 NIHSS -patient is unable to fully participate for adequate scoring of this.  Patient was seen with eye movement, equal  bilaterally, able to follow commands with  and leg movement.  Patient is able to wiggle toes and bend at knees.  Patient feels pain in all 4 extremities.  Patient able to open her mouth.  Patient will occasionally moan yes or no to a question.  Patient's phonation is normal when she does.    Labs Reviewed  URINE DRUG SCREEN - Abnormal; Notable for the following components:     THC, Screen, Urine            Positive (*)               Opiate Screen                 Positive (*)               Benzodiazepine Screen, Urine   Positive (*)            All other components within normal limits         Narrative: Cutoff For Drugs Screened:                                    Amphetamines               500 ng/ml                  Barbiturates               200 ng/ml                  Benzodiazepines            150 ng/ml                  Cocaine                    150 ng/ml                  Methadone                  200 ng/ml                  Opiates                    100 ng/ml                  Phencyclidine               25 ng/ml                  THC                         50 ng/ml                  Methamphetamine            500 ng/ml                  Tricyclic Antidepressants  300 ng/ml                  Oxycodone                  100 ng/ml                  Buprenorphine               10 ng/ml                                    The normal value for all drugs tested is negative. This report includes unconfirmed screening results, with the cutoff values listed, to be used for medical treatment purposes only.  Unconfirmed results must not be used for non-medical purposes such as employment or legal testing.  Clinical  consideration should be applied to any drug of abuse test, particularly when unconfirmed results are used.    COMPREHENSIVE METABOLIC PANEL - Abnormal; Notable for the following components:     Creatinine                    1.67 (*)               Sodium                        131 (*)                Chloride                      95 (*)                 eGFR                          33.6 (*)            All other components within normal limits         Narrative: GFR Normal >60                  Chronic Kidney Disease <60                  Kidney Failure <15                    PHOSPHORUS - Abnormal; Notable for the following components:     Phosphorus                    4.6 (*)             All other components within normal limits  CBC WITH AUTO DIFFERENTIAL - Abnormal; Notable for the following components:     WBC                           13.50 (*)               RBC                           3.45 (*)               Hemoglobin                    11.0 (*)               MCV                           100.3 (*)               Neutrophil %                  79.2 (*)               Lymphocyte %                  13.4 (*)               Eosinophil %                  0.0 (*)                Neutrophils, Absolute         10.70 (*)               Monocytes, Absolute           0.94 (*)            All other components within normal limits  LIPID PANEL - Abnormal; Notable for the following components:     Total Cholesterol             207 (*)                Triglycerides                 165 (*)                LDL Cholesterol               124 (*)             All other components within normal limits         Narrative: Cholesterol Reference Ranges                  (U.S. Department of Health and Human Services ATP III Classifications)                                    Desirable          <200 mg/dL                  Borderline High    200-239 mg/dL                  High Risk          >240 mg/dL                                                       Triglyceride Reference Ranges                  (U.S. Department of Health and Human Services ATP III Classifications)                                    Normal           <150 mg/dL                  Borderline High  150-199 mg/dL                  High             200-499 mg/dL                  Very High        >500 mg/dL                                    HDL Reference Ranges                  (U.S. Department of Health and Human Services ATP III Classifications)                                    Low     <40 mg/dl (major risk factor for CHD)                  High    >60 mg/dl ('negative' risk factor for CHD)                                                                        LDL Reference Ranges                  (U.S. Department of Health and Human Services ATP III Classifications)                                    Optimal          <100 mg/dL                  Near Optimal     100-129 mg/dL                  Borderline High  130-159 mg/dL                  High             160-189 mg/dL                  Very High        >189 mg/dL  BLOOD GAS, ARTERIAL - Abnormal; Notable for the following components:     pCO2, Arterial                51.6 (*)               pO2, Arterial                 62.4 (*)               HCO3, Arterial                29.3 (*)               Base Excess, Arterial         3.0 (*)                O2 Saturation, Arterial       93.0 (*)               Hemoglobin, Blood Gas         11.2 (*)               pCO2, Temperature Corrected   51.6 (*)               pO2, Temperature Corrected    62.4 (*)            All other components within normal limits  BASIC METABOLIC PANEL - Abnormal; Notable for the following components:     Creatinine                    1.20 (*)               Sodium                        128 (*)                Chloride                      96 (*)                 eGFR                          50.0 (*)            All other components within normal limits         Narrative: GFR Normal  >60                  Chronic Kidney Disease <60                  Kidney Failure <15                    CBC (NO DIFF) - Abnormal; Notable for the following components:     RBC                           3.18 (*)               Hemoglobin                    10.2 (*)               Hematocrit                    31.2 (*)               MCV                           98.1 (*)            All other components within normal limits  ACETAMINOPHEN LEVEL - Normal  SALICYLATE LEVEL - Normal  TSH - Normal  MAGNESIUM - Normal  HEMOGLOBIN A1C - Normal         Narrative: Hemoglobin A1C Ranges:                                    Increased Risk for Diabetes  5.7% to 6.4%                  Diabetes                     >= 6.5%                  Diabetic Goal                < 7.0%  ETHANOL  BLOOD GAS, ARTERIAL  CBC AND DIFFERENTIAL    US Carotid Bilateral    Result Date: 4/11/2024  Impression: 1. Mild plaquing but no hemodynamically significant stenosis. Electronically Signed: Anup Davila MD  4/11/2024 3:58 PM EDT  Workstation ID: [id number]    MRI Brain With & Without Contrast    Addendum Date: 4/11/2024    Findings regarding acute infarcts were personally called to the patient's nurse at 3:50 p.m. on 4/11/2024 Electronically Signed: Anup Bragg MD  4/11/2024 3:50 PM EDT  Workstation ID: [id number]    Result Date: 4/11/2024  Impression: 1. There are areas of restricted diffusion within the bilateral basal ganglia, bilateral occipital lobes, and right frontal lobe compatible with multiple infarcts. The symmetric appearance of the infarcts within the basal ganglia can be seen with anoxic injury or carbon monoxide poisoning. 2. No evidence of acute intracranial hemorrhage. 3. No evidence of pathologic contrast enhancement. Electronically Signed: Anup Bragg MD  4/11/2024 3:43 PM EDT  Workstation ID: [id number]    XR Chest 1 View    Result Date: 4/11/2024  Impression:   Prominent interstitial markings similar to the prior exam. No focal  airspace consolidation or other acute cardiopulmonary disease. Electronically Signed: Anup Bragg MD  4/11/2024 3:05 PM EDT  Workstation ID: [id number]    CT Head Without Contrast    Result Date: 4/11/2024  Impression: 1. Small subtle areas of low-attenuation within the bilateral basal ganglia. These are nonspecific but can be seen in the setting of anoxic injury, carbon monoxide poisoning, and other metabolic abnormalities. Clinical correlation recommended. 2. No evidence of acute intracranial hemorrhage. Electronically Signed: Anup Bragg MD  4/11/2024 9:47 AM EDT  Workstation ID: [id number]        Problems Addressed:  WILEY (acute kidney injury): complicated acute illness or injury  Hypoxia: complicated acute illness or injury  Somnolence: complicated acute illness or injury    Amount and/or Complexity of Data Reviewed  Labs: ordered.  Radiology: ordered.    Risk  Prescription drug management.  Decision regarding hospitalization.        Final diagnoses:   WILEY (acute kidney injury)   Hypoxia   Somnolence   Cerebrovascular accident (CVA), unspecified mechanism       ED Disposition  ED Disposition       ED Disposition   Decision to Admit    Condition   --    Comment   Level of Care: Med/Surg [1]   Diagnosis: AMS (altered mental status) [[id number]]   Admitting Physician: OSKAR BAIRES [[id number]]   Attending Physician: OSKAR BAIRES [[id number]]                 No follow-up provider specified.       Medication List      No changes were made to your prescriptions during this visit.            Luis Alberto Chaudhari MD  04/12/24 0933

## 2024-04-11 NOTE — ED NOTES
Nursing report ED to floor  Pili Arechiga  68 y.o.  female    HPI :  HPI (Adult)  Stated Reason for Visit: AMS  History Obtained From: patient    Chief Complaint  Chief Complaint   Patient presents with    Altered Mental Status       Admitting doctor:   Nigel De Paz MD    Admitting diagnosis:   The primary encounter diagnosis was WILEY (acute kidney injury). Diagnoses of Hypoxia and Somnolence were also pertinent to this visit.    Code status:   Current Code Status       Date Active Code Status Order ID Comments User Context       Prior            Allergies:   Codeine and Percocet [oxycodone-acetaminophen]    Isolation:   No active isolations    Intake and Output  No intake or output data in the 24 hours ending 04/11/24 1337    Weight:       04/11/24  0925   Weight: 49.6 kg (109 lb 5.6 oz)       Most recent vitals:   Vitals:    04/11/24 1146 04/11/24 1216 04/11/24 1231 04/11/24 1246   BP: 125/73 107/65 119/61 108/54   Pulse: 73 61 62 64   Resp: 20 16     Temp:       TempSrc:       SpO2: 99% 97% 98% 99%   Weight:       Height:           Active LDAs/IV Access:   Lines, Drains & Airways       Active LDAs       Name Placement date Placement time Site Days    Peripheral IV 04/11/24 0921 Right;Anterior Forearm 04/11/24  0921  Forearm  less than 1    Single Lumen Implantable Port 03/14/24 Right Subclavian 03/14/24  1613  Subclavian  27                    Labs (abnormal labs have a star):   Labs Reviewed   URINE DRUG SCREEN - Abnormal; Notable for the following components:       Result Value    THC, Screen, Urine Positive (*)     Opiate Screen Positive (*)     Benzodiazepine Screen, Urine Positive (*)     All other components within normal limits    Narrative:     Cutoff For Drugs Screened:    Amphetamines               500 ng/ml  Barbiturates               200 ng/ml  Benzodiazepines            150 ng/ml  Cocaine                    150 ng/ml  Methadone                  200 ng/ml  Opiates                    100  ng/ml  Phencyclidine               25 ng/ml  THC                         50 ng/ml  Methamphetamine            500 ng/ml  Tricyclic Antidepressants  300 ng/ml  Oxycodone                  100 ng/ml  Buprenorphine               10 ng/ml    The normal value for all drugs tested is negative. This report includes unconfirmed screening results, with the cutoff values listed, to be used for medical treatment purposes only.  Unconfirmed results must not be used for non-medical purposes such as employment or legal testing.  Clinical consideration should be applied to any drug of abuse test, particularly when unconfirmed results are used.     COMPREHENSIVE METABOLIC PANEL - Abnormal; Notable for the following components:    Creatinine 1.67 (*)     Sodium 131 (*)     Chloride 95 (*)     eGFR 33.6 (*)     All other components within normal limits    Narrative:     GFR Normal >60  Chronic Kidney Disease <60  Kidney Failure <15     PHOSPHORUS - Abnormal; Notable for the following components:    Phosphorus 4.6 (*)     All other components within normal limits   CBC WITH AUTO DIFFERENTIAL - Abnormal; Notable for the following components:    WBC 13.50 (*)     RBC 3.45 (*)     Hemoglobin 11.0 (*)     .3 (*)     Neutrophil % 79.2 (*)     Lymphocyte % 13.4 (*)     Eosinophil % 0.0 (*)     Neutrophils, Absolute 10.70 (*)     Monocytes, Absolute 0.94 (*)     All other components within normal limits   ACETAMINOPHEN LEVEL - Normal   SALICYLATE LEVEL - Normal   TSH - Normal   MAGNESIUM - Normal   ETHANOL   BLOOD GAS, ARTERIAL   CBC AND DIFFERENTIAL    Narrative:     The following orders were created for panel order CBC & Differential.  Procedure                               Abnormality         Status                     ---------                               -----------         ------                     CBC Auto Differential[[id number]]        Abnormal            Final result                 Please view results for these tests on  the individual orders.       EKG:   No orders to display       Meds given in ED:   Medications   sodium chloride 0.9 % flush 10 mL (has no administration in time range)   gadobenate dimeglumine (MULTIHANCE) injection 9 mL (has no administration in time range)   Naloxone HCl (NARCAN) injection 2 mg (2 mg Intravenous Given 4/11/24 0923)       Imaging results:  CT Head Without Contrast    Result Date: 4/11/2024  Impression: 1. Small subtle areas of low-attenuation within the bilateral basal ganglia. These are nonspecific but can be seen in the setting of anoxic injury, carbon monoxide poisoning, and other metabolic abnormalities. Clinical correlation recommended. 2. No evidence of acute intracranial hemorrhage. Electronically Signed: Anup Bragg MD  4/11/2024 9:47 AM EDT  Workstation ID: [id number]     Ambulatory status:   -      Social issues:   Social History     Socioeconomic History    Marital status:    Tobacco Use    Smoking status: Every Day     Current packs/day: 1.00     Average packs/day: 1 pack/day for 30.0 years (30.0 ttl pk-yrs)     Types: Cigarettes     Passive exposure: Current    Smokeless tobacco: Never    Tobacco comments:     Began smoking at age 11.  Smoked .5 ppd for 6 years, 2.5 ppd for a year, 2 ppd for 5 years, and 1 ppd for the past 43 years for a 58.5 pack year history.   Vaping Use    Vaping status: Former    Substances: Nicotine, Flavoring    Devices: Disposable   Substance and Sexual Activity    Alcohol use: Yes     Comment: once a year     Drug use: No    Sexual activity: Defer       Peripheral Neurovascular  Peripheral Neurovascular (Adult)  Peripheral Neurovascular WDL: WDL    Neuro Cognitive  Neuro Cognitive (Adult)  Cognitive/Neuro/Behavioral WDL: .WDL except, all  Level of Consciousness: Responds to Voice  Arousal Level: arouses to repeated stimulation, arouses to voice  Orientation: disoriented to, place, time, situation  Last Known Well Date: 04/10/24  Last Known Well Time:  1600    Learning  Learning Assessment (Adult)  Learning Readiness and Ability: cognitive limitation noted    Respiratory  Respiratory WDL  Respiratory WDL: .WDL except, cough  Cough Type: congested    Abdominal Pain       Pain Assessments  Pain (Adult)  (0-10) Pain Rating: Rest: 10  Pain Location: abdomen    NIH Stroke Scale       Sunita Sanchez RN  04/11/24 13:37 EDT  2

## 2024-04-11 NOTE — PLAN OF CARE
Goal Outcome Evaluation:  Plan of Care Reviewed With: patient, caregiver        Progress: no change  Outcome Evaluation: patient arrived to unit from ED, was brought in via EMS from home, found unresponsive by family member who picked her up for chemo. per MRI she had multiple small infarcts. IVF @ 77. started Lovenox and Plavix.

## 2024-04-11 NOTE — H&P
HISTORY AND PHYSICAL      Patient Care Team:  Nigel De Paz MD as PCP - General (Family Medicine)  Lucita Yu, RN as Nurse Navigator  Nikita Burns MD as Consulting Physician (Hematology and Oncology)  Alexia Velásquez MD as Referring Physician (Thoracic Surgery)    CHIEF COMPLAINT: Mental status change and hypoxia    HISTORY OF PRESENT ILLNESS:    68-year-old white female with history of small cell carcinoma of lung who is brought in the emergency room for unresponsiveness.  History is obtained from her niece Clarita Carvajal who apparently took her to chemotherapy on Wednesday and dropped her off home around 4:30 PM.  She came back to pick her up for chemotherapy on Thursday in the a.m. apparently found the patient poorly responsive [facility] out of her bed.  On questioning the people at home currently she was found that way around midnight but no further intervention was sought at that time.  EMS was called and per ER physician's note her blood sugar was 125 she was hypotensive sats were in the mid 70s and patient was placed on nonrebreather and brought to the emergency room.    On arrival to the emergency room she was poorly responsive was given Narcan and her O2 has been gradually weaned she is down to 2 L and maintaining sats in the mid 90s.     CT was done in emergency room showed questionable anoxic brain injury and subsequently neurology was called and an MRI of the brain was ordered showed within the bilateral basal ganglia, bilateral occipital lobes, and right frontal lobe compatible with multiple infarcts       Past Medical History:   Diagnosis Date    COPD (chronic obstructive pulmonary disease)     COPD with acute exacerbation 09/28/2020    Small cell carcinoma 3/13/2024     Past Surgical History:   Procedure Laterality Date    BRONCHOSCOPY N/A 8/25/2023    Procedure: BRONCHOSCOPY WITH ENDOBRONCHIAL ULTRASOUND (EBUS) with FNA;  Surgeon: Coy Mccarthy MD;  Location:   [facility];  Service: Pulmonary;  Laterality: N/A;  Pre/Post - mediastinal and hilar lymphadenopathy.    BRONCHOSCOPY WITH ION ROBOTIC ASSIST N/A 2024    Procedure: BRONCHOSCOPY WITH ION ROBOT,  ENDOBRONCHIAL ULTRASOUND, FINE NEEDLE ASPIRATIONS,  CRYOTHERAPY BIOPSIES, AND LEFT LOWER LOBE BRONCHOALVEOLAR LAVAGE.;  Surgeon: Alexia Velásquez MD;  Location: [facility] ENDOSCOPY;  Service: Robotics - Pulmonary;  Laterality: N/A;     SECTION      CHEST TUBE INSERTION Left 2024    Procedure: CHEST TUBE INSERTION;  Surgeon: Alexia Velásquez MD;  Location: [facility] ENDOSCOPY;  Service: Thoracic;  Laterality: Left;    CHOLECYSTECTOMY      COLONOSCOPY      ECTOPIC PREGNANCY SURGERY      HYSTERECTOMY      TONSILLECTOMY      TOTAL HIP ARTHROPLASTY Left     VENOUS ACCESS DEVICE (PORT) INSERTION N/A 3/14/2024    Procedure: INSERTION VENOUS ACCESS DEVICE;  Surgeon: Jonas Garner MD;  Location: [facility] OR;  Service: General;  Laterality: N/A;     Family History   Problem Relation Age of Onset    Lung cancer Niece 50    Throat cancer Father     Breast cancer Neg Hx      Social History     Tobacco Use    Smoking status: Every Day     Current packs/day: 1.00     Average packs/day: 1 pack/day for 30.0 years (30.0 ttl pk-yrs)     Types: Cigarettes     Passive exposure: Current    Smokeless tobacco: Never    Tobacco comments:     Began smoking at age 11.  Smoked .5 ppd for 6 years, 2.5 ppd for a year, 2 ppd for 5 years, and 1 ppd for the past 43 years for a 58.5 pack year history.   Vaping Use    Vaping status: Former    Substances: Nicotine, Flavoring    Devices: Disposable   Substance Use Topics    Alcohol use: Yes     Comment: once a year     Drug use: No     Medications Prior to Admission   Medication Sig Dispense Refill Last Dose    diazePAM (VALIUM) 10 MG tablet Take 1 tablet by mouth 2 (Two) Times a Day As Needed for Anxiety (RLS.). Takes 20 mg at bedtime at times when she needs.   4/10/2024     "HYDROcodone-acetaminophen (NORCO)  MG per tablet Take 1 tablet by mouth 3 (Three) Times a Day As Needed for Moderate Pain.   4/10/2024    midodrine (PROAMATINE) 2.5 MG tablet Take 1 tablet by mouth 3 (Three) Times a Day Before Meals.   4/10/2024    OLANZapine (zyPREXA) 5 MG tablet Take 1 tablet by mouth Every Night. Take nightly x 4 starting night of chemotherapy. 4 tablet 3 4/10/2024    ondansetron (ZOFRAN) 8 MG tablet Take 1 tablet by mouth 3 (Three) Times a Day As Needed for Nausea or Vomiting. 30 tablet 5 4/10/2024    pantoprazole (PROTONIX) 40 MG EC tablet Take 1 tablet by mouth Daily. 30 tablet 3 4/10/2024    sucralfate (CARAFATE) 1 g tablet Take 1 tablet by mouth 4 (Four) Times a Day. 120 tablet 3 4/10/2024    albuterol sulfate  (90 Base) MCG/ACT inhaler Inhale 2 puffs Every 4 (Four) Hours As Needed for Wheezing. Takes as needed.       budesonide-formoterol (SYMBICORT) 160-4.5 MCG/ACT inhaler Inhale 2 puffs 2 (Two) Times a Day.  12     Denosumab (PROLIA SC) Inject  under the skin into the appropriate area as directed Every 6 (Six) Months.       naloxone (NARCAN) 4 MG/0.1ML nasal spray 1 spray into the nostril(s) as directed by provider As Needed.        Allergies:  Codeine and Percocet [oxycodone-acetaminophen]     Review of Systems   Reason unable to perform ROS: Mental status change.       Objective       Vital Signs  Temp:  [98.4 °F (36.9 °C)-98.5 °F (36.9 °C)] 98.5 °F (36.9 °C)  Heart Rate:  [] 76  Resp:  [16-24] 18  BP: (107-145)/(54-92) 115/71  Oxygen Therapy  SpO2: 92 %  Pulse Oximetry Type: Intermittent  Device (Oxygen Therapy): room air  Flow (L/min): 3}  Body mass index is 21.36 kg/m².  Flowsheet Rows      Flowsheet Row First Filed Value   Admission Height 160 cm (62.99\") Documented at 04/11/2024 0925   Admission Weight 49.6 kg (109 lb 5.6 oz) Documented at 04/11/2024 0925                   Physical Exam  Vitals and nursing note reviewed.   Constitutional:       Appearance: She is " well-developed. She is not ill-appearing, toxic-appearing or diaphoretic.   HENT:      Head: Normocephalic.   Eyes:      Conjunctiva/sclera: Conjunctivae normal.   Neck:      Thyroid: No thyromegaly.      Vascular: No JVD.   Cardiovascular:      Rate and Rhythm: Normal rate and regular rhythm.      Heart sounds: Normal heart sounds. No murmur heard.  Pulmonary:      Effort: Pulmonary effort is normal. No respiratory distress.      Breath sounds: Normal breath sounds. No wheezing or rales.   Abdominal:      General: Bowel sounds are normal. There is no distension.      Palpations: Abdomen is soft.      Tenderness: There is no abdominal tenderness. There is no guarding.   Skin:     General: Skin is warm and dry.      Findings: No rash.   Neurological:      Mental Status: She is alert and easily aroused. She is lethargic.      Cranial Nerves: Cranial nerves 2-12 are intact.      Motor: Motor function is intact.      Deep Tendon Reflexes: Reflexes are normal and symmetric.      Comments: Made to place person and time          Debilities/Disabilities Identified: Mental status change    Emotional Behavior: Appropriate    Result Review        I have personally reviewed the results from the time of this admission to 4/11/2024 16:09 EDT and agree with these findings:  [x]  Laboratory  []  Microbiology  [x]  Radiology  [x]  EKG/Telemetry   [x]  Cardiology/Vascular   []  Pathology  [x]  Old records  []  Other:          Results Review:    I reviewed the patient's new clinical results.  Lab Results (most recent)       Procedure Component Value Units Date/Time    Hemoglobin A1c [[id number]]  (Normal) Collected: 04/11/24 1012    Specimen: Blood Updated: 04/11/24 1507     Hemoglobin A1C 5.30 %     Narrative:      Hemoglobin A1C Ranges:    Increased Risk for Diabetes  5.7% to 6.4%  Diabetes                     >= 6.5%  Diabetic Goal                < 7.0%    Lipid Panel [[id number]]  (Abnormal) Collected: 04/11/24 1012    Specimen:  Blood Updated: 04/11/24 1506     Total Cholesterol 207 mg/dL      Triglycerides 165 mg/dL      HDL Cholesterol 54 mg/dL      LDL Cholesterol  124 mg/dL      VLDL Cholesterol 29 mg/dL      LDL/HDL Ratio 2.22    Narrative:      Cholesterol Reference Ranges  (U.S. Department of Health and Human Services ATP III Classifications)    Desirable          <200 mg/dL  Borderline High    200-239 mg/dL  High Risk          >240 mg/dL      Triglyceride Reference Ranges  (U.S. Department of Health and Human Services ATP III Classifications)    Normal           <150 mg/dL  Borderline High  150-199 mg/dL  High             200-499 mg/dL  Very High        >500 mg/dL    HDL Reference Ranges  (U.S. Department of Health and Human Services ATP III Classifications)    Low     <40 mg/dl (major risk factor for CHD)  High    >60 mg/dl ('negative' risk factor for CHD)        LDL Reference Ranges  (U.S. Department of Health and Human Services ATP III Classifications)    Optimal          <100 mg/dL  Near Optimal     100-129 mg/dL  Borderline High  130-159 mg/dL  High             160-189 mg/dL  Very High        >189 mg/dL    Blood Gas, Arterial - [[id number]]  (Abnormal) Collected: 04/11/24 1455    Specimen: Arterial Blood Updated: 04/11/24 1502     Site Right Radial     Matheus's Test Positive     pH, Arterial 7.362 pH units      pCO2, Arterial 51.6 mm Hg      Comment: 83 Value above reference range        pO2, Arterial 62.4 mm Hg      Comment: 84 Value below reference range        HCO3, Arterial 29.3 mmol/L      Comment: 83 Value above reference range        Base Excess, Arterial 3.0 mmol/L      Comment: 83 Value above reference range        O2 Saturation, Arterial 93.0 %      Comment: 84 Value below reference range        Hemoglobin, Blood Gas 11.2 g/dL      Comment: 84 Value below reference range        Temperature 37.0     Barometric Pressure for Blood Gas 723 mmHg      Modality Nasal Cannula     Flow Rate 2.0 lpm      Collected by [id number]      Comment: Meter: [id number]     :  [id number]        pCO2, Temperature Corrected 51.6 mm Hg      pH, Temp Corrected 7.362 pH Units      pO2, Temperature Corrected 62.4 mm Hg     TSH [[id number]]  (Normal) Collected: 04/11/24 1012    Specimen: Blood Updated: 04/11/24 1111     TSH 0.450 uIU/mL     Comprehensive Metabolic Panel [[id number]]  (Abnormal) Collected: 04/11/24 1012    Specimen: Blood Updated: 04/11/24 1102     Glucose 90 mg/dL      BUN 17 mg/dL      Creatinine 1.67 mg/dL      Sodium 131 mmol/L      Potassium 4.0 mmol/L      Chloride 95 mmol/L      CO2 24.8 mmol/L      Calcium 9.7 mg/dL      Total Protein 6.4 g/dL      Albumin 4.1 g/dL      ALT (SGPT) 8 U/L      AST (SGOT) 14 U/L      Alkaline Phosphatase 68 U/L      Total Bilirubin <0.2 mg/dL      Globulin 2.3 gm/dL      A/G Ratio 1.8 g/dL      BUN/Creatinine Ratio 10.2     Anion Gap 11.2 mmol/L      eGFR 33.6 mL/min/1.73     Narrative:      GFR Normal >60  Chronic Kidney Disease <60  Kidney Failure <15      Magnesium [[id number]]  (Normal) Collected: 04/11/24 1012    Specimen: Blood Updated: 04/11/24 1102     Magnesium 2.1 mg/dL     CBC & Differential [[id number]]  (Abnormal) Collected: 04/11/24 1012    Specimen: Blood Updated: 04/11/24 1101    Narrative:      The following orders were created for panel order CBC & Differential.  Procedure                               Abnormality         Status                     ---------                               -----------         ------                     CBC Auto Differential[[id number]]        Abnormal            Final result                 Please view results for these tests on the individual orders.    CBC Auto Differential [[id number]]  (Abnormal) Collected: 04/11/24 1012    Specimen: Blood Updated: 04/11/24 1101     WBC 13.50 10*3/mm3      RBC 3.45 10*6/mm3      Hemoglobin 11.0 g/dL      Hematocrit 34.6 %      .3 fL      MCH 31.9 pg      MCHC 31.8 g/dL      RDW 12.8 %      RDW-SD 46.9 fl       MPV 8.5 fL      Platelets 318 10*3/mm3      Neutrophil % 79.2 %      Lymphocyte % 13.4 %      Monocyte % 7.0 %      Eosinophil % 0.0 %      Basophil % 0.1 %      Immature Grans % 0.3 %      Neutrophils, Absolute 10.70 10*3/mm3      Lymphocytes, Absolute 1.81 10*3/mm3      Monocytes, Absolute 0.94 10*3/mm3      Eosinophils, Absolute 0.00 10*3/mm3      Basophils, Absolute 0.01 10*3/mm3      Immature Grans, Absolute 0.04 10*3/mm3     Phosphorus [[id number]]  (Abnormal) Collected: 04/11/24 1012    Specimen: Blood Updated: 04/11/24 1100     Phosphorus 4.6 mg/dL     Urine Drug Screen - Straight Cath [[id number]]  (Abnormal) Collected: 04/11/24 1019    Specimen: Urine from Straight Cath Updated: 04/11/24 1041     THC, Screen, Urine Positive     Phencyclidine (PCP), Urine Negative     Cocaine Screen, Urine Negative     Methamphetamine, Ur Negative     Opiate Screen Positive     Amphetamine Screen, Urine Negative     Benzodiazepine Screen, Urine Positive     Tricyclic Antidepressants Screen Negative     Methadone Screen, Urine Negative     Barbiturates Screen, Urine Negative     Oxycodone Screen, Urine Negative     Buprenorphine, Screen, Urine Negative    Narrative:      Cutoff For Drugs Screened:    Amphetamines               500 ng/ml  Barbiturates               200 ng/ml  Benzodiazepines            150 ng/ml  Cocaine                    150 ng/ml  Methadone                  200 ng/ml  Opiates                    100 ng/ml  Phencyclidine               25 ng/ml  THC                         50 ng/ml  Methamphetamine            500 ng/ml  Tricyclic Antidepressants  300 ng/ml  Oxycodone                  100 ng/ml  Buprenorphine               10 ng/ml    The normal value for all drugs tested is negative. This report includes unconfirmed screening results, with the cutoff values listed, to be used for medical treatment purposes only.  Unconfirmed results must not be used for non-medical purposes such as employment or legal testing.   Clinical consideration should be applied to any drug of abuse test, particularly when unconfirmed results are used.      Ethanol [[id number]] Collected: 04/11/24 1012    Specimen: Blood Updated: 04/11/24 1041     Ethanol <10 mg/dL      Ethanol % <0.010 %     Acetaminophen Level [[id number]]  (Normal) Collected: 04/11/24 1012    Specimen: Blood Updated: 04/11/24 1041     Acetaminophen <5.0 mcg/mL     Salicylate Level [[id number]]  (Normal) Collected: 04/11/24 1012    Specimen: Blood Updated: 04/11/24 1041     Salicylate <0.3 mg/dL             Imaging Results (Most Recent)       Procedure Component Value Units Date/Time    US Carotid Bilateral [[id number]] Collected: 04/11/24 1556     Updated: 04/11/24 1601    Narrative:      US CAROTID BILATERAL    Date of Exam: 4/11/2024 3:38 PM EDT    Indication: bilat occipital cvas.    Comparison: No comparisons available.    Technique: Grayscale, color-flow, and spectral imaging was obtained of the bilateral carotid and vertebral arteries.      Findings:  No significant plaquing within the right common or internal carotid artery. Peak systolic velocity is 57 cm/s. There is antegrade flow within the right vertebral artery.    Mild plaquing in the left carotid bulb and left proximal ICA. Peak systolic velocity is 122 cm/s. There is antegrade flow within the left vertebral artery.      Impression:      Impression:    1. Mild plaquing but no hemodynamically significant stenosis.        Electronically Signed: Anup Davila MD    4/11/2024 3:58 PM EDT    Workstation ID: [id number]    MRI Brain With & Without Contrast [[id number]] Collected: 04/11/24 1534     Updated: 04/11/24 1553    Addenda:        Findings regarding acute infarcts were personally called to the patient's   nurse at 3:50 p.m. on 4/11/2024    Electronically Signed: Anup Bragg MD    4/11/2024 3:50 PM EDT    Workstation ID: [id number]  Signed: 04/11/24 1550 by Jose Bragg MD    Narrative:      MRI BRAIN W WO  CONTRAST    Date of Exam: 4/11/2024 1:11 PM EDT    Indication: ams.     Comparison: CT brain 4/11/2024    Technique:  Routine multiplanar/multisequence sequence images of the brain were obtained before and after the uneventful administration of Multihance.      Findings:  There is mild generalized parenchymal volume loss. Diffusion weighted images demonstrate areas of restricted diffusion within the globus pallidus bilaterally corresponding to the areas of low-attenuation noted on prior CT scan. There are additional   punctate foci of restricted diffusion involving the precentral gyrus of the right frontal lobe as well as additional patchy areas of restricted diffusion within the bilateral occipital lobes. Findings would be compatible with multiple acute infarcts.   Given the symmetric appearance within the basal ganglia other differential considerations would include carbon monoxide poisoning, anoxic injury there is no evidence of intracranial hemorrhage. No abnormal extra-axial fluid collections are identified. No   mass effect or hydrocephalus.    Major intracranial vascular flow voids are preserved. Sella and suprasellar cistern are within normal limits. Craniovertebral junction appears normal.    Postcontrast images demonstrate no pathologic intracranial contrast enhancement.      Impression:      Impression:    1. There are areas of restricted diffusion within the bilateral basal ganglia, bilateral occipital lobes, and right frontal lobe compatible with multiple infarcts. The symmetric appearance of the infarcts within the basal ganglia can be seen with anoxic   injury or carbon monoxide poisoning.  2. No evidence of acute intracranial hemorrhage.  3. No evidence of pathologic contrast enhancement.        Electronically Signed: Anup Bragg MD    4/11/2024 3:43 PM EDT    Workstation ID: [id number]    XR Chest 1 View [[id number]] Collected: 04/11/24 1454     Updated: 04/11/24 1508    Narrative:      XR CHEST 1  VW    Date of Exam: 4/11/2024 2:39 PM EDT    Indication: decrease breathing    Comparison: None available.    Findings:   RIght IJ portacath is unchanged. Heart size and pulmonary vessels are within normal limits. Interstitial markings are prominent and similar to prior exam. No focal airspace consolidation. No pleural effusion or pneumothorax.        Impression:      Impression:    Prominent interstitial markings similar to the prior exam. No focal airspace consolidation or other acute cardiopulmonary disease.      Electronically Signed: Anup Bragg MD    4/11/2024 3:05 PM EDT    Workstation ID: [id number]    CT Head Without Contrast [[id number]] Collected: 04/11/24 0943     Updated: 04/11/24 0950    Narrative:      CT HEAD WO CONTRAST    Date of Exam: 4/11/2024 9:19 AM EDT    Indication: AMS.    Comparison: MRI brain 3/13/2024    Technique: Axial CT images were obtained of the head without contrast administration.  Coronal reconstructions were performed.  Automated exposure control and iterative reconstruction methods were used.      Findings:  There are small areas of low-attenuation within the globes bilaterally bilaterally. Findings are nonspecific but can be seen with anoxic injury or carboximide poisoning as well as other metabolic abnormalities.    No evidence of acute intracranial hemorrhage. No abnormal extra-axial fluid collections are seen. No mass effect or hydrocephalus.    Visualized paranasal sinuses and mastoid air cells are clear. The globes and orbits appear within normal limits.      Impression:      Impression:    1. Small subtle areas of low-attenuation within the bilateral basal ganglia. These are nonspecific but can be seen in the setting of anoxic injury, carbon monoxide poisoning, and other metabolic abnormalities. Clinical correlation recommended.  2. No evidence of acute intracranial hemorrhage.      Electronically Signed: Anup Bragg MD    4/11/2024 9:47 AM EDT    Workstation ID:  [id number]          reviewed    ECG/EMG Results (most recent)       None          reviewed EKG from prior admissions    Assessment & Plan       .  Acute bilateral basal ganglia, bilateral occipital lobe, right frontal lobe infarcts certainly consistent with shower emboli.  Dopplers are unremarkable with echocardiogram neurology seen the patient consultation started on Lovenox and Plavix until echocardiogram available.  She is certainly hypercoagulable from her underlying malignancy likely will need anticoagulation as an outpatient.  I discussed with Dr. Ortiz today    .  Acute hypoxic hypercapnic respiratory failure likely due to hypoventilation and mental status change.  She is already improved we will get her weaned off oxygen    .  Acute kidney injury likely due to hypotension consequent renal hypoperfusion IV fluids supportive care monitor renal functions    .  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  Hyperlipidemia with statin intolerance will pursue Repatha or Praluent as outpatient.      .  COPD nothing acute continue with home medications    .  Orthostatic hypotension stable continue with midodrine.    .  GERD esophagitis continue with Carafate and Protonix    .  Chronic back and left hip pain on chronic hydrocodone stable          I discussed the patients findings and my recommendations with patient and her niece who was at bedside    Nigel De Paz MD  04/11/24  16:09 EDT        Much of this encounter note is an electronic transcription/translation of spoken language to printed text. The electronic translation of spoken language may permit erroneous, or at times, nonsensical words or phrases to be inadvertently transcribed; Although I have reviewed the note for such errors, some may still exist.    Note Disclaimer: At [facility], we believe that sharing information builds trust and better  relationships. You are receiving this note because you recently visited [facility]. It is possible you will see health information before a provider has talked with you about it. This kind of information can be easy to misunderstand. To help you fully understand what it means for your health, we urge you to discuss this note with your provider.

## 2024-04-11 NOTE — NURSING NOTE
Pt in ER . Called and talked to Luzmaria RN in ER to make sure port was deaccessed before she goes home - Per Luzmaria pt is being admitted.  Advised Luzmaria port should be deaccessed or reaccessed.   She VU

## 2024-04-11 NOTE — ED NOTES
Pt taken to CT on EMS stretcher with EMS assistance upon arrival, Dr Watson chavis upon arrival while on EMS stretcher

## 2024-04-12 ENCOUNTER — PATIENT OUTREACH (OUTPATIENT)
Dept: OTHER | Facility: HOSPITAL | Age: 69
End: 2024-04-12
Payer: MEDICARE

## 2024-04-12 LAB
ANION GAP SERPL CALCULATED.3IONS-SCNC: 6.1 MMOL/L (ref 5–15)
BUN SERPL-MCNC: 15 MG/DL (ref 8–23)
BUN/CREAT SERPL: 12.5 (ref 7–25)
CALCIUM SPEC-SCNC: 8.8 MG/DL (ref 8.6–10.5)
CHLORIDE SERPL-SCNC: 96 MMOL/L (ref 98–107)
CO2 SERPL-SCNC: 25.9 MMOL/L (ref 22–29)
CREAT SERPL-MCNC: 1.2 MG/DL (ref 0.57–1)
DEPRECATED RDW RBC AUTO: 45.7 FL (ref 37–54)
EGFRCR SERPLBLD CKD-EPI 2021: 50 ML/MIN/1.73
ERYTHROCYTE [DISTWIDTH] IN BLOOD BY AUTOMATED COUNT: 12.9 % (ref 12.3–15.4)
GLUCOSE SERPL-MCNC: 85 MG/DL (ref 65–99)
HCT VFR BLD AUTO: 31.2 % (ref 34–46.6)
HGB BLD-MCNC: 10.2 G/DL (ref 12–15.9)
MCH RBC QN AUTO: 32.1 PG (ref 26.6–33)
MCHC RBC AUTO-ENTMCNC: 32.7 G/DL (ref 31.5–35.7)
MCV RBC AUTO: 98.1 FL (ref 79–97)
OSMOLALITY SERPL: 275 MOSM/KG (ref 280–301)
OSMOLALITY UR: 153 MOSM/KG
PLATELET # BLD AUTO: 232 10*3/MM3 (ref 140–450)
PMV BLD AUTO: 8.4 FL (ref 6–12)
POTASSIUM SERPL-SCNC: 4.1 MMOL/L (ref 3.5–5.2)
RBC # BLD AUTO: 3.18 10*6/MM3 (ref 3.77–5.28)
SODIUM SERPL-SCNC: 128 MMOL/L (ref 136–145)
WBC NRBC COR # BLD AUTO: 8.78 10*3/MM3 (ref 3.4–10.8)

## 2024-04-12 PROCEDURE — 94761 N-INVAS EAR/PLS OXIMETRY MLT: CPT

## 2024-04-12 PROCEDURE — 94799 UNLISTED PULMONARY SVC/PX: CPT

## 2024-04-12 PROCEDURE — 99232 SBSQ HOSP IP/OBS MODERATE 35: CPT | Performed by: PSYCHIATRY & NEUROLOGY

## 2024-04-12 PROCEDURE — 83930 ASSAY OF BLOOD OSMOLALITY: CPT | Performed by: FAMILY MEDICINE

## 2024-04-12 PROCEDURE — 25810000003 SODIUM CHLORIDE 0.9 % SOLUTION: Performed by: FAMILY MEDICINE

## 2024-04-12 PROCEDURE — 97161 PT EVAL LOW COMPLEX 20 MIN: CPT

## 2024-04-12 PROCEDURE — 85027 COMPLETE CBC AUTOMATED: CPT | Performed by: FAMILY MEDICINE

## 2024-04-12 PROCEDURE — 83935 ASSAY OF URINE OSMOLALITY: CPT | Performed by: FAMILY MEDICINE

## 2024-04-12 PROCEDURE — 80048 BASIC METABOLIC PNL TOTAL CA: CPT | Performed by: FAMILY MEDICINE

## 2024-04-12 PROCEDURE — 94664 DEMO&/EVAL PT USE INHALER: CPT

## 2024-04-12 PROCEDURE — 97165 OT EVAL LOW COMPLEX 30 MIN: CPT

## 2024-04-12 PROCEDURE — 92523 SPEECH SOUND LANG COMPREHEN: CPT

## 2024-04-12 RX ORDER — LEVETIRACETAM 500 MG/1
500 TABLET ORAL EVERY 12 HOURS SCHEDULED
Status: DISCONTINUED | OUTPATIENT
Start: 2024-04-12 | End: 2024-04-13 | Stop reason: HOSPADM

## 2024-04-12 RX ADMIN — SUCRALFATE 1 G: 1 TABLET ORAL at 08:19

## 2024-04-12 RX ADMIN — MIDODRINE HYDROCHLORIDE 2.5 MG: 5 TABLET ORAL at 17:15

## 2024-04-12 RX ADMIN — SODIUM CHLORIDE 75 ML/HR: 9 INJECTION, SOLUTION INTRAVENOUS at 17:18

## 2024-04-12 RX ADMIN — APIXABAN 5 MG: 2.5 TABLET, FILM COATED ORAL at 20:40

## 2024-04-12 RX ADMIN — MIDODRINE HYDROCHLORIDE 2.5 MG: 5 TABLET ORAL at 08:19

## 2024-04-12 RX ADMIN — SUCRALFATE 1 G: 1 TABLET ORAL at 17:16

## 2024-04-12 RX ADMIN — LEVETIRACETAM 500 MG: 500 TABLET, FILM COATED ORAL at 17:16

## 2024-04-12 RX ADMIN — SUCRALFATE 1 G: 1 TABLET ORAL at 20:40

## 2024-04-12 RX ADMIN — Medication 10 ML: at 20:40

## 2024-04-12 RX ADMIN — HYDROCODONE BITARTRATE AND ACETAMINOPHEN 1 TABLET: 10; 325 TABLET ORAL at 01:23

## 2024-04-12 RX ADMIN — MIDODRINE HYDROCHLORIDE 2.5 MG: 5 TABLET ORAL at 11:46

## 2024-04-12 RX ADMIN — BUDESONIDE AND FORMOTEROL FUMARATE DIHYDRATE 2 PUFF: 160; 4.5 AEROSOL RESPIRATORY (INHALATION) at 19:45

## 2024-04-12 RX ADMIN — SODIUM CHLORIDE 75 ML/HR: 9 INJECTION, SOLUTION INTRAVENOUS at 06:19

## 2024-04-12 RX ADMIN — IPRATROPIUM BROMIDE AND ALBUTEROL SULFATE 3 ML: .5; 3 SOLUTION RESPIRATORY (INHALATION) at 17:53

## 2024-04-12 RX ADMIN — SUCRALFATE 1 G: 1 TABLET ORAL at 11:46

## 2024-04-12 RX ADMIN — Medication 10 ML: at 08:20

## 2024-04-12 RX ADMIN — CLOPIDOGREL BISULFATE 75 MG: 75 TABLET ORAL at 08:19

## 2024-04-12 RX ADMIN — BUDESONIDE AND FORMOTEROL FUMARATE DIHYDRATE 2 PUFF: 160; 4.5 AEROSOL RESPIRATORY (INHALATION) at 09:11

## 2024-04-12 RX ADMIN — PANTOPRAZOLE SODIUM 40 MG: 40 TABLET, DELAYED RELEASE ORAL at 06:15

## 2024-04-12 NOTE — PROGRESS NOTES
Adult Nutrition  Assessment/PES    Patient Name:  Pili Arechiga  YOB: 1955  MRN: [id number]  Admit Date:  4/11/2024    Assessment Date:  4/12/2024    Comments:  Chart reviewed due to age/BMI.    Pt meets criteria for:    Severe acute disease related malnutrition related to Small Cell Lung Cancer as evidenced by muscle wasting and fat loss on exam.     Recommend: add Boost Plus alyssa once per day    Will cont to follow and monitor.      Reason for Assessment       Row Name 04/12/24 1132          Reason for Assessment    Reason For Assessment identified at risk by screening criteria  age/BMI     Diagnosis neurologic conditions;cancer diagnosis/related complications;pulmonary disease  Acute Infarcts, Small Cell Lung CA with mets, Acute Hypoxic Resp Failure, WILEY hx COPD, back pain                    Nutrition/Diet History       Row Name 04/12/24 1133          Nutrition/Diet History    Typical Intake (Food/Fluid/EN/PN) Spoke w pt at chair side. NKFA. Denies issue swallowing, issues chewing due to missing teeth. Reports can eat steak & other things but has to be tender. Can tolerate Alyssa Boost but it made her sick last time she drank it. Reports 1 weeks ago N/V. Otherwise appetite ok at home. Denies recent wt loss, -07 recently. Eats 1-3 meals per day                    Labs/Tests/Procedures/Meds       Row Name 04/12/24 1136          Labs/Procedures/Meds    Lab Results Reviewed reviewed     Lab Results Comments Tg 165 elevated, chol 207 elevated, Na 128 L, Creat 1.2 H, phos 4.6 H        Diagnostic Tests/Procedures    Diagnostic Test/Procedure Reviewed reviewed     Diagnostic Test/Procedures Comments SLP= mild cognitive deficits        Medications    Pertinent Medications Reviewed reviewed                    Physical Findings       Row Name 04/12/24 1136          Physical Findings    Overall Physical Appearance muscle & fat loss on exam                    Estimated/Assessed Needs - Anthropometrics        Row Name 04/12/24 1136 04/12/24 0420       Anthropometrics    Weight -- 54 kg (119 lb 1.6 oz)    Weight for Calculation 54 kg (119 lb 0.8 oz) --       Estimated/Assessed Needs    Additional Documentation Estimated Calorie Needs (Group);Fluid Requirements (Group);Protein Requirements (Group) --       Estimated Calorie Needs    Estimated Calorie Requirement (kcal/day) 1623 kcal ( 30 kcal/kg ) --    Estimated Calorie Need Method kcal/kg --       Protein Requirements    Est Protein Requirement Amount (gms/kg) 1.5 gm protein  81 gm pro --       Fluid Requirements    Estimated Fluid Requirement Method RDA Method  1623ml --                   Nutrition Prescription Ordered       Row Name 04/12/24 1137          Nutrition Prescription PO    Current PO Diet Regular     Common Modifiers Fluid Restriction     Fluid Restriction mL per Day 1500 mL                    Evaluation of Received Nutrient/Fluid Intake       Row Name 04/12/24 1137          Fluid Intake Evaluation    Other Fluid (mL) 953  IVF        PO Evaluation    Number of Days PO Intake Evaluated Insufficient Data                    Malnutrition Severity Assessment       Row Name 04/12/24 1137          Malnutrition Severity Assessment    Malnutrition Type Chronic Disease - Related Malnutrition        Muscle Loss    [place] Region Severe - deep hollowing/scooping, lack of muscle to touch, facial bones well defined     Clavicle Bone Region Severe - protruding prominent bone     Acromion Bone Region Severe - squared shoulders, bones, and acromion process protrusion prominent     Patellar Region Severe - prominent bone, square looking, very little muscle definition     Anterior Thigh Region Severe - line/depression along thigh, obviously thin     Posterior Calf Region Moderate - some roundness, slight firmness        Fat Loss    Orbital Region  Moderate -  somewhat hollowness, slightly dark circles     Upper Arm Region Severe - mostly skin, very little space between folds,  fingers touch        Criteria Met (Must meet criteria for severity in at least 2 of these categories: M Wasting, Fat Loss, Fluid, Secondary Signs, Wt. Status, Intake)    Patient meets criteria for  Severe Malnutrition                     Problem/Interventions:   Problem 1       Row Name 04/12/24 1139          Nutrition Diagnoses Problem 1    Problem 1 Other (comment)  Severe acute disease related malnutrition related to Small Cell Lung Cancer as evidenced by muscle wasting and fat loss on exam.                          Intervention Goal       Row Name 04/12/24 1139          Intervention Goal    General Meet nutritional needs for age/condition     PO Establish PO;PO intake (%)     PO Intake % 50 %                    Nutrition Intervention       Row Name 04/12/24 1140          Nutrition Intervention    RD/Tech Action Follow Tx progress                      Education/Evaluation       Row Name 04/12/24 1140          Education    Education Other (comment);Education topics;Advised regarding habits/behavior;Provided education regarding  Edu on exam for nutriiton. Edu on easy pro sources. Edu on nutrition losses noted. Edu on oral supplement or homeade shake options.     Education Topics Protein  Living Well Nourished provided w RD contact        Monitor/Evaluation    Monitor Per protocol;I&O;PO intake;Pertinent labs;Weight;Symptoms;Supplement intake     Education Follow-up Other (comment)  pt verbalized understanding                     Electronically signed by:  Jessie Siddiqi RD  04/12/24 11:44 EDT

## 2024-04-12 NOTE — THERAPY EVALUATION
Patient Name: Pili Arechiga  : 1955    MRN: [id number]                              Today's Date: 2024       Admit Date: 2024    Visit Dx:     ICD-10-CM ICD-9-CM   1. WILEY (acute kidney injury)  N17.9 584.9   2. Hypoxia  R09.02 799.02   3. Somnolence  R40.0 780.09   4. Cerebrovascular accident (CVA), unspecified mechanism  I63.9 434.91     Patient Active Problem List   Diagnosis    Pneumonia of both lungs due to infectious organism    COPD with acute exacerbation    Leucocytosis    Lung nodule    Pneumothorax    Small cell carcinoma    Fitting and adjustment of vascular catheter    Smoking greater than 40 pack years    AMS (altered mental status)    CVA (cerebrovascular accident due to intracerebral hemorrhage)     Past Medical History:   Diagnosis Date    COPD (chronic obstructive pulmonary disease)     COPD with acute exacerbation 2020    Small cell carcinoma 3/13/2024     Past Surgical History:   Procedure Laterality Date    BRONCHOSCOPY N/A 2023    Procedure: BRONCHOSCOPY WITH ENDOBRONCHIAL ULTRASOUND (EBUS) with FNA;  Surgeon: Coy Mccarthy MD;  Location: [facility] ENDOSCOPY;  Service: Pulmonary;  Laterality: N/A;  Pre/Post - mediastinal and hilar lymphadenopathy.    BRONCHOSCOPY WITH ION ROBOTIC ASSIST N/A 2024    Procedure: BRONCHOSCOPY WITH ION ROBOT,  ENDOBRONCHIAL ULTRASOUND, FINE NEEDLE ASPIRATIONS,  CRYOTHERAPY BIOPSIES, AND LEFT LOWER LOBE BRONCHOALVEOLAR LAVAGE.;  Surgeon: Alexia Velásquez MD;  Location: [facility] ENDOSCOPY;  Service: Robotics - Pulmonary;  Laterality: N/A;     SECTION      CHEST TUBE INSERTION Left 2024    Procedure: CHEST TUBE INSERTION;  Surgeon: Alexia Velásquez MD;  Location: [facility] ENDOSCOPY;  Service: Thoracic;  Laterality: Left;    CHOLECYSTECTOMY      COLONOSCOPY      ECTOPIC PREGNANCY SURGERY      HYSTERECTOMY      TONSILLECTOMY      TOTAL HIP ARTHROPLASTY Left     VENOUS ACCESS DEVICE (PORT) INSERTION N/A 3/14/2024    Procedure:  "INSERTION VENOUS ACCESS DEVICE;  Surgeon: Jonas Garner MD;  Location:  [facility] OR;  Service: General;  Laterality: N/A;      General Information       Row Name 04/12/24 0922          Physical Therapy Time and Intention    Document Type evaluation  -     Mode of Treatment physical therapy  -       Row Name 04/12/24 0922          General Information    Patient Profile Reviewed yes  pt found unresponsive at home, found to have multiple acute infarcts  -     Prior Level of Function independent:;all household mobility;community mobility  -     Existing Precautions/Restrictions fall  -     Barriers to Rehab none identified  -       Row Name 04/12/24 0922          Living Environment    People in Home child(gulshan), adult  pt reports her son lives with her  -       Row Name 04/12/24 0922          Home Main Entrance    Number of Stairs, Main Entrance --  pt reports there are steps to enter home, but does not specify how many  -[facility] Name 04/12/24 0922          Stairs Within Home, Primary    Stairs, Within Home, Primary pt lives in a 3 story house, reports staying on the 2nd floor  -[facility] Name 04/12/24 0922          Cognition    Orientation Status (Cognition) oriented x 3  reports current month is \"march\", states year 2024  -               User Key  (r) = Recorded By, (t) = Taken By, (c) = Cosigned By      Initials Name Provider Type    Tracey Carbajal, PT Physical Therapist                   Mobility       Row Name 04/12/24 0922          Bed Mobility    Bed Mobility supine-sit  -     Supine-Sit Hana (Bed Mobility) supervision  -     Assistive Device (Bed Mobility) bed rails;head of bed elevated  -       Row Name 04/12/24 0922          Transfers    Comment, (Transfers) cues for hand placement  -       Row Name 04/12/24 0922          Sit-Stand Transfer    Sit-Stand Hana (Transfers) contact guard;verbal cues  -     Assistive Device (Sit-Stand Transfers) walker, " front-wheeled  -     Comment, (Sit-Stand Transfer) cues for hand placement  -       Row Name 04/12/24 0922          Gait/Stairs (Locomotion)    [place] Level (Gait) contact guard;verbal cues  -     Assistive Device (Gait) walker, front-wheeled  -     Distance in Feet (Gait) 120  -     Deviations/Abnormal Patterns (Gait) daily decreased;base of support, narrow  -     Bilateral Gait Deviations forward flexed posture  -     Comment, (Gait/Stairs) pt requires verbal cues for increased base of support  -               User Key  (r) = Recorded By, (t) = Taken By, (c) = Cosigned By      Initials Name Provider Type    Tracey Carbajal, PT Physical Therapist                   Obj/Interventions       Row Name 04/12/24 0922          Range of Motion Comprehensive    Comment, General Range of Motion LE ROM WFL bilaterally  -[facility] Name 04/12/24 0922          Strength Comprehensive (MMT)    Comment, General Manual Muscle Testing (MMT) Assessment LE strength 4/5 bilaterally  -[facility] Name 04/12/24 0922          Balance    Comment, Balance SBA for static standing balance, CGA for dynamic standing balance with walker  -[facility] Name 04/12/24 0922          Sensory Assessment (Somatosensory)    Sensory Assessment (Somatosensory) other (see comments)  pt reports no numbness/tingling  -               User Key  (r) = Recorded By, (t) = Taken By, (c) = Cosigned By      Initials Name Provider Type    JW Tracey Arnold, PT Physical Therapist                   Goals/Plan       Row Name 04/12/24 0922          Bed Mobility Goal 1 (PT)    Activity/Assistive Device (Bed Mobility Goal 1, PT) bed mobility activities, all  -     [place] Level/Cues Needed (Bed Mobility Goal 1, PT) modified independence  -     Time Frame (Bed Mobility Goal 1, PT) 2 days  -     Progress/Outcomes (Bed Mobility Goal 1, PT) new goal  -       Row Name 04/12/24 0922          Transfer Goal 1 (PT)    Activity/Assistive  Device (Transfer Goal 1, PT) transfers, all  -JW     [place] Level/Cues Needed (Transfer Goal 1, PT) supervision required  -JW     Time Frame (Transfer Goal 1, PT) 2 days  -JW     Progress/Outcome (Transfer Goal 1, PT) new goal  -       Row Name 04/12/24 0922          Gait Training Goal 1 (PT)    Activity/Assistive Device (Gait Training Goal 1, PT) gait (walking locomotion);assistive device use  -JW     [place] Level (Gait Training Goal 1, PT) supervision required  -JW     Distance (Gait Training Goal 1, PT) 150  -JW     Time Frame (Gait Training Goal 1, PT) 2 days  -JW     Progress/Outcome (Gait Training Goal 1, PT) new goal  -       Row Name 04/12/24 0922          Therapy Assessment/Plan (PT)    Planned Therapy Interventions (PT) balance training;bed mobility training;gait training;home exercise program;patient/family education;strengthening;transfer training  -               User Key  (r) = Recorded By, (t) = Taken By, (c) = Cosigned By      Initials Name Provider Type    Tracey Carbajal, PT Physical Therapist                   Clinical Impression       Row Name 04/12/24 0922          Pain    Pretreatment Pain Rating 0/10 - no pain  -     Posttreatment Pain Rating 0/10 - no pain  -       Row Name 04/12/24 0922          Plan of Care Review    Plan of Care Reviewed With patient  -     Outcome Evaluation Physical therapy evaluation complete.  Patient performs supine to sit with SBA and sit to stand with CGA.  Patient performs gait with rolling walker x120 feet, CGA with cues for increased base of support.  Patient reports no deficits in vision or sensation, however does report some dizziness initially with activity.  Plan to see patient x1-2 additional visits to ensure consistency with mobility.  Recommend rolling walker and home health at discharge.  Defer supervision recommendations to SLP assessment.  Will continue to follow.  -       Row Name 04/12/24 0922          Therapy  "Assessment/Plan (PT)    Patient/Family Therapy Goals Statement (PT) \"go home\"  -     Rehab Potential (PT) good, to achieve stated therapy goals  -     Criteria for Skilled Interventions Met (PT) yes;meets criteria  -     Therapy Frequency (PT) other (see comments)  1-2 visits  -     Predicted Duration of Therapy Intervention (PT) 1-2 days  -       Row Name 04/12/24 0922          Positioning and Restraints    Pre-Treatment Position in bed  -     Post Treatment Position bathroom  -     Bathroom call light within reach;sitting;with nsg  with CNA  -               User Key  (r) = Recorded By, (t) = Taken By, (c) = Cosigned By      Initials Name Provider Type    Tracey Carbajal, PT Physical Therapist                   Outcome Measures       Row Name 04/12/24 0922 04/12/24 0815       How much help from another person do you currently need...    Turning from your back to your side while in flat bed without using bedrails? 4  - 3  -TF    Moving from lying on back to sitting on the side of a flat bed without bedrails? 3  - 3  -TF    Moving to and from a bed to a chair (including a wheelchair)? 3  - 3  -TF    Standing up from a chair using your arms (e.g., wheelchair, bedside chair)? 3  - 3  -TF    Climbing 3-5 steps with a railing? 3  - 3  -TF    To walk in hospital room? 3  - 3  -TF    AM-PAC 6 Clicks Score (PT) 19  - 18  -TF    Highest Level of Mobility Goal 6 --> Walk 10 steps or more  - 6 --> Walk 10 steps or more  -      Row Name 04/12/24 1301 04/12/24 0922       Modified Matilde Scale    Pre-Stroke Modified Matilde Scale 0 - No Symptoms at all.  -SD 0 - No Symptoms at all.  -    Modified Matilde Scale 0 - No Symptoms at all.  -SD 0 - No Symptoms at all.  -      Row Name 04/12/24 1301 04/12/24 0922       Functional Assessment    Outcome Measure Options Modified [place];AM-PAC 6 Clicks Daily Activity (OT)  -SD AM-PAC 6 Clicks Basic Mobility (PT)  -              User Key  (r) = " Recorded By, (t) = Taken By, (c) = Cosigned By      Initials Name Provider Type    SD Anup Gold OTR Occupational Therapist    Tracey Carbajal, PT Physical Therapist    Martha Morales, RN Registered Nurse                                 Physical Therapy Education       Title: PT OT SLP Therapies (In Progress)       Topic: Physical Therapy (In Progress)       Point: Mobility training (Done)       Learning Progress Summary             Patient Acceptance, E,TB, VU by  at 4/12/2024 1317                         Point: Home exercise program (Not Started)       Learner Progress:  Not documented in this visit.                              User Key       Initials Effective Dates Name Provider Type Discipline    JESSY 06/16/21 -  Tracey Arnold, PT Physical Therapist PT                  PT Recommendation and Plan  Planned Therapy Interventions (PT): balance training, bed mobility training, gait training, home exercise program, patient/family education, strengthening, transfer training  Plan of Care Reviewed With: patient  Outcome Evaluation: Physical therapy evaluation complete.  Patient performs supine to sit with SBA and sit to stand with CGA.  Patient performs gait with rolling walker x120 feet, CGA with cues for increased base of support.  Patient reports no deficits in vision or sensation, however does report some dizziness initially with activity.  Plan to see patient x1-2 additional visits to ensure consistency with mobility.  Recommend rolling walker and home health at discharge.  Defer supervision recommendations to SLP assessment.  Will continue to follow.     Time Calculation:   PT Evaluation Complexity  History, PT Evaluation Complexity: 1-2 personal factors and/or comorbidities  Examination of Body Systems (PT Eval Complexity): 1-2 elements  Clinical Presentation (PT Evaluation Complexity): stable  Clinical Decision Making (PT Evaluation Complexity): low complexity  Overall Complexity (PT Evaluation  Complexity): low complexity     PT Charges       Row Name 04/12/24 1317             Time Calculation    Start Time 0922  -      PT Received On 04/12/24  -JESSY      PT - Next Appointment 04/13/24  -JESSY                User Key  (r) = Recorded By, (t) = Taken By, (c) = Cosigned By      Initials Name Provider Type    Tracey Carbajal PT Physical Therapist                  Therapy Charges for Today       Code Description Service Date Service Provider Modifiers Qty    93350328645 HC PT EVAL LOW COMPLEXITY 1 4/12/2024 Tracey Arnold, PT GP 1            PT G-Codes  Outcome Measure Options: Modified [place], AM-PAC 6 Clicks Daily Activity (OT)  AM-PAC 6 Clicks Score (PT): 19  AM-PAC 6 Clicks Score (OT): 22  Modified Matilde Scale: 0 - No Symptoms at all.  PT Discharge Summary  Anticipated Discharge Disposition (PT): home with home health    Tracey Arnodl, CHEY  4/12/2024

## 2024-04-12 NOTE — DISCHARGE INSTR - APPOINTMENTS
SCHEDULING DEPT TO CALL PT MONDAY TO SCHEDULE ZIO PATCH (HOLTOR MONITOR) TO BE APPLIED MONDAY. [phone]

## 2024-04-12 NOTE — PLAN OF CARE
Problem: Adult Inpatient Plan of Care  Goal: Plan of Care Review  Recent Flowsheet Documentation  Taken 4/12/2024 1252 by Anup Gold OTR  Plan of Care Reviewed With: patient  Outcome Evaluation: Occupational therapy evalation completed. Pt was found unresponsive at home by family and was brought to ER by EMS. Pt dx with acute stroke. Pt managed bed mobility with supervision, sit to stand transfers with SBA and functional mobility with CGA x 120 feet using a rolling walker for support. Pt reported feeling light headed when initially standing and some with mobility (improved during mobility). Rec use of a walker for safety with mobility at this time. Pt reports no concern for management of basic adl tasks. Pt states she will have family support at home as needed. No further OT services recommended.

## 2024-04-12 NOTE — PLAN OF CARE
Goal Outcome Evaluation:  Plan of Care Reviewed With: patient        Progress: improving  Outcome Evaluation: vss. pt a&ox4. pt up with assist x1. iv fluids continued. currently on room air. R chest port, accessed in ER. tele in place, sinus kyaw. regular diet with 1500ml fluid restriction. eliquis ordered to start. family at bedside. buttocks red, blanchable. pt encouraged to reposition.

## 2024-04-12 NOTE — NURSING NOTE
Contacted MD to obtain order for oxygen. No order obtained. Patient trialed on room air. O2 sats on room air quickly dropped to 84%. Placed patient on 1L O2 and sats are stable at 90-94%.

## 2024-04-12 NOTE — THERAPY DISCHARGE NOTE
Acute Care - Occupational Therapy Discharge   [place]    Patient Name: Pili Arechiga  : 1955    MRN: [id number]                              Today's Date: 2024       Admit Date: 2024    Visit Dx:     ICD-10-CM ICD-9-CM   1. WILEY (acute kidney injury)  N17.9 584.9   2. Hypoxia  R09.02 799.02   3. Somnolence  R40.0 780.09   4. Cerebrovascular accident (CVA), unspecified mechanism  I63.9 434.91     Patient Active Problem List   Diagnosis    Pneumonia of both lungs due to infectious organism    COPD with acute exacerbation    Leucocytosis    Lung nodule    Pneumothorax    Small cell carcinoma    Fitting and adjustment of vascular catheter    Smoking greater than 40 pack years    AMS (altered mental status)    CVA (cerebrovascular accident due to intracerebral hemorrhage)     Past Medical History:   Diagnosis Date    COPD (chronic obstructive pulmonary disease)     COPD with acute exacerbation 2020    Small cell carcinoma 3/13/2024     Past Surgical History:   Procedure Laterality Date    BRONCHOSCOPY N/A 2023    Procedure: BRONCHOSCOPY WITH ENDOBRONCHIAL ULTRASOUND (EBUS) with FNA;  Surgeon: Coy Mccarthy MD;  Location: [facility] ENDOSCOPY;  Service: Pulmonary;  Laterality: N/A;  Pre/Post - mediastinal and hilar lymphadenopathy.    BRONCHOSCOPY WITH ION ROBOTIC ASSIST N/A 2024    Procedure: BRONCHOSCOPY WITH ION ROBOT,  ENDOBRONCHIAL ULTRASOUND, FINE NEEDLE ASPIRATIONS,  CRYOTHERAPY BIOPSIES, AND LEFT LOWER LOBE BRONCHOALVEOLAR LAVAGE.;  Surgeon: Alexia Velásquez MD;  Location: [facility] ENDOSCOPY;  Service: Robotics - Pulmonary;  Laterality: N/A;     SECTION      CHEST TUBE INSERTION Left 2024    Procedure: CHEST TUBE INSERTION;  Surgeon: Alexia Velásquez MD;  Location: [facility] ENDOSCOPY;  Service: Thoracic;  Laterality: Left;    CHOLECYSTECTOMY      COLONOSCOPY      ECTOPIC PREGNANCY SURGERY      HYSTERECTOMY      TONSILLECTOMY      TOTAL HIP ARTHROPLASTY Left     VENOUS  "ACCESS DEVICE (PORT) INSERTION N/A 3/14/2024    Procedure: INSERTION VENOUS ACCESS DEVICE;  Surgeon: Jonas Garner MD;  Location:  [facility] OR;  Service: General;  Laterality: N/A;      General Information       Row Name 04/12/24 1234          OT Time and Intention    Document Type evaluation  -SD     Mode of Treatment occupational therapy  -SD       Row Name 04/12/24 1234          General Information    Patient Profile Reviewed yes  pt found unresponsive at home, found to have multiple acute infarcts (occipital)  -SD     Prior Level of Function independent:;ADL's;all household mobility  -SD     Existing Precautions/Restrictions fall  -SD     Barriers to Rehab medically complex  pt with dx of cancer and has been undergoing chemo treatements. Pt with hx of COPD  -SD       Row Name 04/12/24 1234          Occupational Profile    Reason for Services/Referral (Occupational Profile) stroke protocol  -SD     Successful Occupations (Occupational Profile) I with daily tasks  -SD     Occupational History/Life Experiences (Occupational Profile) Pt with hx of COPD and cancer.  -SD     Environmental Supports and Barriers (Occupational Profile) pt lives with son  -SD     Patient Goals (Occupational Profile) go home  -SD       Row Name 04/12/24 1234          Living Environment    People in Home child(gulshan), adult  -SD       Row Name 04/12/24 1234          Home Main Entrance    Number of Stairs, Main Entrance other (see comments)  pt reported \"some\" steps to enter home (did not specify number of steps)  -SD       Row Name 04/12/24 1234          Stairs Within Home, Primary    Stairs, Within Home, Primary pt lives in a 3 story house, reports staying on the 2nd floor  -SD       Row Name 04/12/24 1234          Cognition    Orientation Status (Cognition) oriented x 3  oriented to year but stated date as March  -SD               User Key  (r) = Recorded By, (t) = Taken By, (c) = Cosigned By      Initials Name Provider Type    SD " "Anup Gold, OTMINGO Occupational Therapist                   Mobility/ADL's       Row Name 04/12/24 1244          Bed Mobility    Bed Mobility supine-sit  -SD     Supine-Sit [place] (Bed Mobility) supervision  -SD     Assistive Device (Bed Mobility) bed rails;head of bed elevated  -SD       Row Name 04/12/24 1244          Transfers    Comment, (Transfers) pt c/o being \"lightheaded\" when initially standing. Pt reported dizziness improved as she ambulated.  -SD       Row Name 04/12/24 1244          Sit-Stand Transfer    Sit-Stand [place] (Transfers) contact guard;verbal cues  -SD     Assistive Device (Sit-Stand Transfers) walker, front-wheeled  -SD     Comment, (Sit-Stand Transfer) cues for hand placement on walker  -SD       Row Name 04/12/24 1244          Functional Mobility    Functional Mobility- Ind. Level contact guard assist  -SD     Functional Mobility- Device walker, front-wheeled  -SD     Functional Mobility-Distance (Feet) 120  -SD       Row Name 04/12/24 1244          Activities of Daily Living    BADL Assessment/Intervention other (see comments)  Pt reports no concerns for management of daily tasks.  -SD               User Key  (r) = Recorded By, (t) = Taken By, (c) = Cosigned By      Initials Name Provider Type    SD Anup Gold OTR Occupational Therapist                   Obj/Interventions       Row Name 04/12/24 1249          Sensory Assessment (Somatosensory)    Sensory Assessment (Somatosensory) other (see comments)  no sensory issues reported  -SD       Row Name 04/12/24 1249          Vision Assessment/Intervention    Visual Impairment/Limitations WF  -SD     Vision Assessment Comment pt reports no visual issues or changes with current stroke  -SD       Row Name 04/12/24 1249          Range of Motion Comprehensive    Comment, General Range of Motion BUE rom wfl  -SD       Row Name 04/12/24 1249          Strength Comprehensive (MMT)    Comment, General Manual Muscle Testing (MMT) " Assessment BUE strength wfl  -SD       Row Name 04/12/24 1249          Balance    Comment, Balance SBA for static standing balance and CGA for dymanic standing balance  -SD               User Key  (r) = Recorded By, (t) = Taken By, (c) = Cosigned By      Initials Name Provider Type    Anup Weiss OTR Occupational Therapist                   Goals/Plan    No documentation.                  Clinical Impression       Row Name 04/12/24 1252          Plan of Care Review    Plan of Care Reviewed With patient  -SD     Outcome Evaluation Occupational therapy evalation completed. Pt was found unresponsive at home by family and was brought to ER by EMS. Pt dx with acute stroke. Pt managed bed mobility with supervision, sit to stand transfers with SBA and functional mobility with CGA x 120 feet using a rolling walker for support. Pt reported feeling light headed when initially standing and some with mobility (improved during mobility). Rec use of a walker for safety with mobility at this time. Pt reports no concern for management of basic adl tasks. Pt states she will have family support at home as needed. No further OT services recommended.  -SD       Row Name 04/12/24 1252          Therapy Assessment/Plan (OT)    Patient/Family Therapy Goal Statement (OT) go home  -SD     Criteria for Skilled Therapeutic Interventions Met (OT) other (see comments)  Pt reports no concern for management of daily tasks.  -SD     Therapy Frequency (OT) evaluation only  -SD       Row Name 04/12/24 1252          Therapy Plan Review/Discharge Plan (OT)    Anticipated Discharge Disposition (OT) home with assist  -SD       Row Name 04/12/24 1252          Positioning and Restraints    Pre-Treatment Position in bed  -SD     Post Treatment Position bathroom  -SD     Bathroom call light within reach;sitting;with nsg  -SD               User Key  (r) = Recorded By, (t) = Taken By, (c) = Cosigned By      Initials Name Provider Type    REMINGTON Gold  FRANCES Leong Occupational Therapist                   Outcome Measures       Row Name 04/12/24 1301          How much help from another is currently needed...    Putting on and taking off regular lower body clothing? 4  -SD     Bathing (including washing, rinsing, and drying) 3  -SD     Toileting (which includes using toilet bed pan or urinal) 3  -SD     Putting on and taking off regular upper body clothing 4  -SD     Taking care of personal grooming (such as brushing teeth) 4  -SD     Eating meals 4  -SD     AM-PAC 6 Clicks Score (OT) 22  -SD       Row Name 04/12/24 0815          How much help from another person do you currently need...    Turning from your back to your side while in flat bed without using bedrails? 3  -TF     Moving from lying on back to sitting on the side of a flat bed without bedrails? 3  -TF     Moving to and from a bed to a chair (including a wheelchair)? 3  -TF     Standing up from a chair using your arms (e.g., wheelchair, bedside chair)? 3  -TF     Climbing 3-5 steps with a railing? 3  -TF     To walk in hospital room? 3  -TF     AM-PAC 6 Clicks Score (PT) 18  -TF     Highest Level of Mobility Goal 6 --> Walk 10 steps or more  -TF       Row Name 04/12/24 1301          Modified Matilde Scale    Pre-Stroke Modified [place] Scale 0 - No Symptoms at all.  -SD     Modified [place]ilde Scale 0 - No Symptoms at all.  -SD       Row Name 04/12/24 1301          Functional Assessment    Outcome Measure Options Modified [place];AM-PAC 6 Clicks Daily Activity (OT)  -SD               User Key  (r) = Recorded By, (t) = Taken By, (c) = Cosigned By      Initials Name Provider Type    Anup Weiss OTR Occupational Therapist    TF Martha Markham, RN Registered Nurse                  Occupational Therapy Education       Title: PT OT SLP Therapies (Resolved)       Topic: Occupational Therapy (Resolved)       Point: ADL training (Resolved)       Description:   Instruct learner(s) on proper safety adaptation  and remediation techniques during self care or transfers.   Instruct in proper use of assistive devices.                  Learning Progress Summary             Patient Acceptance, E, VU by SD at 4/12/2024 1301    Comment: Education regarding OT services and role with stroke protocol. Education regarding safety with adl's, transfers and mobility. Pt reports no concerns for management of basic daily tasks.                                         User Key       Initials Effective Dates Name Provider Type Discipline    SD 06/16/21 -  Anup Gold, OTR Occupational Therapist OT                  OT Recommendation and Plan  Therapy Frequency (OT): evaluation only  Plan of Care Review  Plan of Care Reviewed With: patient  Outcome Evaluation: Occupational therapy evalation completed. Pt was found unresponsive at home by family and was brought to ER by EMS. Pt dx with acute stroke. Pt managed bed mobility with supervision, sit to stand transfers with SBA and functional mobility with CGA x 120 feet using a rolling walker for support. Pt reported feeling light headed when initially standing and some with mobility (improved during mobility). Rec use of a walker for safety with mobility at this time. Pt reports no concern for management of basic adl tasks. Pt states she will have family support at home as needed. No further OT services recommended.     Time Calculation:   Evaluation Complexity (OT)  Review Occupational Profile/Medical/Therapy History Complexity: brief/low complexity  Assessment, Occupational Performance/Identification of Deficit Complexity: 1-3 performance deficits  Clinical Decision Making Complexity (OT): problem focused assessment/low complexity  Overall Complexity of Evaluation (OT): low complexity     Time Calculation- OT       Row Name 04/12/24 1306             Time Calculation- OT    OT Start Time 0922  -SD      OT Stop Time 0938  -SD      OT Time Calculation (min) 16 min  -SD         Untimed Charges     OT Eval/Re-eval Minutes 16  -SD         Total Minutes    Untimed Charges Total Minutes 16  -SD       Total Minutes 16  -SD                User Key  (r) = Recorded By, (t) = Taken By, (c) = Cosigned By      Initials Name Provider Type    Anup Weiss OTR Occupational Therapist                  Therapy Charges for Today       Code Description Service Date Service Provider Modifiers Qty    [id number]  OT EVAL LOW COMPLEXITY 1 4/12/2024 Anup Gold OTR GO 1               OT Discharge Summary  Anticipated Discharge Disposition (OT): home with assist  Reason for Discharge: other (comment) (pt reports she feels at/near baseline status and has no concerns for management of basic daily tasks. She reports having family support available as needed.)    FRANCES Zambrano  4/12/2024

## 2024-04-12 NOTE — THERAPY EVALUATION
Outpatient Speech Language Pathology   Adult Speech Language Cognitive Initial Evaluation   [place]     Patient Name: Pili Arechiga  : 1955  MRN: [id number]  Today's Date: 2024        Visit Date: 2024   Patient Active Problem List   Diagnosis    Pneumonia of both lungs due to infectious organism    COPD with acute exacerbation    Leucocytosis    Lung nodule    Pneumothorax    Small cell carcinoma    Fitting and adjustment of vascular catheter    Smoking greater than 40 pack years    AMS (altered mental status)    CVA (cerebrovascular accident due to intracerebral hemorrhage)        Past Medical History:   Diagnosis Date    COPD (chronic obstructive pulmonary disease)     COPD with acute exacerbation 2020    Small cell carcinoma 3/13/2024        Past Surgical History:   Procedure Laterality Date    BRONCHOSCOPY N/A 2023    Procedure: BRONCHOSCOPY WITH ENDOBRONCHIAL ULTRASOUND (EBUS) with FNA;  Surgeon: Coy Mccarthy MD;  Location: [facility] ENDOSCOPY;  Service: Pulmonary;  Laterality: N/A;  Pre/Post - mediastinal and hilar lymphadenopathy.    BRONCHOSCOPY WITH ION ROBOTIC ASSIST N/A 2024    Procedure: BRONCHOSCOPY WITH ION ROBOT,  ENDOBRONCHIAL ULTRASOUND, FINE NEEDLE ASPIRATIONS,  CRYOTHERAPY BIOPSIES, AND LEFT LOWER LOBE BRONCHOALVEOLAR LAVAGE.;  Surgeon: Alexia Velásquez MD;  Location: [facility] ENDOSCOPY;  Service: Robotics - Pulmonary;  Laterality: N/A;     SECTION      CHEST TUBE INSERTION Left 2024    Procedure: CHEST TUBE INSERTION;  Surgeon: Alexia Velásquez MD;  Location: [facility] ENDOSCOPY;  Service: Thoracic;  Laterality: Left;    CHOLECYSTECTOMY      COLONOSCOPY      ECTOPIC PREGNANCY SURGERY      HYSTERECTOMY      TONSILLECTOMY      TOTAL HIP ARTHROPLASTY Left     VENOUS ACCESS DEVICE (PORT) INSERTION N/A 3/14/2024    Procedure: INSERTION VENOUS ACCESS DEVICE;  Surgeon: Jonas Garner MD;  Location: [facility] OR;  Service: General;  Laterality: N/A;          Visit Dx:    ICD-10-CM ICD-9-CM   1. WILEY (acute kidney injury)  N17.9 584.9   2. Hypoxia  R09.02 799.02   3. Somnolence  R40.0 780.09   4. Cerebrovascular accident (CVA), unspecified mechanism  I63.9 434.91                SLP SLC Evaluation - 04/12/24 0822          Communication Assessment/Intervention    Document Type evaluation  -AD    Subjective Information no complaints  -AD    Patient Observations alert;cooperative  -AD    Patient/Family/Caregiver Comments/Observations Pt seen semi-reclined in bed. Reports that she does not remember anything from yesterday.  -AD    Patient Effort good  -AD    Comment Pt appeared to put forth her best effort on all tasks/items.  -AD    Symptoms Noted During/After Treatment none  -AD       General Information    Patient Profile Reviewed yes  -AD    Pertinent History Of Current Problem Pt is a 67 y/o female who was found unresponsive at home when family came to pick her up. She was last known well around 4 pm on Wednesday, April 10th. Pt reports no memory of the events of yesterday. She was only responsive to painful stimuli in the ED. Neurology consulted and pt found to have had an embolic stroke suspected watershed distribution. MRI positive for bilateral basal ganglia infarcts (possible differential from radiologist is anoxic injury and this was also seen on the CT scan). Additional infarcts in the bilateral occipital lobes and right frontal lobe. Hx of small cell lung CA w/pt undergoing current chemotherapy treatment. Also diagnosed with WILEY and hypoxic respiratory failure. Pt lives alone and has family that can assist with care. She reports no concerns with memory or cognition in the past.  -AD    Precautions/Limitations, Vision WFL;for purposes of eval  -AD    Precautions/Limitations, Hearing WFL;for purposes of eval  -AD    Patient Level of Education Quit school in the 11th grade and later obtained a GED.  -AD    Prior Level of Function-Communication WFL;other (see  comments)   per pt report; no prior concerns charted per physician -AD    Plans/Goals Discussed with patient;agreed upon  -AD    Barriers to Rehab medically complex   current chemotherapy -AD    Patient's Goals for Discharge return to home  -AD    Family Goals for Discharge other (see comments)   no family present -AD    Standardized Assessment Used SLUMS  -AD       Pain    Additional Documentation --   no current pain reported or indicated -AD       Comprehension Assessment/Intervention    Comprehension Assessment/Intervention Auditory Comprehension  -AD       Auditory Comprehension Assessment/Intervention    Auditory Comprehension (Communication) WFL  -AD    Auditory Comprehension Communication, Comment Able to follow 1, 2, 3 step commands. Able to comprehend at the conversational level. Occasional deficits of recall noted during conversation.  -AD       Expression Assessment/Intervention    Expression Assessment/Intervention verbal expression  -AD       Verbal Expression Assessment/Intervention    Verbal Expression WFL  -AD    Verbal Expression, Comment Able to name and repeat w/o deficit. Able to converse at the conversational level w/o deficit.  -AD       Oral Motor Structure and Function    Oral Motor Structure and Function WFL  -AD    Dentition Assessment edentulous, dentures not available  -AD       Oral Musculature and Cranial Nerve Assessment    Oral Motor General Assessment WFL  -AD    Oral Motor, Comment No facial symmetry of gross deficits of strength and ROM of all oromotor musculature.  -AD       Motor Speech Assessment/Intervention    Motor Speech Function WFL  -AD    Motor Speech, Comment Clear and precise speech at the conversational level.  -AD       Standardized Tests    Cognitive/Memory Tests SLUMS: [facility] Mental Status Examination  -AD       SLUMS: [facility] Mental Status Examination    SLUMS Score 20  -AD    SLUMS Range 20-24: Mild Neurocognitive Disorder (Less than  High school education)  -AD    SLUMS Comments Completed cognitive communication evaluation. Pt with no s/s of aphasia, dysarthria or verbal apraxia. She does demonstrate some deficits of short term memory. She scored 20/30 points on the SLUMS placing her in the Mild Neurocognitive Disorder category for less than a high school education. She does report that she obtained a GED. Deficits in Calculation and Registration with initial wrong addition, Category Naming with time constraint with 14 animals being recalled (15 needed for full points), Delayed Recall with Interference with 3 of 5 items recalled but pt was able to recall the last 2 with category cues, Registration and Digit Span with 0 of 2 numbers recalled in reverse order (recalled all numbers just not in the right order), Clock Drawing 2 of 4 points (hours markers present, but hour hand on wrong time as pt could not recall the exact time stated), Story Recall with Executive Functions was 4 of 8 points (items missed due to inability to recall the information). Pt demonstrated normal Orientation/Attention and Visual Spatial skills. Impressions: Mild cognitive communication deficit. Recommend: SLP will follow while in the hospital for education regarding deficits, strategies for memory and f/u testing as needed. Pt would benefit from increased assistance at home managing household activities and higher level skills such as appts, finances, medications.  -AD       SLP Evaluation Clinical Impressions    SLP Diagnosis mild;cognitive-linguistic disorder  -AD    SLP Diagnosis Comments Pt with mild neurocognitive deficits. May be related to current diagnosis of embolic stroke. May also be related to current chemotherapy as well. Pt with limited awareness but does not difficulty with recall in real time.  -AD    Rehab Potential/Prognosis good   due to acute onset per report; intact premorbid condition -AD    [facility] Criteria for Skilled Therapy Interventions Met yes  -AD     Functional Impact difficulty completing home management task;other (see comments)   decreased recall of upcoming events and ability to convey information to others -AD       Recommendations    Therapy Frequency (SLP SLC) PRN  -AD    Predicted Duration Therapy Intervention (Days) 1 week;until discharge  -AD    Anticipated Discharge Disposition (SLP) home with assist;other (see comments)   further therapy dependent on progression -AD    Patient/Family Concerns, Anticipated Discharge Disposition (SLP) No current concerns reported per pt.  -AD    Communication Strategy Suggestions other (see comments)   provide with written information for necessary recall of information later. -AD       Communication Treatment Objective and Progress Goals (SLP)    SLC LTGs Patient will demonstrate functional cognitive-linguistic skills for return to discharge environment  -AD    Cognitive Linguistic Treatment Objectives Cognitive Linguistic Treatment Objectives (Group)  -AD       Patient will demonstrate functional cognitive-linguistic skills for return to discharge environment    [place] with minimal cues  -AD    Time frame 1 week  -AD    Barriers medically complex  -AD    Progress/Outcomes new goal  -AD       Cognitive Linguistic Treatment Objectives    Memory Skills Selection memory skills, SLP goal 1  -AD    Organizational Skills Selection organizational skills, SLP goal 1  -AD       Memory Skills Goal 1 (SLP)    Improve Memory Skills Through Goal 1 (SLP) recalling related word lists immediately;listen to a paragraph and answer questions;recall details of the day;80%;with minimal cues (75-90%)  -AD    Time Frame (Memory Skills Goal 1, SLP) short term goal (STG);1 week  -AD    Progress (Memory Skills Goal 1, SLP) other (comment)  -AD    Progress/Outcomes (Memory Skills Goal 1, SLP) new goal  -AD       Organizational Skills Goal 1 (SLP)    Improve Thought Organization Through Goal 1 (SLP) completing mental manipulation  task;70%;with moderate cues (50-74%)  -AD    Time Frame (Thought Organization Skills Goal 1, SLP) short term goal (STG);1 week  -AD    Progress (Thought Organization Skills Goal 1, SLP) other (comment)  -AD    Progress/Outcomes (Thought Organization Skills Goal 1, SLP) new goal  -AD              User Key  (r) = Recorded By, (t) = Taken By, (c) = Cosigned By      Initials Name Provider Type    AD Alexia Gold MS CCC-SLP Speech and Language Pathologist                                              Time Calculation:   SLP Start Time: 0822  SLP Stop Time: 0849  SLP Time Calculation (min): 27 min  SLP Non-Billable Time (min): 0 min  Total Timed Code Minutes- SLP: 0 minute(s)  Untimed Charges  SLP Eval/Re-eval : ST Eval Speech and Production w/ Language - [id number]  [id number] Eval Speech and Production w/ Language Minutes: 27  Total Minutes  Untimed Charges Total Minutes: 27   Total Minutes: 27                 Alexia Gold MS CCC-SLP  4/12/2024

## 2024-04-12 NOTE — PLAN OF CARE
Goal Outcome Evaluation:  Plan of Care Reviewed With: patient        Progress: improving  Outcome Evaluation: VSS, patient more alert and oriented this morning.

## 2024-04-12 NOTE — PROGRESS NOTES
"Daily Progress Note:      Chief complaint: Bilateral cerebral infarcts, COPD, acute hypoxic failure, small cell carcinoma lung    Subjective     Subjective: She feels much better this morning.  She is amnestic to the events of yesterday and my visit and exam yesterday.  She has no complaints     LOS: 1 day     Objective     Vital Signs  Temp:  [98.4 °F (36.9 °C)-98.9 °F (37.2 °C)] 98.9 °F (37.2 °C)  Heart Rate:  [] 57  Resp:  [14-24] 16  BP: (102-145)/(53-92) 125/58  Oxygen Therapy  SpO2: 97 %  Pulse Oximetry Type: Continuous  Device (Oxygen Therapy): nasal cannula  Flow (L/min): 1}  Body mass index is 21.1 kg/m².  Flowsheet Rows      Flowsheet Row First Filed Value   Admission Height 160 cm (62.99\") Documented at 04/11/2024 0925   Admission Weight 49.6 kg (109 lb 5.6 oz) Documented at 04/11/2024 0925               Documented weights    04/11/24 0925 04/11/24 1533 04/11/24 1836 04/12/24 0420   Weight: 49.6 kg (109 lb 5.6 oz) 54.7 kg (120 lb 9.5 oz) 54 kg (119 lb 0.8 oz) 54 kg (119 lb 1.6 oz)         Patient Vitals for the past 24 hrs:   BP Temp Temp src Pulse Resp SpO2 Height Weight   04/12/24 0722 125/58 98.9 °F (37.2 °C) Oral 57 16 97 % -- --   04/12/24 0420 112/57 98.8 °F (37.1 °C) Oral 66 14 91 % -- 54 kg (119 lb 1.6 oz)   04/12/24 0019 102/53 -- -- 57 14 92 % -- --   04/11/24 2203 -- -- -- -- -- (!) 89 % -- --   04/11/24 2201 -- -- -- -- -- (!) 85 % -- --   04/11/24 2159 -- -- -- -- -- 91 % -- --   04/11/24 2133 -- -- -- 59 -- 94 % -- --   04/11/24 2132 -- -- -- -- -- 96 % -- --   04/11/24 2030 106/63 98.6 °F (37 °C) Oral 57 14 95 % -- --   04/11/24 1914 -- -- -- 61 16 94 % -- --   04/11/24 1843 -- -- -- -- -- 95 % -- --   04/11/24 1836 115/71 -- -- 76 -- -- 160 cm (62.99\") 54 kg (119 lb 0.8 oz)   04/11/24 1533 115/71 98.5 °F (36.9 °C) Oral 76 18 92 % 160 cm (63\") 54.7 kg (120 lb 9.5 oz)   04/11/24 1309 -- -- -- 65 -- 100 % -- --   04/11/24 1246 108/54 -- -- 64 -- 99 % -- --   04/11/24 1231 119/61 -- -- " "62 -- 98 % -- --   04/11/24 1216 107/65 -- -- 61 16 97 % -- --   04/11/24 1146 125/73 -- -- 73 20 99 % -- --   04/11/24 1130 123/61 -- -- 57 -- 98 % -- --   04/11/24 1116 126/63 -- -- 61 -- 100 % -- --   04/11/24 1046 142/61 -- -- 81 -- 97 % -- --   04/11/24 1030 127/68 -- -- 65 -- 96 % -- --   04/11/24 1023 -- -- -- 65 -- 97 % -- --   04/11/24 1016 145/67 -- -- 79 -- 100 % -- --   04/11/24 1000 -- -- -- 86 -- 94 % -- --   04/11/24 0946 135/92 -- -- -- -- -- -- --   04/11/24 0930 -- 98.4 °F (36.9 °C) Oral -- -- -- -- --   04/11/24 0925 132/89 -- -- 112 24 100 % 160 cm (62.99\") 49.6 kg (109 lb 5.6 oz)       54 kg (119 lb 1.6 oz)    Intake/Output                   04/11/24 0701 - 04/12/24 0700     [id number] Total              Intake    I.V.  --  953.8 953.8    Total Intake -- 953.8 953.8       Output    Urine  --  700 700    Total Output -- 700 700             Intake/Output Summary (Last 24 hours) at 4/12/2024 0748  Last data filed at 4/12/2024 0618  Gross per 24 hour   Intake 953.75 ml   Output 700 ml   Net 253.75 ml      Intake/Output Summary (Last 24 hours) at 4/12/2024 0748  Last data filed at 4/12/2024 0618  Gross per 24 hour   Intake 953.75 ml   Output 700 ml   Net 253.75 ml        Review of Systems   Constitutional:  Negative for activity change, appetite change and fatigue.   HENT:  Negative for congestion.    Respiratory:  Negative for cough, chest tightness, shortness of breath and wheezing.    Cardiovascular:  Negative for chest pain.   Gastrointestinal:  Negative for abdominal distention, abdominal pain, diarrhea, nausea and vomiting.   Endocrine: Negative for polyphagia and polyuria.   Genitourinary:  Negative for frequency.   Skin:  Negative for rash.   Neurological:  Negative for light-headedness.   Hematological:  Does not bruise/bleed easily.   Psychiatric/Behavioral:  Negative for agitation and behavioral problems.        Physical Exam  Vitals and nursing note reviewed.   Constitutional:  "      Appearance: She is well-developed.   HENT:      Head: Normocephalic.   Eyes:      Conjunctiva/sclera: Conjunctivae normal.   Neck:      Thyroid: No thyromegaly.      Vascular: No JVD.   Cardiovascular:      Rate and Rhythm: Normal rate and regular rhythm.      Heart sounds: Normal heart sounds. No murmur heard.  Pulmonary:      Effort: Pulmonary effort is normal. No respiratory distress.      Breath sounds: Normal breath sounds. No wheezing or rales.   Abdominal:      General: Bowel sounds are normal. There is no distension.      Palpations: Abdomen is soft.      Tenderness: There is no abdominal tenderness. There is no guarding.   Skin:     General: Skin is warm and dry.      Findings: No rash.   Neurological:      General: No focal deficit present.      Mental Status: She is alert and oriented to person, place, and time.      Cranial Nerves: Cranial nerves 2-12 are intact.      Motor: Motor function is intact.      Deep Tendon Reflexes: Reflexes are normal and symmetric.         Medication Review:   I have reviewed the patient's current medication list  Scheduled Meds:budesonide-formoterol, 2 puff, Inhalation, BID - RT  clopidogrel, 75 mg, Oral, Daily  enoxaparin, 0.7 mg/kg, Subcutaneous, Q24H  midodrine, 2.5 mg, Oral, TID AC  pantoprazole, 40 mg, Oral, QAM  sodium chloride, 10 mL, Intravenous, Q12H  sucralfate, 1 g, Oral, 4x Daily      Continuous Infusions:sodium chloride, 75 mL/hr, Last Rate: 75 mL/hr (04/12/24 0619)      PRN Meds:.  acetaminophen **OR** acetaminophen **OR** acetaminophen    aluminum-magnesium hydroxide-simethicone    senna-docusate sodium **AND** polyethylene glycol **AND** bisacodyl **AND** bisacodyl    calcium carbonate    HYDROcodone-acetaminophen    ipratropium-albuterol    ondansetron ODT **OR** ondansetron    [COMPLETED] Insert Peripheral IV **AND** sodium chloride    sodium chloride    sodium chloride      Result Review        I have personally reviewed the results from the time of  this admission to 4/12/2024 07:48 EDT and agree with these findings:  [x]  Laboratory  []  Microbiology  [x]  Radiology  []  EKG/Telemetry   [x]  Cardiology/Vascular   []  Pathology  []  Old records  []  Other:        Labs:  Results from last 7 days   Lab Units 04/11/24  1012 04/09/24  0755   WBC 10*3/mm3 13.50* 6.68   RBC 10*6/mm3 3.45* 3.74*   HEMOGLOBIN g/dL 11.0* 12.1   HEMATOCRIT % 34.6 35.7   RDW % 12.8 12.5   MCV fL 100.3* 95.5   MCH pg 31.9 32.4   MCHC g/dL 31.8 33.9   MPV fL 8.5 8.4   PLATELETS 10*3/mm3 318 313   RDW-SD fl 46.9 42.6       Results from last 7 days   Lab Units 04/11/24  1012 04/09/24  0755   SODIUM mmol/L 131* 131*   POTASSIUM mmol/L 4.0 4.1   CHLORIDE mmol/L 95* 93*   CO2 mmol/L 24.8 29.4*   ANION GAP mmol/L 11.2 8.6   BUN mg/dL 17 5*   CREATININE mg/dL 1.67* 1.07*   BUN / CREAT RATIO  10.2 4.7*   EGFR mL/min/1.73 33.6* 57.4*   CALCIUM mg/dL 9.7 9.3   GLUCOSE mg/dL 90 73     CMP:        Lab 04/11/24  1012 04/09/24  0755   SODIUM 131* 131*   POTASSIUM 4.0 4.1   CHLORIDE 95* 93*   CO2 24.8 29.4*   ANION GAP 11.2 8.6   BUN 17 5*   CREATININE 1.67* 1.07*   EGFR 33.6* 57.4*   GLUCOSE 90 73   CALCIUM 9.7 9.3   MAGNESIUM 2.1  --    PHOSPHORUS 4.6*  --    TOTAL PROTEIN 6.4 6.4   ALBUMIN 4.1 4.0   GLOBULIN 2.3 2.4   ALT (SGPT) 8 5   AST (SGOT) 14 11   BILIRUBIN <0.2 <0.2   ALK PHOS 68 75       Results from last 7 days   Lab Units 04/11/24  1012 04/09/24  0755   PLATELETS 10*3/mm3 318 313         Lab Results (last 24 hours)       Procedure Component Value Units Date/Time    Hemoglobin A1c [[id number]]  (Normal) Collected: 04/11/24 1012    Specimen: Blood Updated: 04/11/24 1507     Hemoglobin A1C 5.30 %     Narrative:      Hemoglobin A1C Ranges:    Increased Risk for Diabetes  5.7% to 6.4%  Diabetes                     >= 6.5%  Diabetic Goal                < 7.0%    Lipid Panel [[id number]]  (Abnormal) Collected: 04/11/24 1012    Specimen: Blood Updated: 04/11/24 1506     Total Cholesterol 207 mg/dL       Triglycerides 165 mg/dL      HDL Cholesterol 54 mg/dL      LDL Cholesterol  124 mg/dL      VLDL Cholesterol 29 mg/dL      LDL/HDL Ratio 2.22    Narrative:      Cholesterol Reference Ranges  (U.[place]. Department of Health and Human Services ATP III Classifications)    Desirable          <200 mg/dL  Borderline High    200-239 mg/dL  High Risk          >240 mg/dL      Triglyceride Reference Ranges  (U.S. Department of Health and Human Services ATP III Classifications)    Normal           <150 mg/dL  Borderline High  150-199 mg/dL  High             200-499 mg/dL  Very High        >500 mg/dL    HDL Reference Ranges  (U.S. Department of Health and Human Services ATP III Classifications)    Low     <40 mg/dl (major risk factor for CHD)  High    >60 mg/dl ('negative' risk factor for CHD)        LDL Reference Ranges  (U.S. Department of Health and Human Services ATP III Classifications)    Optimal          <100 mg/dL  Near Optimal     100-129 mg/dL  Borderline High  130-159 mg/dL  High             160-189 mg/dL  Very High        >189 mg/dL    Blood Gas, Arterial - [[id number]]  (Abnormal) Collected: 04/11/24 1455    Specimen: Arterial Blood Updated: 04/11/24 1502     Site Right Radial     Matheus's Test Positive     pH, Arterial 7.362 pH units      pCO2, Arterial 51.6 mm Hg      Comment: 83 Value above reference range        pO2, Arterial 62.4 mm Hg      Comment: 84 Value below reference range        HCO3, Arterial 29.3 mmol/L      Comment: 83 Value above reference range        Base Excess, Arterial 3.0 mmol/L      Comment: 83 Value above reference range        O2 Saturation, Arterial 93.0 %      Comment: 84 Value below reference range        Hemoglobin, Blood Gas 11.2 g/dL      Comment: 84 Value below reference range        Temperature 37.0     Barometric Pressure for Blood Gas 723 mmHg      Modality Nasal Cannula     Flow Rate 2.0 lpm      Collected by [id number]     Comment: Meter: [id number]     :  [id number]         pCO2, Temperature Corrected 51.6 mm Hg      pH, Temp Corrected 7.362 pH Units      pO2, Temperature Corrected 62.4 mm Hg     TSH [[id number]]  (Normal) Collected: 04/11/24 1012    Specimen: Blood Updated: 04/11/24 1111     TSH 0.450 uIU/mL     Comprehensive Metabolic Panel [[id number]]  (Abnormal) Collected: 04/11/24 1012    Specimen: Blood Updated: 04/11/24 1102     Glucose 90 mg/dL      BUN 17 mg/dL      Creatinine 1.67 mg/dL      Sodium 131 mmol/L      Potassium 4.0 mmol/L      Chloride 95 mmol/L      CO2 24.8 mmol/L      Calcium 9.7 mg/dL      Total Protein 6.4 g/dL      Albumin 4.1 g/dL      ALT (SGPT) 8 U/L      AST (SGOT) 14 U/L      Alkaline Phosphatase 68 U/L      Total Bilirubin <0.2 mg/dL      Globulin 2.3 gm/dL      A/G Ratio 1.8 g/dL      BUN/Creatinine Ratio 10.2     Anion Gap 11.2 mmol/L      eGFR 33.6 mL/min/1.73     Narrative:      GFR Normal >60  Chronic Kidney Disease <60  Kidney Failure <15      Magnesium [[id number]]  (Normal) Collected: 04/11/24 1012    Specimen: Blood Updated: 04/11/24 1102     Magnesium 2.1 mg/dL     CBC & Differential [[id number]]  (Abnormal) Collected: 04/11/24 1012    Specimen: Blood Updated: 04/11/24 1101    Narrative:      The following orders were created for panel order CBC & Differential.  Procedure                               Abnormality         Status                     ---------                               -----------         ------                     CBC Auto Differential[[id number]]        Abnormal            Final result                 Please view results for these tests on the individual orders.    CBC Auto Differential [[id number]]  (Abnormal) Collected: 04/11/24 1012    Specimen: Blood Updated: 04/11/24 1101     WBC 13.50 10*3/mm3      RBC 3.45 10*6/mm3      Hemoglobin 11.0 g/dL      Hematocrit 34.6 %      .3 fL      MCH 31.9 pg      MCHC 31.8 g/dL      RDW 12.8 %      RDW-SD 46.9 fl      MPV 8.5 fL      Platelets 318 10*3/mm3      Neutrophil % 79.2 %       Lymphocyte % 13.4 %      Monocyte % 7.0 %      Eosinophil % 0.0 %      Basophil % 0.1 %      Immature Grans % 0.3 %      Neutrophils, Absolute 10.70 10*3/mm3      Lymphocytes, Absolute 1.81 10*3/mm3      Monocytes, Absolute 0.94 10*3/mm3      Eosinophils, Absolute 0.00 10*3/mm3      Basophils, Absolute 0.01 10*3/mm3      Immature Grans, Absolute 0.04 10*3/mm3     Phosphorus [[id number]]  (Abnormal) Collected: 04/11/24 1012    Specimen: Blood Updated: 04/11/24 1100     Phosphorus 4.6 mg/dL     Urine Drug Screen - Straight Cath [[id number]]  (Abnormal) Collected: 04/11/24 1019    Specimen: Urine from Straight Cath Updated: 04/11/24 1041     THC, Screen, Urine Positive     Phencyclidine (PCP), Urine Negative     Cocaine Screen, Urine Negative     Methamphetamine, Ur Negative     Opiate Screen Positive     Amphetamine Screen, Urine Negative     Benzodiazepine Screen, Urine Positive     Tricyclic Antidepressants Screen Negative     Methadone Screen, Urine Negative     Barbiturates Screen, Urine Negative     Oxycodone Screen, Urine Negative     Buprenorphine, Screen, Urine Negative    Narrative:      Cutoff For Drugs Screened:    Amphetamines               500 ng/ml  Barbiturates               200 ng/ml  Benzodiazepines            150 ng/ml  Cocaine                    150 ng/ml  Methadone                  200 ng/ml  Opiates                    100 ng/ml  Phencyclidine               25 ng/ml  THC                         50 ng/ml  Methamphetamine            500 ng/ml  Tricyclic Antidepressants  300 ng/ml  Oxycodone                  100 ng/ml  Buprenorphine               10 ng/ml    The normal value for all drugs tested is negative. This report includes unconfirmed screening results, with the cutoff values listed, to be used for medical treatment purposes only.  Unconfirmed results must not be used for non-medical purposes such as employment or legal testing.  Clinical consideration should be applied to any drug of abuse  "test, particularly when unconfirmed results are used.      Ethanol [[id number]] Collected: 04/11/24 1012    Specimen: Blood Updated: 04/11/24 1041     Ethanol <10 mg/dL      Ethanol % <0.010 %     Acetaminophen Level [[id number]]  (Normal) Collected: 04/11/24 1012    Specimen: Blood Updated: 04/11/24 1041     Acetaminophen <5.0 mcg/mL     Salicylate Level [[id number]]  (Normal) Collected: 04/11/24 1012    Specimen: Blood Updated: 04/11/24 1041     Salicylate <0.3 mg/dL               Results from last 7 days   Lab Units 04/11/24  1012   TSH uIU/mL 0.450             Results from last 7 days   Lab Units 04/11/24  1012   MAGNESIUM mg/dL 2.1     Results from last 7 days   Lab Units 04/11/24  1012   CHOLESTEROL mg/dL 207*   TRIGLYCERIDES mg/dL 165*   HDL CHOL mg/dL 54     Results from last 7 days   Lab Units 04/11/24  1455   PH, ARTERIAL pH units 7.362   PCO2, ARTERIAL mm Hg 51.6*   PO2 ART mm Hg 62.4*   HCO3 ART mmol/L 29.3*     Results from last 7 days   Lab Units 04/11/24  1012   HEMOGLOBIN A1C % 5.30     No results found for: \"POCGLU\"                      Radiology:  Imaging Results (Last 24 Hours)       Procedure Component Value Units Date/Time    US Carotid Bilateral [[id number]] Collected: 04/11/24 1556     Updated: 04/11/24 1601    Narrative:      US CAROTID BILATERAL    Date of Exam: 4/11/2024 3:38 PM EDT    Indication: bilat occipital cvas.    Comparison: No comparisons available.    Technique: Grayscale, color-flow, and spectral imaging was obtained of the bilateral carotid and vertebral arteries.      Findings:  No significant plaquing within the right common or internal carotid artery. Peak systolic velocity is 57 cm/s. There is antegrade flow within the right vertebral artery.    Mild plaquing in the left carotid bulb and left proximal ICA. Peak systolic velocity is 122 cm/s. There is antegrade flow within the left vertebral artery.      Impression:      Impression:    1. Mild plaquing but no hemodynamically " significant stenosis.        Electronically Signed: Anup Davila MD    4/11/2024 3:58 PM EDT    Workstation ID: [id number]    MRI Brain With & Without Contrast [[id number]] Collected: 04/11/24 1534     Updated: 04/11/24 1553    Addenda:        Findings regarding acute infarcts were personally called to the patient's   nurse at 3:50 p.m. on 4/11/2024    Electronically Signed: Anup Bragg MD    4/11/2024 3:50 PM EDT    Workstation ID: [id number]  Signed: 04/11/24 1550 by Jose Bragg MD    Narrative:      MRI BRAIN W WO CONTRAST    Date of Exam: 4/11/2024 1:11 PM EDT    Indication: ams.     Comparison: CT brain 4/11/2024    Technique:  Routine multiplanar/multisequence sequence images of the brain were obtained before and after the uneventful administration of Multihance.      Findings:  There is mild generalized parenchymal volume loss. Diffusion weighted images demonstrate areas of restricted diffusion within the globus pallidus bilaterally corresponding to the areas of low-attenuation noted on prior CT scan. There are additional   punctate foci of restricted diffusion involving the precentral gyrus of the right frontal lobe as well as additional patchy areas of restricted diffusion within the bilateral occipital lobes. Findings would be compatible with multiple acute infarcts.   Given the symmetric appearance within the basal ganglia other differential considerations would include carbon monoxide poisoning, anoxic injury there is no evidence of intracranial hemorrhage. No abnormal extra-axial fluid collections are identified. No   mass effect or hydrocephalus.    Major intracranial vascular flow voids are preserved. Sella and suprasellar cistern are within normal limits. Craniovertebral junction appears normal.    Postcontrast images demonstrate no pathologic intracranial contrast enhancement.      Impression:      Impression:    1. There are areas of restricted diffusion within the bilateral basal ganglia,  bilateral occipital lobes, and right frontal lobe compatible with multiple infarcts. The symmetric appearance of the infarcts within the basal ganglia can be seen with anoxic   injury or carbon monoxide poisoning.  2. No evidence of acute intracranial hemorrhage.  3. No evidence of pathologic contrast enhancement.        Electronically Signed: Anup Bragg MD    4/11/2024 3:43 PM EDT    Workstation ID: [id number]    XR Chest 1 View [[id number]] Collected: 04/11/24 1454     Updated: 04/11/24 1508    Narrative:      XR CHEST 1 VW    Date of Exam: 4/11/2024 2:39 PM EDT    Indication: decrease breathing    Comparison: None available.    Findings:   RIght IJ portacath is unchanged. Heart size and pulmonary vessels are within normal limits. Interstitial markings are prominent and similar to prior exam. No focal airspace consolidation. No pleural effusion or pneumothorax.        Impression:      Impression:    Prominent interstitial markings similar to the prior exam. No focal airspace consolidation or other acute cardiopulmonary disease.      Electronically Signed: Anup Bragg MD    4/11/2024 3:05 PM EDT    Workstation ID: [id number]    CT Head Without Contrast [[id number]] Collected: 04/11/24 0943     Updated: 04/11/24 0950    Narrative:      CT HEAD WO CONTRAST    Date of Exam: 4/11/2024 9:19 AM EDT    Indication: AMS.    Comparison: MRI brain 3/13/2024    Technique: Axial CT images were obtained of the head without contrast administration.  Coronal reconstructions were performed.  Automated exposure control and iterative reconstruction methods were used.      Findings:  There are small areas of low-attenuation within the globes bilaterally bilaterally. Findings are nonspecific but can be seen with anoxic injury or carboximide poisoning as well as other metabolic abnormalities.    No evidence of acute intracranial hemorrhage. No abnormal extra-axial fluid collections are seen. No mass effect or  hydrocephalus.    Visualized paranasal sinuses and mastoid air cells are clear. The globes and orbits appear within normal limits.      Impression:      Impression:    1. Small subtle areas of low-attenuation within the bilateral basal ganglia. These are nonspecific but can be seen in the setting of anoxic injury, carbon monoxide poisoning, and other metabolic abnormalities. Clinical correlation recommended.  2. No evidence of acute intracranial hemorrhage.      Electronically Signed: Anup Bragg MD    4/11/2024 9:47 AM EDT    Workstation ID: [id number]            Cardiology:  ECG/EMG Results (last 24 hours)       Procedure Component Value Units Date/Time    Adult Transthoracic Echo Complete W/ Cont if Necessary Per Protocol [[id number]] Resulted: 04/11/24 1855     Updated: 04/11/24 1905     EF(MOD-bp) 51.8 %      LVIDd 4.6 cm      LVIDs 2.7 cm      IVSd 0.90 cm      LVPWd 0.80 cm      FS 42.0 %      IVS/LVPW 1.13 cm      ESV(cubed) 19.0 ml      LV Sys Vol (BSA corrected) 25.5 cm2      EDV(cubed) 97.3 ml      LV [place] Vol (BSA corrected) 49.0 cm2      LV mass(C)d 127.7 grams      LVOT area 3.3 cm2      LVOT diam 2.06 cm      EDV(MOD-sp2) 60.0 ml      EDV(MOD-sp4) 73.0 ml      ESV(MOD-sp2) 26.0 ml      ESV(MOD-sp4) 38.0 ml      SV(MOD-sp2) 34.0 ml      SV(MOD-sp4) 35.0 ml      SI(MOD-sp2) 22.8 ml/m2      SI(MOD-sp4) 23.5 ml/m2      EF(MOD-sp2) 56.7 %      EF(MOD-sp4) 47.9 %      MV E max edis 62.9 cm/sec      MV A max edis 79.1 cm/sec      MV dec time 0.23 sec      MV E/A 0.80     LA ESV Index (BP) 21.1 ml/m2      Med Peak E' Edis 9.3 cm/sec      Lat Peak E' Edis 9.1 cm/sec      TR max edis 231.0 cm/sec      Avg E/e' ratio 6.84     SV(LVOT) 72.9 ml      SV(RVOT) 101.6 ml      Qp/Qs 1.39     RV Base 3.5 cm      RV Mid 2.8 cm      RV Length 7.6 cm      RV S' 11.2 cm/sec      LV V1 max 102.0 cm/sec      LV V1 max PG 4.2 mmHg      LV V1 mean PG 2.00 mmHg      LV V1 VTI 21.8 cm      Ao pk edis 123.0 cm/sec      Ao max PG 6.1  mmHg      Ao mean PG 3.0 mmHg      Ao V2 VTI 27.9 cm      AYESHA(I,D) 2.6 cm2      MV max PG 3.2 mmHg      MV mean PG 1.24 mmHg      MV V2 VTI 27.4 cm      MV P1/2t 75.3 msec      MVA(P1/2t) 2.9 cm2      MVA(VTI) 2.7 cm2      MV dec slope 360.6 cm/sec2      TR max PG 21.3 mmHg      RVOT diam 2.7 cm      RV V1 max PG 1.35 mmHg      RV V1 max 58.2 cm/sec      RV V1 VTI 17.5 cm      PA V2 max 81.1 cm/sec      PA acc time 0.13 sec      ACS 1.83 cm      Sinus 3.0 cm      BH CV ECHO SHUNT ASSESSMENT PERFORMED (HIDDEN SCRIPTING) 1     Dimensionless Index 0.80 (DI)      RVSP(TR) 24 mmHg      RAP systole 3 mmHg     Narrative:        Left ventricular systolic function is normal. Left ventricular ejection   fraction appears to be 56 - 60%.    Left ventricular diastolic function is consistent with (grade I)   impaired relaxation.    The interatrial septum appears redundant. The agitated saline study is   positive, consistent with a small PFO    Mild tricuspid valve regurgitation is present    Calculated right ventricular systolic pressure from tricuspid   regurgitation is 24 mmHg.    There is no evidence of pericardial effusion.              I have reviewed recent labs results and consult notes.    Please note portions of this assessment/plan may have been copied and pasted, but I have personally seen this patient and reviewed each line of this assessment and plan for accuracy and made updates to reflect my necessary changes    Assessment/Plan     Assessment and Plan:  .  Acute bilateral basal ganglia, bilateral occipital lobe, right frontal lobe infarcts certainly consistent with shower emboli.  Echocardiogram pending.  If renal functions improved today we will order CT angiogram neck and head    .  Acute kidney injury continue with hydration likely due to hypotension and hypoperfusion     .  Acute hypoxic hypercapnic respiratory failure likely due to hypoventilation and mental status change.  Much improved wean off oxygen  today     .  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  Hyperlipidemia with statin intolerance will pursue Repatha or Praluent as outpatient.        .  COPD nothing acute continue with home medications     .  Orthostatic hypotension stable continue with midodrine.     .  GERD esophagitis continue with Carafate and Protonix     .  Chronic back and left hip pain on chronic hydrocodone stable    Much of this encounter note is an electronic transcription/translation of spoken language to printed text. The electronic translation of spoken language may permit erroneous, or at times, nonsensical words or phrases to be inadvertently transcribed; Although I have reviewed the note for such errors, some may still exist.    Note Disclaimer: At [facility], we believe that sharing information builds trust and better relationships. You are receiving this note because you recently visited [facility]. It is possible you will see health information before a provider has talked with you about it. This kind of information can be easy to misunderstand. To help you fully understand what it means for your health, we urge you to discuss this note with your provider.

## 2024-04-12 NOTE — PLAN OF CARE
Goal Outcome Evaluation:  Plan of Care Reviewed With: patient           Outcome Evaluation: Physical therapy evaluation complete.  Patient performs supine to sit with SBA and sit to stand with CGA.  Patient performs gait with rolling walker x120 feet, CGA with cues for increased base of support.  Patient reports no deficits in vision or sensation, however does report some dizziness initially with activity.  Plan to see patient x1-2 additional visits to ensure consistency with mobility.  Recommend rolling walker and home health at discharge.  Defer supervision recommendations to SLP assessment.  Will continue to follow.      Anticipated Discharge Disposition (PT): home with home health

## 2024-04-12 NOTE — PROGRESS NOTES
Reviewed chart.  Admitted to hospital 4/11 after being found down and poorly responsive at home. Still IP

## 2024-04-12 NOTE — PLAN OF CARE
Problem: Adult Inpatient Plan of Care  Goal: Plan of Care Review  Flowsheets (Taken 4/12/2024 0853)  Plan of Care Reviewed With: patient  Outcome Evaluation: SLP: Completed cognitive communication evaluation. Pt with no s/s of aphasia, dysarthria or verbal apraxia. She does demonstrate some deficits of short term memory. She scored 20/30 points on the SLUMS placing her in the Mild Neurocognitive Disorder category for less than a high school education. She does report that she obtained a GED. Deficits in Calculation and Registration with initial wrong addition, Category Naming with time constraint with 14 animals being recalled (15 needed for full points), Delayed Recall with Interference with 3 of 5 items recalled but pt was able to recall the last 2 with category cues, Registration and Digit Span with 0 of 2 numbers recalled in reverse order (recalled all numbers just not in the right order), Clock Drawing 2 of 4 points (hours markers present, but hour hand on wrong time as pt could not recall the exact time stated), Story Recall with Executive Functions was 4 of 8 points (items missed due to inability to recall the information). Pt demonstrated normal Orientation/Attention and Visual Spatial skills. Impressions: Mild cognitive communication deficit. Recommend: SLP will follow while in the hospital for education regarding deficits, strategies for memory and f/u testing as needed. Pt would benefit from increased assistance at home managing household activities and higher level skills such as appts, finances, medications.

## 2024-04-12 NOTE — CASE MANAGEMENT/SOCIAL WORK
Discharge Planning Assessment   Marge Shoemaker     Patient Name: Pili Arechiga  MRN: [id number]  Today's Date: 4/12/2024    Admit Date: 4/11/2024    Plan: Plan home with family   Discharge Needs Assessment       Row Name 04/12/24 1036       Living Environment    People in Home child(gulshan), adult    Current Living Arrangements home    Potentially Unsafe Housing Conditions none    In the past 12 months has the electric, gas, oil, or water company threatened to shut off services in your home? No    Primary Care Provided by self    Provides Primary Care For no one    Family Caregiver if Needed child(gulshan), adult;other relative(s)    Quality of Family Relationships involved    Able to Return to Prior Arrangements yes       Resource/Environmental Concerns    Resource/Environmental Concerns none    Transportation Concerns none       Transportation Needs    In the past 12 months, has lack of transportation kept you from medical appointments or from getting medications? no    In the past 12 months, has lack of transportation kept you from meetings, work, or from getting things needed for daily living? No       Food Insecurity    Within the past 12 months, you worried that your food would run out before you got the money to buy more. Never true    Within the past 12 months, the food you bought just didn't last and you didn't have money to get more. Never true       Transition Planning    Patient/Family Anticipates Transition to home with family    Patient/Family Anticipated Services at Transition   to be determined    Transportation Anticipated family or friend will provide       Discharge Needs Assessment    Readmission Within the Last 30 Days no previous admission in last 30 days    Equipment Currently Used at Home none    Concerns to be Addressed adjustment to diagnosis/illness    Equipment Needed After Discharge walker, rolling    Provided Post Acute Provider List? N/A    N/A Provider List Comment Patient has used  [facility] in the past and requests to use again if needed    Provided Post Acute Provider Quality & Resource List? N/A                   Discharge Plan       Row Name 04/12/24 1039       Plan    Plan Plan home with family    Patient/Family in Agreement with Plan yes    Provided Post Acute Provider Quality & Resource List? N/A    Plan Comments Spoke with patient at bedside. Face sheet verified. IMM explained, signed and copy declined. Patient lives in a home with her son. She is independent of ADLs including driving.  She denies use of DMe. She has used [facility] in the past and request to use them again if needed. She has not been to inpatient rehab previously. She has a living will. She sees Dr De Paz as PCP. SHe uses [facility] and denies issues obtaining medications. There are no issues r/t food, housing, utilities or transportation. PT recommends [facility] and possibly HH at [place]. Secure chat to update Dr De Paz of recommendation. CM # placed on white board, will follow for dc needs.                  Continued Care and Services - Admitted Since 4/11/2024    No active coordination exists for this encounter.          Demographic Summary    No documentation.                  Functional Status    No documentation.                  Psychosocial    No documentation.                  Abuse/Neglect    No documentation.                  Legal    No documentation.                  Substance Abuse    No documentation.                  Patient Forms    No documentation.                     Tera Barrios RN

## 2024-04-12 NOTE — PROGRESS NOTES
Patient Identification:  NAME:  Pili Arechiga  Age:  68 y.o.   Sex:  female   :  1955   MRN:  [id number]       Chief complaint: Bihemispheric stroke, unresponsiveness hypoxemia, hyponatremia, metabolic encephalopathy, hypotension    History of present illness: Patient looks great today and would prefer to go home.  She has no complaints whatsoever and is noted by her exam below.  Her exam is completely normal MRI showed multiple bihemispheric strokes in all arterial distributions.  Echocardiogram transthoracic is normal.  Carotid Doppler is normal and there is good anterograde flow in one of the vertebrals.  She admits she does drink a lot of sweet tea.  She also has been receiving IV chemotherapy for the last couple of days on her second round we are going to hold that for the time being until she follows up with her oncologist Dr. Burns.  She is not a drinker of alcohol.  There is no evidence of metastatic disease on this MRI scan by my independent eyeball review but multiple areas of diffusion-weighted abnormalities.  Note the MRI scan about 1 month ago by report was normal.      At least 35 minutes was spent with this patient planning and coordinating care going over all the lab results and talking about the complexity of this presentation, stroke, possible seizure, postictal state, hyponatremia, hypoxemia, hypotension, metabolic encephalopathy.  All on top of the fact she has small cell lung cancer  Past medical history:  Past Medical History:   Diagnosis Date    COPD (chronic obstructive pulmonary disease)     COPD with acute exacerbation 2020    Small cell carcinoma 3/13/2024       Allergies:  Codeine and Percocet [oxycodone-acetaminophen]    Home medications:  Medications Prior to Admission   Medication Sig Dispense Refill Last Dose    diazePAM (VALIUM) 10 MG tablet Take 1 tablet by mouth 2 (Two) Times a Day As Needed for Anxiety (RLS.). Takes 20 mg at bedtime at times when she needs.    4/10/2024    HYDROcodone-acetaminophen (NORCO)  MG per tablet Take 1 tablet by mouth 3 (Three) Times a Day As Needed for Moderate Pain.   4/10/2024    midodrine (PROAMATINE) 2.5 MG tablet Take 1 tablet by mouth 3 (Three) Times a Day Before Meals.   4/10/2024    OLANZapine (zyPREXA) 5 MG tablet Take 1 tablet by mouth Every Night. Take nightly x 4 starting night of chemotherapy. 4 tablet 3 4/10/2024    ondansetron (ZOFRAN) 8 MG tablet Take 1 tablet by mouth 3 (Three) Times a Day As Needed for Nausea or Vomiting. 30 tablet 5 4/10/2024    pantoprazole (PROTONIX) 40 MG EC tablet Take 1 tablet by mouth Daily. 30 tablet 3 4/10/2024    sucralfate (CARAFATE) 1 g tablet Take 1 tablet by mouth 4 (Four) Times a Day. 120 tablet 3 4/10/2024    albuterol sulfate  (90 Base) MCG/ACT inhaler Inhale 2 puffs Every 4 (Four) Hours As Needed for Wheezing. Takes as needed.       budesonide-formoterol (SYMBICORT) 160-4.5 MCG/ACT inhaler Inhale 2 puffs 2 (Two) Times a Day.  12     Denosumab (PROLIA SC) Inject  under the skin into the appropriate area as directed Every 6 (Six) Months.       naloxone (NARCAN) 4 MG/0.1ML nasal spray 1 spray into the nostril(s) as directed by provider As Needed.           Hospital medications:  apixaban, 5 mg, Oral, Q12H  budesonide-formoterol, 2 puff, Inhalation, BID - RT  levETIRAcetam, 500 mg, Oral, Q12H  midodrine, 2.5 mg, Oral, TID AC  pantoprazole, 40 mg, Oral, QAM  sodium chloride, 10 mL, Intravenous, Q12H  sucralfate, 1 g, Oral, 4x Daily      sodium chloride, 75 mL/hr, Last Rate: 75 mL/hr (04/12/24 0619)        acetaminophen **OR** acetaminophen **OR** acetaminophen    aluminum-magnesium hydroxide-simethicone    senna-docusate sodium **AND** polyethylene glycol **AND** bisacodyl **AND** bisacodyl    calcium carbonate    HYDROcodone-acetaminophen    ipratropium-albuterol    ondansetron ODT **OR** ondansetron    [COMPLETED] Insert Peripheral IV **AND** sodium chloride    sodium chloride     sodium chloride      Objective:  Vitals Ranges:   Temp:  [98.1 °F (36.7 °C)-98.9 °F (37.2 °C)] 98.1 °F (36.7 °C)  Heart Rate:  [52-76] 52  Resp:  [14-18] 18  BP: (102-133)/(53-71) 133/62      Physical Exam:  Awake alert oriented x 3 normal cranial nerves II through VII tongue is midline.  Visual fields absolutely normal.  She can read easily without problems.  Motor 5 out of 5 x 4 extremities no pronator drift or fasciculations.  No past density reflexes trace throughout symmetrical toes definitely downgoing bilaterally.  Heart is regular without murmur neck supple without bruits    Results review:   I reviewed the patient's new clinical results.    Data review:  Lab Results (last 24 hours)       Procedure Component Value Units Date/Time    Osmolality, Urine - Urine, Clean Catch [[id number]] Collected: 04/12/24 0948    Specimen: Urine, Clean Catch Updated: 04/12/24 0948    Osmolality, Serum [[id number]] Collected: 04/12/24 0806    Specimen: Blood Updated: 04/12/24 0944    Basic Metabolic Panel [[id number]]  (Abnormal) Collected: 04/12/24 0806    Specimen: Blood Updated: 04/12/24 0837     Glucose 85 mg/dL      BUN 15 mg/dL      Creatinine 1.20 mg/dL      Sodium 128 mmol/L      Potassium 4.1 mmol/L      Chloride 96 mmol/L      CO2 25.9 mmol/L      Calcium 8.8 mg/dL      BUN/Creatinine Ratio 12.5     Anion Gap 6.1 mmol/L      eGFR 50.0 mL/min/1.73     Narrative:      GFR Normal >60  Chronic Kidney Disease <60  Kidney Failure <15      CBC (No Diff) [[id number]]  (Abnormal) Collected: 04/12/24 0806    Specimen: Blood Updated: 04/12/24 0821     WBC 8.78 10*3/mm3      RBC 3.18 10*6/mm3      Hemoglobin 10.2 g/dL      Hematocrit 31.2 %      MCV 98.1 fL      MCH 32.1 pg      MCHC 32.7 g/dL      RDW 12.9 %      RDW-SD 45.7 fl      MPV 8.4 fL      Platelets 232 10*3/mm3     Hemoglobin A1c [[id number]]  (Normal) Collected: 04/11/24 1012    Specimen: Blood Updated: 04/11/24 1507     Hemoglobin A1C 5.30 %     Narrative:      Hemoglobin  A1C Ranges:    Increased Risk for Diabetes  5.7% to 6.4%  Diabetes                     >= 6.5%  Diabetic Goal                < 7.0%    Lipid Panel [[id number]]  (Abnormal) Collected: 04/11/24 1012    Specimen: Blood Updated: 04/11/24 1506     Total Cholesterol 207 mg/dL      Triglycerides 165 mg/dL      HDL Cholesterol 54 mg/dL      LDL Cholesterol  124 mg/dL      VLDL Cholesterol 29 mg/dL      LDL/HDL Ratio 2.22    Narrative:      Cholesterol Reference Ranges  (U.S. Department of Health and Human Services ATP III Classifications)    Desirable          <200 mg/dL  Borderline High    200-239 mg/dL  High Risk          >240 mg/dL      Triglyceride Reference Ranges  (U.S. Department of Health and Human Services ATP III Classifications)    Normal           <150 mg/dL  Borderline High  150-199 mg/dL  High             200-499 mg/dL  Very High        >500 mg/dL    HDL Reference Ranges  (U.S. Department of Health and Human Services ATP III Classifications)    Low     <40 mg/dl (major risk factor for CHD)  High    >60 mg/dl ('negative' risk factor for CHD)        LDL Reference Ranges  (U.S. Department of Health and Human Services ATP III Classifications)    Optimal          <100 mg/dL  Near Optimal     100-129 mg/dL  Borderline High  130-159 mg/dL  High             160-189 mg/dL  Very High        >189 mg/dL    Blood Gas, Arterial - [[id number]]  (Abnormal) Collected: 04/11/24 1455    Specimen: Arterial Blood Updated: 04/11/24 1502     Site Right Radial     Matheus's Test Positive     pH, Arterial 7.362 pH units      pCO2, Arterial 51.6 mm Hg      Comment: 83 Value above reference range        pO2, Arterial 62.4 mm Hg      Comment: 84 Value below reference range        HCO3, Arterial 29.3 mmol/L      Comment: 83 Value above reference range        Base Excess, Arterial 3.0 mmol/L      Comment: 83 Value above reference range        O2 Saturation, Arterial 93.0 %      Comment: 84 Value below reference range        Hemoglobin, Blood  Gas 11.2 g/dL      Comment: 84 Value below reference range        Temperature 37.0     Barometric Pressure for Blood Gas 723 mmHg      Modality Nasal Cannula     Flow Rate 2.0 lpm      Collected by [id number]     Comment: Meter: [id number]     :  [id number]        pCO2, Temperature Corrected 51.6 mm Hg      pH, Temp Corrected 7.362 pH Units      pO2, Temperature Corrected 62.4 mm Hg              Imaging:  Imaging Results (Last 24 Hours)       Procedure Component Value Units Date/Time    US Carotid Bilateral [[id number]] Collected: 04/11/24 1556     Updated: 04/11/24 1601    Narrative:      US CAROTID BILATERAL    Date of Exam: 4/11/2024 3:38 PM EDT    Indication: bilat occipital cvas.    Comparison: No comparisons available.    Technique: Grayscale, color-flow, and spectral imaging was obtained of the bilateral carotid and vertebral arteries.      Findings:  No significant plaquing within the right common or internal carotid artery. Peak systolic velocity is 57 cm/s. There is antegrade flow within the right vertebral artery.    Mild plaquing in the left carotid bulb and left proximal ICA. Peak systolic velocity is 122 cm/s. There is antegrade flow within the left vertebral artery.      Impression:      Impression:    1. Mild plaquing but no hemodynamically significant stenosis.        Electronically Signed: Anup Davila MD    4/11/2024 3:58 PM EDT    Workstation ID: [id number]    MRI Brain With & Without Contrast [[id number]] Collected: 04/11/24 1534     Updated: 04/11/24 1553    Addenda:        Findings regarding acute infarcts were personally called to the patient's   nurse at 3:50 p.m. on 4/11/2024    Electronically Signed: Anup Bragg MD    4/11/2024 3:50 PM EDT    Workstation ID: [id number]  Signed: 04/11/24 1550 by Jose Bragg MD    Narrative:      MRI BRAIN W WO CONTRAST    Date of Exam: 4/11/2024 1:11 PM EDT    Indication: ams.     Comparison: CT brain 4/11/2024    Technique:  Routine  multiplanar/multisequence sequence images of the brain were obtained before and after the uneventful administration of Multihance.      Findings:  There is mild generalized parenchymal volume loss. Diffusion weighted images demonstrate areas of restricted diffusion within the globus pallidus bilaterally corresponding to the areas of low-attenuation noted on prior CT scan. There are additional   punctate foci of restricted diffusion involving the precentral gyrus of the right frontal lobe as well as additional patchy areas of restricted diffusion within the bilateral occipital lobes. Findings would be compatible with multiple acute infarcts.   Given the symmetric appearance within the basal ganglia other differential considerations would include carbon monoxide poisoning, anoxic injury there is no evidence of intracranial hemorrhage. No abnormal extra-axial fluid collections are identified. No   mass effect or hydrocephalus.    Major intracranial vascular flow voids are preserved. Sella and suprasellar cistern are within normal limits. Craniovertebral junction appears normal.    Postcontrast images demonstrate no pathologic intracranial contrast enhancement.      Impression:      Impression:    1. There are areas of restricted diffusion within the bilateral basal ganglia, bilateral occipital lobes, and right frontal lobe compatible with multiple infarcts. The symmetric appearance of the infarcts within the basal ganglia can be seen with anoxic   injury or carbon monoxide poisoning.  2. No evidence of acute intracranial hemorrhage.  3. No evidence of pathologic contrast enhancement.        Electronically Signed: Anup Bragg MD    4/11/2024 3:43 PM EDT    Workstation ID: [id number]    XR Chest 1 View [[id number]] Collected: 04/11/24 1454     Updated: 04/11/24 1508    Narrative:      XR CHEST 1 VW    Date of Exam: 4/11/2024 2:39 PM EDT    Indication: decrease breathing    Comparison: None available.    Findings:   RIght  IJ portacath is unchanged. Heart size and pulmonary vessels are within normal limits. Interstitial markings are prominent and similar to prior exam. No focal airspace consolidation. No pleural effusion or pneumothorax.        Impression:      Impression:    Prominent interstitial markings similar to the prior exam. No focal airspace consolidation or other acute cardiopulmonary disease.      Electronically Signed: Anup Bragg MD    4/11/2024 3:05 PM EDT    Workstation ID: [id number]               Assessment and Plan:     This patient has suffered bihemispheric stroke.  This appears to be an embolic phenomena.  At this point I think the appropriate treatment is Eliquis 5 mg p.o. twice daily that has been started.  She will avoid nonsteroidal anti-inflammatory medications.  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  Note this patient has normal transthoracic echo, she is not in A-fib, and carotid Dopplers are normal and she has anterograde flow through 1 vertebral artery.  Simply put, this is a central source of bihemispheric stroke.    Next she presented with significant encephalopathy that I believe would be related to hypotension and hypoxia (initial pO2 of 62).  Nonetheless she has had hyponatremia of 128 and could easily have had a generalized seizure with postictal state related to the sodium of 128 versus the effect of bihemispheric stroke.  She admits she drinks a lot of sweet tea and she has been receiving IV fluids for the last couple of days as part of her chemotherapy treatment.  Therefore I am going to go ahead and start her on Keppra 500 mg p.o. twice daily to be on the safe side.  She will follow-up with Dr. De Paz, and I have ordered an outpatient EEG.  The patient will not drive and has not been driving.    Lastly, there is a question of whether or not her current chemotherapy could have triggered this seizure.  That is at least  a possibility although I am not exactly sure what chemotherapeutic agent.  She has been receiving.  This is her second round of chemotherapy and she tolerated the first round without problems in the second round yesterday would have been her third IV dose.    Until she is seen by her oncologist.  I would recommend holding the remainder of her chemotherapy for the time being.  Note that it seems possibly more likely that the hyponatremia versus multiple bihemispheric strokes could have triggered a seizure rather than the chemotherapy itself.  Also note that small cell carcinoma is frequently associated with syndrome of inappropriate ADH    She has also had a history of hypotension and is on ProAmatine so the way she was found lying in bed possibly for as long as 12 hours (flopped backwards in bed with her lower legs dangling) could have definitely added to decreased cerebral perfusion with hypotension, seizure, and prolonged unresponsiveness    In any event, she looks great now.  She has a completely normal neurologic exam and her niece thinks she is back to normal.  The patient would like to go home and I am actually okay with that.  The niece will be staying with her at least for the next few days in some fashion and we will arrange outpatient follow-up with Dr. Burns her oncologist to see what the next step might be.    Note she is now on Eliquis 5 mg p.o. every 12 hours and Keppra 500 mg p.o. every 12 hours    Thanks      Chris Yañez MD  04/12/24  13:18 EDT

## 2024-04-13 ENCOUNTER — READMISSION MANAGEMENT (OUTPATIENT)
Dept: CALL CENTER | Facility: HOSPITAL | Age: 69
End: 2024-04-13
Payer: MEDICARE

## 2024-04-13 VITALS
OXYGEN SATURATION: 91 % | BODY MASS INDEX: 19.99 KG/M2 | RESPIRATION RATE: 20 BRPM | TEMPERATURE: 98.5 F | HEART RATE: 71 BPM | HEIGHT: 63 IN | SYSTOLIC BLOOD PRESSURE: 162 MMHG | WEIGHT: 112.8 LBS | DIASTOLIC BLOOD PRESSURE: 78 MMHG

## 2024-04-13 PROBLEM — D72.829 LEUCOCYTOSIS: Status: RESOLVED | Noted: 2023-01-21 | Resolved: 2024-04-13

## 2024-04-13 PROBLEM — J18.9 PNEUMONIA OF BOTH LUNGS DUE TO INFECTIOUS ORGANISM: Status: RESOLVED | Noted: 2020-09-22 | Resolved: 2024-04-13

## 2024-04-13 PROBLEM — R41.82 AMS (ALTERED MENTAL STATUS): Status: RESOLVED | Noted: 2024-04-11 | Resolved: 2024-04-13

## 2024-04-13 PROBLEM — I61.9 CVA (CEREBROVASCULAR ACCIDENT DUE TO INTRACEREBRAL HEMORRHAGE): Status: RESOLVED | Noted: 2024-04-11 | Resolved: 2024-04-13

## 2024-04-13 PROBLEM — J44.1 COPD WITH ACUTE EXACERBATION: Status: RESOLVED | Noted: 2020-09-28 | Resolved: 2024-04-13

## 2024-04-13 PROBLEM — J93.9 PNEUMOTHORAX: Status: RESOLVED | Noted: 2024-02-28 | Resolved: 2024-04-13

## 2024-04-13 PROBLEM — I63.113 CEREBROVASCULAR ACCIDENT (CVA) DUE TO BILATERAL EMBOLISM OF VERTEBRAL ARTERIES: Status: ACTIVE | Noted: 2024-04-13

## 2024-04-13 PROBLEM — R91.1 LUNG NODULE: Status: RESOLVED | Noted: 2024-02-22 | Resolved: 2024-04-13

## 2024-04-13 LAB
ANION GAP SERPL CALCULATED.3IONS-SCNC: 9.6 MMOL/L (ref 5–15)
BUN SERPL-MCNC: 7 MG/DL (ref 8–23)
BUN/CREAT SERPL: 7.5 (ref 7–25)
CALCIUM SPEC-SCNC: 8.5 MG/DL (ref 8.6–10.5)
CHLORIDE SERPL-SCNC: 105 MMOL/L (ref 98–107)
CO2 SERPL-SCNC: 25.4 MMOL/L (ref 22–29)
CREAT SERPL-MCNC: 0.93 MG/DL (ref 0.57–1)
DEPRECATED RDW RBC AUTO: 45.3 FL (ref 37–54)
EGFRCR SERPLBLD CKD-EPI 2021: 67.9 ML/MIN/1.73
ERYTHROCYTE [DISTWIDTH] IN BLOOD BY AUTOMATED COUNT: 12.8 % (ref 12.3–15.4)
GLUCOSE SERPL-MCNC: 92 MG/DL (ref 65–99)
HCT VFR BLD AUTO: 29 % (ref 34–46.6)
HGB BLD-MCNC: 9.7 G/DL (ref 12–15.9)
MCH RBC QN AUTO: 32.4 PG (ref 26.6–33)
MCHC RBC AUTO-ENTMCNC: 33.4 G/DL (ref 31.5–35.7)
MCV RBC AUTO: 97 FL (ref 79–97)
PLATELET # BLD AUTO: 217 10*3/MM3 (ref 140–450)
PMV BLD AUTO: 8.8 FL (ref 6–12)
POTASSIUM SERPL-SCNC: 3.7 MMOL/L (ref 3.5–5.2)
RBC # BLD AUTO: 2.99 10*6/MM3 (ref 3.77–5.28)
SODIUM SERPL-SCNC: 140 MMOL/L (ref 136–145)
WBC NRBC COR # BLD AUTO: 8.14 10*3/MM3 (ref 3.4–10.8)

## 2024-04-13 PROCEDURE — 94761 N-INVAS EAR/PLS OXIMETRY MLT: CPT

## 2024-04-13 PROCEDURE — 94799 UNLISTED PULMONARY SVC/PX: CPT

## 2024-04-13 PROCEDURE — 94664 DEMO&/EVAL PT USE INHALER: CPT

## 2024-04-13 PROCEDURE — 97116 GAIT TRAINING THERAPY: CPT

## 2024-04-13 PROCEDURE — 25810000003 SODIUM CHLORIDE 0.9 % SOLUTION: Performed by: FAMILY MEDICINE

## 2024-04-13 PROCEDURE — 85027 COMPLETE CBC AUTOMATED: CPT | Performed by: FAMILY MEDICINE

## 2024-04-13 PROCEDURE — 80048 BASIC METABOLIC PNL TOTAL CA: CPT | Performed by: FAMILY MEDICINE

## 2024-04-13 RX ADMIN — SUCRALFATE 1 G: 1 TABLET ORAL at 11:33

## 2024-04-13 RX ADMIN — HYDROCODONE BITARTRATE AND ACETAMINOPHEN 1 TABLET: 10; 325 TABLET ORAL at 08:28

## 2024-04-13 RX ADMIN — Medication 10 ML: at 08:25

## 2024-04-13 RX ADMIN — SODIUM CHLORIDE 75 ML/HR: 9 INJECTION, SOLUTION INTRAVENOUS at 04:56

## 2024-04-13 RX ADMIN — MIDODRINE HYDROCHLORIDE 2.5 MG: 5 TABLET ORAL at 11:33

## 2024-04-13 RX ADMIN — SUCRALFATE 1 G: 1 TABLET ORAL at 08:25

## 2024-04-13 RX ADMIN — BUDESONIDE AND FORMOTEROL FUMARATE DIHYDRATE 2 PUFF: 160; 4.5 AEROSOL RESPIRATORY (INHALATION) at 08:40

## 2024-04-13 RX ADMIN — APIXABAN 5 MG: 2.5 TABLET, FILM COATED ORAL at 08:25

## 2024-04-13 RX ADMIN — MIDODRINE HYDROCHLORIDE 2.5 MG: 5 TABLET ORAL at 08:25

## 2024-04-13 RX ADMIN — LEVETIRACETAM 500 MG: 500 TABLET, FILM COATED ORAL at 08:25

## 2024-04-13 RX ADMIN — PANTOPRAZOLE SODIUM 40 MG: 40 TABLET, DELAYED RELEASE ORAL at 06:27

## 2024-04-13 NOTE — CASE MANAGEMENT/SOCIAL WORK
Case Management Discharge Note      Final Note: home    Provided Post Acute Provider List?: N/A  N/A Provider List Comment: Patient has used [facility] in the past and requests to use again if needed  Provided Post Acute Provider Quality & Resource List?: N/A    Selected Continued Care - Discharged on 4/13/2024 Admission date: 4/11/2024 - Discharge disposition: Home or Self Care      Destination    No services have been selected for the patient.                Durable Medical Equipment    No services have been selected for the patient.                Dialysis/Infusion    No services have been selected for the patient.                Home Medical Care    No services have been selected for the patient.                Therapy    No services have been selected for the patient.                Community Resources    No services have been selected for the patient.                Community & DME    No services have been selected for the patient.                         Final Discharge Disposition Code: 01 - home or self-care

## 2024-04-13 NOTE — CASE MANAGEMENT/SOCIAL WORK
Continued Stay Note  SANDY Calderon     Patient Name: Pili Arechiga  MRN: [id number]  Today's Date: 4/13/2024    Admit Date: 4/11/2024    Plan: Home, no needs   Discharge Plan       Row Name 04/13/24 1442       Plan    Plan Home, no needs    Plan Comments CCP spoke to pt introduced self, role and discussed dc plans/needs.  MD placed an order for HH and pt says she does not need HH at this time. Pt's plan is to go home w/family.  Pts family will transport her home. No needs identified.  CCP will follow. Dianne Serna    Final Discharge Disposition Code 01 - home or self-care    Final Note home                   Discharge Codes    No documentation.                 Expected Discharge Date and Time       Expected Discharge Date Expected Discharge Time    Apr 13, 2024               Dianne Whelan

## 2024-04-13 NOTE — PLAN OF CARE
Goal Outcome Evaluation:  Plan of Care Reviewed With: patient        Progress: improving  Outcome Evaluation: Pt VSS, am labs show WBC trending down. Pt continues tele, SR-SB, HR 50's-60's. RT titrating o2, see RT charting. Pt up with standby assist to restroom. Pt denies pain overnight; denies n/v/d, reports tolerating diet. Pt resting at this time.

## 2024-04-13 NOTE — PROGRESS NOTES
"Daily Progress Note:      Chief complaint: Bilateral cerebral infarcts, COPD, acute hypoxic failure, small cell carcinoma lung    Subjective     Subjective: No overnight events noted she is anxious to go home has been doing well physical therapy   LOS: 2 days     Objective     Vital Signs  Temp:  [97.9 °F (36.6 °C)-98.8 °F (37.1 °C)] 98.5 °F (36.9 °C)  Heart Rate:  [52-71] 71  Resp:  [16-20] 20  BP: (133-171)/(62-78) 162/78  Oxygen Therapy  SpO2: 91 %  Pulse Oximetry Type: Continuous  Device (Oxygen Therapy): room air  Flow (L/min): 1}  Body mass index is 19.99 kg/m².  Flowsheet Rows      Flowsheet Row First Filed Value   Admission Height 160 cm (62.99\") Documented at 04/11/2024 0925   Admission Weight 49.6 kg (109 lb 5.6 oz) Documented at 04/11/2024 0925               Documented weights    04/11/24 0925 04/11/24 1533 04/11/24 1836 04/12/24 0420   Weight: 49.6 kg (109 lb 5.6 oz) 54.7 kg (120 lb 9.5 oz) 54 kg (119 lb 0.8 oz) 54 kg (119 lb 1.6 oz)    04/13/24 0404   Weight: 51.2 kg (112 lb 12.8 oz)         Patient Vitals for the past 24 hrs:   BP Temp Temp src Pulse Resp SpO2 Weight   04/13/24 0843 -- -- -- 71 20 91 % --   04/13/24 0840 -- -- -- 65 20 93 % --   04/13/24 0734 162/78 98.5 °F (36.9 °C) Oral -- 18 93 % --   04/13/24 0627 -- -- -- 61 16 96 % --   04/13/24 0404 171/76 98.8 °F (37.1 °C) Oral 58 16 91 % 51.2 kg (112 lb 12.8 oz)   04/13/24 0008 -- -- -- 57 -- 93 % --   04/12/24 2322 167/74 97.9 °F (36.6 °C) Oral 62 16 96 % --   04/12/24 2048 -- -- -- -- -- 91 % --   04/12/24 2047 -- -- -- -- -- (!) 87 % --   04/12/24 2040 149/70 97.9 °F (36.6 °C) Oral 61 16 90 % --   04/12/24 1953 -- -- -- 64 16 92 % --   04/12/24 1945 -- -- -- 56 16 91 % --   04/12/24 1753 -- -- -- 56 18 95 % --   04/12/24 1616 165/71 98.3 °F (36.8 °C) Oral 66 17 95 % --   04/12/24 1143 133/62 98.1 °F (36.7 °C) Oral 52 18 90 % --       51.2 kg (112 lb 12.8 oz)    Intake/Output                         04/11/24 0701 - 04/12/24 0700 04/12/24 " 0701 - 04/13/24 0700     [id number] 9036-2044 Total 5621-5327 4363-4971 Total                 Intake    P.O.  --  -- --  720  60 780    I.V.  --  953.8 953.8  121.3  873.8 995    Total Intake -- 953.8 953.8 841.3 933.8 1775       Output    Urine  --  700 700  3100  2350 5450    Total Output --  2350 5450             Intake/Output Summary (Last 24 hours) at 4/13/2024 0931  Last data filed at 4/13/2024 0634  Gross per 24 hour   Intake 1775 ml   Output 5450 ml   Net -3675 ml      Intake/Output Summary (Last 24 hours) at 4/13/2024 0931  Last data filed at 4/13/2024 0634  Gross per 24 hour   Intake 1775 ml   Output 5450 ml   Net -3675 ml        Review of Systems   Constitutional:  Negative for activity change, appetite change and fatigue.   HENT:  Negative for congestion.    Respiratory:  Negative for cough, chest tightness, shortness of breath and wheezing.    Cardiovascular:  Negative for chest pain.   Gastrointestinal:  Negative for abdominal distention, abdominal pain, diarrhea, nausea and vomiting.   Endocrine: Negative for polyphagia and polyuria.   Genitourinary:  Negative for frequency.   Skin:  Negative for rash.   Neurological:  Negative for light-headedness.   Hematological:  Does not bruise/bleed easily.   Psychiatric/Behavioral:  Negative for agitation and behavioral problems.        Physical Exam  Vitals and nursing note reviewed.   Constitutional:       Appearance: She is well-developed.   HENT:      Head: Normocephalic.   Eyes:      Conjunctiva/sclera: Conjunctivae normal.   Neck:      Thyroid: No thyromegaly.      Vascular: No JVD.   Cardiovascular:      Rate and Rhythm: Normal rate and regular rhythm.      Heart sounds: Normal heart sounds. No murmur heard.  Pulmonary:      Effort: Pulmonary effort is normal. No respiratory distress.      Breath sounds: Normal breath sounds. No wheezing or rales.   Abdominal:      General: Bowel sounds are normal. There is no distension.      Palpations:  Abdomen is soft.      Tenderness: There is no abdominal tenderness. There is no guarding.   Skin:     General: Skin is warm and dry.      Findings: No rash.   Neurological:      General: No focal deficit present.      Mental Status: She is alert and oriented to person, place, and time.      Cranial Nerves: Cranial nerves 2-12 are intact.      Motor: Motor function is intact.      Deep Tendon Reflexes: Reflexes are normal and symmetric.         Medication Review:   I have reviewed the patient's current medication list  Scheduled Meds:apixaban, 5 mg, Oral, Q12H  budesonide-formoterol, 2 puff, Inhalation, BID - RT  levETIRAcetam, 500 mg, Oral, Q12H  midodrine, 2.5 mg, Oral, TID AC  pantoprazole, 40 mg, Oral, QAM  sodium chloride, 10 mL, Intravenous, Q12H  sucralfate, 1 g, Oral, 4x Daily      Continuous Infusions:     PRN Meds:.  acetaminophen **OR** acetaminophen **OR** acetaminophen    aluminum-magnesium hydroxide-simethicone    senna-docusate sodium **AND** polyethylene glycol **AND** bisacodyl **AND** bisacodyl    calcium carbonate    HYDROcodone-acetaminophen    ipratropium-albuterol    ondansetron ODT **OR** ondansetron    [COMPLETED] Insert Peripheral IV **AND** sodium chloride    sodium chloride    sodium chloride      Result Review        I have personally reviewed the results from the time of this admission to 4/13/2024 09:31 EDT and agree with these findings:  [x]  Laboratory  []  Microbiology  [x]  Radiology  []  EKG/Telemetry   [x]  Cardiology/Vascular   []  Pathology  []  Old records  []  Other:        Labs:  Results from last 7 days   Lab Units 04/13/24  0536 04/12/24  0806 04/11/24  1012 04/09/24  0755   WBC 10*3/mm3 8.14 8.78 13.50* 6.68   RBC 10*6/mm3 2.99* 3.18* 3.45* 3.74*   HEMOGLOBIN g/dL 9.7* 10.2* 11.0* 12.1   HEMATOCRIT % 29.0* 31.2* 34.6 35.7   RDW % 12.8 12.9 12.8 12.5   MCV fL 97.0 98.1* 100.3* 95.5   MCH pg 32.4 32.1 31.9 32.4   MCHC g/dL 33.4 32.7 31.8 33.9   MPV fL 8.8 8.4 8.5 8.4    PLATELETS 10*3/mm3 217 232 318 313   RDW-SD fl 45.3 45.7 46.9 42.6       Results from last 7 days   Lab Units 04/13/24 0536 04/12/24 0806 04/11/24 1012 04/09/24  0755   SODIUM mmol/L 140 128* 131* 131*   POTASSIUM mmol/L 3.7 4.1 4.0 4.1   CHLORIDE mmol/L 105 96* 95* 93*   CO2 mmol/L 25.4 25.9 24.8 29.4*   ANION GAP mmol/L 9.6 6.1 11.2 8.6   BUN mg/dL 7* 15 17 5*   CREATININE mg/dL 0.93 1.20* 1.67* 1.07*   BUN / CREAT RATIO  7.5 12.5 10.2 4.7*   EGFR mL/min/1.73 67.9 50.0* 33.6* 57.4*   CALCIUM mg/dL 8.5* 8.8 9.7 9.3   GLUCOSE mg/dL 92 85 90 73     CMP:        Lab 04/13/24 0536 04/12/24 0806 04/11/24 1012 04/09/24  0755   SODIUM 140 128* 131* 131*   POTASSIUM 3.7 4.1 4.0 4.1   CHLORIDE 105 96* 95* 93*   CO2 25.4 25.9 24.8 29.4*   ANION GAP 9.6 6.1 11.2 8.6   BUN 7* 15 17 5*   CREATININE 0.93 1.20* 1.67* 1.07*   EGFR 67.9 50.0* 33.6* 57.4*   GLUCOSE 92 85 90 73   CALCIUM 8.5* 8.8 9.7 9.3   MAGNESIUM  --   --  2.1  --    PHOSPHORUS  --   --  4.6*  --    TOTAL PROTEIN  --   --  6.4 6.4   ALBUMIN  --   --  4.1 4.0   GLOBULIN  --   --  2.3 2.4   ALT (SGPT)  --   --  8 5   AST (SGOT)  --   --  14 11   BILIRUBIN  --   --  <0.2 <0.2   ALK PHOS  --   --  68 75       Results from last 7 days   Lab Units 04/13/24 0536 04/12/24 0806 04/11/24  1012   PLATELETS 10*3/mm3 217 232 318         Lab Results (last 24 hours)       Procedure Component Value Units Date/Time    Basic Metabolic Panel [[id number]]  (Abnormal) Collected: 04/13/24 0536    Specimen: Blood Updated: 04/13/24 0617     Glucose 92 mg/dL      BUN 7 mg/dL      Creatinine 0.93 mg/dL      Sodium 140 mmol/L      Potassium 3.7 mmol/L      Chloride 105 mmol/L      CO2 25.4 mmol/L      Calcium 8.5 mg/dL      BUN/Creatinine Ratio 7.5     Anion Gap 9.6 mmol/L      eGFR 67.9 mL/min/1.73     Narrative:      GFR Normal >60  Chronic Kidney Disease <60  Kidney Failure <15      CBC (No Diff) [[id number]]  (Abnormal) Collected: 04/13/24 0536    Specimen: Blood Updated:  "04/13/24 0555     WBC 8.14 10*3/mm3      RBC 2.99 10*6/mm3      Hemoglobin 9.7 g/dL      Hematocrit 29.0 %      MCV 97.0 fL      MCH 32.4 pg      MCHC 33.4 g/dL      RDW 12.8 %      RDW-SD 45.3 fl      MPV 8.8 fL      Platelets 217 10*3/mm3     Osmolality, Urine - Urine, Clean Catch [[id number]] Collected: 04/12/24 0948    Specimen: Urine, Clean Catch Updated: 04/12/24 1654     Osmolality, Urine 153 mOsm/kg     Narrative:      Osmo Normal Reference Ranges:    Random:  mOsm/kg H2O, depending on fluid intake.  Random: >850 mOsm/kg H20, after 12 hour fluid restriction.    24 Hour: 300-900 mOsm/kg H2O.    Osmolality, Serum [[id number]]  (Abnormal) Collected: 04/12/24 0806    Specimen: Blood Updated: 04/12/24 1648     Osmolality 275 mOsm/kg               Results from last 7 days   Lab Units 04/11/24  1012   TSH uIU/mL 0.450             Results from last 7 days   Lab Units 04/11/24  1012   MAGNESIUM mg/dL 2.1     Results from last 7 days   Lab Units 04/11/24  1012   CHOLESTEROL mg/dL 207*   TRIGLYCERIDES mg/dL 165*   HDL CHOL mg/dL 54     Results from last 7 days   Lab Units 04/11/24  1455   PH, ARTERIAL pH units 7.362   PCO2, ARTERIAL mm Hg 51.6*   PO2 ART mm Hg 62.4*   HCO3 ART mmol/L 29.3*     Results from last 7 days   Lab Units 04/11/24  1012   HEMOGLOBIN A1C % 5.30     No results found for: \"POCGLU\"                      Radiology:  Imaging Results (Last 24 Hours)       ** No results found for the last 24 hours. **            Cardiology:  ECG/EMG Results (last 24 hours)       Procedure Component Value Units Date/Time    Adult Transthoracic Echo Complete W/ Cont if Necessary Per Protocol [[id number]] Resulted: 04/11/24 1855     Updated: 04/11/24 1905     EF(MOD-bp) 51.8 %      LVIDd 4.6 cm      LVIDs 2.7 cm      IVSd 0.90 cm      LVPWd 0.80 cm      FS 42.0 %      IVS/LVPW 1.13 cm      ESV(cubed) 19.0 ml      LV Sys Vol (BSA corrected) 25.5 cm2      EDV(cubed) 97.3 ml      LV Austin Vol (BSA corrected) 49.0 cm2      " LV mass(C)d 127.7 grams      LVOT area 3.3 cm2      LVOT diam 2.06 cm      EDV(MOD-sp2) 60.0 ml      EDV(MOD-sp4) 73.0 ml      ESV(MOD-sp2) 26.0 ml      ESV(MOD-sp4) 38.0 ml      SV(MOD-sp2) 34.0 ml      SV(MOD-sp4) 35.0 ml      SI(MOD-sp2) 22.8 ml/m2      SI(MOD-sp4) 23.5 ml/m2      EF(MOD-sp2) 56.7 %      EF(MOD-sp4) 47.9 %      MV E max edis 62.9 cm/sec      MV A max edis 79.1 cm/sec      MV dec time 0.23 sec      MV E/A 0.80     LA ESV Index (BP) 21.1 ml/m2      Med Peak E' Edis 9.3 cm/sec      Lat Peak E' Edis 9.1 cm/sec      TR max edis 231.0 cm/sec      Avg E/e' ratio 6.84     SV(LVOT) 72.9 ml      SV(RVOT) 101.6 ml      Qp/Qs 1.39     RV Base 3.5 cm      RV Mid 2.8 cm      RV Length 7.6 cm      RV S' 11.2 cm/sec      LV V1 max 102.0 cm/sec      LV V1 max PG 4.2 mmHg      LV V1 mean PG 2.00 mmHg      LV V1 VTI 21.8 cm      Ao pk edis 123.0 cm/sec      Ao max PG 6.1 mmHg      Ao mean PG 3.0 mmHg      Ao V2 VTI 27.9 cm      AYESHA(I,D) 2.6 cm2      MV max PG 3.2 mmHg      MV mean PG 1.24 mmHg      MV V2 VTI 27.4 cm      MV P1/2t 75.3 msec      MVA(P1/2t) 2.9 cm2      MVA(VTI) 2.7 cm2      MV dec slope 360.6 cm/sec2      TR max PG 21.3 mmHg      RVOT diam 2.7 cm      RV V1 max PG 1.35 mmHg      RV V1 max 58.2 cm/sec      RV V1 VTI 17.5 cm      PA V2 max 81.1 cm/sec      PA acc time 0.13 sec      ACS 1.83 cm      Sinus 3.0 cm      BH CV ECHO SHUNT ASSESSMENT PERFORMED (HIDDEN SCRIPTING) 1     Dimensionless Index 0.80 (DI)      RVSP(TR) 24 mmHg      RAP systole 3 mmHg     Narrative:        Left ventricular systolic function is normal. Left ventricular ejection   fraction appears to be 56 - 60%.    Left ventricular diastolic function is consistent with (grade I)   impaired relaxation.    The interatrial septum appears redundant. The agitated saline study is   positive, consistent with a small PFO    Mild tricuspid valve regurgitation is present    Calculated right ventricular systolic pressure from tricuspid   regurgitation  is 24 mmHg.    There is no evidence of pericardial effusion.              I have reviewed recent labs results and consult notes.    Please note portions of this assessment/plan may have been copied and pasted, but I have personally seen this patient and reviewed each line of this assessment and plan for accuracy and made updates to reflect my necessary changes    Assessment/Plan     Assessment and Plan:  .  Acute bilateral basal ganglia, bilateral occipital lobe, right frontal lobe infarcts certainly consistent with shower emboli.  Not sure if chemotherapy had a 3 times daily with her emboli she will consult with her oncologist before proceeding with next chemotherapy   echocardiogram negative.  Okay discharge home with p.o. Eliquis      .  Acute kidney injury resolved     .  Acute hypoxic hypercapnic respiratory failure resolved      .  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  Hyperlipidemia with statin intolerance will pursue Repatha or Praluent as outpatient.        .  COPD nothing acute continue with home medications     .  Orthostatic hypotension stable continue with midodrine.     .  GERD esophagitis continue with Carafate and Protonix     .  Chronic back and left hip pain on chronic hydrocodone stable    Much of this encounter note is an electronic transcription/translation of spoken language to printed text. The electronic translation of spoken language may permit erroneous, or at times, nonsensical words or phrases to be inadvertently transcribed; Although I have reviewed the note for such errors, some may still exist.    Note Disclaimer: At [facility], we believe that sharing information builds trust and better relationships. You are receiving this note because you recently visited [facility]. It is possible you will see health information before a provider has talked with you about it. This kind of  information can be easy to misunderstand. To help you fully understand what it means for your health, we urge you to discuss this note with your provider.

## 2024-04-13 NOTE — THERAPY TREATMENT NOTE
Acute Care - Physical Therapy Treatment Note   Marge Shoemaker     Patient Name: Pili Arechiga  : 1955  MRN: [id number]  Today's Date: 2024      Visit Dx:     ICD-10-CM ICD-9-CM   1. WILEY (acute kidney injury)  N17.9 584.9   2. Hypoxia  R09.02 799.02   3. Somnolence  R40.0 780.09   4. Cerebrovascular accident (CVA), unspecified mechanism  I63.9 434.91     Patient Active Problem List   Diagnosis    Pneumonia of both lungs due to infectious organism    COPD with acute exacerbation    Leucocytosis    Lung nodule    Pneumothorax    Small cell carcinoma    Fitting and adjustment of vascular catheter    Smoking greater than 40 pack years    AMS (altered mental status)    CVA (cerebrovascular accident due to intracerebral hemorrhage)     Past Medical History:   Diagnosis Date    COPD (chronic obstructive pulmonary disease)     COPD with acute exacerbation 2020    Small cell carcinoma 3/13/2024     Past Surgical History:   Procedure Laterality Date    BRONCHOSCOPY N/A 2023    Procedure: BRONCHOSCOPY WITH ENDOBRONCHIAL ULTRASOUND (EBUS) with FNA;  Surgeon: Coy Mccarthy MD;  Location: [facility] ENDOSCOPY;  Service: Pulmonary;  Laterality: N/A;  Pre/Post - mediastinal and hilar lymphadenopathy.    BRONCHOSCOPY WITH ION ROBOTIC ASSIST N/A 2024    Procedure: BRONCHOSCOPY WITH ION ROBOT,  ENDOBRONCHIAL ULTRASOUND, FINE NEEDLE ASPIRATIONS,  CRYOTHERAPY BIOPSIES, AND LEFT LOWER LOBE BRONCHOALVEOLAR LAVAGE.;  Surgeon: Alexia Velásquez MD;  Location: [facility] ENDOSCOPY;  Service: Robotics - Pulmonary;  Laterality: N/A;     SECTION      CHEST TUBE INSERTION Left 2024    Procedure: CHEST TUBE INSERTION;  Surgeon: Alexia Velásquez MD;  Location: [facility] ENDOSCOPY;  Service: Thoracic;  Laterality: Left;    CHOLECYSTECTOMY      COLONOSCOPY      ECTOPIC PREGNANCY SURGERY      HYSTERECTOMY      TONSILLECTOMY      TOTAL HIP ARTHROPLASTY Left     VENOUS ACCESS DEVICE (PORT) INSERTION N/A 3/14/2024     Procedure: INSERTION VENOUS ACCESS DEVICE;  Surgeon: Jonas Garner MD;  Location: [facility] OR;  Service: General;  Laterality: N/A;     PT Assessment (Last 12 Hours)       PT Evaluation and Treatment       Row Name 04/13/24 0815          Physical Therapy Time and Intention    Subjective Information no complaints  -KM     Document Type therapy note (daily note)  -KM     Patient Effort good  -KM     Comment Pt willing to work with PT this morning. States she is doing better and feels ready to go home  -KM       Row Name 04/13/24 0815          Cognition    Personal Safety Interventions gait belt;nonskid shoes/slippers when out of bed  -KM       Row Name 04/13/24 0815          Bed Mobility    Bed Mobility supine-sit  -KM     Supine-Sit Robeson (Bed Mobility) supervision  -KM     Assistive Device (Bed Mobility) bed rails;head of bed elevated  -KM       Row Name 04/13/24 0815          Transfers    Transfers sit-stand transfer;stand-sit transfer;toilet transfer  -KM       Row Name 04/13/24 0815          Sit-Stand Transfer    Sit-Stand Robeson (Transfers) standby assist  -KM     Assistive Device (Sit-Stand Transfers) walker, front-wheeled  -KM       Row Name 04/13/24 0815          Stand-Sit Transfer    Stand-Sit Robeson (Transfers) standby assist  -KM     Assistive Device (Stand-Sit Transfers) walker, front-wheeled  -KM       Row Name 04/13/24 0815          Toilet Transfer    Type (Toilet Transfer) sit-stand;stand-sit  -KM     Robeson Level (Toilet Transfer) independent  -KM     Assistive Device (Toilet Transfer) commode, 3-in-1  -KM       Row Name 04/13/24 0815          Gait/Stairs (Locomotion)    Robeson Level (Gait) standby assist;contact guard  -KM     Assistive Device (Gait) walker, front-wheeled  -KM     Distance in Feet (Gait) 160  -KM     Comment, (Gait/Stairs) Pt with improved tolerance with increased gait distance. Pt ambulates x 15ft without use of AD within room without LOB  -KM        Row Name 04/13/24 0815          Plan of Care Review    Plan of Care Reviewed With patient  -KM     Progress improving  -KM     Outcome Evaluation PT: Pt willing to work with PT and states she is doing better. Pt has made good progress toward goals completing all mobility and transfers with supervision/SBA. Pt ambulated a total of 160ft with FWW SBA with improved tolerance; ambulated x 15 ft within room without use of AD without LOB. Pt does not express any questions or concerns upon being discharged home.  -KM       Row Name 04/13/24 0815          Positioning and Restraints    Pre-Treatment Position in bed  -KM     Post Treatment Position chair  -KM     In Chair reclined;call light within reach;encouraged to call for assist;with nsg  -KM               User Key  (r) = Recorded By, (t) = Taken By, (c) = Cosigned By      Initials Name Provider Type    Jocelyn Brandon PTA Physical Therapist Assistant                    Physical Therapy Education       Title: PT OT SLP Therapies (Done)       Topic: Physical Therapy (Done)       Point: Mobility training (Done)       Learning Progress Summary             Patient Acceptance, E, VU by  at 4/13/2024 0855    Comment: Progressed gait and mobility and use of AD when needed    Acceptance, E,TB, VU by  at 4/12/2024 1317                         Point: Home exercise program (Done)       Learning Progress Summary             Patient Acceptance, E, VU by  at 4/13/2024 0855    Comment: Progressed gait and mobility and use of AD when needed                                         User Key       Initials Effective Dates Name Provider Type Discipline     06/16/21 -  Tracey Arnold, PT Physical Therapist PT     06/16/21 -  Jocelyn López PTA Physical Therapist Assistant PT                  PT Recommendation and Plan     Plan of Care Reviewed With: patient  Progress: improving  Outcome Evaluation: PT: Pt willing to work with PT and states she is doing better. Pt has  made good progress toward goals completing all mobility and transfers with supervision/SBA. Pt ambulated a total of 160ft with FWW SBA with improved tolerance; ambulated x 15 ft within room without use of AD without LOB. Pt does not express any questions or concerns upon being discharged home.   Outcome Measures       Row Name 04/13/24 0815             How much help from another person do you currently need...    Turning from your back to your side while in flat bed without using bedrails? 4  -KM      Moving from lying on back to sitting on the side of a flat bed without bedrails? 4  -KM      Moving to and from a bed to a chair (including a wheelchair)? 4  -KM      Standing up from a chair using your arms (e.g., wheelchair, bedside chair)? 4  -KM      Climbing 3-5 steps with a railing? 3  -KM      To walk in hospital room? 3  -KM      AM-PAC 6 Clicks Score (PT) 22  -KM      Highest Level of Mobility Goal 7 --> Walk 25 feet or more  -KM         Functional Assessment    Outcome Measure Options AM-PAC 6 Clicks Basic Mobility (PT)  -KM                User Key  (r) = Recorded By, (t) = Taken By, (c) = Cosigned By      Initials Name Provider Type    Jocelyn Brandon PTA Physical Therapist Assistant                     Time Calculation:    PT Charges       Row Name 04/13/24 0857             Time Calculation    Start Time 0815  -KM      Stop Time 0830  -KM      Time Calculation (min) 15 min  -KM                User Key  (r) = Recorded By, (t) = Taken By, (c) = Cosigned By      Initials Name Provider Type    Jocelyn Brandon PTA Physical Therapist Assistant                  Therapy Charges for Today       Code Description Service Date Service Provider Modifiers Qty    [id number] HC GAIT TRAINING EA 15 MIN 4/13/2024 Jocelyn López PTA GP 1            PT G-Codes  Outcome Measure Options: AM-PAC 6 Clicks Basic Mobility (PT)  AM-PAC 6 Clicks Score (PT): 22  AM-PAC 6 Clicks Score (OT): 22  Modified Parlin Scale: 0 - No  Symptoms at all.    Jocelyn López, PTA  4/13/2024

## 2024-04-13 NOTE — DISCHARGE SUMMARY
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  Hyperlipidemia  COPD  Chronic pain left hip uncomplicated opioid dependence  Orthostatic hypotension    PCP  Patient Care Team:  Nigel De Paz MD as PCP - General (Family Medicine)  Lucita Yu, RN as Nurse Navigator  Nikita Burns MD as Consulting Physician (Hematology and Oncology)  Alexia Velásquez MD as Referring Physician (Thoracic Surgery)    Consults:   Consults       Date and Time Order Name Status Description    4/11/2024  2:15 PM Inpatient Neurology Consult General Completed               History of Present Illness:  68-year-old white female with history of small cell carcinoma of lung who is brought in the emergency room for unresponsiveness.  History is obtained from her niece Clarita Carvajal who apparently took her to chemotherapy on Wednesday and dropped her off home around 4:30 PM.  She came back to pick her up for chemotherapy on Thursday in the a.m. apparently found the patient poorly responsive [facility] bed.  On questioning the people at home currently she was found that way around midnight but no further intervention was sought at that time.  EMS was called and per ER physician's note her blood sugar was 125 she was hypotensive sats were in the mid 70s and patient was placed on nonrebreather and brought to the emergency room.     On arrival to the emergency room she was poorly responsive was given Narcan and her O2 has been gradually weaned she is down to 2 L and  maintaining sats in the mid 90s.      CT was done in emergency room showed questionable anoxic brain injury and subsequently neurology was called and an MRI of the brain was ordered showed within the bilateral basal ganglia, bilateral occipital lobes, and right frontal lobe compatible with multiple infarcts     Hospital Course    She was admitted to a monitored bed she had no arrhythmias echocardiogram was performed which was normal.  She was started on IV fluids and renal functions improved and acute WILEY resolved.  Because of bilateral nature of her emboli likely was due to underlying hypercoagulable state from her underlying malignancy and patient was started on Eliquis.     her respiratory status also improved and she was off her oxygen within 48 hours.  PT OT saw patient consultation made consistent progress was almost back to baseline at time of discharge.    She will be scheduled for Zio patch on discharge      Operations and Procedures Performed:       Adult Transthoracic Echo Complete W/ Cont if Necessary Per Protocol    Result Date: 4/11/2024  Narrative:   Left ventricular systolic function is normal. Left ventricular ejection fraction appears to be 56 - 60%.   Left ventricular diastolic function is consistent with (grade I) impaired relaxation.   The interatrial septum appears redundant. The agitated saline study is positive, consistent with a small PFO   Mild tricuspid valve regurgitation is present   Calculated right ventricular systolic pressure from tricuspid regurgitation is 24 mmHg.   There is no evidence of pericardial effusion.     US Carotid Bilateral    Result Date: 4/11/2024  Narrative: US CAROTID BILATERAL Date of Exam: 4/11/2024 3:38 PM EDT Indication: bilat occipital cvas. Comparison: No comparisons available. Technique: Grayscale, color-flow, and spectral imaging was obtained of the bilateral carotid and vertebral arteries. Findings: No significant plaquing within the right common or  internal carotid artery. Peak systolic velocity is 57 cm/s. There is antegrade flow within the right vertebral artery. Mild plaquing in the left carotid bulb and left proximal ICA. Peak systolic velocity is 122 cm/s. There is antegrade flow within the left vertebral artery.     Impression: Impression: 1. Mild plaquing but no hemodynamically significant stenosis. Electronically Signed: Anup Davila MD  4/11/2024 3:58 PM EDT  Workstation ID: [id number]    MRI Brain With & Without Contrast    Addendum Date: 4/11/2024 Addendum:   Findings regarding acute infarcts were personally called to the patient's nurse at 3:50 p.m. on 4/11/2024 Electronically Signed: Anup Bragg MD  4/11/2024 3:50 PM EDT  Workstation ID: [id number]    Result Date: 4/11/2024  Narrative: MRI BRAIN W WO CONTRAST Date of Exam: 4/11/2024 1:11 PM EDT Indication: ams.  Comparison: CT brain 4/11/2024 Technique:  Routine multiplanar/multisequence sequence images of the brain were obtained before and after the uneventful administration of Multihance. Findings: There is mild generalized parenchymal volume loss. Diffusion weighted images demonstrate areas of restricted diffusion within the globus pallidus bilaterally corresponding to the areas of low-attenuation noted on prior CT scan. There are additional punctate foci of restricted diffusion involving the precentral gyrus of the right frontal lobe as well as additional patchy areas of restricted diffusion within the bilateral occipital lobes. Findings would be compatible with multiple acute infarcts. Given the symmetric appearance within the basal ganglia other differential considerations would include carbon monoxide poisoning, anoxic injury there is no evidence of intracranial hemorrhage. No abnormal extra-axial fluid collections are identified. No  mass effect or hydrocephalus. Major intracranial vascular flow voids are preserved. Sella and suprasellar cistern are within normal limits. Craniovertebral  junction appears normal. Postcontrast images demonstrate no pathologic intracranial contrast enhancement.     Impression: Impression: 1. There are areas of restricted diffusion within the bilateral basal ganglia, bilateral occipital lobes, and right frontal lobe compatible with multiple infarcts. The symmetric appearance of the infarcts within the basal ganglia can be seen with anoxic injury or carbon monoxide poisoning. 2. No evidence of acute intracranial hemorrhage. 3. No evidence of pathologic contrast enhancement. Electronically Signed: Anup Bragg MD  4/11/2024 3:43 PM EDT  Workstation ID: [id number]    XR Chest 1 View    Result Date: 4/11/2024  Narrative: XR CHEST 1 VW Date of Exam: 4/11/2024 2:39 PM EDT Indication: decrease breathing Comparison: None available. Findings:  RIght IJ portacath is unchanged. Heart size and pulmonary vessels are within normal limits. Interstitial markings are prominent and similar to prior exam. No focal airspace consolidation. No pleural effusion or pneumothorax.      Impression: Impression:   Prominent interstitial markings similar to the prior exam. No focal airspace consolidation or other acute cardiopulmonary disease. Electronically Signed: Anup Bragg MD  4/11/2024 3:05 PM EDT  Workstation ID: [id number]    CT Head Without Contrast    Result Date: 4/11/2024  Narrative: CT HEAD WO CONTRAST Date of Exam: 4/11/2024 9:19 AM EDT Indication: AMS. Comparison: MRI brain 3/13/2024 Technique: Axial CT images were obtained of the head without contrast administration.  Coronal reconstructions were performed.  Automated exposure control and iterative reconstruction methods were used. Findings: There are small areas of low-attenuation within the globes bilaterally bilaterally. Findings are nonspecific but can be seen with anoxic injury or carboximide poisoning as well as other metabolic abnormalities. No evidence of acute intracranial hemorrhage. No abnormal extra-axial fluid  collections are seen. No mass effect or hydrocephalus. Visualized paranasal sinuses and mastoid air cells are clear. The globes and orbits appear within normal limits.     Impression: Impression: 1. Small subtle areas of low-attenuation within the bilateral basal ganglia. These are nonspecific but can be seen in the setting of anoxic injury, carbon monoxide poisoning, and other metabolic abnormalities. Clinical correlation recommended. 2. No evidence of acute intracranial hemorrhage. Electronically Signed: Anup Bragg MD  4/11/2024 9:47 AM EDT  Workstation ID: [id number]    XR Chest Post CVA Port    Result Date: 3/14/2024  Narrative: XR CHEST POST CVA PORT Date of Exam: 3/14/2024 4:32 PM EDT Indication: Port placement Comparison: 2/29/2024 Findings: There has been interval insertion of a right-sided chest port with the tip at the cavoatrial junction via the right internal jugular vein. Lungs show prominent interstitial markings with no acute infiltrates. There is no pneumothorax.     Impression: Impression: Right-sided chest port appears in appropriate position with the tip at the cavoatrial junction. No pneumothorax is identified. Electronically Signed: Chris Manning MD  3/14/2024 4:54 PM EDT  Workstation ID: [id number]     Labs:  Results from last 7 days   Lab Units 04/13/24  0536 04/12/24  0806 04/11/24  1012 04/09/24  0755   WBC 10*3/mm3 8.14 8.78 13.50* 6.68   HEMOGLOBIN g/dL 9.7* 10.2* 11.0* 12.1   HEMATOCRIT % 29.0* 31.2* 34.6 35.7   PLATELETS 10*3/mm3 217 232 318 313     Results from last 7 days   Lab Units 04/13/24  0536 04/12/24  0806 04/11/24  1012 04/09/24  0755   SODIUM mmol/L 140 128* 131* 131*   POTASSIUM mmol/L 3.7 4.1 4.0 4.1   CHLORIDE mmol/L 105 96* 95* 93*   CO2 mmol/L 25.4 25.9 24.8 29.4*   ANION GAP mmol/L 9.6 6.1 11.2 8.6   BUN mg/dL 7* 15 17 5*   CREATININE mg/dL 0.93 1.20* 1.67* 1.07*   BUN / CREAT RATIO  7.5 12.5 10.2 4.7*   EGFR mL/min/1.73 67.9 50.0* 33.6* 57.4*   CALCIUM mg/dL 8.5* 8.8  9.7 9.3   GLUCOSE mg/dL 92 85 90 73       Lab Results (last 24 hours)       Procedure Component Value Units Date/Time    Basic Metabolic Panel [[id number]]  (Abnormal) Collected: 04/13/24 0536    Specimen: Blood Updated: 04/13/24 0617     Glucose 92 mg/dL      BUN 7 mg/dL      Creatinine 0.93 mg/dL      Sodium 140 mmol/L      Potassium 3.7 mmol/L      Chloride 105 mmol/L      CO2 25.4 mmol/L      Calcium 8.5 mg/dL      BUN/Creatinine Ratio 7.5     Anion Gap 9.6 mmol/L      eGFR 67.9 mL/min/1.73     Narrative:      GFR Normal >60  Chronic Kidney Disease <60  Kidney Failure <15      CBC (No Diff) [[id number]]  (Abnormal) Collected: 04/13/24 0536    Specimen: Blood Updated: 04/13/24 0555     WBC 8.14 10*3/mm3      RBC 2.99 10*6/mm3      Hemoglobin 9.7 g/dL      Hematocrit 29.0 %      MCV 97.0 fL      MCH 32.4 pg      MCHC 33.4 g/dL      RDW 12.8 %      RDW-SD 45.3 fl      MPV 8.8 fL      Platelets 217 10*3/mm3     Osmolality, Urine - Urine, Clean Catch [[id number]] Collected: 04/12/24 0948    Specimen: Urine, Clean Catch Updated: 04/12/24 1654     Osmolality, Urine 153 mOsm/kg     Narrative:      Osmo Normal Reference Ranges:    Random:  mOsm/kg H2O, depending on fluid intake.  Random: >850 mOsm/kg H20, after 12 hour fluid restriction.    24 Hour: 300-900 mOsm/kg H2O.    Osmolality, Serum [[id number]]  (Abnormal) Collected: 04/12/24 0806    Specimen: Blood Updated: 04/12/24 1648     Osmolality 275 mOsm/kg               Results from last 7 days   Lab Units 04/11/24  1012   TSH uIU/mL 0.450             Results from last 7 days   Lab Units 04/11/24  1012   MAGNESIUM mg/dL 2.1     Results from last 7 days   Lab Units 04/11/24  1012   CHOLESTEROL mg/dL 207*   TRIGLYCERIDES mg/dL 165*   HDL CHOL mg/dL 54     Results from last 7 days   Lab Units 04/11/24  1455   PH, ARTERIAL pH units 7.362   PCO2, ARTERIAL mm Hg 51.6*   PO2 ART mm Hg 62.4*   HCO3 ART mmol/L 29.3*     Results from last 7 days   Lab Units 04/11/24  1012  "  HEMOGLOBIN A1C % 5.30     No results found for: \"POCGLU\"                      Radiology:  Imaging Results (Last 24 Hours)       ** No results found for the last 24 hours. **            PROCEDURES          Allergies:  is allergic to codeine and percocet [oxycodone-acetaminophen].      Discharge Medications:     Your medication list        START taking these medications        Instructions Last Dose Given Next Dose Due   apixaban 5 MG tablet tablet  Commonly known as: ELIQUIS      Take 1 tablet by mouth Every 12 (Twelve) Hours. Indications: Other - full anticoagulation              CONTINUE taking these medications        Instructions Last Dose Given Next Dose Due   albuterol sulfate  (90 Base) MCG/ACT inhaler  Commonly known as: PROVENTIL HFA;VENTOLIN HFA;PROAIR HFA      Inhale 2 puffs Every 4 (Four) Hours As Needed for Wheezing. Takes as needed.       budesonide-formoterol 160-4.5 MCG/ACT inhaler  Commonly known as: SYMBICORT      Inhale 2 puffs 2 (Two) Times a Day.       diazePAM 10 MG tablet  Commonly known as: VALIUM      Take 1 tablet by mouth 2 (Two) Times a Day As Needed for Anxiety (RLS.). Takes 20 mg at bedtime at times when she needs.       HYDROcodone-acetaminophen  MG per tablet  Commonly known as: NORCO      Take 1 tablet by mouth 3 (Three) Times a Day As Needed for Moderate Pain.       midodrine 2.5 MG tablet  Commonly known as: PROAMATINE      Take 1 tablet by mouth 3 (Three) Times a Day Before Meals.       naloxone 4 MG/0.1ML nasal spray  Commonly known as: NARCAN      1 spray into the nostril(s) as directed by provider As Needed.       pantoprazole 40 MG EC tablet  Commonly known as: PROTONIX      Take 1 tablet by mouth Daily.       PROLIA SC      Inject  under the skin into the appropriate area as directed Every 6 (Six) Months.       sucralfate 1 g tablet  Commonly known as: CARAFATE      Take 1 tablet by mouth 4 (Four) Times a Day.              STOP taking these medications  "     OLANZapine 5 MG tablet  Commonly known as: zyPREXA        ondansetron 8 MG tablet  Commonly known as: ZOFRAN                  Where to Get Your Medications        These medications were sent to [facility] [id number] - [place] - [address] - [phone]  - [phone] FX  [address] [address]      Phone: [phone]   apixaban 5 MG tablet tablet         Home medications and discharge medications reconciled with patient      Condition on Discharge: Stable    Discharge Disposition  Home    Visiting Nurse:    No     Home PT/OT:  Yes     Home Safety Evaluation:  No     DME  None    Discharge Diet:      Dietary Orders (From admission, onward)       Start     Ordered    04/12/24 0918  Diet: Regular/House, Fluid Restriction (240 mL/tray); 1500 mL/day; Fluid Consistency: Thin (IDDSI 0)  Diet Effective Now        References:    Diet Order [place]   Question Answer Comment   Diets: Regular/House    Diets: Fluid Restriction (240 mL/tray)    Fluid Restriction Diet (240 mL/tray): 1500 mL/day    Fluid Consistency: Thin (IDDSI 0)        04/12/24 0918                    Activity at Discharge:  As tolerated      Follow-up Appointments:  Additional Instructions for the Follow-ups that You Need to Schedule       Call MD for problems / concerns.   As directed      Discharge Follow-up with PCP   As directed       Currently Documented PCP:    Nigel De Paz MD    PCP Phone Number:    [phone]     Follow Up Details: Thursday or Friday               Follow-up Information       Nigel De Paz MD .    Specialty: Family Medicine  Why: Thursday or Friday  Contact information:  [place] [address] [phone]                             Test Results Pending at Discharge       Nigel De Paz MD  04/13/24  09:38 EDT          Much of this encounter note is an electronic transcription/translation of spoken language to printed text. The electronic  translation of spoken language may permit erroneous, or at times, nonsensical words or phrases to be inadvertently transcribed; Although I have reviewed the note for such errors, some may still exist.    Note Disclaimer: At [facility], we believe that sharing information builds trust and better relationships. You are receiving this note because you recently visited [facility]. It is possible you will see health information before a provider has talked with you about it. This kind of information can be easy to misunderstand. To help you fully understand what it means for your health, we urge you to discuss this note with your provider.

## 2024-04-13 NOTE — PLAN OF CARE
Goal Outcome Evaluation:  Plan of Care Reviewed With: patient        Progress: improving  Outcome Evaluation: PT: Pt willing to work with PT and states she is doing better. Pt has made good progress toward goals completing all mobility and transfers with supervision/SBA. Pt ambulated a total of 160ft with FWW SBA with improved tolerance; ambulated x 15 ft within room without use of AD without LOB. Pt does not express any questions or concerns upon being discharged home.

## 2024-04-14 NOTE — OUTREACH NOTE
Prep Survey      Flowsheet Row Responses   [facility] facility patient discharged from? [facility]   Is LACE score < 7 ? No   Eligibility Readm Mgmt   Discharge diagnosis AMS (altered mental status)   Does the patient have one of the following disease processes/diagnoses(primary or secondary)? Stroke   Does the patient have Home health ordered? No   Is there a DME ordered? No   Prep survey completed? Yes            Yomaira SORENSON - Registered Nurse

## 2024-04-15 ENCOUNTER — TELEPHONE (OUTPATIENT)
Dept: ONCOLOGY | Facility: CLINIC | Age: 69
End: 2024-04-15
Payer: MEDICARE

## 2024-04-15 NOTE — NURSING NOTE
1400  Pt here to [facility] ambulatory for Prolia injection.  Labs done at Dr De Paz office and faxed to [facility].  1435  Pt discharged ambulatory and pt called Dr De Paz office for new order for Prolia.  Pt denies any adverse reactions to injection given today.     Lowered  Theophyline 25 bid  Atropine for sustained HR < 30

## 2024-04-15 NOTE — TELEPHONE ENCOUNTER
"  Caller: Pili Arechiga \"THEODORE\"    Relationship to patient: Self    Best call back number: [phone]    Chief complaint: RECENT HOSPITAL VISIT TOLD TO F/U THIS WEEK    Type of visit: F/U 1    Requested date: TUES OR WED WEEK OF 4/15/2024  IN [place], CALL TO [facility]      "

## 2024-04-18 ENCOUNTER — READMISSION MANAGEMENT (OUTPATIENT)
Dept: CALL CENTER | Facility: HOSPITAL | Age: 69
End: 2024-04-18
Payer: MEDICARE

## 2024-04-18 ENCOUNTER — OFFICE VISIT (OUTPATIENT)
Dept: ONCOLOGY | Facility: CLINIC | Age: 69
End: 2024-04-18
Payer: MEDICARE

## 2024-04-18 ENCOUNTER — LAB (OUTPATIENT)
Dept: LAB | Facility: HOSPITAL | Age: 69
End: 2024-04-18
Payer: MEDICARE

## 2024-04-18 VITALS
DIASTOLIC BLOOD PRESSURE: 74 MMHG | BODY MASS INDEX: 19.17 KG/M2 | RESPIRATION RATE: 18 BRPM | WEIGHT: 108.2 LBS | TEMPERATURE: 97.8 F | HEIGHT: 63 IN | OXYGEN SATURATION: 97 % | HEART RATE: 80 BPM | SYSTOLIC BLOOD PRESSURE: 116 MMHG

## 2024-04-18 DIAGNOSIS — C80.1 SMALL CELL CARCINOMA: Primary | ICD-10-CM

## 2024-04-18 LAB
ANION GAP SERPL CALCULATED.3IONS-SCNC: 10.4 MMOL/L (ref 5–15)
BASOPHILS # BLD AUTO: 0.04 10*3/MM3 (ref 0–0.2)
BASOPHILS NFR BLD AUTO: 0.7 % (ref 0–1.5)
BUN SERPL-MCNC: 12 MG/DL (ref 8–23)
BUN/CREAT SERPL: 9.6 (ref 7–25)
CALCIUM SPEC-SCNC: 10.4 MG/DL (ref 8.6–10.5)
CHLORIDE SERPL-SCNC: 95 MMOL/L (ref 98–107)
CO2 SERPL-SCNC: 28.6 MMOL/L (ref 22–29)
CREAT SERPL-MCNC: 1.25 MG/DL (ref 0.57–1)
DEPRECATED RDW RBC AUTO: 46.9 FL (ref 37–54)
EGFRCR SERPLBLD CKD-EPI 2021: 47.6 ML/MIN/1.73
EOSINOPHIL # BLD AUTO: 0.03 10*3/MM3 (ref 0–0.4)
EOSINOPHIL NFR BLD AUTO: 0.6 % (ref 0.3–6.2)
ERYTHROCYTE [DISTWIDTH] IN BLOOD BY AUTOMATED COUNT: 12.6 % (ref 12.3–15.4)
GLUCOSE SERPL-MCNC: 140 MG/DL (ref 65–99)
HCT VFR BLD AUTO: 36.9 % (ref 34–46.6)
HGB BLD-MCNC: 11.7 G/DL (ref 12–15.9)
IMM GRANULOCYTES # BLD AUTO: 0.03 10*3/MM3 (ref 0–0.05)
IMM GRANULOCYTES NFR BLD AUTO: 0.6 % (ref 0–0.5)
LYMPHOCYTES # BLD AUTO: 2.66 10*3/MM3 (ref 0.7–3.1)
LYMPHOCYTES NFR BLD AUTO: 49 % (ref 19.6–45.3)
MCH RBC QN AUTO: 32 PG (ref 26.6–33)
MCHC RBC AUTO-ENTMCNC: 31.7 G/DL (ref 31.5–35.7)
MCV RBC AUTO: 100.8 FL (ref 79–97)
MONOCYTES # BLD AUTO: 0.26 10*3/MM3 (ref 0.1–0.9)
MONOCYTES NFR BLD AUTO: 4.8 % (ref 5–12)
NEUTROPHILS NFR BLD AUTO: 2.41 10*3/MM3 (ref 1.7–7)
NEUTROPHILS NFR BLD AUTO: 44.3 % (ref 42.7–76)
PLATELET # BLD AUTO: 133 10*3/MM3 (ref 140–450)
PMV BLD AUTO: 8.6 FL (ref 6–12)
POTASSIUM SERPL-SCNC: 5.3 MMOL/L (ref 3.5–5.2)
RBC # BLD AUTO: 3.66 10*6/MM3 (ref 3.77–5.28)
SODIUM SERPL-SCNC: 134 MMOL/L (ref 136–145)
WBC NRBC COR # BLD AUTO: 5.43 10*3/MM3 (ref 3.4–10.8)

## 2024-04-18 PROCEDURE — 80048 BASIC METABOLIC PNL TOTAL CA: CPT | Performed by: INTERNAL MEDICINE

## 2024-04-18 PROCEDURE — 36415 COLL VENOUS BLD VENIPUNCTURE: CPT

## 2024-04-18 PROCEDURE — 85025 COMPLETE CBC W/AUTO DIFF WBC: CPT | Performed by: INTERNAL MEDICINE

## 2024-04-18 RX ORDER — LEVETIRACETAM 500 MG/1
500 TABLET ORAL 2 TIMES DAILY
COMMUNITY

## 2024-04-18 RX ORDER — LEVETIRACETAM 500 MG/1
500 TABLET ORAL EVERY 12 HOURS
Qty: 60 TABLET | Refills: 3 | Status: SHIPPED | OUTPATIENT
Start: 2024-04-18

## 2024-04-18 NOTE — OUTREACH NOTE
Stroke Week 1 Survey      Flowsheet Row Responses   [facility] patient discharged from? [facility]   Does the patient have one of the following disease processes/diagnoses(primary or secondary)? Stroke   Week 1 attempt successful? Yes   Call start time 1446   Call end time 1449   Discharge diagnosis AMS (altered mental status)   Person spoke with today (if not patient) and relationship pt   Meds reviewed with patient/caregiver? Yes   Is the patient having any side effects they believe may be caused by any medication additions or changes? No   Does the patient have all medications ordered at discharge? Yes   Is the patient taking all medications as directed (includes completed medication regime)? Yes   Does the patient have a primary care provider?  Yes   Does the patient have an appointment with their PCP within 7 days of discharge? Yes   Has the patient kept scheduled appointments due by today? Yes   Psychosocial issues? No   Does the patient require any assistance with activities of daily living such as eating, bathing, dressing, walking, etc.? No   Does the patient have any residual symptoms from stroke/TIA? No   Does the patient understand the diet ordered at discharge? Yes   Did the patient receive a copy of their discharge instructions? Yes   Nursing interventions Reviewed instructions with patient   What is the patient's perception of their health status since discharge? Improving   Nursing interventions Nurse provided patient education   Is the patient/caregiver able to teach back the risk factors for a stroke? High blood pressure-goal below 120/80   Is the patient/caregiver able to teach back signs and symptoms related to disease process for when to call PCP? Yes   Is the patient/caregiver able to teach back signs and symptoms related to disease process for when to call 911? Yes   If the patient is a current smoker, are they able to teach back resources for cessation? 8-084-[id number]   Is the  patient/caregiver able to teach back the hierarchy of who to call/visit for symptoms/problems? PCP, Specialist, Home health nurse, Urgent Care, ED, 911 Yes   Is the patient able to teach back FAST for Stroke? B alance: Watch for sudden loss of balance, E yes: Check for vision loss, F ace: Look for an uneven smile, A rm: Check if one arm is weak, S peech: Listen for slurred speech, T nadia: Call 9-1-1 right away   Week 1 call completed? Yes   Is the patient interested in additional calls from an ambulatory ? No   Would this patient benefit from a Referral to [facility] Social Work? No   Wrap up additional comments pt doing ok, still smoking.   Call end time 1443            LAURA MCGARRY - Registered Nurse

## 2024-04-18 NOTE — PROGRESS NOTES
Subjective     REASON FOR CONSULTATION:  small cell lung cancer  Provide an opinion on any further workup or treatment                             REQUESTING PHYSICIAN:  James    RECORDS OBTAINED:  Records of the patients history including those obtained from the referring provider were reviewed and summarized in detail.    HISTORY OF PRESENT ILLNESS:  The patient is a 68 y.o. year old female who is here for an opinion about the above issue.    History of Present Illness   The patient initiated chemoimmunotherapy with carboplatin/etoposide/atezolizumab 3/19/2024.  Cycle 2-day 3 family went to pick the patient up for chemotherapy and she was found to be unresponsive at home.  She was brought to the ER and hypoxic CT scan showed abnormalities for which MRI of the brain was performed 4/11/2024 showing areas of restricted diffusion in the bilateral basal ganglia, occipital lobes, right frontal lobe consistent with multiple infarcts.  She had no evidence of atrial fibrillation and normal 2D echocardiogram.  She was started on Eliquis 5 mg every 12 hours.  She was mildly hyponatremic and there was some concern for seizure for which she was started on Keppra by neurology.  The patient gradually improved and was discharged from the hospital near normal baseline on 4/13/2024.    Oncology History:  This is a 68-year-old woman with long history of tobacco use and COPD, degenerative disease of the spine on narcotic therapy, depression/anxiety, orthostatic hypotension, hyperlipidemia who has been followed with serial imaging for a left lower lobe lung nodule and mediastinal lymphadenopathy.  A CT of the chest 1/21/2023 showed a masslike consolidation in the left lower lobe 2.5 x 2.6 cm in size with a potential cavitary focus centrally surrounding haziness and otherwise groundglass opacities in the right middle lobe and right lower lobe and enlarged mediastinal lymph nodes up to 10 mm.  The findings were favored to  be infectious or inflammatory.  A follow-up CT chest 7/28/2023 showed pleural-based area of density in the left upper lung 11 x 5 mm new from the previous exam and resolution of the consolidation in the left lower lobe.  A PET scan was performed 8/9/2023 which showed the 1.1 cm pleural-based density in the posterior left lower lobe to blend into dependent atelectasis but have more intense activity than the atelectasis with maximum SUV 2.8.  There was hypermetabolic lymphadenopathy in the left hilum and left lower paratracheal regions for example lymph node posterior to the left pulmonary artery SUV 4.7 measuring 1.6 cm and another area of soft tissue lateral to the left mainstem bronchus SUV 4.4, ill-defined lymphadenopathy left lower paratracheal region SUV 3.7.  She underwent bronchoscopy with biopsies on 8/25/2023 with samples from stations 11 R, 4R, 7, 10 L, 11 L, 12 L all negative for malignancy; station 4R showed rare atypical reactive epithelial cells.  A follow-up CT of the chest on 2/6/2024 showed the left lower lobe nodule to be enlarging at 1.6 x 0.9 cm and enlarging precarinal lymphadenopathy concerning for metastatic disease.  The patient underwent repeat bronchoscopy with Ion navigation on 2/28/2024; biopsies from the left lower lobe nodule were positive for small cell carcinoma and a level 4 lymph node biopsied also positive for small cell carcinoma.    Full body PET scan on 3/7/2024 showed significantly avid bilateral hilar and mediastinal lymph nodes for example kyl[employer] AP window 8.3 SUV measuring 2.5 x 1.8 cm, left hilar lymph node SUV 7 1.4 cm, pleural-based nodularity left major fissure newly hypermetabolic SUV 2.2, pleural-based partially cavitary mass left lower lobe SUV five 1.5 x 1 cm, new right lung nodule 8 mm in the right lower lobe suspicious.  MRI of the brain negative.    The patient has comorbidities of COPD but minimally symptomatic, no known cardiac disease, kidney  disease, liver disease, diabetes etc.  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    The patient initiated chemoimmunotherapy with carboplatin/etoposide/atezolizumab 3/19/2024.     Cycle 2-day 3 family went to pick the patient up for chemotherapy and she was found to be unresponsive at home.  She was brought to the ER and hypoxic CT scan showed abnormalities for which MRI of the brain was performed 2024 showing areas of restricted diffusion in the bilateral basal ganglia, occipital lobes, right frontal lobe consistent with multiple infarcts.  She had no evidence of atrial fibrillation and normal 2D echocardiogram.  She was started on Eliquis 5 mg every 12 hours.  She was mildly hyponatremic and there was some concern for seizure for which she was started on Keppra by neurology.  The patient gradually improved and was discharged from the hospital near normal baseline on 2024.    Past Medical History:   Diagnosis Date    COPD (chronic obstructive pulmonary disease)     COPD with acute exacerbation 2020    Small cell carcinoma 3/13/2024        Past Surgical History:   Procedure Laterality Date    BRONCHOSCOPY N/A 2023    Procedure: BRONCHOSCOPY WITH ENDOBRONCHIAL ULTRASOUND (EBUS) with FNA;  Surgeon: Coy Mccarthy MD;  Location: [facility] ENDOSCOPY;  Service: Pulmonary;  Laterality: N/A;  Pre/Post - mediastinal and hilar lymphadenopathy.    BRONCHOSCOPY WITH ION ROBOTIC ASSIST N/A 2024    Procedure: BRONCHOSCOPY WITH ION ROBOT,  ENDOBRONCHIAL ULTRASOUND, FINE NEEDLE ASPIRATIONS,  CRYOTHERAPY BIOPSIES, AND LEFT LOWER LOBE BRONCHOALVEOLAR LAVAGE.;  Surgeon: Alexia Velásquez MD;  Location: [facility] ENDOSCOPY;  Service: Robotics - Pulmonary;  Laterality: N/A;     SECTION      CHEST TUBE INSERTION Left 2024    Procedure: CHEST TUBE INSERTION;  Surgeon: Alexia Velásquez MD;  Location: [facility] ENDOSCOPY;  Service: Thoracic;  Laterality: Left;    CHOLECYSTECTOMY      COLONOSCOPY      ECTOPIC  PREGNANCY SURGERY      HYSTERECTOMY      TONSILLECTOMY      TOTAL HIP ARTHROPLASTY Left     VENOUS ACCESS DEVICE (PORT) INSERTION N/A 3/14/2024    Procedure: INSERTION VENOUS ACCESS DEVICE;  Surgeon: Jonas Garner MD;  Location: [facility];  Service: General;  Laterality: N/A;        Current Outpatient Medications on File Prior to Visit   Medication Sig Dispense Refill    albuterol sulfate  (90 Base) MCG/ACT inhaler Inhale 2 puffs Every 4 (Four) Hours As Needed for Wheezing. Takes as needed.      apixaban (ELIQUIS) 5 MG tablet tablet Take 1 tablet by mouth Every 12 (Twelve) Hours. Indications: Other - full anticoagulation 180 tablet 0    budesonide-formoterol (SYMBICORT) 160-4.5 MCG/ACT inhaler Inhale 2 puffs 2 (Two) Times a Day.  12    Denosumab (PROLIA SC) Inject  under the skin into the appropriate area as directed Every 6 (Six) Months.      diazePAM (VALIUM) 10 MG tablet Take 1 tablet by mouth 2 (Two) Times a Day As Needed for Anxiety (RLS.). Takes 20 mg at bedtime at times when she needs.      HYDROcodone-acetaminophen (NORCO)  MG per tablet Take 1 tablet by mouth 3 (Three) Times a Day As Needed for Moderate Pain.      levETIRAcetam (KEPPRA) 500 MG tablet Take 1 tablet by mouth 2 (Two) Times a Day.      midodrine (PROAMATINE) 2.5 MG tablet Take 1 tablet by mouth 3 (Three) Times a Day Before Meals.      naloxone (NARCAN) 4 MG/0.1ML nasal spray 1 spray into the nostril(s) as directed by provider As Needed.      pantoprazole (PROTONIX) 40 MG EC tablet Take 1 tablet by mouth Daily. 30 tablet 3    sucralfate (CARAFATE) 1 g tablet Take 1 tablet by mouth 4 (Four) Times a Day. 120 tablet 3     No current facility-administered medications on file prior to visit.        ALLERGIES:    Allergies   Allergen Reactions    Codeine GI Intolerance    Percocet [Oxycodone-Acetaminophen] Hives        Social History     Socioeconomic History    Marital status:    Tobacco Use    Smoking status: Every  "Day     Current packs/day: 1.00     Average packs/day: 1 pack/day for 30.0 years (30.0 ttl pk-yrs)     Types: Cigarettes     Passive exposure: Current    Smokeless tobacco: Never    Tobacco comments:     Began smoking at age 11.  Smoked .5 ppd for 6 years, 2.5 ppd for a year, 2 ppd for 5 years, and 1 ppd for the past 43 years for a 58.5 pack year history.   Vaping Use    Vaping status: Former    Substances: Nicotine, Flavoring    Devices: Disposable   Substance and Sexual Activity    Alcohol use: Yes     Comment: once a year     Drug use: No    Sexual activity: Defer        Family History   Problem Relation Age of Onset    Lung cancer Niece 50    Throat cancer Father     Breast cancer Neg Hx         Review of Systems   Constitutional: Negative.    HENT: Negative.     Eyes: Negative.    Respiratory: Negative.     Cardiovascular: Negative.    Gastrointestinal:         Esophagitis symptoms   Musculoskeletal:  Positive for back pain.   Neurological: Negative.    Hematological: Negative.    Psychiatric/Behavioral:  Positive for dysphoric mood. The patient is nervous/anxious.    ROS unchanged-4/18/24    Objective     Vitals:    04/18/24 0803   BP: 116/74   Pulse: 80   Resp: 18   Temp: 97.8 °F (36.6 °C)   TempSrc: Infrared   SpO2: 97%   Weight: 49.1 kg (108 lb 3.2 oz)   Height: 160 cm (62.99\")   PainSc: 0-No pain             4/18/2024     8:05 AM   Current Status   ECOG score 0       Physical Exam    CONSTITUTIONAL: pleasant well-developed adult woman  HEENT: no icterus, no thrush, moist membranes, anisocoria  LYMPH: no cervical or supraclavicular lad  CV: RRR, S1S2, no murmur  RESP: cta bilat, no wheezing, no rales  GI: soft, non-tender, no splenomegaly, +bs  MUSC: no edema, normal gait  NEURO: alert and oriented x3, normal strength  PSYCH: normal mood and affect for situation  Skin: No rash or induration noted, developing alopecia  Unchanged-4/18/2024    RECENT LABS:  Hematology WBC   Date Value Ref Range Status "   04/18/2024 5.43 3.40 - 10.80 10*3/mm3 Final     RBC   Date Value Ref Range Status   04/18/2024 3.66 (L) 3.77 - 5.28 10*6/mm3 Final     Hemoglobin   Date Value Ref Range Status   04/18/2024 11.7 (L) 12.0 - 15.9 g/dL Final     Hematocrit   Date Value Ref Range Status   04/18/2024 36.9 34.0 - 46.6 % Final     Platelets   Date Value Ref Range Status   04/18/2024 133 (L) 140 - 450 10*3/mm3 Final        Lab Results   Component Value Date    GLUCOSE 92 04/13/2024    BUN 7 (L) 04/13/2024    CREATININE 0.93 04/13/2024    EGFR 67.9 04/13/2024    BCR 7.5 04/13/2024    K 3.7 04/13/2024    CO2 25.4 04/13/2024    CALCIUM 8.5 (L) 04/13/2024    ALBUMIN 4.1 04/11/2024    BILITOT <0.2 04/11/2024    AST 14 04/11/2024    ALT 8 04/11/2024     MRI brain 4/11/2024:  Findings:  There is mild generalized parenchymal volume loss. Diffusion weighted images demonstrate areas of restricted diffusion within the globus pallidus bilaterally corresponding to the areas of low-attenuation noted on prior CT scan. There are additional   punctate foci of restricted diffusion involving the precentral gyrus of the right frontal lobe as well as additional patchy areas of restricted diffusion within the bilateral occipital lobes. Findings would be compatible with multiple acute infarcts.   Given the symmetric appearance within the basal ganglia other differential considerations would include carbon monoxide poisoning, anoxic injury there is no evidence of intracranial hemorrhage. No abnormal extra-axial fluid collections are identified. No   mass effect or hydrocephalus.     Major intracranial vascular flow voids are preserved. Sella and suprasellar cistern are within normal limits. Craniovertebral junction appears normal.     Postcontrast images demonstrate no pathologic intracranial contrast enhancement.     IMPRESSION:  Impression:     1. There are areas of restricted diffusion within the bilateral basal ganglia, bilateral occipital lobes, and right  frontal lobe compatible with multiple infarcts. The symmetric appearance of the infarcts within the basal ganglia can be seen with anoxic   injury or carbon monoxide poisoning.  2. No evidence of acute intracranial hemorrhage.  3. No evidence of pathologic contrast enhancemen    Assessment & Plan     *Small cell lung cancer left lower lobe of the lung with mediastinal involvement  CT chest 2/22/2024-enlarged for ill lymph nodes 2.5 cm, enlarged AP window lymph node 1.4 cm, poorly defined left hilar lymphadenopathy, left lower lobe pulmonary nodule 1.7 cm  Bronchoscopy with Ion navigation performed 2/28/2024-pathology positive for small cell carcinoma from the left lower lobe and station 4L  PET scan on 3/7/2024 showed significantly avid bilateral hilar and mediastinal lymph nodes for example kyl[employer] AP window 8.3 SUV measuring 2.5 x 1.8 cm, left hilar lymph node SUV 7 1.4 cm, pleural-based nodularity left major fissure newly hypermetabolic SUV 2.2, pleural-based partially cavitary mass left lower lobe SUV five 1.5 x 1 cm, new right lung nodule 8 mm in the right lower lobe suspicious.-Results discussed with Dr. Velásquez and we both feel the contralateral right lung nodule is highly suspicious for metastatic disease  MRI of the brain negative.  3/19/2024.initiated chemoimmunotherapy with carboplatin/etoposide/atezolizumab   4/9/24 received D1-2 of cycle 2 chemotherapy; day 3 held for admission for acute bilateral strokes  *Bilateral strokes suspected embolic-new  Admitted 4/11/2024 with acute mental status change-MRI brain showed multiple areas of restricted diffusion bilateral basal ganglia, bilateral occipital lobes, right frontal lobe  No documented arrhythmia, normal 2D echocardiogram  Patient discharged on Eliquis 5 mg every 12 hours  *Hyponatremia  *Esophagitis secondary to chemotherapy  *Comorbidities of tobacco abuse/COPD, anxiety, atherosclerotic calcifications by CT but no definitive diagnosis of  CAD, degenerative disease of the spine, hyperlipidemia; no known autoimmune disorders, chronic pain on opioid medicines    Oncology plan/recommendations:  Continue Eliquis 5 mg every 12 hours  Discussed with Dr. Yañez who recommended Keppra 500 mg twice daily for seizure prophylaxis-I prescribed to the patient's pharmacy  Restaging CT scans ordered chest abdomen pelvis  Follow-up as scheduled after CT scans for review and if responding continue therapy as planned  I spent 50 minutes of time today on this case reviewing the patient's recent hospital records, labs, imaging studies, discussed with Dr. Yañez neurology, face-to-face time with patient and granddaughter, documenting care, writing orders etc.

## 2024-04-19 ENCOUNTER — HOSPITAL ENCOUNTER (OUTPATIENT)
Dept: CARDIOLOGY | Facility: HOSPITAL | Age: 69
Discharge: HOME | End: 2024-04-19
Payer: MEDICARE

## 2024-04-19 DIAGNOSIS — I63.9 CEREBROVASCULAR ACCIDENT (CVA), UNSPECIFIED MECHANISM: ICD-10-CM

## 2024-04-19 PROCEDURE — 93246 EXT ECG>7D<15D RECORDING: CPT

## 2024-04-22 ENCOUNTER — PATIENT OUTREACH (OUTPATIENT)
Dept: OTHER | Facility: HOSPITAL | Age: 69
End: 2024-04-22
Payer: MEDICARE

## 2024-04-22 NOTE — PROGRESS NOTES
Reviewed chart. Patient with Small cell lung cancer left lower lobe of the lung with mediastinal involvement. Initiated chemoimmunotherapy with carboplatin/etoposide/Atezolizumab 3/19/24; received D1-2 of cycle 2 chemotherapy; day 3 held for admission for acute bilateral strokes.  IP 4/11-4/13/24 for altered mental status; MRI revealed bilateral embolic strokes.  Saw Dr. Burns 4/18, continues Eliquis, Keppra started for seizure prophylaxis.   CT scans 4/24, sees Dr. Burns 4/30            Called patient. We discussed her recent admission and visit with Dr. Burns. The patient reports intermittent dizziness with standing although denies falls.  She currently is wearing a Holter monitor. The patient denies shortness of breath and has a pulse ox to check her oxygen saturations as needed. We discussed her upcoming appts.     The patient denies any questions/concerns or ongoing resource needs. I will call in 1-2 months; encouraged patient to call as needed.

## 2024-04-22 NOTE — PROGRESS NOTES
Subjective     REASON FOR CONSULTATION:  small cell lung cancer  Provide an opinion on any further workup or treatment                             REQUESTING PHYSICIAN:  James    RECORDS OBTAINED:  Records of the patients history including those obtained from the referring provider were reviewed and summarized in detail.    HISTORY OF PRESENT ILLNESS:  The patient is a 68 y.o. year old female who is here for an opinion about the above issue.    History of Present Illness   The patient returns today for follow-up, treatment and scan review.  She is doing reasonably well complains of moderate fatigue/decreased energy no new neurologic symptoms otherwise.  She has intermittent nausea vomiting nothing uncontrolled.  She denies diarrhea.  She is tolerating anticoagulation and Keppra.    Oncology History:  This is a 68-year-old woman with long history of tobacco use and COPD, degenerative disease of the spine on narcotic therapy, depression/anxiety, orthostatic hypotension, hyperlipidemia who has been followed with serial imaging for a left lower lobe lung nodule and mediastinal lymphadenopathy.  A CT of the chest 1/21/2023 showed a masslike consolidation in the left lower lobe 2.5 x 2.6 cm in size with a potential cavitary focus centrally surrounding haziness and otherwise groundglass opacities in the right middle lobe and right lower lobe and enlarged mediastinal lymph nodes up to 10 mm.  The findings were favored to be infectious or inflammatory.  A follow-up CT chest 7/28/2023 showed pleural-based area of density in the left upper lung 11 x 5 mm new from the previous exam and resolution of the consolidation in the left lower lobe.  A PET scan was performed 8/9/2023 which showed the 1.1 cm pleural-based density in the posterior left lower lobe to blend into dependent atelectasis but have more intense activity than the atelectasis with maximum SUV 2.8.  There was hypermetabolic lymphadenopathy in the left  hilum and left lower paratracheal regions for example lymph node posterior to the left pulmonary artery SUV 4.7 measuring 1.6 cm and another area of soft tissue lateral to the left mainstem bronchus SUV 4.4, ill-defined lymphadenopathy left lower paratracheal region SUV 3.7.  She underwent bronchoscopy with biopsies on 8/25/2023 with samples from stations 11 R, 4R, 7, 10 L, 11 L, 12 L all negative for malignancy; station 4R showed rare atypical reactive epithelial cells.  A follow-up CT of the chest on 2/6/2024 showed the left lower lobe nodule to be enlarging at 1.6 x 0.9 cm and enlarging precarinal lymphadenopathy concerning for metastatic disease.  The patient underwent repeat bronchoscopy with Ion navigation on 2/28/2024; biopsies from the left lower lobe nodule were positive for small cell carcinoma and a level 4 lymph node biopsied also positive for small cell carcinoma.    Full body PET scan on 3/7/2024 showed significantly avid bilateral hilar and mediastinal lymph nodes for example kylah [employer] AP window 8.3 SUV measuring 2.5 x 1.8 cm, left hilar lymph node SUV 7 1.4 cm, pleural-based nodularity left major fissure newly hypermetabolic SUV 2.2, pleural-based partially cavitary mass left lower lobe SUV five 1.5 x 1 cm, new right lung nodule 8 mm in the right lower lobe suspicious.  MRI of the brain negative.    The patient has comorbidities of COPD but minimally symptomatic, no known cardiac disease, kidney disease, liver disease, diabetes etc.  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    The patient initiated chemoimmunotherapy with carboplatin/etoposide/atezolizumab 3/19/2024.    Cycle 2-day 3 family went to pick the patient up for chemotherapy and she was found to be unresponsive at home.  She was brought to the ER and hypoxic CT scan showed abnormalities for which MRI of the brain was performed 4/11/2024 showing areas of restricted diffusion in the bilateral basal ganglia, occipital lobes, right frontal  lobe consistent with multiple infarcts.  She had no evidence of atrial fibrillation and normal 2D echocardiogram.  She was started on Eliquis 5 mg every 12 hours.  She was mildly hyponatremic and there was some concern for seizure for which she was started on Keppra by neurology.  The patient gradually improved and was discharged from the hospital near normal baseline on 2024.    CT chest abdomen pelvis 2024 showed resolved groundglass infiltrates, no well-defined consolidation or pleural effusions, mucous plug in the right lower lobe 6 mm, residual nodularity posterior lateral left lower lobe 5 mm (decreased from previous PET), no suspicious pulmonary nodules or masses, no lymphadenopathy in the chest.    Past Medical History:   Diagnosis Date    COPD (chronic obstructive pulmonary disease)     COPD with acute exacerbation 2020    Small cell carcinoma 3/13/2024        Past Surgical History:   Procedure Laterality Date    BRONCHOSCOPY N/A 2023    Procedure: BRONCHOSCOPY WITH ENDOBRONCHIAL ULTRASOUND (EBUS) with FNA;  Surgeon: Coy Mccarthy MD;  Location: [facility] ENDOSCOPY;  Service: Pulmonary;  Laterality: N/A;  Pre/Post - mediastinal and hilar lymphadenopathy.    BRONCHOSCOPY WITH ION ROBOTIC ASSIST N/A 2024    Procedure: BRONCHOSCOPY WITH ION ROBOT,  ENDOBRONCHIAL ULTRASOUND, FINE NEEDLE ASPIRATIONS,  CRYOTHERAPY BIOPSIES, AND LEFT LOWER LOBE BRONCHOALVEOLAR LAVAGE.;  Surgeon: Alexia Velásquez MD;  Location: [facility] ENDOSCOPY;  Service: Robotics - Pulmonary;  Laterality: N/A;     SECTION      CHEST TUBE INSERTION Left 2024    Procedure: CHEST TUBE INSERTION;  Surgeon: Alexia Velásquez MD;  Location: [facility] ENDOSCOPY;  Service: Thoracic;  Laterality: Left;    CHOLECYSTECTOMY      COLONOSCOPY      ECTOPIC PREGNANCY SURGERY      HYSTERECTOMY      TONSILLECTOMY      TOTAL HIP ARTHROPLASTY Left     VENOUS ACCESS DEVICE (PORT) INSERTION N/A 3/14/2024    Procedure: INSERTION  VENOUS ACCESS DEVICE;  Surgeon: Jonas Garner MD;  Location: [facility];  Service: General;  Laterality: N/A;        Current Outpatient Medications on File Prior to Visit   Medication Sig Dispense Refill    albuterol sulfate  (90 Base) MCG/ACT inhaler Inhale 2 puffs Every 4 (Four) Hours As Needed for Wheezing. Takes as needed.      apixaban (ELIQUIS) 5 MG tablet tablet Take 1 tablet by mouth Every 12 (Twelve) Hours. Indications: Other - full anticoagulation 180 tablet 0    budesonide-formoterol (SYMBICORT) 160-4.5 MCG/ACT inhaler Inhale 2 puffs 2 (Two) Times a Day.  12    Denosumab (PROLIA SC) Inject  under the skin into the appropriate area as directed Every 6 (Six) Months.      diazePAM (VALIUM) 10 MG tablet Take 1 tablet by mouth 2 (Two) Times a Day As Needed for Anxiety (RLS.). Takes 20 mg at bedtime at times when she needs.      HYDROcodone-acetaminophen (NORCO)  MG per tablet Take 1 tablet by mouth 3 (Three) Times a Day As Needed for Moderate Pain.      levETIRAcetam (KEPPRA) 500 MG tablet Take 1 tablet by mouth Every 12 (Twelve) Hours. 60 tablet 3    midodrine (PROAMATINE) 2.5 MG tablet Take 1 tablet by mouth 3 (Three) Times a Day Before Meals.      naloxone (NARCAN) 4 MG/0.1ML nasal spray 1 spray into the nostril(s) as directed by provider As Needed.      pantoprazole (PROTONIX) 40 MG EC tablet Take 1 tablet by mouth Daily. 30 tablet 3    sucralfate (CARAFATE) 1 g tablet Take 1 tablet by mouth 4 (Four) Times a Day. 120 tablet 3    [DISCONTINUED] levETIRAcetam (KEPPRA) 500 MG tablet Take 1 tablet by mouth 2 (Two) Times a Day.       No current facility-administered medications on file prior to visit.        ALLERGIES:    Allergies   Allergen Reactions    Codeine GI Intolerance    Percocet [Oxycodone-Acetaminophen] Hives        Social History     Socioeconomic History    Marital status:    Tobacco Use    Smoking status: Every Day     Current packs/day: 1.00     Average packs/day:  "1 pack/day for 30.0 years (30.0 ttl pk-yrs)     Types: Cigarettes     Passive exposure: Current    Smokeless tobacco: Never    Tobacco comments:     Began smoking at age 11.  Smoked .5 ppd for 6 years, 2.5 ppd for a year, 2 ppd for 5 years, and 1 ppd for the past 43 years for a 58.5 pack year history.   Vaping Use    Vaping status: Former    Substances: Nicotine, Flavoring    Devices: Disposable   Substance and Sexual Activity    Alcohol use: Yes     Comment: once a year     Drug use: No    Sexual activity: Defer        Family History   Problem Relation Age of Onset    Lung cancer Niece 50    Throat cancer Father     Breast cancer Neg Hx         Review of Systems   Constitutional:  Positive for fatigue.   HENT: Negative.     Eyes: Negative.    Respiratory: Negative.     Cardiovascular: Negative.    Gastrointestinal:         Esophagitis symptoms   Musculoskeletal:  Positive for back pain.   Neurological: Negative.    Hematological: Negative.    Psychiatric/Behavioral:  Positive for dysphoric mood. The patient is nervous/anxious.         Objective     Vitals:    04/30/24 0805   BP: 111/72   Pulse: 89   Resp: 16   Temp: 97.3 °F (36.3 °C)   TempSrc: Infrared   SpO2: 96%   Weight: 46.7 kg (103 lb)   Height: 160 cm (62.99\")   PainSc: 0-No pain               4/30/2024     8:08 AM   Current Status   ECOG score 0       Physical Exam    CONSTITUTIONAL: pleasant well-developed adult woman  HEENT: no icterus, no thrush, moist membranes, anisocoria  LYMPH: no cervical or supraclavicular lad  CV: RRR, S1S2, no murmur  RESP: cta bilat, no wheezing, no rales  GI: soft, non-tender, no splenomegaly, +bs  MUSC: no edema, normal gait  NEURO: alert and oriented x3, normal strength  PSYCH: normal mood and affect for situation  Skin: No rash or induration noted,  alopecia  Unchanged-4/30/2024    RECENT LABS:  Hematology WBC   Date Value Ref Range Status   04/30/2024 5.32 3.40 - 10.80 10*3/mm3 Final     RBC   Date Value Ref Range Status "   04/30/2024 3.75 (L) 3.77 - 5.28 10*6/mm3 Final     Hemoglobin   Date Value Ref Range Status   04/30/2024 12.4 12.0 - 15.9 g/dL Final     Hematocrit   Date Value Ref Range Status   04/30/2024 36.5 34.0 - 46.6 % Final     Platelets   Date Value Ref Range Status   04/30/2024 235 140 - 450 10*3/mm3 Final        Lab Results   Component Value Date    GLUCOSE 140 (H) 04/18/2024    BUN 12 04/18/2024    CREATININE 1.25 (H) 04/18/2024    EGFR 47.6 (L) 04/18/2024    BCR 9.6 04/18/2024    K 5.3 (H) 04/18/2024    CO2 28.6 04/18/2024    CALCIUM 10.4 04/18/2024    ALBUMIN 4.1 04/11/2024    BILITOT <0.2 04/11/2024    AST 14 04/11/2024    ALT 8 04/11/2024     MRI brain 4/11/2024:  Findings:  There is mild generalized parenchymal volume loss. Diffusion weighted images demonstrate areas of restricted diffusion within the globus pallidus bilaterally corresponding to the areas of low-attenuation noted on prior CT scan. There are additional   punctate foci of restricted diffusion involving the precentral gyrus of the right frontal lobe as well as additional patchy areas of restricted diffusion within the bilateral occipital lobes. Findings would be compatible with multiple acute infarcts.   Given the symmetric appearance within the basal ganglia other differential considerations would include carbon monoxide poisoning, anoxic injury there is no evidence of intracranial hemorrhage. No abnormal extra-axial fluid collections are identified. No   mass effect or hydrocephalus.     Major intracranial vascular flow voids are preserved. Sella and suprasellar cistern are within normal limits. Craniovertebral junction appears normal.     Postcontrast images demonstrate no pathologic intracranial contrast enhancement.     IMPRESSION:  Impression:     1. There are areas of restricted diffusion within the bilateral basal ganglia, bilateral occipital lobes, and right frontal lobe compatible with multiple infarcts. The symmetric appearance of the  infarcts within the basal ganglia can be seen with anoxic   injury or carbon monoxide poisoning.  2. No evidence of acute intracranial hemorrhage.  3. No evidence of pathologic contrast enhancement    CT Chest Abdomen Pelvis With Contrast 4/24/2024 - IMPRESSION:  1.Subtle residual subpleural nodularity at the lateral aspect of the left lower lobe at the site of prior subpleural mass or malignant nodule. The density in this region measures 5 mm. The findings indicate an appropriate response to interval therapy.  2.No evidence for additional malignancy or metastatic disease throughout the chest. No evidence for malignancy or metastatic disease throughout the abdomen or pelvis.    Assessment & Plan     *Small cell lung cancer left lower lobe of the lung with mediastinal involvement  CT chest 2/22/2024-enlarged for ill lymph nodes 2.5 cm, enlarged AP window lymph node 1.4 cm, poorly defined left hilar lymphadenopathy, left lower lobe pulmonary nodule 1.7 cm  Bronchoscopy with Ion navigation performed 2/28/2024-pathology positive for small cell carcinoma from the left lower lobe and station 4L  PET scan on 3/7/2024 showed significantly avid bilateral hilar and mediastinal lymph nodes for example kylah [employer] AP window 8.3 SUV measuring 2.5 x 1.8 cm, left hilar lymph node SUV 7 1.4 cm, pleural-based nodularity left major fissure newly hypermetabolic SUV 2.2, pleural-based partially cavitary mass left lower lobe SUV 5 measuring  1.5 x 1 cm, new right lung nodule 8 mm in the right lower lobe suspicious.-Results discussed with Dr. Velásquez and we both feel the contralateral right lung nodule is highly suspicious for metastatic disease  MRI of the brain negative.  3/19/2024.initiated chemoimmunotherapy with carboplatin/etoposide/atezolizumab   4/9/24 received D1-2 of cycle 2 chemotherapy; day 3 held for admission for acute bilateral strokes  CT chest abdomen pelvis 4/24/2024 showed resolved groundglass infiltrates, no  well-defined consolidation or pleural effusions, mucous plug in the right lower lobe 6 mm, residual nodularity posterior lateral left lower lobe 5 mm (decreased from previous PET), no suspicious pulmonary nodules or masses, no lymphadenopathy in the chest.  *Bilateral strokes suspected embolic-new  Admitted 4/11/2024 with acute mental status change-MRI brain showed multiple areas of restricted diffusion bilateral basal ganglia, bilateral occipital lobes, right frontal lobe  No documented arrhythmia, normal 2D echocardiogram  Patient discharged on Eliquis 5 mg every 12 hours; on Keppra 500 twice daily for seizure prophylaxis  *Hyponatremia  *Esophagitis secondary to chemotherapy-improved with Protonix and sucralfate  *Comorbidities of tobacco abuse/COPD, anxiety, atherosclerotic calcifications by CT but no definitive diagnosis of CAD, degenerative disease of the spine, hyperlipidemia; no known autoimmune disorders, chronic pain on opioid medicines    Oncology plan/recommendations:  Continue Eliquis 5 mg every 12 hours and Keppra 500 mg twice daily  Proceed with cycle 3 carboplatin/-16/Tecentriq  Weekly CBC with differential/nurse review-hold Eliquis if platelet count drops below 50,000  Return to clinic 3 weeks NP/MD visit lab and cycle 4 of chemoimmunotherapy  She will need a PET scan after cycle 4 MD follow-up and continuation of atezolizumab +/- radiation therapy pending PET results

## 2024-04-24 ENCOUNTER — HOSPITAL ENCOUNTER (OUTPATIENT)
Dept: INFUSION THERAPY | Facility: HOSPITAL | Age: 69
Discharge: HOME | End: 2024-04-24
Payer: MEDICARE

## 2024-04-24 ENCOUNTER — HOSPITAL ENCOUNTER (OUTPATIENT)
Dept: CT IMAGING | Facility: HOSPITAL | Age: 69
Discharge: HOME | End: 2024-04-24
Payer: MEDICARE

## 2024-04-24 ENCOUNTER — INFUSION (OUTPATIENT)
Dept: ONCOLOGY | Facility: HOSPITAL | Age: 69
End: 2024-04-24
Payer: MEDICARE

## 2024-04-24 VITALS
OXYGEN SATURATION: 98 % | HEART RATE: 74 BPM | RESPIRATION RATE: 16 BRPM | SYSTOLIC BLOOD PRESSURE: 135 MMHG | DIASTOLIC BLOOD PRESSURE: 85 MMHG | TEMPERATURE: 96.9 F

## 2024-04-24 DIAGNOSIS — C80.1 SMALL CELL CARCINOMA: ICD-10-CM

## 2024-04-24 DIAGNOSIS — M81.0 POST-MENOPAUSAL OSTEOPOROSIS: Primary | ICD-10-CM

## 2024-04-24 DIAGNOSIS — Z45.2 FITTING AND ADJUSTMENT OF VASCULAR CATHETER: Primary | ICD-10-CM

## 2024-04-24 PROCEDURE — 74177 CT ABD & PELVIS W/CONTRAST: CPT

## 2024-04-24 PROCEDURE — 25010000002 HEPARIN LOCK FLUSH PER 10 UNITS: Performed by: INTERNAL MEDICINE

## 2024-04-24 PROCEDURE — 96372 THER/PROPH/DIAG INJ SC/IM: CPT

## 2024-04-24 PROCEDURE — 25010000002 DENOSUMAB 60 MG/ML SOLUTION PREFILLED SYRINGE: Performed by: FAMILY MEDICINE

## 2024-04-24 PROCEDURE — 71260 CT THORAX DX C+: CPT

## 2024-04-24 PROCEDURE — 25510000001 IOPAMIDOL PER 1 ML: Performed by: INTERNAL MEDICINE

## 2024-04-24 RX ORDER — HEPARIN SODIUM (PORCINE) LOCK FLUSH IV SOLN 100 UNIT/ML 100 UNIT/ML
500 SOLUTION INTRAVENOUS AS NEEDED
OUTPATIENT
Start: 2024-04-24

## 2024-04-24 RX ORDER — SODIUM CHLORIDE 0.9 % (FLUSH) 0.9 %
10 SYRINGE (ML) INJECTION AS NEEDED
OUTPATIENT
Start: 2024-04-24

## 2024-04-24 RX ORDER — HEPARIN SODIUM (PORCINE) LOCK FLUSH IV SOLN 100 UNIT/ML 100 UNIT/ML
500 SOLUTION INTRAVENOUS AS NEEDED
Status: DISCONTINUED | OUTPATIENT
Start: 2024-04-24 | End: 2024-04-24 | Stop reason: HOSPADM

## 2024-04-24 RX ORDER — SODIUM CHLORIDE 0.9 % (FLUSH) 0.9 %
10 SYRINGE (ML) INJECTION AS NEEDED
Status: DISCONTINUED | OUTPATIENT
Start: 2024-04-24 | End: 2024-04-24 | Stop reason: HOSPADM

## 2024-04-24 RX ADMIN — IOPAMIDOL 100 ML: 755 INJECTION, SOLUTION INTRAVENOUS at 12:34

## 2024-04-24 RX ADMIN — Medication 10 ML: at 12:44

## 2024-04-24 RX ADMIN — HEPARIN 500 UNITS: 100 SYRINGE at 12:44

## 2024-04-24 RX ADMIN — DENOSUMAB 60 MG: 60 INJECTION SUBCUTANEOUS at 11:11

## 2024-04-24 NOTE — NURSING NOTE
Patient arrived to [facility] at 1055.  History and medications reviewed with patient.  Medication administered without complications.  AVS refused.  Patient discharged from [facility] at 1130 in stable condition without complaint.

## 2024-04-29 ENCOUNTER — READMISSION MANAGEMENT (OUTPATIENT)
Dept: CALL CENTER | Facility: HOSPITAL | Age: 69
End: 2024-04-29
Payer: MEDICARE

## 2024-04-29 NOTE — OUTREACH NOTE
Stroke Week 2 Survey      Flowsheet Row Responses   [facility] patient discharged from? [place]   Does the patient have one of the following disease processes/diagnoses(primary or secondary)? Stroke   Week 2 attempt successful? Yes   Call start time 1315   Call end time 1317   Discharge diagnosis AMS (altered mental status)   Meds reviewed with patient/caregiver? Yes   Is the patient having any side effects they believe may be caused by any medication additions or changes? No   Is the patient taking all medications as directed (includes completed medication regime)? Yes   Does the patient have a primary care provider?  Yes   Has the patient kept scheduled appointments due by today? Yes   Has home health visited the patient within 72 hours of discharge? N/A   Does the patient require any assistance with activities of daily living such as eating, bathing, dressing, walking, etc.? No   Does the patient have any residual symptoms from stroke/TIA? No   Does the patient understand the diet ordered at discharge? Yes   What is the patient's perception of their health status since discharge? Improving   Is the patient able to teach back FAST for Stroke? B alance: Watch for sudden loss of balance, E yes: Check for vision loss, F ace: Look for an uneven smile, A rm: Check if one arm is weak, S peech: Listen for slurred speech, T nadia: Call 9-1-1 right away   Is the patient/caregiver able to teach back the hierarchy of who to call/visit for symptoms/problems? PCP, Specialist, Home health nurse, Urgent Care, ED, 911 Yes   Week 2 call completed? Yes   Revoked No further contact(revokes)-requires comment   Is the patient interested in additional calls from an ambulatory ? No   Would this patient benefit from a Referral to [facility] Social Work? No   Graduated/Revoked comments Pt reports doing well, no concerns at this time   Call end time 1317            Zuly SORENSON - Registered Nurse

## 2024-04-30 ENCOUNTER — OFFICE VISIT (OUTPATIENT)
Dept: ONCOLOGY | Facility: CLINIC | Age: 69
End: 2024-04-30
Payer: MEDICARE

## 2024-04-30 ENCOUNTER — INFUSION (OUTPATIENT)
Dept: ONCOLOGY | Facility: HOSPITAL | Age: 69
End: 2024-04-30
Payer: MEDICARE

## 2024-04-30 VITALS
WEIGHT: 103 LBS | HEIGHT: 63 IN | DIASTOLIC BLOOD PRESSURE: 72 MMHG | OXYGEN SATURATION: 96 % | BODY MASS INDEX: 18.25 KG/M2 | TEMPERATURE: 97.3 F | RESPIRATION RATE: 16 BRPM | HEART RATE: 89 BPM | SYSTOLIC BLOOD PRESSURE: 111 MMHG

## 2024-04-30 DIAGNOSIS — C80.1 SMALL CELL CARCINOMA: Primary | ICD-10-CM

## 2024-04-30 DIAGNOSIS — Z45.2 FITTING AND ADJUSTMENT OF VASCULAR CATHETER: ICD-10-CM

## 2024-04-30 DIAGNOSIS — C80.1 SMALL CELL CARCINOMA: ICD-10-CM

## 2024-04-30 DIAGNOSIS — I63.113 CEREBROVASCULAR ACCIDENT (CVA) DUE TO BILATERAL EMBOLISM OF VERTEBRAL ARTERIES: ICD-10-CM

## 2024-04-30 LAB
ALBUMIN SERPL-MCNC: 4.3 G/DL (ref 3.5–5.2)
ALBUMIN/GLOB SERPL: 1.7 G/DL
ALP SERPL-CCNC: 77 U/L (ref 39–117)
ALT SERPL W P-5'-P-CCNC: <5 U/L (ref 1–33)
ANION GAP SERPL CALCULATED.3IONS-SCNC: 10.4 MMOL/L (ref 5–15)
AST SERPL-CCNC: 13 U/L (ref 1–32)
BASOPHILS # BLD AUTO: 0.03 10*3/MM3 (ref 0–0.2)
BASOPHILS NFR BLD AUTO: 0.6 % (ref 0–1.5)
BILIRUB SERPL-MCNC: 0.2 MG/DL (ref 0–1.2)
BUN SERPL-MCNC: 5 MG/DL (ref 8–23)
BUN/CREAT SERPL: 5.1 (ref 7–25)
CALCIUM SPEC-SCNC: 8 MG/DL (ref 8.6–10.5)
CHLORIDE SERPL-SCNC: 99 MMOL/L (ref 98–107)
CO2 SERPL-SCNC: 24.6 MMOL/L (ref 22–29)
CREAT SERPL-MCNC: 0.99 MG/DL (ref 0.57–1)
DEPRECATED RDW RBC AUTO: 46.2 FL (ref 37–54)
EGFRCR SERPLBLD CKD-EPI 2021: 63 ML/MIN/1.73
EOSINOPHIL # BLD AUTO: 0.04 10*3/MM3 (ref 0–0.4)
EOSINOPHIL NFR BLD AUTO: 0.8 % (ref 0.3–6.2)
ERYTHROCYTE [DISTWIDTH] IN BLOOD BY AUTOMATED COUNT: 14.3 % (ref 12.3–15.4)
GLOBULIN UR ELPH-MCNC: 2.6 GM/DL
GLUCOSE SERPL-MCNC: 128 MG/DL (ref 65–99)
HCT VFR BLD AUTO: 36.5 % (ref 34–46.6)
HGB BLD-MCNC: 12.4 G/DL (ref 12–15.9)
IMM GRANULOCYTES # BLD AUTO: 0.02 10*3/MM3 (ref 0–0.05)
IMM GRANULOCYTES NFR BLD AUTO: 0.4 % (ref 0–0.5)
LYMPHOCYTES # BLD AUTO: 2.63 10*3/MM3 (ref 0.7–3.1)
LYMPHOCYTES NFR BLD AUTO: 49.4 % (ref 19.6–45.3)
MCH RBC QN AUTO: 33.1 PG (ref 26.6–33)
MCHC RBC AUTO-ENTMCNC: 34 G/DL (ref 31.5–35.7)
MCV RBC AUTO: 97.3 FL (ref 79–97)
MONOCYTES # BLD AUTO: 0.73 10*3/MM3 (ref 0.1–0.9)
MONOCYTES NFR BLD AUTO: 13.7 % (ref 5–12)
NEUTROPHILS NFR BLD AUTO: 1.87 10*3/MM3 (ref 1.7–7)
NEUTROPHILS NFR BLD AUTO: 35.1 % (ref 42.7–76)
NRBC BLD AUTO-RTO: 0 /100 WBC (ref 0–0.2)
PLATELET # BLD AUTO: 235 10*3/MM3 (ref 140–450)
PMV BLD AUTO: 9 FL (ref 6–12)
POTASSIUM SERPL-SCNC: 3.9 MMOL/L (ref 3.5–5.2)
PROT SERPL-MCNC: 6.9 G/DL (ref 6–8.5)
RBC # BLD AUTO: 3.75 10*6/MM3 (ref 3.77–5.28)
SODIUM SERPL-SCNC: 134 MMOL/L (ref 136–145)
T3FREE SERPL-MCNC: 2.67 PG/ML (ref 2–4.4)
T4 FREE SERPL-MCNC: 1.29 NG/DL (ref 0.93–1.7)
TSH SERPL DL<=0.05 MIU/L-ACNC: 5.48 UIU/ML (ref 0.27–4.2)
WBC NRBC COR # BLD AUTO: 5.32 10*3/MM3 (ref 3.4–10.8)

## 2024-04-30 PROCEDURE — 25810000003 SODIUM CHLORIDE 0.9 % SOLUTION: Performed by: INTERNAL MEDICINE

## 2024-04-30 PROCEDURE — 25810000003 SODIUM CHLORIDE 0.9 % SOLUTION 500 ML FLEX CONT: Performed by: INTERNAL MEDICINE

## 2024-04-30 PROCEDURE — 96367 TX/PROPH/DG ADDL SEQ IV INF: CPT

## 2024-04-30 PROCEDURE — 25010000002 PALONOSETRON 0.25 MG/5ML SOLUTION PREFILLED SYRINGE: Performed by: INTERNAL MEDICINE

## 2024-04-30 PROCEDURE — 1126F AMNT PAIN NOTED NONE PRSNT: CPT | Performed by: INTERNAL MEDICINE

## 2024-04-30 PROCEDURE — 25010000002 DEXAMETHASONE SODIUM PHOSPHATE 100 MG/10ML SOLUTION: Performed by: INTERNAL MEDICINE

## 2024-04-30 PROCEDURE — 99214 OFFICE O/P EST MOD 30 MIN: CPT | Performed by: INTERNAL MEDICINE

## 2024-04-30 PROCEDURE — 85025 COMPLETE CBC W/AUTO DIFF WBC: CPT | Performed by: INTERNAL MEDICINE

## 2024-04-30 PROCEDURE — 96413 CHEMO IV INFUSION 1 HR: CPT

## 2024-04-30 PROCEDURE — 25010000002 HEPARIN LOCK FLUSH PER 10 UNITS: Performed by: INTERNAL MEDICINE

## 2024-04-30 PROCEDURE — 25010000002 FOSAPREPITANT PER 1 MG: Performed by: INTERNAL MEDICINE

## 2024-04-30 PROCEDURE — 96417 CHEMO IV INFUS EACH ADDL SEQ: CPT

## 2024-04-30 PROCEDURE — 84481 FREE ASSAY (FT-3): CPT | Performed by: INTERNAL MEDICINE

## 2024-04-30 PROCEDURE — 80053 COMPREHEN METABOLIC PANEL: CPT | Performed by: INTERNAL MEDICINE

## 2024-04-30 PROCEDURE — G2211 COMPLEX E/M VISIT ADD ON: HCPCS | Performed by: INTERNAL MEDICINE

## 2024-04-30 PROCEDURE — 25010000002 CARBOPLATIN PER 50 MG: Performed by: INTERNAL MEDICINE

## 2024-04-30 PROCEDURE — 25010000002 ETOPOSIDE 500 MG/25ML SOLUTION 25 ML VIAL: Performed by: INTERNAL MEDICINE

## 2024-04-30 PROCEDURE — 96375 TX/PRO/DX INJ NEW DRUG ADDON: CPT

## 2024-04-30 PROCEDURE — 84439 ASSAY OF FREE THYROXINE: CPT | Performed by: INTERNAL MEDICINE

## 2024-04-30 PROCEDURE — 25010000002 ATEZOLIZUMAB 1200 MG/20ML SOLUTION 20 ML VIAL: Performed by: INTERNAL MEDICINE

## 2024-04-30 PROCEDURE — 84443 ASSAY THYROID STIM HORMONE: CPT | Performed by: INTERNAL MEDICINE

## 2024-04-30 PROCEDURE — 25810000003 SODIUM CHLORIDE 0.9 % SOLUTION 250 ML FLEX CONT: Performed by: INTERNAL MEDICINE

## 2024-04-30 RX ORDER — PROCHLORPERAZINE MALEATE 10 MG
10 TABLET ORAL ONCE
Status: CANCELLED | OUTPATIENT
Start: 2024-05-01 | End: 2024-05-01

## 2024-04-30 RX ORDER — FAMOTIDINE 10 MG/ML
20 INJECTION, SOLUTION INTRAVENOUS AS NEEDED
Status: CANCELLED | OUTPATIENT
Start: 2024-04-30

## 2024-04-30 RX ORDER — SODIUM CHLORIDE 0.9 % (FLUSH) 0.9 %
10 SYRINGE (ML) INJECTION AS NEEDED
Status: CANCELLED | OUTPATIENT
Start: 2024-04-30

## 2024-04-30 RX ORDER — SODIUM CHLORIDE 9 MG/ML
20 INJECTION, SOLUTION INTRAVENOUS ONCE
Status: CANCELLED | OUTPATIENT
Start: 2024-05-02

## 2024-04-30 RX ORDER — PALONOSETRON 0.05 MG/ML
0.25 INJECTION, SOLUTION INTRAVENOUS ONCE
Status: CANCELLED | OUTPATIENT
Start: 2024-04-30

## 2024-04-30 RX ORDER — SODIUM CHLORIDE 9 MG/ML
20 INJECTION, SOLUTION INTRAVENOUS ONCE
Status: CANCELLED | OUTPATIENT
Start: 2024-05-01

## 2024-04-30 RX ORDER — HEPARIN SODIUM (PORCINE) LOCK FLUSH IV SOLN 100 UNIT/ML 100 UNIT/ML
500 SOLUTION INTRAVENOUS AS NEEDED
Status: CANCELLED | OUTPATIENT
Start: 2024-04-30

## 2024-04-30 RX ORDER — SODIUM CHLORIDE 9 MG/ML
20 INJECTION, SOLUTION INTRAVENOUS ONCE
Status: COMPLETED | OUTPATIENT
Start: 2024-04-30 | End: 2024-04-30

## 2024-04-30 RX ORDER — SODIUM CHLORIDE 9 MG/ML
20 INJECTION, SOLUTION INTRAVENOUS ONCE
Status: CANCELLED | OUTPATIENT
Start: 2024-04-30

## 2024-04-30 RX ORDER — HEPARIN SODIUM (PORCINE) LOCK FLUSH IV SOLN 100 UNIT/ML 100 UNIT/ML
500 SOLUTION INTRAVENOUS AS NEEDED
Status: DISCONTINUED | OUTPATIENT
Start: 2024-04-30 | End: 2024-04-30 | Stop reason: HOSPADM

## 2024-04-30 RX ORDER — SODIUM CHLORIDE 0.9 % (FLUSH) 0.9 %
10 SYRINGE (ML) INJECTION AS NEEDED
Status: DISCONTINUED | OUTPATIENT
Start: 2024-04-30 | End: 2024-04-30 | Stop reason: HOSPADM

## 2024-04-30 RX ORDER — PROCHLORPERAZINE MALEATE 10 MG
10 TABLET ORAL ONCE
Status: CANCELLED | OUTPATIENT
Start: 2024-05-02 | End: 2024-05-02

## 2024-04-30 RX ORDER — DIPHENHYDRAMINE HYDROCHLORIDE 50 MG/ML
50 INJECTION INTRAMUSCULAR; INTRAVENOUS AS NEEDED
Status: CANCELLED | OUTPATIENT
Start: 2024-04-30

## 2024-04-30 RX ORDER — PALONOSETRON 0.05 MG/ML
0.25 INJECTION, SOLUTION INTRAVENOUS ONCE
Status: COMPLETED | OUTPATIENT
Start: 2024-04-30 | End: 2024-04-30

## 2024-04-30 RX ADMIN — PALONOSETRON 0.25 MG: 0.25 INJECTION, SOLUTION INTRAVENOUS at 10:18

## 2024-04-30 RX ADMIN — ATEZOLIZUMAB 1200 MG: 1200 INJECTION, SOLUTION INTRAVENOUS at 09:46

## 2024-04-30 RX ADMIN — HEPARIN 500 UNITS: 100 SYRINGE at 12:16

## 2024-04-30 RX ADMIN — SODIUM CHLORIDE 20 ML/HR: 9 INJECTION, SOLUTION INTRAVENOUS at 09:06

## 2024-04-30 RX ADMIN — CARBOPLATIN 330 MG: 10 INJECTION, SOLUTION INTRAVENOUS at 10:40

## 2024-04-30 RX ADMIN — ETOPOSIDE 110 MG: 20 INJECTION INTRAVENOUS at 11:15

## 2024-04-30 RX ADMIN — Medication 10 ML: at 12:16

## 2024-04-30 RX ADMIN — SODIUM CHLORIDE 100 ML: 9 INJECTION, SOLUTION INTRAVENOUS at 09:07

## 2024-04-30 RX ADMIN — DEXAMETHASONE SODIUM PHOSPHATE 12 MG: 10 INJECTION, SOLUTION INTRAMUSCULAR; INTRAVENOUS at 10:20

## 2024-05-01 ENCOUNTER — INFUSION (OUTPATIENT)
Dept: ONCOLOGY | Facility: HOSPITAL | Age: 69
End: 2024-05-01
Payer: MEDICARE

## 2024-05-01 DIAGNOSIS — C80.1 SMALL CELL CARCINOMA: Primary | ICD-10-CM

## 2024-05-01 DIAGNOSIS — Z45.2 FITTING AND ADJUSTMENT OF VASCULAR CATHETER: ICD-10-CM

## 2024-05-01 PROCEDURE — 63710000001 PROCHLORPERAZINE MALEATE PER 5 MG: Performed by: INTERNAL MEDICINE

## 2024-05-01 PROCEDURE — 25010000002 HEPARIN LOCK FLUSH PER 10 UNITS: Performed by: INTERNAL MEDICINE

## 2024-05-01 PROCEDURE — 25010000002 ETOPOSIDE 500 MG/25ML SOLUTION 25 ML VIAL: Performed by: INTERNAL MEDICINE

## 2024-05-01 PROCEDURE — 96413 CHEMO IV INFUSION 1 HR: CPT

## 2024-05-01 PROCEDURE — 25810000003 SODIUM CHLORIDE 0.9 % SOLUTION: Performed by: INTERNAL MEDICINE

## 2024-05-01 PROCEDURE — 25810000003 SODIUM CHLORIDE 0.9 % SOLUTION 500 ML FLEX CONT: Performed by: INTERNAL MEDICINE

## 2024-05-01 RX ORDER — HEPARIN SODIUM (PORCINE) LOCK FLUSH IV SOLN 100 UNIT/ML 100 UNIT/ML
500 SOLUTION INTRAVENOUS AS NEEDED
Status: CANCELLED | OUTPATIENT
Start: 2024-05-01

## 2024-05-01 RX ORDER — SODIUM CHLORIDE 0.9 % (FLUSH) 0.9 %
10 SYRINGE (ML) INJECTION AS NEEDED
Status: CANCELLED | OUTPATIENT
Start: 2024-05-01

## 2024-05-01 RX ORDER — ONDANSETRON HYDROCHLORIDE 8 MG/1
8 TABLET, FILM COATED ORAL EVERY 8 HOURS PRN
Qty: 30 TABLET | Refills: 1 | Status: SHIPPED | OUTPATIENT
Start: 2024-05-01

## 2024-05-01 RX ORDER — SODIUM CHLORIDE 0.9 % (FLUSH) 0.9 %
10 SYRINGE (ML) INJECTION AS NEEDED
Status: DISCONTINUED | OUTPATIENT
Start: 2024-05-01 | End: 2024-05-01 | Stop reason: HOSPADM

## 2024-05-01 RX ORDER — HEPARIN SODIUM (PORCINE) LOCK FLUSH IV SOLN 100 UNIT/ML 100 UNIT/ML
500 SOLUTION INTRAVENOUS AS NEEDED
Status: DISCONTINUED | OUTPATIENT
Start: 2024-05-01 | End: 2024-05-01 | Stop reason: HOSPADM

## 2024-05-01 RX ORDER — SODIUM CHLORIDE 9 MG/ML
20 INJECTION, SOLUTION INTRAVENOUS ONCE
Status: COMPLETED | OUTPATIENT
Start: 2024-05-01 | End: 2024-05-01

## 2024-05-01 RX ORDER — PROCHLORPERAZINE MALEATE 5 MG/1
10 TABLET ORAL ONCE
Status: COMPLETED | OUTPATIENT
Start: 2024-05-01 | End: 2024-05-01

## 2024-05-01 RX ADMIN — ETOPOSIDE 110 MG: 20 INJECTION INTRAVENOUS at 14:31

## 2024-05-01 RX ADMIN — Medication 10 ML: at 15:34

## 2024-05-01 RX ADMIN — HEPARIN 500 UNITS: 100 SYRINGE at 15:34

## 2024-05-01 RX ADMIN — SODIUM CHLORIDE 20 ML/HR: 9 INJECTION, SOLUTION INTRAVENOUS at 14:29

## 2024-05-01 RX ADMIN — PROCHLORPERAZINE MALEATE 10 MG: 5 TABLET ORAL at 14:29

## 2024-05-02 ENCOUNTER — INFUSION (OUTPATIENT)
Dept: ONCOLOGY | Facility: HOSPITAL | Age: 69
End: 2024-05-02
Payer: MEDICARE

## 2024-05-02 VITALS
HEART RATE: 64 BPM | DIASTOLIC BLOOD PRESSURE: 55 MMHG | OXYGEN SATURATION: 95 % | TEMPERATURE: 97.7 F | SYSTOLIC BLOOD PRESSURE: 110 MMHG

## 2024-05-02 DIAGNOSIS — C80.1 SMALL CELL CARCINOMA: Primary | ICD-10-CM

## 2024-05-02 DIAGNOSIS — Z45.2 FITTING AND ADJUSTMENT OF VASCULAR CATHETER: ICD-10-CM

## 2024-05-02 PROCEDURE — 63710000001 PROCHLORPERAZINE MALEATE PER 5 MG: Performed by: INTERNAL MEDICINE

## 2024-05-02 PROCEDURE — 96413 CHEMO IV INFUSION 1 HR: CPT

## 2024-05-02 PROCEDURE — 25810000003 SODIUM CHLORIDE 0.9 % SOLUTION: Performed by: INTERNAL MEDICINE

## 2024-05-02 PROCEDURE — 25810000003 SODIUM CHLORIDE 0.9 % SOLUTION 500 ML FLEX CONT: Performed by: INTERNAL MEDICINE

## 2024-05-02 PROCEDURE — 25010000002 ETOPOSIDE 500 MG/25ML SOLUTION 25 ML VIAL: Performed by: INTERNAL MEDICINE

## 2024-05-02 PROCEDURE — 25010000002 HEPARIN LOCK FLUSH PER 10 UNITS: Performed by: INTERNAL MEDICINE

## 2024-05-02 RX ORDER — SODIUM CHLORIDE 9 MG/ML
20 INJECTION, SOLUTION INTRAVENOUS ONCE
Status: COMPLETED | OUTPATIENT
Start: 2024-05-02 | End: 2024-05-02

## 2024-05-02 RX ORDER — PROCHLORPERAZINE MALEATE 5 MG/1
10 TABLET ORAL ONCE
Status: COMPLETED | OUTPATIENT
Start: 2024-05-02 | End: 2024-05-02

## 2024-05-02 RX ORDER — SODIUM CHLORIDE 0.9 % (FLUSH) 0.9 %
10 SYRINGE (ML) INJECTION AS NEEDED
OUTPATIENT
Start: 2024-05-02

## 2024-05-02 RX ORDER — SODIUM CHLORIDE 0.9 % (FLUSH) 0.9 %
10 SYRINGE (ML) INJECTION AS NEEDED
Status: DISCONTINUED | OUTPATIENT
Start: 2024-05-02 | End: 2024-05-02 | Stop reason: HOSPADM

## 2024-05-02 RX ORDER — HEPARIN SODIUM (PORCINE) LOCK FLUSH IV SOLN 100 UNIT/ML 100 UNIT/ML
500 SOLUTION INTRAVENOUS AS NEEDED
OUTPATIENT
Start: 2024-05-02

## 2024-05-02 RX ORDER — HEPARIN SODIUM (PORCINE) LOCK FLUSH IV SOLN 100 UNIT/ML 100 UNIT/ML
500 SOLUTION INTRAVENOUS AS NEEDED
Status: DISCONTINUED | OUTPATIENT
Start: 2024-05-02 | End: 2024-05-02 | Stop reason: HOSPADM

## 2024-05-02 RX ADMIN — SODIUM CHLORIDE 20 ML/HR: 9 INJECTION, SOLUTION INTRAVENOUS at 14:24

## 2024-05-02 RX ADMIN — Medication 10 ML: at 15:53

## 2024-05-02 RX ADMIN — HEPARIN 500 UNITS: 100 SYRINGE at 15:53

## 2024-05-02 RX ADMIN — PROCHLORPERAZINE MALEATE 10 MG: 5 TABLET ORAL at 14:25

## 2024-05-02 RX ADMIN — ETOPOSIDE 110 MG: 20 INJECTION INTRAVENOUS at 14:49

## 2024-05-02 NOTE — PROGRESS NOTES
"Enter Query Response Below      Query Response: unable to determin  Electronically signed by Nigel De Paz MD, 24, 5:58 AM EDT.              If applicable, please update the problem list.     Patient: Pili Arechiga \"Ivet\"        : 1955  Account: [id number]           Admit Date: 2024        How to Respond to this query:       a. Click New Note     b. Answer query within the yellow box.                c. Update the Problem List, if applicable.      If you have any questions about this query contact me at: [email or web address]    Dr. De Paz,     Patient admitted with CVA, respiratory failure, metabolic encephalopathy, lung cancer.  Per nutrition note 24 \"Pt meets criteria for: Severe acute disease related malnutrition related to Small Cell Lung Cancer as evidenced by muscle wasting and fat loss on exam. Recommend: add Boost Plus alyssa once per day\".    Please clarify diagnosis treated/monitored:  Acute illness related severe protein calorie malnutrition  Other- specify __________  Unable to determine     By submitting this query, we are merely seeking further clarification of documentation to accurately reflect all conditions that you are monitoring, evaluating, treating or that extend the hospitalization or utilize additional resources of care. Please utilize your independent clinical judgment when addressing the question(s) above.     This query and your response, once completed, will be entered into the legal medical record.    Sincerely,  Dorcas Hardwick RN BSN  Clinical Documentation Integrity Program   "

## 2024-05-07 ENCOUNTER — LAB (OUTPATIENT)
Dept: LAB | Facility: HOSPITAL | Age: 69
End: 2024-05-07
Payer: MEDICARE

## 2024-05-07 ENCOUNTER — CLINICAL SUPPORT (OUTPATIENT)
Dept: ONCOLOGY | Facility: HOSPITAL | Age: 69
End: 2024-05-07
Payer: MEDICARE

## 2024-05-07 DIAGNOSIS — C80.1 SMALL CELL CARCINOMA: ICD-10-CM

## 2024-05-07 DIAGNOSIS — I63.113 CEREBROVASCULAR ACCIDENT (CVA) DUE TO BILATERAL EMBOLISM OF VERTEBRAL ARTERIES: ICD-10-CM

## 2024-05-07 LAB
BASOPHILS # BLD AUTO: 0.1 10*3/MM3 (ref 0–0.2)
BASOPHILS NFR BLD AUTO: 1.3 % (ref 0–1.5)
DEPRECATED RDW RBC AUTO: 50.7 FL (ref 37–54)
EOSINOPHIL # BLD AUTO: 0.01 10*3/MM3 (ref 0–0.4)
EOSINOPHIL NFR BLD AUTO: 0.1 % (ref 0.3–6.2)
ERYTHROCYTE [DISTWIDTH] IN BLOOD BY AUTOMATED COUNT: 14.4 % (ref 12.3–15.4)
HCT VFR BLD AUTO: 35 % (ref 34–46.6)
HGB BLD-MCNC: 11.9 G/DL (ref 12–15.9)
IMM GRANULOCYTES # BLD AUTO: 0.15 10*3/MM3 (ref 0–0.05)
IMM GRANULOCYTES NFR BLD AUTO: 2 % (ref 0–0.5)
LYMPHOCYTES # BLD AUTO: 2.96 10*3/MM3 (ref 0.7–3.1)
LYMPHOCYTES NFR BLD AUTO: 39.7 % (ref 19.6–45.3)
MCH RBC QN AUTO: 33.1 PG (ref 26.6–33)
MCHC RBC AUTO-ENTMCNC: 34 G/DL (ref 31.5–35.7)
MCV RBC AUTO: 97.2 FL (ref 79–97)
MONOCYTES # BLD AUTO: 0.08 10*3/MM3 (ref 0.1–0.9)
MONOCYTES NFR BLD AUTO: 1.1 % (ref 5–12)
NEUTROPHILS NFR BLD AUTO: 4.16 10*3/MM3 (ref 1.7–7)
NEUTROPHILS NFR BLD AUTO: 55.8 % (ref 42.7–76)
NRBC BLD AUTO-RTO: 0 /100 WBC (ref 0–0.2)
PLATELET # BLD AUTO: 238 10*3/MM3 (ref 140–450)
PMV BLD AUTO: 8.9 FL (ref 6–12)
RBC # BLD AUTO: 3.6 10*6/MM3 (ref 3.77–5.28)
WBC NRBC COR # BLD AUTO: 7.46 10*3/MM3 (ref 3.4–10.8)

## 2024-05-07 PROCEDURE — G0463 HOSPITAL OUTPT CLINIC VISIT: HCPCS | Performed by: INTERNAL MEDICINE

## 2024-05-07 PROCEDURE — 36416 COLLJ CAPILLARY BLOOD SPEC: CPT

## 2024-05-07 PROCEDURE — 85025 COMPLETE CBC W/AUTO DIFF WBC: CPT | Performed by: INTERNAL MEDICINE

## 2024-05-14 ENCOUNTER — CLINICAL SUPPORT (OUTPATIENT)
Dept: ONCOLOGY | Facility: HOSPITAL | Age: 69
End: 2024-05-14
Payer: MEDICARE

## 2024-05-14 ENCOUNTER — TELEPHONE (OUTPATIENT)
Dept: ONCOLOGY | Facility: CLINIC | Age: 69
End: 2024-05-14

## 2024-05-14 ENCOUNTER — LAB (OUTPATIENT)
Dept: LAB | Facility: HOSPITAL | Age: 69
End: 2024-05-14
Payer: MEDICARE

## 2024-05-14 DIAGNOSIS — I63.113 CEREBROVASCULAR ACCIDENT (CVA) DUE TO BILATERAL EMBOLISM OF VERTEBRAL ARTERIES: ICD-10-CM

## 2024-05-14 DIAGNOSIS — C80.1 SMALL CELL CARCINOMA: ICD-10-CM

## 2024-05-14 LAB
BASOPHILS # BLD AUTO: 0.02 10*3/MM3 (ref 0–0.2)
BASOPHILS NFR BLD AUTO: 0.4 % (ref 0–1.5)
DEPRECATED RDW RBC AUTO: 48.4 FL (ref 37–54)
EOSINOPHIL # BLD AUTO: 0.02 10*3/MM3 (ref 0–0.4)
EOSINOPHIL NFR BLD AUTO: 0.4 % (ref 0.3–6.2)
ERYTHROCYTE [DISTWIDTH] IN BLOOD BY AUTOMATED COUNT: 14.2 % (ref 12.3–15.4)
HCT VFR BLD AUTO: 29 % (ref 34–46.6)
HGB BLD-MCNC: 10.1 G/DL (ref 12–15.9)
IMM GRANULOCYTES # BLD AUTO: 0.02 10*3/MM3 (ref 0–0.05)
IMM GRANULOCYTES NFR BLD AUTO: 0.4 % (ref 0–0.5)
LYMPHOCYTES # BLD AUTO: 3.86 10*3/MM3 (ref 0.7–3.1)
LYMPHOCYTES NFR BLD AUTO: 76.4 % (ref 19.6–45.3)
MCH RBC QN AUTO: 33.1 PG (ref 26.6–33)
MCHC RBC AUTO-ENTMCNC: 34.8 G/DL (ref 31.5–35.7)
MCV RBC AUTO: 95.1 FL (ref 79–97)
MONOCYTES # BLD AUTO: 0.29 10*3/MM3 (ref 0.1–0.9)
MONOCYTES NFR BLD AUTO: 5.7 % (ref 5–12)
NEUTROPHILS NFR BLD AUTO: 0.84 10*3/MM3 (ref 1.7–7)
NEUTROPHILS NFR BLD AUTO: 16.7 % (ref 42.7–76)
NRBC BLD AUTO-RTO: 0 /100 WBC (ref 0–0.2)
PLATELET # BLD AUTO: 31 10*3/MM3 (ref 140–450)
PMV BLD AUTO: 10.9 FL (ref 6–12)
RBC # BLD AUTO: 3.05 10*6/MM3 (ref 3.77–5.28)
WBC NRBC COR # BLD AUTO: 5.05 10*3/MM3 (ref 3.4–10.8)

## 2024-05-14 PROCEDURE — 36416 COLLJ CAPILLARY BLOOD SPEC: CPT

## 2024-05-14 PROCEDURE — 85025 COMPLETE CBC W/AUTO DIFF WBC: CPT | Performed by: INTERNAL MEDICINE

## 2024-05-14 PROCEDURE — G0463 HOSPITAL OUTPT CLINIC VISIT: HCPCS

## 2024-05-14 RX ORDER — LEVOFLOXACIN 500 MG/1
500 TABLET, FILM COATED ORAL DAILY
Qty: 7 TABLET | Refills: 0 | Status: SHIPPED | OUTPATIENT
Start: 2024-05-14 | End: 2024-05-21

## 2024-05-14 NOTE — NURSING NOTE
Pt arrived for CBC with Rn review and was unable to stay for results due to having another appt. CBC resulted and reviewed with pt. Pt denies having fevers or any other s/s of infection. CBC reviewed with Dr. Burns. Per Dr. Burns, PT to hold Eliquis until she returns 5/21 for her next appt. Pt also to take Levaquin 500 mg daily for 7 days (script sent to Pt's pharmacy). PT called and informed of abx that was sent to her pharmacy and reviewed neutropenic precautions with pt. Pt instructed to contact the office with any questions/concerns/ or s/s of infection. Pt verbalize understanding.

## 2024-05-14 NOTE — TELEPHONE ENCOUNTER
"  Caller: Pili Arechiga \"THEODORE\"    Relationship: Self    Best call back number: [phone]     What is the best time to reach you: ASAP    Who are you requesting to speak with (clinical staff, provider,  specific staff member): CLINICAL      What was the call regarding: PATIENT JUST LEFT OFFICE, SAYS SOMEONE WAS GOING TO TALK TO THE DR AND CALL HER BACK.  SOMEONE CALLED BACK BUT SHE WAS NOT ABLE TO ANSWER, NO NOTES IN CHART, PLEASE CALL PATIENT BACK TO ADVISE WHAT WAS NEEDED.      "

## 2024-05-21 ENCOUNTER — INFUSION (OUTPATIENT)
Dept: ONCOLOGY | Facility: HOSPITAL | Age: 69
End: 2024-05-21
Payer: MEDICARE

## 2024-05-21 ENCOUNTER — OFFICE VISIT (OUTPATIENT)
Dept: ONCOLOGY | Facility: CLINIC | Age: 69
End: 2024-05-21
Payer: MEDICARE

## 2024-05-21 VITALS
HEART RATE: 94 BPM | DIASTOLIC BLOOD PRESSURE: 70 MMHG | SYSTOLIC BLOOD PRESSURE: 109 MMHG | RESPIRATION RATE: 16 BRPM | OXYGEN SATURATION: 99 % | HEIGHT: 63 IN | WEIGHT: 103.2 LBS | TEMPERATURE: 97.7 F | BODY MASS INDEX: 18.29 KG/M2

## 2024-05-21 DIAGNOSIS — C80.1 SMALL CELL CARCINOMA: ICD-10-CM

## 2024-05-21 DIAGNOSIS — Z79.899 HIGH RISK MEDICATION USE: ICD-10-CM

## 2024-05-21 DIAGNOSIS — C34.81 MALIGNANT NEOPLASM OF OVERLAPPING SITES OF RIGHT BRONCHUS AND LUNG: ICD-10-CM

## 2024-05-21 DIAGNOSIS — D64.9 ANEMIA, UNSPECIFIED TYPE: ICD-10-CM

## 2024-05-21 DIAGNOSIS — E87.6 HYPOKALEMIA: ICD-10-CM

## 2024-05-21 DIAGNOSIS — Z45.2 FITTING AND ADJUSTMENT OF VASCULAR CATHETER: Primary | ICD-10-CM

## 2024-05-21 DIAGNOSIS — Z45.2 FITTING AND ADJUSTMENT OF VASCULAR CATHETER: ICD-10-CM

## 2024-05-21 DIAGNOSIS — C80.1 SMALL CELL CARCINOMA: Primary | ICD-10-CM

## 2024-05-21 LAB
ALBUMIN SERPL-MCNC: 4.1 G/DL (ref 3.5–5.2)
ALBUMIN/GLOB SERPL: 1.7 G/DL
ALP SERPL-CCNC: 89 U/L (ref 39–117)
ALT SERPL W P-5'-P-CCNC: <5 U/L (ref 1–33)
ANION GAP SERPL CALCULATED.3IONS-SCNC: 11.4 MMOL/L (ref 5–15)
AST SERPL-CCNC: 11 U/L (ref 1–32)
BASOPHILS # BLD AUTO: 0.01 10*3/MM3 (ref 0–0.2)
BASOPHILS NFR BLD AUTO: 0.2 % (ref 0–1.5)
BILIRUB SERPL-MCNC: 0.2 MG/DL (ref 0–1.2)
BUN SERPL-MCNC: 2 MG/DL (ref 8–23)
BUN/CREAT SERPL: 2.2 (ref 7–25)
CALCIUM SPEC-SCNC: 8.4 MG/DL (ref 8.6–10.5)
CHLORIDE SERPL-SCNC: 96 MMOL/L (ref 98–107)
CO2 SERPL-SCNC: 25.6 MMOL/L (ref 22–29)
CORTIS SERPL-MCNC: 12 MCG/DL
CREAT SERPL-MCNC: 0.89 MG/DL (ref 0.57–1)
DEPRECATED RDW RBC AUTO: 52.3 FL (ref 37–54)
EGFRCR SERPLBLD CKD-EPI 2021: 71.6 ML/MIN/1.73
EOSINOPHIL # BLD AUTO: 0.02 10*3/MM3 (ref 0–0.4)
EOSINOPHIL NFR BLD AUTO: 0.4 % (ref 0.3–6.2)
ERYTHROCYTE [DISTWIDTH] IN BLOOD BY AUTOMATED COUNT: 16.3 % (ref 12.3–15.4)
FERRITIN SERPL-MCNC: 257 NG/ML (ref 13–150)
FOLATE SERPL-MCNC: 5.28 NG/ML (ref 4.78–24.2)
GLOBULIN UR ELPH-MCNC: 2.4 GM/DL
GLUCOSE SERPL-MCNC: 115 MG/DL (ref 65–99)
HCT VFR BLD AUTO: 28.7 % (ref 34–46.6)
HGB BLD-MCNC: 9.7 G/DL (ref 12–15.9)
IMM GRANULOCYTES # BLD AUTO: 0 10*3/MM3 (ref 0–0.05)
IMM GRANULOCYTES NFR BLD AUTO: 0 % (ref 0–0.5)
IRON 24H UR-MRATE: 72 MCG/DL (ref 37–145)
IRON SATN MFR SERPL: 27 % (ref 20–50)
LYMPHOCYTES # BLD AUTO: 2.84 10*3/MM3 (ref 0.7–3.1)
LYMPHOCYTES NFR BLD AUTO: 52.7 % (ref 19.6–45.3)
MAGNESIUM SERPL-MCNC: 1.8 MG/DL (ref 1.6–2.4)
MCH RBC QN AUTO: 33 PG (ref 26.6–33)
MCHC RBC AUTO-ENTMCNC: 33.8 G/DL (ref 31.5–35.7)
MCV RBC AUTO: 97.6 FL (ref 79–97)
MONOCYTES # BLD AUTO: 0.68 10*3/MM3 (ref 0.1–0.9)
MONOCYTES NFR BLD AUTO: 12.6 % (ref 5–12)
NEUTROPHILS NFR BLD AUTO: 1.84 10*3/MM3 (ref 1.7–7)
NEUTROPHILS NFR BLD AUTO: 34.1 % (ref 42.7–76)
PLATELET # BLD AUTO: 155 10*3/MM3 (ref 140–450)
PMV BLD AUTO: 9.4 FL (ref 6–12)
POTASSIUM SERPL-SCNC: 2.8 MMOL/L (ref 3.5–5.2)
PROT SERPL-MCNC: 6.5 G/DL (ref 6–8.5)
RBC # BLD AUTO: 2.94 10*6/MM3 (ref 3.77–5.28)
SODIUM SERPL-SCNC: 133 MMOL/L (ref 136–145)
T4 FREE SERPL-MCNC: 1.54 NG/DL (ref 0.93–1.7)
TIBC SERPL-MCNC: 271 MCG/DL (ref 298–536)
TRANSFERRIN SERPL-MCNC: 182 MG/DL (ref 200–360)
TSH SERPL DL<=0.05 MIU/L-ACNC: 2.52 UIU/ML (ref 0.27–4.2)
VIT B12 BLD-MCNC: 275 PG/ML (ref 211–946)
WBC NRBC COR # BLD AUTO: 5.39 10*3/MM3 (ref 3.4–10.8)

## 2024-05-21 PROCEDURE — 80053 COMPREHEN METABOLIC PANEL: CPT | Performed by: INTERNAL MEDICINE

## 2024-05-21 PROCEDURE — 25010000002 ATEZOLIZUMAB 1200 MG/20ML SOLUTION 20 ML VIAL: Performed by: NURSE PRACTITIONER

## 2024-05-21 PROCEDURE — 99215 OFFICE O/P EST HI 40 MIN: CPT | Performed by: NURSE PRACTITIONER

## 2024-05-21 PROCEDURE — 25810000003 SODIUM CHLORIDE 0.9 % SOLUTION 250 ML FLEX CONT: Performed by: NURSE PRACTITIONER

## 2024-05-21 PROCEDURE — 84466 ASSAY OF TRANSFERRIN: CPT | Performed by: INTERNAL MEDICINE

## 2024-05-21 PROCEDURE — 25010000002 HEPARIN LOCK FLUSH PER 10 UNITS: Performed by: INTERNAL MEDICINE

## 2024-05-21 PROCEDURE — 25010000002 CARBOPLATIN PER 50 MG: Performed by: NURSE PRACTITIONER

## 2024-05-21 PROCEDURE — 82533 TOTAL CORTISOL: CPT | Performed by: NURSE PRACTITIONER

## 2024-05-21 PROCEDURE — 25010000002 PALONOSETRON 0.25 MG/5ML SOLUTION PREFILLED SYRINGE: Performed by: NURSE PRACTITIONER

## 2024-05-21 PROCEDURE — 1126F AMNT PAIN NOTED NONE PRSNT: CPT | Performed by: NURSE PRACTITIONER

## 2024-05-21 PROCEDURE — 25010000002 FOSAPREPITANT PER 1 MG: Performed by: NURSE PRACTITIONER

## 2024-05-21 PROCEDURE — 85025 COMPLETE CBC W/AUTO DIFF WBC: CPT | Performed by: INTERNAL MEDICINE

## 2024-05-21 PROCEDURE — 84439 ASSAY OF FREE THYROXINE: CPT | Performed by: INTERNAL MEDICINE

## 2024-05-21 PROCEDURE — 84443 ASSAY THYROID STIM HORMONE: CPT | Performed by: INTERNAL MEDICINE

## 2024-05-21 PROCEDURE — 25010000002 DEXAMETHASONE SODIUM PHOSPHATE 100 MG/10ML SOLUTION: Performed by: NURSE PRACTITIONER

## 2024-05-21 PROCEDURE — 25010000002 ETOPOSIDE 500 MG/25ML SOLUTION 25 ML VIAL: Performed by: NURSE PRACTITIONER

## 2024-05-21 PROCEDURE — 96413 CHEMO IV INFUSION 1 HR: CPT

## 2024-05-21 PROCEDURE — 96417 CHEMO IV INFUS EACH ADDL SEQ: CPT

## 2024-05-21 PROCEDURE — 83735 ASSAY OF MAGNESIUM: CPT | Performed by: INTERNAL MEDICINE

## 2024-05-21 PROCEDURE — 25810000003 SODIUM CHLORIDE 0.9 % SOLUTION 500 ML FLEX CONT: Performed by: NURSE PRACTITIONER

## 2024-05-21 PROCEDURE — 83540 ASSAY OF IRON: CPT | Performed by: INTERNAL MEDICINE

## 2024-05-21 PROCEDURE — 82024 ASSAY OF ACTH: CPT | Performed by: NURSE PRACTITIONER

## 2024-05-21 PROCEDURE — 96367 TX/PROPH/DG ADDL SEQ IV INF: CPT

## 2024-05-21 PROCEDURE — 96375 TX/PRO/DX INJ NEW DRUG ADDON: CPT

## 2024-05-21 PROCEDURE — 36415 COLL VENOUS BLD VENIPUNCTURE: CPT | Performed by: NURSE PRACTITIONER

## 2024-05-21 PROCEDURE — 25810000003 SODIUM CHLORIDE 0.9 % SOLUTION: Performed by: NURSE PRACTITIONER

## 2024-05-21 PROCEDURE — 82746 ASSAY OF FOLIC ACID SERUM: CPT | Performed by: NURSE PRACTITIONER

## 2024-05-21 PROCEDURE — 82728 ASSAY OF FERRITIN: CPT | Performed by: INTERNAL MEDICINE

## 2024-05-21 PROCEDURE — 82607 VITAMIN B-12: CPT | Performed by: NURSE PRACTITIONER

## 2024-05-21 RX ORDER — PALONOSETRON 0.05 MG/ML
0.25 INJECTION, SOLUTION INTRAVENOUS ONCE
Status: COMPLETED | OUTPATIENT
Start: 2024-05-21 | End: 2024-05-21

## 2024-05-21 RX ORDER — HEPARIN SODIUM (PORCINE) LOCK FLUSH IV SOLN 100 UNIT/ML 100 UNIT/ML
500 SOLUTION INTRAVENOUS AS NEEDED
Status: DISCONTINUED | OUTPATIENT
Start: 2024-05-21 | End: 2024-05-21 | Stop reason: HOSPADM

## 2024-05-21 RX ORDER — SODIUM CHLORIDE 0.9 % (FLUSH) 0.9 %
10 SYRINGE (ML) INJECTION AS NEEDED
Status: DISCONTINUED | OUTPATIENT
Start: 2024-05-21 | End: 2024-05-21 | Stop reason: HOSPADM

## 2024-05-21 RX ORDER — SODIUM CHLORIDE 9 MG/ML
20 INJECTION, SOLUTION INTRAVENOUS ONCE
Status: CANCELLED | OUTPATIENT
Start: 2024-05-23

## 2024-05-21 RX ORDER — SODIUM CHLORIDE 9 MG/ML
20 INJECTION, SOLUTION INTRAVENOUS ONCE
Status: CANCELLED | OUTPATIENT
Start: 2024-05-21

## 2024-05-21 RX ORDER — PALONOSETRON 0.05 MG/ML
0.25 INJECTION, SOLUTION INTRAVENOUS ONCE
Status: CANCELLED | OUTPATIENT
Start: 2024-05-21

## 2024-05-21 RX ORDER — FAMOTIDINE 10 MG/ML
20 INJECTION, SOLUTION INTRAVENOUS AS NEEDED
Status: CANCELLED | OUTPATIENT
Start: 2024-05-21

## 2024-05-21 RX ORDER — SODIUM CHLORIDE 9 MG/ML
20 INJECTION, SOLUTION INTRAVENOUS ONCE
Status: COMPLETED | OUTPATIENT
Start: 2024-05-21 | End: 2024-05-21

## 2024-05-21 RX ORDER — SODIUM CHLORIDE 9 MG/ML
20 INJECTION, SOLUTION INTRAVENOUS ONCE
Status: CANCELLED | OUTPATIENT
Start: 2024-05-22

## 2024-05-21 RX ORDER — POTASSIUM CHLORIDE 20 MEQ/1
40 TABLET, EXTENDED RELEASE ORAL ONCE
Status: COMPLETED | OUTPATIENT
Start: 2024-05-21 | End: 2024-05-21

## 2024-05-21 RX ORDER — SODIUM CHLORIDE 0.9 % (FLUSH) 0.9 %
10 SYRINGE (ML) INJECTION AS NEEDED
Status: CANCELLED | OUTPATIENT
Start: 2024-05-21

## 2024-05-21 RX ORDER — HEPARIN SODIUM (PORCINE) LOCK FLUSH IV SOLN 100 UNIT/ML 100 UNIT/ML
500 SOLUTION INTRAVENOUS AS NEEDED
Status: CANCELLED | OUTPATIENT
Start: 2024-05-21

## 2024-05-21 RX ORDER — PROCHLORPERAZINE MALEATE 10 MG
10 TABLET ORAL ONCE
Status: CANCELLED | OUTPATIENT
Start: 2024-05-22 | End: 2024-05-22

## 2024-05-21 RX ORDER — DIPHENHYDRAMINE HYDROCHLORIDE 50 MG/ML
50 INJECTION INTRAMUSCULAR; INTRAVENOUS AS NEEDED
Status: CANCELLED | OUTPATIENT
Start: 2024-05-21

## 2024-05-21 RX ORDER — PROCHLORPERAZINE MALEATE 10 MG
10 TABLET ORAL ONCE
Status: CANCELLED | OUTPATIENT
Start: 2024-05-23 | End: 2024-05-23

## 2024-05-21 RX ADMIN — CARBOPLATIN 280 MG: 10 INJECTION, SOLUTION INTRAVENOUS at 11:32

## 2024-05-21 RX ADMIN — ATEZOLIZUMAB 1200 MG: 1200 INJECTION, SOLUTION INTRAVENOUS at 10:35

## 2024-05-21 RX ADMIN — SODIUM CHLORIDE 20 ML/HR: 9 INJECTION, SOLUTION INTRAVENOUS at 09:55

## 2024-05-21 RX ADMIN — ETOPOSIDE 110 MG: 20 INJECTION INTRAVENOUS at 12:07

## 2024-05-21 RX ADMIN — HEPARIN 500 UNITS: 100 SYRINGE at 13:09

## 2024-05-21 RX ADMIN — FOSAPREPITANT 100 ML: 150 INJECTION, POWDER, LYOPHILIZED, FOR SOLUTION INTRAVENOUS at 09:56

## 2024-05-21 RX ADMIN — PALONOSETRON 0.25 MG: 0.25 INJECTION, SOLUTION INTRAVENOUS at 11:09

## 2024-05-21 RX ADMIN — POTASSIUM CHLORIDE 40 MEQ: 1500 TABLET, EXTENDED RELEASE ORAL at 09:50

## 2024-05-21 RX ADMIN — DEXAMETHASONE SODIUM PHOSPHATE 12 MG: 10 INJECTION, SOLUTION INTRAMUSCULAR; INTRAVENOUS at 11:11

## 2024-05-21 RX ADMIN — Medication 10 ML: at 13:09

## 2024-05-21 NOTE — NURSING NOTE
Pt seen per MITESH Serrano with review of pt's potassium of 2.8. Per Johnna, give 40meq oral potassium today. Johnna will update the plan to add 40meq potassium PO for tomorrow's treatment as well with recheck of BMP on Thursday. Relayed this information to pt who vu.

## 2024-05-21 NOTE — PROGRESS NOTES
Subjective     REASON FOR CONSULTATION:  small cell lung cancer  Provide an opinion on any further workup or treatment                             REQUESTING PHYSICIAN:  James    RECORDS OBTAINED:  Records of the patients history including those obtained from the referring provider were reviewed and summarized in detail.    HISTORY OF PRESENT ILLNESS:  The patient is a 68 y.o. year old female who is here for an opinion about the above issue.    History of Present Illness   The patient returns today for follow-up, treatment and scan review.  She reports continued fatigue which is stable in nature however does affect her day today as she is unable to do tasks such as sweeping the floor.  She reported she tried to do that yesterday and had pain in her back and felt wiped out after.  She continues her hydrocodone/APAP 10/325 4 times daily typically.  Nausea is controlled with ondansetron.  She continues her Keppra.  She was holding Eliquis as her platelets dropped to 31,000 last week.  ANC was down to 0.84 and she was started on Levaquin daily as well.  She denies bleeding or fevers.    Oncology History:  This is a 68-year-old woman with long history of tobacco use and COPD, degenerative disease of the spine on narcotic therapy, depression/anxiety, orthostatic hypotension, hyperlipidemia who has been followed with serial imaging for a left lower lobe lung nodule and mediastinal lymphadenopathy.  A CT of the chest 1/21/2023 showed a masslike consolidation in the left lower lobe 2.5 x 2.6 cm in size with a potential cavitary focus centrally surrounding haziness and otherwise groundglass opacities in the right middle lobe and right lower lobe and enlarged mediastinal lymph nodes up to 10 mm.  The findings were favored to be infectious or inflammatory.  A follow-up CT chest 7/28/2023 showed pleural-based area of density in the left upper lung 11 x 5 mm new from the previous exam and resolution of the  consolidation in the left lower lobe.  A PET scan was performed 8/9/2023 which showed the 1.1 cm pleural-based density in the posterior left lower lobe to blend into dependent atelectasis but have more intense activity than the atelectasis with maximum SUV 2.8.  There was hypermetabolic lymphadenopathy in the left hilum and left lower paratracheal regions for example lymph node posterior to the left pulmonary artery SUV 4.7 measuring 1.6 cm and another area of soft tissue lateral to the left mainstem bronchus SUV 4.4, ill-defined lymphadenopathy left lower paratracheal region SUV 3.7.  She underwent bronchoscopy with biopsies on 8/25/2023 with samples from stations 11 R, 4R, 7, 10 L, 11 L, 12 L all negative for malignancy; station 4R showed rare atypical reactive epithelial cells.  A follow-up CT of the chest on 2/6/2024 showed the left lower lobe nodule to be enlarging at 1.6 x 0.9 cm and enlarging precarinal lymphadenopathy concerning for metastatic disease.  The patient underwent repeat bronchoscopy with Ion navigation on 2/28/2024; biopsies from the left lower lobe nodule were positive for small cell carcinoma and a level 4 lymph node biopsied also positive for small cell carcinoma.    Full body PET scan on 3/7/2024 showed significantly avid bilateral hilar and mediastinal lymph nodes for example kylah conglom[employer] AP window 8.3 SUV measuring 2.5 x 1.8 cm, left hilar lymph node SUV 7 1.4 cm, pleural-based nodularity left major fissure newly hypermetabolic SUV 2.2, pleural-based partially cavitary mass left lower lobe SUV five 1.5 x 1 cm, new right lung nodule 8 mm in the right lower lobe suspicious.  MRI of the brain negative.    The patient has comorbidities of COPD but minimally symptomatic, no known cardiac disease, kidney disease, liver disease, diabetes etc.  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    The patient initiated chemoimmunotherapy with carboplatin/etoposide/atezolizumab 3/19/2024.    Cycle 2-day 3  family went to pick the patient up for chemotherapy and she was found to be unresponsive at home.  She was brought to the ER and hypoxic CT scan showed abnormalities for which MRI of the brain was performed 2024 showing areas of restricted diffusion in the bilateral basal ganglia, occipital lobes, right frontal lobe consistent with multiple infarcts.  She had no evidence of atrial fibrillation and normal 2D echocardiogram.  She was started on Eliquis 5 mg every 12 hours.  She was mildly hyponatremic and there was some concern for seizure for which she was started on Keppra by neurology.  The patient gradually improved and was discharged from the hospital near normal baseline on 2024.    CT chest abdomen pelvis 2024 showed resolved groundglass infiltrates, no well-defined consolidation or pleural effusions, mucous plug in the right lower lobe 6 mm, residual nodularity posterior lateral left lower lobe 5 mm (decreased from previous PET), no suspicious pulmonary nodules or masses, no lymphadenopathy in the chest.    Past Medical History:   Diagnosis Date    COPD (chronic obstructive pulmonary disease)     COPD with acute exacerbation 2020    Small cell carcinoma 3/13/2024        Past Surgical History:   Procedure Laterality Date    BRONCHOSCOPY N/A 2023    Procedure: BRONCHOSCOPY WITH ENDOBRONCHIAL ULTRASOUND (EBUS) with FNA;  Surgeon: Coy Mccarthy MD;  Location: [facility] ENDOSCOPY;  Service: Pulmonary;  Laterality: N/A;  Pre/Post - mediastinal and hilar lymphadenopathy.    BRONCHOSCOPY WITH ION ROBOTIC ASSIST N/A 2024    Procedure: BRONCHOSCOPY WITH ION ROBOT,  ENDOBRONCHIAL ULTRASOUND, FINE NEEDLE ASPIRATIONS,  CRYOTHERAPY BIOPSIES, AND LEFT LOWER LOBE BRONCHOALVEOLAR LAVAGE.;  Surgeon: Alexia Velásquez MD;  Location: [facility] ENDOSCOPY;  Service: Robotics - Pulmonary;  Laterality: N/A;     SECTION      CHEST TUBE INSERTION Left 2024    Procedure: CHEST TUBE INSERTION;   Surgeon: Alexia Velásquez MD;  Location:  [facility] ENDOSCOPY;  Service: Thoracic;  Laterality: Left;    CHOLECYSTECTOMY      COLONOSCOPY      ECTOPIC PREGNANCY SURGERY      HYSTERECTOMY      TONSILLECTOMY      TOTAL HIP ARTHROPLASTY Left     VENOUS ACCESS DEVICE (PORT) INSERTION N/A 3/14/2024    Procedure: INSERTION VENOUS ACCESS DEVICE;  Surgeon: Jonas Garner MD;  Location:  [facility] OR;  Service: General;  Laterality: N/A;        Current Outpatient Medications on File Prior to Visit   Medication Sig Dispense Refill    albuterol sulfate  (90 Base) MCG/ACT inhaler Inhale 2 puffs Every 4 (Four) Hours As Needed for Wheezing. Takes as needed.      apixaban (ELIQUIS) 5 MG tablet tablet Take 1 tablet by mouth Every 12 (Twelve) Hours. Indications: Other - full anticoagulation 180 tablet 0    budesonide-formoterol (SYMBICORT) 160-4.5 MCG/ACT inhaler Inhale 2 puffs 2 (Two) Times a Day.  12    Denosumab (PROLIA SC) Inject  under the skin into the appropriate area as directed Every 6 (Six) Months.      diazePAM (VALIUM) 10 MG tablet Take 1 tablet by mouth 2 (Two) Times a Day As Needed for Anxiety (RLS.). Takes 20 mg at bedtime at times when she needs.      HYDROcodone-acetaminophen (NORCO)  MG per tablet Take 1 tablet by mouth 3 (Three) Times a Day As Needed for Moderate Pain.      levETIRAcetam (KEPPRA) 500 MG tablet Take 1 tablet by mouth Every 12 (Twelve) Hours. 60 tablet 3    levoFLOXacin (Levaquin) 500 MG tablet Take 1 tablet by mouth Daily for 7 days. 7 tablet 0    midodrine (PROAMATINE) 2.5 MG tablet Take 1 tablet by mouth 3 (Three) Times a Day Before Meals.      naloxone (NARCAN) 4 MG/0.1ML nasal spray 1 spray into the nostril(s) as directed by provider As Needed.      ondansetron (ZOFRAN) 8 MG tablet Take 1 tablet by mouth Every 8 (Eight) Hours As Needed for Nausea or Vomiting. 30 tablet 1    pantoprazole (PROTONIX) 40 MG EC tablet Take 1 tablet by mouth Daily. 30 tablet 3    sucralfate (CARAFATE)  "1 g tablet Take 1 tablet by mouth 4 (Four) Times a Day. 120 tablet 3     No current facility-administered medications on file prior to visit.        ALLERGIES:    Allergies   Allergen Reactions    Codeine GI Intolerance    Percocet [Oxycodone-Acetaminophen] Hives        Social History     Socioeconomic History    Marital status:    Tobacco Use    Smoking status: Every Day     Current packs/day: 1.00     Average packs/day: 1 pack/day for 30.0 years (30.0 ttl pk-yrs)     Types: Cigarettes     Passive exposure: Current    Smokeless tobacco: Never    Tobacco comments:     Began smoking at age 11.  Smoked .5 ppd for 6 years, 2.5 ppd for a year, 2 ppd for 5 years, and 1 ppd for the past 43 years for a 58.5 pack year history.   Vaping Use    Vaping status: Former    Substances: Nicotine, Flavoring    Devices: Disposable   Substance and Sexual Activity    Alcohol use: Yes     Comment: once a year     Drug use: No    Sexual activity: Defer        Family History   Problem Relation Age of Onset    Lung cancer Niece 50    Throat cancer Father     Breast cancer Neg Hx         Review of Systems   Constitutional:  Positive for fatigue.   HENT: Negative.     Eyes: Negative.    Respiratory: Negative.     Cardiovascular: Negative.    Gastrointestinal:         Esophagitis symptoms   Musculoskeletal:  Positive for back pain.   Neurological: Negative.    Hematological: Negative.    Psychiatric/Behavioral:  Positive for dysphoric mood. The patient is nervous/anxious.         Objective     Vitals:    05/21/24 0829   BP: 109/70   Pulse: 94   Resp: 16   Temp: 97.7 °F (36.5 °C)   TempSrc: Infrared   SpO2: 99%   Weight: 46.8 kg (103 lb 3.2 oz)   Height: 160 cm (62.99\")   PainSc: 0-No pain               5/21/2024     8:30 AM   Current Status   ECOG score 0       Physical Exam    CONSTITUTIONAL: pleasant well-developed adult woman  HEENT: no icterus, no thrush, moist membranes, anisocoria  LYMPH: no cervical or supraclavicular lad  CV: " RRR, S1S2, no murmur  RESP: cta bilat, no wheezing, no rales  GI: soft, non-tender, no splenomegaly, +bs  MUSC: no edema, normal gait  NEURO: alert and oriented x3, normal strength  PSYCH: normal mood and affect for situation  Skin: No rash or induration noted,  alopecia  Unchanged- 05/21/2024      RECENT LABS:  Hematology WBC   Date Value Ref Range Status   05/21/2024 5.39 3.40 - 10.80 10*3/mm3 Final     RBC   Date Value Ref Range Status   05/21/2024 2.94 (L) 3.77 - 5.28 10*6/mm3 Final     Hemoglobin   Date Value Ref Range Status   05/21/2024 9.7 (L) 12.0 - 15.9 g/dL Final     Hematocrit   Date Value Ref Range Status   05/21/2024 28.7 (L) 34.0 - 46.6 % Final     Platelets   Date Value Ref Range Status   05/21/2024 155 140 - 450 10*3/mm3 Final        Lab Results   Component Value Date    GLUCOSE 128 (H) 04/30/2024    BUN 5 (L) 04/30/2024    CREATININE 0.99 04/30/2024    EGFR 63.0 04/30/2024    BCR 5.1 (L) 04/30/2024    K 3.9 04/30/2024    CO2 24.6 04/30/2024    CALCIUM 8.0 (L) 04/30/2024    ALBUMIN 4.3 04/30/2024    BILITOT 0.2 04/30/2024    AST 13 04/30/2024    ALT <5 04/30/2024     MRI brain 4/11/2024:  Findings:  There is mild generalized parenchymal volume loss. Diffusion weighted images demonstrate areas of restricted diffusion within the globus pallidus bilaterally corresponding to the areas of low-attenuation noted on prior CT scan. There are additional   punctate foci of restricted diffusion involving the precentral gyrus of the right frontal lobe as well as additional patchy areas of restricted diffusion within the bilateral occipital lobes. Findings would be compatible with multiple acute infarcts.   Given the symmetric appearance within the basal ganglia other differential considerations would include carbon monoxide poisoning, anoxic injury there is no evidence of intracranial hemorrhage. No abnormal extra-axial fluid collections are identified. No   mass effect or hydrocephalus.     Major intracranial  vascular flow voids are preserved. Sella and suprasellar cistern are within normal limits. Craniovertebral junction appears normal.     Postcontrast images demonstrate no pathologic intracranial contrast enhancement.     IMPRESSION:  Impression:     1. There are areas of restricted diffusion within the bilateral basal ganglia, bilateral occipital lobes, and right frontal lobe compatible with multiple infarcts. The symmetric appearance of the infarcts within the basal ganglia can be seen with anoxic   injury or carbon monoxide poisoning.  2. No evidence of acute intracranial hemorrhage.  3. No evidence of pathologic contrast enhancement    CT Chest Abdomen Pelvis With Contrast 4/24/2024 - IMPRESSION:  1.Subtle residual subpleural nodularity at the lateral aspect of the left lower lobe at the site of prior subpleural mass or malignant nodule. The density in this region measures 5 mm. The findings indicate an appropriate response to interval therapy.  2.No evidence for additional malignancy or metastatic disease throughout the chest. No evidence for malignancy or metastatic disease throughout the abdomen or pelvis.    Assessment & Plan     *Small cell lung cancer left lower lobe of the lung with mediastinal involvement  CT chest 2/22/2024-enlarged for ill lymph nodes 2.5 cm, enlarged AP window lymph node 1.4 cm, poorly defined left hilar lymphadenopathy, left lower lobe pulmonary nodule 1.7 cm  Bronchoscopy with Ion navigation performed 2/28/2024-pathology positive for small cell carcinoma from the left lower lobe and station 4L  PET scan on 3/7/2024 showed significantly avid bilateral hilar and mediastinal lymph nodes for example kylah conglom[employer] AP window 8.3 SUV measuring 2.5 x 1.8 cm, left hilar lymph node SUV 7 1.4 cm, pleural-based nodularity left major fissure newly hypermetabolic SUV 2.2, pleural-based partially cavitary mass left lower lobe SUV 5 measuring  1.5 x 1 cm, new right lung nodule 8 mm in the right  lower lobe suspicious.-Results discussed with Dr. Velásquez and we both feel the contralateral right lung nodule is highly suspicious for metastatic disease  MRI of the brain negative.  3/19/2024.initiated chemoimmunotherapy with carboplatin/etoposide/atezolizumab   4/9/24 received D1-2 of cycle 2 chemotherapy; day 3 held for admission for acute bilateral strokes  CT chest abdomen pelvis 4/24/2024 showed resolved groundglass infiltrates, no well-defined consolidation or pleural effusions, mucous plug in the right lower lobe 6 mm, residual nodularity posterior lateral left lower lobe 5 mm (decreased from previous PET), no suspicious pulmonary nodules or masses, no lymphadenopathy in the chest.  5/21/2024 returns for cycle 4 for platinum/etoposide/atezolizumab.  5/21/2024: Patient returns for cycle 4 carboplatin/etoposide/atezolizumab.  Platelet chiki this past cycle was 31,000 and Eliquis was held.  ANC chiki to 0.84 and Levaquin was started.  Counts have recovered today with platelets up to 155,000 and ANC 1.84.  Discussed with Dr. Burns and will reduce carboplatin to an AUC of 4.  Etoposide is at 75 mg/m2 and will remain so.  Patient will continue weekly labs with nurse review.      *Bilateral strokes suspected embolic-new  Admitted 4/11/2024 with acute mental status change-MRI brain showed multiple areas of restricted diffusion bilateral basal ganglia, bilateral occipital lobes, right frontal lobe  No documented arrhythmia, normal 2D echocardiogram  Patient discharged on Eliquis 5 mg every 12 hours; on Keppra 500 twice daily for seizure prophylaxis    *Hyponatremia-sodium level today fairly stable at 133    *Hypokalemia-potassium 2.8 today.  Will give 40 mEq potassium chloride in office today and again tomorrow.  Magnesium level checked and normal at 1.8    *Multifactorial anemia-hemoglobin today 9.7, has been trending down.  Patient denies bleeding.  Additional labs with ferritin, iron panel unremarkable, B12 and  folate pending    *Fatigue-patient reports treatment related fatigue that is present and unchanged affecting her day-to-day activity with her being unable to do chores around the house without being exhausted.  Will add additional labs including  anemia labs as above as well as cortisol, ACTH, currently pending.  TSH, free T4 today normal range.      *Esophagitis secondary to chemotherapy-improved with Protonix and sucralfate      *Comorbidities of tobacco abuse/COPD, anxiety, atherosclerotic calcifications by CT but no definitive diagnosis of CAD, degenerative disease of the spine, hyperlipidemia; no known autoimmune disorders, chronic pain on opioid medicines    Oncology plan/recommendations:  Proceed with carboplatin at an AUC reduced to 4, -16 at continued dose of 75 mg/m2 and atezolizumab today  Potassium chloride 40 mEq oral today and tomorrow with recheck stat BMP on Thursday  ACTH and cortisol pending  Eliquis 5 mg twice daily can be restarted today  Continue Keppra 500 mg twice daily  Proceed with cycle 3 carboplatin/-16/Tecentriq  Weekly CBC with differential/nurse review-hold Eliquis if platelet count drops below 50,000  Return to clinic 3 weeks MD visit lab with 1 wk prior PET scan after cycle 4  continuation of atezolizumab +/- radiation therapy pending PET results    Patient is on high risk medication requiring frequent monitoring.  Case reviewed with Dr. Burns today. I spent 40 minutes caring for Pili on this date of service. This time includes time spent by me in the following activities: preparing for the visit, reviewing tests, obtaining and/or reviewing a separately obtained history, performing a medically appropriate examination and/or evaluation, counseling and educating the patient/family/caregiver, ordering medications, tests, or procedures, documenting information in the medical record, independently interpreting results and communicating that information with the  patient/family/caregiver, and care coordination.

## 2024-05-22 ENCOUNTER — INFUSION (OUTPATIENT)
Dept: ONCOLOGY | Facility: HOSPITAL | Age: 69
End: 2024-05-22
Payer: MEDICARE

## 2024-05-22 VITALS
HEART RATE: 80 BPM | SYSTOLIC BLOOD PRESSURE: 100 MMHG | TEMPERATURE: 98 F | DIASTOLIC BLOOD PRESSURE: 53 MMHG | OXYGEN SATURATION: 98 %

## 2024-05-22 DIAGNOSIS — Z45.2 FITTING AND ADJUSTMENT OF VASCULAR CATHETER: ICD-10-CM

## 2024-05-22 DIAGNOSIS — C80.1 SMALL CELL CARCINOMA: Primary | ICD-10-CM

## 2024-05-22 LAB — ACTH PLAS-MCNC: 6.9 PG/ML (ref 7.2–63.3)

## 2024-05-22 PROCEDURE — 25010000002 ETOPOSIDE 100 MG/5ML SOLUTION 5 ML VIAL: Performed by: NURSE PRACTITIONER

## 2024-05-22 PROCEDURE — 25810000003 SODIUM CHLORIDE 0.9 % SOLUTION 500 ML FLEX CONT: Performed by: NURSE PRACTITIONER

## 2024-05-22 PROCEDURE — 63710000001 PROCHLORPERAZINE MALEATE PER 5 MG: Performed by: NURSE PRACTITIONER

## 2024-05-22 PROCEDURE — 96413 CHEMO IV INFUSION 1 HR: CPT

## 2024-05-22 PROCEDURE — 25010000002 HEPARIN LOCK FLUSH PER 10 UNITS: Performed by: INTERNAL MEDICINE

## 2024-05-22 PROCEDURE — 25810000003 SODIUM CHLORIDE 0.9 % SOLUTION: Performed by: NURSE PRACTITIONER

## 2024-05-22 RX ORDER — GREEN TEA/HOODIA GORDONII 315-12.5MG
500 CAPSULE ORAL DAILY
Qty: 90 TABLET | Refills: 1 | Status: SHIPPED | OUTPATIENT
Start: 2024-05-22

## 2024-05-22 RX ORDER — SODIUM CHLORIDE 0.9 % (FLUSH) 0.9 %
10 SYRINGE (ML) INJECTION AS NEEDED
Status: CANCELLED | OUTPATIENT
Start: 2024-05-22

## 2024-05-22 RX ORDER — POTASSIUM CHLORIDE 1500 MG/1
40 TABLET, FILM COATED, EXTENDED RELEASE ORAL ONCE
Qty: 2 TABLET | Refills: 0 | Status: DISCONTINUED | OUTPATIENT
Start: 2024-05-22 | End: 2024-05-22

## 2024-05-22 RX ORDER — HEPARIN SODIUM (PORCINE) LOCK FLUSH IV SOLN 100 UNIT/ML 100 UNIT/ML
500 SOLUTION INTRAVENOUS AS NEEDED
Status: DISCONTINUED | OUTPATIENT
Start: 2024-05-22 | End: 2024-05-22 | Stop reason: HOSPADM

## 2024-05-22 RX ORDER — POTASSIUM CHLORIDE 20 MEQ/1
40 TABLET, EXTENDED RELEASE ORAL ONCE
Status: COMPLETED | OUTPATIENT
Start: 2024-05-22 | End: 2024-05-22

## 2024-05-22 RX ORDER — SODIUM CHLORIDE 0.9 % (FLUSH) 0.9 %
10 SYRINGE (ML) INJECTION AS NEEDED
Status: DISCONTINUED | OUTPATIENT
Start: 2024-05-22 | End: 2024-05-22 | Stop reason: HOSPADM

## 2024-05-22 RX ORDER — SODIUM CHLORIDE 9 MG/ML
20 INJECTION, SOLUTION INTRAVENOUS ONCE
Status: COMPLETED | OUTPATIENT
Start: 2024-05-22 | End: 2024-05-22

## 2024-05-22 RX ORDER — PROCHLORPERAZINE MALEATE 5 MG/1
10 TABLET ORAL ONCE
Status: COMPLETED | OUTPATIENT
Start: 2024-05-22 | End: 2024-05-22

## 2024-05-22 RX ORDER — UREA 10 %
800 LOTION (ML) TOPICAL DAILY
Qty: 90 TABLET | Refills: 1 | Status: SHIPPED | OUTPATIENT
Start: 2024-05-22

## 2024-05-22 RX ORDER — HEPARIN SODIUM (PORCINE) LOCK FLUSH IV SOLN 100 UNIT/ML 100 UNIT/ML
500 SOLUTION INTRAVENOUS AS NEEDED
Status: CANCELLED | OUTPATIENT
Start: 2024-05-22

## 2024-05-22 RX ADMIN — PROCHLORPERAZINE MALEATE 10 MG: 5 TABLET ORAL at 13:48

## 2024-05-22 RX ADMIN — Medication 10 ML: at 15:16

## 2024-05-22 RX ADMIN — POTASSIUM CHLORIDE 40 MEQ: 1500 TABLET, EXTENDED RELEASE ORAL at 14:14

## 2024-05-22 RX ADMIN — ETOPOSIDE 110 MG: 20 INJECTION INTRAVENOUS at 14:12

## 2024-05-22 RX ADMIN — HEPARIN 500 UNITS: 100 SYRINGE at 15:16

## 2024-05-22 RX ADMIN — SODIUM CHLORIDE 20 ML/HR: 9 INJECTION, SOLUTION INTRAVENOUS at 13:48

## 2024-05-22 NOTE — NURSING NOTE
Per Johnna, informed pt B12 and folic acid levels were low and supplements sent to [facility] pharmacy. Pt v/u.

## 2024-05-23 ENCOUNTER — INFUSION (OUTPATIENT)
Dept: ONCOLOGY | Facility: HOSPITAL | Age: 69
End: 2024-05-23
Payer: MEDICARE

## 2024-05-23 VITALS
DIASTOLIC BLOOD PRESSURE: 60 MMHG | SYSTOLIC BLOOD PRESSURE: 93 MMHG | HEART RATE: 86 BPM | TEMPERATURE: 97.3 F | OXYGEN SATURATION: 97 %

## 2024-05-23 DIAGNOSIS — Z45.2 FITTING AND ADJUSTMENT OF VASCULAR CATHETER: ICD-10-CM

## 2024-05-23 DIAGNOSIS — C80.1 SMALL CELL CARCINOMA: Primary | ICD-10-CM

## 2024-05-23 LAB
ANION GAP SERPL CALCULATED.3IONS-SCNC: 11.5 MMOL/L (ref 5–15)
BUN SERPL-MCNC: 8 MG/DL (ref 8–23)
BUN/CREAT SERPL: 8.6 (ref 7–25)
CALCIUM SPEC-SCNC: 8.1 MG/DL (ref 8.6–10.5)
CHLORIDE SERPL-SCNC: 103 MMOL/L (ref 98–107)
CO2 SERPL-SCNC: 23.5 MMOL/L (ref 22–29)
CREAT SERPL-MCNC: 0.93 MG/DL (ref 0.57–1)
EGFRCR SERPLBLD CKD-EPI 2021: 67.9 ML/MIN/1.73
GLUCOSE SERPL-MCNC: 106 MG/DL (ref 65–99)
POTASSIUM SERPL-SCNC: 3.5 MMOL/L (ref 3.5–5.2)
SODIUM SERPL-SCNC: 138 MMOL/L (ref 136–145)

## 2024-05-23 PROCEDURE — 25810000003 SODIUM CHLORIDE 0.9 % SOLUTION 500 ML FLEX CONT: Performed by: NURSE PRACTITIONER

## 2024-05-23 PROCEDURE — 25010000002 ETOPOSIDE 100 MG/5ML SOLUTION 5 ML VIAL: Performed by: NURSE PRACTITIONER

## 2024-05-23 PROCEDURE — 80048 BASIC METABOLIC PNL TOTAL CA: CPT | Performed by: NURSE PRACTITIONER

## 2024-05-23 PROCEDURE — 96413 CHEMO IV INFUSION 1 HR: CPT

## 2024-05-23 PROCEDURE — 63710000001 PROCHLORPERAZINE MALEATE PER 5 MG: Performed by: NURSE PRACTITIONER

## 2024-05-23 PROCEDURE — 25810000003 SODIUM CHLORIDE 0.9 % SOLUTION: Performed by: NURSE PRACTITIONER

## 2024-05-23 PROCEDURE — 25010000002 HEPARIN LOCK FLUSH PER 10 UNITS: Performed by: INTERNAL MEDICINE

## 2024-05-23 RX ORDER — HEPARIN SODIUM (PORCINE) LOCK FLUSH IV SOLN 100 UNIT/ML 100 UNIT/ML
500 SOLUTION INTRAVENOUS AS NEEDED
Status: DISCONTINUED | OUTPATIENT
Start: 2024-05-23 | End: 2024-05-23 | Stop reason: HOSPADM

## 2024-05-23 RX ORDER — SODIUM CHLORIDE 9 MG/ML
20 INJECTION, SOLUTION INTRAVENOUS ONCE
Status: COMPLETED | OUTPATIENT
Start: 2024-05-23 | End: 2024-05-23

## 2024-05-23 RX ORDER — HEPARIN SODIUM (PORCINE) LOCK FLUSH IV SOLN 100 UNIT/ML 100 UNIT/ML
500 SOLUTION INTRAVENOUS AS NEEDED
OUTPATIENT
Start: 2024-05-23

## 2024-05-23 RX ORDER — SODIUM CHLORIDE 0.9 % (FLUSH) 0.9 %
10 SYRINGE (ML) INJECTION AS NEEDED
OUTPATIENT
Start: 2024-05-23

## 2024-05-23 RX ORDER — CALCIUM CARBONATE 500 MG/1
1 TABLET, CHEWABLE ORAL DAILY
COMMUNITY

## 2024-05-23 RX ORDER — POTASSIUM CHLORIDE 20 MEQ/1
20 TABLET, EXTENDED RELEASE ORAL DAILY
Qty: 30 TABLET | Refills: 1 | Status: SHIPPED | OUTPATIENT
Start: 2024-05-23

## 2024-05-23 RX ORDER — PROCHLORPERAZINE MALEATE 5 MG/1
10 TABLET ORAL ONCE
Status: COMPLETED | OUTPATIENT
Start: 2024-05-23 | End: 2024-05-23

## 2024-05-23 RX ORDER — SODIUM CHLORIDE 0.9 % (FLUSH) 0.9 %
10 SYRINGE (ML) INJECTION AS NEEDED
Status: DISCONTINUED | OUTPATIENT
Start: 2024-05-23 | End: 2024-05-23 | Stop reason: HOSPADM

## 2024-05-23 RX ADMIN — ETOPOSIDE 110 MG: 20 INJECTION INTRAVENOUS at 14:01

## 2024-05-23 RX ADMIN — Medication 10 ML: at 15:14

## 2024-05-23 RX ADMIN — PROCHLORPERAZINE MALEATE 10 MG: 5 TABLET ORAL at 13:33

## 2024-05-23 RX ADMIN — SODIUM CHLORIDE 20 ML/HR: 9 INJECTION, SOLUTION INTRAVENOUS at 13:30

## 2024-05-23 RX ADMIN — HEPARIN 500 UNITS: 100 SYRINGE at 15:14

## 2024-05-23 NOTE — NURSING NOTE
The pt was called at home and given the following instructions per MITESH Serrano:  1) Take Tums, 1 daily for low serum calcium  2)  prescription for KCl 20meQ and take 1 daily.    The pt v/u.

## 2024-05-24 ENCOUNTER — PATIENT OUTREACH (OUTPATIENT)
Dept: OTHER | Facility: HOSPITAL | Age: 69
End: 2024-05-24
Payer: MEDICARE

## 2024-05-24 NOTE — PROGRESS NOTES
Reviewed chart.  Patient with Small cell lung cancer left lower lobe of the lung with mediastinal involvement. Initiated chemoimmunotherapy with carboplatin/etoposide/Atezolizumab 3/19/2. CT chest abdomen pelvis 4/24/2024 showed resolved groundglass infiltrates, no well-defined consolidation or pleural effusions, mucous plug in the right lower lobe 6 mm, residual nodularity posterior lateral left lower lobe 5 mm (decreased from previous PET), no suspicious pulmonary nodules or masses, no lymphadenopathy in the chest.   Rec'd 4th/final cycle carboplatin at an AUC reduced to 4, -16 at continued dose of 75 mg/m2 and atezolizumab starting 5/21.   PET scan 6/6, sees oncology APRN 6/11    Called patient.  We discussed her 5/21 appt. She is taking antibiotics now due to low blood counts as well as B12.  She states she is feeling ok except being tired.  We discussed her upcoming appts including PET scan. We discussed PET dietary restrictions; patient verbalized understanding.     The patient denies any questions/concerns or ongoing resource needs. I will call in 1-2 months; encouraged patient to call as needed.

## 2024-05-28 ENCOUNTER — APPOINTMENT (OUTPATIENT)
Dept: ONCOLOGY | Facility: HOSPITAL | Age: 69
End: 2024-05-28
Payer: MEDICARE

## 2024-05-28 ENCOUNTER — LAB (OUTPATIENT)
Dept: LAB | Facility: HOSPITAL | Age: 69
End: 2024-05-28
Payer: MEDICARE

## 2024-05-28 DIAGNOSIS — C80.1 SMALL CELL CARCINOMA: Primary | ICD-10-CM

## 2024-05-28 DIAGNOSIS — Z79.899 HIGH RISK MEDICATION USE: ICD-10-CM

## 2024-05-28 DIAGNOSIS — E87.6 HYPOKALEMIA: ICD-10-CM

## 2024-05-28 DIAGNOSIS — C80.1 SMALL CELL CARCINOMA: ICD-10-CM

## 2024-05-28 LAB
ANION GAP SERPL CALCULATED.3IONS-SCNC: 10.3 MMOL/L (ref 5–15)
BASOPHILS # BLD AUTO: 0.04 10*3/MM3 (ref 0–0.2)
BASOPHILS NFR BLD AUTO: 0.8 % (ref 0–1.5)
BUN SERPL-MCNC: 10 MG/DL (ref 8–23)
BUN/CREAT SERPL: 10.5 (ref 7–25)
CALCIUM SPEC-SCNC: 9.2 MG/DL (ref 8.6–10.5)
CHLORIDE SERPL-SCNC: 95 MMOL/L (ref 98–107)
CO2 SERPL-SCNC: 25.7 MMOL/L (ref 22–29)
CREAT SERPL-MCNC: 0.95 MG/DL (ref 0.57–1)
DEPRECATED RDW RBC AUTO: 61.7 FL (ref 37–54)
EGFRCR SERPLBLD CKD-EPI 2021: 66.2 ML/MIN/1.73
EOSINOPHIL # BLD AUTO: 0.01 10*3/MM3 (ref 0–0.4)
EOSINOPHIL NFR BLD AUTO: 0.2 % (ref 0.3–6.2)
ERYTHROCYTE [DISTWIDTH] IN BLOOD BY AUTOMATED COUNT: 16.9 % (ref 12.3–15.4)
GLUCOSE SERPL-MCNC: 128 MG/DL (ref 65–99)
HCT VFR BLD AUTO: 24.4 % (ref 34–46.6)
HGB BLD-MCNC: 8.1 G/DL (ref 12–15.9)
IMM GRANULOCYTES # BLD AUTO: 0.11 10*3/MM3 (ref 0–0.05)
IMM GRANULOCYTES NFR BLD AUTO: 2.1 % (ref 0–0.5)
LYMPHOCYTES # BLD AUTO: 2.72 10*3/MM3 (ref 0.7–3.1)
LYMPHOCYTES NFR BLD AUTO: 51.3 % (ref 19.6–45.3)
MCH RBC QN AUTO: 34 PG (ref 26.6–33)
MCHC RBC AUTO-ENTMCNC: 33.2 G/DL (ref 31.5–35.7)
MCV RBC AUTO: 102.5 FL (ref 79–97)
MONOCYTES # BLD AUTO: 0.08 10*3/MM3 (ref 0.1–0.9)
MONOCYTES NFR BLD AUTO: 1.5 % (ref 5–12)
NEUTROPHILS NFR BLD AUTO: 2.34 10*3/MM3 (ref 1.7–7)
NEUTROPHILS NFR BLD AUTO: 44.1 % (ref 42.7–76)
NRBC BLD AUTO-RTO: 0 /100 WBC (ref 0–0.2)
PLATELET # BLD AUTO: 204 10*3/MM3 (ref 140–450)
PMV BLD AUTO: 9.2 FL (ref 6–12)
POTASSIUM SERPL-SCNC: 4.2 MMOL/L (ref 3.5–5.2)
RBC # BLD AUTO: 2.38 10*6/MM3 (ref 3.77–5.28)
SODIUM SERPL-SCNC: 131 MMOL/L (ref 136–145)
WBC NRBC COR # BLD AUTO: 5.3 10*3/MM3 (ref 3.4–10.8)

## 2024-05-28 PROCEDURE — 80048 BASIC METABOLIC PNL TOTAL CA: CPT | Performed by: INTERNAL MEDICINE

## 2024-05-28 PROCEDURE — 85025 COMPLETE CBC W/AUTO DIFF WBC: CPT | Performed by: INTERNAL MEDICINE

## 2024-05-28 PROCEDURE — 36415 COLL VENOUS BLD VENIPUNCTURE: CPT

## 2024-06-04 ENCOUNTER — LAB (OUTPATIENT)
Dept: LAB | Facility: HOSPITAL | Age: 69
End: 2024-06-04
Payer: MEDICARE

## 2024-06-04 ENCOUNTER — CLINICAL SUPPORT (OUTPATIENT)
Dept: ONCOLOGY | Facility: HOSPITAL | Age: 69
End: 2024-06-04
Payer: MEDICARE

## 2024-06-04 VITALS
OXYGEN SATURATION: 97 % | TEMPERATURE: 97.8 F | HEART RATE: 91 BPM | DIASTOLIC BLOOD PRESSURE: 61 MMHG | SYSTOLIC BLOOD PRESSURE: 100 MMHG

## 2024-06-04 DIAGNOSIS — C80.1 SMALL CELL CARCINOMA: ICD-10-CM

## 2024-06-04 DIAGNOSIS — E87.6 HYPOKALEMIA: ICD-10-CM

## 2024-06-04 DIAGNOSIS — Z79.899 HIGH RISK MEDICATION USE: ICD-10-CM

## 2024-06-04 LAB
ANION GAP SERPL CALCULATED.3IONS-SCNC: 8.9 MMOL/L (ref 5–15)
BASOPHILS # BLD AUTO: 0.02 10*3/MM3 (ref 0–0.2)
BASOPHILS NFR BLD AUTO: 0.4 % (ref 0–1.5)
BUN SERPL-MCNC: 5 MG/DL (ref 8–23)
BUN/CREAT SERPL: 5.4 (ref 7–25)
CALCIUM SPEC-SCNC: 8.7 MG/DL (ref 8.6–10.5)
CHLORIDE SERPL-SCNC: 97 MMOL/L (ref 98–107)
CO2 SERPL-SCNC: 25.1 MMOL/L (ref 22–29)
CREAT SERPL-MCNC: 0.93 MG/DL (ref 0.57–1)
DEPRECATED RDW RBC AUTO: 63 FL (ref 37–54)
EGFRCR SERPLBLD CKD-EPI 2021: 67.9 ML/MIN/1.73
EOSINOPHIL # BLD AUTO: 0.03 10*3/MM3 (ref 0–0.4)
EOSINOPHIL NFR BLD AUTO: 0.6 % (ref 0.3–6.2)
ERYTHROCYTE [DISTWIDTH] IN BLOOD BY AUTOMATED COUNT: 17 % (ref 12.3–15.4)
GLUCOSE SERPL-MCNC: 102 MG/DL (ref 65–99)
HCT VFR BLD AUTO: 24.2 % (ref 34–46.6)
HGB BLD-MCNC: 8 G/DL (ref 12–15.9)
IMM GRANULOCYTES # BLD AUTO: 0 10*3/MM3 (ref 0–0.05)
IMM GRANULOCYTES NFR BLD AUTO: 0 % (ref 0–0.5)
LYMPHOCYTES # BLD AUTO: 4.04 10*3/MM3 (ref 0.7–3.1)
LYMPHOCYTES NFR BLD AUTO: 76.5 % (ref 19.6–45.3)
MCH RBC QN AUTO: 34.9 PG (ref 26.6–33)
MCHC RBC AUTO-ENTMCNC: 33.1 G/DL (ref 31.5–35.7)
MCV RBC AUTO: 105.7 FL (ref 79–97)
MONOCYTES # BLD AUTO: 0.36 10*3/MM3 (ref 0.1–0.9)
MONOCYTES NFR BLD AUTO: 6.8 % (ref 5–12)
NEUTROPHILS NFR BLD AUTO: 0.83 10*3/MM3 (ref 1.7–7)
NEUTROPHILS NFR BLD AUTO: 15.7 % (ref 42.7–76)
PLATELET # BLD AUTO: 70 10*3/MM3 (ref 140–450)
PMV BLD AUTO: 10.1 FL (ref 6–12)
POTASSIUM SERPL-SCNC: 4.5 MMOL/L (ref 3.5–5.2)
RBC # BLD AUTO: 2.29 10*6/MM3 (ref 3.77–5.28)
SODIUM SERPL-SCNC: 131 MMOL/L (ref 136–145)
WBC NRBC COR # BLD AUTO: 5.28 10*3/MM3 (ref 3.4–10.8)

## 2024-06-04 PROCEDURE — 85025 COMPLETE CBC W/AUTO DIFF WBC: CPT | Performed by: INTERNAL MEDICINE

## 2024-06-04 PROCEDURE — 80048 BASIC METABOLIC PNL TOTAL CA: CPT | Performed by: INTERNAL MEDICINE

## 2024-06-04 PROCEDURE — 36415 COLL VENOUS BLD VENIPUNCTURE: CPT

## 2024-06-04 NOTE — NURSING NOTE
The pt is here for review of cbc and cmp following C4 Carboplatin, Etoposide and Tecentriq on 5/21/24. The pt has no complaints except for dizziness on occasion when she changes position. The pt denies SOA, cough, constipation, diarrhea, headache, etc. She states that her appetite is decreased but she does still eat and drink fluids regularly.     The pt was informed that her BMP is stable for her at this time with WNL K+ of 4.5 and calcium of 8.7. Today's CBC showed neutopenia with ANC of 830; today's hgb was 8.0; and platelets were 70K. The pt denies s/s of infection, bleeding and worsening fatigue or SOA. She was given informational sheets for neutropenic and bleeding precautions and instructed to contact our office before her appt next week with any questions or concerns. The pt v/u.    Lab Results   Component Value Date    WBC 5.28 06/04/2024    HGB 8.0 (L) 06/04/2024    HCT 24.2 (L) 06/04/2024    .7 (H) 06/04/2024    PLT 70 (L) 06/04/2024   ANC 0.83

## 2024-06-06 ENCOUNTER — HOSPITAL ENCOUNTER (OUTPATIENT)
Dept: PET IMAGING | Facility: HOSPITAL | Age: 69
Discharge: HOME | End: 2024-06-06
Payer: MEDICARE

## 2024-06-06 DIAGNOSIS — C80.1 SMALL CELL CARCINOMA: ICD-10-CM

## 2024-06-06 DIAGNOSIS — C34.81 MALIGNANT NEOPLASM OF OVERLAPPING SITES OF RIGHT BRONCHUS AND LUNG: ICD-10-CM

## 2024-06-06 LAB — GLUCOSE BLDC GLUCOMTR-MCNC: 130 MG/DL (ref 70–130)

## 2024-06-06 PROCEDURE — 0 FLUDEOXYGLUCOSE F18 SOLUTION: Performed by: NURSE PRACTITIONER

## 2024-06-06 PROCEDURE — 78815 PET IMAGE W/CT SKULL-THIGH: CPT

## 2024-06-06 PROCEDURE — A9552 F18 FDG: HCPCS | Performed by: NURSE PRACTITIONER

## 2024-06-06 PROCEDURE — 82948 REAGENT STRIP/BLOOD GLUCOSE: CPT

## 2024-06-06 RX ADMIN — FLUDEOXYGLUCOSE F 18 1 DOSE: 200 INJECTION, SOLUTION INTRAVENOUS at 10:31

## 2024-06-11 ENCOUNTER — APPOINTMENT (OUTPATIENT)
Dept: ONCOLOGY | Facility: HOSPITAL | Age: 69
End: 2024-06-11
Payer: MEDICARE

## 2024-06-11 ENCOUNTER — INFUSION (OUTPATIENT)
Dept: ONCOLOGY | Facility: HOSPITAL | Age: 69
End: 2024-06-11
Payer: MEDICARE

## 2024-06-11 ENCOUNTER — OFFICE VISIT (OUTPATIENT)
Dept: ONCOLOGY | Facility: CLINIC | Age: 69
End: 2024-06-11
Payer: MEDICARE

## 2024-06-11 VITALS
WEIGHT: 101 LBS | HEART RATE: 97 BPM | DIASTOLIC BLOOD PRESSURE: 73 MMHG | RESPIRATION RATE: 14 BRPM | TEMPERATURE: 97.1 F | HEIGHT: 63 IN | SYSTOLIC BLOOD PRESSURE: 111 MMHG | OXYGEN SATURATION: 96 % | BODY MASS INDEX: 17.89 KG/M2

## 2024-06-11 DIAGNOSIS — Z45.2 FITTING AND ADJUSTMENT OF VASCULAR CATHETER: Primary | ICD-10-CM

## 2024-06-11 DIAGNOSIS — C80.1 SMALL CELL CARCINOMA: Primary | ICD-10-CM

## 2024-06-11 DIAGNOSIS — R63.4 WEIGHT LOSS: ICD-10-CM

## 2024-06-11 DIAGNOSIS — C80.1 SMALL CELL CARCINOMA: ICD-10-CM

## 2024-06-11 DIAGNOSIS — E53.8 VITAMIN B12 DEFICIENCY: ICD-10-CM

## 2024-06-11 DIAGNOSIS — R53.83 CHEMOTHERAPY-INDUCED FATIGUE: ICD-10-CM

## 2024-06-11 DIAGNOSIS — T45.1X5A CHEMOTHERAPY-INDUCED FATIGUE: ICD-10-CM

## 2024-06-11 DIAGNOSIS — F32.A DEPRESSION, UNSPECIFIED DEPRESSION TYPE: ICD-10-CM

## 2024-06-11 LAB
ALBUMIN SERPL-MCNC: 4.2 G/DL (ref 3.5–5.2)
ALBUMIN/GLOB SERPL: 1.7 G/DL
ALP SERPL-CCNC: 91 U/L (ref 39–117)
ALT SERPL W P-5'-P-CCNC: <5 U/L (ref 1–33)
ANION GAP SERPL CALCULATED.3IONS-SCNC: 10.3 MMOL/L (ref 5–15)
AST SERPL-CCNC: 9 U/L (ref 1–32)
BASOPHILS # BLD AUTO: 0.03 10*3/MM3 (ref 0–0.2)
BASOPHILS NFR BLD AUTO: 0.6 % (ref 0–1.5)
BILIRUB SERPL-MCNC: <0.2 MG/DL (ref 0–1.2)
BUN SERPL-MCNC: 4 MG/DL (ref 8–23)
BUN/CREAT SERPL: 4.3 (ref 7–25)
CALCIUM SPEC-SCNC: 8.5 MG/DL (ref 8.6–10.5)
CHLORIDE SERPL-SCNC: 101 MMOL/L (ref 98–107)
CO2 SERPL-SCNC: 23.7 MMOL/L (ref 22–29)
CORTIS SERPL-MCNC: 13.9 MCG/DL
CREAT SERPL-MCNC: 0.93 MG/DL (ref 0.57–1)
DEPRECATED RDW RBC AUTO: 82 FL (ref 37–54)
EGFRCR SERPLBLD CKD-EPI 2021: 67.9 ML/MIN/1.73
EOSINOPHIL # BLD AUTO: 0.05 10*3/MM3 (ref 0–0.4)
EOSINOPHIL NFR BLD AUTO: 0.9 % (ref 0.3–6.2)
ERYTHROCYTE [DISTWIDTH] IN BLOOD BY AUTOMATED COUNT: 20.6 % (ref 12.3–15.4)
GLOBULIN UR ELPH-MCNC: 2.5 GM/DL
GLUCOSE SERPL-MCNC: 132 MG/DL (ref 65–99)
HCT VFR BLD AUTO: 28.3 % (ref 34–46.6)
HGB BLD-MCNC: 9.1 G/DL (ref 12–15.9)
IMM GRANULOCYTES # BLD AUTO: 0.02 10*3/MM3 (ref 0–0.05)
IMM GRANULOCYTES NFR BLD AUTO: 0.4 % (ref 0–0.5)
LYMPHOCYTES # BLD AUTO: 2.37 10*3/MM3 (ref 0.7–3.1)
LYMPHOCYTES NFR BLD AUTO: 44.7 % (ref 19.6–45.3)
MCH RBC QN AUTO: 35.4 PG (ref 26.6–33)
MCHC RBC AUTO-ENTMCNC: 32.2 G/DL (ref 31.5–35.7)
MCV RBC AUTO: 110.1 FL (ref 79–97)
MONOCYTES # BLD AUTO: 0.58 10*3/MM3 (ref 0.1–0.9)
MONOCYTES NFR BLD AUTO: 10.9 % (ref 5–12)
NEUTROPHILS NFR BLD AUTO: 2.25 10*3/MM3 (ref 1.7–7)
NEUTROPHILS NFR BLD AUTO: 42.5 % (ref 42.7–76)
NRBC BLD AUTO-RTO: 0 /100 WBC (ref 0–0.2)
PLATELET # BLD AUTO: 155 10*3/MM3 (ref 140–450)
PMV BLD AUTO: 9.7 FL (ref 6–12)
POTASSIUM SERPL-SCNC: 3.9 MMOL/L (ref 3.5–5.2)
PROT SERPL-MCNC: 6.7 G/DL (ref 6–8.5)
RBC # BLD AUTO: 2.57 10*6/MM3 (ref 3.77–5.28)
SODIUM SERPL-SCNC: 135 MMOL/L (ref 136–145)
T3FREE SERPL-MCNC: 3.09 PG/ML (ref 2–4.4)
T4 FREE SERPL-MCNC: 1.22 NG/DL (ref 0.93–1.7)
TSH SERPL DL<=0.05 MIU/L-ACNC: 2.34 UIU/ML (ref 0.27–4.2)
WBC NRBC COR # BLD AUTO: 5.3 10*3/MM3 (ref 3.4–10.8)

## 2024-06-11 PROCEDURE — 96372 THER/PROPH/DIAG INJ SC/IM: CPT

## 2024-06-11 PROCEDURE — 82533 TOTAL CORTISOL: CPT | Performed by: NURSE PRACTITIONER

## 2024-06-11 PROCEDURE — 84439 ASSAY OF FREE THYROXINE: CPT | Performed by: INTERNAL MEDICINE

## 2024-06-11 PROCEDURE — 84481 FREE ASSAY (FT-3): CPT | Performed by: INTERNAL MEDICINE

## 2024-06-11 PROCEDURE — 85025 COMPLETE CBC W/AUTO DIFF WBC: CPT | Performed by: INTERNAL MEDICINE

## 2024-06-11 PROCEDURE — 80053 COMPREHEN METABOLIC PANEL: CPT | Performed by: INTERNAL MEDICINE

## 2024-06-11 PROCEDURE — 25010000002 CYANOCOBALAMIN PER 1000 MCG: Performed by: NURSE PRACTITIONER

## 2024-06-11 PROCEDURE — 84443 ASSAY THYROID STIM HORMONE: CPT | Performed by: INTERNAL MEDICINE

## 2024-06-11 PROCEDURE — 25010000002 HEPARIN LOCK FLUSH PER 10 UNITS: Performed by: INTERNAL MEDICINE

## 2024-06-11 PROCEDURE — 25010000002 ATEZOLIZUMAB 1200 MG/20ML SOLUTION 20 ML VIAL: Performed by: NURSE PRACTITIONER

## 2024-06-11 PROCEDURE — 25810000003 SODIUM CHLORIDE 0.9 % SOLUTION 250 ML FLEX CONT: Performed by: NURSE PRACTITIONER

## 2024-06-11 PROCEDURE — 99215 OFFICE O/P EST HI 40 MIN: CPT | Performed by: NURSE PRACTITIONER

## 2024-06-11 PROCEDURE — 82024 ASSAY OF ACTH: CPT | Performed by: NURSE PRACTITIONER

## 2024-06-11 PROCEDURE — 1126F AMNT PAIN NOTED NONE PRSNT: CPT | Performed by: NURSE PRACTITIONER

## 2024-06-11 PROCEDURE — 25810000003 SODIUM CHLORIDE 0.9 % SOLUTION: Performed by: NURSE PRACTITIONER

## 2024-06-11 PROCEDURE — 96413 CHEMO IV INFUSION 1 HR: CPT

## 2024-06-11 RX ORDER — OLANZAPINE 2.5 MG/1
2.5 TABLET, FILM COATED ORAL NIGHTLY
Qty: 30 TABLET | Refills: 2 | Status: SHIPPED | OUTPATIENT
Start: 2024-06-11

## 2024-06-11 RX ORDER — SODIUM CHLORIDE 9 MG/ML
20 INJECTION, SOLUTION INTRAVENOUS ONCE
Status: CANCELLED | OUTPATIENT
Start: 2024-06-11

## 2024-06-11 RX ORDER — SODIUM CHLORIDE 0.9 % (FLUSH) 0.9 %
10 SYRINGE (ML) INJECTION AS NEEDED
OUTPATIENT
Start: 2024-06-11

## 2024-06-11 RX ORDER — CYANOCOBALAMIN 1000 UG/ML
1000 INJECTION, SOLUTION INTRAMUSCULAR; SUBCUTANEOUS ONCE
Status: COMPLETED | OUTPATIENT
Start: 2024-06-11 | End: 2024-06-11

## 2024-06-11 RX ORDER — CYANOCOBALAMIN 1000 UG/ML
1000 INJECTION, SOLUTION INTRAMUSCULAR; SUBCUTANEOUS ONCE
Status: CANCELLED | OUTPATIENT
Start: 2024-06-11

## 2024-06-11 RX ORDER — SODIUM CHLORIDE 0.9 % (FLUSH) 0.9 %
10 SYRINGE (ML) INJECTION AS NEEDED
Status: DISCONTINUED | OUTPATIENT
Start: 2024-06-11 | End: 2024-06-11 | Stop reason: HOSPADM

## 2024-06-11 RX ORDER — HEPARIN SODIUM (PORCINE) LOCK FLUSH IV SOLN 100 UNIT/ML 100 UNIT/ML
500 SOLUTION INTRAVENOUS AS NEEDED
Status: DISCONTINUED | OUTPATIENT
Start: 2024-06-11 | End: 2024-06-11 | Stop reason: HOSPADM

## 2024-06-11 RX ORDER — HEPARIN SODIUM (PORCINE) LOCK FLUSH IV SOLN 100 UNIT/ML 100 UNIT/ML
500 SOLUTION INTRAVENOUS AS NEEDED
OUTPATIENT
Start: 2024-06-11

## 2024-06-11 RX ORDER — SODIUM CHLORIDE 9 MG/ML
20 INJECTION, SOLUTION INTRAVENOUS ONCE
Status: COMPLETED | OUTPATIENT
Start: 2024-06-11 | End: 2024-06-11

## 2024-06-11 RX ADMIN — SODIUM CHLORIDE 20 ML/HR: 9 INJECTION, SOLUTION INTRAVENOUS at 09:35

## 2024-06-11 RX ADMIN — Medication 10 ML: at 10:30

## 2024-06-11 RX ADMIN — HEPARIN 500 UNITS: 100 SYRINGE at 10:30

## 2024-06-11 RX ADMIN — ATEZOLIZUMAB 1200 MG: 1200 INJECTION, SOLUTION INTRAVENOUS at 09:57

## 2024-06-11 RX ADMIN — CYANOCOBALAMIN 1000 MCG: 1000 INJECTION, SOLUTION INTRAMUSCULAR; SUBCUTANEOUS at 09:58

## 2024-06-11 NOTE — PROGRESS NOTES
Subjective     REASON FOR CONSULTATION:  small cell lung cancer  Provide an opinion on any further workup or treatment                             REQUESTING PHYSICIAN:  James    RECORDS OBTAINED:  Records of the patients history including those obtained from the referring provider were reviewed and summarized in detail.    HISTORY OF PRESENT ILLNESS:  The patient is a 68 y.o. year old female who is here for an opinion about the above issue.    History of Present Illness   The patient returns today for follow-up, treatment and scan review. She continues with fatigue and reprorts feeling disinterested in doing activities.  She feels down.  Her appetite is diminished and weight down to 101 pounds today.  She did begin the oral b12 we suggested at the last office visit.  She continues her Keppra.  She continues her Eliquis and denies bleeding.   She denies fevers.    Oncology History:  This is a 68-year-old woman with long history of tobacco use and COPD, degenerative disease of the spine on narcotic therapy, depression/anxiety, orthostatic hypotension, hyperlipidemia who has been followed with serial imaging for a left lower lobe lung nodule and mediastinal lymphadenopathy.  A CT of the chest 1/21/2023 showed a masslike consolidation in the left lower lobe 2.5 x 2.6 cm in size with a potential cavitary focus centrally surrounding haziness and otherwise groundglass opacities in the right middle lobe and right lower lobe and enlarged mediastinal lymph nodes up to 10 mm.  The findings were favored to be infectious or inflammatory.  A follow-up CT chest 7/28/2023 showed pleural-based area of density in the left upper lung 11 x 5 mm new from the previous exam and resolution of the consolidation in the left lower lobe.  A PET scan was performed 8/9/2023 which showed the 1.1 cm pleural-based density in the posterior left lower lobe to blend into dependent atelectasis but have more intense activity than the  atelectasis with maximum SUV 2.8.  There was hypermetabolic lymphadenopathy in the left hilum and left lower paratracheal regions for example lymph node posterior to the left pulmonary artery SUV 4.7 measuring 1.6 cm and another area of soft tissue lateral to the left mainstem bronchus SUV 4.4, ill-defined lymphadenopathy left lower paratracheal region SUV 3.7.  She underwent bronchoscopy with biopsies on 8/25/2023 with samples from stations 11 R, 4R, 7, 10 L, 11 L, 12 L all negative for malignancy; station 4R showed rare atypical reactive epithelial cells.  A follow-up CT of the chest on 2/6/2024 showed the left lower lobe nodule to be enlarging at 1.6 x 0.9 cm and enlarging precarinal lymphadenopathy concerning for metastatic disease.  The patient underwent repeat bronchoscopy with Ion navigation on 2/28/2024; biopsies from the left lower lobe nodule were positive for small cell carcinoma and a level 4 lymph node biopsied also positive for small cell carcinoma.    Full body PET scan on 3/7/2024 showed significantly avid bilateral hilar and mediastinal lymph nodes for example kylah [employer] AP window 8.3 SUV measuring 2.5 x 1.8 cm, left hilar lymph node SUV 7 1.4 cm, pleural-based nodularity left major fissure newly hypermetabolic SUV 2.2, pleural-based partially cavitary mass left lower lobe SUV five 1.5 x 1 cm, new right lung nodule 8 mm in the right lower lobe suspicious.  MRI of the brain negative.    The patient has comorbidities of COPD but minimally symptomatic, no known cardiac disease, kidney disease, liver disease, diabetes etc.  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    The patient initiated chemoimmunotherapy with carboplatin/etoposide/atezolizumab 3/19/2024.    Cycle 2-day 3 family went to pick the patient up for chemotherapy and she was found to be unresponsive at home.  She was brought to the ER and hypoxic CT scan showed abnormalities for which MRI of the brain was performed 4/11/2024 showing areas  of restricted diffusion in the bilateral basal ganglia, occipital lobes, right frontal lobe consistent with multiple infarcts.  She had no evidence of atrial fibrillation and normal 2D echocardiogram.  She was started on Eliquis 5 mg every 12 hours.  She was mildly hyponatremic and there was some concern for seizure for which she was started on Keppra by neurology.  The patient gradually improved and was discharged from the hospital near normal baseline on 2024.    CT chest abdomen pelvis 2024 showed resolved groundglass infiltrates, no well-defined consolidation or pleural effusions, mucous plug in the right lower lobe 6 mm, residual nodularity posterior lateral left lower lobe 5 mm (decreased from previous PET), no suspicious pulmonary nodules or masses, no lymphadenopathy in the chest.    Past Medical History:   Diagnosis Date    COPD (chronic obstructive pulmonary disease)     COPD with acute exacerbation 2020    Small cell carcinoma 3/13/2024        Past Surgical History:   Procedure Laterality Date    BRONCHOSCOPY N/A 2023    Procedure: BRONCHOSCOPY WITH ENDOBRONCHIAL ULTRASOUND (EBUS) with FNA;  Surgeon: Coy Mccarthy MD;  Location: [facility] ENDOSCOPY;  Service: Pulmonary;  Laterality: N/A;  Pre/Post - mediastinal and hilar lymphadenopathy.    BRONCHOSCOPY WITH ION ROBOTIC ASSIST N/A 2024    Procedure: BRONCHOSCOPY WITH ION ROBOT,  ENDOBRONCHIAL ULTRASOUND, FINE NEEDLE ASPIRATIONS,  CRYOTHERAPY BIOPSIES, AND LEFT LOWER LOBE BRONCHOALVEOLAR LAVAGE.;  Surgeon: Alexia Velásquez MD;  Location: [facility] ENDOSCOPY;  Service: Robotics - Pulmonary;  Laterality: N/A;     SECTION      CHEST TUBE INSERTION Left 2024    Procedure: CHEST TUBE INSERTION;  Surgeon: Alexia Velásquez MD;  Location: [facility] ENDOSCOPY;  Service: Thoracic;  Laterality: Left;    CHOLECYSTECTOMY      COLONOSCOPY      ECTOPIC PREGNANCY SURGERY      HYSTERECTOMY      TONSILLECTOMY      TOTAL HIP ARTHROPLASTY  Left     VENOUS ACCESS DEVICE (PORT) INSERTION N/A 3/14/2024    Procedure: INSERTION VENOUS ACCESS DEVICE;  Surgeon: Jonas Garner MD;  Location: [facility];  Service: General;  Laterality: N/A;        Current Outpatient Medications on File Prior to Visit   Medication Sig Dispense Refill    albuterol sulfate  (90 Base) MCG/ACT inhaler Inhale 2 puffs Every 4 (Four) Hours As Needed for Wheezing. Takes as needed.      apixaban (ELIQUIS) 5 MG tablet tablet Take 1 tablet by mouth Every 12 (Twelve) Hours. Indications: Other - full anticoagulation 180 tablet 0    budesonide-formoterol (SYMBICORT) 160-4.5 MCG/ACT inhaler Inhale 2 puffs 2 (Two) Times a Day.  12    calcium carbonate (TUMS) 500 MG chewable tablet Chew 1 tablet Daily.      Cyanocobalamin (B-12) 500 MCG sublingual tablet Place 500 mcg under the tongue Daily. 90 tablet 1    Denosumab (PROLIA SC) Inject  under the skin into the appropriate area as directed Every 6 (Six) Months.      diazePAM (VALIUM) 10 MG tablet Take 1 tablet by mouth 2 (Two) Times a Day As Needed for Anxiety (RLS.). Takes 20 mg at bedtime at times when she needs.      folic acid (FOLVITE) 800 MCG tablet Take 1 tablet by mouth Daily. 90 tablet 1    HYDROcodone-acetaminophen (NORCO)  MG per tablet Take 1 tablet by mouth 3 (Three) Times a Day As Needed for Moderate Pain.      levETIRAcetam (KEPPRA) 500 MG tablet Take 1 tablet by mouth Every 12 (Twelve) Hours. 60 tablet 3    midodrine (PROAMATINE) 2.5 MG tablet Take 1 tablet by mouth 3 (Three) Times a Day Before Meals.      naloxone (NARCAN) 4 MG/0.1ML nasal spray 1 spray into the nostril(s) as directed by provider As Needed.      ondansetron (ZOFRAN) 8 MG tablet Take 1 tablet by mouth Every 8 (Eight) Hours As Needed for Nausea or Vomiting. 30 tablet 1    pantoprazole (PROTONIX) 40 MG EC tablet Take 1 tablet by mouth Daily. 30 tablet 3    potassium chloride (KLOR-CON M20) 20 MEQ CR tablet Take 1 tablet by mouth Daily. 30  "tablet 1    sucralfate (CARAFATE) 1 g tablet Take 1 tablet by mouth 4 (Four) Times a Day. 120 tablet 3     No current facility-administered medications on file prior to visit.        ALLERGIES:    Allergies   Allergen Reactions    Codeine GI Intolerance    Percocet [Oxycodone-Acetaminophen] Hives        Social History     Socioeconomic History    Marital status:    Tobacco Use    Smoking status: Every Day     Current packs/day: 1.00     Average packs/day: 1 pack/day for 30.0 years (30.0 ttl pk-yrs)     Types: Cigarettes     Passive exposure: Current    Smokeless tobacco: Never    Tobacco comments:     Began smoking at age 11.  Smoked .5 ppd for 6 years, 2.5 ppd for a year, 2 ppd for 5 years, and 1 ppd for the past 43 years for a 58.5 pack year history.   Vaping Use    Vaping status: Former    Substances: Nicotine, Flavoring    Devices: Disposable   Substance and Sexual Activity    Alcohol use: Yes     Comment: once a year     Drug use: No    Sexual activity: Defer        Family History   Problem Relation Age of Onset    Lung cancer Niece 50    Throat cancer Father     Breast cancer Neg Hx         Review of Systems   Constitutional:  Positive for appetite change and fatigue.   HENT: Negative.     Eyes: Negative.    Respiratory: Negative.     Cardiovascular: Negative.    Gastrointestinal:         Esophagitis symptoms   Musculoskeletal:  Positive for back pain.   Neurological: Negative.    Hematological: Negative.    Psychiatric/Behavioral:  Positive for dysphoric mood. The patient is nervous/anxious.         Objective     Vitals:    06/11/24 0800   BP: 111/73   Pulse: 97   Resp: 14   Temp: 97.1 °F (36.2 °C)   TempSrc: Infrared   SpO2: 96%   Weight: 45.8 kg (101 lb)   Height: 160 cm (62.99\")   PainSc: 0-No pain               6/11/2024     8:01 AM   Current Status   ECOG score 1       Physical Exam    CONSTITUTIONAL: pleasant, thin adult woman  HEENT: no icterus, no thrush, moist membranes, anisocoria  LYMPH: no " cervical or supraclavicular lad  CV: RRR, S1S2, no murmur  RESP: cta bilat, no wheezing, no rales  GI: soft, non-tender, no splenomegaly, +bs  MUSC: no edema, normal gait  NEURO: alert and oriented x3, normal strength  PSYCH: normal mood and flat affect  Skin: No rash or induration noted,  alopecia  Unchanged- 06/11/2024      RECENT LABS:  Hematology WBC   Date Value Ref Range Status   06/04/2024 5.28 3.40 - 10.80 10*3/mm3 Final     RBC   Date Value Ref Range Status   06/04/2024 2.29 (L) 3.77 - 5.28 10*6/mm3 Final     Hemoglobin   Date Value Ref Range Status   06/04/2024 8.0 (L) 12.0 - 15.9 g/dL Final     Hematocrit   Date Value Ref Range Status   06/04/2024 24.2 (L) 34.0 - 46.6 % Final     Platelets   Date Value Ref Range Status   06/04/2024 70 (L) 140 - 450 10*3/mm3 Final        Lab Results   Component Value Date    GLUCOSE 102 (H) 06/04/2024    BUN 5 (L) 06/04/2024    CREATININE 0.93 06/04/2024    EGFR 67.9 06/04/2024    BCR 5.4 (L) 06/04/2024    K 4.5 06/04/2024    CO2 25.1 06/04/2024    CALCIUM 8.7 06/04/2024    ALBUMIN 4.1 05/21/2024    BILITOT 0.2 05/21/2024    AST 11 05/21/2024    ALT <5 05/21/2024     MRI brain 4/11/2024:  Findings:  There is mild generalized parenchymal volume loss. Diffusion weighted images demonstrate areas of restricted diffusion within the globus pallidus bilaterally corresponding to the areas of low-attenuation noted on prior CT scan. There are additional   punctate foci of restricted diffusion involving the precentral gyrus of the right frontal lobe as well as additional patchy areas of restricted diffusion within the bilateral occipital lobes. Findings would be compatible with multiple acute infarcts.   Given the symmetric appearance within the basal ganglia other differential considerations would include carbon monoxide poisoning, anoxic injury there is no evidence of intracranial hemorrhage. No abnormal extra-axial fluid collections are identified. No   mass effect or  hydrocephalus.     Major intracranial vascular flow voids are preserved. Sella and suprasellar cistern are within normal limits. Craniovertebral junction appears normal.     Postcontrast images demonstrate no pathologic intracranial contrast enhancement.     IMPRESSION:  Impression:     1. There are areas of restricted diffusion within the bilateral basal ganglia, bilateral occipital lobes, and right frontal lobe compatible with multiple infarcts. The symmetric appearance of the infarcts within the basal ganglia can be seen with anoxic   injury or carbon monoxide poisoning.  2. No evidence of acute intracranial hemorrhage.  3. No evidence of pathologic contrast enhancement    CT Chest Abdomen Pelvis With Contrast 4/24/2024 - IMPRESSION:  1.Subtle residual subpleural nodularity at the lateral aspect of the left lower lobe at the site of prior subpleural mass or malignant nodule. The density in this region measures 5 mm. The findings indicate an appropriate response to interval therapy.  2.No evidence for additional malignancy or metastatic disease throughout the chest. No evidence for malignancy or metastatic disease throughout the abdomen or pelvis.    PET 6/6/24  FINDINGS: Mediastinal blood pool has a maximal SUV of 2.0, previously  2.3.     1. The residual 6 mm pleural-based nodule at the left lower lobe is  photopenic. There is low-level subpleural activity adjacently and  inferiorly with an SUV of 1.9, likely representing radiation  pneumonitis. There has been resolution of left mediastinal and left  hilar lymphadenopathy. Interval marked decrease in the size of  mediastinal and left hilar nodes and the remaining nodes have low-level  activity, maximal SUV of 2.6. There are no new hypermetabolic pulmonary  opacities.  2. There is no suspicious hypermetabolic activity at the supraclavicular  regions or neck.  3. There is no suspicious hypermetabolic activity within the abdomen or  pelvis.  4. There is no  suspicious bone activity.       Assessment & Plan     *Small cell lung cancer left lower lobe of the lung with mediastinal involvement  CT chest 2/22/2024-enlarged for ill lymph nodes 2.5 cm, enlarged AP window lymph node 1.4 cm, poorly defined left hilar lymphadenopathy, left lower lobe pulmonary nodule 1.7 cm  Bronchoscopy with Ion navigation performed 2/28/2024-pathology positive for small cell carcinoma from the left lower lobe and station 4L  PET scan on 3/7/2024 showed significantly avid bilateral hilar and mediastinal lymph nodes for example kylah [employer] AP window 8.3 SUV measuring 2.5 x 1.8 cm, left hilar lymph node SUV 7 1.4 cm, pleural-based nodularity left major fissure newly hypermetabolic SUV 2.2, pleural-based partially cavitary mass left lower lobe SUV 5 measuring  1.5 x 1 cm, new right lung nodule 8 mm in the right lower lobe suspicious.-Results discussed with Dr. Velásquez and we both feel the contralateral right lung nodule is highly suspicious for metastatic disease  MRI of the brain negative.  3/19/2024.initiated chemoimmunotherapy with carboplatin/etoposide/atezolizumab   4/9/24 received D1-2 of cycle 2 chemotherapy; day 3 held for admission for acute bilateral strokes  CT chest abdomen pelvis 4/24/2024 showed resolved groundglass infiltrates, no well-defined consolidation or pleural effusions, mucous plug in the right lower lobe 6 mm, residual nodularity posterior lateral left lower lobe 5 mm (decreased from previous PET), no suspicious pulmonary nodules or masses, no lymphadenopathy in the chest.  5/21/2024 returns for cycle 4 for platinum/etoposide/atezolizumab.  5/21/2024: Patient returns for cycle 4 carboplatin/etoposide/atezolizumab.  Platelet chiki this past cycle was 31,000 and Eliquis was held.  ANC chiki to 0.84 and Levaquin was started.  Counts have recovered today with platelets up to 155,000 and ANC 1.84.  Discussed with Dr. Burns and will reduce carboplatin to an AUC of 4.   Etoposide is at 75 mg/m2 and will remain so.    6/11/2024 patient returns today with a PET scan to review showing residual 6 mm pleural-based nodule left lower lobe is photopenic.  Low-level subpleural activity adjacent and inferior to the SUV of 1.9. Resolution of left mediastinal and left hilar lymphadenopathy.There was marked decrease in size of mediastinal left hilar nodes with remaining nodes low-level activity with maximum SUV of 2.6.  Case reviewed with Dr. Patel today and will move to atezolizumab alone at this time per plan.       *Bilateral strokes suspected embolic-new  Admitted 4/11/2024 with acute mental status change-MRI brain showed multiple areas of restricted diffusion bilateral basal ganglia, bilateral occipital lobes, right frontal lobe  No documented arrhythmia, normal 2D echocardiogram  Patient discharged on Eliquis 5 mg every 12 hours; on Keppra 500 twice daily for seizure prophylaxis    *Hyponatremia-sodium level today fairly stable at 135    *Hypokalemia-potassium 3.9 continue potassium chloride 20meq daily    *Multifactorial anemia-hemoglobin today 9.1, has been trending down.  Patient denies bleeding.  Additional labs with ferritin, iron panel, folate unremarkable. B12 low normal and patient now taking sublingual b12 500mcg daily.    *Fatigue-patient reports treatment related fatigue that is present and unchanged affecting her day-to-day activity with her being unable to do chores around the house without being exhausted.  Labs checked at the last visit and again today showed thyroid studies in normal range, unremarkable cortisol.  ACTH pending.  Patient also reports feeling down we discussed that some of her fatigue could be related to depression.  Patient is also struggling with appetite not getting enough calories which we discussed can help with her fatigue.  As her weight is declining and she is struggling with mood we will begin olanzapine 2.5 mg nightly.  Will also refer her to  supportive oncology.      *Esophagitis secondary to chemotherapy-improved with Protonix and sucralfate      *Comorbidities of tobacco abuse/COPD, anxiety, atherosclerotic calcifications by CT but no definitive diagnosis of CAD, degenerative disease of the spine, hyperlipidemia; no known autoimmune disorders, chronic pain on opioid medicines    Oncology plan/recommendations:  Proceed with atezolizumab today  Referral supportive oncology  Begin olanzapine 2.5 mg nightly  Continue potassium chloride 20 mEq daily  ACTH cortisol  Continue Eliquis 5 mg twice daily   Continue Keppra 500 mg twice daily  Follow-up in 3 weeks with MD, labs, atezolizumab.    Patient is on high risk medication requiring frequent monitoring.  Case reviewed with Dr Patel, in Dr Carnes absence.  I spent 45 minutes caring for Pili on this date of service. This time includes time spent by me in the following activities: preparing for the visit, reviewing tests, obtaining and/or reviewing a separately obtained history, performing a medically appropriate examination and/or evaluation, counseling and educating the patient/family/caregiver, ordering medications, tests, or procedures, referring and communicating with other health care professionals, documenting information in the medical record, independently interpreting results and communicating that information with the patient/family/caregiver, and care coordination.

## 2024-06-12 LAB — ACTH PLAS-MCNC: 8.6 PG/ML (ref 7.2–63.3)

## 2024-06-19 ENCOUNTER — OFFICE VISIT (OUTPATIENT)
Dept: PSYCHIATRY | Facility: HOSPITAL | Age: 69
End: 2024-06-19
Payer: MEDICARE

## 2024-06-19 DIAGNOSIS — C80.1 SMALL CELL CARCINOMA: ICD-10-CM

## 2024-06-19 DIAGNOSIS — F41.1 GENERALIZED ANXIETY DISORDER: Primary | ICD-10-CM

## 2024-06-19 PROCEDURE — 90792 PSYCH DIAG EVAL W/MED SRVCS: CPT | Performed by: NURSE PRACTITIONER

## 2024-06-19 NOTE — PROGRESS NOTES
In Person  Provider Location: [facility]    Chief Complaint: Fatigue, apathy    Subjective  Patient ID: Pili Arechiga is a 68 y.o. female who presents for initial consultation through the [facility] at the request of Alexia Velásquez MD     PHQ-9 Total Score: 7   Over the last two weeks, how often have you been bothered by the following problems?  Feeling nervous, anxious or on edge: Several days  Not being able to stop or control worrying: Several days  Worrying too much about different things: Several days  Trouble Relaxing: Not at all  Being so restless that it is hard to sit still: Not at all  Becoming easily annoyed or irritable: Not at all  Feeling afraid as if something awful might happen: Not at all  OCTAVIA 7 Total Score: 3    HPI:  Pt is seen in initial consultation regarding sx of anxiety and depression alongside newly metastatic lung cancer. Pt describes high functioning until recent hospitalization, dx of brain mets. No longer drives, no longer able to work. Doesn't feel like getting up or doing anything, becoming easily fatigued with any activity. Describes wanting to lay in bed and watch TV. Acknowledges hx depression, although states while disengagement is similar, current apathy is different. Pt is active with psychiatrist, Dr. Flowers, for long term mgmt of depression, insomnia, and restless legs. Reports taking valium 20 mg q hs, occasional dosing during day PRN anxiety. Reports being on this dose for quite some time (Neel confirmed for past 12 months minimum). Endorses disruptive fatigue and poor appetite most days. Confirms reduced interests and feeling down and depressed some days. Denies challenges concentrating and focusing. Denies psychomotor agitation or retardation. Denies SI, past or current. Pt does confirm pain in back, down left leg, well managed on current regimen of hydrocodone. Was recently initiated on olanzapine 2.5 mg q  "[facility] pre med onc due to appetite concerns/ weight loss. Appreciates improved ability to fall asleep, waking appx 6-7 AM. Spends most of day in bed. Describes dizziness with change in position, tiring easily. Has had hx of falls, last appx 2 months ago. Added stressors include concern for son, distress surrounding inability to work.    Behavioral health history reviewed--first episode of depression alongside divorce after 28 years. Sx including disengagement, excessive time in bed, \"like I feel right now.\" Eventually resolved without tx after appx 1 year. Various depressive episodes since this time. Always continued to work. Denies inpatient admission. Currently sees psychiatrist, Dr. Flowers, q 3 months who prescribes valium. States restless legs are intrusive if she doesn't take, well managed on current dosing.    Protective factors reviewed; pt reports close relationship with granddaughter, friend she lives with. Reports hx enjoyment riding horses, camping, and working. Currently unable to do these things due to discomfort with port, fatigue. Would like to be able to return to these activities.    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 Current Valium 20 mg q hs per Dr. Flowers.   Reports hx SSRI and wellbutrin without benefit. Believes she has " taken Cymbalta, gabapentin, abilify - poor historian regarding. Denies having taken a mood stabilizer. Denies hx stimulant. Denies hx alternate benzo. Denies hx of adverse reaction to any medication, simply states they didn't work. Reports multiple episodes of depression. Never felt anything was especially helpful, just eventually improved.    Family History  Family Psychiatric History: Denied  Family Cancer History: Father  of head and neck cancer, niece had lung cancer    The following portions of the patient's history were reviewed and updated as appropriate: She  has a past medical history of COPD (chronic obstructive pulmonary disease), COPD with acute exacerbation (2020), and Small cell carcinoma (3/13/2024).  She  has a past surgical history that includes Tonsillectomy; Cholecystectomy;  section; Ectopic pregnancy surgery; Total hip arthroplasty (Left); Colonoscopy; Hysterectomy; Bronchoscopy (N/A, 2023); BRONCHOSCOPY WITH ION ROBOTIC ASSIST (N/A, 2024); Chest tube insertion (Left, 2024); and Venous Access Device (Port) (N/A, 3/14/2024).  Her family history includes Lung cancer (age of onset: 50) in her niece; Throat cancer in her father.  She  reports that she has been smoking cigarettes. She has a 30 pack-year smoking history. She has been exposed to tobacco smoke. She has never used smokeless tobacco. She reports current alcohol use. She reports that she does not use drugs.  Current Outpatient Medications   Medication Sig Dispense Refill    albuterol sulfate  (90 Base) MCG/ACT inhaler Inhale 2 puffs Every 4 (Four) Hours As Needed for Wheezing. Takes as needed.      apixaban (ELIQUIS) 5 MG tablet tablet Take 1 tablet by mouth Every 12 (Twelve) Hours. Indications: Other - full anticoagulation 180 tablet 0    budesonide-formoterol (SYMBICORT) 160-4.5 MCG/ACT inhaler Inhale 2 puffs 2 (Two) Times a Day.  12    calcium carbonate (TUMS) 500 MG chewable tablet Chew 1 tablet  Daily.      Cyanocobalamin (B-12) 500 MCG sublingual tablet Place 500 mcg under the tongue Daily. 90 tablet 1    Denosumab (PROLIA SC) Inject  under the skin into the appropriate area as directed Every 6 (Six) Months.      diazePAM (VALIUM) 10 MG tablet Take 1 tablet by mouth 2 (Two) Times a Day As Needed for Anxiety (RLS.). Takes 20 mg at bedtime at times when she needs.      folic acid (FOLVITE) 800 MCG tablet Take 1 tablet by mouth Daily. 90 tablet 1    HYDROcodone-acetaminophen (NORCO)  MG per tablet Take 1 tablet by mouth 3 (Three) Times a Day As Needed for Moderate Pain.      levETIRAcetam (KEPPRA) 500 MG tablet Take 1 tablet by mouth Every 12 (Twelve) Hours. 60 tablet 3    midodrine (PROAMATINE) 2.5 MG tablet Take 1 tablet by mouth 3 (Three) Times a Day Before Meals.      naloxone (NARCAN) 4 MG/0.1ML nasal spray 1 spray into the nostril(s) as directed by provider As Needed.      OLANZapine (zyPREXA) 2.5 MG tablet Take 1 tablet by mouth Every Night. 30 tablet 2    ondansetron (ZOFRAN) 8 MG tablet Take 1 tablet by mouth Every 8 (Eight) Hours As Needed for Nausea or Vomiting. 30 tablet 1    pantoprazole (PROTONIX) 40 MG EC tablet Take 1 tablet by mouth Daily. 30 tablet 3    potassium chloride (KLOR-CON M20) 20 MEQ CR tablet Take 1 tablet by mouth Daily. 30 tablet 1    sucralfate (CARAFATE) 1 g tablet Take 1 tablet by mouth 4 (Four) Times a Day. 120 tablet 3     No current facility-administered medications for this visit.     Current Outpatient Medications on File Prior to Visit   Medication Sig    albuterol sulfate  (90 Base) MCG/ACT inhaler Inhale 2 puffs Every 4 (Four) Hours As Needed for Wheezing. Takes as needed.    apixaban (ELIQUIS) 5 MG tablet tablet Take 1 tablet by mouth Every 12 (Twelve) Hours. Indications: Other - full anticoagulation    budesonide-formoterol (SYMBICORT) 160-4.5 MCG/ACT inhaler Inhale 2 puffs 2 (Two) Times a Day.    calcium carbonate (TUMS) 500 MG chewable tablet Chew 1  tablet Daily.    Cyanocobalamin (B-12) 500 MCG sublingual tablet Place 500 mcg under the tongue Daily.    Denosumab (PROLIA SC) Inject  under the skin into the appropriate area as directed Every 6 (Six) Months.    diazePAM (VALIUM) 10 MG tablet Take 1 tablet by mouth 2 (Two) Times a Day As Needed for Anxiety (RLS.). Takes 20 mg at bedtime at times when she needs.    folic acid (FOLVITE) 800 MCG tablet Take 1 tablet by mouth Daily.    HYDROcodone-acetaminophen (NORCO)  MG per tablet Take 1 tablet by mouth 3 (Three) Times a Day As Needed for Moderate Pain.    levETIRAcetam (KEPPRA) 500 MG tablet Take 1 tablet by mouth Every 12 (Twelve) Hours.    midodrine (PROAMATINE) 2.5 MG tablet Take 1 tablet by mouth 3 (Three) Times a Day Before Meals.    naloxone (NARCAN) 4 MG/0.1ML nasal spray 1 spray into the nostril(s) as directed by provider As Needed.    OLANZapine (zyPREXA) 2.5 MG tablet Take 1 tablet by mouth Every Night.    ondansetron (ZOFRAN) 8 MG tablet Take 1 tablet by mouth Every 8 (Eight) Hours As Needed for Nausea or Vomiting.    pantoprazole (PROTONIX) 40 MG EC tablet Take 1 tablet by mouth Daily.    potassium chloride (KLOR-CON M20) 20 MEQ CR tablet Take 1 tablet by mouth Daily.    sucralfate (CARAFATE) 1 g tablet Take 1 tablet by mouth 4 (Four) Times a Day.     No current facility-administered medications on file prior to visit.     She is allergic to codeine and percocet [oxycodone-acetaminophen]..    Objective   Mental Status Exam  Appearance:  clean and casually dressed, appropriate  Attitude toward clinician:  cooperative and agreeable   Speech:    Rate:  regular rate and rhythm   Volume:  normal  Motor:  no abnormal movements present  Mood:  Depressed, fatigued  Affect:  flat and mood congruent  Thought Processes:  linear, logical, and goal directed  Thought Content:  normal  Suicidal Thoughts:  absent  Homicidal Thoughts:  absent  Perceptual Disturbance: no perceptual disturbance  Attention and  Concentration:  fair  Insight and Judgement:  fair  Memory:  memory appears to be intact    Lab Review:   Infusion on 06/11/2024   Component Date Value    Glucose 06/11/2024 132 (H)     BUN 06/11/2024 4 (L)     Creatinine 06/11/2024 0.93     Sodium 06/11/2024 135 (L)     Potassium 06/11/2024 3.9     Chloride 06/11/2024 101     CO2 06/11/2024 23.7     Calcium 06/11/2024 8.5 (L)     Total Protein 06/11/2024 6.7     Albumin 06/11/2024 4.2     ALT (SGPT) 06/11/2024 <5     AST (SGOT) 06/11/2024 9     Alkaline Phosphatase 06/11/2024 91     Total Bilirubin 06/11/2024 <0.2     Globulin 06/11/2024 2.5     A/G Ratio 06/11/2024 1.7     BUN/Creatinine Ratio 06/11/2024 4.3 (L)     Anion Gap 06/11/2024 10.3     eGFR 06/11/2024 67.9     TSH 06/11/2024 2.340     T3, Free 06/11/2024 3.09     Free T4 06/11/2024 1.22     WBC 06/11/2024 5.30     RBC 06/11/2024 2.57 (L)     Hemoglobin 06/11/2024 9.1 (L)     Hematocrit 06/11/2024 28.3 (L)     MCV 06/11/2024 110.1 (H)     MCH 06/11/2024 35.4 (H)     MCHC 06/11/2024 32.2     RDW 06/11/2024 20.6 (H)     RDW-SD 06/11/2024 82.0 (H)     MPV 06/11/2024 9.7     Platelets 06/11/2024 155     Neutrophil % 06/11/2024 42.5 (L)     Lymphocyte % 06/11/2024 44.7     Monocyte % 06/11/2024 10.9     Eosinophil % 06/11/2024 0.9     Basophil % 06/11/2024 0.6     Immature Grans % 06/11/2024 0.4     Neutrophils, Absolute 06/11/2024 2.25     Lymphocytes, Absolute 06/11/2024 2.37     Monocytes, Absolute 06/11/2024 0.58     Eosinophils, Absolute 06/11/2024 0.05     Basophils, Absolute 06/11/2024 0.03     Immature Grans, Absolute 06/11/2024 0.02     nRBC 06/11/2024 0.0     Cortisol 06/11/2024 13.90     ACTH 06/11/2024 8.6    Hospital Outpatient Visit on 06/06/2024   Component Date Value    Glucose 06/06/2024 130    Lab on 06/04/2024   Component Date Value    Glucose 06/04/2024 102 (H)     BUN 06/04/2024 5 (L)     Creatinine 06/04/2024 0.93     Sodium 06/04/2024 131 (L)     Potassium 06/04/2024 4.5     Chloride  06/04/2024 97 (L)     CO2 06/04/2024 25.1     Calcium 06/04/2024 8.7     BUN/Creatinine Ratio 06/04/2024 5.4 (L)     Anion Gap 06/04/2024 8.9     eGFR 06/04/2024 67.9     WBC 06/04/2024 5.28     RBC 06/04/2024 2.29 (L)     Hemoglobin 06/04/2024 8.0 (L)     Hematocrit 06/04/2024 24.2 (L)     MCV 06/04/2024 105.7 (H)     MCH 06/04/2024 34.9 (H)     MCHC 06/04/2024 33.1     RDW 06/04/2024 17.0 (H)     RDW-SD 06/04/2024 63.0 (H)     MPV 06/04/2024 10.1     Platelets 06/04/2024 70 (L)     Neutrophil % 06/04/2024 15.7 (L)     Lymphocyte % 06/04/2024 76.5 (H)     Monocyte % 06/04/2024 6.8     Eosinophil % 06/04/2024 0.6     Basophil % 06/04/2024 0.4     Immature Grans % 06/04/2024 0.0     Neutrophils, Absolute 06/04/2024 0.83 (L)     Lymphocytes, Absolute 06/04/2024 4.04 (H)     Monocytes, Absolute 06/04/2024 0.36     Eosinophils, Absolute 06/04/2024 0.03     Basophils, Absolute 06/04/2024 0.02     Immature Grans, Absolute 06/04/2024 0.00    Lab on 05/28/2024   Component Date Value    Glucose 05/28/2024 128 (H)     BUN 05/28/2024 10     Creatinine 05/28/2024 0.95     Sodium 05/28/2024 131 (L)     Potassium 05/28/2024 4.2     Chloride 05/28/2024 95 (L)     CO2 05/28/2024 25.7     Calcium 05/28/2024 9.2     BUN/Creatinine Ratio 05/28/2024 10.5     Anion Gap 05/28/2024 10.3     eGFR 05/28/2024 66.2     WBC 05/28/2024 5.30     RBC 05/28/2024 2.38 (L)     Hemoglobin 05/28/2024 8.1 (L)     Hematocrit 05/28/2024 24.4 (L)     MCV 05/28/2024 102.5 (H)     MCH 05/28/2024 34.0 (H)     MCHC 05/28/2024 33.2     RDW 05/28/2024 16.9 (H)     RDW-SD 05/28/2024 61.7 (H)     MPV 05/28/2024 9.2     Platelets 05/28/2024 204     Neutrophil % 05/28/2024 44.1     Lymphocyte % 05/28/2024 51.3 (H)     Monocyte % 05/28/2024 1.5 (L)     Eosinophil % 05/28/2024 0.2 (L)     Basophil % 05/28/2024 0.8     Immature Grans % 05/28/2024 2.1 (H)     Neutrophils, Absolute 05/28/2024 2.34     Lymphocytes, Absolute 05/28/2024 2.72     Monocytes, Absolute  05/28/2024 0.08 (L)     Eosinophils, Absolute 05/28/2024 0.01     Basophils, Absolute 05/28/2024 0.04     Immature Grans, Absolute 05/28/2024 0.11 (H)     nRBC 05/28/2024 0.0    Infusion on 05/23/2024   Component Date Value    Glucose 05/23/2024 106 (H)     BUN 05/23/2024 8     Creatinine 05/23/2024 0.93     Sodium 05/23/2024 138     Potassium 05/23/2024 3.5     Chloride 05/23/2024 103     CO2 05/23/2024 23.5     Calcium 05/23/2024 8.1 (L)     BUN/Creatinine Ratio 05/23/2024 8.6     Anion Gap 05/23/2024 11.5     eGFR 05/23/2024 67.9    Office Visit on 05/21/2024   Component Date Value    Free T4 05/21/2024 1.54     TSH 05/21/2024 2.520     Cortisol 05/21/2024 12.00     ACTH 05/21/2024 6.9 (L)     Ferritin 05/21/2024 257.00 (H)     Iron 05/21/2024 72     Iron Saturation (TSAT) 05/21/2024 27     Transferrin 05/21/2024 182 (L)     TIBC 05/21/2024 271 (L)     Vitamin B-12 05/21/2024 275     Folate 05/21/2024 5.28    Infusion on 05/21/2024   Component Date Value    Glucose 05/21/2024 115 (H)     BUN 05/21/2024 2 (L)     Creatinine 05/21/2024 0.89     Sodium 05/21/2024 133 (L)     Potassium 05/21/2024 2.8 (L)     Chloride 05/21/2024 96 (L)     CO2 05/21/2024 25.6     Calcium 05/21/2024 8.4 (L)     Total Protein 05/21/2024 6.5     Albumin 05/21/2024 4.1     ALT (SGPT) 05/21/2024 <5     AST (SGOT) 05/21/2024 11     Alkaline Phosphatase 05/21/2024 89     Total Bilirubin 05/21/2024 0.2     Globulin 05/21/2024 2.4     A/G Ratio 05/21/2024 1.7     BUN/Creatinine Ratio 05/21/2024 2.2 (L)     Anion Gap 05/21/2024 11.4     eGFR 05/21/2024 71.6     WBC 05/21/2024 5.39     RBC 05/21/2024 2.94 (L)     Hemoglobin 05/21/2024 9.7 (L)     Hematocrit 05/21/2024 28.7 (L)     MCV 05/21/2024 97.6 (H)     MCH 05/21/2024 33.0     MCHC 05/21/2024 33.8     RDW 05/21/2024 16.3 (H)     RDW-SD 05/21/2024 52.3     MPV 05/21/2024 9.4     Platelets 05/21/2024 155     Neutrophil % 05/21/2024 34.1 (L)     Lymphocyte % 05/21/2024 52.7 (H)     Monocyte %  05/21/2024 12.6 (H)     Eosinophil % 05/21/2024 0.4     Basophil % 05/21/2024 0.2     Immature Grans % 05/21/2024 0.0     Neutrophils, Absolute 05/21/2024 1.84     Lymphocytes, Absolute 05/21/2024 2.84     Monocytes, Absolute 05/21/2024 0.68     Eosinophils, Absolute 05/21/2024 0.02     Basophils, Absolute 05/21/2024 0.01     Immature Grans, Absolute 05/21/2024 0.00     Magnesium 05/21/2024 1.8    Lab on 05/14/2024   Component Date Value    WBC 05/14/2024 5.05     RBC 05/14/2024 3.05 (L)     Hemoglobin 05/14/2024 10.1 (L)     Hematocrit 05/14/2024 29.0 (L)     MCV 05/14/2024 95.1     MCH 05/14/2024 33.1 (H)     MCHC 05/14/2024 34.8     RDW 05/14/2024 14.2     RDW-SD 05/14/2024 48.4     MPV 05/14/2024 10.9     Platelets 05/14/2024 31 (C)     Neutrophil % 05/14/2024 16.7 (L)     Lymphocyte % 05/14/2024 76.4 (H)     Monocyte % 05/14/2024 5.7     Eosinophil % 05/14/2024 0.4     Basophil % 05/14/2024 0.4     Immature Grans % 05/14/2024 0.4     Neutrophils, Absolute 05/14/2024 0.84 (L)     Lymphocytes, Absolute 05/14/2024 3.86 (H)     Monocytes, Absolute 05/14/2024 0.29     Eosinophils, Absolute 05/14/2024 0.02     Basophils, Absolute 05/14/2024 0.02     Immature Grans, Absolute 05/14/2024 0.02     nRBC 05/14/2024 0.0    Lab on 05/07/2024   Component Date Value    WBC 05/07/2024 7.46     RBC 05/07/2024 3.60 (L)     Hemoglobin 05/07/2024 11.9 (L)     Hematocrit 05/07/2024 35.0     MCV 05/07/2024 97.2 (H)     MCH 05/07/2024 33.1 (H)     MCHC 05/07/2024 34.0     RDW 05/07/2024 14.4     RDW-SD 05/07/2024 50.7     MPV 05/07/2024 8.9     Platelets 05/07/2024 238     Neutrophil % 05/07/2024 55.8     Lymphocyte % 05/07/2024 39.7     Monocyte % 05/07/2024 1.1 (L)     Eosinophil % 05/07/2024 0.1 (L)     Basophil % 05/07/2024 1.3     Immature Grans % 05/07/2024 2.0 (H)     Neutrophils, Absolute 05/07/2024 4.16     Lymphocytes, Absolute 05/07/2024 2.96     Monocytes, Absolute 05/07/2024 0.08 (L)     Eosinophils, Absolute 05/07/2024  0.01     Basophils, Absolute 05/07/2024 0.10     Immature Grans, Absolute 05/07/2024 0.15 (H)     nRBC 05/07/2024 0.0    Infusion on 04/30/2024   Component Date Value    Glucose 04/30/2024 128 (H)     BUN 04/30/2024 5 (L)     Creatinine 04/30/2024 0.99     Sodium 04/30/2024 134 (L)     Potassium 04/30/2024 3.9     Chloride 04/30/2024 99     CO2 04/30/2024 24.6     Calcium 04/30/2024 8.0 (L)     Total Protein 04/30/2024 6.9     Albumin 04/30/2024 4.3     ALT (SGPT) 04/30/2024 <5     AST (SGOT) 04/30/2024 13     Alkaline Phosphatase 04/30/2024 77     Total Bilirubin 04/30/2024 0.2     Globulin 04/30/2024 2.6     A/G Ratio 04/30/2024 1.7     BUN/Creatinine Ratio 04/30/2024 5.1 (L)     Anion Gap 04/30/2024 10.4     eGFR 04/30/2024 63.0     TSH 04/30/2024 5.480 (H)     T3, Free 04/30/2024 2.67     Free T4 04/30/2024 1.29     WBC 04/30/2024 5.32     RBC 04/30/2024 3.75 (L)     Hemoglobin 04/30/2024 12.4     Hematocrit 04/30/2024 36.5     MCV 04/30/2024 97.3 (H)     MCH 04/30/2024 33.1 (H)     MCHC 04/30/2024 34.0     RDW 04/30/2024 14.3     RDW-SD 04/30/2024 46.2     MPV 04/30/2024 9.0     Platelets 04/30/2024 235     Neutrophil % 04/30/2024 35.1 (L)     Lymphocyte % 04/30/2024 49.4 (H)     Monocyte % 04/30/2024 13.7 (H)     Eosinophil % 04/30/2024 0.8     Basophil % 04/30/2024 0.6     Immature Grans % 04/30/2024 0.4     Neutrophils, Absolute 04/30/2024 1.87     Lymphocytes, Absolute 04/30/2024 2.63     Monocytes, Absolute 04/30/2024 0.73     Eosinophils, Absolute 04/30/2024 0.04     Basophils, Absolute 04/30/2024 0.03     Immature Grans, Absolute 04/30/2024 0.02     nRBC 04/30/2024 0.0    There may be more visits with results that are not included.   Labs reviewed; anemia likely contributing to fatigue. Electrolyte imbalance noted - recently initiated on olanzapine to assist with appetite, intake    Plan of Care  Medication regimen reviewed; considered high likelihood long acting benzo is modifiable factor contributing  to daytime fatigue. Given sleep benefit on newly initiated olanzapine, recommend tapering valium vs consideration of shorter acting agent. May use gabapentin if restless legs persist.  Continue olanzapine 2.5 mg q hs.  Pt may be candidate for wake promoting agent vs stimulant for ongoing fatigue, apathy r/t brain mets vs medication SE vs depression. Will first try to simplify current medications and reassess in FU.  Refer patient to social work given inability to work at this time.  Call placed to Dr. Flowers, [place] office, regarding medications, goals of care.  FU not yet scheduled; will await collaboration with existing team. Pt has card for questions in the interim.    Diagnoses and all orders for this visit:    1. Generalized anxiety disorder (Primary)    2. Small cell carcinoma

## 2024-06-24 ENCOUNTER — TELEPHONE (OUTPATIENT)
Dept: OTHER | Facility: HOSPITAL | Age: 69
End: 2024-06-24
Payer: MEDICARE

## 2024-06-24 NOTE — TELEPHONE ENCOUNTER
Oncology Social Work  Chief Complaint: Financial Assistance    Subjective  Patient ID: Pili Arechiga is a 68 y.o. patient being treated for small cell lung cancer.       Social History  Highest Level of Education: high school diploma/GED  Tobacco Use: The patient currently smokes and does not want to discontinue at this time.     Medical History  Psychiatric History: Currently seeing MITESH Phan    Objective   Mental Status Exam  Attitude toward clinician:  cooperative and agreeable   Speech:    Rate:  regular rate and rhythm   Volume:  normal  Mood:  Solemn  Affect:  mood congruent  Thought Processes:  linear, logical, and goal directed  Thought Content:  normal  Suicidal Thoughts:  absent  Homicidal Thoughts:  absent  Perceptual Disturbance: no perceptual disturbance  Attention and Concentration:  good  Insight and Judgement:  good  Memory:  memory appears to be intact      Plan of Care  [facility] referral received on the patient for financial assistance.  OSW spoke to the patient who was agreeable to referrals to be made to [facility] and [facility] [employer].  The patient states that she receives $1,600 per month in social security and her Medicare A and B costs of $170 come out of that amount.      Discussed with the patient the information that is needed to apply for hospital financial assistance as she states that she has problems covering the cost of her medical bills.  The patient states that she does not have copies of her tax documents or social security award letter.  The patient was informed that she will be provided with information on how to request those documents.      The patient was informed that a letter will be mailed to her listing [facility] available, [facility] contact information and information on [employer] [facility] and [employer].  OSW will follow up with the patient to continue to assist.  CHARANJIT Bonner

## 2024-06-25 ENCOUNTER — DOCUMENTATION (OUTPATIENT)
Dept: OTHER | Facility: HOSPITAL | Age: 69
End: 2024-06-25
Payer: MEDICARE

## 2024-06-25 NOTE — PROGRESS NOTES
Oncology Social Work  Application submitted for [employer] financial assistance program.  CHARANJIT Bonner

## 2024-06-25 NOTE — PROGRESS NOTES
Oncology Social Work  Application for financial assistance submitted to [facility].   CHARANJIT Bonner

## 2024-06-25 NOTE — PROGRESS NOTES
Oncology Social Work  Application submitted for [employer] for their Financial Assistance Program and submitted the patient's electric bill for payment also.  OSW will follow up with [facility] and the patient.  CHARANJIT Bonner

## 2024-06-26 ENCOUNTER — PATIENT OUTREACH (OUTPATIENT)
Dept: OTHER | Facility: HOSPITAL | Age: 69
End: 2024-06-26
Payer: MEDICARE

## 2024-06-26 NOTE — PROGRESS NOTES
Subjective     REASON FOR CONSULTATION:  small cell lung cancer  Provide an opinion on any further workup or treatment                             REQUESTING PHYSICIAN:  James    RECORDS OBTAINED:  Records of the patients history including those obtained from the referring provider were reviewed and summarized in detail.    HISTORY OF PRESENT ILLNESS:  The patient is a 68 y.o. year old female who is here for an opinion about the above issue.    History of Present Illness   The patient initiated chemoimmunotherapy with carboplatin/etoposide/atezolizumab 3/19/2024.  The patient has completed 4 cycles of carboplatin/etoposide/atezolizumab with good tolerance.  The patient complains of decreased energy but otherwise doing well with no nausea vomiting diarrhea skin rash uncontrolled pain worsening shortness of breath or cough.    Oncology History:  This is a 68-year-old woman with long history of tobacco use and COPD, degenerative disease of the spine on narcotic therapy, depression/anxiety, orthostatic hypotension, hyperlipidemia who has been followed with serial imaging for a left lower lobe lung nodule and mediastinal lymphadenopathy.  A CT of the chest 1/21/2023 showed a masslike consolidation in the left lower lobe 2.5 x 2.6 cm in size with a potential cavitary focus centrally surrounding haziness and otherwise groundglass opacities in the right middle lobe and right lower lobe and enlarged mediastinal lymph nodes up to 10 mm.  The findings were favored to be infectious or inflammatory.  A follow-up CT chest 7/28/2023 showed pleural-based area of density in the left upper lung 11 x 5 mm new from the previous exam and resolution of the consolidation in the left lower lobe.  A PET scan was performed 8/9/2023 which showed the 1.1 cm pleural-based density in the posterior left lower lobe to blend into dependent atelectasis but have more intense activity than the atelectasis with maximum SUV 2.8.  There was  hypermetabolic lymphadenopathy in the left hilum and left lower paratracheal regions for example lymph node posterior to the left pulmonary artery SUV 4.7 measuring 1.6 cm and another area of soft tissue lateral to the left mainstem bronchus SUV 4.4, ill-defined lymphadenopathy left lower paratracheal region SUV 3.7.  She underwent bronchoscopy with biopsies on 8/25/2023 with samples from stations 11 R, 4R, 7, 10 L, 11 L, 12 L all negative for malignancy; station 4R showed rare atypical reactive epithelial cells.  A follow-up CT of the chest on 2/6/2024 showed the left lower lobe nodule to be enlarging at 1.6 x 0.9 cm and enlarging precarinal lymphadenopathy concerning for metastatic disease.  The patient underwent repeat bronchoscopy with Ion navigation on 2/28/2024; biopsies from the left lower lobe nodule were positive for small cell carcinoma and a level 4 lymph node biopsied also positive for small cell carcinoma.    Full body PET scan on 3/7/2024 showed significantly avid bilateral hilar and mediastinal lymph nodes for example kylah [employer] AP window 8.3 SUV measuring 2.5 x 1.8 cm, left hilar lymph node SUV 7 1.4 cm, pleural-based nodularity left major fissure newly hypermetabolic SUV 2.2, pleural-based partially cavitary mass left lower lobe SUV five 1.5 x 1 cm, new right lung nodule 8 mm in the right lower lobe suspicious.  MRI of the brain negative.    The patient has comorbidities of COPD but minimally symptomatic, no known cardiac disease, kidney disease, liver disease, diabetes etc.  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    The patient initiated chemoimmunotherapy with carboplatin/etoposide/atezolizumab 3/19/2024.  She completed 4 cycles of carboplatin/etoposide/atezolizumab on 5/23/2024.    A PET scan on 6/6/2024 showed a residual 6 mm pleural-based nodule left lower lobe to be photopenic, low-level subpleural activity adjacently and inferiorly SUV 1.9, resolution of left mediastinal and left hilar  lymphadenopathy, marked decrease mediastinal and left hilar lymph nodes have low-level activity SUV 2.6, no new hypermetabolic pulmonary opacities, no hypermetabolic activity in the abdomen, pelvis or bones.    Past Medical History:   Diagnosis Date    COPD (chronic obstructive pulmonary disease)     COPD with acute exacerbation 2020    Small cell carcinoma 3/13/2024        Past Surgical History:   Procedure Laterality Date    BRONCHOSCOPY N/A 2023    Procedure: BRONCHOSCOPY WITH ENDOBRONCHIAL ULTRASOUND (EBUS) with FNA;  Surgeon: Coy Mccarthy MD;  Location:  [facility];  Service: Pulmonary;  Laterality: N/A;  Pre/Post - mediastinal and hilar lymphadenopathy.    BRONCHOSCOPY WITH ION ROBOTIC ASSIST N/A 2024    Procedure: BRONCHOSCOPY WITH ION ROBOT,  ENDOBRONCHIAL ULTRASOUND, FINE NEEDLE ASPIRATIONS,  CRYOTHERAPY BIOPSIES, AND LEFT LOWER LOBE BRONCHOALVEOLAR LAVAGE.;  Surgeon: Alexia Velásquez MD;  Location:  [facility];  Service: Robotics - Pulmonary;  Laterality: N/A;     SECTION      CHEST TUBE INSERTION Left 2024    Procedure: CHEST TUBE INSERTION;  Surgeon: Alexia Velásquez MD;  Location: [facility];  Service: Thoracic;  Laterality: Left;    CHOLECYSTECTOMY      COLONOSCOPY      ECTOPIC PREGNANCY SURGERY      HYSTERECTOMY      TONSILLECTOMY      TOTAL HIP ARTHROPLASTY Left     VENOUS ACCESS DEVICE (PORT) INSERTION N/A 3/14/2024    Procedure: INSERTION VENOUS ACCESS DEVICE;  Surgeon: Jonas Garner MD;  Location:  [facility];  Service: General;  Laterality: N/A;        Current Outpatient Medications on File Prior to Visit   Medication Sig Dispense Refill    albuterol sulfate  (90 Base) MCG/ACT inhaler Inhale 2 puffs Every 4 (Four) Hours As Needed for Wheezing. Takes as needed.      apixaban (ELIQUIS) 5 MG tablet tablet Take 1 tablet by mouth Every 12 (Twelve) Hours. Indications: Other - full anticoagulation 180 tablet 0    budesonide-formoterol  (SYMBICORT) 160-4.5 MCG/ACT inhaler Inhale 2 puffs 2 (Two) Times a Day.  12    calcium carbonate (TUMS) 500 MG chewable tablet Chew 1 tablet Daily.      Cyanocobalamin (B-12) 500 MCG sublingual tablet Place 500 mcg under the tongue Daily. 90 tablet 1    Denosumab (PROLIA SC) Inject  under the skin into the appropriate area as directed Every 6 (Six) Months.      diazePAM (VALIUM) 10 MG tablet Take 1 tablet by mouth 2 (Two) Times a Day As Needed for Anxiety (RLS.). Takes 20 mg at bedtime at times when she needs.      folic acid (FOLVITE) 800 MCG tablet Take 1 tablet by mouth Daily. 90 tablet 1    HYDROcodone-acetaminophen (NORCO)  MG per tablet Take 1 tablet by mouth 3 (Three) Times a Day As Needed for Moderate Pain.      levETIRAcetam (KEPPRA) 500 MG tablet Take 1 tablet by mouth Every 12 (Twelve) Hours. 60 tablet 3    midodrine (PROAMATINE) 2.5 MG tablet Take 1 tablet by mouth 3 (Three) Times a Day Before Meals.      naloxone (NARCAN) 4 MG/0.1ML nasal spray 1 spray into the nostril(s) as directed by provider As Needed.      OLANZapine (zyPREXA) 2.5 MG tablet Take 1 tablet by mouth Every Night. 30 tablet 2    ondansetron (ZOFRAN) 8 MG tablet Take 1 tablet by mouth Every 8 (Eight) Hours As Needed for Nausea or Vomiting. 30 tablet 1    pantoprazole (PROTONIX) 40 MG EC tablet Take 1 tablet by mouth Daily. 30 tablet 3    potassium chloride (KLOR-CON M20) 20 MEQ CR tablet Take 1 tablet by mouth Daily. 30 tablet 1    sucralfate (CARAFATE) 1 g tablet Take 1 tablet by mouth 4 (Four) Times a Day. 120 tablet 3     No current facility-administered medications on file prior to visit.        ALLERGIES:    Allergies   Allergen Reactions    Codeine GI Intolerance    Percocet [Oxycodone-Acetaminophen] Hives        Social History     Socioeconomic History    Marital status:    Tobacco Use    Smoking status: Every Day     Current packs/day: 1.00     Average packs/day: 1 pack/day for 30.0 years (30.0 ttl pk-yrs)      "Types: Cigarettes     Passive exposure: Current    Smokeless tobacco: Never    Tobacco comments:     Began smoking at age 11.  Smoked .5 ppd for 6 years, 2.5 ppd for a year, 2 ppd for 5 years, and 1 ppd for the past 43 years for a 58.5 pack year history.   Vaping Use    Vaping status: Former    Substances: Nicotine, Flavoring    Devices: Disposable   Substance and Sexual Activity    Alcohol use: Yes     Comment: once a year     Drug use: No    Sexual activity: Defer        Family History   Problem Relation Age of Onset    Lung cancer Niece 50    Throat cancer Father     Breast cancer Neg Hx         Review of Systems   Constitutional:  Positive for fatigue.   HENT: Negative.     Eyes: Negative.    Respiratory: Negative.     Cardiovascular: Negative.    Gastrointestinal: Negative.    Musculoskeletal:  Positive for back pain.   Neurological: Negative.    Hematological: Negative.    Psychiatric/Behavioral:  Positive for dysphoric mood. The patient is nervous/anxious.         Objective     Vitals:    07/02/24 0803   BP: 117/76   Pulse: 83   Resp: 14   Temp: 97.1 °F (36.2 °C)   TempSrc: Infrared   SpO2: 95%   Weight: 46.5 kg (102 lb 9.6 oz)   Height: 160 cm (62.99\")   PainSc: 0-No pain             7/2/2024     8:15 AM   Current Status   ECOG score 1       Physical Exam    CONSTITUTIONAL: pleasant well-developed adult woman  HEENT: no icterus, no thrush, moist membranes, anisocoria  LYMPH: no cervical or supraclavicular lad  CV: RRR, S1S2, no murmur  RESP: cta bilat, no wheezing, no rales  GI: soft, non-tender, no splenomegaly, +bs  MUSC: no edema, normal gait  NEURO: alert and oriented x3, normal strength  PSYCH: normal mood and affect for situation  Skin: No rash or induration noted, developing alopecia  Exam unchanged 7/2/24    RECENT LABS:  Hematology WBC   Date Value Ref Range Status   07/02/2024 10.06 3.40 - 10.80 10*3/mm3 Final     RBC   Date Value Ref Range Status   07/02/2024 3.59 (L) 3.77 - 5.28 10*6/mm3 Final "     Hemoglobin   Date Value Ref Range Status   07/02/2024 12.9 12.0 - 15.9 g/dL Final     Hematocrit   Date Value Ref Range Status   07/02/2024 40.6 34.0 - 46.6 % Final     Platelets   Date Value Ref Range Status   07/02/2024 422 140 - 450 10*3/mm3 Final        Lab Results   Component Value Date    GLUCOSE 132 (H) 06/11/2024    BUN 4 (L) 06/11/2024    CREATININE 0.93 06/11/2024    EGFR 67.9 06/11/2024    BCR 4.3 (L) 06/11/2024    K 3.9 06/11/2024    CO2 23.7 06/11/2024    CALCIUM 8.5 (L) 06/11/2024    ALBUMIN 4.2 06/11/2024    BILITOT <0.2 06/11/2024    AST 9 06/11/2024    ALT <5 06/11/2024     NM PET/CT Skull Base to Mid Thigh (06/06/2024 11:52 AM)   FINDINGS: Mediastinal blood pool has a maximal SUV of 2.0, previously 2.3.  1. The residual 6 mm pleural-based nodule at the left lower lobe is photopenic. There is low-level subpleural activity adjacently and inferiorly with an SUV of 1.9, likely representing radiation pneumonitis. There has been resolution of left mediastinal and left hilar lymphadenopathy. Interval marked decrease in the size of mediastinal and left hilar nodes and the remaining nodes have low-level activity, maximal SUV of 2.6. There are no new hypermetabolic pulmonary opacities.  2. There is no suspicious hypermetabolic activity at the supraclavicular regions or neck.  3. There is no suspicious hypermetabolic activity within the abdomen or pelvis.  4. There is no suspicious bone activity.    Assessment & Plan     *Small cell lung cancer left lower lobe of the lung with mediastinal involvement, contralateral lung nodule suspicious for metastatic disease/extensive stage  CT chest 2/22/2024-enlarged for ill lymph nodes 2.5 cm, enlarged AP window lymph node 1.4 cm, poorly defined left hilar lymphadenopathy, left lower lobe pulmonary nodule 1.7 cm  Bronchoscopy with Ion navigation performed 2/28/2024-pathology positive for small cell carcinoma from the left lower lobe and station 4L  PET scan on  3/7/2024 showed significantly avid bilateral hilar and mediastinal lymph nodes for example kylah [employer] AP window 8.3 SUV measuring 2.5 x 1.8 cm, left hilar lymph node SUV 7 1.4 cm, pleural-based nodularity left major fissure newly hypermetabolic SUV 2.2, pleural-based partially cavitary mass left lower lobe SUV five 1.5 x 1 cm, new right lung nodule 8 mm in the right lower lobe suspicious.-Results discussed with Dr. Velásquez and we both feel the contralateral right lung nodule is highly suspicious for metastatic disease  MRI of the brain negative.  3/19/2024.initiated chemoimmunotherapy with carboplatin/etoposide/atezolizumab; completed 4 cycles of carboplatin/etoposide/atezolizumab 5/23/2024 6/6/2024 PET scan: showed a residual 6 mm pleural-based nodule left lower lobe to be photopenic, low-level subpleural activity adjacently and inferiorly SUV 1.9, resolution of left mediastinal and left hilar lymphadenopathy, marked decrease mediastinal and left hilar lymph nodes have low-level activity SUV 2.6, no new hypermetabolic pulmonary opacities, no hypermetabolic activity in the abdomen, pelvis or bones  *anemia/thrombocytopenia secondary to chemotherapy  Hemoglobin improved 12.9/platelets 422  *Comorbidities of tobacco abuse/COPD, anxiety, atherosclerotic calcifications by CT but no definitive diagnosis of CAD, degenerative disease of the spine, hyperlipidemia; no known autoimmune disorders, chronic pain on opioid medicines    Oncology plan/recommendations:  The patient has had a very good response to initial chemoimmunotherapy.  Plan to discontinue chemotherapy at this time and continue Tecentriq every 3 weeks with repeat CT scan chest abdomen pelvis and MRI brain 3 months  I discussed with the patient considering radiation consult for consolidative radiation and PCI but she declined an appointment at this time  6-week follow-up labs plus Tecentriq practitioner visit

## 2024-06-26 NOTE — PROGRESS NOTES
Reviewed chart.  Patient with Small cell lung cancer left lower lobe of the lung with mediastinal involvement. Initiated chemoimmunotherapy with carboplatin/etoposide/Atezolizumab 3/19/24.   Rec'd 4th/final cycle carboplatin at an AUC reduced to 4, -16 at continued dose of 75 mg/m2 and atezolizumab starting 5/21.   PET scan 6/6 revealed good response to treatment; met with oncology APRN 6/11. Rec'd first Atezolizumab. Patient has ongoing contact with HALEY More and Michelle, behavioral oncology. The patient has also been followed by oncology nutrition    Called patient. She states she is doing ok although complains of fatigue.      We discussed her upcoming appt with Dr. Burns 7/2.      She denies any questions/concerns or ongoing resource needs. I will call in 1-2 months; encouraged patient to call as needed.

## 2024-06-28 ENCOUNTER — TELEPHONE (OUTPATIENT)
Dept: OTHER | Facility: HOSPITAL | Age: 69
End: 2024-06-28
Payer: MEDICARE

## 2024-06-28 ENCOUNTER — DOCUMENTATION (OUTPATIENT)
Dept: OTHER | Facility: HOSPITAL | Age: 69
End: 2024-06-28
Payer: MEDICARE

## 2024-06-28 NOTE — PROGRESS NOTES
Oncology Social Work  Clarita ENGLAND with [employer] stated that they paid the patients electric bill to [facility] in the amount of $217.91.  CHARANJIT Bonner

## 2024-06-28 NOTE — TELEPHONE ENCOUNTER
Oncology Social Work  Spoke to the patient to inform her that per Clarita ENGLAND with [employer] they paid her electric bill to [facility] in the amount of $217.91.  The patient stated that all of her other bills come directly out of her account but she stated that she would benefit from a gift card for groceries and gas.  Request sent to [employer].  CHARANJIT Bonner

## 2024-07-01 ENCOUNTER — TELEPHONE (OUTPATIENT)
Dept: OTHER | Facility: HOSPITAL | Age: 69
End: 2024-07-01
Payer: MEDICARE

## 2024-07-01 NOTE — TELEPHONE ENCOUNTER
Oncology Social Work    OSW called patient for Argenis DONOHUE and informed patient [employer] helped with her water bill and sending a [employer] gift card.     Sara DONOHUE

## 2024-07-02 ENCOUNTER — INFUSION (OUTPATIENT)
Dept: ONCOLOGY | Facility: HOSPITAL | Age: 69
End: 2024-07-02
Payer: MEDICARE

## 2024-07-02 ENCOUNTER — OFFICE VISIT (OUTPATIENT)
Dept: ONCOLOGY | Facility: CLINIC | Age: 69
End: 2024-07-02
Payer: MEDICARE

## 2024-07-02 VITALS
OXYGEN SATURATION: 95 % | RESPIRATION RATE: 14 BRPM | HEART RATE: 83 BPM | DIASTOLIC BLOOD PRESSURE: 76 MMHG | WEIGHT: 102.6 LBS | TEMPERATURE: 97.1 F | HEIGHT: 63 IN | SYSTOLIC BLOOD PRESSURE: 117 MMHG | BODY MASS INDEX: 18.18 KG/M2

## 2024-07-02 DIAGNOSIS — C80.1 SMALL CELL CARCINOMA: Primary | ICD-10-CM

## 2024-07-02 DIAGNOSIS — Z45.2 FITTING AND ADJUSTMENT OF VASCULAR CATHETER: ICD-10-CM

## 2024-07-02 DIAGNOSIS — Z45.2 FITTING AND ADJUSTMENT OF VASCULAR CATHETER: Primary | ICD-10-CM

## 2024-07-02 DIAGNOSIS — E53.8 VITAMIN B12 DEFICIENCY: ICD-10-CM

## 2024-07-02 LAB
ALBUMIN SERPL-MCNC: 4.1 G/DL (ref 3.5–5.2)
ALBUMIN/GLOB SERPL: 1.4 G/DL
ALP SERPL-CCNC: 123 U/L (ref 39–117)
ALT SERPL W P-5'-P-CCNC: <5 U/L (ref 1–33)
ANION GAP SERPL CALCULATED.3IONS-SCNC: 9 MMOL/L (ref 5–15)
AST SERPL-CCNC: 18 U/L (ref 1–32)
BASOPHILS # BLD AUTO: 0.04 10*3/MM3 (ref 0–0.2)
BASOPHILS NFR BLD AUTO: 0.4 % (ref 0–1.5)
BILIRUB SERPL-MCNC: 0.2 MG/DL (ref 0–1.2)
BUN SERPL-MCNC: 3 MG/DL (ref 8–23)
BUN/CREAT SERPL: 3.4 (ref 7–25)
CALCIUM SPEC-SCNC: 9.1 MG/DL (ref 8.6–10.5)
CHLORIDE SERPL-SCNC: 96 MMOL/L (ref 98–107)
CO2 SERPL-SCNC: 26 MMOL/L (ref 22–29)
CREAT SERPL-MCNC: 0.89 MG/DL (ref 0.57–1)
DEPRECATED RDW RBC AUTO: 63.5 FL (ref 37–54)
EGFRCR SERPLBLD CKD-EPI 2021: 71.6 ML/MIN/1.73
EOSINOPHIL # BLD AUTO: 0.15 10*3/MM3 (ref 0–0.4)
EOSINOPHIL NFR BLD AUTO: 1.5 % (ref 0.3–6.2)
ERYTHROCYTE [DISTWIDTH] IN BLOOD BY AUTOMATED COUNT: 14.9 % (ref 12.3–15.4)
GLOBULIN UR ELPH-MCNC: 2.9 GM/DL
GLUCOSE SERPL-MCNC: 77 MG/DL (ref 65–99)
HCT VFR BLD AUTO: 40.6 % (ref 34–46.6)
HGB BLD-MCNC: 12.9 G/DL (ref 12–15.9)
IMM GRANULOCYTES # BLD AUTO: 0.03 10*3/MM3 (ref 0–0.05)
IMM GRANULOCYTES NFR BLD AUTO: 0.3 % (ref 0–0.5)
LYMPHOCYTES # BLD AUTO: 2.77 10*3/MM3 (ref 0.7–3.1)
LYMPHOCYTES NFR BLD AUTO: 27.5 % (ref 19.6–45.3)
MCH RBC QN AUTO: 35.9 PG (ref 26.6–33)
MCHC RBC AUTO-ENTMCNC: 31.8 G/DL (ref 31.5–35.7)
MCV RBC AUTO: 113.1 FL (ref 79–97)
MONOCYTES # BLD AUTO: 1.07 10*3/MM3 (ref 0.1–0.9)
MONOCYTES NFR BLD AUTO: 10.6 % (ref 5–12)
NEUTROPHILS NFR BLD AUTO: 59.7 % (ref 42.7–76)
NEUTROPHILS NFR BLD AUTO: 6 10*3/MM3 (ref 1.7–7)
PLATELET # BLD AUTO: 422 10*3/MM3 (ref 140–450)
PMV BLD AUTO: 8.5 FL (ref 6–12)
POTASSIUM SERPL-SCNC: 5.3 MMOL/L (ref 3.5–5.2)
PROT SERPL-MCNC: 7 G/DL (ref 6–8.5)
RBC # BLD AUTO: 3.59 10*6/MM3 (ref 3.77–5.28)
SODIUM SERPL-SCNC: 131 MMOL/L (ref 136–145)
WBC NRBC COR # BLD AUTO: 10.06 10*3/MM3 (ref 3.4–10.8)

## 2024-07-02 PROCEDURE — 85025 COMPLETE CBC W/AUTO DIFF WBC: CPT | Performed by: INTERNAL MEDICINE

## 2024-07-02 PROCEDURE — 36593 DECLOT VASCULAR DEVICE: CPT

## 2024-07-02 PROCEDURE — 96413 CHEMO IV INFUSION 1 HR: CPT

## 2024-07-02 PROCEDURE — 25810000003 SODIUM CHLORIDE 0.9 % SOLUTION 250 ML FLEX CONT: Performed by: INTERNAL MEDICINE

## 2024-07-02 PROCEDURE — 25010000002 ALTEPLASE 2 MG RECONSTITUTED SOLUTION: Performed by: INTERNAL MEDICINE

## 2024-07-02 PROCEDURE — 96366 THER/PROPH/DIAG IV INF ADDON: CPT

## 2024-07-02 PROCEDURE — 80053 COMPREHEN METABOLIC PANEL: CPT | Performed by: INTERNAL MEDICINE

## 2024-07-02 PROCEDURE — 25010000002 CYANOCOBALAMIN PER 1000 MCG: Performed by: INTERNAL MEDICINE

## 2024-07-02 PROCEDURE — 25810000003 SODIUM CHLORIDE 0.9 % SOLUTION: Performed by: INTERNAL MEDICINE

## 2024-07-02 PROCEDURE — 25010000002 HEPARIN LOCK FLUSH PER 10 UNITS: Performed by: INTERNAL MEDICINE

## 2024-07-02 PROCEDURE — 96372 THER/PROPH/DIAG INJ SC/IM: CPT

## 2024-07-02 PROCEDURE — 25010000002 ATEZOLIZUMAB 1200 MG/20ML SOLUTION 20 ML VIAL: Performed by: INTERNAL MEDICINE

## 2024-07-02 RX ORDER — HEPARIN SODIUM (PORCINE) LOCK FLUSH IV SOLN 100 UNIT/ML 100 UNIT/ML
500 SOLUTION INTRAVENOUS AS NEEDED
OUTPATIENT
Start: 2024-07-02

## 2024-07-02 RX ORDER — CYANOCOBALAMIN 1000 UG/ML
1000 INJECTION, SOLUTION INTRAMUSCULAR; SUBCUTANEOUS ONCE
Status: COMPLETED | OUTPATIENT
Start: 2024-07-02 | End: 2024-07-02

## 2024-07-02 RX ORDER — SODIUM CHLORIDE 0.9 % (FLUSH) 0.9 %
10 SYRINGE (ML) INJECTION AS NEEDED
Status: DISCONTINUED | OUTPATIENT
Start: 2024-07-02 | End: 2024-07-02 | Stop reason: HOSPADM

## 2024-07-02 RX ORDER — HEPARIN SODIUM (PORCINE) LOCK FLUSH IV SOLN 100 UNIT/ML 100 UNIT/ML
500 SOLUTION INTRAVENOUS AS NEEDED
Status: DISCONTINUED | OUTPATIENT
Start: 2024-07-02 | End: 2024-07-02 | Stop reason: HOSPADM

## 2024-07-02 RX ORDER — SODIUM CHLORIDE 9 MG/ML
20 INJECTION, SOLUTION INTRAVENOUS ONCE
Status: CANCELLED | OUTPATIENT
Start: 2024-07-02

## 2024-07-02 RX ORDER — SODIUM CHLORIDE 9 MG/ML
20 INJECTION, SOLUTION INTRAVENOUS ONCE
OUTPATIENT
Start: 2024-07-23

## 2024-07-02 RX ORDER — SODIUM CHLORIDE 9 MG/ML
20 INJECTION, SOLUTION INTRAVENOUS ONCE
Status: COMPLETED | OUTPATIENT
Start: 2024-07-02 | End: 2024-07-02

## 2024-07-02 RX ORDER — SODIUM CHLORIDE 0.9 % (FLUSH) 0.9 %
10 SYRINGE (ML) INJECTION AS NEEDED
OUTPATIENT
Start: 2024-07-02

## 2024-07-02 RX ADMIN — CYANOCOBALAMIN 1000 MCG: 1000 INJECTION, SOLUTION INTRAMUSCULAR; SUBCUTANEOUS at 10:00

## 2024-07-02 RX ADMIN — ALTEPLASE: 2.2 INJECTION, POWDER, LYOPHILIZED, FOR SOLUTION INTRAVENOUS at 08:22

## 2024-07-02 RX ADMIN — ATEZOLIZUMAB 1200 MG: 1200 INJECTION, SOLUTION INTRAVENOUS at 09:46

## 2024-07-02 RX ADMIN — HEPARIN 500 UNITS: 100 SYRINGE at 10:20

## 2024-07-02 RX ADMIN — SODIUM CHLORIDE 20 ML/HR: 9 INJECTION, SOLUTION INTRAVENOUS at 09:46

## 2024-07-02 RX ADMIN — Medication 10 ML: at 10:20

## 2024-07-08 ENCOUNTER — TELEPHONE (OUTPATIENT)
Dept: PSYCHIATRY | Facility: HOSPITAL | Age: 69
End: 2024-07-08
Payer: MEDICARE

## 2024-07-08 NOTE — TELEPHONE ENCOUNTER
Supportive Oncology Services    Collaboration initiated with Dr. Flowers, pt psychiatrist, regarding current target sx alongside dx of metastatic lung cancer. Reviewed current medications, treatment strategy, and opportunities for adjustment to maximize mood sx, sleep, energy, and pain mgmt. Dr. Flowers has seen patient since 2018 and has vast knowledge of prior medication trials and pt response. Agreed patient will continue to follow under her care vs transition to Behav Onc.    Call placed to patient regarding this conversation. Pt VU and agreement and has FU scheduled.

## 2024-07-23 ENCOUNTER — APPOINTMENT (OUTPATIENT)
Dept: ONCOLOGY | Facility: HOSPITAL | Age: 69
End: 2024-07-23
Payer: MEDICARE

## 2024-07-23 ENCOUNTER — INFUSION (OUTPATIENT)
Dept: ONCOLOGY | Facility: HOSPITAL | Age: 69
End: 2024-07-23
Payer: MEDICARE

## 2024-07-23 VITALS
WEIGHT: 101.4 LBS | TEMPERATURE: 97.5 F | DIASTOLIC BLOOD PRESSURE: 68 MMHG | BODY MASS INDEX: 17.97 KG/M2 | HEART RATE: 96 BPM | OXYGEN SATURATION: 91 % | SYSTOLIC BLOOD PRESSURE: 98 MMHG

## 2024-07-23 DIAGNOSIS — E53.8 VITAMIN B12 DEFICIENCY: ICD-10-CM

## 2024-07-23 DIAGNOSIS — Z45.2 FITTING AND ADJUSTMENT OF VASCULAR CATHETER: Primary | ICD-10-CM

## 2024-07-23 DIAGNOSIS — C80.1 SMALL CELL CARCINOMA: ICD-10-CM

## 2024-07-23 LAB
ALBUMIN SERPL-MCNC: 3.7 G/DL (ref 3.5–5.2)
ALBUMIN/GLOB SERPL: 1.5 G/DL
ALP SERPL-CCNC: 100 U/L (ref 39–117)
ALT SERPL W P-5'-P-CCNC: <5 U/L (ref 1–33)
ANION GAP SERPL CALCULATED.3IONS-SCNC: 10.8 MMOL/L (ref 5–15)
AST SERPL-CCNC: 12 U/L (ref 1–32)
BASOPHILS # BLD AUTO: 0.12 10*3/MM3 (ref 0–0.2)
BASOPHILS NFR BLD AUTO: 1.1 % (ref 0–1.5)
BILIRUB SERPL-MCNC: 0.2 MG/DL (ref 0–1.2)
BUN SERPL-MCNC: 6 MG/DL (ref 8–23)
BUN/CREAT SERPL: 6.6 (ref 7–25)
CALCIUM SPEC-SCNC: 8.8 MG/DL (ref 8.6–10.5)
CHLORIDE SERPL-SCNC: 98 MMOL/L (ref 98–107)
CO2 SERPL-SCNC: 25.2 MMOL/L (ref 22–29)
CREAT SERPL-MCNC: 0.91 MG/DL (ref 0.57–1)
DEPRECATED RDW RBC AUTO: 53.6 FL (ref 37–54)
EGFRCR SERPLBLD CKD-EPI 2021: 69.7 ML/MIN/1.73
EOSINOPHIL # BLD AUTO: 0.33 10*3/MM3 (ref 0–0.4)
EOSINOPHIL NFR BLD AUTO: 3 % (ref 0.3–6.2)
ERYTHROCYTE [DISTWIDTH] IN BLOOD BY AUTOMATED COUNT: 13.2 % (ref 12.3–15.4)
GLOBULIN UR ELPH-MCNC: 2.5 GM/DL
GLUCOSE SERPL-MCNC: 127 MG/DL (ref 65–99)
HCT VFR BLD AUTO: 41.9 % (ref 34–46.6)
HGB BLD-MCNC: 13.7 G/DL (ref 12–15.9)
IMM GRANULOCYTES # BLD AUTO: 0.05 10*3/MM3 (ref 0–0.05)
IMM GRANULOCYTES NFR BLD AUTO: 0.5 % (ref 0–0.5)
LYMPHOCYTES # BLD AUTO: 3.42 10*3/MM3 (ref 0.7–3.1)
LYMPHOCYTES NFR BLD AUTO: 30.8 % (ref 19.6–45.3)
MCH RBC QN AUTO: 36.1 PG (ref 26.6–33)
MCHC RBC AUTO-ENTMCNC: 32.7 G/DL (ref 31.5–35.7)
MCV RBC AUTO: 110.3 FL (ref 79–97)
MONOCYTES # BLD AUTO: 0.96 10*3/MM3 (ref 0.1–0.9)
MONOCYTES NFR BLD AUTO: 8.6 % (ref 5–12)
NEUTROPHILS NFR BLD AUTO: 56 % (ref 42.7–76)
NEUTROPHILS NFR BLD AUTO: 6.22 10*3/MM3 (ref 1.7–7)
NRBC BLD AUTO-RTO: 0 /100 WBC (ref 0–0.2)
PLATELET # BLD AUTO: 295 10*3/MM3 (ref 140–450)
PMV BLD AUTO: 8.7 FL (ref 6–12)
POTASSIUM SERPL-SCNC: 4.4 MMOL/L (ref 3.5–5.2)
PROT SERPL-MCNC: 6.2 G/DL (ref 6–8.5)
RBC # BLD AUTO: 3.8 10*6/MM3 (ref 3.77–5.28)
SODIUM SERPL-SCNC: 134 MMOL/L (ref 136–145)
T3FREE SERPL-MCNC: 3.1 PG/ML (ref 2–4.4)
T4 FREE SERPL-MCNC: 1.06 NG/DL (ref 0.93–1.7)
TSH SERPL DL<=0.05 MIU/L-ACNC: 3.25 UIU/ML (ref 0.27–4.2)
WBC NRBC COR # BLD AUTO: 11.1 10*3/MM3 (ref 3.4–10.8)

## 2024-07-23 PROCEDURE — 25010000002 HEPARIN LOCK FLUSH PER 10 UNITS: Performed by: INTERNAL MEDICINE

## 2024-07-23 PROCEDURE — 96372 THER/PROPH/DIAG INJ SC/IM: CPT

## 2024-07-23 PROCEDURE — 80053 COMPREHEN METABOLIC PANEL: CPT | Performed by: INTERNAL MEDICINE

## 2024-07-23 PROCEDURE — 25810000003 SODIUM CHLORIDE 0.9 % SOLUTION: Performed by: INTERNAL MEDICINE

## 2024-07-23 PROCEDURE — 84481 FREE ASSAY (FT-3): CPT | Performed by: INTERNAL MEDICINE

## 2024-07-23 PROCEDURE — 25010000002 ATEZOLIZUMAB 1200 MG/20ML SOLUTION 20 ML VIAL: Performed by: INTERNAL MEDICINE

## 2024-07-23 PROCEDURE — 84443 ASSAY THYROID STIM HORMONE: CPT | Performed by: INTERNAL MEDICINE

## 2024-07-23 PROCEDURE — 84439 ASSAY OF FREE THYROXINE: CPT | Performed by: INTERNAL MEDICINE

## 2024-07-23 PROCEDURE — 96413 CHEMO IV INFUSION 1 HR: CPT

## 2024-07-23 PROCEDURE — 25010000002 CYANOCOBALAMIN PER 1000 MCG: Performed by: NURSE PRACTITIONER

## 2024-07-23 PROCEDURE — 25810000003 SODIUM CHLORIDE 0.9 % SOLUTION 250 ML FLEX CONT: Performed by: INTERNAL MEDICINE

## 2024-07-23 PROCEDURE — 85025 COMPLETE CBC W/AUTO DIFF WBC: CPT | Performed by: INTERNAL MEDICINE

## 2024-07-23 RX ORDER — SODIUM CHLORIDE 9 MG/ML
20 INJECTION, SOLUTION INTRAVENOUS ONCE
Status: COMPLETED | OUTPATIENT
Start: 2024-07-23 | End: 2024-07-23

## 2024-07-23 RX ORDER — CYANOCOBALAMIN 1000 UG/ML
1000 INJECTION, SOLUTION INTRAMUSCULAR; SUBCUTANEOUS ONCE
Status: COMPLETED | OUTPATIENT
Start: 2024-07-23 | End: 2024-07-23

## 2024-07-23 RX ORDER — HEPARIN SODIUM (PORCINE) LOCK FLUSH IV SOLN 100 UNIT/ML 100 UNIT/ML
500 SOLUTION INTRAVENOUS AS NEEDED
OUTPATIENT
Start: 2024-07-23

## 2024-07-23 RX ORDER — SODIUM CHLORIDE 0.9 % (FLUSH) 0.9 %
10 SYRINGE (ML) INJECTION AS NEEDED
Status: DISCONTINUED | OUTPATIENT
Start: 2024-07-23 | End: 2024-07-23 | Stop reason: HOSPADM

## 2024-07-23 RX ORDER — SODIUM CHLORIDE 0.9 % (FLUSH) 0.9 %
10 SYRINGE (ML) INJECTION AS NEEDED
OUTPATIENT
Start: 2024-07-23

## 2024-07-23 RX ORDER — HEPARIN SODIUM (PORCINE) LOCK FLUSH IV SOLN 100 UNIT/ML 100 UNIT/ML
500 SOLUTION INTRAVENOUS AS NEEDED
Status: DISCONTINUED | OUTPATIENT
Start: 2024-07-23 | End: 2024-07-23 | Stop reason: HOSPADM

## 2024-07-23 RX ADMIN — Medication 10 ML: at 10:08

## 2024-07-23 RX ADMIN — CYANOCOBALAMIN 1000 MCG: 1000 INJECTION, SOLUTION INTRAMUSCULAR at 09:37

## 2024-07-23 RX ADMIN — SODIUM CHLORIDE 20 ML/HR: 9 INJECTION, SOLUTION INTRAVENOUS at 09:36

## 2024-07-23 RX ADMIN — HEPARIN 500 UNITS: 100 SYRINGE at 10:08

## 2024-07-23 RX ADMIN — ATEZOLIZUMAB 1200 MG: 1200 INJECTION, SOLUTION INTRAVENOUS at 09:36

## 2024-07-24 ENCOUNTER — TELEPHONE (OUTPATIENT)
Dept: OTHER | Facility: HOSPITAL | Age: 69
End: 2024-07-24
Payer: MEDICARE

## 2024-07-24 NOTE — TELEPHONE ENCOUNTER
Oncology Social Work  Spoke to the patient who stated that she did receive a $500 gift card from [employer] and a gas card from [email or web address].  The patient requested assistance with her medical bills and she was informed that OSW will assist her with completing the documents if she can bring her taxes, social security award letter and bank statements.  The patient also requested for [employer] to pay her electric bill again.  Request was sent to Clarita ENGLAND.  OSW will follow up with the patient to assist.  CHARANJIT Bonner

## 2024-07-29 RX ORDER — POTASSIUM CHLORIDE 1500 MG/1
20 TABLET, EXTENDED RELEASE ORAL DAILY
Qty: 30 TABLET | Refills: 1 | Status: SHIPPED | OUTPATIENT
Start: 2024-07-29

## 2024-07-30 ENCOUNTER — TELEPHONE (OUTPATIENT)
Dept: OTHER | Facility: HOSPITAL | Age: 69
End: 2024-07-30
Payer: MEDICARE

## 2024-07-30 ENCOUNTER — DOCUMENTATION (OUTPATIENT)
Dept: OTHER | Facility: HOSPITAL | Age: 69
End: 2024-07-30
Payer: MEDICARE

## 2024-07-30 NOTE — PROGRESS NOTES
Oncology Social Work  Per Clarita ENGLAND with [employer] they paid the patient's electric bill $217.17- [facility].  CHARANJIT Bonner

## 2024-07-30 NOTE — TELEPHONE ENCOUNTER
Oncology Social Work  Spoke to the patient who was informed that per [employer] they paid the patient's electric bill $217.17. The patient emailed OSW her social security amount and stated that she would bring her taxes and bank statements to her next appointment.  OSW informed the patient that she will be provided with assistance to complete the hospital financial assistance documentation at that appointment.  CHARANJIT Bonner

## 2024-08-06 ENCOUNTER — TELEPHONE (OUTPATIENT)
Dept: OTHER | Facility: HOSPITAL | Age: 69
End: 2024-08-06
Payer: MEDICARE

## 2024-08-06 NOTE — TELEPHONE ENCOUNTER
Oncology Social Work  The patient requested for [employer] to cover the cost of her water bill. Request sent to [employer].  CHARANJIT Bonner

## 2024-08-06 NOTE — TELEPHONE ENCOUNTER
Oncology Social Work  Spoke to the patient via telephone to inform her that [employer] paid her water bill in the amount of $40.57.  The patient was appreciative. The patient states that she is weak and dizzy but denied wanting any P.T. or having any DME needs.  She states that she is very careful when ambulating and has her brothers or grand-daughter drive her to appointments.  The patient confirmed that she would meet with OSW at [facility] on 8/13 and bring her tax documents and bank statements to submit with her application for hospital financial assistance.  CHARANJIT Bonner

## 2024-08-06 NOTE — PROGRESS NOTES
Subjective     REASON FOR CONSULTATION:  small cell lung cancer  Provide an opinion on any further workup or treatment                             REQUESTING PHYSICIAN:  James    RECORDS OBTAINED:  Records of the patients history including those obtained from the referring provider were reviewed and summarized in detail.    HISTORY OF PRESENT ILLNESS:  The patient is a 68 y.o. year old female who is here for an opinion about the above issue.    History of Present Illness   The patient initiated chemoimmunotherapy with carboplatin/etoposide/atezolizumab 3/19/2024.  The patient completed 4 cycles of carboplatin/etoposide/atezolizumab and is now on maintenance atezolizumab.  She primarily complains of fatigue and low energy.  She denies worsening shortness of breath, cough, uncontrolled pain, nausea vomiting diarrhea decreased appetite.  She also complains of decreased mood.    Oncology History:  This is a 68-year-old woman with long history of tobacco use and COPD, degenerative disease of the spine on narcotic therapy, depression/anxiety, orthostatic hypotension, hyperlipidemia who has been followed with serial imaging for a left lower lobe lung nodule and mediastinal lymphadenopathy.  A CT of the chest 1/21/2023 showed a masslike consolidation in the left lower lobe 2.5 x 2.6 cm in size with a potential cavitary focus centrally surrounding haziness and otherwise groundglass opacities in the right middle lobe and right lower lobe and enlarged mediastinal lymph nodes up to 10 mm.  The findings were favored to be infectious or inflammatory.  A follow-up CT chest 7/28/2023 showed pleural-based area of density in the left upper lung 11 x 5 mm new from the previous exam and resolution of the consolidation in the left lower lobe.  A PET scan was performed 8/9/2023 which showed the 1.1 cm pleural-based density in the posterior left lower lobe to blend into dependent atelectasis but have more intense activity  than the atelectasis with maximum SUV 2.8.  There was hypermetabolic lymphadenopathy in the left hilum and left lower paratracheal regions for example lymph node posterior to the left pulmonary artery SUV 4.7 measuring 1.6 cm and another area of soft tissue lateral to the left mainstem bronchus SUV 4.4, ill-defined lymphadenopathy left lower paratracheal region SUV 3.7.  She underwent bronchoscopy with biopsies on 8/25/2023 with samples from stations 11 R, 4R, 7, 10 L, 11 L, 12 L all negative for malignancy; station 4R showed rare atypical reactive epithelial cells.  A follow-up CT of the chest on 2/6/2024 showed the left lower lobe nodule to be enlarging at 1.6 x 0.9 cm and enlarging precarinal lymphadenopathy concerning for metastatic disease.  The patient underwent repeat bronchoscopy with Ion navigation on 2/28/2024; biopsies from the left lower lobe nodule were positive for small cell carcinoma and a level 4 lymph node biopsied also positive for small cell carcinoma.    Full body PET scan on 3/7/2024 showed significantly avid bilateral hilar and mediastinal lymph nodes for example kyl[employer] AP window 8.3 SUV measuring 2.5 x 1.8 cm, left hilar lymph node SUV 7 1.4 cm, pleural-based nodularity left major fissure newly hypermetabolic SUV 2.2, pleural-based partially cavitary mass left lower lobe SUV five 1.5 x 1 cm, new right lung nodule 8 mm in the right lower lobe suspicious.  MRI of the brain negative.    The patient has comorbidities of COPD but minimally symptomatic, no known cardiac disease, kidney disease, liver disease, diabetes etc.  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    The patient initiated chemoimmunotherapy with carboplatin/etoposide/atezolizumab 3/19/2024.  She completed 4 cycles of carboplatin/etoposide/atezolizumab on 5/23/2024.    A PET scan on 6/6/2024 showed a residual 6 mm pleural-based nodule left lower lobe to be photopenic, low-level subpleural activity adjacently and inferiorly  SUV 1.9, resolution of left mediastinal and left hilar lymphadenopathy, marked decrease mediastinal and left hilar lymph nodes have low-level activity SUV 2.6, no new hypermetabolic pulmonary opacities, no hypermetabolic activity in the abdomen, pelvis or bones.    Past Medical History:   Diagnosis Date    COPD (chronic obstructive pulmonary disease)     COPD with acute exacerbation 2020    Small cell carcinoma 3/13/2024        Past Surgical History:   Procedure Laterality Date    BRONCHOSCOPY N/A 2023    Procedure: BRONCHOSCOPY WITH ENDOBRONCHIAL ULTRASOUND (EBUS) with FNA;  Surgeon: Coy Mccarthy MD;  Location:  [facility];  Service: Pulmonary;  Laterality: N/A;  Pre/Post - mediastinal and hilar lymphadenopathy.    BRONCHOSCOPY WITH ION ROBOTIC ASSIST N/A 2024    Procedure: BRONCHOSCOPY WITH ION ROBOT,  ENDOBRONCHIAL ULTRASOUND, FINE NEEDLE ASPIRATIONS,  CRYOTHERAPY BIOPSIES, AND LEFT LOWER LOBE BRONCHOALVEOLAR LAVAGE.;  Surgeon: Alexia Velásquez MD;  Location:  [facility];  Service: Robotics - Pulmonary;  Laterality: N/A;     SECTION      CHEST TUBE INSERTION Left 2024    Procedure: CHEST TUBE INSERTION;  Surgeon: Alexia Velásquez MD;  Location:  [facility];  Service: Thoracic;  Laterality: Left;    CHOLECYSTECTOMY      COLONOSCOPY      ECTOPIC PREGNANCY SURGERY      HYSTERECTOMY      TONSILLECTOMY      TOTAL HIP ARTHROPLASTY Left     VENOUS ACCESS DEVICE (PORT) INSERTION N/A 3/14/2024    Procedure: INSERTION VENOUS ACCESS DEVICE;  Surgeon: Jonas Garner MD;  Location:  [facility];  Service: General;  Laterality: N/A;        Current Outpatient Medications on File Prior to Visit   Medication Sig Dispense Refill    albuterol sulfate  (90 Base) MCG/ACT inhaler Inhale 2 puffs Every 4 (Four) Hours As Needed for Wheezing. Takes as needed.      apixaban (ELIQUIS) 5 MG tablet tablet Take 1 tablet by mouth Every 12 (Twelve) Hours. Indications: Other - full  anticoagulation 180 tablet 0    budesonide-formoterol (SYMBICORT) 160-4.5 MCG/ACT inhaler Inhale 2 puffs 2 (Two) Times a Day.  12    calcium carbonate (TUMS) 500 MG chewable tablet Chew 1 tablet Daily.      Cyanocobalamin (B-12) 500 MCG sublingual tablet Place 500 mcg under the tongue Daily. 90 tablet 1    Denosumab (PROLIA SC) Inject  under the skin into the appropriate area as directed Every 6 (Six) Months.      diazePAM (VALIUM) 10 MG tablet Take 1 tablet by mouth 2 (Two) Times a Day As Needed for Anxiety (RLS.). Takes 20 mg at bedtime at times when she needs.      folic acid (FOLVITE) 800 MCG tablet Take 1 tablet by mouth Daily. 90 tablet 1    HYDROcodone-acetaminophen (NORCO)  MG per tablet Take 1 tablet by mouth 3 (Three) Times a Day As Needed for Moderate Pain.      Klor-Con M20 20 MEQ CR tablet TAKE 1 TABLET BY MOUTH DAILY 30 tablet 1    levETIRAcetam (KEPPRA) 500 MG tablet Take 1 tablet by mouth Every 12 (Twelve) Hours. 60 tablet 3    midodrine (PROAMATINE) 2.5 MG tablet Take 1 tablet by mouth 3 (Three) Times a Day Before Meals.      naloxone (NARCAN) 4 MG/0.1ML nasal spray 1 spray into the nostril(s) as directed by provider As Needed.      OLANZapine (zyPREXA) 2.5 MG tablet Take 1 tablet by mouth Every Night. 30 tablet 2    ondansetron (ZOFRAN) 8 MG tablet Take 1 tablet by mouth Every 8 (Eight) Hours As Needed for Nausea or Vomiting. 30 tablet 1    pantoprazole (PROTONIX) 40 MG EC tablet Take 1 tablet by mouth Daily. 30 tablet 3    sucralfate (CARAFATE) 1 g tablet Take 1 tablet by mouth 4 (Four) Times a Day. 120 tablet 3     No current facility-administered medications on file prior to visit.        ALLERGIES:    Allergies   Allergen Reactions    Codeine GI Intolerance    Percocet [Oxycodone-Acetaminophen] Hives        Social History     Socioeconomic History    Marital status:    Tobacco Use    Smoking status: Every Day     Current packs/day: 1.00     Average packs/day: 1 pack/day for 30.0  "years (30.0 ttl pk-yrs)     Types: Cigarettes     Passive exposure: Current    Smokeless tobacco: Never    Tobacco comments:     Began smoking at age 11.  Smoked .5 ppd for 6 years, 2.5 ppd for a year, 2 ppd for 5 years, and 1 ppd for the past 43 years for a 58.5 pack year history.   Vaping Use    Vaping status: Former    Substances: Nicotine, Flavoring    Devices: Disposable   Substance and Sexual Activity    Alcohol use: Yes     Comment: once a year     Drug use: No    Sexual activity: Defer        Family History   Problem Relation Age of Onset    Lung cancer Niece 50    Throat cancer Father     Breast cancer Neg Hx         Review of Systems   Constitutional:  Positive for fatigue.   HENT: Negative.     Eyes: Negative.    Respiratory: Negative.     Cardiovascular: Negative.    Gastrointestinal: Negative.    Musculoskeletal:  Positive for back pain.   Neurological: Negative.    Hematological: Negative.    Psychiatric/Behavioral:  Positive for dysphoric mood. The patient is nervous/anxious.    ROS is unchanged-8/13/2024    Objective     Vitals:    08/13/24 0816   BP: 118/77   Pulse: 94   Resp: 16   Temp: 97.5 °F (36.4 °C)   TempSrc: Infrared   SpO2: 91%   Weight: 46.2 kg (101 lb 12.8 oz)  Comment: Simultaneous filing. User may be unaware of other data.   Height: 160 cm (62.99\")   PainSc: 0-No pain               8/13/2024     8:21 AM   Current Status   ECOG score 1       Physical Exam    CONSTITUTIONAL: pleasant well-developed adult woman  HEENT: no icterus, no thrush, moist membranes, anisocoria  LYMPH: no cervical or supraclavicular lad  CV: RRR, S1S2, no murmur  RESP: cta bilat, no wheezing, no rales  GI: soft, non-tender, no splenomegaly, +bs  MUSC: no edema, normal gait  NEURO: alert and oriented x3, normal strength  PSYCH: Dysphoric mood and flat affect  Skin: No rash or induration noted, developing alopecia       RECENT LABS:  Hematology WBC   Date Value Ref Range Status   08/13/2024 8.21 3.40 - 10.80 10*3/mm3 " Final     RBC   Date Value Ref Range Status   08/13/2024 4.23 3.77 - 5.28 10*6/mm3 Final     Hemoglobin   Date Value Ref Range Status   08/13/2024 14.6 12.0 - 15.9 g/dL Final     Hematocrit   Date Value Ref Range Status   08/13/2024 44.2 34.0 - 46.6 % Final     Platelets   Date Value Ref Range Status   08/13/2024 325 140 - 450 10*3/mm3 Final        Lab Results   Component Value Date    GLUCOSE 131 (H) 08/13/2024    BUN 3 (L) 08/13/2024    CREATININE 0.75 08/13/2024    EGFR 87.9 08/13/2024    BCR 4.0 (L) 08/13/2024    K 4.5 08/13/2024    CO2 27.2 08/13/2024    CALCIUM 8.8 08/13/2024    ALBUMIN 3.9 08/13/2024    BILITOT 0.2 08/13/2024    AST 14 08/13/2024    ALT <5 08/13/2024     NM PET/CT Skull Base to Mid Thigh (06/06/2024 11:52 AM)   FINDINGS: Mediastinal blood pool has a maximal SUV of 2.0, previously 2.3.  1. The residual 6 mm pleural-based nodule at the left lower lobe is photopenic. There is low-level subpleural activity adjacently and inferiorly with an SUV of 1.9, likely representing radiation pneumonitis. There has been resolution of left mediastinal and left hilar lymphadenopathy. Interval marked decrease in the size of mediastinal and left hilar nodes and the remaining nodes have low-level activity, maximal SUV of 2.6. There are no new hypermetabolic pulmonary opacities.  2. There is no suspicious hypermetabolic activity at the supraclavicular regions or neck.  3. There is no suspicious hypermetabolic activity within the abdomen or pelvis.  4. There is no suspicious bone activity.    Assessment & Plan     *Small cell lung cancer left lower lobe of the lung with mediastinal involvement, contralateral lung nodule suspicious for metastatic disease/extensive stage  CT chest 2/22/2024-enlarged for ill lymph nodes 2.5 cm, enlarged AP window lymph node 1.4 cm, poorly defined left hilar lymphadenopathy, left lower lobe pulmonary nodule 1.7 cm  Bronchoscopy with Ion navigation performed 2/28/2024-pathology positive  for small cell carcinoma from the left lower lobe and station 4L  PET scan on 3/7/2024 showed significantly avid bilateral hilar and mediastinal lymph nodes for example [employer] AP window 8.3 SUV measuring 2.5 x 1.8 cm, left hilar lymph node SUV 7 1.4 cm, pleural-based nodularity left major fissure newly hypermetabolic SUV 2.2, pleural-based partially cavitary mass left lower lobe SUV five 1.5 x 1 cm, new right lung nodule 8 mm in the right lower lobe suspicious.-Results discussed with Dr. Velásquez and we both feel the contralateral right lung nodule is highly suspicious for metastatic disease  MRI of the brain negative.  3/19/2024.initiated chemoimmunotherapy with carboplatin/etoposide/atezolizumab; completed 4 cycles of carboplatin/etoposide/atezolizumab 5/23/2024 6/6/2024 PET scan: showed a residual 6 mm pleural-based nodule left lower lobe to be photopenic, low-level subpleural activity adjacently and inferiorly SUV 1.9, resolution of left mediastinal and left hilar lymphadenopathy, marked decrease mediastinal and left hilar lymph nodes have low-level activity SUV 2.6, no new hypermetabolic pulmonary opacities, no hypermetabolic activity in the abdomen, pelvis or bones  *anemia/thrombocytopenia secondary to chemotherapy  Hemoglobin improved 14.6 and platelets 325  *  *Comorbidities of tobacco abuse/COPD, anxiety, atherosclerotic calcifications by CT but no definitive diagnosis of CAD, degenerative disease of the spine, hyperlipidemia; no known autoimmune disorders, chronic pain on opioid medicines    Oncology plan/recommendations:  Continue atezolizumab every 3 weeks; repeat CT chest abdomen pelvis with IV contrast and MRI brain with and without contrast prior to 6-week follow-up visit  I discussed with the patient considering radiation consult for consolidative radiation and PCI but she declined an appointment at this time  Additional labs for fatigue-Free T3 3, free T4, TSH, cortisol, ACTH;

## 2024-08-12 ENCOUNTER — TELEPHONE (OUTPATIENT)
Dept: OTHER | Facility: HOSPITAL | Age: 69
End: 2024-08-12
Payer: MEDICARE

## 2024-08-12 NOTE — TELEPHONE ENCOUNTER
Oncology Social Work  Spoke to the patient to confirm that she has the documents needed to initiate her hospital financial assistance application. Confirmed that [facility] will meet [facility] to assist with the application process.  CHARANJIT Bonner

## 2024-08-13 ENCOUNTER — DOCUMENTATION (OUTPATIENT)
Dept: OTHER | Facility: HOSPITAL | Age: 69
End: 2024-08-13
Payer: MEDICARE

## 2024-08-13 ENCOUNTER — INFUSION (OUTPATIENT)
Dept: ONCOLOGY | Facility: HOSPITAL | Age: 69
End: 2024-08-13
Payer: MEDICARE

## 2024-08-13 ENCOUNTER — OFFICE VISIT (OUTPATIENT)
Dept: ONCOLOGY | Facility: CLINIC | Age: 69
End: 2024-08-13
Payer: MEDICARE

## 2024-08-13 VITALS
RESPIRATION RATE: 16 BRPM | OXYGEN SATURATION: 91 % | TEMPERATURE: 97.5 F | HEIGHT: 63 IN | DIASTOLIC BLOOD PRESSURE: 77 MMHG | BODY MASS INDEX: 18.04 KG/M2 | HEART RATE: 94 BPM | SYSTOLIC BLOOD PRESSURE: 118 MMHG | WEIGHT: 101.8 LBS

## 2024-08-13 DIAGNOSIS — Z45.2 FITTING AND ADJUSTMENT OF VASCULAR CATHETER: Primary | ICD-10-CM

## 2024-08-13 DIAGNOSIS — Z45.2 FITTING AND ADJUSTMENT OF VASCULAR CATHETER: ICD-10-CM

## 2024-08-13 DIAGNOSIS — C80.1 SMALL CELL CARCINOMA: ICD-10-CM

## 2024-08-13 DIAGNOSIS — Z79.899 HIGH RISK MEDICATION USE: ICD-10-CM

## 2024-08-13 DIAGNOSIS — E53.8 VITAMIN B12 DEFICIENCY: Primary | ICD-10-CM

## 2024-08-13 LAB
ALBUMIN SERPL-MCNC: 3.9 G/DL (ref 3.5–5.2)
ALBUMIN/GLOB SERPL: 1.4 G/DL
ALP SERPL-CCNC: 109 U/L (ref 39–117)
ALT SERPL W P-5'-P-CCNC: <5 U/L (ref 1–33)
ANION GAP SERPL CALCULATED.3IONS-SCNC: 9.8 MMOL/L (ref 5–15)
AST SERPL-CCNC: 14 U/L (ref 1–32)
BASOPHILS # BLD AUTO: 0.04 10*3/MM3 (ref 0–0.2)
BASOPHILS NFR BLD AUTO: 0.5 % (ref 0–1.5)
BILIRUB SERPL-MCNC: 0.2 MG/DL (ref 0–1.2)
BUN SERPL-MCNC: 3 MG/DL (ref 8–23)
BUN/CREAT SERPL: 4 (ref 7–25)
CALCIUM SPEC-SCNC: 8.8 MG/DL (ref 8.6–10.5)
CHLORIDE SERPL-SCNC: 96 MMOL/L (ref 98–107)
CO2 SERPL-SCNC: 27.2 MMOL/L (ref 22–29)
CORTIS SERPL-MCNC: 18.4 MCG/DL
CREAT SERPL-MCNC: 0.75 MG/DL (ref 0.57–1)
DEPRECATED RDW RBC AUTO: 46.5 FL (ref 37–54)
EGFRCR SERPLBLD CKD-EPI 2021: 87.9 ML/MIN/1.73
EOSINOPHIL # BLD AUTO: 0.21 10*3/MM3 (ref 0–0.4)
EOSINOPHIL NFR BLD AUTO: 2.6 % (ref 0.3–6.2)
ERYTHROCYTE [DISTWIDTH] IN BLOOD BY AUTOMATED COUNT: 11.8 % (ref 12.3–15.4)
GLOBULIN UR ELPH-MCNC: 2.8 GM/DL
GLUCOSE SERPL-MCNC: 131 MG/DL (ref 65–99)
HCT VFR BLD AUTO: 44.2 % (ref 34–46.6)
HGB BLD-MCNC: 14.6 G/DL (ref 12–15.9)
IMM GRANULOCYTES # BLD AUTO: 0.01 10*3/MM3 (ref 0–0.05)
IMM GRANULOCYTES NFR BLD AUTO: 0.1 % (ref 0–0.5)
LYMPHOCYTES # BLD AUTO: 3.14 10*3/MM3 (ref 0.7–3.1)
LYMPHOCYTES NFR BLD AUTO: 38.2 % (ref 19.6–45.3)
MCH RBC QN AUTO: 34.5 PG (ref 26.6–33)
MCHC RBC AUTO-ENTMCNC: 33 G/DL (ref 31.5–35.7)
MCV RBC AUTO: 104.5 FL (ref 79–97)
MONOCYTES # BLD AUTO: 0.62 10*3/MM3 (ref 0.1–0.9)
MONOCYTES NFR BLD AUTO: 7.6 % (ref 5–12)
NEUTROPHILS NFR BLD AUTO: 4.19 10*3/MM3 (ref 1.7–7)
NEUTROPHILS NFR BLD AUTO: 51 % (ref 42.7–76)
PLATELET # BLD AUTO: 325 10*3/MM3 (ref 140–450)
PMV BLD AUTO: 8.2 FL (ref 6–12)
POTASSIUM SERPL-SCNC: 4.5 MMOL/L (ref 3.5–5.2)
PROT SERPL-MCNC: 6.7 G/DL (ref 6–8.5)
RBC # BLD AUTO: 4.23 10*6/MM3 (ref 3.77–5.28)
SODIUM SERPL-SCNC: 133 MMOL/L (ref 136–145)
T3FREE SERPL-MCNC: 3.1 PG/ML (ref 2–4.4)
T4 FREE SERPL-MCNC: 1.07 NG/DL (ref 0.93–1.7)
TSH SERPL DL<=0.05 MIU/L-ACNC: 2.57 UIU/ML (ref 0.27–4.2)
WBC NRBC COR # BLD AUTO: 8.21 10*3/MM3 (ref 3.4–10.8)

## 2024-08-13 PROCEDURE — 25010000002 ATEZOLIZUMAB 1200 MG/20ML SOLUTION 20 ML VIAL: Performed by: INTERNAL MEDICINE

## 2024-08-13 PROCEDURE — 80053 COMPREHEN METABOLIC PANEL: CPT | Performed by: INTERNAL MEDICINE

## 2024-08-13 PROCEDURE — G2211 COMPLEX E/M VISIT ADD ON: HCPCS | Performed by: INTERNAL MEDICINE

## 2024-08-13 PROCEDURE — 85025 COMPLETE CBC W/AUTO DIFF WBC: CPT | Performed by: INTERNAL MEDICINE

## 2024-08-13 PROCEDURE — 96413 CHEMO IV INFUSION 1 HR: CPT

## 2024-08-13 PROCEDURE — 99214 OFFICE O/P EST MOD 30 MIN: CPT | Performed by: INTERNAL MEDICINE

## 2024-08-13 PROCEDURE — 36415 COLL VENOUS BLD VENIPUNCTURE: CPT | Performed by: INTERNAL MEDICINE

## 2024-08-13 PROCEDURE — 84439 ASSAY OF FREE THYROXINE: CPT | Performed by: INTERNAL MEDICINE

## 2024-08-13 PROCEDURE — 96372 THER/PROPH/DIAG INJ SC/IM: CPT

## 2024-08-13 PROCEDURE — 25010000002 CYANOCOBALAMIN PER 1000 MCG: Performed by: INTERNAL MEDICINE

## 2024-08-13 PROCEDURE — 82533 TOTAL CORTISOL: CPT | Performed by: INTERNAL MEDICINE

## 2024-08-13 PROCEDURE — 84443 ASSAY THYROID STIM HORMONE: CPT | Performed by: INTERNAL MEDICINE

## 2024-08-13 PROCEDURE — 25010000002 HEPARIN LOCK FLUSH PER 10 UNITS: Performed by: INTERNAL MEDICINE

## 2024-08-13 PROCEDURE — 1126F AMNT PAIN NOTED NONE PRSNT: CPT | Performed by: INTERNAL MEDICINE

## 2024-08-13 PROCEDURE — 84481 FREE ASSAY (FT-3): CPT | Performed by: INTERNAL MEDICINE

## 2024-08-13 PROCEDURE — 25810000003 SODIUM CHLORIDE 0.9 % SOLUTION 250 ML FLEX CONT: Performed by: INTERNAL MEDICINE

## 2024-08-13 PROCEDURE — 25810000003 SODIUM CHLORIDE 0.9 % SOLUTION: Performed by: INTERNAL MEDICINE

## 2024-08-13 RX ORDER — SODIUM CHLORIDE 0.9 % (FLUSH) 0.9 %
10 SYRINGE (ML) INJECTION AS NEEDED
OUTPATIENT
Start: 2024-08-13

## 2024-08-13 RX ORDER — CYANOCOBALAMIN 1000 UG/ML
1000 INJECTION, SOLUTION INTRAMUSCULAR; SUBCUTANEOUS ONCE
Status: COMPLETED | OUTPATIENT
Start: 2024-08-13 | End: 2024-08-13

## 2024-08-13 RX ORDER — SODIUM CHLORIDE 0.9 % (FLUSH) 0.9 %
10 SYRINGE (ML) INJECTION AS NEEDED
Status: DISCONTINUED | OUTPATIENT
Start: 2024-08-13 | End: 2024-08-13 | Stop reason: HOSPADM

## 2024-08-13 RX ORDER — HEPARIN SODIUM (PORCINE) LOCK FLUSH IV SOLN 100 UNIT/ML 100 UNIT/ML
500 SOLUTION INTRAVENOUS AS NEEDED
OUTPATIENT
Start: 2024-08-13

## 2024-08-13 RX ORDER — HEPARIN SODIUM (PORCINE) LOCK FLUSH IV SOLN 100 UNIT/ML 100 UNIT/ML
500 SOLUTION INTRAVENOUS AS NEEDED
Status: DISCONTINUED | OUTPATIENT
Start: 2024-08-13 | End: 2024-08-13 | Stop reason: HOSPADM

## 2024-08-13 RX ORDER — SODIUM CHLORIDE 9 MG/ML
20 INJECTION, SOLUTION INTRAVENOUS ONCE
Status: CANCELLED | OUTPATIENT
Start: 2024-08-13

## 2024-08-13 RX ORDER — SODIUM CHLORIDE 9 MG/ML
20 INJECTION, SOLUTION INTRAVENOUS ONCE
Status: COMPLETED | OUTPATIENT
Start: 2024-08-13 | End: 2024-08-13

## 2024-08-13 RX ORDER — SODIUM CHLORIDE 9 MG/ML
20 INJECTION, SOLUTION INTRAVENOUS ONCE
OUTPATIENT
Start: 2024-09-03

## 2024-08-13 RX ADMIN — CYANOCOBALAMIN 1000 MCG: 1000 INJECTION, SOLUTION INTRAMUSCULAR; SUBCUTANEOUS at 08:58

## 2024-08-13 RX ADMIN — HEPARIN 500 UNITS: 100 SYRINGE at 10:18

## 2024-08-13 RX ADMIN — Medication 10 ML: at 10:17

## 2024-08-13 RX ADMIN — ATEZOLIZUMAB 1200 MG: 1200 INJECTION, SOLUTION INTRAVENOUS at 09:44

## 2024-08-13 RX ADMIN — SODIUM CHLORIDE 20 ML/HR: 9 INJECTION, SOLUTION INTRAVENOUS at 08:58

## 2024-08-13 NOTE — PROGRESS NOTES
Oncology Social Work  [place] [facility] that the patient was mailed a [employer] card.  CHARANJIT Bonner

## 2024-08-13 NOTE — PROGRESS NOTES
Oncology Social Work  Met with the patient and her grand-daughter.  The patient provided OSW with documents for her hospital financial assistance application and submitted her signed application.  CHARANJIT Bonner

## 2024-08-14 ENCOUNTER — PATIENT OUTREACH (OUTPATIENT)
Dept: OTHER | Facility: HOSPITAL | Age: 69
End: 2024-08-14
Payer: MEDICARE

## 2024-08-14 ENCOUNTER — TELEPHONE (OUTPATIENT)
Dept: ONCOLOGY | Facility: CLINIC | Age: 69
End: 2024-08-14
Payer: MEDICARE

## 2024-08-14 NOTE — PROGRESS NOTES
Reviewed chart.  Patient with small cell lung cancer. She completed 4 cycles of carboplatin/etoposide/atezolizumab and is now on maintenance atezolizumab every 3 weeks. Most recently met with Dr. Burns 8/13. MRI brain and CT C/A/P scheduled 9/17, sees Dr. Burns again 9/24.    Called patient.  We discussed her recent appt with Dr. Burns and appt with HALEY More.  The patient complains of fatigue and mild weight loss although denies any other significant side effects to treatment.  Explained we can reconnect her with nutrition in the future if the need arises.     We discussed her upcoming scans and appt with Dr. Burns 9/24.  The patient denies any questions/concerns or ongoing resource needs. I will continue to follow; encouraged patient to call as needed.

## 2024-08-14 NOTE — TELEPHONE ENCOUNTER
"  Caller: Katiana Arechiga \"THEODORE\"    Relationship: Self    Best call back number: [phone]    What was the call regarding: KATIANA WAS RETURNING [employer] CALL.     "

## 2024-08-14 NOTE — TELEPHONE ENCOUNTER
----- Message from Nikita Burns sent at 8/14/2024  2:08 PM EDT -----  PIP her steroid levels checked yesterday came back normal.

## 2024-08-19 RX ORDER — LEVETIRACETAM 500 MG/1
500 TABLET ORAL EVERY 12 HOURS
Qty: 180 TABLET | Refills: 0 | Status: SHIPPED | OUTPATIENT
Start: 2024-08-19

## 2024-08-21 ENCOUNTER — DOCUMENTATION (OUTPATIENT)
Dept: OTHER | Facility: HOSPITAL | Age: 69
End: 2024-08-21
Payer: MEDICARE

## 2024-08-21 NOTE — PROGRESS NOTES
Oncology Social Work  The patient forwarded her electric bill to OSW requesting assistance from Jason Nune[employer]. CHARANJIT Bonner

## 2024-08-21 NOTE — PROGRESS NOTES
Oncology Social Work  Request was made to [employer] to see if they are able to assist the patient with paying her electric bill. CHARANJIT Bonner

## 2024-08-23 ENCOUNTER — DOCUMENTATION (OUTPATIENT)
Dept: OTHER | Facility: HOSPITAL | Age: 69
End: 2024-08-23
Payer: MEDICARE

## 2024-08-23 ENCOUNTER — TELEPHONE (OUTPATIENT)
Dept: OTHER | Facility: HOSPITAL | Age: 69
End: 2024-08-23
Payer: MEDICARE

## 2024-08-23 NOTE — PROGRESS NOTES
Oncology Social Work  Per [employer] [employer] [address] the patient's electric bill was paid in the amount of $249.30. CHARANJIT Bonner

## 2024-08-23 NOTE — TELEPHONE ENCOUNTER
Oncology Social Work  Spoke to the patient to inform her that per Per [employer] [address] they paid the patient's electric bill in the amount of $249.30. CHARANJIT Bonner

## 2024-09-03 ENCOUNTER — DOCUMENTATION (OUTPATIENT)
Dept: OTHER | Facility: HOSPITAL | Age: 69
End: 2024-09-03
Payer: MEDICARE

## 2024-09-03 ENCOUNTER — INFUSION (OUTPATIENT)
Dept: ONCOLOGY | Facility: HOSPITAL | Age: 69
End: 2024-09-03
Payer: MEDICARE

## 2024-09-03 VITALS
DIASTOLIC BLOOD PRESSURE: 68 MMHG | TEMPERATURE: 97.7 F | BODY MASS INDEX: 18.92 KG/M2 | WEIGHT: 106.8 LBS | OXYGEN SATURATION: 95 % | HEART RATE: 91 BPM | SYSTOLIC BLOOD PRESSURE: 103 MMHG

## 2024-09-03 DIAGNOSIS — E53.8 VITAMIN B12 DEFICIENCY: Primary | ICD-10-CM

## 2024-09-03 DIAGNOSIS — C80.1 SMALL CELL CARCINOMA: ICD-10-CM

## 2024-09-03 DIAGNOSIS — Z45.2 FITTING AND ADJUSTMENT OF VASCULAR CATHETER: ICD-10-CM

## 2024-09-03 LAB
ALBUMIN SERPL-MCNC: 3.5 G/DL (ref 3.5–5.2)
ALBUMIN/GLOB SERPL: 1.3 G/DL
ALP SERPL-CCNC: 134 U/L (ref 39–117)
ALT SERPL W P-5'-P-CCNC: <5 U/L (ref 1–33)
ANION GAP SERPL CALCULATED.3IONS-SCNC: 8.7 MMOL/L (ref 5–15)
AST SERPL-CCNC: 13 U/L (ref 1–32)
BASOPHILS # BLD AUTO: 0.11 10*3/MM3 (ref 0–0.2)
BASOPHILS NFR BLD AUTO: 0.6 % (ref 0–1.5)
BILIRUB SERPL-MCNC: 0.2 MG/DL (ref 0–1.2)
BUN SERPL-MCNC: 4 MG/DL (ref 8–23)
BUN/CREAT SERPL: 5 (ref 7–25)
CALCIUM SPEC-SCNC: 8 MG/DL (ref 8.6–10.5)
CHLORIDE SERPL-SCNC: 101 MMOL/L (ref 98–107)
CO2 SERPL-SCNC: 25.3 MMOL/L (ref 22–29)
CREAT SERPL-MCNC: 0.8 MG/DL (ref 0.57–1)
DEPRECATED RDW RBC AUTO: 47.8 FL (ref 37–54)
EGFRCR SERPLBLD CKD-EPI 2021: 81.4 ML/MIN/1.73
EOSINOPHIL # BLD AUTO: 0.24 10*3/MM3 (ref 0–0.4)
EOSINOPHIL NFR BLD AUTO: 1.3 % (ref 0.3–6.2)
ERYTHROCYTE [DISTWIDTH] IN BLOOD BY AUTOMATED COUNT: 12.4 % (ref 12.3–15.4)
GLOBULIN UR ELPH-MCNC: 2.6 GM/DL
GLUCOSE SERPL-MCNC: 130 MG/DL (ref 65–99)
HCT VFR BLD AUTO: 40.6 % (ref 34–46.6)
HGB BLD-MCNC: 13.1 G/DL (ref 12–15.9)
IMM GRANULOCYTES # BLD AUTO: 0.09 10*3/MM3 (ref 0–0.05)
IMM GRANULOCYTES NFR BLD AUTO: 0.5 % (ref 0–0.5)
LYMPHOCYTES # BLD AUTO: 2.76 10*3/MM3 (ref 0.7–3.1)
LYMPHOCYTES NFR BLD AUTO: 15 % (ref 19.6–45.3)
MCH RBC QN AUTO: 33.8 PG (ref 26.6–33)
MCHC RBC AUTO-ENTMCNC: 32.3 G/DL (ref 31.5–35.7)
MCV RBC AUTO: 104.6 FL (ref 79–97)
MONOCYTES # BLD AUTO: 1.06 10*3/MM3 (ref 0.1–0.9)
MONOCYTES NFR BLD AUTO: 5.8 % (ref 5–12)
NEUTROPHILS NFR BLD AUTO: 14.12 10*3/MM3 (ref 1.7–7)
NEUTROPHILS NFR BLD AUTO: 76.8 % (ref 42.7–76)
NRBC BLD AUTO-RTO: 0 /100 WBC (ref 0–0.2)
PLATELET # BLD AUTO: 349 10*3/MM3 (ref 140–450)
PMV BLD AUTO: 8.8 FL (ref 6–12)
POTASSIUM SERPL-SCNC: 4.4 MMOL/L (ref 3.5–5.2)
PROT SERPL-MCNC: 6.1 G/DL (ref 6–8.5)
RBC # BLD AUTO: 3.88 10*6/MM3 (ref 3.77–5.28)
SODIUM SERPL-SCNC: 135 MMOL/L (ref 136–145)
T3FREE SERPL-MCNC: 2.84 PG/ML (ref 2–4.4)
T4 FREE SERPL-MCNC: 0.99 NG/DL (ref 0.93–1.7)
TSH SERPL DL<=0.05 MIU/L-ACNC: 1.94 UIU/ML (ref 0.27–4.2)
WBC NRBC COR # BLD AUTO: 18.38 10*3/MM3 (ref 3.4–10.8)

## 2024-09-03 PROCEDURE — 25010000002 CYANOCOBALAMIN PER 1000 MCG: Performed by: INTERNAL MEDICINE

## 2024-09-03 PROCEDURE — 96372 THER/PROPH/DIAG INJ SC/IM: CPT

## 2024-09-03 PROCEDURE — 85025 COMPLETE CBC W/AUTO DIFF WBC: CPT | Performed by: INTERNAL MEDICINE

## 2024-09-03 PROCEDURE — 25810000003 SODIUM CHLORIDE 0.9 % SOLUTION 250 ML FLEX CONT: Performed by: INTERNAL MEDICINE

## 2024-09-03 PROCEDURE — 80053 COMPREHEN METABOLIC PANEL: CPT | Performed by: INTERNAL MEDICINE

## 2024-09-03 PROCEDURE — 84443 ASSAY THYROID STIM HORMONE: CPT | Performed by: INTERNAL MEDICINE

## 2024-09-03 PROCEDURE — 96413 CHEMO IV INFUSION 1 HR: CPT

## 2024-09-03 PROCEDURE — 25010000002 ATEZOLIZUMAB 1200 MG/20ML SOLUTION 20 ML VIAL: Performed by: INTERNAL MEDICINE

## 2024-09-03 PROCEDURE — 25810000003 SODIUM CHLORIDE 0.9 % SOLUTION: Performed by: INTERNAL MEDICINE

## 2024-09-03 PROCEDURE — 84439 ASSAY OF FREE THYROXINE: CPT | Performed by: INTERNAL MEDICINE

## 2024-09-03 PROCEDURE — 84481 FREE ASSAY (FT-3): CPT | Performed by: INTERNAL MEDICINE

## 2024-09-03 PROCEDURE — 25010000002 HEPARIN LOCK FLUSH PER 10 UNITS: Performed by: INTERNAL MEDICINE

## 2024-09-03 RX ORDER — HEPARIN SODIUM (PORCINE) LOCK FLUSH IV SOLN 100 UNIT/ML 100 UNIT/ML
500 SOLUTION INTRAVENOUS AS NEEDED
Status: DISCONTINUED | OUTPATIENT
Start: 2024-09-03 | End: 2024-09-03 | Stop reason: HOSPADM

## 2024-09-03 RX ORDER — OLANZAPINE 2.5 MG/1
2.5 TABLET, FILM COATED ORAL NIGHTLY
Qty: 30 TABLET | Refills: 2 | Status: SHIPPED | OUTPATIENT
Start: 2024-09-03

## 2024-09-03 RX ORDER — CYANOCOBALAMIN 1000 UG/ML
1000 INJECTION, SOLUTION INTRAMUSCULAR; SUBCUTANEOUS ONCE
Status: COMPLETED | OUTPATIENT
Start: 2024-09-03 | End: 2024-09-03

## 2024-09-03 RX ORDER — HEPARIN SODIUM (PORCINE) LOCK FLUSH IV SOLN 100 UNIT/ML 100 UNIT/ML
500 SOLUTION INTRAVENOUS AS NEEDED
OUTPATIENT
Start: 2024-09-03

## 2024-09-03 RX ORDER — SODIUM CHLORIDE 0.9 % (FLUSH) 0.9 %
10 SYRINGE (ML) INJECTION AS NEEDED
OUTPATIENT
Start: 2024-09-03

## 2024-09-03 RX ORDER — SODIUM CHLORIDE 9 MG/ML
20 INJECTION, SOLUTION INTRAVENOUS ONCE
Status: COMPLETED | OUTPATIENT
Start: 2024-09-03 | End: 2024-09-03

## 2024-09-03 RX ORDER — SODIUM CHLORIDE 0.9 % (FLUSH) 0.9 %
10 SYRINGE (ML) INJECTION AS NEEDED
Status: DISCONTINUED | OUTPATIENT
Start: 2024-09-03 | End: 2024-09-03 | Stop reason: HOSPADM

## 2024-09-03 RX ADMIN — ATEZOLIZUMAB 1200 MG: 1200 INJECTION, SOLUTION INTRAVENOUS at 09:38

## 2024-09-03 RX ADMIN — CYANOCOBALAMIN 1000 MCG: 1000 INJECTION, SOLUTION INTRAMUSCULAR; SUBCUTANEOUS at 09:23

## 2024-09-03 RX ADMIN — Medication 10 ML: at 10:14

## 2024-09-03 RX ADMIN — SODIUM CHLORIDE 20 ML/HR: 9 INJECTION, SOLUTION INTRAVENOUS at 09:23

## 2024-09-03 RX ADMIN — HEPARIN 500 UNITS: 100 SYRINGE at 10:14

## 2024-09-03 NOTE — PROGRESS NOTES
Oncology Social Work  Email from Michelle [facility]ad[employer] stating that there are currently no funds available for the [facility] therefore they are unable to assist the patient.  CHARANJIT Bonner

## 2024-09-04 ENCOUNTER — DOCUMENTATION (OUTPATIENT)
Dept: OTHER | Facility: HOSPITAL | Age: 69
End: 2024-09-04
Payer: MEDICARE

## 2024-09-04 ENCOUNTER — TELEPHONE (OUTPATIENT)
Dept: OTHER | Facility: HOSPITAL | Age: 69
End: 2024-09-04
Payer: MEDICARE

## 2024-09-04 NOTE — TELEPHONE ENCOUNTER
Oncology Social Work  Spoke to the patient to inform her that [employer] [employer] [address] stated that they are unable to assist the patient with paying her water bill as they have already assisted the patient with over $1,000 in bills.  The patient was informed and expressed understanding.  CHARANJIT Bonner

## 2024-09-04 NOTE — PROGRESS NOTES
Oncology Social Work  The patient requested assistance with covering the cost of her water bill. Request was sent to [employer] and per [facility] they are unable to assist as they have assisted the patient with over $1,000 in bills.  CHARANJIT Bonner

## 2024-09-17 ENCOUNTER — HOSPITAL ENCOUNTER (OUTPATIENT)
Dept: CT IMAGING | Facility: HOSPITAL | Age: 69
Discharge: HOME | End: 2024-09-17
Payer: MEDICARE

## 2024-09-17 ENCOUNTER — HOSPITAL ENCOUNTER (OUTPATIENT)
Dept: MRI IMAGING | Facility: HOSPITAL | Age: 69
Discharge: HOME | End: 2024-09-17
Payer: MEDICARE

## 2024-09-17 ENCOUNTER — INFUSION (OUTPATIENT)
Dept: ONCOLOGY | Facility: HOSPITAL | Age: 69
End: 2024-09-17
Payer: MEDICARE

## 2024-09-17 DIAGNOSIS — Z45.2 FITTING AND ADJUSTMENT OF VASCULAR CATHETER: Primary | ICD-10-CM

## 2024-09-17 DIAGNOSIS — C80.1 SMALL CELL CARCINOMA: ICD-10-CM

## 2024-09-17 PROCEDURE — 74177 CT ABD & PELVIS W/CONTRAST: CPT

## 2024-09-17 PROCEDURE — 25510000001 IOPAMIDOL PER 1 ML: Performed by: INTERNAL MEDICINE

## 2024-09-17 PROCEDURE — 25010000002 HEPARIN LOCK FLUSH PER 10 UNITS: Performed by: INTERNAL MEDICINE

## 2024-09-17 PROCEDURE — 70553 MRI BRAIN STEM W/O & W/DYE: CPT

## 2024-09-17 PROCEDURE — 71260 CT THORAX DX C+: CPT

## 2024-09-17 PROCEDURE — A9577 INJ MULTIHANCE: HCPCS | Performed by: INTERNAL MEDICINE

## 2024-09-17 PROCEDURE — 0 GADOBENATE DIMEGLUMINE 529 MG/ML SOLUTION: Performed by: INTERNAL MEDICINE

## 2024-09-17 RX ORDER — SODIUM CHLORIDE 0.9 % (FLUSH) 0.9 %
10 SYRINGE (ML) INJECTION AS NEEDED
OUTPATIENT
Start: 2024-09-17

## 2024-09-17 RX ORDER — SODIUM CHLORIDE 0.9 % (FLUSH) 0.9 %
10 SYRINGE (ML) INJECTION AS NEEDED
Status: DISCONTINUED | OUTPATIENT
Start: 2024-09-17 | End: 2024-09-17 | Stop reason: HOSPADM

## 2024-09-17 RX ORDER — HEPARIN SODIUM (PORCINE) LOCK FLUSH IV SOLN 100 UNIT/ML 100 UNIT/ML
500 SOLUTION INTRAVENOUS AS NEEDED
Status: DISCONTINUED | OUTPATIENT
Start: 2024-09-17 | End: 2024-09-17 | Stop reason: HOSPADM

## 2024-09-17 RX ORDER — IOPAMIDOL 755 MG/ML
100 INJECTION, SOLUTION INTRAVASCULAR
Status: COMPLETED | OUTPATIENT
Start: 2024-09-17 | End: 2024-09-17

## 2024-09-17 RX ORDER — HEPARIN SODIUM (PORCINE) LOCK FLUSH IV SOLN 100 UNIT/ML 100 UNIT/ML
500 SOLUTION INTRAVENOUS AS NEEDED
OUTPATIENT
Start: 2024-09-17

## 2024-09-17 RX ADMIN — Medication 10 ML: at 11:13

## 2024-09-17 RX ADMIN — HEPARIN 500 UNITS: 100 SYRINGE at 11:13

## 2024-09-17 RX ADMIN — GADOBENATE DIMEGLUMINE 9 ML: 529 INJECTION, SOLUTION INTRAVENOUS at 10:36

## 2024-09-17 RX ADMIN — IOPAMIDOL 100 ML: 755 INJECTION, SOLUTION INTRAVENOUS at 11:29

## 2024-09-24 ENCOUNTER — OFFICE VISIT (OUTPATIENT)
Dept: ONCOLOGY | Facility: CLINIC | Age: 69
End: 2024-09-24
Payer: MEDICARE

## 2024-09-24 ENCOUNTER — INFUSION (OUTPATIENT)
Dept: ONCOLOGY | Facility: HOSPITAL | Age: 69
End: 2024-09-24
Payer: MEDICARE

## 2024-09-24 VITALS
RESPIRATION RATE: 16 BRPM | TEMPERATURE: 98 F | HEIGHT: 63 IN | HEART RATE: 88 BPM | WEIGHT: 106.2 LBS | DIASTOLIC BLOOD PRESSURE: 60 MMHG | OXYGEN SATURATION: 90 % | SYSTOLIC BLOOD PRESSURE: 91 MMHG | BODY MASS INDEX: 18.82 KG/M2

## 2024-09-24 DIAGNOSIS — C80.1 SMALL CELL CARCINOMA: Primary | ICD-10-CM

## 2024-09-24 DIAGNOSIS — E53.8 VITAMIN B12 DEFICIENCY: Primary | ICD-10-CM

## 2024-09-24 DIAGNOSIS — C80.1 SMALL CELL CARCINOMA: ICD-10-CM

## 2024-09-24 DIAGNOSIS — Z45.2 FITTING AND ADJUSTMENT OF VASCULAR CATHETER: ICD-10-CM

## 2024-09-24 LAB
ALBUMIN SERPL-MCNC: 3.6 G/DL (ref 3.5–5.2)
ALBUMIN/GLOB SERPL: 1.3 G/DL
ALP SERPL-CCNC: 137 U/L (ref 39–117)
ALT SERPL W P-5'-P-CCNC: <5 U/L (ref 1–33)
ANION GAP SERPL CALCULATED.3IONS-SCNC: 6.9 MMOL/L (ref 5–15)
AST SERPL-CCNC: 14 U/L (ref 1–32)
BASOPHILS # BLD AUTO: 0.1 10*3/MM3 (ref 0–0.2)
BASOPHILS NFR BLD AUTO: 0.7 % (ref 0–1.5)
BILIRUB SERPL-MCNC: 0.2 MG/DL (ref 0–1.2)
BUN SERPL-MCNC: 5 MG/DL (ref 8–23)
BUN/CREAT SERPL: 5.7 (ref 7–25)
CALCIUM SPEC-SCNC: 9 MG/DL (ref 8.6–10.5)
CHLORIDE SERPL-SCNC: 95 MMOL/L (ref 98–107)
CO2 SERPL-SCNC: 29.1 MMOL/L (ref 22–29)
CREAT SERPL-MCNC: 0.87 MG/DL (ref 0.57–1)
DEPRECATED RDW RBC AUTO: 45.5 FL (ref 37–54)
EGFRCR SERPLBLD CKD-EPI 2021: 73.1 ML/MIN/1.73
EOSINOPHIL # BLD AUTO: 0.32 10*3/MM3 (ref 0–0.4)
EOSINOPHIL NFR BLD AUTO: 2.3 % (ref 0.3–6.2)
ERYTHROCYTE [DISTWIDTH] IN BLOOD BY AUTOMATED COUNT: 12.5 % (ref 12.3–15.4)
GLOBULIN UR ELPH-MCNC: 2.8 GM/DL
GLUCOSE SERPL-MCNC: 102 MG/DL (ref 65–99)
HCT VFR BLD AUTO: 41.4 % (ref 34–46.6)
HGB BLD-MCNC: 13.6 G/DL (ref 12–15.9)
IMM GRANULOCYTES # BLD AUTO: 0.08 10*3/MM3 (ref 0–0.05)
IMM GRANULOCYTES NFR BLD AUTO: 0.6 % (ref 0–0.5)
LYMPHOCYTES # BLD AUTO: 3.82 10*3/MM3 (ref 0.7–3.1)
LYMPHOCYTES NFR BLD AUTO: 27.8 % (ref 19.6–45.3)
MCH RBC QN AUTO: 32.9 PG (ref 26.6–33)
MCHC RBC AUTO-ENTMCNC: 32.9 G/DL (ref 31.5–35.7)
MCV RBC AUTO: 100 FL (ref 79–97)
MONOCYTES # BLD AUTO: 1.06 10*3/MM3 (ref 0.1–0.9)
MONOCYTES NFR BLD AUTO: 7.7 % (ref 5–12)
NEUTROPHILS NFR BLD AUTO: 60.9 % (ref 42.7–76)
NEUTROPHILS NFR BLD AUTO: 8.34 10*3/MM3 (ref 1.7–7)
NRBC BLD AUTO-RTO: 0 /100 WBC (ref 0–0.2)
PLATELET # BLD AUTO: 350 10*3/MM3 (ref 140–450)
PMV BLD AUTO: 8.6 FL (ref 6–12)
POTASSIUM SERPL-SCNC: 4.5 MMOL/L (ref 3.5–5.2)
PROT SERPL-MCNC: 6.4 G/DL (ref 6–8.5)
RBC # BLD AUTO: 4.14 10*6/MM3 (ref 3.77–5.28)
SODIUM SERPL-SCNC: 131 MMOL/L (ref 136–145)
WBC NRBC COR # BLD AUTO: 13.72 10*3/MM3 (ref 3.4–10.8)

## 2024-09-24 PROCEDURE — 99214 OFFICE O/P EST MOD 30 MIN: CPT | Performed by: INTERNAL MEDICINE

## 2024-09-24 PROCEDURE — 80053 COMPREHEN METABOLIC PANEL: CPT | Performed by: INTERNAL MEDICINE

## 2024-09-24 PROCEDURE — 25010000002 CYANOCOBALAMIN PER 1000 MCG: Performed by: INTERNAL MEDICINE

## 2024-09-24 PROCEDURE — 96413 CHEMO IV INFUSION 1 HR: CPT

## 2024-09-24 PROCEDURE — 25010000002 HEPARIN LOCK FLUSH PER 10 UNITS: Performed by: INTERNAL MEDICINE

## 2024-09-24 PROCEDURE — 25810000003 SODIUM CHLORIDE 0.9 % SOLUTION 250 ML FLEX CONT: Performed by: INTERNAL MEDICINE

## 2024-09-24 PROCEDURE — G2211 COMPLEX E/M VISIT ADD ON: HCPCS | Performed by: INTERNAL MEDICINE

## 2024-09-24 PROCEDURE — 1126F AMNT PAIN NOTED NONE PRSNT: CPT | Performed by: INTERNAL MEDICINE

## 2024-09-24 PROCEDURE — 85025 COMPLETE CBC W/AUTO DIFF WBC: CPT | Performed by: INTERNAL MEDICINE

## 2024-09-24 PROCEDURE — 25010000002 ATEZOLIZUMAB 1200 MG/20ML SOLUTION 20 ML VIAL: Performed by: INTERNAL MEDICINE

## 2024-09-24 PROCEDURE — 96372 THER/PROPH/DIAG INJ SC/IM: CPT

## 2024-09-24 PROCEDURE — 25810000003 SODIUM CHLORIDE 0.9 % SOLUTION: Performed by: INTERNAL MEDICINE

## 2024-09-24 RX ORDER — CYANOCOBALAMIN 1000 UG/ML
1000 INJECTION, SOLUTION INTRAMUSCULAR; SUBCUTANEOUS ONCE
Status: COMPLETED | OUTPATIENT
Start: 2024-09-24 | End: 2024-09-24

## 2024-09-24 RX ORDER — SODIUM CHLORIDE 0.9 % (FLUSH) 0.9 %
10 SYRINGE (ML) INJECTION AS NEEDED
Status: DISCONTINUED | OUTPATIENT
Start: 2024-09-24 | End: 2024-09-24 | Stop reason: HOSPADM

## 2024-09-24 RX ORDER — SODIUM CHLORIDE 9 MG/ML
20 INJECTION, SOLUTION INTRAVENOUS ONCE
OUTPATIENT
Start: 2024-10-15

## 2024-09-24 RX ORDER — HEPARIN SODIUM (PORCINE) LOCK FLUSH IV SOLN 100 UNIT/ML 100 UNIT/ML
500 SOLUTION INTRAVENOUS AS NEEDED
Status: DISCONTINUED | OUTPATIENT
Start: 2024-09-24 | End: 2024-09-24 | Stop reason: HOSPADM

## 2024-09-24 RX ORDER — SODIUM CHLORIDE 0.9 % (FLUSH) 0.9 %
10 SYRINGE (ML) INJECTION AS NEEDED
OUTPATIENT
Start: 2024-09-24

## 2024-09-24 RX ORDER — SODIUM CHLORIDE 9 MG/ML
20 INJECTION, SOLUTION INTRAVENOUS ONCE
Status: CANCELLED | OUTPATIENT
Start: 2024-09-24

## 2024-09-24 RX ORDER — SODIUM CHLORIDE 9 MG/ML
20 INJECTION, SOLUTION INTRAVENOUS ONCE
OUTPATIENT
Start: 2024-11-05

## 2024-09-24 RX ORDER — SODIUM CHLORIDE 9 MG/ML
20 INJECTION, SOLUTION INTRAVENOUS ONCE
Status: COMPLETED | OUTPATIENT
Start: 2024-09-24 | End: 2024-09-24

## 2024-09-24 RX ORDER — HEPARIN SODIUM (PORCINE) LOCK FLUSH IV SOLN 100 UNIT/ML 100 UNIT/ML
500 SOLUTION INTRAVENOUS AS NEEDED
OUTPATIENT
Start: 2024-09-24

## 2024-09-24 RX ADMIN — ATEZOLIZUMAB 1200 MG: 1200 INJECTION, SOLUTION INTRAVENOUS at 10:23

## 2024-09-24 RX ADMIN — CYANOCOBALAMIN 1000 MCG: 1000 INJECTION, SOLUTION INTRAMUSCULAR; SUBCUTANEOUS at 10:02

## 2024-09-24 RX ADMIN — Medication 10 ML: at 10:57

## 2024-09-24 RX ADMIN — SODIUM CHLORIDE 20 ML/HR: 9 INJECTION, SOLUTION INTRAVENOUS at 10:04

## 2024-09-24 RX ADMIN — HEPARIN 500 UNITS: 100 SYRINGE at 10:58

## 2024-09-30 RX ORDER — POTASSIUM CHLORIDE 1500 MG/1
20 TABLET, EXTENDED RELEASE ORAL DAILY
Qty: 30 TABLET | Refills: 1 | Status: SHIPPED | OUTPATIENT
Start: 2024-09-30

## 2024-10-01 RX ORDER — DIAZEPAM 10 MG
10 TABLET ORAL 2 TIMES DAILY PRN
Qty: 60 TABLET | OUTPATIENT
Start: 2024-10-01

## 2024-10-01 NOTE — TELEPHONE ENCOUNTER
Called quentin Yanez who says this request was resent to Dr. Flowers's [place] private practice, since she is not a pt at our [place] office.

## 2024-10-08 ENCOUNTER — PATIENT OUTREACH (OUTPATIENT)
Dept: OTHER | Facility: HOSPITAL | Age: 69
End: 2024-10-08
Payer: MEDICARE

## 2024-10-08 NOTE — PROGRESS NOTES
Reviewed chart.  Patient with small cell lung cancer. She completed 4 cycles of carboplatin/etoposide/Atezolizumab and is now on maintenance Atezolizumab every 3 weeks. Most recently met with Dr. Burns 8/13. MRI brain and CT C/A/P 9/17-stable, saw Dr. Burns on 9/24. Patient declined radiation consult for consolidative radiation and PCI; scans in 3 months, continue immunotherapy    Called patient. She is tolerating immunotherapy fairly well. We discussed her recent scans and appt with Dr. Burns.  She has no questions or concerns regarding her cancer or treatment.    We again discussed integrative therapies and other [facility]. Patient expressed gratitude for my support and denied any additional needs at this time.    Since she is stable on immunotherapy with no ongoing resource needs, I will close her case to navigation although reminded her that she can access the services in the [facility] even with her navigation case being closed. Encouraged patient to call in the future if the need arises. Patient verbalized understanding.

## 2024-10-15 ENCOUNTER — INFUSION (OUTPATIENT)
Dept: ONCOLOGY | Facility: HOSPITAL | Age: 69
End: 2024-10-15
Payer: MEDICARE

## 2024-10-15 VITALS
OXYGEN SATURATION: 94 % | TEMPERATURE: 97.5 F | HEART RATE: 79 BPM | WEIGHT: 106.8 LBS | BODY MASS INDEX: 18.92 KG/M2 | DIASTOLIC BLOOD PRESSURE: 77 MMHG | SYSTOLIC BLOOD PRESSURE: 122 MMHG

## 2024-10-15 DIAGNOSIS — Z45.2 FITTING AND ADJUSTMENT OF VASCULAR CATHETER: Primary | ICD-10-CM

## 2024-10-15 DIAGNOSIS — E53.8 VITAMIN B12 DEFICIENCY: ICD-10-CM

## 2024-10-15 DIAGNOSIS — C80.1 SMALL CELL CARCINOMA: ICD-10-CM

## 2024-10-15 LAB
ALBUMIN SERPL-MCNC: 3.6 G/DL (ref 3.5–5.2)
ALBUMIN/GLOB SERPL: 1.3 G/DL
ALP SERPL-CCNC: 128 U/L (ref 39–117)
ALT SERPL W P-5'-P-CCNC: <5 U/L (ref 1–33)
ANION GAP SERPL CALCULATED.3IONS-SCNC: 8.1 MMOL/L (ref 5–15)
AST SERPL-CCNC: 13 U/L (ref 1–32)
BASOPHILS # BLD AUTO: 0.1 10*3/MM3 (ref 0–0.2)
BASOPHILS NFR BLD AUTO: 0.7 % (ref 0–1.5)
BILIRUB SERPL-MCNC: 0.3 MG/DL (ref 0–1.2)
BUN SERPL-MCNC: 7 MG/DL (ref 8–23)
BUN/CREAT SERPL: 7.9 (ref 7–25)
CALCIUM SPEC-SCNC: 9.2 MG/DL (ref 8.6–10.5)
CHLORIDE SERPL-SCNC: 95 MMOL/L (ref 98–107)
CO2 SERPL-SCNC: 28.9 MMOL/L (ref 22–29)
CREAT SERPL-MCNC: 0.89 MG/DL (ref 0.57–1)
DEPRECATED RDW RBC AUTO: 49.5 FL (ref 37–54)
EGFRCR SERPLBLD CKD-EPI 2021: 71.2 ML/MIN/1.73
EOSINOPHIL # BLD AUTO: 0.16 10*3/MM3 (ref 0–0.4)
EOSINOPHIL NFR BLD AUTO: 1.2 % (ref 0.3–6.2)
ERYTHROCYTE [DISTWIDTH] IN BLOOD BY AUTOMATED COUNT: 13.7 % (ref 12.3–15.4)
GLOBULIN UR ELPH-MCNC: 2.7 GM/DL
GLUCOSE SERPL-MCNC: 94 MG/DL (ref 65–99)
HCT VFR BLD AUTO: 38.2 % (ref 34–46.6)
HGB BLD-MCNC: 12.5 G/DL (ref 12–15.9)
IMM GRANULOCYTES # BLD AUTO: 0.05 10*3/MM3 (ref 0–0.05)
IMM GRANULOCYTES NFR BLD AUTO: 0.4 % (ref 0–0.5)
LYMPHOCYTES # BLD AUTO: 3.16 10*3/MM3 (ref 0.7–3.1)
LYMPHOCYTES NFR BLD AUTO: 22.7 % (ref 19.6–45.3)
MCH RBC QN AUTO: 32.7 PG (ref 26.6–33)
MCHC RBC AUTO-ENTMCNC: 32.7 G/DL (ref 31.5–35.7)
MCV RBC AUTO: 100 FL (ref 79–97)
MONOCYTES # BLD AUTO: 0.86 10*3/MM3 (ref 0.1–0.9)
MONOCYTES NFR BLD AUTO: 6.2 % (ref 5–12)
NEUTROPHILS NFR BLD AUTO: 68.8 % (ref 42.7–76)
NEUTROPHILS NFR BLD AUTO: 9.58 10*3/MM3 (ref 1.7–7)
NRBC BLD AUTO-RTO: 0 /100 WBC (ref 0–0.2)
PLATELET # BLD AUTO: 382 10*3/MM3 (ref 140–450)
PMV BLD AUTO: 8.4 FL (ref 6–12)
POTASSIUM SERPL-SCNC: 4.7 MMOL/L (ref 3.5–5.2)
PROT SERPL-MCNC: 6.3 G/DL (ref 6–8.5)
RBC # BLD AUTO: 3.82 10*6/MM3 (ref 3.77–5.28)
SODIUM SERPL-SCNC: 132 MMOL/L (ref 136–145)
T3FREE SERPL-MCNC: 3.02 PG/ML (ref 2–4.4)
T4 FREE SERPL-MCNC: 0.99 NG/DL (ref 0.93–1.7)
TSH SERPL DL<=0.05 MIU/L-ACNC: 1.51 UIU/ML (ref 0.27–4.2)
WBC NRBC COR # BLD AUTO: 13.91 10*3/MM3 (ref 3.4–10.8)

## 2024-10-15 PROCEDURE — 96413 CHEMO IV INFUSION 1 HR: CPT

## 2024-10-15 PROCEDURE — 25010000002 HEPARIN LOCK FLUSH PER 10 UNITS: Performed by: INTERNAL MEDICINE

## 2024-10-15 PROCEDURE — 84443 ASSAY THYROID STIM HORMONE: CPT | Performed by: INTERNAL MEDICINE

## 2024-10-15 PROCEDURE — 85025 COMPLETE CBC W/AUTO DIFF WBC: CPT | Performed by: INTERNAL MEDICINE

## 2024-10-15 PROCEDURE — 84481 FREE ASSAY (FT-3): CPT | Performed by: INTERNAL MEDICINE

## 2024-10-15 PROCEDURE — 96372 THER/PROPH/DIAG INJ SC/IM: CPT

## 2024-10-15 PROCEDURE — 84439 ASSAY OF FREE THYROXINE: CPT | Performed by: INTERNAL MEDICINE

## 2024-10-15 PROCEDURE — 25810000003 SODIUM CHLORIDE 0.9 % SOLUTION 250 ML FLEX CONT: Performed by: INTERNAL MEDICINE

## 2024-10-15 PROCEDURE — 25010000002 ATEZOLIZUMAB 1200 MG/20ML SOLUTION 20 ML VIAL: Performed by: INTERNAL MEDICINE

## 2024-10-15 PROCEDURE — 25010000002 CYANOCOBALAMIN PER 1000 MCG: Performed by: INTERNAL MEDICINE

## 2024-10-15 PROCEDURE — 80053 COMPREHEN METABOLIC PANEL: CPT | Performed by: INTERNAL MEDICINE

## 2024-10-15 RX ORDER — SODIUM CHLORIDE 0.9 % (FLUSH) 0.9 %
10 SYRINGE (ML) INJECTION AS NEEDED
OUTPATIENT
Start: 2024-10-15

## 2024-10-15 RX ORDER — HEPARIN SODIUM (PORCINE) LOCK FLUSH IV SOLN 100 UNIT/ML 100 UNIT/ML
500 SOLUTION INTRAVENOUS AS NEEDED
Status: DISCONTINUED | OUTPATIENT
Start: 2024-10-15 | End: 2024-10-15 | Stop reason: HOSPADM

## 2024-10-15 RX ORDER — HEPARIN SODIUM (PORCINE) LOCK FLUSH IV SOLN 100 UNIT/ML 100 UNIT/ML
500 SOLUTION INTRAVENOUS AS NEEDED
OUTPATIENT
Start: 2024-10-15

## 2024-10-15 RX ORDER — SODIUM CHLORIDE 9 MG/ML
20 INJECTION, SOLUTION INTRAVENOUS ONCE
Status: DISCONTINUED | OUTPATIENT
Start: 2024-10-15 | End: 2024-10-15 | Stop reason: HOSPADM

## 2024-10-15 RX ORDER — CYANOCOBALAMIN 1000 UG/ML
1000 INJECTION, SOLUTION INTRAMUSCULAR; SUBCUTANEOUS ONCE
Status: COMPLETED | OUTPATIENT
Start: 2024-10-15 | End: 2024-10-15

## 2024-10-15 RX ORDER — SODIUM CHLORIDE 0.9 % (FLUSH) 0.9 %
10 SYRINGE (ML) INJECTION AS NEEDED
Status: DISCONTINUED | OUTPATIENT
Start: 2024-10-15 | End: 2024-10-15 | Stop reason: HOSPADM

## 2024-10-15 RX ADMIN — CYANOCOBALAMIN 1000 MCG: 1000 INJECTION, SOLUTION INTRAMUSCULAR; SUBCUTANEOUS at 13:29

## 2024-10-15 RX ADMIN — HEPARIN 500 UNITS: 100 SYRINGE at 13:30

## 2024-10-15 RX ADMIN — ATEZOLIZUMAB 1200 MG: 1200 INJECTION, SOLUTION INTRAVENOUS at 13:02

## 2024-10-15 RX ADMIN — Medication 10 ML: at 13:30

## 2024-10-25 ENCOUNTER — HOSPITAL ENCOUNTER (OUTPATIENT)
Dept: INFUSION THERAPY | Facility: HOSPITAL | Age: 69
Discharge: HOME | End: 2024-10-25
Payer: MEDICARE

## 2024-10-25 VITALS
HEART RATE: 81 BPM | SYSTOLIC BLOOD PRESSURE: 103 MMHG | TEMPERATURE: 96.8 F | DIASTOLIC BLOOD PRESSURE: 66 MMHG | OXYGEN SATURATION: 97 % | RESPIRATION RATE: 16 BRPM

## 2024-10-25 DIAGNOSIS — M81.0 POST-MENOPAUSAL OSTEOPOROSIS: Primary | ICD-10-CM

## 2024-10-25 PROCEDURE — 25010000002 DENOSUMAB 60 MG/ML SOLUTION PREFILLED SYRINGE: Performed by: FAMILY MEDICINE

## 2024-10-25 PROCEDURE — 96372 THER/PROPH/DIAG INJ SC/IM: CPT

## 2024-10-25 RX ADMIN — DENOSUMAB 60 MG: 60 INJECTION SUBCUTANEOUS at 11:02

## 2024-10-25 NOTE — PATIENT INSTRUCTIONS
"  Call Dr. Nigel De Paz, [facility] at [phone] if you have any problems or concerns.    We know you have a Choice in healthcare and appreciate you using [facility].  Our purpose is to provide you \"Excellent Care\".  We hope that you will always choose us in the future and continue to recommend us to your family and friends.             "

## 2024-10-25 NOTE — NURSING NOTE
Pt arrived to [facility] for appt. VSS, no complaints at this time. Medication administered per MD order. Pt tolerated well. AVS printed out, copy given to pt. Pt discharged from [facility] at 11:08 AM in stable condition, without complaints.

## 2024-10-28 ENCOUNTER — OFFICE VISIT (OUTPATIENT)
Dept: NEUROLOGY | Facility: CLINIC | Age: 69
End: 2024-10-28
Payer: MEDICARE

## 2024-10-28 VITALS
HEART RATE: 85 BPM | SYSTOLIC BLOOD PRESSURE: 120 MMHG | BODY MASS INDEX: 19.14 KG/M2 | DIASTOLIC BLOOD PRESSURE: 64 MMHG | WEIGHT: 108 LBS | HEIGHT: 63 IN | OXYGEN SATURATION: 100 %

## 2024-10-28 DIAGNOSIS — Z86.73 HISTORY OF STROKE: Primary | ICD-10-CM

## 2024-10-28 DIAGNOSIS — R56.9 SEIZURE-LIKE ACTIVITY: ICD-10-CM

## 2024-10-28 DIAGNOSIS — E53.8 VITAMIN B12 DEFICIENCY: ICD-10-CM

## 2024-10-28 DIAGNOSIS — F17.210 SMOKING GREATER THAN 40 PACK YEARS: ICD-10-CM

## 2024-10-28 DIAGNOSIS — C80.1 SMALL CELL CARCINOMA: ICD-10-CM

## 2024-10-28 RX ORDER — ROSUVASTATIN CALCIUM 10 MG/1
10 TABLET, COATED ORAL NIGHTLY
Qty: 30 TABLET | Refills: 11 | Status: SHIPPED | OUTPATIENT
Start: 2024-10-28 | End: 2025-10-28

## 2024-10-28 NOTE — PROGRESS NOTES
Notes by LPN:    Patient referred for hx stroke. Need to come off eliquis for cataract surgery.  CC:    HPI:  Pili Arechiga is a  69 y.o.  female current small cell carcinoma status post chemo has Mediport every 3 week infusions (followed by heme-onc Dr. Burns), COPD I am seeing today in initial consultation due to hospital follow-up where she presented with chronic stroke hypoxemia, hyponatremia metabolic encephalopathy/witnessed seizure and hypotension-she is also requesting cataract surgery clearance-required to be off Eliquis for 14 days according to patient.    Patient was admitted April 2024 for the above, she denies any new signs and/or symptoms of stroke.  She was initiated on Eliquis due to concern for cardioembolic stroke secondary to hypercoagulable state.  She had witnessed seizure during that event and has remained on Keppra 500 mg twice daily since that time.  She denies any falls additional hospitalization and/or illnesses.  She reports chronic hypotension today blood pressure 120/64.  She denies any issues with mood no concerns for worsening/changing depression and/or PBA.  Denies any sleep issues; felt to be at low to moderate risk for ALVARO-discussed considering ALVARO evaluation in the future which she is amenable to considering.  Neurologically, stable nonfocal exam.  Has some anisocoria which she reports is chronic left pupil 4 mm right pupil 2 mm-she notes that pupils never resumed normal size after MRI dilation several years ago.  She denies any other complaints and agrees-I will plan to see her back in 6 months; sooner if needed.         Past Medical History:   Diagnosis Date    COPD (chronic obstructive pulmonary disease)     COPD with acute exacerbation 09/28/2020    Small cell carcinoma 3/13/2024         Past Surgical History:   Procedure Laterality Date    BRONCHOSCOPY N/A 8/25/2023    Procedure: BRONCHOSCOPY WITH ENDOBRONCHIAL ULTRASOUND (EBUS) with FNA;  Surgeon: Coy Mccarthy MD;   Location:  [facility];  Service: Pulmonary;  Laterality: N/A;  Pre/Post - mediastinal and hilar lymphadenopathy.    BRONCHOSCOPY WITH ION ROBOTIC ASSIST N/A 2024    Procedure: BRONCHOSCOPY WITH ION ROBOT,  ENDOBRONCHIAL ULTRASOUND, FINE NEEDLE ASPIRATIONS,  CRYOTHERAPY BIOPSIES, AND LEFT LOWER LOBE BRONCHOALVEOLAR LAVAGE.;  Surgeon: Alexia Velásquez MD;  Location: [facility] ENDOSCOPY;  Service: Robotics - Pulmonary;  Laterality: N/A;     SECTION      CHEST TUBE INSERTION Left 2024    Procedure: CHEST TUBE INSERTION;  Surgeon: Alexia Velásquez MD;  Location: [facility] ENDOSCOPY;  Service: Thoracic;  Laterality: Left;    CHOLECYSTECTOMY      COLONOSCOPY      ECTOPIC PREGNANCY SURGERY      HYSTERECTOMY      TONSILLECTOMY      TOTAL HIP ARTHROPLASTY Left     VENOUS ACCESS DEVICE (PORT) INSERTION N/A 3/14/2024    Procedure: INSERTION VENOUS ACCESS DEVICE;  Surgeon: Jonas Garner MD;  Location:  [facility];  Service: General;  Laterality: N/A;           Current Outpatient Medications:     albuterol sulfate  (90 Base) MCG/ACT inhaler, Inhale 2 puffs Every 4 (Four) Hours As Needed for Wheezing. Takes as needed., Disp: , Rfl:     apixaban (ELIQUIS) 5 MG tablet tablet, Take 1 tablet by mouth Every 12 (Twelve) Hours. Indications: Other - full anticoagulation, Disp: 180 tablet, Rfl: 0    budesonide-formoterol (SYMBICORT) 160-4.5 MCG/ACT inhaler, Inhale 2 puffs 2 (Two) Times a Day., Disp: , Rfl: 12    calcium carbonate (TUMS) 500 MG chewable tablet, Chew 1 tablet Daily., Disp: , Rfl:     Cyanocobalamin (B-12) 500 MCG sublingual tablet, Place 500 mcg under the tongue Daily., Disp: 90 tablet, Rfl: 1    Denosumab (PROLIA SC), Inject  under the skin into the appropriate area as directed Every 6 (Six) Months., Disp: , Rfl:     diazePAM (VALIUM) 10 MG tablet, Take 1 tablet by mouth 2 (Two) Times a Day As Needed for Anxiety (RLS.). Takes 20 mg at bedtime at times when she needs., Disp: , Rfl:     folic  acid (FOLVITE) 800 MCG tablet, Take 1 tablet by mouth Daily., Disp: 90 tablet, Rfl: 1    HYDROcodone-acetaminophen (NORCO)  MG per tablet, Take 1 tablet by mouth 3 (Three) Times a Day As Needed for Moderate Pain., Disp: , Rfl:     levETIRAcetam (KEPPRA) 500 MG tablet, TAKE ONE TABLET BY MOUTH EVERY 12 HOURS, Disp: 180 tablet, Rfl: 0    midodrine (PROAMATINE) 2.5 MG tablet, Take 1 tablet by mouth 3 (Three) Times a Day Before Meals., Disp: , Rfl:     multivitamin with minerals (PRESERVISION AREDS PO), Take 1 tablet by mouth 2 (Two) Times a Day., Disp: , Rfl:     naloxone (NARCAN) 4 MG/0.1ML nasal spray, Administer 1 spray into the nostril(s) as directed by provider As Needed., Disp: , Rfl:     OLANZapine (zyPREXA) 2.5 MG tablet, Take 1 tablet by mouth Every Night., Disp: 30 tablet, Rfl: 2    ondansetron (ZOFRAN) 8 MG tablet, Take 1 tablet by mouth Every 8 (Eight) Hours As Needed for Nausea or Vomiting., Disp: 30 tablet, Rfl: 1    pantoprazole (PROTONIX) 40 MG EC tablet, Take 1 tablet by mouth Daily., Disp: 30 tablet, Rfl: 3    potassium chloride (Klor-Con M20) 20 MEQ CR tablet, Take 1 tablet by mouth Daily., Disp: 30 tablet, Rfl: 1    rosuvastatin (Crestor) 10 MG tablet, Take 1 tablet by mouth Every Night., Disp: 30 tablet, Rfl: 11    sucralfate (CARAFATE) 1 g tablet, Take 1 tablet by mouth 4 (Four) Times a Day., Disp: 120 tablet, Rfl: 3      Family History   Problem Relation Age of Onset    Lung cancer Niece 50    Throat cancer Father     Breast cancer Neg Hx          Social History     Socioeconomic History    Marital status:    Tobacco Use    Smoking status: Every Day     Current packs/day: 1.00     Average packs/day: 1 pack/day for 30.0 years (30.0 ttl pk-yrs)     Types: Cigarettes     Passive exposure: Current    Smokeless tobacco: Never    Tobacco comments:     Began smoking at age 11.  Smoked .5 ppd for 6 years, 2.5 ppd for a year, 2 ppd for 5 years, and 1 ppd for the past 43 years for a 58.5 pack  "year history.   Vaping Use    Vaping status: Former    Substances: Nicotine, Flavoring    Devices: Disposable   Substance and Sexual Activity    Alcohol use: Yes     Comment: once a year     Drug use: No    Sexual activity: Defer         Allergies   Allergen Reactions    Codeine GI Intolerance    Percocet [Oxycodone-Acetaminophen] Hives           ROS:  Review of Systems   Constitutional:  Negative for activity change, appetite change and fatigue.   HENT:  Negative for facial swelling, trouble swallowing and voice change.    Eyes:  Positive for visual disturbance. Negative for photophobia and pain.   Respiratory:  Negative for chest tightness, shortness of breath and wheezing.    Cardiovascular:  Negative for chest pain, palpitations and leg swelling.   Gastrointestinal:  Negative for abdominal pain, nausea and vomiting.   Endocrine: Negative for cold intolerance and heat intolerance.   Musculoskeletal:  Negative for arthralgias, back pain, gait problem, joint swelling, myalgias, neck pain and neck stiffness.   Neurological:  Negative for dizziness, tremors, seizures, syncope, facial asymmetry, speech difficulty, weakness, light-headedness, numbness and headaches.   Hematological:  Does not bruise/bleed easily.   Psychiatric/Behavioral:  Negative for agitation, behavioral problems, confusion, decreased concentration, dysphoric mood, hallucinations, self-injury, sleep disturbance and suicidal ideas. The patient is not nervous/anxious and is not hyperactive.            Physical Exam:  Vitals:    10/28/24 1054   BP: 120/64   BP Location: Right arm   Patient Position: Sitting   Cuff Size: Adult   Pulse: 85   SpO2: 100%   Weight: 49 kg (108 lb)   Height: 160 cm (62.99\")     Body mass index is 19.14 kg/m².    Physical Exam  Head: Head is erect and midline: skull is normocephalic, symmetrical and smooth without deformity. Facial features are symmetrical;   Neck:  Trachea is midline; no JVD.   Eyes: conjunctivae pink; " sclerae white; PERRL. No tearing noted.  Anisocoria; left pupil 4 mm right pupil 2 mm  Ears:  Normal position.  Chest:  The trachea is midline. The chest is normal in appearance. The chest is clear to percussion and auscultation.  Heart:  HR 85 beats per minute, S1 and S2 noted with regular rhythm. /64. No abnormal jugular venous distention. No rubs, murmurs, or gallops noted upon auscultation.   Musculoskeletal:  The extremities are normal in color, size, and temperature. All pulses in the upper and lower extremities are grade II and equal. No clubbing, cyanosis, or edema is present.   Neurological:  Alert, relaxed, cooperative. Thought process coherent. Oriented to person, place and time. CN II- XII intact. Good muscle bulk and tone. Strength 5/5 throughout. Cerebellar: Rapid alternating movements, finger-to-nose, heel-to-shin intact. Gait with normal base. Sensory:  light touch  intact. Reflexes:  plantar reflexes downgoing.     Results:  Lab Results   Component Value Date    GLUCOSE 94 10/15/2024    BUN 7 (L) 10/15/2024    CREATININE 0.89 10/15/2024    EGFRIFNONA 70 09/27/2020    BCR 7.9 10/15/2024    CO2 28.9 10/15/2024    CALCIUM 9.2 10/15/2024    ALBUMIN 3.6 10/15/2024    AST 13 10/15/2024    ALT <5 10/15/2024       Lab Results   Component Value Date    WBC 13.91 (H) 10/15/2024    HGB 12.5 10/15/2024    HCT 38.2 10/15/2024    .0 (H) 10/15/2024     10/15/2024       Lab Results   Component Value Date    TSH 1.510 10/15/2024         Lab Results   Component Value Date    VFPHUDTT46 275 05/21/2024         Lab Results   Component Value Date    FOLATE 5.28 05/21/2024         Lab Results   Component Value Date    HGBA1C 5.30 04/11/2024               Assessment:   1.  Hospital follow-up   2.  Witnessed seizure-like activity; started on Keppra no further seizure like events  3.Hx of Stroke; MRI showing bilateral BG and Occipital lobe strokes; started on Eliquis by neurology 4/24; added statin today  for secondary stroke prevention-vessel imaging not completed at minimum would recommend carotid duplex  4.  Small cell carcinoma  5.  Tobacco abuse          Plan:  Continue Eliquis 5 mg twice daily; recommend holding for 24 to 48 hours if needed for surgery if unable to hold for this shorter timeframe would recommend Lovenox bridge 1 mg/kg twice daily-discussed with Dr. Burns patient's hematology oncology provider and he is in agreement with this plan; increased risk of stroke if anticoagulation for 14 days  Crestor 10 mg daily  Carotid duplex  Continue Keppra 500 mg twice daily; seizure precautions discussed  Smoking cessation advised  Avoid hypotension and dehydration  Follow-up with me in 6 to 12-month    Case reviewed with attending neurologist  and he agrees with recommendation regarding anticoagulation discussed above.     Diagnoses and all orders for this visit:    1. History of stroke (Primary)  -     rosuvastatin (Crestor) 10 MG tablet; Take 1 tablet by mouth Every Night.  Dispense: 30 tablet; Refill: 11  -     US Carotid Bilateral; Future    2. Seizure-like activity    3. Small cell carcinoma    4. Vitamin B12 deficiency    5. Smoking greater than 40 pack years                                    Dictated utilizing Dragon dictation.

## 2024-10-31 ENCOUNTER — TELEPHONE (OUTPATIENT)
Dept: NEUROLOGY | Facility: CLINIC | Age: 69
End: 2024-10-31
Payer: MEDICARE

## 2024-10-31 NOTE — TELEPHONE ENCOUNTER
10/31/2024 Spoke to Richard, told her that [facility] has received the Fax on the recommendation for holding the Eliquis, I told richard that she will need to call scheduling with [facility] to find out when she is supposed to stop the Eliquis, she said she will call then to find out she gave me a number [phone] for us to call and make sure that they received the fax for the recommendation to hold the medication

## 2024-11-04 ENCOUNTER — TRANSCRIBE ORDERS (OUTPATIENT)
Dept: ADMINISTRATIVE | Facility: HOSPITAL | Age: 69
End: 2024-11-04
Payer: MEDICARE

## 2024-11-04 DIAGNOSIS — M81.0 OSTEOPOROSIS, UNSPECIFIED OSTEOPOROSIS TYPE, UNSPECIFIED PATHOLOGICAL FRACTURE PRESENCE: Primary | ICD-10-CM

## 2024-11-04 DIAGNOSIS — Z78.0 POSTMENOPAUSAL: ICD-10-CM

## 2024-11-05 ENCOUNTER — INFUSION (OUTPATIENT)
Dept: ONCOLOGY | Facility: HOSPITAL | Age: 69
End: 2024-11-05
Payer: MEDICARE

## 2024-11-05 VITALS
RESPIRATION RATE: 18 BRPM | HEART RATE: 80 BPM | BODY MASS INDEX: 19.07 KG/M2 | SYSTOLIC BLOOD PRESSURE: 115 MMHG | DIASTOLIC BLOOD PRESSURE: 72 MMHG | OXYGEN SATURATION: 92 % | TEMPERATURE: 97.2 F | WEIGHT: 107.6 LBS

## 2024-11-05 DIAGNOSIS — Z45.2 FITTING AND ADJUSTMENT OF VASCULAR CATHETER: Primary | ICD-10-CM

## 2024-11-05 DIAGNOSIS — E53.8 VITAMIN B12 DEFICIENCY: ICD-10-CM

## 2024-11-05 DIAGNOSIS — C80.1 SMALL CELL CARCINOMA: ICD-10-CM

## 2024-11-05 LAB
ALBUMIN SERPL-MCNC: 3.6 G/DL (ref 3.5–5.2)
ALBUMIN/GLOB SERPL: 1.3 G/DL
ALP SERPL-CCNC: 117 U/L (ref 39–117)
ALT SERPL W P-5'-P-CCNC: <5 U/L (ref 1–33)
ANION GAP SERPL CALCULATED.3IONS-SCNC: 4.7 MMOL/L (ref 5–15)
AST SERPL-CCNC: 12 U/L (ref 1–32)
BASOPHILS # BLD AUTO: 0.11 10*3/MM3 (ref 0–0.2)
BASOPHILS NFR BLD AUTO: 0.7 % (ref 0–1.5)
BILIRUB SERPL-MCNC: 0.2 MG/DL (ref 0–1.2)
BUN SERPL-MCNC: 5 MG/DL (ref 8–23)
BUN/CREAT SERPL: 6 (ref 7–25)
CALCIUM SPEC-SCNC: 7.9 MG/DL (ref 8.6–10.5)
CHLORIDE SERPL-SCNC: 99 MMOL/L (ref 98–107)
CO2 SERPL-SCNC: 26.3 MMOL/L (ref 22–29)
CREAT SERPL-MCNC: 0.83 MG/DL (ref 0.57–1)
DEPRECATED RDW RBC AUTO: 49.1 FL (ref 37–54)
EGFRCR SERPLBLD CKD-EPI 2021: 77.4 ML/MIN/1.73
EOSINOPHIL # BLD AUTO: 0.2 10*3/MM3 (ref 0–0.4)
EOSINOPHIL NFR BLD AUTO: 1.3 % (ref 0.3–6.2)
ERYTHROCYTE [DISTWIDTH] IN BLOOD BY AUTOMATED COUNT: 13.3 % (ref 12.3–15.4)
GLOBULIN UR ELPH-MCNC: 2.8 GM/DL
GLUCOSE SERPL-MCNC: 99 MG/DL (ref 65–99)
HCT VFR BLD AUTO: 37 % (ref 34–46.6)
HGB BLD-MCNC: 12.3 G/DL (ref 12–15.9)
IMM GRANULOCYTES # BLD AUTO: 0.04 10*3/MM3 (ref 0–0.05)
IMM GRANULOCYTES NFR BLD AUTO: 0.3 % (ref 0–0.5)
LYMPHOCYTES # BLD AUTO: 4.72 10*3/MM3 (ref 0.7–3.1)
LYMPHOCYTES NFR BLD AUTO: 30.9 % (ref 19.6–45.3)
MCH RBC QN AUTO: 33.5 PG (ref 26.6–33)
MCHC RBC AUTO-ENTMCNC: 33.2 G/DL (ref 31.5–35.7)
MCV RBC AUTO: 100.8 FL (ref 79–97)
MONOCYTES # BLD AUTO: 1.04 10*3/MM3 (ref 0.1–0.9)
MONOCYTES NFR BLD AUTO: 6.8 % (ref 5–12)
NEUTROPHILS NFR BLD AUTO: 60 % (ref 42.7–76)
NEUTROPHILS NFR BLD AUTO: 9.17 10*3/MM3 (ref 1.7–7)
NRBC BLD AUTO-RTO: 0 /100 WBC (ref 0–0.2)
PLATELET # BLD AUTO: 298 10*3/MM3 (ref 140–450)
PMV BLD AUTO: 8.6 FL (ref 6–12)
POTASSIUM SERPL-SCNC: 4.1 MMOL/L (ref 3.5–5.2)
PROT SERPL-MCNC: 6.4 G/DL (ref 6–8.5)
RBC # BLD AUTO: 3.67 10*6/MM3 (ref 3.77–5.28)
SODIUM SERPL-SCNC: 130 MMOL/L (ref 136–145)
WBC NRBC COR # BLD AUTO: 15.28 10*3/MM3 (ref 3.4–10.8)

## 2024-11-05 PROCEDURE — 25810000003 SODIUM CHLORIDE 0.9 % SOLUTION 250 ML FLEX CONT: Performed by: INTERNAL MEDICINE

## 2024-11-05 PROCEDURE — 96413 CHEMO IV INFUSION 1 HR: CPT

## 2024-11-05 PROCEDURE — 96372 THER/PROPH/DIAG INJ SC/IM: CPT

## 2024-11-05 PROCEDURE — 25010000002 ATEZOLIZUMAB 1200 MG/20ML SOLUTION 20 ML VIAL: Performed by: INTERNAL MEDICINE

## 2024-11-05 PROCEDURE — 25010000002 HEPARIN LOCK FLUSH PER 10 UNITS: Performed by: INTERNAL MEDICINE

## 2024-11-05 PROCEDURE — 80053 COMPREHEN METABOLIC PANEL: CPT | Performed by: INTERNAL MEDICINE

## 2024-11-05 PROCEDURE — 85025 COMPLETE CBC W/AUTO DIFF WBC: CPT | Performed by: INTERNAL MEDICINE

## 2024-11-05 PROCEDURE — 25010000002 CYANOCOBALAMIN PER 1000 MCG: Performed by: INTERNAL MEDICINE

## 2024-11-05 RX ORDER — SODIUM CHLORIDE 0.9 % (FLUSH) 0.9 %
10 SYRINGE (ML) INJECTION AS NEEDED
Status: DISCONTINUED | OUTPATIENT
Start: 2024-11-05 | End: 2024-11-05 | Stop reason: HOSPADM

## 2024-11-05 RX ORDER — SODIUM CHLORIDE 9 MG/ML
20 INJECTION, SOLUTION INTRAVENOUS ONCE
Status: DISCONTINUED | OUTPATIENT
Start: 2024-11-05 | End: 2024-11-05 | Stop reason: HOSPADM

## 2024-11-05 RX ORDER — SODIUM CHLORIDE 0.9 % (FLUSH) 0.9 %
10 SYRINGE (ML) INJECTION AS NEEDED
OUTPATIENT
Start: 2024-11-05

## 2024-11-05 RX ORDER — HEPARIN SODIUM (PORCINE) LOCK FLUSH IV SOLN 100 UNIT/ML 100 UNIT/ML
500 SOLUTION INTRAVENOUS AS NEEDED
OUTPATIENT
Start: 2024-11-05

## 2024-11-05 RX ORDER — HEPARIN SODIUM (PORCINE) LOCK FLUSH IV SOLN 100 UNIT/ML 100 UNIT/ML
500 SOLUTION INTRAVENOUS AS NEEDED
Status: DISCONTINUED | OUTPATIENT
Start: 2024-11-05 | End: 2024-11-05 | Stop reason: HOSPADM

## 2024-11-05 RX ORDER — CYANOCOBALAMIN 1000 UG/ML
1000 INJECTION, SOLUTION INTRAMUSCULAR; SUBCUTANEOUS ONCE
Status: COMPLETED | OUTPATIENT
Start: 2024-11-05 | End: 2024-11-05

## 2024-11-05 RX ADMIN — ATEZOLIZUMAB 1200 MG: 1200 INJECTION, SOLUTION INTRAVENOUS at 15:30

## 2024-11-05 RX ADMIN — Medication 10 ML: at 16:04

## 2024-11-05 RX ADMIN — CYANOCOBALAMIN 1000 MCG: 1000 INJECTION, SOLUTION INTRAMUSCULAR; SUBCUTANEOUS at 15:36

## 2024-11-05 RX ADMIN — HEPARIN 500 UNITS: 100 SYRINGE at 16:04

## 2024-11-05 NOTE — NURSING NOTE
Lab Results   Component Value Date    CALCIUM 7.9 (L) 11/05/2024    PHOS 4.6 (H) 04/11/2024     Pt no longer taking tums. Patient to start taking twice a day.

## 2024-11-06 ENCOUNTER — HOSPITAL ENCOUNTER (OUTPATIENT)
Dept: ULTRASOUND IMAGING | Facility: HOSPITAL | Age: 69
Discharge: HOME | End: 2024-11-06
Admitting: NURSE PRACTITIONER
Payer: MEDICARE

## 2024-11-06 DIAGNOSIS — Z86.73 HISTORY OF STROKE: ICD-10-CM

## 2024-11-06 PROCEDURE — 93880 EXTRACRANIAL BILAT STUDY: CPT

## 2024-11-07 DIAGNOSIS — Z86.73 HISTORY OF STROKE: ICD-10-CM

## 2024-11-07 DIAGNOSIS — I65.22 CAROTID STENOSIS, LEFT: Primary | ICD-10-CM

## 2024-11-12 ENCOUNTER — TRANSCRIBE ORDERS (OUTPATIENT)
Dept: ADMINISTRATIVE | Facility: HOSPITAL | Age: 69
End: 2024-11-12
Payer: MEDICARE

## 2024-11-12 DIAGNOSIS — Z12.31 VISIT FOR SCREENING MAMMOGRAM: Primary | ICD-10-CM

## 2024-11-15 ENCOUNTER — APPOINTMENT (OUTPATIENT)
Dept: BONE DENSITY | Facility: HOSPITAL | Age: 69
End: 2024-11-15
Payer: MEDICARE

## 2024-11-15 DIAGNOSIS — M81.0 OSTEOPOROSIS, UNSPECIFIED OSTEOPOROSIS TYPE, UNSPECIFIED PATHOLOGICAL FRACTURE PRESENCE: ICD-10-CM

## 2024-11-15 DIAGNOSIS — Z78.0 POSTMENOPAUSAL: ICD-10-CM

## 2024-11-15 PROCEDURE — 77080 DXA BONE DENSITY AXIAL: CPT

## 2024-11-18 RX ORDER — LEVETIRACETAM 500 MG/1
500 TABLET ORAL EVERY 12 HOURS
Qty: 180 TABLET | Refills: 0 | Status: SHIPPED | OUTPATIENT
Start: 2024-11-18

## 2024-11-18 RX ORDER — FOLIC ACID 0.8 MG
800 TABLET ORAL DAILY
Qty: 90 TABLET | Refills: 1 | Status: SHIPPED | OUTPATIENT
Start: 2024-11-18

## 2024-11-19 ENCOUNTER — OFFICE VISIT (OUTPATIENT)
Age: 69
End: 2024-11-19
Payer: MEDICARE

## 2024-11-19 VITALS
HEART RATE: 92 BPM | BODY MASS INDEX: 18.98 KG/M2 | HEIGHT: 63 IN | DIASTOLIC BLOOD PRESSURE: 72 MMHG | SYSTOLIC BLOOD PRESSURE: 128 MMHG | WEIGHT: 107.1 LBS

## 2024-11-19 DIAGNOSIS — F17.210 CIGARETTE SMOKER: ICD-10-CM

## 2024-11-19 DIAGNOSIS — I65.23 CAROTID STENOSIS, BILATERAL: Primary | ICD-10-CM

## 2024-11-19 PROCEDURE — 1160F RVW MEDS BY RX/DR IN RCRD: CPT | Performed by: SURGERY

## 2024-11-19 PROCEDURE — 1159F MED LIST DOCD IN RCRD: CPT | Performed by: SURGERY

## 2024-11-19 PROCEDURE — 99204 OFFICE O/P NEW MOD 45 MIN: CPT | Performed by: SURGERY

## 2024-11-19 NOTE — PROGRESS NOTES
"Chief Complaint  Carotid Artery Disease    Subjective        Pili Arechiga presents to [facility]ERY  History of Present Illness    New patient evaluation for carotid stenosis.  Had basal ganglier strokes back in April.  Fighting lung cancer currently.    Objective   Vital Signs:  /72 (BP Location: Left arm)   Pulse 92   Ht 160 cm (62.99\")   Wt 48.6 kg (107 lb 1.6 oz)   BMI 18.98 kg/m²   Estimated body mass index is 18.98 kg/m² as calculated from the following:    Height as of this encounter: 160 cm (62.99\").    Weight as of this encounter: 48.6 kg (107 lb 1.6 oz).     BMI is within normal parameters. No other follow-up for BMI required.    Pili Arechiga  reports that she has been smoking cigarettes. She has a 30 pack-year smoking history. She has been exposed to tobacco smoke. She has never used smokeless tobacco. I have educated her on the risk of diseases from using tobacco products such as arterial disease.     I advised her to quit and she is not willing to quit.    I spent 3  minutes counseling the patient.         Physical Exam  Constitutional:       Appearance: She is well-developed.   Pulmonary:      Effort: Pulmonary effort is normal. No respiratory distress.   Abdominal:      General: There is no distension.      Palpations: Abdomen is soft.   Neurological:      Mental Status: She is alert and oriented to person, place, and time.        Result Review :    The following data was reviewed by: Gary Mitchell MD on 11/19/24  CBC          9/24/2024    08:11 10/15/2024    11:07 11/5/2024    14:21   CBC   WBC 13.72  13.91  15.28    RBC 4.14  3.82  3.67    Hemoglobin 13.6  12.5  12.3    Hematocrit 41.4  38.2  37.0    .0  100.0  100.8    MCH 32.9  32.7  33.5    MCHC 32.9  32.7  33.2    RDW 12.5  13.7  13.3    Platelets 350  382  298       BMP          9/24/2024    08:11 10/15/2024    11:07 11/5/2024    14:21   BMP   BUN 5  7  5    Creatinine 0.87  0.89  0.83    Sodium " 131  132  130    Potassium 4.5  4.7  4.1    Chloride 95  95  99    CO2 29.1  28.9  26.3    Calcium 9.0  9.2  7.9       A1C Last 3 Results          4/11/2024    10:12   HGBA1C Last 3 Results   Hemoglobin A1C 5.30        Data reviewed : Cardiology studies as below  Vascular Surgical History:    Vascular Imaging History:  Carotid duplex:  11/6/2024: Right side less than 50 stenosis, left side 50 to 70% stenosis (223/74 ratio of 2.5)    (Text in bold font has been individually reviewed by myself and confirmed)         Assessment and Plan     Vascular surgery following for:  Carotid stenosis    Diagnoses and all orders for this visit:    1. Carotid stenosis, bilateral (Primary)  -     Duplex Carotid Ultrasound CAR; Future    2. Cigarette smoker    Patient with moderate asymptomatic stenosis of the left carotid artery.  She has had strokes earlier this year consistent with small vessel disease (not embolic from her carotids).  She is on good medical therapy.  Discussed need for smoking cessation.  Will see her back in 1 years time for repeat carotid duplex.      Vascular medical management: Patient should continue Eliquis 5 mg p.o. twice daily and Crestor 10 mg p.o. daily  Return in about 1 year (around 11/19/2025), or if symptoms worsen or fail to improve, for Follow up with vascular ultrasound.  Patient was given instructions and counseling regarding her condition or for health maintenance advice. Please see specific information pulled into the AVS if appropriate.

## 2024-11-19 NOTE — PATIENT INSTRUCTIONS
Smoking Cessation  You will learn methods to help you quit smoking and how quitting can help prevent a variety of health problems and improve your health.  To view the content, go to this web address:  [email or web address]    This video will  on: 2026. If you need access to this video following this date, please reach out to the healthcare provider who assigned it to you.  This information is not intended to replace advice given to you by your health care provider. Make sure you discuss any questions you have with your health care provider.  [employer] Patient Education ©  [employer]        Steps to Quit Smoking  Smoking tobacco is the leading cause of preventable death. It can affect almost every organ in the body. Smoking puts you and people around you at risk for many serious, long-lasting (chronic) diseases. Quitting smoking can be hard, but it is one of the best things that you can do for your health. It is never too late to quit.  Do not give up if you cannot quit the first time. Some people need to try many times to quit. Do your best to stick to your quit plan, and talk with your doctor if you have any questions or concerns.  How do I get ready to quit?  Pick a date to quit. Set a date within the next 2 weeks to give you time to prepare.  Write down the reasons why you are quitting. Keep this list in places where you will see it often.  Tell your family, friends, and co-workers that you are quitting. Their support is important.  Talk with your doctor about the choices that may help you quit.  Find out if your health insurance will pay for these treatments.  Know the people, places, things, and activities that make you want to smoke (triggers). Avoid them.  What first steps can I take to quit smoking?  Throw away all cigarettes at home, at work, and in your car.  Throw away the things that you use when you smoke, such as ashtrays and lighters.  Clean your car. Empty the  ashtray.  Clean your home, including curtains and carpets.  What can I do to help me quit smoking?  Talk with your doctor about taking medicines and seeing a counselor. You are more likely to succeed when you do both.  If you are pregnant or breastfeeding:  Talk with your doctor about counseling or other ways to quit smoking.  Do not take medicine to help you quit smoking unless your doctor tells you to.  Quit right away  Quit smoking completely, instead of slowly cutting back on how much you smoke over a period of time. Stopping smoking right away may be more successful than slowly quitting.  Go to counseling. In-person is best if this is an option. You are more likely to quit if you go to counseling sessions regularly.  Take medicine  You may take medicines to help you quit. Some medicines need a prescription, and some you can buy over-the-counter. Some medicines may contain a drug called nicotine to replace the nicotine in cigarettes. Medicines may:  Help you stop having the desire to smoke (cravings).  Help to stop the problems that come when you stop smoking (withdrawal symptoms).  Your doctor may ask you to use:  Nicotine patches, gum, or lozenges.  Nicotine inhalers or sprays.  Non-nicotine medicine that you take by mouth.  Find resources  Find resources and other ways to help you quit smoking and remain smoke-free after you quit. They include:  Online chats with a counselor.  Phone quitlines.  Printed self-help materials.  Support groups or group counseling.  Text messaging programs.  Mobile phone apps. Use apps on your mobile phone or tablet that can help you stick to your quit plan. Examples of free services include Quit Guide from the CDC and smokefree.gov    What can I do to make it easier to quit?    Talk to your family and friends. Ask them to support and encourage you.  Call a phone quitline, such as 1-800-QUIT-NOW, reach out to support groups, or work with a counselor.  Ask people who smoke to not  smoke around you.  Avoid places that make you want to smoke, such as:  Bars.  Parties.  Smoke-break areas at work.  Spend time with people who do not smoke.  Lower the stress in your life. Stress can make you want to smoke. Try these things to lower stress:  Getting regular exercise.  Doing deep-breathing exercises.  Doing yoga.  Meditating.  What benefits will I see if I quit smoking?  Over time, you may have:  A better sense of smell and taste.  Less coughing and sore throat.  A slower heart rate.  Lower blood pressure.  Clearer skin.  Better breathing.  Fewer sick days.  Summary  Quitting smoking can be hard, but it is one of the best things that you can do for your health.  Do not give up if you cannot quit the first time. Some people need to try many times to quit.  When you decide to quit smoking, make a plan to help you succeed.  Quit smoking right away, not slowly over a period of time.  When you start quitting, get help and support to keep you smoke-free.    This information is not intended to replace advice given to you by your health care provider. Make sure you discuss any questions you have with your health care provider.  Document Revised: 12/09/2022 Document Reviewed: 12/09/2022  Elsevier Patient Education © 2024 [employer] Inc.

## 2024-11-20 PROBLEM — Z79.899 NEED FOR PROPHYLACTIC CHEMOTHERAPY: Status: ACTIVE | Noted: 2024-11-20

## 2024-11-20 PROBLEM — Z79.69 NEED FOR PROPHYLACTIC CHEMOTHERAPY: Status: ACTIVE | Noted: 2024-11-20

## 2024-11-20 NOTE — PROGRESS NOTES
Subjective     REASON FOR CONSULTATION:  small cell lung cancer  Provide an opinion on any further workup or treatment                             REQUESTING PHYSICIAN:  James    RECORDS OBTAINED:  Records of the patients history including those obtained from the referring provider were reviewed and summarized in detail.    HISTORY OF PRESENT ILLNESS:  The patient is a 69 y.o. year old female who is here for an opinion about the above issue.    History of Present Illness   The patient initiated chemoimmunotherapy with carboplatin/etoposide/atezolizumab 3/19/2024 and completed 4 cycles of carboplatin/etoposide/atezolizumab and is now on maintenance atezolizumab.  The patient is tolerating her immunotherapy well.  She denies nausea vomiting diarrhea skin rash cough worsening shortness of breath or other acute concerns.  She continues to have significant fatigue.  She has associated depression.    Oncology History:  This is a 68-year-old woman with long history of tobacco use and COPD, degenerative disease of the spine on narcotic therapy, depression/anxiety, orthostatic hypotension, hyperlipidemia who has been followed with serial imaging for a left lower lobe lung nodule and mediastinal lymphadenopathy.  A CT of the chest 1/21/2023 showed a masslike consolidation in the left lower lobe 2.5 x 2.6 cm in size with a potential cavitary focus centrally surrounding haziness and otherwise groundglass opacities in the right middle lobe and right lower lobe and enlarged mediastinal lymph nodes up to 10 mm.  The findings were favored to be infectious or inflammatory.  A follow-up CT chest 7/28/2023 showed pleural-based area of density in the left upper lung 11 x 5 mm new from the previous exam and resolution of the consolidation in the left lower lobe.  A PET scan was performed 8/9/2023 which showed the 1.1 cm pleural-based density in the posterior left lower lobe to blend into dependent atelectasis but have more  intense activity than the atelectasis with maximum SUV 2.8.  There was hypermetabolic lymphadenopathy in the left hilum and left lower paratracheal regions for example lymph node posterior to the left pulmonary artery SUV 4.7 measuring 1.6 cm and another area of soft tissue lateral to the left mainstem bronchus SUV 4.4, ill-defined lymphadenopathy left lower paratracheal region SUV 3.7.  She underwent bronchoscopy with biopsies on 8/25/2023 with samples from stations 11 R, 4R, 7, 10 L, 11 L, 12 L all negative for malignancy; station 4R showed rare atypical reactive epithelial cells.  A follow-up CT of the chest on 2/6/2024 showed the left lower lobe nodule to be enlarging at 1.6 x 0.9 cm and enlarging precarinal lymphadenopathy concerning for metastatic disease.  The patient underwent repeat bronchoscopy with Ion navigation on 2/28/2024; biopsies from the left lower lobe nodule were positive for small cell carcinoma and a level 4 lymph node biopsied also positive for small cell carcinoma.    Full body PET scan on 3/7/2024 showed significantly avid bilateral hilar and mediastinal lymph nodes for example kylah [employer] AP window 8.3 SUV measuring 2.5 x 1.8 cm, left hilar lymph node SUV 7 1.4 cm, pleural-based nodularity left major fissure newly hypermetabolic SUV 2.2, pleural-based partially cavitary mass left lower lobe SUV five 1.5 x 1 cm, new right lung nodule 8 mm in the right lower lobe suspicious.  MRI of the brain negative.    The patient has comorbidities of COPD but minimally symptomatic, no known cardiac disease, kidney disease, liver disease, diabetes etc.  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    The patient initiated chemoimmunotherapy with carboplatin/etoposide/atezolizumab 3/19/2024.  She completed 4 cycles of carboplatin/etoposide/atezolizumab on 5/23/2024.    A PET scan on 6/6/2024 showed a residual 6 mm pleural-based nodule left lower lobe to be photopenic, low-level subpleural activity adjacently  and inferiorly SUV 1.9, resolution of left mediastinal and left hilar lymphadenopathy, marked decrease mediastinal and left hilar lymph nodes have low-level activity SUV 2.6, no new hypermetabolic pulmonary opacities, no hypermetabolic activity in the abdomen, pelvis or bones.    CT chest abdomen pelvis 2024-subpleural parenchymal scarring posterior left lower lobe.  No residual pulmonary nodule, stable subcentimeter low-density mass right lobe of the liver favored to be a cyst or hemangioma, stable sclerotic lesion right iliac bone.  MRI brain-old bilateral basal ganglia and occipital infarcts, no metastatic disease.    Past Medical History:   Diagnosis Date    COPD (chronic obstructive pulmonary disease)     COPD with acute exacerbation 2020    Small cell carcinoma 3/13/2024        Past Surgical History:   Procedure Laterality Date    BRONCHOSCOPY N/A 2023    Procedure: BRONCHOSCOPY WITH ENDOBRONCHIAL ULTRASOUND (EBUS) with FNA;  Surgeon: Coy Mccarthy MD;  Location: [facility] ENDOSCOPY;  Service: Pulmonary;  Laterality: N/A;  Pre/Post - mediastinal and hilar lymphadenopathy.    BRONCHOSCOPY WITH ION ROBOTIC ASSIST N/A 2024    Procedure: BRONCHOSCOPY WITH ION ROBOT,  ENDOBRONCHIAL ULTRASOUND, FINE NEEDLE ASPIRATIONS,  CRYOTHERAPY BIOPSIES, AND LEFT LOWER LOBE BRONCHOALVEOLAR LAVAGE.;  Surgeon: Alexia Velásquez MD;  Location: [facility] ENDOSCOPY;  Service: Robotics - Pulmonary;  Laterality: N/A;     SECTION      CHEST TUBE INSERTION Left 2024    Procedure: CHEST TUBE INSERTION;  Surgeon: Alexia Velásquez MD;  Location: [facility] ENDOSCOPY;  Service: Thoracic;  Laterality: Left;    CHOLECYSTECTOMY      COLONOSCOPY      ECTOPIC PREGNANCY SURGERY      HYSTERECTOMY      TONSILLECTOMY      TOTAL HIP ARTHROPLASTY Left     VENOUS ACCESS DEVICE (PORT) INSERTION N/A 3/14/2024    Procedure: INSERTION VENOUS ACCESS DEVICE;  Surgeon: Jonas Garner MD;  Location: [facility] OR;  Service:  General;  Laterality: N/A;        Current Outpatient Medications on File Prior to Visit   Medication Sig Dispense Refill    albuterol sulfate  (90 Base) MCG/ACT inhaler Inhale 2 puffs Every 4 (Four) Hours As Needed for Wheezing. Takes as needed.      apixaban (ELIQUIS) 5 MG tablet tablet Take 1 tablet by mouth Every 12 (Twelve) Hours. Indications: Other - full anticoagulation 180 tablet 0    budesonide-formoterol (SYMBICORT) 160-4.5 MCG/ACT inhaler Inhale 2 puffs 2 (Two) Times a Day.  12    calcium carbonate (TUMS) 500 MG chewable tablet Chew 1 tablet 2 (Two) Times a Day.      Denosumab (PROLIA SC) Inject  under the skin into the appropriate area as directed Every 6 (Six) Months.      diazePAM (VALIUM) 10 MG tablet Take 1 tablet by mouth 2 (Two) Times a Day As Needed for Anxiety (RLS.). Takes 20 mg at bedtime at times when she needs.      folic acid (FOLVITE) 800 MCG tablet TAKE 1 TABLET BY MOUTH DAILY 90 tablet 1    HYDROcodone-acetaminophen (NORCO)  MG per tablet Take 1 tablet by mouth 3 (Three) Times a Day As Needed for Moderate Pain.      levETIRAcetam (KEPPRA) 500 MG tablet TAKE ONE TABLET BY MOUTH EVERY 12 HOURS 180 tablet 0    midodrine (PROAMATINE) 2.5 MG tablet Take 1 tablet by mouth 3 (Three) Times a Day Before Meals.      multivitamin with minerals (PRESERVISION AREDS PO) Take 1 tablet by mouth 2 (Two) Times a Day.      naloxone (NARCAN) 4 MG/0.1ML nasal spray Administer 1 spray into the nostril(s) as directed by provider As Needed. (Patient not taking: Reported on 11/19/2024)      OLANZapine (zyPREXA) 2.5 MG tablet Take 1 tablet by mouth Every Night. 30 tablet 2    ondansetron (ZOFRAN) 8 MG tablet Take 1 tablet by mouth Every 8 (Eight) Hours As Needed for Nausea or Vomiting. 30 tablet 1    pantoprazole (PROTONIX) 40 MG EC tablet Take 1 tablet by mouth Daily. 30 tablet 3    potassium chloride (Klor-Con M20) 20 MEQ CR tablet Take 1 tablet by mouth Daily. 30 tablet 1    rosuvastatin (Crestor)  "10 MG tablet Take 1 tablet by mouth Every Night. 30 tablet 11    [DISCONTINUED] Cyanocobalamin (B-12) 500 MCG sublingual tablet Place 500 mcg under the tongue Daily. 90 tablet 1    [DISCONTINUED] sucralfate (CARAFATE) 1 g tablet Take 1 tablet by mouth 4 (Four) Times a Day. 120 tablet 3     No current facility-administered medications on file prior to visit.        ALLERGIES:    Allergies   Allergen Reactions    Codeine GI Intolerance    Percocet [Oxycodone-Acetaminophen] Hives        Social History     Socioeconomic History    Marital status:    Tobacco Use    Smoking status: Every Day     Current packs/day: 1.00     Average packs/day: 1 pack/day for 30.0 years (30.0 ttl pk-yrs)     Types: Cigarettes     Passive exposure: Current    Smokeless tobacco: Never    Tobacco comments:     Began smoking at age 11.  Smoked .5 ppd for 6 years, 2.5 ppd for a year, 2 ppd for 5 years, and 1 ppd for the past 43 years for a 58.5 pack year history.   Vaping Use    Vaping status: Former    Substances: Nicotine, Flavoring    Devices: Disposable   Substance and Sexual Activity    Alcohol use: Yes     Comment: once a year     Drug use: No    Sexual activity: Defer        Family History   Problem Relation Age of Onset    Lung cancer Niece 50    Throat cancer Father     Breast cancer Neg Hx         Review of Systems   Constitutional:  Positive for fatigue.   HENT: Negative.     Eyes: Negative.    Respiratory: Negative.     Cardiovascular: Negative.    Gastrointestinal: Negative.    Musculoskeletal:  Positive for back pain.   Neurological: Negative.    Hematological: Negative.    Psychiatric/Behavioral:  Positive for dysphoric mood. The patient is nervous/anxious.    ROS is unchanged-11/26/2024    Objective     Vitals:    11/26/24 1040   BP: 108/70   Pulse: 94   Resp: 16   Temp: 97.6 °F (36.4 °C)   TempSrc: Oral   SpO2: 90%   Weight: 46.9 kg (103 lb 6.4 oz)   Height: 160 cm (62.99\")   PainSc: 0-No pain                   11/26/2024 "    10:40 AM   Current Status   ECOG score 0       Physical Exam    CONSTITUTIONAL: pleasant well-developed adult woman  HEENT: no icterus, no thrush, moist membranes, anisocoria  LYMPH: no cervical or supraclavicular lad  CV: RRR, S1S2, no murmur  RESP: cta bilat, no wheezing, no rales  GI: soft, non-tender, no splenomegaly, +bs  MUSC: no edema, normal gait  NEURO: alert and oriented x3, normal strength  PSYCH: Dysphoric mood and flat affect  Skin: No rash or induration noted   Exam unchanged 11/26/2024    RECENT LABS:  Hematology WBC   Date Value Ref Range Status   11/26/2024 16.69 (H) 3.40 - 10.80 10*3/mm3 Final     RBC   Date Value Ref Range Status   11/26/2024 4.14 3.77 - 5.28 10*6/mm3 Final     Hemoglobin   Date Value Ref Range Status   11/26/2024 13.7 12.0 - 15.9 g/dL Final     Hematocrit   Date Value Ref Range Status   11/26/2024 43.2 34.0 - 46.6 % Final     Platelets   Date Value Ref Range Status   11/26/2024 318 140 - 450 10*3/mm3 Final        Lab Results   Component Value Date    GLUCOSE 151 (H) 11/26/2024    BUN 3 (L) 11/26/2024    CREATININE 0.90 11/26/2024    EGFR 70.2 11/26/2024    BCR 3.3 (L) 11/26/2024    K 4.0 11/26/2024    CO2 26.5 11/26/2024    CALCIUM 8.3 (L) 11/26/2024    ALBUMIN 3.5 11/26/2024    BILITOT <0.2 11/26/2024    AST 11 11/26/2024    ALT <5 11/26/2024     NM PET/CT Skull Base to Mid Thigh (06/06/2024 11:52 AM)   FINDINGS: Mediastinal blood pool has a maximal SUV of 2.0, previously 2.3.  1. The residual 6 mm pleural-based nodule at the left lower lobe is photopenic. There is low-level subpleural activity adjacently and inferiorly with an SUV of 1.9, likely representing radiation pneumonitis. There has been resolution of left mediastinal and left hilar lymphadenopathy. Interval marked decrease in the size of mediastinal and left hilar nodes and the remaining nodes have low-level activity, maximal SUV of 2.6. There are no new hypermetabolic pulmonary opacities.  2. There is no suspicious  hypermetabolic activity at the supraclavicular regions or neck.  3. There is no suspicious hypermetabolic activity within the abdomen or pelvis.  4. There is no suspicious bone activity.    MRI Brain With & Without Contrast (09/17/2024 10:49 AM)  Impression:  1. Changes of old bilateral basal ganglia and occipital lobe infarcts.  2. No acute intracranial abnormality.  3. No pathologic contrast enhancement to suggest intracranial metastatic disease.    CT Abdomen Pelvis With Contrast (09/17/2024 11:34 AM)  Impression:  1. Stable exam with subcentimeter low-density mass in the posterior right lobe of the liver favored to be a cyst or hemangioma.  2. Colonic diverticulosis. No evidence of diverticulitis.  3. Stable sclerotic density within the right iliac bone favored to be a bone island. No suspicious lytic or sclerotic bony lesions are seen.    CT Chest With Contrast Diagnostic (09/17/2024 11:34 AM)  Impression:  1. Persistent subpleural parenchymal scarring within the posterior left lower lobe at site of previously demonstrated hypermetabolic pulmonary nodule. No residual pulmonary nodule identified. No evidence of metastatic disease elsewhere within the chest.    Assessment & Plan     *Small cell lung cancer left lower lobe of the lung with mediastinal involvement, contralateral lung nodule suspicious for metastatic disease/extensive stage  CT chest 2/22/2024-enlarged f4L lymph nodes 2.5 cm, enlarged AP window lymph node 1.4 cm, poorly defined left hilar lymphadenopathy, left lower lobe pulmonary nodule 1.7 cm  Bronchoscopy with Ion navigation performed 2/28/2024-pathology positive for small cell carcinoma from the left lower lobe and station 4L  PET scan on 3/7/2024 showed significantly avid bilateral hilar and mediastinal lymph nodes for example kylah [employer] AP window 8.3 SUV measuring 2.5 x 1.8 cm, left hilar lymph node SUV 7 1.4 cm, pleural-based nodularity left major fissure newly hypermetabolic SUV 2.2,  pleural-based partially cavitary mass left lower lobe SUV five 1.5 x 1 cm, new right lung nodule 8 mm in the right lower lobe suspicious.-Results discussed with Dr. Velásquez and we both feel the contralateral right lung nodule is highly suspicious for metastatic disease  MRI of the brain negative.  3/19/2024.initiated chemoimmunotherapy with carboplatin/etoposide/atezolizumab; completed 4 cycles of carboplatin/etoposide/atezolizumab 5/23/2024 6/6/2024 PET scan: showed a residual 6 mm pleural-based nodule left lower lobe to be photopenic, low-level subpleural activity adjacently and inferiorly SUV 1.9, resolution of left mediastinal and left hilar lymphadenopathy, marked decrease mediastinal and left hilar lymph nodes have low-level activity SUV 2.6, no new hypermetabolic pulmonary opacities, no hypermetabolic activity in the abdomen, pelvis or bones  CT chest abdomen pelvis 9/17/2024-subpleural parenchymal scarring posterior left lower lobe.  No residual pulmonary nodule, stable subcentimeter low-density mass right lobe of the liver favored to be a cyst or hemangioma, stable sclerotic lesion right iliac bone.  MRI brain-old bilateral basal ganglia and occipital infarcts, no metastatic disease.  *anemia/thrombocytopenia secondary to chemotherapy  Hemoglobin improved 13.7  *Fatigue-normal TSH free T3 free T4 cortisol and ACTH  *Comorbidities of tobacco abuse/COPD, anxiety, atherosclerotic calcifications by CT but no definitive diagnosis of CAD, degenerative disease of the spine, hyperlipidemia; no known autoimmune disorders, chronic pain on opioid medicines    Oncology plan/recommendations:  Continue atezolizumab every 3 weeks  Begin [id number] mg daily for depression; consider dose increase to 60 mg  Plan to repeat CT chest abdomen pelvis prior to her 6-week follow-up visit

## 2024-11-26 ENCOUNTER — OFFICE VISIT (OUTPATIENT)
Dept: ONCOLOGY | Facility: CLINIC | Age: 69
End: 2024-11-26
Payer: MEDICARE

## 2024-11-26 ENCOUNTER — INFUSION (OUTPATIENT)
Dept: ONCOLOGY | Facility: HOSPITAL | Age: 69
End: 2024-11-26
Payer: MEDICARE

## 2024-11-26 VITALS
HEART RATE: 94 BPM | BODY MASS INDEX: 18.32 KG/M2 | HEIGHT: 63 IN | DIASTOLIC BLOOD PRESSURE: 70 MMHG | RESPIRATION RATE: 16 BRPM | TEMPERATURE: 97.6 F | SYSTOLIC BLOOD PRESSURE: 108 MMHG | OXYGEN SATURATION: 90 % | WEIGHT: 103.4 LBS

## 2024-11-26 DIAGNOSIS — C80.1 SMALL CELL CARCINOMA: Primary | ICD-10-CM

## 2024-11-26 DIAGNOSIS — E53.8 VITAMIN B12 DEFICIENCY: Primary | ICD-10-CM

## 2024-11-26 DIAGNOSIS — Z79.899 NEED FOR PROPHYLACTIC CHEMOTHERAPY: ICD-10-CM

## 2024-11-26 DIAGNOSIS — Z45.2 FITTING AND ADJUSTMENT OF VASCULAR CATHETER: ICD-10-CM

## 2024-11-26 DIAGNOSIS — F32.A DEPRESSION, UNSPECIFIED DEPRESSION TYPE: ICD-10-CM

## 2024-11-26 DIAGNOSIS — C80.1 SMALL CELL CARCINOMA: ICD-10-CM

## 2024-11-26 DIAGNOSIS — Z79.899 HIGH RISK MEDICATION USE: ICD-10-CM

## 2024-11-26 LAB
ALBUMIN SERPL-MCNC: 3.5 G/DL (ref 3.5–5.2)
ALBUMIN/GLOB SERPL: 1.3 G/DL
ALP SERPL-CCNC: 118 U/L (ref 39–117)
ALT SERPL W P-5'-P-CCNC: <5 U/L (ref 1–33)
ANION GAP SERPL CALCULATED.3IONS-SCNC: 11.5 MMOL/L (ref 5–15)
AST SERPL-CCNC: 11 U/L (ref 1–32)
BASOPHILS # BLD AUTO: 0.09 10*3/MM3 (ref 0–0.2)
BASOPHILS NFR BLD AUTO: 0.5 % (ref 0–1.5)
BILIRUB SERPL-MCNC: <0.2 MG/DL (ref 0–1.2)
BUN SERPL-MCNC: 3 MG/DL (ref 8–23)
BUN/CREAT SERPL: 3.3 (ref 7–25)
CALCIUM SPEC-SCNC: 8.3 MG/DL (ref 8.6–10.5)
CHLORIDE SERPL-SCNC: 99 MMOL/L (ref 98–107)
CO2 SERPL-SCNC: 26.5 MMOL/L (ref 22–29)
CREAT SERPL-MCNC: 0.9 MG/DL (ref 0.57–1)
DEPRECATED RDW RBC AUTO: 51.5 FL (ref 37–54)
EGFRCR SERPLBLD CKD-EPI 2021: 70.2 ML/MIN/1.73
EOSINOPHIL # BLD AUTO: 0.17 10*3/MM3 (ref 0–0.4)
EOSINOPHIL NFR BLD AUTO: 1 % (ref 0.3–6.2)
ERYTHROCYTE [DISTWIDTH] IN BLOOD BY AUTOMATED COUNT: 13.4 % (ref 12.3–15.4)
GLOBULIN UR ELPH-MCNC: 2.7 GM/DL
GLUCOSE SERPL-MCNC: 151 MG/DL (ref 65–99)
HCT VFR BLD AUTO: 43.2 % (ref 34–46.6)
HGB BLD-MCNC: 13.7 G/DL (ref 12–15.9)
IMM GRANULOCYTES # BLD AUTO: 0.05 10*3/MM3 (ref 0–0.05)
IMM GRANULOCYTES NFR BLD AUTO: 0.3 % (ref 0–0.5)
LYMPHOCYTES # BLD AUTO: 2.68 10*3/MM3 (ref 0.7–3.1)
LYMPHOCYTES NFR BLD AUTO: 16.1 % (ref 19.6–45.3)
MCH RBC QN AUTO: 33.1 PG (ref 26.6–33)
MCHC RBC AUTO-ENTMCNC: 31.7 G/DL (ref 31.5–35.7)
MCV RBC AUTO: 104.3 FL (ref 79–97)
MONOCYTES # BLD AUTO: 1.05 10*3/MM3 (ref 0.1–0.9)
MONOCYTES NFR BLD AUTO: 6.3 % (ref 5–12)
NEUTROPHILS NFR BLD AUTO: 12.65 10*3/MM3 (ref 1.7–7)
NEUTROPHILS NFR BLD AUTO: 75.8 % (ref 42.7–76)
NRBC BLD AUTO-RTO: 0 /100 WBC (ref 0–0.2)
PLATELET # BLD AUTO: 318 10*3/MM3 (ref 140–450)
PMV BLD AUTO: 8.9 FL (ref 6–12)
POTASSIUM SERPL-SCNC: 4 MMOL/L (ref 3.5–5.2)
PROT SERPL-MCNC: 6.2 G/DL (ref 6–8.5)
RBC # BLD AUTO: 4.14 10*6/MM3 (ref 3.77–5.28)
SODIUM SERPL-SCNC: 137 MMOL/L (ref 136–145)
T3FREE SERPL-MCNC: 3.05 PG/ML (ref 2–4.4)
T4 FREE SERPL-MCNC: 1.09 NG/DL (ref 0.93–1.7)
TSH SERPL DL<=0.05 MIU/L-ACNC: 1.61 UIU/ML (ref 0.27–4.2)
WBC NRBC COR # BLD AUTO: 16.69 10*3/MM3 (ref 3.4–10.8)

## 2024-11-26 PROCEDURE — 85025 COMPLETE CBC W/AUTO DIFF WBC: CPT | Performed by: INTERNAL MEDICINE

## 2024-11-26 PROCEDURE — 84439 ASSAY OF FREE THYROXINE: CPT | Performed by: INTERNAL MEDICINE

## 2024-11-26 PROCEDURE — 84481 FREE ASSAY (FT-3): CPT | Performed by: INTERNAL MEDICINE

## 2024-11-26 PROCEDURE — 84443 ASSAY THYROID STIM HORMONE: CPT | Performed by: INTERNAL MEDICINE

## 2024-11-26 PROCEDURE — 80053 COMPREHEN METABOLIC PANEL: CPT | Performed by: INTERNAL MEDICINE

## 2024-11-26 PROCEDURE — 25010000002 ATEZOLIZUMAB 1200 MG/20ML SOLUTION 20 ML VIAL: Performed by: INTERNAL MEDICINE

## 2024-11-26 PROCEDURE — 25810000003 SODIUM CHLORIDE 0.9 % SOLUTION 250 ML FLEX CONT: Performed by: INTERNAL MEDICINE

## 2024-11-26 PROCEDURE — 25010000002 HEPARIN LOCK FLUSH PER 10 UNITS: Performed by: INTERNAL MEDICINE

## 2024-11-26 PROCEDURE — 96372 THER/PROPH/DIAG INJ SC/IM: CPT

## 2024-11-26 PROCEDURE — 96413 CHEMO IV INFUSION 1 HR: CPT

## 2024-11-26 PROCEDURE — 82024 ASSAY OF ACTH: CPT | Performed by: INTERNAL MEDICINE

## 2024-11-26 PROCEDURE — 25010000002 CYANOCOBALAMIN PER 1000 MCG: Performed by: INTERNAL MEDICINE

## 2024-11-26 RX ORDER — CYANOCOBALAMIN 1000 UG/ML
1000 INJECTION, SOLUTION INTRAMUSCULAR; SUBCUTANEOUS ONCE
Status: COMPLETED | OUTPATIENT
Start: 2024-11-26 | End: 2024-11-26

## 2024-11-26 RX ORDER — DULOXETIN HYDROCHLORIDE 30 MG/1
30 CAPSULE, DELAYED RELEASE ORAL DAILY
Qty: 30 CAPSULE | Refills: 2 | Status: SHIPPED | OUTPATIENT
Start: 2024-11-26

## 2024-11-26 RX ORDER — HEPARIN SODIUM (PORCINE) LOCK FLUSH IV SOLN 100 UNIT/ML 100 UNIT/ML
500 SOLUTION INTRAVENOUS AS NEEDED
Status: DISCONTINUED | OUTPATIENT
Start: 2024-11-26 | End: 2024-11-26 | Stop reason: HOSPADM

## 2024-11-26 RX ORDER — HEPARIN SODIUM (PORCINE) LOCK FLUSH IV SOLN 100 UNIT/ML 100 UNIT/ML
500 SOLUTION INTRAVENOUS AS NEEDED
OUTPATIENT
Start: 2024-11-26

## 2024-11-26 RX ORDER — SODIUM CHLORIDE 9 MG/ML
20 INJECTION, SOLUTION INTRAVENOUS ONCE
Status: CANCELLED | OUTPATIENT
Start: 2024-11-26

## 2024-11-26 RX ORDER — POTASSIUM CHLORIDE 1500 MG/1
20 TABLET, EXTENDED RELEASE ORAL DAILY
Qty: 30 TABLET | Refills: 1 | Status: SHIPPED | OUTPATIENT
Start: 2024-11-26

## 2024-11-26 RX ORDER — SODIUM CHLORIDE 0.9 % (FLUSH) 0.9 %
10 SYRINGE (ML) INJECTION AS NEEDED
Status: DISCONTINUED | OUTPATIENT
Start: 2024-11-26 | End: 2024-11-26 | Stop reason: HOSPADM

## 2024-11-26 RX ORDER — SUCRALFATE 1 G/1
1 TABLET ORAL 4 TIMES DAILY
Qty: 120 TABLET | Refills: 3 | Status: SHIPPED | OUTPATIENT
Start: 2024-11-26

## 2024-11-26 RX ORDER — SODIUM CHLORIDE 0.9 % (FLUSH) 0.9 %
10 SYRINGE (ML) INJECTION AS NEEDED
OUTPATIENT
Start: 2024-11-26

## 2024-11-26 RX ORDER — CYANOCOBALAMIN (VITAMIN B-12) 1000 MCG
TABLET, SUBLINGUAL SUBLINGUAL
Qty: 90 TABLET | Refills: 1 | Status: SHIPPED | OUTPATIENT
Start: 2024-11-26

## 2024-11-26 RX ADMIN — ATEZOLIZUMAB 1200 MG: 1200 INJECTION, SOLUTION INTRAVENOUS at 12:10

## 2024-11-26 RX ADMIN — HEPARIN 500 UNITS: 100 SYRINGE at 12:42

## 2024-11-26 RX ADMIN — Medication 10 ML: at 12:42

## 2024-11-26 RX ADMIN — CYANOCOBALAMIN 1000 MCG: 1000 INJECTION, SOLUTION INTRAMUSCULAR; SUBCUTANEOUS at 12:09

## 2024-11-27 LAB — ACTH PLAS-MCNC: 8.7 PG/ML (ref 7.2–63.3)

## 2024-12-02 RX ORDER — OLANZAPINE 2.5 MG/1
2.5 TABLET, FILM COATED ORAL NIGHTLY
Qty: 30 TABLET | Refills: 2 | Status: SHIPPED | OUTPATIENT
Start: 2024-12-02

## 2024-12-14 ENCOUNTER — APPOINTMENT (OUTPATIENT)
Dept: CT IMAGING | Facility: HOSPITAL | Age: 69
End: 2024-12-14
Payer: MEDICARE

## 2024-12-14 ENCOUNTER — HOSPITAL ENCOUNTER (INPATIENT)
Facility: HOSPITAL | Age: 69
LOS: 5 days | Discharge: HOME | End: 2024-12-19
Attending: EMERGENCY MEDICINE | Admitting: FAMILY MEDICINE
Payer: MEDICARE

## 2024-12-14 DIAGNOSIS — J18.9 PNEUMONIA OF LEFT UPPER LOBE DUE TO INFECTIOUS ORGANISM: ICD-10-CM

## 2024-12-14 DIAGNOSIS — K85.00 IDIOPATHIC ACUTE PANCREATITIS, UNSPECIFIED COMPLICATION STATUS: Primary | ICD-10-CM

## 2024-12-14 DIAGNOSIS — K90.9 DIARRHEA DUE TO MALABSORPTION: ICD-10-CM

## 2024-12-14 DIAGNOSIS — J18.9 PNEUMONIA OF RIGHT UPPER LOBE DUE TO INFECTIOUS ORGANISM: ICD-10-CM

## 2024-12-14 DIAGNOSIS — R19.7 DIARRHEA DUE TO MALABSORPTION: ICD-10-CM

## 2024-12-14 PROBLEM — K85.90 PANCREATITIS: Status: ACTIVE | Noted: 2024-12-14

## 2024-12-14 LAB
ALBUMIN SERPL-MCNC: 3.6 G/DL (ref 3.5–5.2)
ALBUMIN/GLOB SERPL: 1.2 G/DL
ALP SERPL-CCNC: 122 U/L (ref 39–117)
ALT SERPL W P-5'-P-CCNC: <5 U/L (ref 1–33)
ANION GAP SERPL CALCULATED.3IONS-SCNC: 11.2 MMOL/L (ref 5–15)
AST SERPL-CCNC: 25 U/L (ref 1–32)
BACTERIA UR QL AUTO: NORMAL /HPF
BASOPHILS # BLD AUTO: 0.07 10*3/MM3 (ref 0–0.2)
BASOPHILS NFR BLD AUTO: 0.3 % (ref 0–1.5)
BILIRUB SERPL-MCNC: 0.3 MG/DL (ref 0–1.2)
BILIRUB UR QL STRIP: NEGATIVE
BUN SERPL-MCNC: 9 MG/DL (ref 8–23)
BUN/CREAT SERPL: 9.7 (ref 7–25)
CALCIUM SPEC-SCNC: 8.8 MG/DL (ref 8.6–10.5)
CHLORIDE SERPL-SCNC: 92 MMOL/L (ref 98–107)
CLARITY UR: CLEAR
CO2 SERPL-SCNC: 26.8 MMOL/L (ref 22–29)
COLOR UR: YELLOW
CREAT SERPL-MCNC: 0.93 MG/DL (ref 0.57–1)
D-LACTATE SERPL-SCNC: 1.8 MMOL/L (ref 0.5–2)
D-LACTATE SERPL-SCNC: 2.3 MMOL/L (ref 0.5–2)
D-LACTATE SERPL-SCNC: 3.6 MMOL/L (ref 0.5–2)
DEPRECATED RDW RBC AUTO: 47.2 FL (ref 37–54)
EGFRCR SERPLBLD CKD-EPI 2021: 67.5 ML/MIN/1.73
EOSINOPHIL # BLD AUTO: 0.75 10*3/MM3 (ref 0–0.4)
EOSINOPHIL NFR BLD AUTO: 3 % (ref 0.3–6.2)
ERYTHROCYTE [DISTWIDTH] IN BLOOD BY AUTOMATED COUNT: 12.9 % (ref 12.3–15.4)
GLOBULIN UR ELPH-MCNC: 3 GM/DL
GLUCOSE SERPL-MCNC: 134 MG/DL (ref 65–99)
GLUCOSE UR STRIP-MCNC: NEGATIVE MG/DL
HCT VFR BLD AUTO: 45.2 % (ref 34–46.6)
HGB BLD-MCNC: 14.8 G/DL (ref 12–15.9)
HGB UR QL STRIP.AUTO: ABNORMAL
HOLD SPECIMEN: NORMAL
HOLD SPECIMEN: NORMAL
HYALINE CASTS UR QL AUTO: NORMAL /LPF
IMM GRANULOCYTES # BLD AUTO: 0.1 10*3/MM3 (ref 0–0.05)
IMM GRANULOCYTES NFR BLD AUTO: 0.4 % (ref 0–0.5)
KETONES UR QL STRIP: NEGATIVE
LEUKOCYTE ESTERASE UR QL STRIP.AUTO: NEGATIVE
LIPASE SERPL-CCNC: 275 U/L (ref 13–60)
LYMPHOCYTES # BLD AUTO: 2.98 10*3/MM3 (ref 0.7–3.1)
LYMPHOCYTES NFR BLD AUTO: 12.1 % (ref 19.6–45.3)
MCH RBC QN AUTO: 32.5 PG (ref 26.6–33)
MCHC RBC AUTO-ENTMCNC: 32.7 G/DL (ref 31.5–35.7)
MCV RBC AUTO: 99.1 FL (ref 79–97)
MONOCYTES # BLD AUTO: 1.69 10*3/MM3 (ref 0.1–0.9)
MONOCYTES NFR BLD AUTO: 6.9 % (ref 5–12)
NEUTROPHILS NFR BLD AUTO: 19.07 10*3/MM3 (ref 1.7–7)
NEUTROPHILS NFR BLD AUTO: 77.3 % (ref 42.7–76)
NITRITE UR QL STRIP: NEGATIVE
NRBC BLD AUTO-RTO: 0 /100 WBC (ref 0–0.2)
PH UR STRIP.AUTO: 6 [PH] (ref 4.5–8)
PLAT MORPH BLD: NORMAL
PLATELET # BLD AUTO: 253 10*3/MM3 (ref 140–450)
PMV BLD AUTO: 9.8 FL (ref 6–12)
POTASSIUM SERPL-SCNC: 3.3 MMOL/L (ref 3.5–5.2)
PROCALCITONIN SERPL-MCNC: 0.33 NG/ML (ref 0–0.25)
PROT SERPL-MCNC: 6.6 G/DL (ref 6–8.5)
PROT UR QL STRIP: ABNORMAL
RBC # BLD AUTO: 4.56 10*6/MM3 (ref 3.77–5.28)
RBC # UR STRIP: NORMAL /HPF
RBC MORPH BLD: NORMAL
REF LAB TEST METHOD: NORMAL
SODIUM SERPL-SCNC: 130 MMOL/L (ref 136–145)
SP GR UR STRIP: 1.01 (ref 1–1.03)
SQUAMOUS #/AREA URNS HPF: NORMAL /HPF
UROBILINOGEN UR QL STRIP: ABNORMAL
WBC # UR STRIP: NORMAL /HPF
WBC MORPH BLD: NORMAL
WBC NRBC COR # BLD AUTO: 24.66 10*3/MM3 (ref 3.4–10.8)
WHOLE BLOOD HOLD COAG: NORMAL
WHOLE BLOOD HOLD SPECIMEN: NORMAL

## 2024-12-14 PROCEDURE — 25810000003 SODIUM CHLORIDE 0.9 % SOLUTION: Performed by: EMERGENCY MEDICINE

## 2024-12-14 PROCEDURE — 84145 PROCALCITONIN (PCT): CPT | Performed by: EMERGENCY MEDICINE

## 2024-12-14 PROCEDURE — 25010000002 POTASSIUM CHLORIDE 10 MEQ/100ML SOLUTION: Performed by: FAMILY MEDICINE

## 2024-12-14 PROCEDURE — 85025 COMPLETE CBC W/AUTO DIFF WBC: CPT | Performed by: EMERGENCY MEDICINE

## 2024-12-14 PROCEDURE — 25010000002 ONDANSETRON PER 1 MG: Performed by: FAMILY MEDICINE

## 2024-12-14 PROCEDURE — 25010000002 PIPERACILLIN SOD-TAZOBACTAM PER 1 G: Performed by: EMERGENCY MEDICINE

## 2024-12-14 PROCEDURE — 74177 CT ABD & PELVIS W/CONTRAST: CPT

## 2024-12-14 PROCEDURE — 87040 BLOOD CULTURE FOR BACTERIA: CPT | Performed by: EMERGENCY MEDICINE

## 2024-12-14 PROCEDURE — 83690 ASSAY OF LIPASE: CPT | Performed by: EMERGENCY MEDICINE

## 2024-12-14 PROCEDURE — G0378 HOSPITAL OBSERVATION PER HR: HCPCS

## 2024-12-14 PROCEDURE — 36415 COLL VENOUS BLD VENIPUNCTURE: CPT

## 2024-12-14 PROCEDURE — 25810000003 SODIUM CHLORIDE 0.9 % SOLUTION: Performed by: FAMILY MEDICINE

## 2024-12-14 PROCEDURE — 25810000003 SODIUM CHLORIDE 0.9 % SOLUTION 250 ML FLEX CONT: Performed by: FAMILY MEDICINE

## 2024-12-14 PROCEDURE — 80053 COMPREHEN METABOLIC PANEL: CPT | Performed by: EMERGENCY MEDICINE

## 2024-12-14 PROCEDURE — 99285 EMERGENCY DEPT VISIT HI MDM: CPT | Performed by: EMERGENCY MEDICINE

## 2024-12-14 PROCEDURE — 85007 BL SMEAR W/DIFF WBC COUNT: CPT | Performed by: EMERGENCY MEDICINE

## 2024-12-14 PROCEDURE — 25010000002 VANCOMYCIN 1 G RECONSTITUTED SOLUTION 1 EACH VIAL: Performed by: FAMILY MEDICINE

## 2024-12-14 PROCEDURE — 83605 ASSAY OF LACTIC ACID: CPT | Performed by: EMERGENCY MEDICINE

## 2024-12-14 PROCEDURE — 71250 CT THORAX DX C-: CPT

## 2024-12-14 PROCEDURE — 94799 UNLISTED PULMONARY SVC/PX: CPT

## 2024-12-14 PROCEDURE — 81001 URINALYSIS AUTO W/SCOPE: CPT | Performed by: EMERGENCY MEDICINE

## 2024-12-14 PROCEDURE — 25010000002 ONDANSETRON PER 1 MG: Performed by: EMERGENCY MEDICINE

## 2024-12-14 PROCEDURE — 25510000001 IOPAMIDOL PER 1 ML: Performed by: EMERGENCY MEDICINE

## 2024-12-14 RX ORDER — ONDANSETRON 2 MG/ML
8 INJECTION INTRAMUSCULAR; INTRAVENOUS ONCE
Status: COMPLETED | OUTPATIENT
Start: 2024-12-14 | End: 2024-12-14

## 2024-12-14 RX ORDER — ACETAMINOPHEN 325 MG/1
650 TABLET ORAL EVERY 6 HOURS PRN
Status: DISCONTINUED | OUTPATIENT
Start: 2024-12-14 | End: 2024-12-19 | Stop reason: HOSPADM

## 2024-12-14 RX ORDER — BISACODYL 10 MG
10 SUPPOSITORY, RECTAL RECTAL DAILY PRN
Status: DISCONTINUED | OUTPATIENT
Start: 2024-12-14 | End: 2024-12-19 | Stop reason: HOSPADM

## 2024-12-14 RX ORDER — CALCIUM CARBONATE 500 MG/1
1 TABLET, CHEWABLE ORAL 2 TIMES DAILY PRN
Status: DISCONTINUED | OUTPATIENT
Start: 2024-12-14 | End: 2024-12-19 | Stop reason: HOSPADM

## 2024-12-14 RX ORDER — POLYETHYLENE GLYCOL 3350 17 G/17G
17 POWDER, FOR SOLUTION ORAL DAILY PRN
Status: DISCONTINUED | OUTPATIENT
Start: 2024-12-14 | End: 2024-12-19 | Stop reason: HOSPADM

## 2024-12-14 RX ORDER — ONDANSETRON 2 MG/ML
4 INJECTION INTRAMUSCULAR; INTRAVENOUS EVERY 6 HOURS PRN
Status: DISCONTINUED | OUTPATIENT
Start: 2024-12-14 | End: 2024-12-14 | Stop reason: SDUPTHER

## 2024-12-14 RX ORDER — LEVETIRACETAM 5 MG/ML
500 INJECTION INTRAVASCULAR EVERY 12 HOURS SCHEDULED
Status: DISCONTINUED | OUTPATIENT
Start: 2024-12-14 | End: 2024-12-15

## 2024-12-14 RX ORDER — SODIUM CHLORIDE 0.9 % (FLUSH) 0.9 %
10 SYRINGE (ML) INJECTION AS NEEDED
Status: DISCONTINUED | OUTPATIENT
Start: 2024-12-14 | End: 2024-12-19 | Stop reason: HOSPADM

## 2024-12-14 RX ORDER — POTASSIUM CHLORIDE 1500 MG/1
40 TABLET, EXTENDED RELEASE ORAL ONCE
Status: DISCONTINUED | OUTPATIENT
Start: 2024-12-14 | End: 2024-12-14

## 2024-12-14 RX ORDER — HYDROCODONE BITARTRATE AND ACETAMINOPHEN 10; 325 MG/1; MG/1
1 TABLET ORAL EVERY 6 HOURS PRN
Status: DISCONTINUED | OUTPATIENT
Start: 2024-12-14 | End: 2024-12-15

## 2024-12-14 RX ORDER — ACETAMINOPHEN 325 MG/1
650 TABLET ORAL EVERY 4 HOURS PRN
Status: DISCONTINUED | OUTPATIENT
Start: 2024-12-14 | End: 2024-12-19 | Stop reason: HOSPADM

## 2024-12-14 RX ORDER — SODIUM CHLORIDE 0.9 % (FLUSH) 0.9 %
10 SYRINGE (ML) INJECTION EVERY 12 HOURS SCHEDULED
Status: DISCONTINUED | OUTPATIENT
Start: 2024-12-14 | End: 2024-12-19 | Stop reason: HOSPADM

## 2024-12-14 RX ORDER — ACETAMINOPHEN 650 MG/1
650 SUPPOSITORY RECTAL EVERY 4 HOURS PRN
Status: DISCONTINUED | OUTPATIENT
Start: 2024-12-14 | End: 2024-12-19 | Stop reason: HOSPADM

## 2024-12-14 RX ORDER — PANTOPRAZOLE SODIUM 40 MG/10ML
40 INJECTION, POWDER, LYOPHILIZED, FOR SOLUTION INTRAVENOUS
Status: DISCONTINUED | OUTPATIENT
Start: 2024-12-15 | End: 2024-12-17

## 2024-12-14 RX ORDER — POTASSIUM CHLORIDE 7.45 MG/ML
10 INJECTION INTRAVENOUS
Status: COMPLETED | OUTPATIENT
Start: 2024-12-14 | End: 2024-12-15

## 2024-12-14 RX ORDER — ACETAMINOPHEN 160 MG/5ML
650 SOLUTION ORAL EVERY 4 HOURS PRN
Status: DISCONTINUED | OUTPATIENT
Start: 2024-12-14 | End: 2024-12-19 | Stop reason: HOSPADM

## 2024-12-14 RX ORDER — AMOXICILLIN 250 MG
2 CAPSULE ORAL 2 TIMES DAILY PRN
Status: DISCONTINUED | OUTPATIENT
Start: 2024-12-14 | End: 2024-12-19 | Stop reason: HOSPADM

## 2024-12-14 RX ORDER — BUDESONIDE AND FORMOTEROL FUMARATE DIHYDRATE 160; 4.5 UG/1; UG/1
2 AEROSOL RESPIRATORY (INHALATION)
Status: DISCONTINUED | OUTPATIENT
Start: 2024-12-14 | End: 2024-12-19 | Stop reason: HOSPADM

## 2024-12-14 RX ORDER — BISACODYL 5 MG/1
5 TABLET, DELAYED RELEASE ORAL DAILY PRN
Status: DISCONTINUED | OUTPATIENT
Start: 2024-12-14 | End: 2024-12-19 | Stop reason: HOSPADM

## 2024-12-14 RX ORDER — ALUMINA, MAGNESIA, AND SIMETHICONE 2400; 2400; 240 MG/30ML; MG/30ML; MG/30ML
15 SUSPENSION ORAL EVERY 6 HOURS PRN
Status: DISCONTINUED | OUTPATIENT
Start: 2024-12-14 | End: 2024-12-19 | Stop reason: HOSPADM

## 2024-12-14 RX ORDER — IPRATROPIUM BROMIDE AND ALBUTEROL SULFATE 2.5; .5 MG/3ML; MG/3ML
3 SOLUTION RESPIRATORY (INHALATION) EVERY 4 HOURS PRN
Status: DISCONTINUED | OUTPATIENT
Start: 2024-12-14 | End: 2024-12-19 | Stop reason: HOSPADM

## 2024-12-14 RX ORDER — SODIUM CHLORIDE 9 MG/ML
50 INJECTION, SOLUTION INTRAVENOUS CONTINUOUS
Status: DISCONTINUED | OUTPATIENT
Start: 2024-12-14 | End: 2024-12-16

## 2024-12-14 RX ORDER — SODIUM CHLORIDE 9 MG/ML
40 INJECTION, SOLUTION INTRAVENOUS AS NEEDED
Status: DISCONTINUED | OUTPATIENT
Start: 2024-12-14 | End: 2024-12-16

## 2024-12-14 RX ORDER — IOPAMIDOL 755 MG/ML
100 INJECTION, SOLUTION INTRAVASCULAR
Status: COMPLETED | OUTPATIENT
Start: 2024-12-14 | End: 2024-12-14

## 2024-12-14 RX ORDER — ONDANSETRON 2 MG/ML
4 INJECTION INTRAMUSCULAR; INTRAVENOUS EVERY 6 HOURS PRN
Status: DISCONTINUED | OUTPATIENT
Start: 2024-12-14 | End: 2024-12-19 | Stop reason: HOSPADM

## 2024-12-14 RX ORDER — ONDANSETRON 4 MG/1
4 TABLET, ORALLY DISINTEGRATING ORAL EVERY 6 HOURS PRN
Status: DISCONTINUED | OUTPATIENT
Start: 2024-12-14 | End: 2024-12-19 | Stop reason: HOSPADM

## 2024-12-14 RX ADMIN — HYDROCODONE BITARTRATE AND ACETAMINOPHEN 1 TABLET: 10; 325 TABLET ORAL at 23:18

## 2024-12-14 RX ADMIN — ONDANSETRON 4 MG: 2 INJECTION INTRAMUSCULAR; INTRAVENOUS at 21:41

## 2024-12-14 RX ADMIN — DOXYCYCLINE 100 MG: 100 INJECTION, POWDER, LYOPHILIZED, FOR SOLUTION INTRAVENOUS at 21:40

## 2024-12-14 RX ADMIN — SODIUM CHLORIDE 1443 ML: 9 INJECTION, SOLUTION INTRAVENOUS at 16:34

## 2024-12-14 RX ADMIN — Medication 10 ML: at 23:58

## 2024-12-14 RX ADMIN — SODIUM CHLORIDE 1000 ML: 9 INJECTION, SOLUTION INTRAVENOUS at 13:37

## 2024-12-14 RX ADMIN — Medication 10 ML: at 13:36

## 2024-12-14 RX ADMIN — SODIUM CHLORIDE 75 ML/HR: 9 INJECTION, SOLUTION INTRAVENOUS at 23:58

## 2024-12-14 RX ADMIN — IOPAMIDOL 100 ML: 755 INJECTION, SOLUTION INTRAVENOUS at 15:14

## 2024-12-14 RX ADMIN — ONDANSETRON 8 MG: 2 INJECTION INTRAMUSCULAR; INTRAVENOUS at 13:40

## 2024-12-14 RX ADMIN — SODIUM CHLORIDE 1000 MG: 900 INJECTION, SOLUTION INTRAVENOUS at 23:57

## 2024-12-14 RX ADMIN — POTASSIUM CHLORIDE 10 MEQ: 7.46 INJECTION, SOLUTION INTRAVENOUS at 23:31

## 2024-12-14 RX ADMIN — PIPERACILLIN AND TAZOBACTAM 3.38 G: 3; .375 INJECTION, POWDER, LYOPHILIZED, FOR SOLUTION INTRAVENOUS; PARENTERAL at 17:34

## 2024-12-14 RX ADMIN — APIXABAN 5 MG: 2.5 TABLET, FILM COATED ORAL at 23:18

## 2024-12-14 NOTE — ED TRIAGE NOTES
Small cell lung cancer started feeling more weak on Tuesday and vomiting when eating.  Denies pain denies nausea other then when she eats no change in chronic cough.    Reports that Tuesday evening her legs were so weak she was unable to walk and fell to her knees did not hit head denies any pain or injury from this    Denies change in bowels or urination- denies ability to provide urine at rooming is aware of need for sample is drinking ginger ale    Is in no distress

## 2024-12-14 NOTE — ED NOTES
3rd call placed to [facility] per MD Lopez. No answer. Left 3rd voicemail. Will call again in 20 minutes if no return call back.

## 2024-12-14 NOTE — ED NOTES
Dr. Burns from [facility] called back. MD Lopez spoke with Dr. Burns. Call completed.

## 2024-12-14 NOTE — ED PROVIDER NOTES
Subjective   History of Present Illness    Chief complaint: Vomiting and weakness    Location: Generalized    Quality/Severity: Nonbloody emesis    Timing/Onset/Duration: Tuesday    Modifying Factors: Worse with food    Associated Symptoms: No headache.  No fever chills or cough.  No sore throat earache or nasal congestion.  No chest pain shortness of breath.  No abdominal pain.  No diarrhea or burning on urination.  Black or bloody stools.    Narrative: This 69-year-old white female presents with vomiting.  The patient has not been around anybody who has been sick.  She has started no new meds and not had any dosage changes on her meds.  Patient has small cell lung cancer.    PCP:Nigel De Paz MD      Review of Systems    Past Medical History:   Diagnosis Date    COPD (chronic obstructive pulmonary disease)     COPD with acute exacerbation 2020    Small cell carcinoma 3/13/2024       Allergies   Allergen Reactions    Codeine GI Intolerance    Percocet [Oxycodone-Acetaminophen] Hives       Past Surgical History:   Procedure Laterality Date    BRONCHOSCOPY N/A 2023    Procedure: BRONCHOSCOPY WITH ENDOBRONCHIAL ULTRASOUND (EBUS) with FNA;  Surgeon: Coy Mccarthy MD;  Location: [facility] ENDOSCOPY;  Service: Pulmonary;  Laterality: N/A;  Pre/Post - mediastinal and hilar lymphadenopathy.    BRONCHOSCOPY WITH ION ROBOTIC ASSIST N/A 2024    Procedure: BRONCHOSCOPY WITH ION ROBOT,  ENDOBRONCHIAL ULTRASOUND, FINE NEEDLE ASPIRATIONS,  CRYOTHERAPY BIOPSIES, AND LEFT LOWER LOBE BRONCHOALVEOLAR LAVAGE.;  Surgeon: Alexia Velásquez MD;  Location: [facility] ENDOSCOPY;  Service: Robotics - Pulmonary;  Laterality: N/A;     SECTION      CHEST TUBE INSERTION Left 2024    Procedure: CHEST TUBE INSERTION;  Surgeon: Alexia Velásquez MD;  Location: [facility] ENDOSCOPY;  Service: Thoracic;  Laterality: Left;    CHOLECYSTECTOMY      COLONOSCOPY      ECTOPIC PREGNANCY SURGERY      HYSTERECTOMY       TONSILLECTOMY      TOTAL HIP ARTHROPLASTY Left     VENOUS ACCESS DEVICE (PORT) INSERTION N/A 3/14/2024    Procedure: INSERTION VENOUS ACCESS DEVICE;  Surgeon: Jonas Garner MD;  Location: [facility];  Service: General;  Laterality: N/A;       Family History   Problem Relation Age of Onset    Lung cancer Niece 50    Throat cancer Father     Breast cancer Neg Hx        Social History     Socioeconomic History    Marital status:    Tobacco Use    Smoking status: Every Day     Current packs/day: 1.00     Average packs/day: 1 pack/day for 30.0 years (30.0 ttl pk-yrs)     Types: Cigarettes     Passive exposure: Current    Smokeless tobacco: Never    Tobacco comments:     Began smoking at age 11.  Smoked .5 ppd for 6 years, 2.5 ppd for a year, 2 ppd for 5 years, and 1 ppd for the past 43 years for a 58.5 pack year history.   Vaping Use    Vaping status: Former    Substances: Nicotine, Flavoring    Devices: Disposable   Substance and Sexual Activity    Alcohol use: Yes     Comment: once a year     Drug use: No    Sexual activity: Defer           Objective   Physical Exam  Vitals (The temperature is 98.6, pulse 91, respirations 18, BP 98/76, room air pulse ox 94%.) and nursing note reviewed.         Procedures           ED Course  ED Course as of 12/14/24 1653   Sat Dec 14, 2024   1402 The white blood cell count is 24,000, neutrophil percent is 77, absolute neutrophil count 19. [RC]   1403 The urinalysis shows trace blood, 30 mg/dL protein, nitrate negative, leukocyte negative. [RC]   1403 2 weeks ago the white blood cell count was 16. [RC]   1403 The lipase is 275 and elevated. [RC]   1405 The sodium is 130, potassium 3.3, chloride 92, alk phos 122, GFR 67, glucose 134, CMP is otherwise unremarkable. [RC]   1541 The lactic acid is 3.6 and elevated. [RC]      ED Course User Index  [RC] Jameson Lopez MD      14:04 EST, 12/14/24:  The repeat blood pressure is 108/79.    17:11 EST, 12/14/24:  Spoke with  Dr. Burns.  He is in agreement to have the patient admitted here at [facility], and they will consult on the patient.    17:13 EST, 12/14/24:  Dr. Car, on-call for the hospitalist, has been paged out.  The plan is to admit the patient.  The case has been turned over to Dr. Dixon who will speak with the on-call hospitalist when they return page.                                               Medical Decision Making      Final diagnoses:   Idiopathic acute pancreatitis, unspecified complication status       ED Disposition  ED Disposition       None            No follow-up provider specified.       Medication List      No changes were made to your prescriptions during this visit.       No orders to display     Labs Reviewed   URINALYSIS W/ MICROSCOPIC IF INDICATED (NO CULTURE)     DEXA Bone Density Axial    Result Date: 11/15/2024  Narrative: DEXA BONE DENSITY AXIAL Date of Exam:  11/15/2024 11:10 AM EST Indication:  M81.0 Comparison:  10/27/2022. Technique:  Lumbar vertebral and proximal femoral Dual-Energy X-ray Absorptiometry (DEXA) was performed on the [employer] C. Findings: According to the World Health Organization criteria, this is classified as  osteopenia  . The T-score at the femoral neck is  -2.3  .  The T-score for the total spine is  -0.2  . Using the FRAX scores, which utilized risk factors and femoral neck bone density: The 10 year probability of major osteoporotic fracture is    29.0  %. The 10 year probability of a hip fracture is    8.2  %.     Impression: Impression:  Osteopenia  Osteopenia. Based upon the National Osteoporosis Foundation Guide, patient's FRAX score does meet criteria for pharmacologic treatment to prevent Osteoporosis.  Individual treatment decisions should be made based upon each patient's full clinical history  and risk factors. Electronically Signed: Anup Bragg MD  11/15/2024 1:02 PM EST  Workstation ID: [id number]     .Jameson Louise MD  12/14/24  [id number]       Jameson Lopez MD  12/14/24 [id number]       Jameson Lopez MD  12/14/24 [id number]

## 2024-12-14 NOTE — ED NOTES
2nd call placed to [facility] per MD Lopez. No answer. Left 2nd voicemail. Will call again in 20 mins if no call back.

## 2024-12-14 NOTE — Clinical Note
Level of Care: Telemetry [5]   Diagnosis: Pancreatitis [202663]   Admitting Physician: CASE BEASLEY [[id number]]   Certification: I Certify That Inpatient Hospital Services Are Medically Necessary For Greater Than 2 Midnights

## 2024-12-14 NOTE — ED NOTES
Call placed to hospitalist per MD Lopez. Left voicemail and call back number. Will call again in 20 minutes if no call back.

## 2024-12-15 PROBLEM — J18.9 PNEUMONIA: Status: ACTIVE | Noted: 2024-12-15

## 2024-12-15 LAB
ALBUMIN SERPL-MCNC: 3 G/DL (ref 3.5–5.2)
ALBUMIN/GLOB SERPL: 1.3 G/DL
ALP SERPL-CCNC: 97 U/L (ref 39–117)
ALT SERPL W P-5'-P-CCNC: <5 U/L (ref 1–33)
ANION GAP SERPL CALCULATED.3IONS-SCNC: 8.5 MMOL/L (ref 5–15)
AST SERPL-CCNC: 21 U/L (ref 1–32)
B PARAPERT DNA SPEC QL NAA+PROBE: NOT DETECTED
B PERT DNA SPEC QL NAA+PROBE: NOT DETECTED
BASOPHILS # BLD AUTO: 0.04 10*3/MM3 (ref 0–0.2)
BASOPHILS NFR BLD AUTO: 0.2 % (ref 0–1.5)
BILIRUB SERPL-MCNC: 0.3 MG/DL (ref 0–1.2)
BUN SERPL-MCNC: 7 MG/DL (ref 8–23)
BUN/CREAT SERPL: 8.9 (ref 7–25)
C PNEUM DNA NPH QL NAA+NON-PROBE: NOT DETECTED
CALCIUM SPEC-SCNC: 7.2 MG/DL (ref 8.6–10.5)
CHLORIDE SERPL-SCNC: 102 MMOL/L (ref 98–107)
CO2 SERPL-SCNC: 24.5 MMOL/L (ref 22–29)
CREAT SERPL-MCNC: 0.79 MG/DL (ref 0.57–1)
DEPRECATED RDW RBC AUTO: 49 FL (ref 37–54)
EGFRCR SERPLBLD CKD-EPI 2021: 82.1 ML/MIN/1.73
EOSINOPHIL # BLD AUTO: 0.03 10*3/MM3 (ref 0–0.4)
EOSINOPHIL NFR BLD AUTO: 0.2 % (ref 0.3–6.2)
ERYTHROCYTE [DISTWIDTH] IN BLOOD BY AUTOMATED COUNT: 13.2 % (ref 12.3–15.4)
FLUAV SUBTYP SPEC NAA+PROBE: NOT DETECTED
FLUBV RNA ISLT QL NAA+PROBE: NOT DETECTED
GLOBULIN UR ELPH-MCNC: 2.4 GM/DL
GLUCOSE SERPL-MCNC: 103 MG/DL (ref 65–99)
HADV DNA SPEC NAA+PROBE: NOT DETECTED
HCOV 229E RNA SPEC QL NAA+PROBE: NOT DETECTED
HCOV HKU1 RNA SPEC QL NAA+PROBE: NOT DETECTED
HCOV NL63 RNA SPEC QL NAA+PROBE: NOT DETECTED
HCOV OC43 RNA SPEC QL NAA+PROBE: NOT DETECTED
HCT VFR BLD AUTO: 39.5 % (ref 34–46.6)
HGB BLD-MCNC: 12.8 G/DL (ref 12–15.9)
HMPV RNA NPH QL NAA+NON-PROBE: NOT DETECTED
HPIV1 RNA ISLT QL NAA+PROBE: NOT DETECTED
HPIV2 RNA SPEC QL NAA+PROBE: NOT DETECTED
HPIV3 RNA NPH QL NAA+PROBE: NOT DETECTED
HPIV4 P GENE NPH QL NAA+PROBE: NOT DETECTED
IMM GRANULOCYTES # BLD AUTO: 0.1 10*3/MM3 (ref 0–0.05)
IMM GRANULOCYTES NFR BLD AUTO: 0.5 % (ref 0–0.5)
LIPASE SERPL-CCNC: 87 U/L (ref 13–60)
LYMPHOCYTES # BLD AUTO: 3.18 10*3/MM3 (ref 0.7–3.1)
LYMPHOCYTES NFR BLD AUTO: 16.9 % (ref 19.6–45.3)
M PNEUMO IGG SER IA-ACNC: NOT DETECTED
MCH RBC QN AUTO: 32.7 PG (ref 26.6–33)
MCHC RBC AUTO-ENTMCNC: 32.4 G/DL (ref 31.5–35.7)
MCV RBC AUTO: 100.8 FL (ref 79–97)
MONOCYTES # BLD AUTO: 1.62 10*3/MM3 (ref 0.1–0.9)
MONOCYTES NFR BLD AUTO: 8.6 % (ref 5–12)
MRSA DNA SPEC QL NAA+PROBE: NORMAL
NEUTROPHILS NFR BLD AUTO: 13.81 10*3/MM3 (ref 1.7–7)
NEUTROPHILS NFR BLD AUTO: 73.6 % (ref 42.7–76)
NRBC BLD AUTO-RTO: 0 /100 WBC (ref 0–0.2)
PLATELET # BLD AUTO: 222 10*3/MM3 (ref 140–450)
PMV BLD AUTO: 10.2 FL (ref 6–12)
POTASSIUM SERPL-SCNC: 3.6 MMOL/L (ref 3.5–5.2)
PROT SERPL-MCNC: 5.4 G/DL (ref 6–8.5)
RBC # BLD AUTO: 3.92 10*6/MM3 (ref 3.77–5.28)
RHINOVIRUS RNA SPEC NAA+PROBE: NOT DETECTED
RSV RNA NPH QL NAA+NON-PROBE: NOT DETECTED
SARS-COV-2 RNA NPH QL NAA+NON-PROBE: NOT DETECTED
SODIUM SERPL-SCNC: 135 MMOL/L (ref 136–145)
WBC NRBC COR # BLD AUTO: 18.78 10*3/MM3 (ref 3.4–10.8)

## 2024-12-15 PROCEDURE — 80053 COMPREHEN METABOLIC PANEL: CPT | Performed by: FAMILY MEDICINE

## 2024-12-15 PROCEDURE — 99222 1ST HOSP IP/OBS MODERATE 55: CPT | Performed by: INTERNAL MEDICINE

## 2024-12-15 PROCEDURE — 87205 SMEAR GRAM STAIN: CPT | Performed by: FAMILY MEDICINE

## 2024-12-15 PROCEDURE — 94640 AIRWAY INHALATION TREATMENT: CPT

## 2024-12-15 PROCEDURE — 25010000002 LEVETIRACETAM IN NACL 0.82% 500 MG/100ML SOLUTION: Performed by: FAMILY MEDICINE

## 2024-12-15 PROCEDURE — 25010000002 POTASSIUM CHLORIDE 10 MEQ/100ML SOLUTION: Performed by: FAMILY MEDICINE

## 2024-12-15 PROCEDURE — 94799 UNLISTED PULMONARY SVC/PX: CPT

## 2024-12-15 PROCEDURE — 25010000002 VANCOMYCIN 1 G RECONSTITUTED SOLUTION 1 EACH VIAL: Performed by: FAMILY MEDICINE

## 2024-12-15 PROCEDURE — 87641 MR-STAPH DNA AMP PROBE: CPT | Performed by: FAMILY MEDICINE

## 2024-12-15 PROCEDURE — 94761 N-INVAS EAR/PLS OXIMETRY MLT: CPT

## 2024-12-15 PROCEDURE — 25010000002 PIPERACILLIN SOD-TAZOBACTAM PER 1 G: Performed by: FAMILY MEDICINE

## 2024-12-15 PROCEDURE — 25810000003 SODIUM CHLORIDE 0.9 % SOLUTION 250 ML FLEX CONT: Performed by: FAMILY MEDICINE

## 2024-12-15 PROCEDURE — 85025 COMPLETE CBC W/AUTO DIFF WBC: CPT | Performed by: FAMILY MEDICINE

## 2024-12-15 PROCEDURE — 87070 CULTURE OTHR SPECIMN AEROBIC: CPT | Performed by: FAMILY MEDICINE

## 2024-12-15 PROCEDURE — 0202U NFCT DS 22 TRGT SARS-COV-2: CPT | Performed by: FAMILY MEDICINE

## 2024-12-15 PROCEDURE — 83690 ASSAY OF LIPASE: CPT | Performed by: FAMILY MEDICINE

## 2024-12-15 RX ORDER — DULOXETIN HYDROCHLORIDE 30 MG/1
30 CAPSULE, DELAYED RELEASE ORAL DAILY
Status: DISCONTINUED | OUTPATIENT
Start: 2024-12-15 | End: 2024-12-19 | Stop reason: HOSPADM

## 2024-12-15 RX ORDER — HYDROCODONE BITARTRATE AND ACETAMINOPHEN 10; 325 MG/1; MG/1
1 TABLET ORAL 3 TIMES DAILY PRN
Status: DISCONTINUED | OUTPATIENT
Start: 2024-12-15 | End: 2024-12-19 | Stop reason: HOSPADM

## 2024-12-15 RX ORDER — DIAZEPAM 5 MG/1
10 TABLET ORAL 2 TIMES DAILY PRN
Status: DISCONTINUED | OUTPATIENT
Start: 2024-12-15 | End: 2024-12-19 | Stop reason: HOSPADM

## 2024-12-15 RX ORDER — OLANZAPINE 5 MG/1
2.5 TABLET ORAL NIGHTLY
Status: DISCONTINUED | OUTPATIENT
Start: 2024-12-15 | End: 2024-12-19 | Stop reason: HOSPADM

## 2024-12-15 RX ORDER — POTASSIUM CHLORIDE 1500 MG/1
20 TABLET, EXTENDED RELEASE ORAL DAILY
Status: DISCONTINUED | OUTPATIENT
Start: 2024-12-16 | End: 2024-12-19 | Stop reason: HOSPADM

## 2024-12-15 RX ORDER — ROSUVASTATIN CALCIUM 5 MG/1
10 TABLET, COATED ORAL NIGHTLY
Status: DISCONTINUED | OUTPATIENT
Start: 2024-12-15 | End: 2024-12-19 | Stop reason: HOSPADM

## 2024-12-15 RX ORDER — LEVETIRACETAM 500 MG/1
500 TABLET ORAL EVERY 12 HOURS
Status: DISCONTINUED | OUTPATIENT
Start: 2024-12-15 | End: 2024-12-19 | Stop reason: HOSPADM

## 2024-12-15 RX ORDER — SUCRALFATE 1 G/1
1 TABLET ORAL 4 TIMES DAILY
Status: DISCONTINUED | OUTPATIENT
Start: 2024-12-15 | End: 2024-12-19 | Stop reason: HOSPADM

## 2024-12-15 RX ORDER — MIDODRINE HYDROCHLORIDE 5 MG/1
2.5 TABLET ORAL
Status: DISCONTINUED | OUTPATIENT
Start: 2024-12-15 | End: 2024-12-19 | Stop reason: HOSPADM

## 2024-12-15 RX ADMIN — Medication 10 ML: at 21:23

## 2024-12-15 RX ADMIN — POTASSIUM CHLORIDE 10 MEQ: 7.46 INJECTION, SOLUTION INTRAVENOUS at 00:24

## 2024-12-15 RX ADMIN — ROSUVASTATIN CALCIUM 10 MG: 5 TABLET, FILM COATED ORAL at 21:21

## 2024-12-15 RX ADMIN — SODIUM CHLORIDE 1000 MG: 900 INJECTION, SOLUTION INTRAVENOUS at 17:42

## 2024-12-15 RX ADMIN — LEVETIRACETAM 500 MG: 5 INJECTION INTRAVASCULAR at 09:19

## 2024-12-15 RX ADMIN — PIPERACILLIN AND TAZOBACTAM 3.38 G: 3; .375 INJECTION, POWDER, FOR SOLUTION INTRAVENOUS at 07:18

## 2024-12-15 RX ADMIN — HYDROCODONE BITARTRATE AND ACETAMINOPHEN 1 TABLET: 10; 325 TABLET ORAL at 09:40

## 2024-12-15 RX ADMIN — MIDODRINE HYDROCHLORIDE 2.5 MG: 5 TABLET ORAL at 17:42

## 2024-12-15 RX ADMIN — APIXABAN 5 MG: 2.5 TABLET, FILM COATED ORAL at 21:21

## 2024-12-15 RX ADMIN — PANTOPRAZOLE SODIUM 40 MG: 40 INJECTION, POWDER, FOR SOLUTION INTRAVENOUS at 05:39

## 2024-12-15 RX ADMIN — PIPERACILLIN AND TAZOBACTAM 3.38 G: 3; .375 INJECTION, POWDER, FOR SOLUTION INTRAVENOUS at 01:49

## 2024-12-15 RX ADMIN — BUDESONIDE AND FORMOTEROL FUMARATE DIHYDRATE 2 PUFF: 160; 4.5 AEROSOL RESPIRATORY (INHALATION) at 20:03

## 2024-12-15 RX ADMIN — SUCRALFATE 1 G: 1 TABLET ORAL at 17:41

## 2024-12-15 RX ADMIN — DIAZEPAM 10 MG: 5 TABLET ORAL at 21:22

## 2024-12-15 RX ADMIN — APIXABAN 5 MG: 2.5 TABLET, FILM COATED ORAL at 09:20

## 2024-12-15 RX ADMIN — DOXYCYCLINE 100 MG: 100 INJECTION, POWDER, LYOPHILIZED, FOR SOLUTION INTRAVENOUS at 10:28

## 2024-12-15 RX ADMIN — DULOXETINE HYDROCHLORIDE 30 MG: 30 CAPSULE, DELAYED RELEASE ORAL at 15:51

## 2024-12-15 RX ADMIN — Medication 10 ML: at 09:20

## 2024-12-15 RX ADMIN — DOXYCYCLINE 100 MG: 100 INJECTION, POWDER, LYOPHILIZED, FOR SOLUTION INTRAVENOUS at 21:23

## 2024-12-15 RX ADMIN — OLANZAPINE 2.5 MG: 5 TABLET, FILM COATED ORAL at 21:22

## 2024-12-15 RX ADMIN — PIPERACILLIN AND TAZOBACTAM 3.38 G: 3; .375 INJECTION, POWDER, FOR SOLUTION INTRAVENOUS at 15:51

## 2024-12-15 RX ADMIN — LEVETIRACETAM 500 MG: 5 INJECTION INTRAVASCULAR at 00:57

## 2024-12-15 RX ADMIN — LEVETIRACETAM 500 MG: 500 TABLET, FILM COATED ORAL at 21:21

## 2024-12-15 RX ADMIN — SUCRALFATE 1 G: 1 TABLET ORAL at 21:22

## 2024-12-15 RX ADMIN — PIPERACILLIN AND TAZOBACTAM 3.38 G: 3; .375 INJECTION, POWDER, FOR SOLUTION INTRAVENOUS at 23:00

## 2024-12-15 RX ADMIN — BUDESONIDE AND FORMOTEROL FUMARATE DIHYDRATE 2 PUFF: 160; 4.5 AEROSOL RESPIRATORY (INHALATION) at 09:07

## 2024-12-15 NOTE — CONSULTS
Subjective     REASON FOR CONSULTATION:  lung cancer  Provide an opinion on any further workup or treatment                             REQUESTING PHYSICIAN:    Zandra    RECORDS OBTAINED:  Records of the patients history including those obtained from the referring provider were reviewed and summarized in detail.      History of Present Illness   This is a 69-year-old lady known to me from clinic with small cell lung cancer stage IV involving the left lower lobe of the lung and contralateral lung nodule.  She has been treated with chemoimmunotherapy utilizing carboplatin/etoposide/atezolizumab for 4 cycles with radiographically improved disease.  She has now on maintenance immunotherapy with atezolizumab.  Treatments have been complicated with anemia/thrombocytopenia and significant fatigue.  She has chronic comorbidities of COPD, anxiety, atherosclerotic disease, degenerative disease of the spine.    Patient presented to the ER on 12/14/2024 complaining of nausea vomiting and generalized weakness.  Evaluation showed a white count of 24.6 higher than her baseline of 13-15, hemoglobin 14.8, platelets 253 ANC 19, sodium 130 creatinine 0.93, normal LFTs, lipase 275 procalcitonin 0.33 lactic acid 3.6.    CT chest abdomen pelvis on admission showed Port-A-Cath in proper position, mild cardiomegaly and coronary artery calcifications, no suspicious mediastinal lymphadenopathy, hilum poorly assessed without contrast, emphysematous changes, lower lobe groundglass consistent with atelectasis, few tree-in-bud opacities left upper lobe anteriorly, small amount of edema surrounding the pancreatic tail suggestive of pancreatitis/enteritis.    She has been given IV fluids and antibiotics.  She reports improved nausea today-tolerating clear liquids.  She denies cough, fever, diarrhea, headaches.    Past Medical History:   Diagnosis Date    COPD (chronic obstructive pulmonary disease)     COPD with acute exacerbation  2020    Small cell carcinoma 3/13/2024        Past Surgical History:   Procedure Laterality Date    BRONCHOSCOPY N/A 2023    Procedure: BRONCHOSCOPY WITH ENDOBRONCHIAL ULTRASOUND (EBUS) with FNA;  Surgeon: oCy Mccarthy MD;  Location:  [facility];  Service: Pulmonary;  Laterality: N/A;  Pre/Post - mediastinal and hilar lymphadenopathy.    BRONCHOSCOPY WITH ION ROBOTIC ASSIST N/A 2024    Procedure: BRONCHOSCOPY WITH ION ROBOT,  ENDOBRONCHIAL ULTRASOUND, FINE NEEDLE ASPIRATIONS,  CRYOTHERAPY BIOPSIES, AND LEFT LOWER LOBE BRONCHOALVEOLAR LAVAGE.;  Surgeon: Alexia Velásquez MD;  Location: [facility] ENDOSCOPY;  Service: Robotics - Pulmonary;  Laterality: N/A;     SECTION      CHEST TUBE INSERTION Left 2024    Procedure: CHEST TUBE INSERTION;  Surgeon: Alexia Velásquez MD;  Location: [facility] ENDOSCOPY;  Service: Thoracic;  Laterality: Left;    CHOLECYSTECTOMY      COLONOSCOPY      ECTOPIC PREGNANCY SURGERY      HYSTERECTOMY      TONSILLECTOMY      TOTAL HIP ARTHROPLASTY Left     VENOUS ACCESS DEVICE (PORT) INSERTION N/A 3/14/2024    Procedure: INSERTION VENOUS ACCESS DEVICE;  Surgeon: Jonas Garner MD;  Location:  [facility] OR;  Service: General;  Laterality: N/A;        No current facility-administered medications on file prior to encounter.     Current Outpatient Medications on File Prior to Encounter   Medication Sig Dispense Refill    albuterol sulfate  (90 Base) MCG/ACT inhaler Inhale 2 puffs Every 4 (Four) Hours As Needed for Wheezing. Takes as needed.      apixaban (ELIQUIS) 5 MG tablet tablet Take 1 tablet by mouth Every 12 (Twelve) Hours. Indications: Other - full anticoagulation 180 tablet 0    budesonide-formoterol (SYMBICORT) 160-4.5 MCG/ACT inhaler Inhale 2 puffs 2 (Two) Times a Day.  12    calcium carbonate (TUMS) 500 MG chewable tablet Chew 1 tablet 2 (Two) Times a Day.      Cyanocobalamin (Vitamin B-12) 500 MCG sublingual tablet PLACE 1 TABLET UNDER THE  TONGUE DAILY 90 tablet 1    Denosumab (PROLIA SC) Inject  under the skin into the appropriate area as directed Every 6 (Six) Months.      diazePAM (VALIUM) 10 MG tablet Take 1 tablet by mouth 2 (Two) Times a Day As Needed for Anxiety (RLS.). Takes 20 mg at bedtime at times when she needs.      DULoxetine (CYMBALTA) 30 MG capsule Take 1 capsule by mouth Daily. 30 capsule 2    folic acid (FOLVITE) 800 MCG tablet TAKE 1 TABLET BY MOUTH DAILY 90 tablet 1    HYDROcodone-acetaminophen (NORCO)  MG per tablet Take 1 tablet by mouth 3 (Three) Times a Day As Needed for Moderate Pain.      Klor-Con M20 20 MEQ CR tablet TAKE 1 TABLET BY MOUTH DAILY 30 tablet 1    levETIRAcetam (KEPPRA) 500 MG tablet TAKE ONE TABLET BY MOUTH EVERY 12 HOURS 180 tablet 0    midodrine (PROAMATINE) 2.5 MG tablet Take 1 tablet by mouth 3 (Three) Times a Day Before Meals.      multivitamin with minerals (PRESERVISION AREDS PO) Take 1 tablet by mouth 2 (Two) Times a Day.      naloxone (NARCAN) 4 MG/0.1ML nasal spray Administer 1 spray into the nostril(s) as directed by provider As Needed.      OLANZapine (zyPREXA) 2.5 MG tablet TAKE ONE TABLET BY MOUTH ONCE NIGHTLY 30 tablet 2    ondansetron (ZOFRAN) 8 MG tablet Take 1 tablet by mouth Every 8 (Eight) Hours As Needed for Nausea or Vomiting. 30 tablet 1    pantoprazole (PROTONIX) 40 MG EC tablet Take 1 tablet by mouth Daily. 30 tablet 3    rosuvastatin (Crestor) 10 MG tablet Take 1 tablet by mouth Every Night. 30 tablet 11    sucralfate (CARAFATE) 1 g tablet TAKE 1 TABLET BY MOUTH 4 TIMES A  tablet 3        ALLERGIES:    Allergies   Allergen Reactions    Codeine GI Intolerance    Percocet [Oxycodone-Acetaminophen] Hives        Social History     Socioeconomic History    Marital status:    Tobacco Use    Smoking status: Every Day     Current packs/day: 1.00     Average packs/day: 1 pack/day for 30.0 years (30.0 ttl pk-yrs)     Types: Cigarettes     Passive exposure: Current     Smokeless tobacco: Never    Tobacco comments:     Began smoking at age 11.  Smoked .5 ppd for 6 years, 2.5 ppd for a year, 2 ppd for 5 years, and 1 ppd for the past 43 years for a 58.5 pack year history.   Vaping Use    Vaping status: Former    Substances: Nicotine, Flavoring    Devices: Disposable   Substance and Sexual Activity    Alcohol use: Yes     Comment: once a year     Drug use: No    Sexual activity: Defer        Family History   Problem Relation Age of Onset    Lung cancer Niece 50    Throat cancer Father     Breast cancer Neg Hx         Review of Systems   Constitutional:  Positive for activity change and fatigue. Negative for fever.   HENT: Negative.     Respiratory:  Positive for shortness of breath (chronic-unchanged). Negative for choking and chest tightness.    Cardiovascular: Negative.    Gastrointestinal:  Positive for nausea and vomiting. Negative for abdominal pain and diarrhea.   Musculoskeletal: Negative.    Neurological:  Positive for weakness. Negative for dizziness, seizures and headaches.   Psychiatric/Behavioral:  Positive for dysphoric mood. Negative for confusion.     \    Objective     Vitals:    12/15/24 0815 12/15/24 0907 12/15/24 0912 12/15/24 1158   BP: 101/67   95/63   BP Location: Left arm   Left arm   Patient Position: Lying   Lying   Pulse: 80 82 85 71   Resp: 18 20 20 20   Temp: 98.1 °F (36.7 °C)      TempSrc: Oral      SpO2: 93% 93%  94%   Weight:       Height:             11/26/2024    10:40 AM   Current Status   ECOG score 0       Physical Exam    CONSTITUTIONAL: pleasant well-developed adult woman  HEENT: no icterus, no thrush, moist membranes  CV: RRR, S1S2, no murmur  RESP: cta bilat, no wheezing, no rales, diminished bs  GI: soft, nontender, no splenomegaly, +BS  MUSC: no edema   NEURO: alert and oriented x3, mild global weakness  PSYCH: normal mood and affect    RECENT LABS:  Hematology WBC   Date Value Ref Range Status   12/15/2024 18.78 (H) 3.40 - 10.80 10*3/mm3 Final      RBC   Date Value Ref Range Status   12/15/2024 3.92 3.77 - 5.28 10*6/mm3 Final     Hemoglobin   Date Value Ref Range Status   12/15/2024 12.8 12.0 - 15.9 g/dL Final     Hematocrit   Date Value Ref Range Status   12/15/2024 39.5 34.0 - 46.6 % Final     Platelets   Date Value Ref Range Status   12/15/2024 222 140 - 450 10*3/mm3 Final        Lab Results   Component Value Date    GLUCOSE 103 (H) 12/15/2024    BUN 7 (L) 12/15/2024    CREATININE 0.79 12/15/2024     (L) 12/15/2024    K 3.6 12/15/2024     12/15/2024    CALCIUM 7.2 (L) 12/15/2024    PROTEINTOT 5.4 (L) 12/15/2024    ALBUMIN 3.0 (L) 12/15/2024    ALT <5 12/15/2024    AST 21 12/15/2024    ALKPHOS 97 12/15/2024    BILITOT 0.3 12/15/2024    GLOB 2.4 12/15/2024    AGRATIO 1.3 12/15/2024    BCR 8.9 12/15/2024    ANIONGAP 8.5 12/15/2024    EGFR 82.1 12/15/2024     CT CAP:  IMPRESSION:  Impression:  1. Mild new patchy tree-in-bud groundglass opacities involving the anterior left upper lobe which may relate to atypical/viral pneumonia versus nonspecific inflammatory process.  2. Emphysema. No new solid pulmonary nodule.  3. Suspected pancreatitis involving pancreatic tail with wall thickening of the proximal jejunum near the duodenal jejunal junction which may relate to nonspecific enteritis, better assessed on prior abdominal CT.  4. Additional chronic findings above.     Assessment & Plan     *Small cell lung cancer, extensive stage  The patient had essentially a complete metabolic response to carboplatin/etoposide/atezolizumab  Currently receiving maintenance atezolizumab every 3 weeks, most recently given 11/26/2024  CT chest abdomen pelvis on admission showed no evidence of disease recurrence/progression  *Nausea/vomiting  CT on admission showed changes of pancreatitis versus enteritis and minimally elevated lipase on labs  Tolerating clears  *Depression/fatigue-recently initiated Cymbalta 30 mg daily 11/26/2024  *chronic leukocytosis of  malignancy/smoking    Oncology Plan/Recommendations:  Admit imaging reviewed with patient-no evidence of recurrent/progressive malignancy  Doubt immunotherapy s/e as cause of presenting symptoms  Consider MRI brain with/without IV contrast if nausea/vomiting recurs  Will move next oncology follow up and treatment out 2 weeks  Please call if needed

## 2024-12-15 NOTE — H&P
HISTORY AND PHYSICAL      Patient Care Team:  Nigel De Paz MD as PCP - General (Family Medicine)  Nikita Burns MD as Consulting Physician (Hematology and Oncology)  Alexia Velásquez MD as Referring Physician (Thoracic Surgery)    CHIEF COMPLAINT: Vomiting and weakness    HISTORY OF PRESENT ILLNESS:    69-year-old white female became ill on Tuesday with acute onset of vomiting.  She denied any abdominal pain or diarrhea says she tried to get up and felt so weak she went down to the floor was able to get back up to the bed and slept most of a Tuesday and almost all day Wednesday.  Patient continued to have intolerance to even clear liquids and continue to vomit and finally was convinced by her granddaughter to come into the hospital.  She had any fever chills no abdominal pain dysuria frequency cough chest congestion or shortness of breath.  Is not able to take any of her medications since Tuesday.    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 She has now on maintenance immunotherapy with atezolizumab. Treatments have been complicated with anemia/thrombocytopenia and significant fatigue.     She was evaluated emergency room and found to have left upper lobe pneumonia and acute pancreatitis and she has been was admitted for further evaluation and treatment.      Patient feels much better this morning nausea vomiting's been much resolved is tolerating clear liquids    Past Medical History:   Diagnosis Date    COPD (chronic obstructive pulmonary disease)     COPD with acute exacerbation 09/28/2020    Small cell carcinoma 3/13/2024     Past Surgical History:   Procedure Laterality Date    BRONCHOSCOPY N/A 8/25/2023    Procedure: BRONCHOSCOPY WITH ENDOBRONCHIAL ULTRASOUND (EBUS) with FNA;  Surgeon: Coy Mccarthy MD;  Location: [facility] ENDOSCOPY;   Service: Pulmonary;  Laterality: N/A;  Pre/Post - mediastinal and hilar lymphadenopathy.    BRONCHOSCOPY WITH ION ROBOTIC ASSIST N/A 2024    Procedure: BRONCHOSCOPY WITH ION ROBOT,  ENDOBRONCHIAL ULTRASOUND, FINE NEEDLE ASPIRATIONS,  CRYOTHERAPY BIOPSIES, AND LEFT LOWER LOBE BRONCHOALVEOLAR LAVAGE.;  Surgeon: Alexia Velásquez MD;  Location: [facility] ENDOSCOPY;  Service: Robotics - Pulmonary;  Laterality: N/A;     SECTION      CHEST TUBE INSERTION Left 2024    Procedure: CHEST TUBE INSERTION;  Surgeon: Alexia Velásquez MD;  Location: [facility] ENDOSCOPY;  Service: Thoracic;  Laterality: Left;    CHOLECYSTECTOMY      COLONOSCOPY      ECTOPIC PREGNANCY SURGERY      HYSTERECTOMY      TONSILLECTOMY      TOTAL HIP ARTHROPLASTY Left     VENOUS ACCESS DEVICE (PORT) INSERTION N/A 3/14/2024    Procedure: INSERTION VENOUS ACCESS DEVICE;  Surgeon: Jonas Garner MD;  Location: [facility] OR;  Service: General;  Laterality: N/A;     Family History   Problem Relation Age of Onset    Lung cancer Niece 50    Throat cancer Father     Breast cancer Neg Hx      Social History     Tobacco Use    Smoking status: Every Day     Current packs/day: 1.00     Average packs/day: 1 pack/day for 30.0 years (30.0 ttl pk-yrs)     Types: Cigarettes     Passive exposure: Current    Smokeless tobacco: Never    Tobacco comments:     Began smoking at age 11.  Smoked .5 ppd for 6 years, 2.5 ppd for a year, 2 ppd for 5 years, and 1 ppd for the past 43 years for a 58.5 pack year history.   Vaping Use    Vaping status: Former    Substances: Nicotine, Flavoring    Devices: Disposable   Substance Use Topics    Alcohol use: Yes     Comment: once a year     Drug use: No     Medications Prior to Admission   Medication Sig Dispense Refill Last Dose/Taking    albuterol sulfate  (90 Base) MCG/ACT inhaler Inhale 2 puffs Every 4 (Four) Hours As Needed for Wheezing. Takes as needed.   Past Week Evening    apixaban (ELIQUIS) 5 MG tablet tablet  Take 1 tablet by mouth Every 12 (Twelve) Hours. Indications: Other - full anticoagulation 180 tablet 0 Taking    budesonide-formoterol (SYMBICORT) 160-4.5 MCG/ACT inhaler Inhale 2 puffs 2 (Two) Times a Day.  12 Past Week Evening    calcium carbonate (TUMS) 500 MG chewable tablet Chew 1 tablet 2 (Two) Times a Day.   Taking    Cyanocobalamin (Vitamin B-12) 500 MCG sublingual tablet PLACE 1 TABLET UNDER THE TONGUE DAILY 90 tablet 1 Taking    Denosumab (PROLIA SC) Inject  under the skin into the appropriate area as directed Every 6 (Six) Months.   Taking    diazePAM (VALIUM) 10 MG tablet Take 1 tablet by mouth 2 (Two) Times a Day As Needed for Anxiety (RLS.). Takes 20 mg at bedtime at times when she needs.   Taking As Needed    DULoxetine (CYMBALTA) 30 MG capsule Take 1 capsule by mouth Daily. 30 capsule 2 Taking    folic acid (FOLVITE) 800 MCG tablet TAKE 1 TABLET BY MOUTH DAILY 90 tablet 1 Taking    HYDROcodone-acetaminophen (NORCO)  MG per tablet Take 1 tablet by mouth 3 (Three) Times a Day As Needed for Moderate Pain.   Taking As Needed    Klor-Con M20 20 MEQ CR tablet TAKE 1 TABLET BY MOUTH DAILY 30 tablet 1 Taking    levETIRAcetam (KEPPRA) 500 MG tablet TAKE ONE TABLET BY MOUTH EVERY 12 HOURS 180 tablet 0 Taking    midodrine (PROAMATINE) 2.5 MG tablet Take 1 tablet by mouth 3 (Three) Times a Day Before Meals.   Taking    multivitamin with minerals (PRESERVISION AREDS PO) Take 1 tablet by mouth 2 (Two) Times a Day.   Taking    naloxone (NARCAN) 4 MG/0.1ML nasal spray Administer 1 spray into the nostril(s) as directed by provider As Needed.   Taking As Needed    OLANZapine (zyPREXA) 2.5 MG tablet TAKE ONE TABLET BY MOUTH ONCE NIGHTLY 30 tablet 2 Taking    ondansetron (ZOFRAN) 8 MG tablet Take 1 tablet by mouth Every 8 (Eight) Hours As Needed for Nausea or Vomiting. 30 tablet 1 Taking As Needed    pantoprazole (PROTONIX) 40 MG EC tablet Take 1 tablet by mouth Daily. 30 tablet 3 Taking    rosuvastatin (Crestor)  "10 MG tablet Take 1 tablet by mouth Every Night. 30 tablet 11 Taking    sucralfate (CARAFATE) 1 g tablet TAKE 1 TABLET BY MOUTH 4 TIMES A  tablet 3 Taking     Allergies:  Codeine and Percocet [oxycodone-acetaminophen]     Review of Systems   Gastrointestinal:  Positive for nausea and vomiting.   Neurological:  Positive for weakness.       Objective       Vital Signs  Temp:  [97.5 °F (36.4 °C)-98.6 °F (37 °C)] 98.1 °F (36.7 °C)  Heart Rate:  [71-91] 71  Resp:  [18-20] 20  BP: ()/(63-85) 95/63  Oxygen Therapy  SpO2: 94 %  Pulse Oximetry Type: Continuous  Device (Oxygen Therapy): nasal cannula  Flow (L/min) (Oxygen Therapy): 2}  Body mass index is 18.1 kg/m².  Flowsheet Rows      Flowsheet Row First Filed Value   Admission Height 160 cm (62.99\") Documented at 12/14/2024 1301   Admission Weight 48.1 kg (106 lb) Documented at 12/14/2024 1301                   Physical Exam  Vitals and nursing note reviewed.   Constitutional:       Appearance: She is underweight. She is not ill-appearing or toxic-appearing.   HENT:      Head: Normocephalic.      Comments: Temporal wasting noted  Eyes:      Conjunctiva/sclera: Conjunctivae normal.   Neck:      Thyroid: No thyromegaly.      Vascular: No JVD.   Cardiovascular:      Rate and Rhythm: Normal rate and regular rhythm.      Heart sounds: Normal heart sounds. No murmur heard.  Pulmonary:      Effort: Pulmonary effort is normal. No respiratory distress.      Breath sounds: Normal breath sounds. No wheezing or rales.   Abdominal:      General: Bowel sounds are normal. There is no distension.      Palpations: Abdomen is soft.      Tenderness: There is no abdominal tenderness. There is no guarding.   Skin:     General: Skin is warm and dry.      Findings: No rash.   Neurological:      General: No focal deficit present.      Mental Status: She is alert and oriented to person, place, and time. Mental status is at baseline.          Debilities/Disabilities Identified: " None    Emotional Behavior: Appropriate    Result Review        I have personally reviewed the results from the time of this admission to 12/15/2024 12:51 EST and agree with these findings:  [x]  Laboratory  []  Microbiology  [x]  Radiology  [x]  EKG/Telemetry   []  Cardiology/Vascular   [x]  Pathology  [x]  Old records  []  Other:          Results Review:    I reviewed the patient's new clinical results.  Lab Results (most recent)       Procedure Component Value Units Date/Time    Respiratory Culture - Aspirate, Cough [[id number]] Collected: 12/15/24 1006    Specimen: Aspirate from Cough Updated: 12/15/24 1202     Gram Stain Few (2+) WBCs seen      Few (2+) Epithelial cells seen      Few (2+) Mucous strands      Moderate (3+) Mixed bacterial morphotypes seen on Gram Stain    Lipase [[id number]]  (Abnormal) Collected: 12/15/24 0405    Specimen: Blood Updated: 12/15/24 0901     Lipase 87 U/L     Comprehensive Metabolic Panel [[id number]]  (Abnormal) Collected: 12/15/24 0405    Specimen: Blood Updated: 12/15/24 0550     Glucose 103 mg/dL      BUN 7 mg/dL      Creatinine 0.79 mg/dL      Sodium 135 mmol/L      Potassium 3.6 mmol/L      Chloride 102 mmol/L      CO2 24.5 mmol/L      Calcium 7.2 mg/dL      Total Protein 5.4 g/dL      Albumin 3.0 g/dL      ALT (SGPT) <5 U/L      AST (SGOT) 21 U/L      Alkaline Phosphatase 97 U/L      Total Bilirubin 0.3 mg/dL      Globulin 2.4 gm/dL      A/G Ratio 1.3 g/dL      BUN/Creatinine Ratio 8.9     Anion Gap 8.5 mmol/L      eGFR 82.1 mL/min/1.73     Narrative:      GFR Categories in Chronic Kidney Disease (CKD)      GFR Category          GFR (mL/min/1.73)    Interpretation  G1                     90 or greater         Normal or high (1)  G2                      60-89                Mild decrease (1)  G3a                   45-59                Mild to moderate decrease  G3b                   30-44                Moderate to severe decrease  G4                    15-29                 Severe decrease  G5                    14 or less           Kidney failure          (1)In the absence of evidence of kidney disease, neither GFR category G1 or G2 fulfill the criteria for CKD.    eGFR calculation 2021 CKD-EPI creatinine equation, which does not include race as a factor    CBC & Differential [[id number]]  (Abnormal) Collected: 12/15/24 0405    Specimen: Blood Updated: 12/15/24 0528    Narrative:      The following orders were created for panel order CBC & Differential.  Procedure                               Abnormality         Status                     ---------                               -----------         ------                     CBC Auto Differential[[id number]]        Abnormal            Final result                 Please view results for these tests on the individual orders.    CBC Auto Differential [[id number]]  (Abnormal) Collected: 12/15/24 0405    Specimen: Blood Updated: 12/15/24 0528     WBC 18.78 10*3/mm3      RBC 3.92 10*6/mm3      Hemoglobin 12.8 g/dL      Hematocrit 39.5 %      .8 fL      MCH 32.7 pg      MCHC 32.4 g/dL      RDW 13.2 %      RDW-SD 49.0 fl      MPV 10.2 fL      Platelets 222 10*3/mm3      Neutrophil % 73.6 %      Lymphocyte % 16.9 %      Monocyte % 8.6 %      Eosinophil % 0.2 %      Basophil % 0.2 %      Immature Grans % 0.5 %      Neutrophils, Absolute 13.81 10*3/mm3      Lymphocytes, Absolute 3.18 10*3/mm3      Monocytes, Absolute 1.62 10*3/mm3      Eosinophils, Absolute 0.03 10*3/mm3      Basophils, Absolute 0.04 10*3/mm3      Immature Grans, Absolute 0.10 10*3/mm3      nRBC 0.0 /100 WBC     MRSA Screen, PCR (Inpatient) - Swab, Nares [[id number]] Collected: 12/15/24 0101    Specimen: Swab from Nares Updated: 12/15/24 0104    Respiratory Panel PCR w/COVID-19(SARS-CoV-2) [facility]/PUSHPA/KIRTI/PAD/COR/NICK In-House, NP Swab in UTM/VTM, 2 HR TAT - Swab, Nasopharynx [[id number]] Collected: 12/15/24 0002    Specimen: Swab from Nasopharynx Updated: 12/15/24 0005    STAT  "Lactic Acid, Reflex [[id number]]  (Normal) Collected: 12/14/24 2123    Specimen: Blood Updated: 12/14/24 2213     Lactate 1.8 mmol/L     STAT Lactic Acid, Reflex [[id number]]  (Abnormal) Collected: 12/14/24 1836    Specimen: Blood from Arm, Right Updated: 12/14/24 1920     Lactate 2.3 mmol/L     Procalcitonin [[id number]]  (Abnormal) Collected: 12/14/24 1337    Specimen: Blood Updated: 12/14/24 1746     Procalcitonin 0.33 ng/mL     Narrative:      As a Marker for Sepsis (Non-Neonates):    1. <0.5 ng/mL represents a low risk of severe sepsis and/or septic shock.  2. >2 ng/mL represents a high risk of severe sepsis and/or septic shock.    As a Marker for Lower Respiratory Tract Infections that require antibiotic therapy:    PCT on Admission    Antibiotic Therapy       6-12 Hrs later    >0.5                Strongly Recommended  >0.25 - <0.5        Recommended   0.1 - 0.25          Discouraged              Remeasure/reassess PCT  <0.1                Strongly Discouraged     Remeasure/reassess PCT    As 28 day mortality risk marker: \"Change in Procalcitonin Result\" (>80% or <=80%) if Day 0 (or Day 1) and Day 4 values are available. Refer to http://www.[facility]-pct-calculator.com    Change in PCT <=80%  A decrease of PCT levels below or equal to 80% defines a positive change in PCT test result representing a higher risk for 28-day all-cause mortality of patients diagnosed with severe sepsis for septic shock.    Change in PCT >80%  A decrease of PCT levels of more than 80% defines a negative change in PCT result representing a lower risk for 28-day all-cause mortality of patients diagnosed with severe sepsis or septic shock.       Blood Culture - Blood, Arm, Right [[id number]] Collected: 12/14/24 1714    Specimen: Blood from Arm, Right Updated: 12/14/24 1714    Blood Culture - Blood, Arm, Left [[id number]] Collected: 12/14/24 1713    Specimen: Blood from Arm, Left Updated: 12/14/24 1714    Lactic Acid, Plasma [[id number]]  " (Abnormal) Collected: 12/14/24 1443    Specimen: Blood from Arm, Right Updated: 12/14/24 1511     Lactate 3.6 mmol/L     Urinalysis, Microscopic Only - Urine, Clean Catch [[id number]] Collected: 12/14/24 1328    Specimen: Urine, Clean Catch Updated: 12/14/24 1420     RBC, UA 0-2 /HPF      Comment: Corrected result. Previous result was None Seen /HPF on 12/14/2024 at 1419 EST.        WBC, UA None Seen /HPF      Comment: Corrected result. Previous result was 0-2 /HPF on 12/14/2024 at 1419 EST.        Bacteria, UA None Seen /HPF      Squamous Epithelial Cells, UA 0-2 /HPF      Hyaline Casts, UA None Seen /LPF      Methodology Manual Light Microscopy    CBC & Differential [[id number]]  (Abnormal) Collected: 12/14/24 1337    Specimen: Blood from Arm, Right Updated: 12/14/24 1408    Narrative:      The following orders were created for panel order CBC & Differential.  Procedure                               Abnormality         Status                     ---------                               -----------         ------                     CBC Auto Differential[[id number]]        Abnormal            Final result               Scan Slide[[id number]]                   Normal              Final result                 Please view results for these tests on the individual orders.    Scan Slide [[id number]]  (Normal) Collected: 12/14/24 1337    Specimen: Blood from Arm, Right Updated: 12/14/24 1408     RBC Morphology Normal     WBC Morphology Normal     Platelet Morphology Normal    Comprehensive Metabolic Panel [[id number]]  (Abnormal) Collected: 12/14/24 1337    Specimen: Blood from Arm, Right Updated: 12/14/24 1404     Glucose 134 mg/dL      BUN 9 mg/dL      Creatinine 0.93 mg/dL      Sodium 130 mmol/L      Potassium 3.3 mmol/L      Chloride 92 mmol/L      CO2 26.8 mmol/L      Calcium 8.8 mg/dL      Total Protein 6.6 g/dL      Albumin 3.6 g/dL      ALT (SGPT) <5 U/L      AST (SGOT) 25 U/L      Alkaline Phosphatase 122 U/L       Total Bilirubin 0.3 mg/dL      Globulin 3.0 gm/dL      A/G Ratio 1.2 g/dL      BUN/Creatinine Ratio 9.7     Anion Gap 11.2 mmol/L      eGFR 67.5 mL/min/1.73     Narrative:      GFR Categories in Chronic Kidney Disease (CKD)      GFR Category          GFR (mL/min/1.73)    Interpretation  G1                     90 or greater         Normal or high (1)  G2                      60-89                Mild decrease (1)  G3a                   45-59                Mild to moderate decrease  G3b                   30-44                Moderate to severe decrease  G4                    15-29                Severe decrease  G5                    14 or less           Kidney failure          (1)In the absence of evidence of kidney disease, neither GFR category G1 or G2 fulfill the criteria for CKD.    eGFR calculation 2021 CKD-EPI creatinine equation, which does not include race as a factor    Lipase [[id number]]  (Abnormal) Collected: 12/14/24 1337    Specimen: Blood from Arm, Right Updated: 12/14/24 1403     Lipase 275 U/L     Urinalysis With Microscopic If Indicated (No Culture) - Urine, Clean Catch [[id number]]  (Abnormal) Collected: 12/14/24 1328    Specimen: Urine, Clean Catch Updated: 12/14/24 1352     Color, UA Yellow     Appearance, UA Clear     pH, UA 6.0     Specific Gravity, UA 1.010     Glucose, UA Negative     Ketones, UA Negative     Bilirubin, UA Negative     Blood, UA Trace     Protein, UA 30 mg/dL (1+)     Leuk Esterase, UA Negative     Nitrite, UA Negative     Urobilinogen, UA 0.2 E.U./dL    CBC Auto Differential [[id number]]  (Abnormal) Collected: 12/14/24 1337    Specimen: Blood from Arm, Right Updated: 12/14/24 1350     WBC 24.66 10*3/mm3      RBC 4.56 10*6/mm3      Hemoglobin 14.8 g/dL      Hematocrit 45.2 %      MCV 99.1 fL      MCH 32.5 pg      MCHC 32.7 g/dL      RDW 12.9 %      RDW-SD 47.2 fl      MPV 9.8 fL      Platelets 253 10*3/mm3      Neutrophil % 77.3 %      Lymphocyte % 12.1 %      Monocyte % 6.9 %       Eosinophil % 3.0 %      Basophil % 0.3 %      Immature Grans % 0.4 %      Neutrophils, Absolute 19.07 10*3/mm3      Lymphocytes, Absolute 2.98 10*3/mm3      Monocytes, Absolute 1.69 10*3/mm3      Eosinophils, Absolute 0.75 10*3/mm3      Basophils, Absolute 0.07 10*3/mm3      Immature Grans, Absolute 0.10 10*3/mm3      nRBC 0.0 /100 WBC     [place] Draw [[id number]] Collected: 12/14/24 1337    Specimen: Blood from Arm, Right Updated: 12/14/24 1346    Narrative:      The following orders were created for panel order [place] Draw.  Procedure                               Abnormality         Status                     ---------                               -----------         ------                     Green Top (Gel)[[id number]]                                  Final result               Lavender Top[[id number]]                                     Final result               Gold Top - SST[[id number]]                                   Final result               Light Blue Top[[id number]]                                   Final result                 Please view results for these tests on the individual orders.    Green Top (Gel) [[id number]] Collected: 12/14/24 1337    Specimen: Blood from Arm, Right Updated: 12/14/24 1346     Extra Tube Hold for add-ons.     Comment: Auto resulted.       Lavender Top [[id number]] Collected: 12/14/24 1337    Specimen: Blood from Arm, Right Updated: 12/14/24 1346     Extra Tube hold for add-on     Comment: Auto resulted       Gold Top - SST [[id number]] Collected: 12/14/24 1337    Specimen: Blood from Arm, Right Updated: 12/14/24 1346     Extra Tube Hold for add-ons.     Comment: Auto resulted.       Light Blue Top [[id number]] Collected: 12/14/24 1337    Specimen: Blood from Arm, Right Updated: 12/14/24 1346     Extra Tube Hold for add-ons.     Comment: Auto resulted               Imaging Results (Most Recent)       Procedure Component Value Units Date/Time    CT Chest Without Contrast  Diagnostic [[id number]] Collected: 12/14/24 1811     Updated: 12/14/24 1822    Narrative:      CT CHEST WO CONTRAST DIAGNOSTIC    Date of Exam: 12/14/2024 5:13 PM EST    Indication: Sepsis, history of lung cancer..    Comparison: CT chest with contrast 9/17/2024, PET/CT 6/6/2024, CT abdomen and pelvis with contrast 12/14/2024    Technique: Axial CT images were obtained of the chest without contrast administration.  Sagittal and coronal reconstructions were performed.  Automated exposure control and iterative reconstruction methods were used.    Findings:  Visualized noncontrast soft tissues of the lower neck are without acute abnormality. There is a right-sided port catheter with the tip at the proximal right atrium. Mild cardiomegaly. Coronary artery calcifications. Negative for pericardial effusion. No   suspicious mediastinal adenopathy within limits of a noncontrast exam. Assessment for hilar adenopathy limited due to noncontrast technique.    The trachea and mainstem bronchi are patent. Emphysema with scarring at the lung apices. No pneumothorax. Dependent lower lobe groundglass opacities consistent with atelectasis. There are few mild areas of patchy tree-in-bud groundglass opacities   involving the anterior left upper lobe which may relate to atypical infectious or inflammatory etiology (3/31). No new or enlarging solid pulmonary nodule.    Upper abdomen demonstrates small amount edema surrounding the pancreatic tail in the proximal jejunum which may relate to pancreatitis/enteritis better assessed on the prior CT exam. The gallbladder is absent. No aggressive osseous lesion or acute   fracture.      Impression:      Impression:  1. Mild new patchy tree-in-bud groundglass opacities involving the anterior left upper lobe which may relate to atypical/viral pneumonia versus nonspecific inflammatory process.  2. Emphysema. No new solid pulmonary nodule.  3. Suspected pancreatitis involving pancreatic tail with  wall thickening of the proximal jejunum near the duodenal jejunal junction which may relate to nonspecific enteritis, better assessed on prior abdominal CT.  4. Additional chronic findings above.        Electronically Signed: Grayson Guevara MD    12/14/2024 6:19 PM EST    Workstation ID: [id number]    CT Abdomen Pelvis With Contrast [[id number]] Collected: 12/14/24 1518     Updated: 12/14/24 1544    Narrative:      CT ABDOMEN PELVIS W CONTRAST    Date of Exam: 12/14/2024 2:48 PM EST    Indication: Vomiting.    Comparison: CT abdomen and pelvis with contrast 9/17/2023    Technique: Axial CT images were obtained of the abdomen and pelvis following the uneventful intravenous administration of iodinated contrast. Sagittal and coronal reconstructions were performed.  Automated exposure control and iterative reconstruction   methods were used.      Findings:  Visualized lung bases without consolidation. The heart is mildly enlarged, partially imaged. No pericardial effusion or pleural effusion.    The liver is normal in size and contour. There is a small area of hepatic parenchymal arterial enhancement which could relate to flash filling hemangioma or perfusional variant, nonspecific measuring 6 mm (2/20). Gallbladder absent. The adrenal glands   are normal. Spleen is without acute abnormality. Negative for biliary dilatation.    The pancreas demonstrates mild stranding surrounding the pancreatic tail consistent with acute interstitial pancreatitis. No organized peripancreatic fluid collection. There is a small cystic lesion in the pancreatic body which is stable which may relate   to dilated side branch or branch duct type IPMN measuring 5 mm (2/23).    The kidneys are symmetrically enhancing. Negative for hydronephrosis or hydroureter. Several small low-attenuation bilateral renal lesions suggesting tiny cyst, too small to characterize. Negative for renal calculus or ureteral calculus. Urinary bladder   is distended and  fluid-filled without acute abnormality. Uterus absent. Negative for adnexal mass.    Negative for pneumoperitoneum or pneumatosis intestinalis. Sigmoid diverticulosis without diverticulitis. No findings of appendicitis. No dilated small bowel loops or findings to indicate obstruction. The portal vein and superior mesenteric vein are   patent.     Moderate vascular calcifications of the aorta and branch vasculature. The celiac axis and superior mesenteric artery are patent. The inferior mesenteric artery is patent. Retroaortic left renal vein. No aggressive osseous lesion or acute fracture. Remote   healed fracture of the left inferior pubic ramus. Left hip prosthesis noted. Disc narrowing and vacuum disc at L4-5.      Impression:      Impression:  1. Mild acute interstitial pancreatitis involving the pancreatic tail. No biliary dilatation.  2. Colonic diverticulosis without diverticulitis.  3. Prior cholecystectomy and hysterectomy.  4. Additional chronic findings above.        Electronically Signed: Grayson Guevara MD    12/14/2024 3:28 PM EST    Workstation ID: [id number]          reviewed    ECG/EMG Results (most recent)       None          not reviewed    Assessment & Plan       .  Nausea and vomiting from acute pancreatitis CT shows mild inflammation in tail of pancreas with some enteritis.  With minimal elevation of her lipase which is already improved this morning.  Will continue conservative management IV fluids antiemetics clear liquid she is clearly improved this morning we will monitor and try advance diet as tolerated.    .  Left upper lobe community-acquired atypical pneumonia in immunocompromised patient will continue Zosyn doxycycline and vancomycin and de-escalate once MRSA screen and cultures are back.     .  Sepsis secondary to community-acquired pneumonia she has been fluid resuscitated lactate has trended down continue with current IV antibiotics    .  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  History of multiple shower emboli within the bilateral basal ganglia, bilateral occipital lobes, and right frontal lobe compatible with multiple infarcts 4/2024 on chronic anticoagulation with Eliquis patient has missed several doses we will resume    .  Hyponatremia and hypokalemia secondary to vomiting fluid resuscitation potassium replacement has been ordered    .  COPD nothing acute continue with as needed MetaNebs    .  Hyperlipidemia resume statin once patient able to take p.o.    .  Orthostatic hypotension resume p.o. midodrine    .  GERD with esophagitis continue IV Protonix    .  Major depression anxiety resume oral Valium and Cymbalta once patient able to take p.o.    .  Lumbar degenerative disease with chronic pain      I discussed the patients findings and my recommendations with patient   Nigel De Paz MD  12/15/24  12:51 EST        Much of this encounter note is an electronic transcription/translation of spoken language to printed text. The electronic translation of spoken language may permit erroneous, or at times, nonsensical words or phrases to be inadvertently transcribed; Although I have reviewed the note for such errors, some may still exist.    Note Disclaimer: At [facility], we believe that sharing information builds trust and better relationships. You are receiving this note because you recently visited [facility]. It is possible you will see health information before a provider has talked with you about it. This kind of information can be easy to misunderstand. To help you fully understand what it means for your health, we urge you to discuss this note with your provider.

## 2024-12-15 NOTE — PLAN OF CARE
Goal Outcome Evaluation:  Plan of Care Reviewed With: patient        Progress: improving  Outcome Evaluation: Admitted from ED, complaint of BLE weakness which resulted to fall and vomiting after meal 4x per patient prior to consultation. IV fluids and antibiotics started. Patient had episode of desaturation with lowest reading at 84% in RA, hooked to 2 LPM, sat level went up to the upper 90's while awake, noted intermittent dip while patient is in deep sleep. Patient is able to ambulate with standby assist, claims that n/v resolved.

## 2024-12-15 NOTE — CASE MANAGEMENT/SOCIAL WORK
Continued Stay Note  SANDY Calderon     Patient Name: Pili Arechiga  MRN: [id number]  Today's Date: 12/15/2024    Admit Date: 12/14/2024    Plan: Home w/friend   Discharge Plan       Row Name 12/15/24 1556       Plan    Plan Home w/friend    Plan Comments Spoke with patient introduced self, role and discussed dc plan, needs. Face sheet verified. IMM explained, signed and copy faxed to registration. Patient lives in a home with her friend. She is independent of ADLs. Pt says she is not driving right now. She denies use of DME. She has used [facility] in the past. She has not been to inpatient rehab previously. She has a living will. She sees Dr De Paz as PCP. Her pharmacy is [facility] in [place] and denies issues obtaining medications. There are no issues r/t food, housing, utilities, or transportation. Pt denies abuse. Her plan on dc is to return home. Her friend can drive her home and help her if needed. Will follow. Thanks, Dianne MCGARRY                   Discharge Codes    No documentation.                       Dianne Whelan

## 2024-12-15 NOTE — PROGRESS NOTES
"Pharmacy Antimicrobial Dosing Service    Subjective:  Pili Arechiga is a 69 y.o.female admitted. Pharmacy has been consulted to dose Vancomycin for possible pneumonia.    PMH: SCLC on Tecentriq last cycle 11/26/24, COPD      Assessment/Plan    1. Day #2 Vancomycin: Goal -600 mcg*h/mL. Patient received vancomycin 1000 mg IVx1 in ED, will start regimen vancomycin 1000 mg IV q18h and obtain level prior to 3rd dose or sooner if clinically indicated    2. Day #2 Piperacillin/Tazobactam: 3.375 gm IV q8h for estCrCl > 20 mL/min.    3. Day #2 Doxycycline: 100 mg IV q12h.    Will continue to monitor drug levels, renal function, culture and sensitivities, and patient clinical status.       Objective:  Relevant clinical data and objective history reviewed:  160 cm (63\")   46.4 kg (102 lb 3.2 oz)   Ideal body weight: 52.4 kg (115 lb 8.3 oz)  Body mass index is 18.1 kg/m².        Results from last 7 days   Lab Units 12/15/24  0405 12/14/24  1337   CREATININE mg/dL 0.79 0.93     Estimated Creatinine Clearance: 50.6 mL/min (by C-G formula based on SCr of 0.79 mg/dL).  No intake/output data recorded.    Results from last 7 days   Lab Units 12/15/24  0405 12/14/24  1337   WBC 10*3/mm3 18.78* 24.66*     Temperature    12/14/24 2055 12/14/24 2336 12/15/24 0435   Temp: 97.9 °F (36.6 °C) 97.9 °F (36.6 °C) 97.5 °F (36.4 °C)     Baseline culture/source/susceptibility:  Microbiology Results (last 10 days)       ** No results found for the last 240 hours. **            Anti-Infectives (From admission, onward)      Ordered     Dose/Rate Route Frequency Start Stop    12/15/24 0620  vancomycin (VANCOCIN) 1,000 mg in sodium chloride 0.9 % 250 mL IVPB        Ordering Provider: Nigel De Paz MD    1,000 mg  250 mL/hr over 60 Minutes Intravenous Every 18 Hours 12/15/24 1800 12/20/24 0559    12/14/24 2243  doxycycline (VIBRAMYCIN) 100 mg in sodium chloride 0.9 % 100 mL IVPB-VTB        Ordering Provider: Nigel De Paz, " "MD    100 mg  over 60 Minutes Intravenous Every 12 Hours 12/14/24 2330 12/19/24 2159    12/14/24 2243  piperacillin-tazobactam (ZOSYN) IVPB 3.375 g IVPB in 100 mL NS (VTB)        Ordering Provider: Nigel De Paz MD    3.375 g  over 4 Hours Intravenous Every 8 Hours 12/14/24 2330 12/21/24 2329    12/14/24 2243  vancomycin (VANCOCIN) 1,000 mg in sodium chloride 0.9 % 250 mL IVPB        Ordering Provider: Nigel De Paz MD   Placed in \"And\" Linked Group    1,000 mg  250 mL/hr over 60 Minutes Intravenous Once 12/14/24 2330 12/15/24 0055    12/14/24 2259  Pharmacy to dose vancomycin        Ordering Provider: Nigel De Paz MD     Not Applicable Continuous PRN 12/14/24 2258 12/19/24 2257    12/14/24 2243  Pharmacy to dose vancomycin        Ordering Provider: Nigel De Paz MD   Placed in \"And\" Linked Group     Not Applicable Continuous PRN 12/14/24 2240 12/19/24 2239    12/14/24 2131  doxycycline (VIBRAMYCIN) 100 mg in sodium chloride 0.9 % 100 mL IVPB-VTB        Ordering Provider: Nigel De Paz MD    100 mg  over 60 Minutes Intravenous Once 12/14/24 2230 12/14/24 2300    12/14/24 1640  piperacillin-tazobactam (ZOSYN) IVPB 3.375 g IVPB in 100 mL NS (VTB)        Ordering Provider: Jameson Lopez MD    3.375 g  over 30 Minutes Intravenous Once 12/14/24 1656 12/14/24 1810            Adelina SORENSON PharmD, 12/15/878801:21 EST      "

## 2024-12-16 ENCOUNTER — TELEPHONE (OUTPATIENT)
Dept: ONCOLOGY | Facility: CLINIC | Age: 69
End: 2024-12-16
Payer: MEDICARE

## 2024-12-16 ENCOUNTER — APPOINTMENT (OUTPATIENT)
Dept: GENERAL RADIOLOGY | Facility: HOSPITAL | Age: 69
End: 2024-12-16
Payer: MEDICARE

## 2024-12-16 LAB
ALBUMIN SERPL-MCNC: 2.7 G/DL (ref 3.5–5.2)
ALBUMIN/GLOB SERPL: 1.1 G/DL
ALP SERPL-CCNC: 88 U/L (ref 39–117)
ALT SERPL W P-5'-P-CCNC: <5 U/L (ref 1–33)
ANION GAP SERPL CALCULATED.3IONS-SCNC: 10.1 MMOL/L (ref 5–15)
AST SERPL-CCNC: 13 U/L (ref 1–32)
BASOPHILS # BLD AUTO: 0.06 10*3/MM3 (ref 0–0.2)
BASOPHILS NFR BLD AUTO: 0.4 % (ref 0–1.5)
BILIRUB SERPL-MCNC: 0.4 MG/DL (ref 0–1.2)
BUN SERPL-MCNC: 6 MG/DL (ref 8–23)
BUN/CREAT SERPL: 7.8 (ref 7–25)
CALCIUM SPEC-SCNC: 6.6 MG/DL (ref 8.6–10.5)
CHLORIDE SERPL-SCNC: 103 MMOL/L (ref 98–107)
CO2 SERPL-SCNC: 23.9 MMOL/L (ref 22–29)
CREAT SERPL-MCNC: 0.77 MG/DL (ref 0.57–1)
DEPRECATED RDW RBC AUTO: 49.4 FL (ref 37–54)
EGFRCR SERPLBLD CKD-EPI 2021: 84.7 ML/MIN/1.73
EOSINOPHIL # BLD AUTO: 0.09 10*3/MM3 (ref 0–0.4)
EOSINOPHIL NFR BLD AUTO: 0.6 % (ref 0.3–6.2)
ERYTHROCYTE [DISTWIDTH] IN BLOOD BY AUTOMATED COUNT: 13.1 % (ref 12.3–15.4)
GLOBULIN UR ELPH-MCNC: 2.4 GM/DL
GLUCOSE SERPL-MCNC: 97 MG/DL (ref 65–99)
HCT VFR BLD AUTO: 38.2 % (ref 34–46.6)
HGB BLD-MCNC: 12.3 G/DL (ref 12–15.9)
IMM GRANULOCYTES # BLD AUTO: 0.06 10*3/MM3 (ref 0–0.05)
IMM GRANULOCYTES NFR BLD AUTO: 0.4 % (ref 0–0.5)
LIPASE SERPL-CCNC: 93 U/L (ref 13–60)
LYMPHOCYTES # BLD AUTO: 2.46 10*3/MM3 (ref 0.7–3.1)
LYMPHOCYTES NFR BLD AUTO: 16.5 % (ref 19.6–45.3)
MCH RBC QN AUTO: 32.7 PG (ref 26.6–33)
MCHC RBC AUTO-ENTMCNC: 32.2 G/DL (ref 31.5–35.7)
MCV RBC AUTO: 101.6 FL (ref 79–97)
MONOCYTES # BLD AUTO: 1.32 10*3/MM3 (ref 0.1–0.9)
MONOCYTES NFR BLD AUTO: 8.9 % (ref 5–12)
NEUTROPHILS NFR BLD AUTO: 10.92 10*3/MM3 (ref 1.7–7)
NEUTROPHILS NFR BLD AUTO: 73.2 % (ref 42.7–76)
NRBC BLD AUTO-RTO: 0 /100 WBC (ref 0–0.2)
PLATELET # BLD AUTO: 190 10*3/MM3 (ref 140–450)
PMV BLD AUTO: 10 FL (ref 6–12)
POTASSIUM SERPL-SCNC: 3.1 MMOL/L (ref 3.5–5.2)
PROT SERPL-MCNC: 5.1 G/DL (ref 6–8.5)
RBC # BLD AUTO: 3.76 10*6/MM3 (ref 3.77–5.28)
SODIUM SERPL-SCNC: 137 MMOL/L (ref 136–145)
WBC NRBC COR # BLD AUTO: 14.91 10*3/MM3 (ref 3.4–10.8)

## 2024-12-16 PROCEDURE — 94761 N-INVAS EAR/PLS OXIMETRY MLT: CPT

## 2024-12-16 PROCEDURE — 25810000003 SODIUM CHLORIDE 0.9 % SOLUTION: Performed by: FAMILY MEDICINE

## 2024-12-16 PROCEDURE — 71046 X-RAY EXAM CHEST 2 VIEWS: CPT

## 2024-12-16 PROCEDURE — 94799 UNLISTED PULMONARY SVC/PX: CPT

## 2024-12-16 PROCEDURE — 94664 DEMO&/EVAL PT USE INHALER: CPT

## 2024-12-16 PROCEDURE — 85025 COMPLETE CBC W/AUTO DIFF WBC: CPT | Performed by: FAMILY MEDICINE

## 2024-12-16 PROCEDURE — 83690 ASSAY OF LIPASE: CPT | Performed by: FAMILY MEDICINE

## 2024-12-16 PROCEDURE — 25010000002 FUROSEMIDE PER 20 MG: Performed by: FAMILY MEDICINE

## 2024-12-16 PROCEDURE — 25010000002 ONDANSETRON PER 1 MG: Performed by: FAMILY MEDICINE

## 2024-12-16 PROCEDURE — 25010000002 PIPERACILLIN SOD-TAZOBACTAM PER 1 G: Performed by: FAMILY MEDICINE

## 2024-12-16 PROCEDURE — 97161 PT EVAL LOW COMPLEX 20 MIN: CPT

## 2024-12-16 PROCEDURE — 80053 COMPREHEN METABOLIC PANEL: CPT | Performed by: FAMILY MEDICINE

## 2024-12-16 RX ORDER — FUROSEMIDE 10 MG/ML
40 INJECTION INTRAMUSCULAR; INTRAVENOUS ONCE
Status: COMPLETED | OUTPATIENT
Start: 2024-12-16 | End: 2024-12-16

## 2024-12-16 RX ORDER — POTASSIUM CHLORIDE 1500 MG/1
40 TABLET, EXTENDED RELEASE ORAL ONCE
Status: COMPLETED | OUTPATIENT
Start: 2024-12-16 | End: 2024-12-16

## 2024-12-16 RX ADMIN — Medication 10 ML: at 09:14

## 2024-12-16 RX ADMIN — POTASSIUM CHLORIDE 20 MEQ: 1500 TABLET, EXTENDED RELEASE ORAL at 09:13

## 2024-12-16 RX ADMIN — Medication 10 ML: at 22:04

## 2024-12-16 RX ADMIN — APIXABAN 5 MG: 2.5 TABLET, FILM COATED ORAL at 22:03

## 2024-12-16 RX ADMIN — MIDODRINE HYDROCHLORIDE 2.5 MG: 5 TABLET ORAL at 14:04

## 2024-12-16 RX ADMIN — BUDESONIDE AND FORMOTEROL FUMARATE DIHYDRATE 2 PUFF: 160; 4.5 AEROSOL RESPIRATORY (INHALATION) at 10:44

## 2024-12-16 RX ADMIN — BUDESONIDE AND FORMOTEROL FUMARATE DIHYDRATE 2 PUFF: 160; 4.5 AEROSOL RESPIRATORY (INHALATION) at 20:21

## 2024-12-16 RX ADMIN — DOXYCYCLINE 100 MG: 100 INJECTION, POWDER, LYOPHILIZED, FOR SOLUTION INTRAVENOUS at 22:01

## 2024-12-16 RX ADMIN — APIXABAN 5 MG: 2.5 TABLET, FILM COATED ORAL at 09:13

## 2024-12-16 RX ADMIN — MIDODRINE HYDROCHLORIDE 2.5 MG: 5 TABLET ORAL at 07:45

## 2024-12-16 RX ADMIN — FUROSEMIDE 40 MG: 10 INJECTION, SOLUTION INTRAVENOUS at 14:04

## 2024-12-16 RX ADMIN — SUCRALFATE 1 G: 1 TABLET ORAL at 14:04

## 2024-12-16 RX ADMIN — PIPERACILLIN AND TAZOBACTAM 3.38 G: 3; .375 INJECTION, POWDER, FOR SOLUTION INTRAVENOUS at 07:44

## 2024-12-16 RX ADMIN — DULOXETINE HYDROCHLORIDE 30 MG: 30 CAPSULE, DELAYED RELEASE ORAL at 09:13

## 2024-12-16 RX ADMIN — DOXYCYCLINE 100 MG: 100 INJECTION, POWDER, LYOPHILIZED, FOR SOLUTION INTRAVENOUS at 10:21

## 2024-12-16 RX ADMIN — LEVETIRACETAM 500 MG: 500 TABLET, FILM COATED ORAL at 09:13

## 2024-12-16 RX ADMIN — SUCRALFATE 1 G: 1 TABLET ORAL at 18:29

## 2024-12-16 RX ADMIN — ROSUVASTATIN CALCIUM 10 MG: 5 TABLET, FILM COATED ORAL at 22:03

## 2024-12-16 RX ADMIN — PIPERACILLIN AND TAZOBACTAM 3.38 G: 3; .375 INJECTION, POWDER, FOR SOLUTION INTRAVENOUS at 23:29

## 2024-12-16 RX ADMIN — PANTOPRAZOLE SODIUM 40 MG: 40 INJECTION, POWDER, FOR SOLUTION INTRAVENOUS at 05:56

## 2024-12-16 RX ADMIN — ONDANSETRON 4 MG: 2 INJECTION INTRAMUSCULAR; INTRAVENOUS at 09:26

## 2024-12-16 RX ADMIN — OLANZAPINE 2.5 MG: 5 TABLET, FILM COATED ORAL at 22:02

## 2024-12-16 RX ADMIN — SODIUM CHLORIDE 75 ML/HR: 9 INJECTION, SOLUTION INTRAVENOUS at 04:20

## 2024-12-16 RX ADMIN — POTASSIUM CHLORIDE 40 MEQ: 1500 TABLET, EXTENDED RELEASE ORAL at 09:13

## 2024-12-16 RX ADMIN — DIAZEPAM 10 MG: 5 TABLET ORAL at 22:13

## 2024-12-16 RX ADMIN — PIPERACILLIN AND TAZOBACTAM 3.38 G: 3; .375 INJECTION, POWDER, FOR SOLUTION INTRAVENOUS at 16:15

## 2024-12-16 RX ADMIN — SUCRALFATE 1 G: 1 TABLET ORAL at 09:13

## 2024-12-16 RX ADMIN — MIDODRINE HYDROCHLORIDE 2.5 MG: 5 TABLET ORAL at 18:29

## 2024-12-16 RX ADMIN — SUCRALFATE 1 G: 1 TABLET ORAL at 22:02

## 2024-12-16 RX ADMIN — HYDROCODONE BITARTRATE AND ACETAMINOPHEN 1 TABLET: 10; 325 TABLET ORAL at 16:18

## 2024-12-16 RX ADMIN — LEVETIRACETAM 500 MG: 500 TABLET, FILM COATED ORAL at 22:02

## 2024-12-16 NOTE — TELEPHONE ENCOUNTER
----- Message from Peyton HUDSON sent at 12/16/2024  2:26 PM EST -----  I HAVEN'T TALKED TO THE PATIENT - DO YOU MIND CALLING HER  ----- Message -----  From: Juan J Morris RN  Sent: 12/16/2024   2:24 PM EST  To: Peyton Villasenor    Ok to leave on for 1/9. Did you speak with the patient or do I need to call her?  ----- Message -----  From: Nikita Burns MD  Sent: 12/16/2024   2:22 PM EST  To: Juan J Morris RN    ok  ----- Message -----  From: Juan J Morris RN  Sent: 12/16/2024   1:55 PM EST  To: Nikita Burns MD    Good with 1/9? 2 weeks after xmas  ----- Message -----  From: Peyton Villasenor  Sent: 12/16/2024   1:25 PM EST  To: KAYCEE Silva,    I cancelled the 12/17 and 12/31 appts - pt was already on for 1/9/25 - OK to keep it there or does she need to come in sooner than that???  ----- Message -----  From: Juan J Morris RN  Sent: 12/16/2024  11:52 AM EST  To: [facility]    I believe Ale left at 11:30 am today. Please take care of this today in her absence.  ----- Message -----  From: Nikita Burns MD  Sent: 12/15/2024   2:10 PM EST  To: Juan J Morris RN    Cancel 12/17 treatment and 12/31 scans  Reschedule next lab josemanuel and md/np week after keiko

## 2024-12-16 NOTE — PROGRESS NOTES
Adult Nutrition  Assessment/PES    Patient Name:  Pili Arechiga  YOB: 1955  MRN: [id number]  Admit Date:  12/14/2024    Assessment Date:  12/16/2024    Comments:  follow-up    Po 25% of meals.    Pt meets criteria for:    Severe chronic disease related malnutrition related to scc of lung stage IV as evidenced by muscle & fat loss on exam, less than 75% est energy requirement >= 1 month     Boost Plus in place at meals.    Will cont to follow and monitor.      Reason for Assessment       Row Name 12/16/24 1403          Reason for Assessment    Reason For Assessment identified at risk by screening criteria     Diagnosis cancer diagnosis/related complications;pulmonary disease;gastrointestinal disease  Pnemonia, Acute pancreatitis, Stage IV SC lung CA     Identified At Risk by Screening Criteria MST SCORE 2+                    Nutrition/Diet History       Row Name 12/16/24 5060          Nutrition/Diet History    Typical Intake (Food/Fluid/EN/PN) Spoke w pt who reports yes wt loss, yes appetite loss, does her own meals at home. Anything hard is difficult. Does try get some pro sources. Drinking Boost at visit                    Labs/Tests/Procedures/Meds       Row Name 12/16/24 2702          Labs/Procedures/Meds    Lab Results Reviewed reviewed     Lab Results Comments lip 87 elevated, glu 134        Diagnostic Tests/Procedures    Diagnostic Test/Procedure Reviewed reviewed        Medications    Pertinent Medications Reviewed reviewed     Pertinent Medications Comments kcl                      Estimated/Assessed Needs - Anthropometrics       Row Name 12/16/24 8602          Anthropometrics    Calculation Weight 49.3 kg (108 lb 11 oz)        Estimated/Assessed Needs    Additional Documentation Estimated Calorie Needs (Group);Fluid Requirements (Group);Protein Requirements (Group)        Estimated Calorie Needs    Estimated Calorie Require (kcal/day) 1725 kcal ( 35 kcal/kg )     Estimated Calorie Need Method  kcal/kg        Protein Requirements    Est Protein Requirement Amount (gms/kg) 1.5 gm protein  74 gm pro        Fluid Requirements    Estimated Fluid Requirement Method RDA Method  1725 ml                    Nutrition Prescription Ordered       Row Name 12/16/24 1410          Nutrition Prescription PO    Current PO Diet Full Liquid     Supplement Boost Plus (Ensure Enlive, Ensure Plus)     Supplement Frequency 3 times a day                    Evaluation of Received Nutrient/Fluid Intake       Row Name 12/16/24 1410          Fluid Intake Evaluation    Oral Fluid (mL) 500  ave x 2        PO Evaluation    % PO Intake 25 x 2 meals                    Malnutrition Severity Assessment       Row Name 12/16/24 1411          Malnutrition Severity Assessment    Malnutrition Type Chronic Disease - Related Malnutrition        Insufficient Energy Intake     Insufficient Energy Intake  <75% of est. energy requirement for > or equal to 1 month        Muscle Loss    Temple Region Moderate - slight depression     Clavicle Bone Region Severe - protruding prominent bone     Acromion Bone Region Severe - squared shoulders, bones, and acromion process protrusion prominent     Patellar Region Severe - prominent bone, square looking, very little muscle definition     Anterior Thigh Region Moderate - mild depression on inner thigh     Posterior Calf Region Severe - thin with very little definition/firmness        Fat Loss    Orbital Region  Moderate -  somewhat hollowness, slightly dark circles     Upper Arm Region Severe - mostly skin, very little space between folds, fingers touch        Criteria Met (Must meet criteria for severity in at least 2 of these categories: M Wasting, Fat Loss, Fluid, Secondary Signs, Wt. Status, Intake)    Patient meets criteria for  Severe Malnutrition                     Problem/Interventions:   Problem 1       Row Name 12/16/24 1411          Nutrition Diagnoses Problem 1    Problem 1 Other (comment)  Severe  chronic disease related malnutrition related to scc of lung stage IV as evidenced by muscle & fat loss on exam, less than 75% est energy requirement >= 1 month                          Intervention Goal       Row Name 12/16/24 1412          Intervention Goal    General Meet nutritional needs for age/condition     PO Tolerate PO;PO intake (%)     PO Intake % 50 %                    Nutrition Intervention       Row Name 12/16/24 1412          Nutrition Intervention    RD/Tech Action Encourage intake;Supplement provided;Follow Tx progress                      Education/Evaluation       Row Name 12/16/24 1412          Education    Education Other (comment)  Edu on exam. Edu on easy pro sources. Edu on adequate intake        Monitor/Evaluation    Monitor Per protocol;I&O;PO intake;Supplement intake;Pertinent labs;Weight;Symptoms     Education Follow-up Other (comment)  pt verbalized understanding                     Electronically signed by:  Jessie Siddiqi RD  12/16/24 14:13 EST

## 2024-12-16 NOTE — PLAN OF CARE
Goal Outcome Evaluation:  Plan of Care Reviewed With: patient        Progress: improving  Outcome Evaluation: AO x4. IV fluid ongoing, antibiotics as ordered.  Had intermittent episodes of desaturation  with 2 LPM. Denied n/v.

## 2024-12-16 NOTE — THERAPY EVALUATION
Patient Name: Pili Arechiga  : 1955    MRN: [id number]                              Today's Date: 2024       Admit Date: 2024    Visit Dx:     ICD-10-CM ICD-9-CM   1. Idiopathic acute pancreatitis, unspecified complication status  K85.00 577.0     Patient Active Problem List   Diagnosis    Small cell carcinoma    Fitting and adjustment of vascular catheter    Smoking greater than 40 pack years    Cerebrovascular accident (CVA) due to bilateral embolism of vertebral arteries    Vitamin B12 deficiency    Need for prophylactic chemotherapy    Depression    Pancreatitis    Pneumonia     Past Medical History:   Diagnosis Date    COPD (chronic obstructive pulmonary disease)     COPD with acute exacerbation 2020    Small cell carcinoma 3/13/2024     Past Surgical History:   Procedure Laterality Date    BRONCHOSCOPY N/A 2023    Procedure: BRONCHOSCOPY WITH ENDOBRONCHIAL ULTRASOUND (EBUS) with FNA;  Surgeon: Coy Mccarthy MD;  Location: [facility] ENDOSCOPY;  Service: Pulmonary;  Laterality: N/A;  Pre/Post - mediastinal and hilar lymphadenopathy.    BRONCHOSCOPY WITH ION ROBOTIC ASSIST N/A 2024    Procedure: BRONCHOSCOPY WITH ION ROBOT,  ENDOBRONCHIAL ULTRASOUND, FINE NEEDLE ASPIRATIONS,  CRYOTHERAPY BIOPSIES, AND LEFT LOWER LOBE BRONCHOALVEOLAR LAVAGE.;  Surgeon: Alexia Velásquez MD;  Location: [facility] ENDOSCOPY;  Service: Robotics - Pulmonary;  Laterality: N/A;     SECTION      CHEST TUBE INSERTION Left 2024    Procedure: CHEST TUBE INSERTION;  Surgeon: Alexia Velásquez MD;  Location: [facility] ENDOSCOPY;  Service: Thoracic;  Laterality: Left;    CHOLECYSTECTOMY      COLONOSCOPY      ECTOPIC PREGNANCY SURGERY      HYSTERECTOMY      TONSILLECTOMY      TOTAL HIP ARTHROPLASTY Left     VENOUS ACCESS DEVICE (PORT) INSERTION N/A 3/14/2024    Procedure: INSERTION VENOUS ACCESS DEVICE;  Surgeon: Jonas Garner MD;  Location: [facility] OR;  Service: General;  Laterality: N/A;       General Information       Row Name 12/16/24 0926          Physical Therapy Time and Intention    Document Type evaluation  -     Mode of Treatment physical therapy  -       Row Name 12/16/24 0926          General Information    Patient Profile Reviewed yes  pt admitted with abdominal pain, diagnosed with pancreatitis  -     Prior Level of Function independent:;all household mobility;community mobility  -     Existing Precautions/Restrictions fall;oxygen therapy device and L/min  -     Barriers to Rehab none identified  -       Row Name 12/16/24 0926          Living Environment    People in Home other (see comments)  pt reports she lives with a friend  -       Row Name 12/16/24 0926          Home Main Entrance    Number of Stairs, Main Entrance none  -       Row Name 12/16/24 0926          Stairs Within Home, Primary    Number of Stairs, Within Home, Primary twelve  -     Stair Railings, Within Home, Primary other (see comments)  1 handrail  -     Stairs Comment, Within Home, Primary pt reports she lives on 2nd floor of the home  -[facility] Name 12/16/24 0926          Cognition    Orientation Status (Cognition) oriented x 3  -               User Key  (r) = Recorded By, (t) = Taken By, (c) = Cosigned By      Initials Name Provider Type     Tracey Arnold, PT Physical Therapist                   Mobility       Row Name 12/16/24 0926          Bed Mobility    Comment, (Bed Mobility) deferred up in recliner  -[facility] Name 12/16/24 0926          Sit-Stand Transfer    Sit-Stand [place] (Transfers) contact guard;verbal cues  -     Assistive Device (Sit-Stand Transfers) walker, front-wheeled  -[facility] Name 12/16/24 0926          Gait/Stairs (Locomotion)    [place] Level (Gait) contact guard;verbal cues  -     Assistive Device (Gait) walker, front-wheeled  -     Distance in Feet (Gait) 40  -     Deviations/Abnormal Patterns (Gait) daily decreased  -     Bilateral Gait  Deviations forward flexed posture  -     Comment, (Gait/Stairs) pt initially attempts gait without device, noted attempting to reach out for objects.  pt completes remainder of gait with use of rolling walker.  overall improved mobility with use of walker  -               User Key  (r) = Recorded By, (t) = Taken By, (c) = Cosigned By      Initials Name Provider Type     Tracey Arnold, PT Physical Therapist                   Obj/Interventions       Row Name 12/16/24 0926          Range of Motion Comprehensive    Comment, General Range of Motion LE ROM WFL bilaterally  -       Row Name 12/16/24 0926          Strength Comprehensive (MMT)    Comment, General Manual Muscle Testing (MMT) Assessment LE strength functional within task  -       Row Name 12/16/24 0926          Balance    Comment, Balance CGA for standing balance with walker  -               User Key  (r) = Recorded By, (t) = Taken By, (c) = Cosigned By      Initials Name Provider Type     Tracey Arnold, PT Physical Therapist                   Goals/Plan       Row Name 12/16/24 0926          Bed Mobility Goal 1 (PT)    Activity/Assistive Device (Bed Mobility Goal 1, PT) bed mobility activities, all  -     [place] Level/Cues Needed (Bed Mobility Goal 1, PT) supervision required  -     Time Frame (Bed Mobility Goal 1, PT) 3 days  -JW     Progress/Outcomes (Bed Mobility Goal 1, PT) new goal  -       Row Name 12/16/24 0926          Transfer Goal 1 (PT)    Activity/Assistive Device (Transfer Goal 1, PT) transfers, all  -     [place] Level/Cues Needed (Transfer Goal 1, PT) supervision required  -JW     Time Frame (Transfer Goal 1, PT) 3 days  -JW     Progress/Outcome (Transfer Goal 1, PT) new goal  -       Row Name 12/16/24 0926          Gait Training Goal 1 (PT)    Activity/Assistive Device (Gait Training Goal 1, PT) gait (walking locomotion);assistive device use  -     [place] Level (Gait Training Goal 1, PT)  "supervision required  -     Distance (Gait Training Goal 1, PT) 100  -     Time Frame (Gait Training Goal 1, PT) 3 days  -     Progress/Outcome (Gait Training Goal 1, PT) new goal  -[facility] Name 12/16/24 0926          Therapy Assessment/Plan (PT)    Planned Therapy Interventions (PT) balance training;bed mobility training;gait training;home exercise program;strengthening;transfer training;patient/family education  -               User Key  (r) = Recorded By, (t) = Taken By, (c) = Cosigned By      Initials Name Provider Type    Tracey Carbajal, PT Physical Therapist                   Clinical Impression       [facility] Name 12/16/24 0926          Pain    Pretreatment Pain Rating 0/10 - no pain  -     Posttreatment Pain Rating 0/10 - no pain  -[facility] Name 12/16/24 0926          Plan of Care Review    Plan of Care Reviewed With patient  -     Outcome Evaluation Physical therapy evaluation complete.  Patient performs sit to stand with CGA and gait with rolling walker x40 feet, CGA.  Patient initially attempts giat without device, however overall mechanics improved with use of walker for mobility.  Patient will benefit from physical therapy to address deficits in functional mobility, strength and activity tolerance.  Anticipate patient to return home at discharge, will continue to follow for therapy needs.  -[facility] Name 12/16/24 0926          Therapy Assessment/Plan (PT)    Patient/Family Therapy Goals Statement (PT) \"go home\"  -     Criteria for Skilled Interventions Met (PT) yes;meets criteria  -     Therapy Frequency (PT) 6 times/wk  -     Predicted Duration of Therapy Intervention (PT) 3 days  -[facility] Name 12/16/24 0926          Vital Signs    Intra SpO2 (%) 89  -     O2 Delivery Intra Treatment room air  -     Post SpO2 (%) 92  -     O2 Delivery Post Treatment supplemental O2  -       Row Name 12/16/24 0926          Positioning and Restraints    Pre-Treatment Position " sitting in chair/recliner  -     Post Treatment Position chair  -JW     In Chair reclined;call light within reach;encouraged to call for assist;notified nsg  -               User Key  (r) = Recorded By, (t) = Taken By, (c) = Cosigned By      Initials Name Provider Type    Tracey Carbajal, PT Physical Therapist                   Outcome Measures       Row Name 12/16/24 0926 12/16/24 0432       How much help from another person do you currently need...    Turning from your back to your side while in flat bed without using bedrails? 4  -JW 4  -AT    Moving from lying on back to sitting on the side of a flat bed without bedrails? 4  -JW 4  -AT    Moving to and from a bed to a chair (including a wheelchair)? 3  -JW 3  -AT    Standing up from a chair using your arms (e.g., wheelchair, bedside chair)? 3  -JW 4  -AT    Climbing 3-5 steps with a railing? 3  -JW 3  -AT    To walk in hospital room? 3  -JW 3  -AT    AM-PAC 6 Clicks Score (PT) 20  -JW 21  -AT    Highest Level of Mobility Goal 6 --> Walk 10 steps or more  - 6 --> Walk 10 steps or more  -AT      Row Name 12/16/24 0926          Functional Assessment    Outcome Measure Options AM-PAC 6 Clicks Basic Mobility (PT)  -               User Key  (r) = Recorded By, (t) = Taken By, (c) = Cosigned By      Initials Name Provider Type    Tracey Carbajal, CHEY Physical Therapist    AT Gustavo Sy RN Registered Nurse                                 Physical Therapy Education       Title: PT OT SLP Therapies (In Progress)       Topic: Physical Therapy (In Progress)       Point: Mobility training (Done)       Learning Progress Summary            Patient Acceptance, E,TB, VU by  at 12/16/2024 0950                      Point: Home exercise program (Not Started)       Learner Progress:  Not documented in this visit.                              User Key       Initials Effective Dates Name Provider Type Discipline     06/16/21 -  Tracey Arnold, CHEY Physical  Therapist PT                  PT Recommendation and Plan  Planned Therapy Interventions (PT): balance training, bed mobility training, gait training, home exercise program, strengthening, transfer training, patient/family education  Outcome Evaluation: Physical therapy evaluation complete.  Patient performs sit to stand with CGA and gait with rolling walker x40 feet, CGA.  Patient initially attempts giat without device, however overall mechanics improved with use of walker for mobility.  Patient will benefit from physical therapy to address deficits in functional mobility, strength and activity tolerance.  Anticipate patient to return home at discharge, will continue to follow for therapy needs.     Time Calculation:   PT Evaluation Complexity  History, PT Evaluation Complexity: 1-2 personal factors and/or comorbidities  Examination of Body Systems (PT Eval Complexity): 1-2 elements  Clinical Presentation (PT Evaluation Complexity): stable  Clinical Decision Making (PT Evaluation Complexity): low complexity  Overall Complexity (PT Evaluation Complexity): low complexity     PT Charges       Row Name 12/16/24 0992             Time Calculation    Start Time 0926  -      PT Received On 12/16/24  -JESSY      PT - Next Appointment 12/17/24  -JESSY                User Key  (r) = Recorded By, (t) = Taken By, (c) = Cosigned By      Initials Name Provider Type    Tracey Carbajal, PT Physical Therapist                  Therapy Charges for Today       Code Description Service Date Service Provider Modifiers Qty    [id number]  PT EVAL LOW COMPLEXITY 2 12/16/2024 Tracey Arnold, PT GP 1            PT G-Codes  Outcome Measure Options: AM-PAC 6 Clicks Basic Mobility (PT)  AM-PAC 6 Clicks Score (PT): 20  PT Discharge Summary  Anticipated Discharge Disposition (PT): home    Tracey Arnold PT  12/16/2024

## 2024-12-16 NOTE — PLAN OF CARE
Goal Outcome Evaluation:  Plan of Care Reviewed With: patient           Outcome Evaluation: Physical therapy evaluation complete.  Patient performs sit to stand with CGA and gait with rolling walker x40 feet, CGA.  Patient initially attempts giat without device, however overall mechanics improved with use of walker for mobility.  Patient will benefit from physical therapy to address deficits in functional mobility, strength and activity tolerance.  Anticipate patient to return home at discharge, will continue to follow for therapy needs.    Anticipated Discharge Disposition (PT): home

## 2024-12-16 NOTE — PROGRESS NOTES
"Daily Progress Note:      Chief complaint: Pneumonia, pancreatitis, enteritis, small cell lung cancer, COPD, hypertension, hypokalemia    Subjective     Subjective: Feels better overnight.  Still somewhat short of breath continues to require 2 L O2 to maintain sats above 90 cough chest congestion generalized fatigue malaise stable improved no abdominal pain nausea vomiting     LOS: 2 days     Objective     Vital Signs  Temp:  [97.5 °F (36.4 °C)-98.7 °F (37.1 °C)] 98.7 °F (37.1 °C)  Heart Rate:  [71-91] 91  Resp:  [18-20] 18  BP: ()/(61-73) 100/61  Oxygen Therapy  SpO2: 95 %  Pulse Oximetry Type: Continuous  Device (Oxygen Therapy): nasal cannula  Flow (L/min) (Oxygen Therapy): 2}  Body mass index is 19.24 kg/m².  Flowsheet Rows      Flowsheet Row First Filed Value   Admission Height 160 cm (62.99\") Documented at 12/14/2024 1301   Admission Weight 48.1 kg (106 lb) Documented at 12/14/2024 1301               Documented weights    12/14/24 1301 12/14/24 2055 12/16/24 0507   Weight: 48.1 kg (106 lb) 46.4 kg (102 lb 3.2 oz) 49.3 kg (108 lb 9.6 oz)         Patient Vitals for the past 24 hrs:   BP Temp Temp src Pulse Resp SpO2 Weight   12/16/24 0757 -- -- -- -- -- 95 % --   12/16/24 0728 100/61 98.7 °F (37.1 °C) Oral 91 18 90 % --   12/16/24 0507 -- -- -- -- -- -- 49.3 kg (108 lb 9.6 oz)   12/16/24 0414 100/64 98.7 °F (37.1 °C) Oral 77 20 92 % --   12/16/24 0355 -- -- -- -- -- 92 % --   12/16/24 0350 -- -- -- -- -- 95 % --   12/16/24 0245 -- -- -- -- -- 94 % --   12/16/24 0211 -- -- -- -- -- 93 % --   12/16/24 0045 -- -- -- -- -- 91 % --   12/16/24 0040 -- -- -- -- -- (!) 85 % --   12/15/24 2309 107/70 97.5 °F (36.4 °C) Oral 74 20 92 % --   12/15/24 2257 -- -- -- -- -- 90 % --   12/15/24 2008 -- -- -- 76 20 91 % --   12/15/24 2003 -- -- -- 79 20 90 % --   12/15/24 1956 118/73 97.9 °F (36.6 °C) Oral 81 20 91 % --   12/15/24 1601 103/66 98.7 °F (37.1 °C) Oral 75 18 90 % --   12/15/24 1303 -- -- -- -- -- 90 % -- "   12/15/24 1300 -- -- -- -- -- (!) 85 % --   12/15/24 1158 95/63 -- -- 71 20 94 % --   12/15/24 0912 -- -- -- 85 20 -- --   12/15/24 0907 -- -- -- 82 20 93 % --   12/15/24 0815 101/67 98.1 °F (36.7 °C) Oral 80 18 93 % --       49.3 kg (108 lb 9.6 oz)    Intake/Output                               12/14/24 0701 - 12/15/24 0700 12/15/24 0701 - 12/16/24 0700 12/16/24 0701 - 12/17/24 0700     8665-3709 1347-4377 Total 2366-9021 2212-9554 Total 2593-0538 2610-0719 Total                    Intake    P.O.  --  640 640  600  480 1080  --  -- --    IV Piggyback  --  550 550  450  200 650  100  -- 100    Total Intake -- 1190 1190 8855 088 7555 100 -- 100       Output    Urine  --  1000 1000  1050  900 1950  --  -- --    Total Output -- 1000 1000 8670 918 2500 -- -- --             Intake/Output Summary (Last 24 hours) at 12/16/2024 0804  Last data filed at 12/16/2024 0744  Gross per 24 hour   Intake 1830 ml   Output 1950 ml   Net -120 ml      Intake/Output Summary (Last 24 hours) at 12/16/2024 0804  Last data filed at 12/16/2024 0744  Gross per 24 hour   Intake 1830 ml   Output 1950 ml   Net -120 ml        Review of Systems   Constitutional:  Positive for activity change and fatigue. Negative for appetite change.   HENT:  Negative for congestion.    Respiratory:  Negative for cough, chest tightness, shortness of breath and wheezing.    Cardiovascular:  Negative for chest pain.   Gastrointestinal:  Negative for abdominal distention, abdominal pain, diarrhea, nausea and vomiting.   Endocrine: Negative for polyphagia and polyuria.   Genitourinary:  Negative for frequency.   Skin:  Negative for rash.   Neurological:  Positive for weakness. Negative for light-headedness.   Hematological:  Does not bruise/bleed easily.   Psychiatric/Behavioral:  Negative for agitation and behavioral problems.        Physical Exam  Vitals and nursing note reviewed.   Constitutional:       Appearance: She is well-developed and underweight.   HENT:       Head: Normocephalic.   Eyes:      Conjunctiva/sclera: Conjunctivae normal.   Neck:      Thyroid: No thyromegaly.      Vascular: No JVD.   Cardiovascular:      Rate and Rhythm: Normal rate and regular rhythm.      Heart sounds: Normal heart sounds. No murmur heard.  Pulmonary:      Effort: Pulmonary effort is normal. No respiratory distress.      Breath sounds: Normal breath sounds. No wheezing or rales.   Abdominal:      General: Bowel sounds are normal. There is no distension.      Palpations: Abdomen is soft.      Tenderness: There is no abdominal tenderness. There is no guarding.   Skin:     General: Skin is warm and dry.      Findings: No rash.   Neurological:      General: No focal deficit present.      Mental Status: She is alert and oriented to person, place, and time.         Medication Review:   I have reviewed the patient's current medication list  Scheduled Meds:apixaban, 5 mg, Oral, Q12H  budesonide-formoterol, 2 puff, Inhalation, BID - RT  doxycycline, 100 mg, Intravenous, Q12H  DULoxetine, 30 mg, Oral, Daily  levETIRAcetam, 500 mg, Oral, Q12H  midodrine, 2.5 mg, Oral, TID AC  OLANZapine, 2.5 mg, Oral, Nightly  pantoprazole, 40 mg, Intravenous, Q AM  piperacillin-tazobactam, 3.375 g, Intravenous, Q8H  potassium chloride, 20 mEq, Oral, Daily  rosuvastatin, 10 mg, Oral, Nightly  sodium chloride, 10 mL, Intravenous, Q12H  sucralfate, 1 g, Oral, 4x Daily  vancomycin, 1,000 mg, Intravenous, Q18H      Continuous Infusions:Pharmacy to dose vancomycin,   sodium chloride, 75 mL/hr, Last Rate: 75 mL/hr (12/16/24 0420)      PRN Meds:.  acetaminophen **OR** acetaminophen **OR** acetaminophen    acetaminophen    aluminum-magnesium hydroxide-simethicone    senna-docusate sodium **AND** polyethylene glycol **AND** bisacodyl **AND** bisacodyl    calcium carbonate    diazePAM    HYDROcodone-acetaminophen    ipratropium-albuterol    ondansetron ODT **OR** ondansetron    Pharmacy to dose vancomycin    sodium chloride     sodium chloride    sodium chloride      Result Review        I have personally reviewed the results from the time of this admission to 12/16/2024 08:04 EST and agree with these findings:  [x]  Laboratory  []  Microbiology  [x]  Radiology  []  EKG/Telemetry   []  Cardiology/Vascular   []  Pathology  []  Old records  []  Other:        Labs:  Results from last 7 days   Lab Units 12/16/24  0401 12/15/24  0405 12/14/24  1337   WBC 10*3/mm3 14.91* 18.78* 24.66*   RBC 10*6/mm3 3.76* 3.92 4.56   HEMOGLOBIN g/dL 12.3 12.8 14.8   HEMATOCRIT % 38.2 39.5 45.2   RDW % 13.1 13.2 12.9   MCV fL 101.6* 100.8* 99.1*   MCH pg 32.7 32.7 32.5   MCHC g/dL 32.2 32.4 32.7   MPV fL 10.0 10.2 9.8   PLATELETS 10*3/mm3 190 222 253   RDW-SD fl 49.4 49.0 47.2       Results from last 7 days   Lab Units 12/16/24  0401 12/15/24  0405 12/14/24  1337   SODIUM mmol/L 137 135* 130*   POTASSIUM mmol/L 3.1* 3.6 3.3*   CHLORIDE mmol/L 103 102 92*   CO2 mmol/L 23.9 24.5 26.8   ANION GAP mmol/L 10.1 8.5 11.2   BUN mg/dL 6* 7* 9   CREATININE mg/dL 0.77 0.79 0.93   BUN / CREAT RATIO  7.8 8.9 9.7   EGFR mL/min/1.73 84.7 82.1 67.5   CALCIUM mg/dL 6.6* 7.2* 8.8   GLUCOSE mg/dL 97 103* 134*     CMP:        Lab 12/16/24  0401 12/15/24  0405 12/14/24  1337   SODIUM 137 135* 130*   POTASSIUM 3.1* 3.6 3.3*   CHLORIDE 103 102 92*   CO2 23.9 24.5 26.8   ANION GAP 10.1 8.5 11.2   BUN 6* 7* 9   CREATININE 0.77 0.79 0.93   EGFR 84.7 82.1 67.5   GLUCOSE 97 103* 134*   CALCIUM 6.6* 7.2* 8.8   TOTAL PROTEIN 5.1* 5.4* 6.6   ALBUMIN 2.7* 3.0* 3.6   GLOBULIN 2.4 2.4 3.0   ALT (SGPT) <5 <5 <5   AST (SGOT) 13 21 25   BILIRUBIN 0.4 0.3 0.3   ALK PHOS 88 97 122*   LIPASE 93* 87* 275*       Results from last 7 days   Lab Units 12/16/24  0401 12/15/24  0405 12/14/24  2123 12/14/24  1836 12/14/24  1443 12/14/24  1337   PROCALCITONIN ng/mL  --   --   --   --   --  0.33*   LACTATE mmol/L  --   --  1.8 2.3* 3.6*  --    PLATELETS 10*3/mm3 190 222  --   --   --  253         Lab Results  (last 24 hours)       Procedure Component Value Units Date/Time    Blood Culture - Blood, Arm, Right [[id number]]  (Normal) Collected: 12/14/24 1714    Specimen: Blood from Arm, Right Updated: 12/16/24 0615     Blood Culture No growth at 24 hours    Comprehensive Metabolic Panel [[id number]]  (Abnormal) Collected: 12/16/24 0401    Specimen: Blood Updated: 12/16/24 0451     Glucose 97 mg/dL      BUN 6 mg/dL      Creatinine 0.77 mg/dL      Sodium 137 mmol/L      Potassium 3.1 mmol/L      Chloride 103 mmol/L      CO2 23.9 mmol/L      Calcium 6.6 mg/dL      Total Protein 5.1 g/dL      Albumin 2.7 g/dL      ALT (SGPT) <5 U/L      AST (SGOT) 13 U/L      Alkaline Phosphatase 88 U/L      Total Bilirubin 0.4 mg/dL      Globulin 2.4 gm/dL      A/G Ratio 1.1 g/dL      BUN/Creatinine Ratio 7.8     Anion Gap 10.1 mmol/L      eGFR 84.7 mL/min/1.73     Narrative:      GFR Categories in Chronic Kidney Disease (CKD)      GFR Category          GFR (mL/min/1.73)    Interpretation  G1                     90 or greater         Normal or high (1)  G2                      60-89                Mild decrease (1)  G3a                   45-59                Mild to moderate decrease  G3b                   30-44                Moderate to severe decrease  G4                    15-29                Severe decrease  G5                    14 or less           Kidney failure          (1)In the absence of evidence of kidney disease, neither GFR category G1 or G2 fulfill the criteria for CKD.    eGFR calculation 2021 CKD-EPI creatinine equation, which does not include race as a factor    Lipase [[id number]]  (Abnormal) Collected: 12/16/24 0401    Specimen: Blood Updated: 12/16/24 0450     Lipase 93 U/L     CBC & Differential [[id number]]  (Abnormal) Collected: 12/16/24 0401    Specimen: Blood Updated: 12/16/24 0429    Narrative:      The following orders were created for panel order CBC & Differential.  Procedure                                Abnormality         Status                     ---------                               -----------         ------                     CBC Auto Differential[[id number]]        Abnormal            Final result                 Please view results for these tests on the individual orders.    CBC Auto Differential [[id number]]  (Abnormal) Collected: 12/16/24 0401    Specimen: Blood Updated: 12/16/24 0429     WBC 14.91 10*3/mm3      RBC 3.76 10*6/mm3      Hemoglobin 12.3 g/dL      Hematocrit 38.2 %      .6 fL      MCH 32.7 pg      MCHC 32.2 g/dL      RDW 13.1 %      RDW-SD 49.4 fl      MPV 10.0 fL      Platelets 190 10*3/mm3      Neutrophil % 73.2 %      Lymphocyte % 16.5 %      Monocyte % 8.9 %      Eosinophil % 0.6 %      Basophil % 0.4 %      Immature Grans % 0.4 %      Neutrophils, Absolute 10.92 10*3/mm3      Lymphocytes, Absolute 2.46 10*3/mm3      Monocytes, Absolute 1.32 10*3/mm3      Eosinophils, Absolute 0.09 10*3/mm3      Basophils, Absolute 0.06 10*3/mm3      Immature Grans, Absolute 0.06 10*3/mm3      nRBC 0.0 /100 WBC     Blood Culture - Blood, Arm, Left [[id number]]  (Normal) Collected: 12/14/24 1713    Specimen: Blood from Arm, Left Updated: 12/15/24 1717     Blood Culture No growth at 24 hours    MRSA Screen, PCR (Inpatient) - Swab, Nares [[id number]]  (Normal) Collected: 12/15/24 0101    Specimen: Swab from Nares Updated: 12/15/24 1609     MRSA PCR No MRSA Detected    Narrative:      The negative predictive value of this diagnostic test is high and should only be used to consider de-escalating anti-MRSA therapy. A positive result may indicate colonization with MRSA and must be correlated clinically.    Respiratory Panel PCR w/COVID-19(SARS-CoV-2) [facility] In-House, NP Swab in UTM/VTM, 2 HR TAT - Swab, Nasopharynx [[id number]]  (Normal) Collected: 12/15/24 0002    Specimen: Swab from Nasopharynx Updated: 12/15/24 1453     ADENOVIRUS, PCR Not Detected     Coronavirus 229E Not Detected      "Coronavirus HKU1 Not Detected     Coronavirus NL63 Not Detected     Coronavirus OC43 Not Detected     COVID19 Not Detected     Human Metapneumovirus Not Detected     Human Rhinovirus/Enterovirus Not Detected     Influenza A PCR Not Detected     Influenza B PCR Not Detected     Parainfluenza Virus 1 Not Detected     Parainfluenza Virus 2 Not Detected     Parainfluenza Virus 3 Not Detected     Parainfluenza Virus 4 Not Detected     RSV, PCR Not Detected     Bordetella pertussis pcr Not Detected     Bordetella parapertussis PCR Not Detected     Chlamydophila pneumoniae PCR Not Detected     Mycoplasma pneumo by PCR Not Detected    Narrative:      In the setting of a positive respiratory panel with a viral infection PLUS a negative procalcitonin without other underlying concern for bacterial infection, consider observing off antibiotics or discontinuation of antibiotics and continue supportive care. If the respiratory panel is positive for atypical bacterial infection (Bordetella pertussis, Chlamydophila pneumoniae, or Mycoplasma pneumoniae), consider antibiotic de-escalation to target atypical bacterial infection.    Respiratory Culture - Aspirate, Cough [[id number]] Collected: 12/15/24 1006    Specimen: Aspirate from Cough Updated: 12/15/24 1202     Gram Stain Few (2+) WBCs seen      Few (2+) Epithelial cells seen      Few (2+) Mucous strands      Moderate (3+) Mixed bacterial morphotypes seen on Gram Stain    Lipase [[id number]]  (Abnormal) Collected: 12/15/24 0405    Specimen: Blood Updated: 12/15/24 0901     Lipase 87 U/L                                           No results found for: \"POCGLU\"  Results from last 7 days   Lab Units 12/14/24  2123 12/14/24  1836 12/14/24  1443 12/14/24  1337   PROCALCITONIN ng/mL  --   --   --  0.33*   LACTATE mmol/L 1.8 2.3* 3.6*  --      Results from last 7 days   Lab Units 12/14/24  1714 12/14/24  1713   BLOODCX  No growth at 24 hours No growth at 24 hours     Results from last 7 " days   Lab Units 12/14/24  1328   NITRITE UA  Negative   WBC UA /HPF None Seen   BACTERIA UA /HPF None Seen   SQUAM EPITHEL UA /HPF 0-2     Results from last 7 days   Lab Units 12/15/24  0002   ADENOVIRUS DETECTION BY PCR  Not Detected   CORONAVIRUS 229E  Not Detected   CORONAVIRUS HKU1  Not Detected   CORONAVIRUS NL63  Not Detected   CORONAVIRUS OC43  Not Detected   HUMAN METAPNEUMOVIRUS  Not Detected   HUMAN RHINOVIRUS/ENTEROVIRUS  Not Detected   INFLUENZA B PCR  Not Detected   PARAINFLUENZA 1  Not Detected   PARAINFLUENZA VIRUS 2  Not Detected   PARAINFLUENZA VIRUS 3  Not Detected   PARAINFLUENZA VIRUS 4  Not Detected   BORDETELLA PERTUSSIS PCR  Not Detected   CHLAMYDOPHILA PNEUMONIAE PCR  Not Detected   MYCOPLAMA PNEUMO PCR  Not Detected   INFLUENZA A PCR  Not Detected   RSV, PCR  Not Detected         Radiology:  Imaging Results (Last 24 Hours)       ** No results found for the last 24 hours. **            Cardiology:  ECG/EMG Results (last 24 hours)       ** No results found for the last 24 hours. **            Results for orders placed during the hospital encounter of 04/11/24    Adult Transthoracic Echo Complete W/ Cont if Necessary Per Protocol    Interpretation Summary    Left ventricular systolic function is normal. Left ventricular ejection fraction appears to be 56 - 60%.    Left ventricular diastolic function is consistent with (grade I) impaired relaxation.    The interatrial septum appears redundant. The agitated saline study is positive, consistent with a small PFO    Mild tricuspid valve regurgitation is present    Calculated right ventricular systolic pressure from tricuspid regurgitation is 24 mmHg.    There is no evidence of pericardial effusion.      I have reviewed recent labs results and consult notes.    Please note portions of this assessment/plan may have been copied and pasted, but I have personally seen this patient and reviewed each line of this assessment and plan for accuracy and made  updates to reflect my necessary changes    Assessment/Plan     Assessment and Plan:      .  Nausea and vomiting from acute pancreatitis/enteritis improving will advance diet    .  Left upper lobe community-acquired atypical pneumonia in immunocompromised patient will continue Zosyn doxycycline MRSA screen negative vancomycin was discontinued    .  Sepsis secondary to community-acquired pneumonia improved.  Responded well to current therapies      .  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  Hypokalemia oral potassium ordered     .  COPD nothing acute continue current therapies     .  Hyperlipidemia resume statin     .  Orthostatic hypotension resume p.o. midodrine     .  GERD with esophagitis continue IV Protonix     .  Major depression anxiety oral Valium and Cymbalta.     .  Lumbar degenerative disease with chronic pain  Much of this encounter note is an electronic transcription/translation of spoken language to printed text. The electronic translation of spoken language may permit erroneous, or at times, nonsensical words or phrases to be inadvertently transcribed; Although I have reviewed the note for such errors, some may still exist.    Note Disclaimer: At [facility], we believe that sharing information builds trust and better relationships. You are receiving this note because you recently visited [facility]. It is possible you will see health information before a provider has talked with you about it. This kind of information can be easy to misunderstand. To help you fully understand what it means for your health, we urge you to  discuss this note with your provider.

## 2024-12-17 LAB
ANION GAP SERPL CALCULATED.3IONS-SCNC: 8.7 MMOL/L (ref 5–15)
BACTERIA SPEC RESP CULT: NORMAL
BUN SERPL-MCNC: 6 MG/DL (ref 8–23)
BUN/CREAT SERPL: 7.2 (ref 7–25)
C DIFF GDH + TOXINS A+B STL QL IA.RAPID: NEGATIVE
C DIFF GDH + TOXINS A+B STL QL IA.RAPID: NEGATIVE
CALCIUM SPEC-SCNC: 6.7 MG/DL (ref 8.6–10.5)
CHLORIDE SERPL-SCNC: 97 MMOL/L (ref 98–107)
CO2 SERPL-SCNC: 29.3 MMOL/L (ref 22–29)
CREAT SERPL-MCNC: 0.83 MG/DL (ref 0.57–1)
DEPRECATED RDW RBC AUTO: 47.9 FL (ref 37–54)
EGFRCR SERPLBLD CKD-EPI 2021: 77.4 ML/MIN/1.73
ERYTHROCYTE [DISTWIDTH] IN BLOOD BY AUTOMATED COUNT: 13 % (ref 12.3–15.4)
GLUCOSE SERPL-MCNC: 85 MG/DL (ref 65–99)
GRAM STN SPEC: NORMAL
HCT VFR BLD AUTO: 38.5 % (ref 34–46.6)
HGB BLD-MCNC: 12.4 G/DL (ref 12–15.9)
MAGNESIUM SERPL-MCNC: 1.2 MG/DL (ref 1.6–2.4)
MCH RBC QN AUTO: 32.3 PG (ref 26.6–33)
MCHC RBC AUTO-ENTMCNC: 32.2 G/DL (ref 31.5–35.7)
MCV RBC AUTO: 100.3 FL (ref 79–97)
PLATELET # BLD AUTO: 194 10*3/MM3 (ref 140–450)
PMV BLD AUTO: 10 FL (ref 6–12)
POTASSIUM SERPL-SCNC: 2.7 MMOL/L (ref 3.5–5.2)
RBC # BLD AUTO: 3.84 10*6/MM3 (ref 3.77–5.28)
SODIUM SERPL-SCNC: 135 MMOL/L (ref 136–145)
WBC NRBC COR # BLD AUTO: 14.07 10*3/MM3 (ref 3.4–10.8)

## 2024-12-17 PROCEDURE — 94799 UNLISTED PULMONARY SVC/PX: CPT

## 2024-12-17 PROCEDURE — 87449 NOS EACH ORGANISM AG IA: CPT | Performed by: FAMILY MEDICINE

## 2024-12-17 PROCEDURE — 85027 COMPLETE CBC AUTOMATED: CPT | Performed by: FAMILY MEDICINE

## 2024-12-17 PROCEDURE — 97116 GAIT TRAINING THERAPY: CPT

## 2024-12-17 PROCEDURE — 25010000002 MAGNESIUM SULFATE 2 GM/50ML SOLUTION: Performed by: FAMILY MEDICINE

## 2024-12-17 PROCEDURE — 83735 ASSAY OF MAGNESIUM: CPT | Performed by: FAMILY MEDICINE

## 2024-12-17 PROCEDURE — 80048 BASIC METABOLIC PNL TOTAL CA: CPT | Performed by: FAMILY MEDICINE

## 2024-12-17 PROCEDURE — 87324 CLOSTRIDIUM AG IA: CPT | Performed by: FAMILY MEDICINE

## 2024-12-17 RX ORDER — POTASSIUM CHLORIDE 1500 MG/1
40 TABLET, EXTENDED RELEASE ORAL
Status: COMPLETED | OUTPATIENT
Start: 2024-12-17 | End: 2024-12-17

## 2024-12-17 RX ORDER — MAGNESIUM SULFATE HEPTAHYDRATE 40 MG/ML
2 INJECTION, SOLUTION INTRAVENOUS
Status: COMPLETED | OUTPATIENT
Start: 2024-12-17 | End: 2024-12-17

## 2024-12-17 RX ORDER — DOXYCYCLINE 100 MG/1
100 CAPSULE ORAL EVERY 12 HOURS SCHEDULED
Status: DISCONTINUED | OUTPATIENT
Start: 2024-12-17 | End: 2024-12-19 | Stop reason: HOSPADM

## 2024-12-17 RX ORDER — CEFUROXIME AXETIL 250 MG/1
250 TABLET ORAL EVERY 12 HOURS SCHEDULED
Status: DISCONTINUED | OUTPATIENT
Start: 2024-12-17 | End: 2024-12-19 | Stop reason: HOSPADM

## 2024-12-17 RX ORDER — PANTOPRAZOLE SODIUM 40 MG/1
40 TABLET, DELAYED RELEASE ORAL
Status: DISCONTINUED | OUTPATIENT
Start: 2024-12-18 | End: 2024-12-19 | Stop reason: HOSPADM

## 2024-12-17 RX ADMIN — DOXYCYCLINE 100 MG: 100 CAPSULE ORAL at 20:27

## 2024-12-17 RX ADMIN — CEFUROXIME AXETIL 250 MG: 250 TABLET, FILM COATED ORAL at 08:47

## 2024-12-17 RX ADMIN — LEVETIRACETAM 500 MG: 500 TABLET, FILM COATED ORAL at 20:28

## 2024-12-17 RX ADMIN — MIDODRINE HYDROCHLORIDE 2.5 MG: 5 TABLET ORAL at 12:04

## 2024-12-17 RX ADMIN — Medication 10 ML: at 20:27

## 2024-12-17 RX ADMIN — MAGNESIUM SULFATE IN WATER FOR 2 G: 40 INJECTION INTRAVENOUS at 10:32

## 2024-12-17 RX ADMIN — SUCRALFATE 1 G: 1 TABLET ORAL at 08:40

## 2024-12-17 RX ADMIN — DOXYCYCLINE 100 MG: 100 CAPSULE ORAL at 08:40

## 2024-12-17 RX ADMIN — POTASSIUM CHLORIDE 40 MEQ: 1500 TABLET, EXTENDED RELEASE ORAL at 12:02

## 2024-12-17 RX ADMIN — Medication 10 ML: at 08:42

## 2024-12-17 RX ADMIN — HYDROCODONE BITARTRATE AND ACETAMINOPHEN 1 TABLET: 10; 325 TABLET ORAL at 08:54

## 2024-12-17 RX ADMIN — APIXABAN 5 MG: 2.5 TABLET, FILM COATED ORAL at 20:27

## 2024-12-17 RX ADMIN — APIXABAN 5 MG: 2.5 TABLET, FILM COATED ORAL at 08:41

## 2024-12-17 RX ADMIN — POTASSIUM CHLORIDE 40 MEQ: 1500 TABLET, EXTENDED RELEASE ORAL at 08:40

## 2024-12-17 RX ADMIN — OLANZAPINE 2.5 MG: 5 TABLET, FILM COATED ORAL at 20:27

## 2024-12-17 RX ADMIN — POTASSIUM CHLORIDE 20 MEQ: 1500 TABLET, EXTENDED RELEASE ORAL at 08:42

## 2024-12-17 RX ADMIN — POTASSIUM CHLORIDE 40 MEQ: 1500 TABLET, EXTENDED RELEASE ORAL at 14:21

## 2024-12-17 RX ADMIN — ROSUVASTATIN CALCIUM 10 MG: 5 TABLET, FILM COATED ORAL at 20:27

## 2024-12-17 RX ADMIN — DULOXETINE HYDROCHLORIDE 30 MG: 30 CAPSULE, DELAYED RELEASE ORAL at 08:40

## 2024-12-17 RX ADMIN — LEVETIRACETAM 500 MG: 500 TABLET, FILM COATED ORAL at 08:41

## 2024-12-17 RX ADMIN — MAGNESIUM SULFATE IN WATER FOR 2 G: 40 INJECTION INTRAVENOUS at 14:21

## 2024-12-17 RX ADMIN — BUDESONIDE AND FORMOTEROL FUMARATE DIHYDRATE 2 PUFF: 160; 4.5 AEROSOL RESPIRATORY (INHALATION) at 19:52

## 2024-12-17 RX ADMIN — SUCRALFATE 1 G: 1 TABLET ORAL at 17:17

## 2024-12-17 RX ADMIN — MIDODRINE HYDROCHLORIDE 2.5 MG: 5 TABLET ORAL at 17:16

## 2024-12-17 RX ADMIN — PANTOPRAZOLE SODIUM 40 MG: 40 INJECTION, POWDER, FOR SOLUTION INTRAVENOUS at 06:37

## 2024-12-17 RX ADMIN — SUCRALFATE 1 G: 1 TABLET ORAL at 20:27

## 2024-12-17 RX ADMIN — MAGNESIUM SULFATE IN WATER FOR 2 G: 40 INJECTION INTRAVENOUS at 12:00

## 2024-12-17 RX ADMIN — SUCRALFATE 1 G: 1 TABLET ORAL at 12:02

## 2024-12-17 RX ADMIN — MIDODRINE HYDROCHLORIDE 2.5 MG: 5 TABLET ORAL at 08:40

## 2024-12-17 RX ADMIN — CEFUROXIME AXETIL 250 MG: 250 TABLET, FILM COATED ORAL at 20:27

## 2024-12-17 RX ADMIN — HYDROCODONE BITARTRATE AND ACETAMINOPHEN 1 TABLET: 10; 325 TABLET ORAL at 17:16

## 2024-12-17 NOTE — PLAN OF CARE
Goal Outcome Evaluation:              Outcome Evaluation: pt slept throughout the night no complaints. 2-3 L NC on overnight. ambulates to bathroom,

## 2024-12-17 NOTE — PROGRESS NOTES
"Daily Progress Note:      Chief complaint: Pneumonia, pancreatitis, enteritis, small cell lung cancer, COPD, hypertension, hypokalemia    Subjective     Subjective: Continues to improve.  Has large volume diarrhea yesterday has history of C. difficile no diarrhea this morning tolerating regular diet     LOS: 3 days     Objective     Vital Signs  Temp:  [97.6 °F (36.4 °C)-98.4 °F (36.9 °C)] 97.6 °F (36.4 °C)  Heart Rate:  [63-86] 72  Resp:  [18-20] 18  BP: ()/(57-75) 99/60  Oxygen Therapy  SpO2: 95 %  Pulse Oximetry Type: Continuous  Device (Oxygen Therapy): nasal cannula  Flow (L/min) (Oxygen Therapy): 2}  Body mass index is 18.46 kg/m².  Flowsheet Rows      Flowsheet Row First Filed Value   Admission Height 160 cm (62.99\") Documented at 12/14/2024 1301   Admission Weight 48.1 kg (106 lb) Documented at 12/14/2024 1301               Documented weights    12/14/24 1301 12/14/24 2055 12/16/24 0507 12/17/24 0500   Weight: 48.1 kg (106 lb) 46.4 kg (102 lb 3.2 oz) 49.3 kg (108 lb 9.6 oz) 47.3 kg (104 lb 3.2 oz)         Patient Vitals for the past 24 hrs:   BP Temp Temp src Pulse Resp SpO2 Weight   12/17/24 0727 99/60 97.6 °F (36.4 °C) Temporal 72 18 95 % --   12/17/24 0637 -- -- -- -- -- 96 % --   12/17/24 0500 -- -- -- -- -- -- 47.3 kg (104 lb 3.2 oz)   12/17/24 0352 96/63 98 °F (36.7 °C) Oral 79 20 96 % --   12/16/24 2339 102/64 98.3 °F (36.8 °C) Oral 70 20 94 % --   12/16/24 2025 -- -- -- 71 20 96 % --   12/16/24 2021 -- -- -- 66 20 96 % --   12/16/24 1946 100/57 98 °F (36.7 °C) Oral 63 18 94 % --   12/16/24 1453 110/71 98.3 °F (36.8 °C) Oral 86 18 94 % --   12/16/24 1143 111/75 98.4 °F (36.9 °C) Oral 67 20 93 % --   12/16/24 1050 -- -- -- 78 20 -- --   12/16/24 1045 -- -- -- 78 20 92 % --   12/16/24 0815 -- -- -- -- -- 90 % --   12/16/24 0808 -- -- -- -- -- (!) 88 % --   12/16/24 0757 -- -- -- -- -- 95 % --       47.3 kg (104 lb 3.2 oz)    Intake/Output                         12/15/24 0701 - 12/16/24 0700 " 12/16/24 0701 - 12/17/24 0700     [id number] 8619-1004 Total 9035-3459 4092-8940 Total                 Intake    P.O.  600  480 1080  540  -- 540    IV Piggyback  450  200 650  100  100 200    Total Intake 3451 169 6456 640 100 740       Output    Urine  1050  900 1950  1000  1000 2000    Total Output 4813 937 1898 1000 1000 2000             Intake/Output Summary (Last 24 hours) at 12/17/2024 0754  Last data filed at 12/16/2024 2301  Gross per 24 hour   Intake 640 ml   Output 2000 ml   Net -1360 ml      Intake/Output Summary (Last 24 hours) at 12/17/2024 0754  Last data filed at 12/16/2024 2301  Gross per 24 hour   Intake 640 ml   Output 2000 ml   Net -1360 ml        Review of Systems   Constitutional:  Positive for activity change and fatigue. Negative for appetite change.   HENT:  Negative for congestion.    Respiratory:  Negative for cough, chest tightness, shortness of breath and wheezing.    Cardiovascular:  Negative for chest pain.   Gastrointestinal:  Negative for abdominal distention, abdominal pain, diarrhea, nausea and vomiting.   Endocrine: Negative for polyphagia and polyuria.   Genitourinary:  Negative for frequency.   Skin:  Negative for rash.   Neurological:  Positive for weakness. Negative for light-headedness.   Hematological:  Does not bruise/bleed easily.   Psychiatric/Behavioral:  Negative for agitation and behavioral problems.        Physical Exam  Vitals and nursing note reviewed.   Constitutional:       Appearance: She is well-developed and underweight.   HENT:      Head: Normocephalic.   Eyes:      Conjunctiva/sclera: Conjunctivae normal.   Neck:      Thyroid: No thyromegaly.      Vascular: No JVD.   Cardiovascular:      Rate and Rhythm: Normal rate and regular rhythm.      Heart sounds: Normal heart sounds. No murmur heard.  Pulmonary:      Effort: Pulmonary effort is normal. No respiratory distress.      Breath sounds: Normal breath sounds. No wheezing or rales.   Abdominal:      General:  Bowel sounds are normal. There is no distension.      Palpations: Abdomen is soft.      Tenderness: There is no abdominal tenderness. There is no guarding.   Skin:     General: Skin is warm and dry.      Findings: No rash.   Neurological:      General: No focal deficit present.      Mental Status: She is alert and oriented to person, place, and time.         Medication Review:   I have reviewed the patient's current medication list  Scheduled Meds:apixaban, 5 mg, Oral, Q12H  budesonide-formoterol, 2 puff, Inhalation, BID - RT  doxycycline, 100 mg, Intravenous, Q12H  DULoxetine, 30 mg, Oral, Daily  levETIRAcetam, 500 mg, Oral, Q12H  midodrine, 2.5 mg, Oral, TID AC  OLANZapine, 2.5 mg, Oral, Nightly  pantoprazole, 40 mg, Intravenous, Q AM  piperacillin-tazobactam, 3.375 g, Intravenous, Q8H  potassium chloride, 20 mEq, Oral, Daily  potassium chloride, 40 mEq, Oral, Q3H  rosuvastatin, 10 mg, Oral, Nightly  sodium chloride, 10 mL, Intravenous, Q12H  sucralfate, 1 g, Oral, 4x Daily      Continuous Infusions:     PRN Meds:.  acetaminophen **OR** acetaminophen **OR** acetaminophen    acetaminophen    aluminum-magnesium hydroxide-simethicone    senna-docusate sodium **AND** polyethylene glycol **AND** bisacodyl **AND** bisacodyl    calcium carbonate    diazePAM    HYDROcodone-acetaminophen    ipratropium-albuterol    ondansetron ODT **OR** ondansetron    sodium chloride    sodium chloride      Result Review        I have personally reviewed the results from the time of this admission to 12/17/2024 07:54 EST and agree with these findings:  [x]  Laboratory  []  Microbiology  [x]  Radiology  []  EKG/Telemetry   []  Cardiology/Vascular   []  Pathology  []  Old records  []  Other:        Labs:  Results from last 7 days   Lab Units 12/17/24  0359 12/16/24  0401 12/15/24  0405 12/14/24  1337   WBC 10*3/mm3 14.07* 14.91* 18.78* 24.66*   RBC 10*6/mm3 3.84 3.76* 3.92 4.56   HEMOGLOBIN g/dL 12.4 12.3 12.8 14.8   HEMATOCRIT % 38.5 38.2  39.5 45.2   RDW % 13.0 13.1 13.2 12.9   MCV fL 100.3* 101.6* 100.8* 99.1*   MCH pg 32.3 32.7 32.7 32.5   MCHC g/dL 32.2 32.2 32.4 32.7   MPV fL 10.0 10.0 10.2 9.8   PLATELETS 10*3/mm3 194 190 222 253   RDW-SD fl 47.9 49.4 49.0 47.2       Results from last 7 days   Lab Units 12/17/24  0359 12/16/24  0401 12/15/24  0405 12/14/24  1337   SODIUM mmol/L 135* 137 135* 130*   POTASSIUM mmol/L 2.7* 3.1* 3.6 3.3*   CHLORIDE mmol/L 97* 103 102 92*   CO2 mmol/L 29.3* 23.9 24.5 26.8   ANION GAP mmol/L 8.7 10.1 8.5 11.2   BUN mg/dL 6* 6* 7* 9   CREATININE mg/dL 0.83 0.77 0.79 0.93   BUN / CREAT RATIO  7.2 7.8 8.9 9.7   EGFR mL/min/1.73 77.4 84.7 82.1 67.5   CALCIUM mg/dL 6.7* 6.6* 7.2* 8.8   GLUCOSE mg/dL 85 97 103* 134*     CMP:        Lab 12/17/24  0359 12/16/24  0401 12/15/24  0405 12/14/24  1337   SODIUM 135* 137 135* 130*   POTASSIUM 2.7* 3.1* 3.6 3.3*   CHLORIDE 97* 103 102 92*   CO2 29.3* 23.9 24.5 26.8   ANION GAP 8.7 10.1 8.5 11.2   BUN 6* 6* 7* 9   CREATININE 0.83 0.77 0.79 0.93   EGFR 77.4 84.7 82.1 67.5   GLUCOSE 85 97 103* 134*   CALCIUM 6.7* 6.6* 7.2* 8.8   TOTAL PROTEIN  --  5.1* 5.4* 6.6   ALBUMIN  --  2.7* 3.0* 3.6   GLOBULIN  --  2.4 2.4 3.0   ALT (SGPT)  --  <5 <5 <5   AST (SGOT)  --  13 21 25   BILIRUBIN  --  0.4 0.3 0.3   ALK PHOS  --  88 97 122*   LIPASE  --  93* 87* 275*       Results from last 7 days   Lab Units 12/17/24  0359 12/16/24  0401 12/15/24  0405 12/14/24  2123 12/14/24  1836 12/14/24  1443 12/14/24  1337   PROCALCITONIN ng/mL  --   --   --   --   --   --  0.33*   LACTATE mmol/L  --   --   --  1.8 2.3* 3.6*  --    PLATELETS 10*3/mm3 194 190 222  --   --   --  253         Lab Results (last 24 hours)       Procedure Component Value Units Date/Time    Basic Metabolic Panel [[id number]]  (Abnormal) Collected: 12/17/24 0359    Specimen: Blood Updated: 12/17/24 0517     Glucose 85 mg/dL      BUN 6 mg/dL      Creatinine 0.83 mg/dL      Sodium 135 mmol/L      Potassium 2.7 mmol/L      Chloride 97 mmol/L       CO2 29.3 mmol/L      Calcium 6.7 mg/dL      BUN/Creatinine Ratio 7.2     Anion Gap 8.7 mmol/L      eGFR 77.4 mL/min/1.73     Narrative:      GFR Categories in Chronic Kidney Disease (CKD)      GFR Category          GFR (mL/min/1.73)    Interpretation  G1                     90 or greater         Normal or high (1)  G2                      60-89                Mild decrease (1)  G3a                   45-59                Mild to moderate decrease  G3b                   30-44                Moderate to severe decrease  G4                    15-29                Severe decrease  G5                    14 or less           Kidney failure          (1)In the absence of evidence of kidney disease, neither GFR category G1 or G2 fulfill the criteria for CKD.    eGFR calculation 2021 CKD-EPI creatinine equation, which does not include race as a factor    CBC (No Diff) [[id number]]  (Abnormal) Collected: 12/17/24 0359    Specimen: Blood Updated: 12/17/24 0443     WBC 14.07 10*3/mm3      RBC 3.84 10*6/mm3      Hemoglobin 12.4 g/dL      Hematocrit 38.5 %      .3 fL      MCH 32.3 pg      MCHC 32.2 g/dL      RDW 13.0 %      RDW-SD 47.9 fl      MPV 10.0 fL      Platelets 194 10*3/mm3     Blood Culture - Blood, Arm, Right [[id number]]  (Normal) Collected: 12/14/24 1714    Specimen: Blood from Arm, Right Updated: 12/16/24 1717     Blood Culture No growth at 2 days    Blood Culture - Blood, Arm, Left [[id number]]  (Normal) Collected: 12/14/24 1713    Specimen: Blood from Arm, Left Updated: 12/16/24 1717     Blood Culture No growth at 2 days    Respiratory Culture - Aspirate, Cough [[id number]] Collected: 12/15/24 1006    Specimen: Aspirate from Cough Updated: 12/16/24 1028     Respiratory Culture Rare growth The culture consists of normal respiratory katie. This is a preliminary report; final report to follow.     Gram Stain Few (2+) WBCs seen      Few (2+) Epithelial cells seen      Few (2+) Mucous strands      Moderate (3+)  "Mixed bacterial morphotypes seen on Gram Stain                                          No results found for: \"POCGLU\"  Results from last 7 days   Lab Units 12/14/24  2123 12/14/24  1836 12/14/24  1443 12/14/24  1337   PROCALCITONIN ng/mL  --   --   --  0.33*   LACTATE mmol/L 1.8 2.3* 3.6*  --      Results from last 7 days   Lab Units 12/15/24  1006 12/14/24  1714 12/14/24  1713   BLOODCX   --  No growth at 2 days No growth at 2 days   RESPCX  Rare growth The culture consists of normal respiratory katie. This is a preliminary report; final report to follow.  --   --      Results from last 7 days   Lab Units 12/14/24  1328   NITRITE UA  Negative   WBC UA /HPF None Seen   BACTERIA UA /HPF None Seen   SQUAM EPITHEL UA /HPF 0-2     Results from last 7 days   Lab Units 12/15/24  0002   ADENOVIRUS DETECTION BY PCR  Not Detected   CORONAVIRUS 229E  Not Detected   CORONAVIRUS HKU1  Not Detected   CORONAVIRUS NL63  Not Detected   CORONAVIRUS OC43  Not Detected   HUMAN METAPNEUMOVIRUS  Not Detected   HUMAN RHINOVIRUS/ENTEROVIRUS  Not Detected   INFLUENZA B PCR  Not Detected   PARAINFLUENZA 1  Not Detected   PARAINFLUENZA VIRUS 2  Not Detected   PARAINFLUENZA VIRUS 3  Not Detected   PARAINFLUENZA VIRUS 4  Not Detected   BORDETELLA PERTUSSIS PCR  Not Detected   CHLAMYDOPHILA PNEUMONIAE PCR  Not Detected   MYCOPLAMA PNEUMO PCR  Not Detected   INFLUENZA A PCR  Not Detected   RSV, PCR  Not Detected         Radiology:  Imaging Results (Last 24 Hours)       Procedure Component Value Units Date/Time    XR Chest 2 View [[id number]] Collected: 12/16/24 0843     Updated: 12/16/24 0847    Narrative:      XR CHEST 2 VW    Date of Exam: 12/16/2024 8:31 AM EST    Indication: soa    Comparison: Chest x-ray dated/11/2024    Findings:  Small left and trace right pleural effusions. Left basilar atelectasis or infiltrate. No pneumothorax is seen. Right chest wall Port-A-Cath terminates overlying the SVC. Cardiac silhouette is unremarkable. No " acute osseous lesion is seen.      Impression:      Impression:  1.Small left and trace right pleural effusions. Left basilar atelectasis or infiltrate.      Electronically Signed: Harry Valentin MD    12/16/2024 8:44 AM EST    Workstation ID: [id number]            Cardiology:  ECG/EMG Results (last 24 hours)       ** No results found for the last 24 hours. **            Results for orders placed during the hospital encounter of 04/11/24    Adult Transthoracic Echo Complete W/ Cont if Necessary Per Protocol    Interpretation Summary    Left ventricular systolic function is normal. Left ventricular ejection fraction appears to be 56 - 60%.    Left ventricular diastolic function is consistent with (grade I) impaired relaxation.    The interatrial septum appears redundant. The agitated saline study is positive, consistent with a small PFO    Mild tricuspid valve regurgitation is present    Calculated right ventricular systolic pressure from tricuspid regurgitation is 24 mmHg.    There is no evidence of pericardial effusion.      I have reviewed recent labs results and consult notes.    Please note portions of this assessment/plan may have been copied and pasted, but I have personally seen this patient and reviewed each line of this assessment and plan for accuracy and made updates to reflect my necessary changes    Assessment/Plan     Assessment and Plan:      .  Nausea and vomiting from acute pancreatitis/enteritis resolved     .  Diarrhea on broad-spectrum antibiotics start probiotic check C. difficile    .  Left upper lobe community-acquired atypical pneumonia in immunocompromised patient will change to p.o. antibiotics Pro-Skyler no significant elevation white count continues to be elevated but is trending down she has baseline leukocytosis    .  Mild fluid overload secondary fluid resuscitation Lasix given with good diuresis    .  Sepsis secondary to community-acquired pneumonia improved.  Responded well to current  therapies      .  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  Hypokalemia persistent oral potassium ordered     .  COPD nothing acute continue current therapies     .  Hyperlipidemia resume statin     .  Orthostatic hypotension resume p.o. midodrine     .  GERD with esophagitis continue IV Protonix     .  Major depression anxiety oral Valium and Cymbalta.     .  Lumbar degenerative disease with chronic pain  Much of this encounter note is an electronic transcription/translation of spoken language to printed text. The electronic translation of spoken language may permit erroneous, or at times, nonsensical words or phrases to be inadvertently transcribed; Although I have reviewed the note for such errors, some may still exist.    Note Disclaimer: At [facility], we believe that sharing information builds trust and better relationships. You are receiving this note because you recently visited [facility]. It is possible you will see health information before a provider has talked with you about it. This kind of information can be easy to misunderstand. To help you fully understand what it means for your health, we urge you to discuss this note with your provider.

## 2024-12-17 NOTE — PLAN OF CARE
Goal Outcome Evaluation:  Plan of Care Reviewed With: patient           Outcome Evaluation: PT: Patient performs supine to/from sit with SBA and sit to/from stand with SBA.  Patient performs gait with rolling walker x70 feet, SBA.  Patient manages direction changes without balance loss or difficulty.  Continue to recommend use of walker for mobility, pt reports her family has one she can use at discharge.  Anticipate 1-2 additional visits to ensure consistency with mobility.    Anticipated Discharge Disposition (PT): home

## 2024-12-17 NOTE — THERAPY TREATMENT NOTE
Acute Care - Physical Therapy Treatment Note   Millie     Patient Name: Pili Arechiga  : 1955  MRN: [id number]  Today's Date: 2024      Visit Dx:     ICD-10-CM ICD-9-CM   1. Idiopathic acute pancreatitis, unspecified complication status  K85.00 577.0     Patient Active Problem List   Diagnosis    Small cell carcinoma    Fitting and adjustment of vascular catheter    Smoking greater than 40 pack years    Cerebrovascular accident (CVA) due to bilateral embolism of vertebral arteries    Vitamin B12 deficiency    Need for prophylactic chemotherapy    Depression    Pancreatitis    Pneumonia     Past Medical History:   Diagnosis Date    COPD (chronic obstructive pulmonary disease)     COPD with acute exacerbation 2020    Small cell carcinoma 3/13/2024     Past Surgical History:   Procedure Laterality Date    BRONCHOSCOPY N/A 2023    Procedure: BRONCHOSCOPY WITH ENDOBRONCHIAL ULTRASOUND (EBUS) with FNA;  Surgeon: Coy Mccarthy MD;  [facility];  Service: Pulmonary;  Laterality: N/A;  Pre/Post - mediastinal and hilar lymphadenopathy.    BRONCHOSCOPY WITH ION ROBOTIC ASSIST N/A 2024    Procedure: BRONCHOSCOPY WITH ION ROBOT,  ENDOBRONCHIAL ULTRASOUND, FINE NEEDLE ASPIRATIONS,  CRYOTHERAPY BIOPSIES, AND LEFT LOWER LOBE BRONCHOALVEOLAR LAVAGE.;  Surgeon: Alexia Velásquez MD;  Location: [facility] ENDOSCOPY;  Service: Robotics - Pulmonary;  Laterality: N/A;     SECTION      CHEST TUBE INSERTION Left 2024    Procedure: CHEST TUBE INSERTION;  Surgeon: Alexia Velásquez MD;  Location: [facility] ENDOSCOPY;  Service: Thoracic;  Laterality: Left;    CHOLECYSTECTOMY      COLONOSCOPY      ECTOPIC PREGNANCY SURGERY      HYSTERECTOMY      TONSILLECTOMY      TOTAL HIP ARTHROPLASTY Left     VENOUS ACCESS DEVICE (PORT) INSERTION N/A 3/14/2024    Procedure: INSERTION VENOUS ACCESS DEVICE;  Surgeon: Jonas Garner MD;  [facility]: [facility] OR;  Service: General;  Laterality: N/A;      PT Assessment (Last 12 Hours)       PT Evaluation and Treatment       Row Name 12/17/24 0907          Physical Therapy Time and Intention    Subjective Information complains of;fatigue  -     Document Type therapy note (daily note)  -     Mode of Treatment physical therapy  -     Patient Effort adequate  -     Symptoms Noted During/After Treatment increased pain  -       Row Name 12/17/24 0907          General Information    Patient Observations alert;cooperative;agree to therapy  -     Patient/Family/Caregiver Comments/Observations pt supine, agrees to therapy  -     Existing Precautions/Restrictions fall;oxygen therapy device and L/min  -     Barriers to Rehab none identified  -       Row Name 12/17/24 0907          Pain    Pre/Posttreatment Pain Comment pt reports back pain at rest, does not rate  -       Row Name 12/17/24 0907          Cognition    Personal Safety Interventions gait belt;nonskid shoes/slippers when out of bed  -       Row Name 12/17/24 0907          Bed Mobility    Bed Mobility supine-sit;sit-supine  -     Supine-Sit [place] (Bed Mobility) standby assist  [facility]     Sit-Supine [place] (Bed Mobility) standby assist  [facility]     Assistive Device (Bed Mobility) bed rails;head of bed elevated  -       Row Name 12/17/24 0907          Transfers    Transfers sit-stand transfer;stand-sit transfer  -       Row Name 12/17/24 0907          Sit-Stand Transfer    Sit-Stand [place] (Transfers) standby assist  [facility]     Assistive Device (Sit-Stand Transfers) walker, front-wheeled  -       Row Name 12/17/24 0907          Stand-Sit Transfer    Stand-Sit [place] (Transfers) standby assist  [facility]     Assistive Device (Stand-Sit Transfers) walker, front-wheeled  -       Row Name 12/17/24 0907          Gait/Stairs (Locomotion)    [place] Level (Gait) standby assist  [facility]     [facility]     Distance in Feet (Gait) 70  -      Pattern (Gait) swing-through  -     Deviations/Abnormal Patterns (Gait) daily decreased  -     Bilateral Gait Deviations forward flexed posture  -     Comment, (Gait/Stairs) pt manages direction changes without difficulty or balance loss  -       Row Name 12/17/24 0907          Balance    Comment, Balance SBA for standing balance with walker  -       Row Name 12/17/24 0907          Plan of Care Review    Outcome Evaluation PT: Patient performs supine to/from sit with SBA and sit to/from stand with SBA.  Patient performs gait with rolling walker x70 feet, SBA.  Patient manages direction changes without balance loss or difficulty.  Continue to recommend use of walker for mobility, pt reports her family has one she can use at discharge.  Anticipate 1-2 additional visits to ensure consistency with mobility.  -       Row Name 12/17/24 0907          Positioning and Restraints    Pre-Treatment Position in bed  -     Post Treatment Position bed  -JW     In Bed supine;call light within reach;encouraged to call for assist;exit alarm on  -       Row Name 12/17/24 0907          Progress Summary (PT)    Progress Toward Functional Goals (PT) progress toward functional goals is good  -[facility] Name 12/17/24 0907          Therapy Plan Review/Discharge Plan (PT)    Patient/Family Concerns, Equipment Needs at Discharge (PT) pt reports family has a rolling walker she can use  -               User Key  (r) = Recorded By, (t) = Taken By, (c) = Cosigned By      Initials Name Provider Type    Tracey Carbajal, PT Physical Therapist                    Physical Therapy Education       Title: PT OT SLP Therapies (In Progress)       Topic: Physical Therapy (In Progress)       Point: Mobility training (Done)       Learning Progress Summary            Patient Acceptance, E,TB, VU by JESSY at 12/17/2024 0940    Acceptance, E,TB, VU by JESSY at 12/16/2024 0950                      Point: Home exercise program (Not Started)        Learner Progress:  Not documented in this visit.                              User Key       Initials Effective Dates Name Provider Type Discipline     06/16/21 -  Tracey Arnold, CHEY Physical Therapist PT                  PT Recommendation and Plan  Anticipated Discharge Disposition (PT): home  Planned Therapy Interventions (PT): balance training, bed mobility training, gait training, home exercise program, strengthening, transfer training, patient/family education  Therapy Frequency (PT): 6 times/wk  Progress Summary (PT)  Progress Toward Functional Goals (PT): progress toward functional goals is good  Outcome Evaluation: PT: Patient performs supine to/from sit with SBA and sit to/from stand with SBA.  Patient performs gait with rolling walker x70 feet, SBA.  Patient manages direction changes without balance loss or difficulty.  Continue to recommend use of walker for mobility, pt reports her family has one she can use at discharge.  Anticipate 1-2 additional visits to ensure consistency with mobility.   Outcome Measures       Row Name 12/17/24 0907             How much help from another person do you currently need...    Turning from your back to your side while in flat bed without using bedrails? 4  -JW      Moving from lying on back to sitting on the side of a flat bed without bedrails? 4  -JW      Moving to and from a bed to a chair (including a wheelchair)? 3  -JW      Standing up from a chair using your arms (e.g., wheelchair, bedside chair)? 3  -JW      Climbing 3-5 steps with a railing? 3  -JW      To walk in hospital room? 3  -JW      AM-PAC 6 Clicks Score (PT) 20  -         Functional Assessment    Outcome Measure Options AM-PAC 6 Clicks Basic Mobility (PT)  -                User Key  (r) = Recorded By, (t) = Taken By, (c) = Cosigned By      Initials Name Provider Type    JW Tracey Arnold PT Physical Therapist                     Time Calculation:    PT Charges       Row Name 12/17/24 3620              Time Calculation    Start Time 0907  -      Stop Time 0922  -      Time Calculation (min) 15 min  -JW      PT Received On 12/17/24  -JW      PT - Next Appointment 12/18/24  -         Timed Charges    74630 - Gait Training Minutes  15  -JW         Total Minutes    Timed Charges Total Minutes 15  -JW       Total Minutes 15  -JW                User Key  (r) = Recorded By, (t) = Taken By, (c) = Cosigned By      Initials Name Provider Type    Tracey Carbajal, PT Physical Therapist                  Therapy Charges for Today       Code Description Service Date Service Provider Modifiers Qty    79389821874 HC PT EVAL LOW COMPLEXITY 2 12/16/2024 Tracey Arnold, PT GP 1    64595998995 HC GAIT TRAINING EA 15 MIN 12/17/2024 Tracey Arnold, PT GP 1            PT G-Codes  Outcome Measure Options: AM-PAC 6 Clicks Basic Mobility (PT)  AM-PAC 6 Clicks Score (PT): 20    Tracey Arnold PT  12/17/2024

## 2024-12-18 ENCOUNTER — APPOINTMENT (OUTPATIENT)
Dept: GENERAL RADIOLOGY | Facility: HOSPITAL | Age: 69
End: 2024-12-18
Payer: MEDICARE

## 2024-12-18 LAB
ADV 40+41 DNA STL QL NAA+NON-PROBE: NOT DETECTED
ALBUMIN SERPL-MCNC: 2.8 G/DL (ref 3.5–5.2)
ALBUMIN/GLOB SERPL: 1.1 G/DL
ALP SERPL-CCNC: 87 U/L (ref 39–117)
ALT SERPL W P-5'-P-CCNC: <5 U/L (ref 1–33)
ANION GAP SERPL CALCULATED.3IONS-SCNC: 5.9 MMOL/L (ref 5–15)
AST SERPL-CCNC: 10 U/L (ref 1–32)
ASTRO TYP 1-8 RNA STL QL NAA+NON-PROBE: NOT DETECTED
BASOPHILS # BLD AUTO: 0.01 10*3/MM3 (ref 0–0.2)
BASOPHILS NFR BLD AUTO: 0.1 % (ref 0–1.5)
BILIRUB SERPL-MCNC: 0.3 MG/DL (ref 0–1.2)
BUN SERPL-MCNC: 5 MG/DL (ref 8–23)
BUN/CREAT SERPL: 7.2 (ref 7–25)
C CAYETANENSIS DNA STL QL NAA+NON-PROBE: NOT DETECTED
C COLI+JEJ+UPSA DNA STL QL NAA+NON-PROBE: NOT DETECTED
CALCIUM SPEC-SCNC: 6.8 MG/DL (ref 8.6–10.5)
CHLORIDE SERPL-SCNC: 99 MMOL/L (ref 98–107)
CO2 SERPL-SCNC: 28.1 MMOL/L (ref 22–29)
CREAT SERPL-MCNC: 0.69 MG/DL (ref 0.57–1)
CRYPTOSP DNA STL QL NAA+NON-PROBE: NOT DETECTED
DEPRECATED RDW RBC AUTO: 50.4 FL (ref 37–54)
E HISTOLYT DNA STL QL NAA+NON-PROBE: NOT DETECTED
EAEC PAA PLAS AGGR+AATA ST NAA+NON-PRB: NOT DETECTED
EC STX1+STX2 GENES STL QL NAA+NON-PROBE: NOT DETECTED
EGFRCR SERPLBLD CKD-EPI 2021: 95.3 ML/MIN/1.73
EOSINOPHIL # BLD AUTO: 0.1 10*3/MM3 (ref 0–0.4)
EOSINOPHIL NFR BLD AUTO: 0.8 % (ref 0.3–6.2)
EPEC EAE GENE STL QL NAA+NON-PROBE: NOT DETECTED
ERYTHROCYTE [DISTWIDTH] IN BLOOD BY AUTOMATED COUNT: 13 % (ref 12.3–15.4)
ETEC LTA+ST1A+ST1B TOX ST NAA+NON-PROBE: NOT DETECTED
G LAMBLIA DNA STL QL NAA+NON-PROBE: NOT DETECTED
GLOBULIN UR ELPH-MCNC: 2.5 GM/DL
GLUCOSE SERPL-MCNC: 93 MG/DL (ref 65–99)
HCT VFR BLD AUTO: 40.1 % (ref 34–46.6)
HGB BLD-MCNC: 12.3 G/DL (ref 12–15.9)
IMM GRANULOCYTES # BLD AUTO: 0.02 10*3/MM3 (ref 0–0.05)
IMM GRANULOCYTES NFR BLD AUTO: 0.2 % (ref 0–0.5)
LYMPHOCYTES # BLD AUTO: 2 10*3/MM3 (ref 0.7–3.1)
LYMPHOCYTES NFR BLD AUTO: 16.4 % (ref 19.6–45.3)
MAGNESIUM SERPL-MCNC: 2.7 MG/DL (ref 1.6–2.4)
MCH RBC QN AUTO: 32 PG (ref 26.6–33)
MCHC RBC AUTO-ENTMCNC: 30.7 G/DL (ref 31.5–35.7)
MCV RBC AUTO: 104.4 FL (ref 79–97)
MONOCYTES # BLD AUTO: 1.07 10*3/MM3 (ref 0.1–0.9)
MONOCYTES NFR BLD AUTO: 8.8 % (ref 5–12)
NEUTROPHILS NFR BLD AUTO: 73.7 % (ref 42.7–76)
NEUTROPHILS NFR BLD AUTO: 8.96 10*3/MM3 (ref 1.7–7)
NOROVIRUS GI+II RNA STL QL NAA+NON-PROBE: NOT DETECTED
P SHIGELLOIDES DNA STL QL NAA+NON-PROBE: NOT DETECTED
PLATELET # BLD AUTO: 204 10*3/MM3 (ref 140–450)
PMV BLD AUTO: 9.8 FL (ref 6–12)
POTASSIUM SERPL-SCNC: 4.8 MMOL/L (ref 3.5–5.2)
PROT SERPL-MCNC: 5.3 G/DL (ref 6–8.5)
RBC # BLD AUTO: 3.84 10*6/MM3 (ref 3.77–5.28)
RVA RNA STL QL NAA+NON-PROBE: NOT DETECTED
S ENT+BONG DNA STL QL NAA+NON-PROBE: NOT DETECTED
SAPO I+II+IV+V RNA STL QL NAA+NON-PROBE: NOT DETECTED
SHIGELLA SP+EIEC IPAH ST NAA+NON-PROBE: NOT DETECTED
SODIUM SERPL-SCNC: 133 MMOL/L (ref 136–145)
V CHOL+PARA+VUL DNA STL QL NAA+NON-PROBE: NOT DETECTED
V CHOLERAE DNA STL QL NAA+NON-PROBE: NOT DETECTED
WBC NRBC COR # BLD AUTO: 12.16 10*3/MM3 (ref 3.4–10.8)
Y ENTEROCOL DNA STL QL NAA+NON-PROBE: NOT DETECTED

## 2024-12-18 PROCEDURE — 94618 PULMONARY STRESS TESTING: CPT

## 2024-12-18 PROCEDURE — 83735 ASSAY OF MAGNESIUM: CPT | Performed by: FAMILY MEDICINE

## 2024-12-18 PROCEDURE — 94664 DEMO&/EVAL PT USE INHALER: CPT

## 2024-12-18 PROCEDURE — 97116 GAIT TRAINING THERAPY: CPT

## 2024-12-18 PROCEDURE — 80053 COMPREHEN METABOLIC PANEL: CPT | Performed by: FAMILY MEDICINE

## 2024-12-18 PROCEDURE — 74019 RADEX ABDOMEN 2 VIEWS: CPT

## 2024-12-18 PROCEDURE — 87507 IADNA-DNA/RNA PROBE TQ 12-25: CPT | Performed by: FAMILY MEDICINE

## 2024-12-18 PROCEDURE — 94761 N-INVAS EAR/PLS OXIMETRY MLT: CPT

## 2024-12-18 PROCEDURE — 94799 UNLISTED PULMONARY SVC/PX: CPT

## 2024-12-18 PROCEDURE — 85025 COMPLETE CBC W/AUTO DIFF WBC: CPT | Performed by: FAMILY MEDICINE

## 2024-12-18 RX ORDER — L.ACID,PARA/B.BIFIDUM/S.THERM 8B CELL
1 CAPSULE ORAL 2 TIMES DAILY
Status: DISCONTINUED | OUTPATIENT
Start: 2024-12-18 | End: 2024-12-19 | Stop reason: HOSPADM

## 2024-12-18 RX ORDER — DIPHENOXYLATE HYDROCHLORIDE AND ATROPINE SULFATE 2.5; .025 MG/1; MG/1
1 TABLET ORAL 4 TIMES DAILY PRN
Status: DISCONTINUED | OUTPATIENT
Start: 2024-12-18 | End: 2024-12-19 | Stop reason: HOSPADM

## 2024-12-18 RX ADMIN — HYDROCODONE BITARTRATE AND ACETAMINOPHEN 1 TABLET: 10; 325 TABLET ORAL at 17:22

## 2024-12-18 RX ADMIN — POTASSIUM CHLORIDE 20 MEQ: 1500 TABLET, EXTENDED RELEASE ORAL at 08:32

## 2024-12-18 RX ADMIN — LEVETIRACETAM 500 MG: 500 TABLET, FILM COATED ORAL at 08:31

## 2024-12-18 RX ADMIN — PANTOPRAZOLE SODIUM 40 MG: 40 TABLET, DELAYED RELEASE ORAL at 06:30

## 2024-12-18 RX ADMIN — Medication 1 CAPSULE: at 20:31

## 2024-12-18 RX ADMIN — APIXABAN 5 MG: 2.5 TABLET, FILM COATED ORAL at 08:32

## 2024-12-18 RX ADMIN — SUCRALFATE 1 G: 1 TABLET ORAL at 17:22

## 2024-12-18 RX ADMIN — APIXABAN 5 MG: 2.5 TABLET, FILM COATED ORAL at 20:31

## 2024-12-18 RX ADMIN — Medication 10 ML: at 20:30

## 2024-12-18 RX ADMIN — CEFUROXIME AXETIL 250 MG: 250 TABLET, FILM COATED ORAL at 08:31

## 2024-12-18 RX ADMIN — LEVETIRACETAM 500 MG: 500 TABLET, FILM COATED ORAL at 20:31

## 2024-12-18 RX ADMIN — Medication 1 CAPSULE: at 08:31

## 2024-12-18 RX ADMIN — MIDODRINE HYDROCHLORIDE 2.5 MG: 5 TABLET ORAL at 12:08

## 2024-12-18 RX ADMIN — OLANZAPINE 2.5 MG: 5 TABLET, FILM COATED ORAL at 20:31

## 2024-12-18 RX ADMIN — MIDODRINE HYDROCHLORIDE 2.5 MG: 5 TABLET ORAL at 06:30

## 2024-12-18 RX ADMIN — SUCRALFATE 1 G: 1 TABLET ORAL at 20:31

## 2024-12-18 RX ADMIN — DOXYCYCLINE 100 MG: 100 CAPSULE ORAL at 08:31

## 2024-12-18 RX ADMIN — DULOXETINE HYDROCHLORIDE 30 MG: 30 CAPSULE, DELAYED RELEASE ORAL at 08:31

## 2024-12-18 RX ADMIN — ROSUVASTATIN CALCIUM 10 MG: 5 TABLET, FILM COATED ORAL at 20:31

## 2024-12-18 RX ADMIN — BUDESONIDE AND FORMOTEROL FUMARATE DIHYDRATE 2 PUFF: 160; 4.5 AEROSOL RESPIRATORY (INHALATION) at 20:28

## 2024-12-18 RX ADMIN — BUDESONIDE AND FORMOTEROL FUMARATE DIHYDRATE 2 PUFF: 160; 4.5 AEROSOL RESPIRATORY (INHALATION) at 07:37

## 2024-12-18 RX ADMIN — DIPHENOXYLATE HYDROCHLORIDE AND ATROPINE SULFATE 1 TABLET: 2.5; .025 TABLET ORAL at 20:31

## 2024-12-18 RX ADMIN — MIDODRINE HYDROCHLORIDE 2.5 MG: 5 TABLET ORAL at 17:22

## 2024-12-18 RX ADMIN — SUCRALFATE 1 G: 1 TABLET ORAL at 12:07

## 2024-12-18 RX ADMIN — CEFUROXIME AXETIL 250 MG: 250 TABLET, FILM COATED ORAL at 20:31

## 2024-12-18 RX ADMIN — ACETAMINOPHEN 650 MG: 325 TABLET ORAL at 20:31

## 2024-12-18 RX ADMIN — DOXYCYCLINE 100 MG: 100 CAPSULE ORAL at 20:31

## 2024-12-18 RX ADMIN — Medication 10 ML: at 08:32

## 2024-12-18 RX ADMIN — SUCRALFATE 1 G: 1 TABLET ORAL at 08:31

## 2024-12-18 RX ADMIN — HYDROCODONE BITARTRATE AND ACETAMINOPHEN 1 TABLET: 10; 325 TABLET ORAL at 06:30

## 2024-12-18 NOTE — THERAPY DISCHARGE NOTE
Acute Care - Physical Therapy Treatment Note/Discharge   Millie     Patient Name: Pili Arechiga  : 1955  MRN: [id number]  Today's Date: 2024                Admit Date: 2024    Visit Dx:    ICD-10-CM ICD-9-CM   1. Idiopathic acute pancreatitis, unspecified complication status  K85.00 577.0     Patient Active Problem List   Diagnosis    Small cell carcinoma    Fitting and adjustment of vascular catheter    Smoking greater than 40 pack years    Cerebrovascular accident (CVA) due to bilateral embolism of vertebral arteries    Vitamin B12 deficiency    Need for prophylactic chemotherapy    Depression    Pancreatitis    Pneumonia     Past Medical History:   Diagnosis Date    COPD (chronic obstructive pulmonary disease)     COPD with acute exacerbation 2020    Small cell carcinoma 3/13/2024     Past Surgical History:   Procedure Laterality Date    BRONCHOSCOPY N/A 2023    Procedure: BRONCHOSCOPY WITH ENDOBRONCHIAL ULTRASOUND (EBUS) with FNA;  Surgeon: Coy Mccarthy MD;  Location: [facility] ENDOSCOPY;  Service: Pulmonary;  Laterality: N/A;  Pre/Post - mediastinal and hilar lymphadenopathy.    BRONCHOSCOPY WITH ION ROBOTIC ASSIST N/A 2024    Procedure: BRONCHOSCOPY WITH ION ROBOT,  ENDOBRONCHIAL ULTRASOUND, FINE NEEDLE ASPIRATIONS,  CRYOTHERAPY BIOPSIES, AND LEFT LOWER LOBE BRONCHOALVEOLAR LAVAGE.;  Surgeon: Alexia Velásquez MD;  Location: [facility] ENDOSCOPY;  Service: Robotics - Pulmonary;  Laterality: N/A;     SECTION      CHEST TUBE INSERTION Left 2024    Procedure: CHEST TUBE INSERTION;  Surgeon: Alexia Velásquez MD;  Location: [facility] ENDOSCOPY;  Service: Thoracic;  Laterality: Left;    CHOLECYSTECTOMY      COLONOSCOPY      ECTOPIC PREGNANCY SURGERY      HYSTERECTOMY      TONSILLECTOMY      TOTAL HIP ARTHROPLASTY Left     VENOUS ACCESS DEVICE (PORT) INSERTION N/A 3/14/2024    Procedure: INSERTION VENOUS ACCESS DEVICE;  Surgeon: Jonas Garner MD;  Location:   [facility] OR;  Service: General;  Laterality: N/A;       PT Assessment (Last 12 Hours)       PT Evaluation and Treatment       Row Name 12/18/24 0939          Physical Therapy Time and Intention    Subjective Information no complaints  -BP     Document Type therapy note (daily note);discharge treatment  -BP     Mode of Treatment physical therapy  -BP     Patient Effort adequate  -BP     Symptoms Noted During/After Treatment none  -BP       Row Name 12/18/24 0939          General Information    Patient Profile Reviewed yes  -BP     Patient/Family/Caregiver Comments/Observations Patient supine in bed with HOB elevated. 1L O2/NC. Patient agreeable to PT treatment  -BP     Risks Reviewed patient:;LOB;increased discomfort  -BP     Benefits Reviewed patient:;improve function;increase independence;increase strength  -BP     Barriers to Rehab none identified  -BP       Row Name 12/18/24 0939          Pain    Pre/Posttreatment Pain Comment Patient does not complain of pain  -BP       Row Name 12/18/24 0939          Cognition    Personal Safety Interventions gait belt;nonskid shoes/slippers when out of bed  -BP       Row Name 12/18/24 0939          Bed Mobility    Bed Mobility supine-sit;sit-supine  -BP     Supine-Sit [place] (Bed Mobility) modified independence  -BP     Sit-Supine [place] (Bed Mobility) modified independence  -BP     Assistive Device (Bed Mobility) bed rails;head of bed elevated  -BP       Row Name 12/18/24 0939          Transfers    Transfers sit-stand transfer;stand-sit transfer  -BP     Comment, (Transfers) Patient performs without need for cues  -BP       Row Name 12/18/24 0939          Sit-Stand Transfer    Sit-Stand [place] (Transfers) supervision  -BP     Assistive Device (Sit-Stand Transfers) walker, front-wheeled  -BP       Row Name 12/18/24 0939          Stand-Sit Transfer    Stand-Sit [place] (Transfers) supervision  -BP     Assistive Device (Stand-Sit Transfers) walker,  front-wheeled  -BP       Row Name 12/18/24 0939          Gait/Stairs (Locomotion)    [place] Level (Gait) supervision;standby assist  -BP     Assistive Device (Gait) walker, front-wheeled  -BP     Distance in Feet (Gait) 90  -BP     Pattern (Gait) swing-through  -BP     Deviations/Abnormal Patterns (Gait) daily decreased  -BP     Bilateral Gait Deviations forward flexed posture  -BP     Comment, (Gait/Stairs) Patient manages device safey. No loss of balance noted.  Patient requires SBA for directional changes and supervision with forward gait.  -BP       Row Name 12/18/24 0939          Safety Issues/Impairments Affecting Functional Mobility    Comment, Safety Issues/Impairments (Mobility) WFL  -BP       Row Name 12/18/24 0939          Balance    Comment, Balance sitting balance-supervision. Static standing balance-Supervision with device  -BP       Row Name 12/18/24 0939          Plan of Care Review    Plan of Care Reviewed With patient  -BP     Outcome Evaluation PT: Patient performs supine to/from sit transfer with modified independence, sit to/from stand transfers with supervision, and gait x 90 feet with with supervision. Patient manages device safely, no loss of balance noted. Patient has no concerns regarding return home and is agreeable to use of FWW at home. Recommend continued mobility with nursing staff. Anticipate return home at discharge.  -BP       Row Name 12/18/24 0939          Positioning and Restraints    Pre-Treatment Position in bed  -BP     Post Treatment Position bed  -BP     In Bed supine;call light within reach;encouraged to call for assist  -BP       Row Name 12/18/24 0939          Progress Summary (PT)    Progress Toward Functional Goals (PT) prepare for discharge  -BP       Row Name 12/18/24 0939          Therapy Plan Review/Discharge Plan (PT)    Therapy Plan Review (PT) patient;risks/benefits reviewed;participants included  -BP       Row Name 12/18/24 0939          Bed Mobility  Goal 1 (PT)    Activity/Assistive Device (Bed Mobility Goal 1, PT) bed mobility activities, all  -BP     [place] Level/Cues Needed (Bed Mobility Goal 1, PT) supervision required  -BP     Time Frame (Bed Mobility Goal 1, PT) 3 days  -BP     Progress/Outcomes (Bed Mobility Goal 1, PT) goal met  -BP       Row Name 12/18/24 0939          Transfer Goal 1 (PT)    Activity/Assistive Device (Transfer Goal 1, PT) transfers, all  -BP     [place] Level/Cues Needed (Transfer Goal 1, PT) supervision required  -BP     Time Frame (Transfer Goal 1, PT) 3 days  -BP     Progress/Outcome (Transfer Goal 1, PT) goal met  -BP       Row Name 12/18/24 0939          Gait Training Goal 1 (PT)    Activity/Assistive Device (Gait Training Goal 1, PT) gait (walking locomotion);assistive device use  -BP     [place] Level (Gait Training Goal 1, PT) supervision required  -BP     Distance (Gait Training Goal 1, PT) 100  -BP     Time Frame (Gait Training Goal 1, PT) 3 days  -BP     Progress/Outcome (Gait Training Goal 1, PT) goal partially met  -BP               User Key  (r) = Recorded By, (t) = Taken By, (c) = Cosigned By      Initials Name Provider Type     Mary Shahid, PT Physical Therapist                      Physical Therapy Education       Title: PT OT SLP Therapies (Resolved)       Topic: Physical Therapy (Resolved)       Point: Mobility training (Resolved)       Learning Progress Summary            Patient Acceptance, E,TB, VU by  at 12/18/2024 1000    Acceptance, E,TB, VU by  at 12/17/2024 0940    Acceptance, E,TB, VU by  at 12/16/2024 0950                      Point: Home exercise program (Resolved)       Learning Progress Summary            Patient Acceptance, E,TB, VU by  at 12/18/2024 1000                                      User Key       Initials Effective Dates Name Provider Type [facility] 06/16/21 -  Mary Shahid, PT Physical Therapist PT     06/16/21 -  Tracey Arnold PT Physical  Therapist PT                    PT Recommendation and Plan  Anticipated Discharge Disposition (PT): home  Progress Summary (PT)  Progress Toward Functional Goals (PT): prepare for discharge  Plan of Care Reviewed With: patient  Outcome Evaluation: PT: Patient performs supine to/from sit transfer with modified independence, sit to/from stand transfers with supervision, and gait x 90 feet with with supervision. Patient manages device safely, no loss of balance noted. Patient has no concerns regarding return home and is agreeable to use of FWW at home. Recommend continued mobility with nursing staff. Anticipate return home at discharge.     Outcome Measures       Row Name 12/18/24 0939 12/17/24 0907          How much help from another person do you currently need...    Turning from your back to your side while in flat bed without using bedrails? 4  -BP 4  -JW     Moving from lying on back to sitting on the side of a flat bed without bedrails? 4  -BP 4  -JW     Moving to and from a bed to a chair (including a wheelchair)? 3  -BP 3  -JW     Standing up from a chair using your arms (e.g., wheelchair, bedside chair)? 3  -BP 3  -JW     Climbing 3-5 steps with a railing? 3  -BP 3  -JW     To walk in hospital room? 3  -BP 3  -JW     AM-PAC 6 Clicks Score (PT) 20  -BP 20  -JW        Functional Assessment    Outcome Measure Options AM-PAC 6 Clicks Basic Mobility (PT)  -BP AM-PAC 6 Clicks Basic Mobility (PT)  -JW               User Key  (r) = Recorded By, (t) = Taken By, (c) = Cosigned By      Initials Name Provider Type    Mary De La O, PT Physical Therapist    Tracey Carabjal, CHEY Physical Therapist                     Time Calculation:    PT Charges       Row Name 12/18/24 1001             Time Calculation    Start Time 0939  -BP      Stop Time 0947  -BP      Time Calculation (min) 8 min  -BP      PT Received On 12/18/24  -BP         Timed Charges    29005 - Gait Training Minutes  8  -BP         Total Minutes     Timed Charges Total Minutes 8  -BP       Total Minutes 8  -BP                User Key  (r) = Recorded By, (t) = Taken By, (c) = Cosigned By      Initials Name Provider Type    BP Mary Shahid, PT Physical Therapist                  Therapy Charges for Today       Code Description Service Date Service Provider Modifiers Qty    69636997015 HC GAIT TRAINING EA 15 MIN 12/18/2024 Mary Shahid PT GP 1            PT G-Codes  Outcome Measure Options: AM-PAC 6 Clicks Basic Mobility (PT)  AM-PAC 6 Clicks Score (PT): 20    PT Discharge Summary  Anticipated Discharge Disposition (PT): home  Reason for Discharge: All goals achieved  Outcomes Achieved: Patient able to partially acheive established goals  Discharge Destination: Home    Mary Shahid PT  12/18/2024

## 2024-12-18 NOTE — PROGRESS NOTES
Exercise Oximetry    Patient Name:Pili Arechiga   MRN: [id number]   Date: 12/18/24             ROOM AIR Baseline at rest   SpO2%  90   Heart Rate  94   Blood Pressure       Oxygen Baseline at rest   Oxygen (LPM) 1   SpO2%  95   Heart Rate  93   Blood Pressure       EXERCISE  SpO2% HR Supplemental Oxygen, LPM   1 MINUTE 86 94 Room air   2 MINUTES 95 92 1L N/C   3 MINUTES 94 94 1L N/C   4 MINUTES 93 92 1L N/C   5 MINUTES 94 90 1 L N/C   6 MINUTES 93 96 1L N/C     Recovery       Time to Baseline/Recovery (minutes) 1 min   SpO2 % 94   HR 90   Oxygen  1LN/C     Distance Walked (meters) 39.01       Pre Post   Dyspnea (Kailey Scale) 0 0   Fatigue (Kailey Scale)       Comments: Pt room air SPO2 at rest 90%. Pt's SPO2 dropped to 86% after ambulating 18'.    Pt placed on 1L & recovered to 95% at rest.    Pt was able to maintain SPO2 > 92% while ambulating the duration of test on 1L N/C.    Pt had no C/O SOA while ambulating.    Pt used a walker & gait belt was in place while CNA walked beside pt .    SPO2 was monitored via forehead probe.

## 2024-12-18 NOTE — PROGRESS NOTES
"Daily Progress Note:      Chief complaint: Pneumonia, pancreatitis, enteritis, small cell lung cancer, COPD, hypertension, hypokalemia    Subjective     Subjective: Continues to improve.  Continues to have diarrhea.  Still requiring 1 to 2 L of O2 to keep sats above 90   LOS: 4 days     Objective     Vital Signs  Temp:  [97.6 °F (36.4 °C)-99.8 °F (37.7 °C)] 98.3 °F (36.8 °C)  Heart Rate:  [72-86] 81  Resp:  [16-18] 16  BP: (88-99)/(55-71) 92/60  Oxygen Therapy  SpO2: 91 %  Pulse Oximetry Type: Continuous  Device (Oxygen Therapy): nasal cannula  Flow (L/min) (Oxygen Therapy): 2}  Body mass index is 18.49 kg/m².  Flowsheet Rows      Flowsheet Row First Filed Value   Admission Height 160 cm (62.99\") Documented at 12/14/2024 1301   Admission Weight 48.1 kg (106 lb) Documented at 12/14/2024 1301               Documented weights    12/14/24 1301 12/14/24 2055 12/16/24 0507 12/17/24 0500   Weight: 48.1 kg (106 lb) 46.4 kg (102 lb 3.2 oz) 49.3 kg (108 lb 9.6 oz) 47.3 kg (104 lb 3.2 oz)    12/18/24 0351   Weight: 47.4 kg (104 lb 6.4 oz)         Patient Vitals for the past 24 hrs:   BP Temp Temp src Pulse Resp SpO2 Weight   12/18/24 0351 92/60 98.3 °F (36.8 °C) Oral 81 16 91 % 47.4 kg (104 lb 6.4 oz)   12/17/24 2324 93/60 98.1 °F (36.7 °C) Oral 86 16 91 % --   12/17/24 2029 -- -- -- -- -- 92 % --   12/17/24 2027 -- -- -- -- -- (!) 87 % --   12/17/24 1956 -- -- -- 80 16 -- --   12/17/24 1952 -- -- -- 81 16 92 % --   12/17/24 1945 99/71 98.1 °F (36.7 °C) Oral 80 18 91 % --   12/17/24 1619 93/63 97.6 °F (36.4 °C) Oral 84 16 90 % --   12/17/24 1325 92/62 -- -- 72 -- 91 % --   12/17/24 1204 (!) 88/57 -- -- 77 -- -- --   12/17/24 1115 (!) 88/55 99.8 °F (37.7 °C) Oral 77 16 91 % --   12/17/24 1037 -- -- -- -- -- 94 % --   12/17/24 1033 -- -- -- -- -- 97 % --   12/17/24 0727 99/60 97.6 °F (36.4 °C) Temporal 72 18 95 % --       47.4 kg (104 lb 6.4 oz)    Intake/Output                         12/16/24 0701 - 12/17/24 0700 12/17/24 0701 " - 12/18/24 0700     2515-7795 8504-7454 Total 6701-7947 6029-6668 Total                 Intake    P.O.  540  -- 540  600  840 1440    IV Piggyback  100  100 200  --  -- --    Total Intake 640 100 740        Output    Urine  1000  1000 2000  350  500 850    Total Output 1000 1000 2000 350 500 850             Intake/Output Summary (Last 24 hours) at 12/18/2024 0654  Last data filed at 12/18/2024 0630  Gross per 24 hour   Intake 1440 ml   Output 850 ml   Net 590 ml      Intake/Output Summary (Last 24 hours) at 12/18/2024 0654  Last data filed at 12/18/2024 0630  Gross per 24 hour   Intake 1440 ml   Output 850 ml   Net 590 ml        Review of Systems   Constitutional:  Positive for activity change and fatigue. Negative for appetite change.   HENT:  Negative for congestion.    Respiratory:  Negative for cough, chest tightness, shortness of breath and wheezing.    Cardiovascular:  Negative for chest pain.   Gastrointestinal:  Negative for abdominal distention, abdominal pain, diarrhea, nausea and vomiting.   Endocrine: Negative for polyphagia and polyuria.   Genitourinary:  Negative for frequency.   Skin:  Negative for rash.   Neurological:  Negative for weakness and light-headedness.   Hematological:  Does not bruise/bleed easily.   Psychiatric/Behavioral:  Negative for agitation and behavioral problems.        Physical Exam  Vitals and nursing note reviewed.   Constitutional:       Appearance: She is well-developed and underweight.   HENT:      Head: Normocephalic.   Eyes:      Conjunctiva/sclera: Conjunctivae normal.   Neck:      Thyroid: No thyromegaly.      Vascular: No JVD.   Cardiovascular:      Rate and Rhythm: Normal rate and regular rhythm.      Heart sounds: Normal heart sounds. No murmur heard.  Pulmonary:      Effort: Pulmonary effort is normal. No respiratory distress.      Breath sounds: Normal breath sounds. No wheezing or rales.   Abdominal:      General: Bowel sounds are normal. There is no  distension.      Palpations: Abdomen is soft.      Tenderness: There is no abdominal tenderness. There is no guarding.   Skin:     General: Skin is warm and dry.      Findings: No rash.   Neurological:      General: No focal deficit present.      Mental Status: She is alert and oriented to person, place, and time.         Medication Review:   I have reviewed the patient's current medication list  Scheduled Meds:apixaban, 5 mg, Oral, Q12H  budesonide-formoterol, 2 puff, Inhalation, BID - RT  cefuroxime, 250 mg, Oral, Q12H  doxycycline, 100 mg, Oral, Q12H  DULoxetine, 30 mg, Oral, Daily  levETIRAcetam, 500 mg, Oral, Q12H  midodrine, 2.5 mg, Oral, TID AC  OLANZapine, 2.5 mg, Oral, Nightly  pantoprazole, 40 mg, Oral, Q AM  potassium chloride, 20 mEq, Oral, Daily  rosuvastatin, 10 mg, Oral, Nightly  sodium chloride, 10 mL, Intravenous, Q12H  sucralfate, 1 g, Oral, 4x Daily      Continuous Infusions:     PRN Meds:.  acetaminophen **OR** acetaminophen **OR** acetaminophen    acetaminophen    aluminum-magnesium hydroxide-simethicone    senna-docusate sodium **AND** polyethylene glycol **AND** bisacodyl **AND** bisacodyl    calcium carbonate    diazePAM    diphenoxylate-atropine    HYDROcodone-acetaminophen    ipratropium-albuterol    Magnesium Standard Dose Replacement - Follow Nurse / BPA Driven Protocol    ondansetron ODT **OR** ondansetron    sodium chloride    sodium chloride      Result Review        I have personally reviewed the results from the time of this admission to 12/18/2024 06:54 EST and agree with these findings:  [x]  Laboratory  []  Microbiology  [x]  Radiology  []  EKG/Telemetry   []  Cardiology/Vascular   []  Pathology  []  Old records  []  Other:        Labs:  Results from last 7 days   Lab Units 12/17/24  0359 12/16/24  0401 12/15/24  0405 12/14/24  1337   WBC 10*3/mm3 14.07* 14.91* 18.78* 24.66*   RBC 10*6/mm3 3.84 3.76* 3.92 4.56   HEMOGLOBIN g/dL 12.4 12.3 12.8 14.8   HEMATOCRIT % 38.5 38.2 39.5  45.2   RDW % 13.0 13.1 13.2 12.9   MCV fL 100.3* 101.6* 100.8* 99.1*   MCH pg 32.3 32.7 32.7 32.5   MCHC g/dL 32.2 32.2 32.4 32.7   MPV fL 10.0 10.0 10.2 9.8   PLATELETS 10*3/mm3 194 190 222 253   RDW-SD fl 47.9 49.4 49.0 47.2       Results from last 7 days   Lab Units 12/17/24  0359 12/16/24  0401 12/15/24  0405 12/14/24  1337   SODIUM mmol/L 135* 137 135* 130*   POTASSIUM mmol/L 2.7* 3.1* 3.6 3.3*   CHLORIDE mmol/L 97* 103 102 92*   CO2 mmol/L 29.3* 23.9 24.5 26.8   ANION GAP mmol/L 8.7 10.1 8.5 11.2   BUN mg/dL 6* 6* 7* 9   CREATININE mg/dL 0.83 0.77 0.79 0.93   BUN / CREAT RATIO  7.2 7.8 8.9 9.7   EGFR mL/min/1.73 77.4 84.7 82.1 67.5   CALCIUM mg/dL 6.7* 6.6* 7.2* 8.8   GLUCOSE mg/dL 85 97 103* 134*     CMP:        Lab 12/17/24  0359 12/16/24  0401 12/15/24  0405 12/14/24  1337   SODIUM 135* 137 135* 130*   POTASSIUM 2.7* 3.1* 3.6 3.3*   CHLORIDE 97* 103 102 92*   CO2 29.3* 23.9 24.5 26.8   ANION GAP 8.7 10.1 8.5 11.2   BUN 6* 6* 7* 9   CREATININE 0.83 0.77 0.79 0.93   EGFR 77.4 84.7 82.1 67.5   GLUCOSE 85 97 103* 134*   CALCIUM 6.7* 6.6* 7.2* 8.8   MAGNESIUM 1.2*  --   --   --    TOTAL PROTEIN  --  5.1* 5.4* 6.6   ALBUMIN  --  2.7* 3.0* 3.6   GLOBULIN  --  2.4 2.4 3.0   ALT (SGPT)  --  <5 <5 <5   AST (SGOT)  --  13 21 25   BILIRUBIN  --  0.4 0.3 0.3   ALK PHOS  --  88 97 122*   LIPASE  --  93* 87* 275*       Results from last 7 days   Lab Units 12/17/24  0359 12/16/24  0401 12/15/24  0405 12/14/24  2123 12/14/24  1836 12/14/24  1443 12/14/24  1337   PROCALCITONIN ng/mL  --   --   --   --   --   --  0.33*   LACTATE mmol/L  --   --   --  1.8 2.3* 3.6*  --    PLATELETS 10*3/mm3 194 190 222  --   --   --  253         Lab Results (last 24 hours)       Procedure Component Value Units Date/Time    Comprehensive Metabolic Panel [[id number]] Collected: 12/18/24 0633    Specimen: Blood Updated: 12/18/24 0651    Magnesium [[id number]] Collected: 12/18/24 0633    Specimen: Blood Updated: 12/18/24 0651    CBC & Differential  [[id number]] Collected: 12/18/24 0633    Specimen: Blood Updated: 12/18/24 0651    Narrative:      The following orders were created for panel order CBC & Differential.  Procedure                               Abnormality         Status                     ---------                               -----------         ------                     CBC Auto Differential[[id number]]                            In process                   Please view results for these tests on the individual orders.    CBC Auto Differential [[id number]] Collected: 12/18/24 0633    Specimen: Blood Updated: 12/18/24 0651    Blood Culture - Blood, Arm, Right [[id number]]  (Normal) Collected: 12/14/24 1714    Specimen: Blood from Arm, Right Updated: 12/17/24 1716     Blood Culture No growth at 3 days    Blood Culture - Blood, Arm, Left [[id number]]  (Normal) Collected: 12/14/24 1713    Specimen: Blood from Arm, Left Updated: 12/17/24 1716     Blood Culture No growth at 3 days    Clostridioides difficile Toxin - Stool, Per Rectum [[id number]]  (Normal) Collected: 12/17/24 1330    Specimen: Stool from Per Rectum Updated: 12/17/24 1433    Narrative:      The following orders were created for panel order Clostridioides difficile Toxin - Stool, Per Rectum.  Procedure                               Abnormality         Status                     ---------                               -----------         ------                     Clostridioides difficile...[[id number]]  Normal              Final result                 Please view results for these tests on the individual orders.    Clostridioides difficile EIA - Stool, Per Rectum [[id number]]  (Normal) Collected: 12/17/24 1330    Specimen: Stool from Per Rectum Updated: 12/17/24 1433     C Diff GDH Ag Negative     C.diff Toxin Ag Negative    Narrative:      The result indicates the absence of toxigenic C.difficile from stool specimen.    Respiratory Culture - Aspirate, Cough [[id number]] Collected: 12/15/24  "1006    Specimen: Aspirate from Cough Updated: 12/17/24 1038     Respiratory Culture Rare growth Normal respiratory katie. No S. aureus or Pseudomonas aeruginosa detected. Final report.     Gram Stain Few (2+) WBCs seen      Few (2+) Epithelial cells seen      Few (2+) Mucous strands      Moderate (3+) Mixed bacterial morphotypes seen on Gram Stain    Magnesium [[id number]]  (Abnormal) Collected: 12/17/24 0359    Specimen: Blood Updated: 12/17/24 0828     Magnesium 1.2 mg/dL                           Results from last 7 days   Lab Units 12/17/24  0359   MAGNESIUM mg/dL 1.2*                 No results found for: \"POCGLU\"  Results from last 7 days   Lab Units 12/14/24  2123 12/14/24  1836 12/14/24  1443 12/14/24  1337   PROCALCITONIN ng/mL  --   --   --  0.33*   LACTATE mmol/L 1.8 2.3* 3.6*  --      Results from last 7 days   Lab Units 12/15/24  1006 12/14/24  1714 12/14/24  1713   BLOODCX   --  No growth at 3 days No growth at 3 days   RESPCX  Rare growth Normal respiratory katie. No S. aureus or Pseudomonas aeruginosa detected. Final report.  --   --      Results from last 7 days   Lab Units 12/14/24  1328   NITRITE UA  Negative   WBC UA /HPF None Seen   BACTERIA UA /HPF None Seen   SQUAM EPITHEL UA /HPF 0-2     Results from last 7 days   Lab Units 12/15/24  0002   ADENOVIRUS DETECTION BY PCR  Not Detected   CORONAVIRUS 229E  Not Detected   CORONAVIRUS HKU1  Not Detected   CORONAVIRUS NL63  Not Detected   CORONAVIRUS OC43  Not Detected   HUMAN METAPNEUMOVIRUS  Not Detected   HUMAN RHINOVIRUS/ENTEROVIRUS  Not Detected   INFLUENZA B PCR  Not Detected   PARAINFLUENZA 1  Not Detected   PARAINFLUENZA VIRUS 2  Not Detected   PARAINFLUENZA VIRUS 3  Not Detected   PARAINFLUENZA VIRUS 4  Not Detected   BORDETELLA PERTUSSIS PCR  Not Detected   CHLAMYDOPHILA PNEUMONIAE PCR  Not Detected   MYCOPLAMA PNEUMO PCR  Not Detected   INFLUENZA A PCR  Not Detected   RSV, PCR  Not Detected         Radiology:  Imaging Results (Last 24 " Hours)       ** No results found for the last 24 hours. **            Cardiology:  ECG/EMG Results (last 24 hours)       ** No results found for the last 24 hours. **            Results for orders placed during the hospital encounter of 04/11/24    Adult Transthoracic Echo Complete W/ Cont if Necessary Per Protocol    Interpretation Summary    Left ventricular systolic function is normal. Left ventricular ejection fraction appears to be 56 - 60%.    Left ventricular diastolic function is consistent with (grade I) impaired relaxation.    The interatrial septum appears redundant. The agitated saline study is positive, consistent with a small PFO    Mild tricuspid valve regurgitation is present    Calculated right ventricular systolic pressure from tricuspid regurgitation is 24 mmHg.    There is no evidence of pericardial effusion.      I have reviewed recent labs results and consult notes.    Please note portions of this assessment/plan may have been copied and pasted, but I have personally seen this patient and reviewed each line of this assessment and plan for accuracy and made updates to reflect my necessary changes    Assessment/Plan     Assessment and Plan:      .  Nausea and vomiting from acute pancreatitis/enteritis resolved     .  Diarrhea on broad-spectrum antibiotics start probiotic check C. difficile    .  Left upper lobe community-acquired atypical pneumonia in immunocompromised patient will change to p.o. antibiotics Pro-Skyler no significant elevation white count continues to be elevated but is trending down she has baseline leukocytosis    .  Acute hypoxic respiratory failure continues require oxygen hold check oximetry on exertion as well as nocturnal oximetry    .  Sepsis secondary to community-acquired pneumonia improved.  Responded well to current therapies      .  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  Hypokalemia persistent oral potassium ordered a.m. labs pending    .  Hypomagnesemia likely due to GI loss due to diarrhea magnesium level pending magnesium replacement protocol ordered     .  COPD nothing acute continue current therapies     .  Hyperlipidemia on statin     .  Orthostatic hypotension resume p.o. midodrine blood pressure continues to be on the low side but, for this patient     .  GERD with esophagitis continue IV Protonix     .  Major depression anxiety oral Valium and Cymbalta.     .  Lumbar degenerative disease with chronic pain  Much of this encounter note is an electronic transcription/translation of spoken language to printed text. The electronic translation of spoken language may permit erroneous, or at times, nonsensical words or phrases to be inadvertently transcribed; Although I have reviewed the note for such errors, some may still exist.    Note Disclaimer: At [facility], we believe that sharing information builds trust and better relationships. You are receiving this note because you recently visited [facility]. It is possible you will see health information before a provider has talked with you about it. This kind of information can be easy to misunderstand. To help you fully understand what it means for your health, we urge you to discuss this note with your provider.

## 2024-12-18 NOTE — PLAN OF CARE
Goal Outcome Evaluation:  Plan of Care Reviewed With: patient           Outcome Evaluation: PT: Patient performs supine to/from sit transfer with modified independence, sit to/from stand transfers with supervision, and gait x 90 feet with with supervision. Patient manages device safely, no loss of balance noted. Patient has no concerns regarding return home and is agreeable to use of FWW at home. Recommend continued mobility with nursing staff. Anticipate return home at discharge.    Anticipated Discharge Disposition (PT): home

## 2024-12-18 NOTE — PLAN OF CARE
Goal Outcome Evaluation:  Plan of Care Reviewed With: patient        Progress: no change  Outcome Evaluation: vss. pt up with stand by assist. po antibiotics in place. pain controlled with prn po pain meds. GI panel negative, walking ox study completed, see respiratory notes. sleep study ordered for tonight. pt resting in room. currently on 1L O2. no other complaints.

## 2024-12-18 NOTE — PLAN OF CARE
Goal Outcome Evaluation:  Plan of Care Reviewed With: patient        Progress: no change  Outcome Evaluation: Vital signs stable. Oxygen currently on 2L nasal cannula. IVs saline locked. PO antibiotic given. Tolerating diet. Patient appears to have rested well overnight.

## 2024-12-19 VITALS
WEIGHT: 103.8 LBS | HEART RATE: 87 BPM | RESPIRATION RATE: 16 BRPM | OXYGEN SATURATION: 96 % | HEIGHT: 63 IN | SYSTOLIC BLOOD PRESSURE: 101 MMHG | DIASTOLIC BLOOD PRESSURE: 65 MMHG | TEMPERATURE: 97.5 F | BODY MASS INDEX: 18.39 KG/M2

## 2024-12-19 LAB
ALBUMIN SERPL-MCNC: 2.7 G/DL (ref 3.5–5.2)
ALBUMIN/GLOB SERPL: 1.1 G/DL
ALP SERPL-CCNC: 88 U/L (ref 39–117)
ALT SERPL W P-5'-P-CCNC: <5 U/L (ref 1–33)
ANION GAP SERPL CALCULATED.3IONS-SCNC: 6.6 MMOL/L (ref 5–15)
AST SERPL-CCNC: 11 U/L (ref 1–32)
BACTERIA SPEC AEROBE CULT: NORMAL
BACTERIA SPEC AEROBE CULT: NORMAL
BILIRUB SERPL-MCNC: 0.2 MG/DL (ref 0–1.2)
BUN SERPL-MCNC: 5 MG/DL (ref 8–23)
BUN/CREAT SERPL: 8.2 (ref 7–25)
CALCIUM SPEC-SCNC: 7.3 MG/DL (ref 8.6–10.5)
CHLORIDE SERPL-SCNC: 102 MMOL/L (ref 98–107)
CO2 SERPL-SCNC: 28.4 MMOL/L (ref 22–29)
CREAT SERPL-MCNC: 0.61 MG/DL (ref 0.57–1)
DEPRECATED RDW RBC AUTO: 49.7 FL (ref 37–54)
EGFRCR SERPLBLD CKD-EPI 2021: 98.1 ML/MIN/1.73
ERYTHROCYTE [DISTWIDTH] IN BLOOD BY AUTOMATED COUNT: 13.2 % (ref 12.3–15.4)
GLOBULIN UR ELPH-MCNC: 2.4 GM/DL
GLUCOSE SERPL-MCNC: 82 MG/DL (ref 65–99)
HCT VFR BLD AUTO: 37.5 % (ref 34–46.6)
HGB BLD-MCNC: 11.8 G/DL (ref 12–15.9)
MCH RBC QN AUTO: 32 PG (ref 26.6–33)
MCHC RBC AUTO-ENTMCNC: 31.5 G/DL (ref 31.5–35.7)
MCV RBC AUTO: 101.6 FL (ref 79–97)
PLATELET # BLD AUTO: 199 10*3/MM3 (ref 140–450)
PMV BLD AUTO: 10 FL (ref 6–12)
POTASSIUM SERPL-SCNC: 4.4 MMOL/L (ref 3.5–5.2)
PROT SERPL-MCNC: 5.1 G/DL (ref 6–8.5)
RBC # BLD AUTO: 3.69 10*6/MM3 (ref 3.77–5.28)
SODIUM SERPL-SCNC: 137 MMOL/L (ref 136–145)
WBC NRBC COR # BLD AUTO: 11.57 10*3/MM3 (ref 3.4–10.8)

## 2024-12-19 PROCEDURE — 94664 DEMO&/EVAL PT USE INHALER: CPT

## 2024-12-19 PROCEDURE — 85027 COMPLETE CBC AUTOMATED: CPT | Performed by: FAMILY MEDICINE

## 2024-12-19 PROCEDURE — 94799 UNLISTED PULMONARY SVC/PX: CPT

## 2024-12-19 PROCEDURE — 94761 N-INVAS EAR/PLS OXIMETRY MLT: CPT

## 2024-12-19 PROCEDURE — 80053 COMPREHEN METABOLIC PANEL: CPT | Performed by: FAMILY MEDICINE

## 2024-12-19 RX ORDER — DOXYCYCLINE 100 MG/1
100 CAPSULE ORAL EVERY 12 HOURS SCHEDULED
Qty: 6 CAPSULE | Refills: 0 | Status: SHIPPED | OUTPATIENT
Start: 2024-12-19 | End: 2024-12-22

## 2024-12-19 RX ORDER — DIPHENOXYLATE HYDROCHLORIDE AND ATROPINE SULFATE 2.5; .025 MG/1; MG/1
1 TABLET ORAL 4 TIMES DAILY PRN
Qty: 15 TABLET | Refills: 0 | Status: SHIPPED | OUTPATIENT
Start: 2024-12-19

## 2024-12-19 RX ORDER — L.ACID,PARA/B.BIFIDUM/S.THERM 8B CELL
1 CAPSULE ORAL 2 TIMES DAILY
Qty: 60 CAPSULE | Refills: 0 | Status: SHIPPED | OUTPATIENT
Start: 2024-12-19

## 2024-12-19 RX ORDER — CEFUROXIME AXETIL 250 MG/1
250 TABLET ORAL EVERY 12 HOURS SCHEDULED
Qty: 6 TABLET | Refills: 0 | Status: SHIPPED | OUTPATIENT
Start: 2024-12-19 | End: 2024-12-22

## 2024-12-19 RX ADMIN — Medication 1 CAPSULE: at 09:28

## 2024-12-19 RX ADMIN — LEVETIRACETAM 500 MG: 500 TABLET, FILM COATED ORAL at 09:28

## 2024-12-19 RX ADMIN — APIXABAN 5 MG: 2.5 TABLET, FILM COATED ORAL at 09:27

## 2024-12-19 RX ADMIN — BUDESONIDE AND FORMOTEROL FUMARATE DIHYDRATE 2 PUFF: 160; 4.5 AEROSOL RESPIRATORY (INHALATION) at 07:51

## 2024-12-19 RX ADMIN — DULOXETINE HYDROCHLORIDE 30 MG: 30 CAPSULE, DELAYED RELEASE ORAL at 09:27

## 2024-12-19 RX ADMIN — Medication 10 ML: at 09:29

## 2024-12-19 RX ADMIN — SUCRALFATE 1 G: 1 TABLET ORAL at 09:27

## 2024-12-19 RX ADMIN — POTASSIUM CHLORIDE 20 MEQ: 1500 TABLET, EXTENDED RELEASE ORAL at 09:28

## 2024-12-19 RX ADMIN — SUCRALFATE 1 G: 1 TABLET ORAL at 13:39

## 2024-12-19 RX ADMIN — MIDODRINE HYDROCHLORIDE 2.5 MG: 5 TABLET ORAL at 13:40

## 2024-12-19 RX ADMIN — HYDROCODONE BITARTRATE AND ACETAMINOPHEN 1 TABLET: 10; 325 TABLET ORAL at 05:22

## 2024-12-19 RX ADMIN — DOXYCYCLINE 100 MG: 100 CAPSULE ORAL at 09:28

## 2024-12-19 RX ADMIN — DIPHENOXYLATE HYDROCHLORIDE AND ATROPINE SULFATE 1 TABLET: 2.5; .025 TABLET ORAL at 09:43

## 2024-12-19 RX ADMIN — CEFUROXIME AXETIL 250 MG: 250 TABLET, FILM COATED ORAL at 09:28

## 2024-12-19 RX ADMIN — MIDODRINE HYDROCHLORIDE 2.5 MG: 5 TABLET ORAL at 09:28

## 2024-12-19 RX ADMIN — PANTOPRAZOLE SODIUM 40 MG: 40 TABLET, DELAYED RELEASE ORAL at 05:22

## 2024-12-19 NOTE — DISCHARGE PLACEMENT REQUEST
"Pili Devries \"THEODORE\" (69 y.o. Female)       Date of Birth   1955    Social Security Number       Address   [address] [place] [address] [facility] [address]    Home Phone   [phone]    MRN   [id number]       [facility]    Marital Status                               Admission Date   12/14/24    Admission Type   Emergency    Admitting Provider   Nigel De Paz MD    Attending Provider   Nigel De Paz MD    Department, Room/Bed   [facility] MED SURG, 1405/1       Discharge Date       Discharge Disposition   Home or Self Care    Discharge Destination                                 Attending Provider: Nigel De Paz MD    Allergies: Codeine, Percocet [Oxycodone-acetaminophen]    Isolation: None   Infection: None   Code Status: CPR    Ht: 160 cm (63\")   Wt: 47.1 kg (103 lb 12.8 oz)    Admission Cmt: None   Principal Problem: Pancreatitis [K85.90]                   Active Insurance as of 12/14/2024       Primary Coverage       Payor Plan Insurance Group Employer/Plan Group    MEDICARE MEDICARE A & B        Payor Plan Address Payor Plan Phone Number Payor Plan Fax Number Effective Dates    [address] [phone]  9/1/2022 - None Entered    [facility] [id number]         Subscriber Name Subscriber Birth Date Member ID       PILI DEVRIES 1955 [id number]               Secondary Coverage       Payor Plan Insurance Group Employer/Plan Group    [facility] SUPP [id number]       Payor Plan Address Payor Plan Phone Number Payor Plan Fax Number Effective Dates    [address]   9/1/2022 - None Entered    [facility] [id number]         Subscriber Name Subscriber Birth Date Member ID       PILI DEVRIES 1955 [id number]                     Emergency Contacts        (Rel.) Home Phone Work Phone Mobile Phone    Jose Ledesma (Brother) -- -- [phone]    Clarita Carvajal (Relative) -- -- [phone]    GeniRebeca (Grandchild) " [phone] -- --

## 2024-12-19 NOTE — PROGRESS NOTES
"Daily Progress Note:      Chief complaint: Pneumonia, pancreatitis, enteritis, small cell lung cancer, COPD, hypertension, hypokalemia    Subjective     Subjective: Continues to improve.  Continues to have diarrhea.  Still requiring 1 to 2 L of O2 to keep sats above 90 no other complaints.   LOS: 5 days     Objective     Vital Signs  Temp:  [97.5 °F (36.4 °C)-98.3 °F (36.8 °C)] 97.5 °F (36.4 °C)  Heart Rate:  [69-92] 85  Resp:  [16-18] 16  BP: (90-98)/(55-64) 98/61  Oxygen Therapy  SpO2: 95 %  Pulse Oximetry Type: Continuous  Device (Oxygen Therapy): room air  Flow (L/min) (Oxygen Therapy): 1  Oxygen Concentration (%): 1}  Body mass index is 18.39 kg/m².  Flowsheet Rows      Flowsheet Row First Filed Value   Admission Height 160 cm (62.99\") Documented at 12/14/2024 1301   Admission Weight 48.1 kg (106 lb) Documented at 12/14/2024 1301               Documented weights    12/14/24 1301 12/14/24 2055 12/16/24 0507 12/17/24 0500   Weight: 48.1 kg (106 lb) 46.4 kg (102 lb 3.2 oz) 49.3 kg (108 lb 9.6 oz) 47.3 kg (104 lb 3.2 oz)    12/18/24 0351 12/19/24 0522   Weight: 47.4 kg (104 lb 6.4 oz) 47.1 kg (103 lb 12.8 oz)         Patient Vitals for the past 24 hrs:   BP Temp Temp src Pulse Resp SpO2 Weight   12/19/24 0726 98/61 97.5 °F (36.4 °C) Oral 85 16 95 % --   12/19/24 0522 -- -- -- -- -- -- 47.1 kg (103 lb 12.8 oz)   12/19/24 0409 93/55 97.9 °F (36.6 °C) Oral 75 16 93 % --   12/18/24 2330 -- -- -- -- -- 93 % --   12/18/24 2311 94/62 98.2 °F (36.8 °C) Oral 69 16 90 % --   12/18/24 2218 -- -- -- -- -- 91 % --   12/18/24 2215 -- -- -- -- -- (!) 80 % --   12/18/24 2200 -- -- -- -- -- 96 % --   12/18/24 2033 -- -- -- 76 16 -- --   12/18/24 2028 -- -- -- 75 16 95 % --   12/18/24 2013 97/61 98.3 °F (36.8 °C) Oral 78 16 100 % --   12/18/24 1526 90/64 97.5 °F (36.4 °C) Oral 92 16 92 % --   12/18/24 1142 90/62 97.8 °F (36.6 °C) Oral 73 18 92 % --   12/18/24 0833 -- -- -- -- -- 93 % --       47.1 kg (103 lb 12.8 " oz)    Intake/Output                         12/17/24 0701 - 12/18/24 0700 12/18/24 0701 - 12/19/24 0700     2399-1441 8839-8274 Total 2234-9387 2044-0923 Total                 Intake    P.O.  600  840 1440  840  240 1080    Total Intake         Output    Urine  350  500 850  600  1100 1700    Stool  --  -- --  1  -- 1    Total Output 350 500  1701             Intake/Output Summary (Last 24 hours) at 12/19/2024 0750  Last data filed at 12/19/2024 0522  Gross per 24 hour   Intake 1080 ml   Output 1701 ml   Net -621 ml      Intake/Output Summary (Last 24 hours) at 12/19/2024 0750  Last data filed at 12/19/2024 0522  Gross per 24 hour   Intake 1080 ml   Output 1701 ml   Net -621 ml        Review of Systems   Constitutional:  Positive for activity change and fatigue. Negative for appetite change.   HENT:  Negative for congestion.    Respiratory:  Negative for cough, chest tightness, shortness of breath and wheezing.    Cardiovascular:  Negative for chest pain.   Gastrointestinal:  Negative for abdominal distention, abdominal pain, diarrhea, nausea and vomiting.   Endocrine: Negative for polyphagia and polyuria.   Genitourinary:  Negative for frequency.   Skin:  Negative for rash.   Neurological:  Negative for weakness and light-headedness.   Hematological:  Does not bruise/bleed easily.   Psychiatric/Behavioral:  Negative for agitation and behavioral problems.        Physical Exam  Vitals and nursing note reviewed.   Constitutional:       Appearance: She is well-developed and underweight.   HENT:      Head: Normocephalic.   Eyes:      Conjunctiva/sclera: Conjunctivae normal.   Neck:      Thyroid: No thyromegaly.      Vascular: No JVD.   Cardiovascular:      Rate and Rhythm: Normal rate and regular rhythm.      Heart sounds: Normal heart sounds. No murmur heard.  Pulmonary:      Effort: Pulmonary effort is normal. No respiratory distress.      Breath sounds: Normal breath sounds. No  wheezing or rales.   Abdominal:      General: Bowel sounds are normal. There is no distension.      Palpations: Abdomen is soft.      Tenderness: There is no abdominal tenderness. There is no guarding.   Skin:     General: Skin is warm and dry.      Findings: No rash.   Neurological:      General: No focal deficit present.      Mental Status: She is alert and oriented to person, place, and time.         Medication Review:   I have reviewed the patient's current medication list  Scheduled Meds:apixaban, 5 mg, Oral, Q12H  budesonide-formoterol, 2 puff, Inhalation, BID - RT  cefuroxime, 250 mg, Oral, Q12H  doxycycline, 100 mg, Oral, Q12H  DULoxetine, 30 mg, Oral, Daily  lactobacillus acidophilus, 1 capsule, Oral, BID  levETIRAcetam, 500 mg, Oral, Q12H  midodrine, 2.5 mg, Oral, TID AC  OLANZapine, 2.5 mg, Oral, Nightly  pantoprazole, 40 mg, Oral, Q AM  potassium chloride, 20 mEq, Oral, Daily  rosuvastatin, 10 mg, Oral, Nightly  sodium chloride, 10 mL, Intravenous, Q12H  sucralfate, 1 g, Oral, 4x Daily      Continuous Infusions:     PRN Meds:.  acetaminophen **OR** acetaminophen **OR** acetaminophen    acetaminophen    aluminum-magnesium hydroxide-simethicone    senna-docusate sodium **AND** polyethylene glycol **AND** bisacodyl **AND** bisacodyl    calcium carbonate    diazePAM    diphenoxylate-atropine    HYDROcodone-acetaminophen    ipratropium-albuterol    Magnesium Standard Dose Replacement - Follow Nurse / BPA Driven Protocol    ondansetron ODT **OR** ondansetron    sodium chloride    sodium chloride      Result Review        I have personally reviewed the results from the time of this admission to 12/19/2024 07:50 EST and agree with these findings:  [x]  Laboratory  []  Microbiology  [x]  Radiology  []  EKG/Telemetry   []  Cardiology/Vascular   []  Pathology  []  Old records  []  Other:        Labs:  Results from last 7 days   Lab Units 12/19/24  0351 12/18/24  0633 12/17/24  0359 12/16/24  0401 12/15/24  0405  12/14/24  1337   WBC 10*3/mm3 11.57* 12.16* 14.07* 14.91* 18.78* 24.66*   RBC 10*6/mm3 3.69* 3.84 3.84 3.76* 3.92 4.56   HEMOGLOBIN g/dL 11.8* 12.3 12.4 12.3 12.8 14.8   HEMATOCRIT % 37.5 40.1 38.5 38.2 39.5 45.2   RDW % 13.2 13.0 13.0 13.1 13.2 12.9   MCV fL 101.6* 104.4* 100.3* 101.6* 100.8* 99.1*   MCH pg 32.0 32.0 32.3 32.7 32.7 32.5   MCHC g/dL 31.5 30.7* 32.2 32.2 32.4 32.7   MPV fL 10.0 9.8 10.0 10.0 10.2 9.8   PLATELETS 10*3/mm3 199 204 194 190 222 253   RDW-SD fl 49.7 50.4 47.9 49.4 49.0 47.2       Results from last 7 days   Lab Units 12/19/24  0351 12/18/24  0633 12/17/24  0359 12/16/24  0401 12/15/24  0405 12/14/24  1337   SODIUM mmol/L 137 133* 135* 137 135* 130*   POTASSIUM mmol/L 4.4 4.8 2.7* 3.1* 3.6 3.3*   CHLORIDE mmol/L 102 99 97* 103 102 92*   CO2 mmol/L 28.4 28.1 29.3* 23.9 24.5 26.8   ANION GAP mmol/L 6.6 5.9 8.7 10.1 8.5 11.2   BUN mg/dL 5* 5* 6* 6* 7* 9   CREATININE mg/dL 0.61 0.69 0.83 0.77 0.79 0.93   BUN / CREAT RATIO  8.2 7.2 7.2 7.8 8.9 9.7   EGFR mL/min/1.73 98.1 95.3 77.4 84.7 82.1 67.5   CALCIUM mg/dL 7.3* 6.8* 6.7* 6.6* 7.2* 8.8   GLUCOSE mg/dL 82 93 85 97 103* 134*     CMP:        Lab 12/19/24  0351 12/18/24  0633 12/17/24  0359 12/16/24  0401 12/15/24  0405 12/14/24  1337   SODIUM 137 133* 135* 137 135* 130*   POTASSIUM 4.4 4.8 2.7* 3.1* 3.6 3.3*   CHLORIDE 102 99 97* 103 102 92*   CO2 28.4 28.1 29.3* 23.9 24.5 26.8   ANION GAP 6.6 5.9 8.7 10.1 8.5 11.2   BUN 5* 5* 6* 6* 7* 9   CREATININE 0.61 0.69 0.83 0.77 0.79 0.93   EGFR 98.1 95.3 77.4 84.7 82.1 67.5   GLUCOSE 82 93 85 97 103* 134*   CALCIUM 7.3* 6.8* 6.7* 6.6* 7.2* 8.8   MAGNESIUM  --  2.7* 1.2*  --   --   --    TOTAL PROTEIN 5.1* 5.3*  --  5.1* 5.4* 6.6   ALBUMIN 2.7* 2.8*  --  2.7* 3.0* 3.6   GLOBULIN 2.4 2.5  --  2.4 2.4 3.0   ALT (SGPT) <5 <5  --  <5 <5 <5   AST (SGOT) 11 10  --  13 21 25   BILIRUBIN 0.2 0.3  --  0.4 0.3 0.3   ALK PHOS 88 87  --  88 97 122*   LIPASE  --   --   --  93* 87* 275*       Results from last 7 days    Lab Units 12/19/24  0351 12/18/24  0633 12/17/24  0359 12/15/24  0405 12/14/24  2123 12/14/24  1836 12/14/24  1443 12/14/24  1337   PROCALCITONIN ng/mL  --   --   --   --   --   --   --  0.33*   LACTATE mmol/L  --   --   --   --  1.8 2.3* 3.6*  --    PLATELETS 10*3/mm3 199 204 194   < >  --   --   --  253    < > = values in this interval not displayed.         Lab Results (last 24 hours)       Procedure Component Value Units Date/Time    Comprehensive Metabolic Panel [[id number]]  (Abnormal) Collected: 12/19/24 0351    Specimen: Blood Updated: 12/19/24 0526     Glucose 82 mg/dL      BUN 5 mg/dL      Creatinine 0.61 mg/dL      Sodium 137 mmol/L      Potassium 4.4 mmol/L      Chloride 102 mmol/L      CO2 28.4 mmol/L      Calcium 7.3 mg/dL      Total Protein 5.1 g/dL      Albumin 2.7 g/dL      ALT (SGPT) <5 U/L      AST (SGOT) 11 U/L      Alkaline Phosphatase 88 U/L      Total Bilirubin 0.2 mg/dL      Globulin 2.4 gm/dL      A/G Ratio 1.1 g/dL      BUN/Creatinine Ratio 8.2     Anion Gap 6.6 mmol/L      eGFR 98.1 mL/min/1.73     Narrative:      GFR Categories in Chronic Kidney Disease (CKD)      GFR Category          GFR (mL/min/1.73)    Interpretation  G1                     90 or greater         Normal or high (1)  G2                      60-89                Mild decrease (1)  G3a                   45-59                Mild to moderate decrease  G3b                   30-44                Moderate to severe decrease  G4                    15-29                Severe decrease  G5                    14 or less           Kidney failure          (1)In the absence of evidence of kidney disease, neither GFR category G1 or G2 fulfill the criteria for CKD.    eGFR calculation 2021 CKD-EPI creatinine equation, which does not include race as a factor    CBC (No Diff) [[id number]]  (Abnormal) Collected: 12/19/24 0351    Specimen: Blood Updated: 12/19/24 0500     WBC 11.57 10*3/mm3      RBC 3.69 10*6/mm3      Hemoglobin 11.8 g/dL  "     Hematocrit 37.5 %      .6 fL      MCH 32.0 pg      MCHC 31.5 g/dL      RDW 13.2 %      RDW-SD 49.7 fl      MPV 10.0 fL      Platelets 199 10*3/mm3     Blood Culture - Blood, Arm, Right [[id number]]  (Normal) Collected: 12/14/24 1714    Specimen: Blood from Arm, Right Updated: 12/18/24 1715     Blood Culture No growth at 4 days    Blood Culture - Blood, Arm, Left [[id number]]  (Normal) Collected: 12/14/24 1713    Specimen: Blood from Arm, Left Updated: 12/18/24 1715     Blood Culture No growth at 4 days    Gastrointestinal Panel, PCR - Stool, Per Rectum [[id number]]  (Normal) Collected: 12/18/24 0916    Specimen: Stool from Per Rectum Updated: 12/18/24 1651     Campylobacter Not Detected     Plesiomonas shigelloides Not Detected     Salmonella Not Detected     Vibrio Not Detected     Vibrio cholerae Not Detected     Yersinia enterocolitica Not Detected     Enteroaggregative E. coli (EAEC) Not Detected     Enteropathogenic E. coli (EPEC) Not Detected     Enterotoxigenic E. coli (ETEC) lt/st Not Detected     Shiga-like toxin-producing E. coli (STEC) stx1/stx2 Not Detected     Shigella/Enteroinvasive E. coli (EIEC) Not Detected     Cryptosporidium Not Detected     Cyclospora cayetanensis Not Detected     Entamoeba histolytica Not Detected     Giardia lamblia Not Detected     Adenovirus F40/41 Not Detected     Astrovirus Not Detected     Norovirus GI/GII Not Detected     Rotavirus A Not Detected     Sapovirus (I, II, IV or V) Not Detected    Narrative:      If Aeromonas, Staphylococcus aureus or Bacillus cereus are suspected, please order [id number]: Stool Culture, Aeromonas, S aureus, B Cereus.                          Results from last 7 days   Lab Units 12/18/24  0633 12/17/24  0359   MAGNESIUM mg/dL 2.7* 1.2*                 No results found for: \"POCGLU\"  Results from last 7 days   Lab Units 12/14/24  2123 12/14/24  1836 12/14/24  1443 12/14/24  1337   PROCALCITONIN ng/mL  --   --   --  0.33*   LACTATE mmol/L " 1.8 2.3* 3.6*  --      Results from last 7 days   Lab Units 12/15/24  1006 12/14/24  1714 12/14/24  1713   BLOODCX   --  No growth at 4 days No growth at 4 days   RESPCX  Rare growth Normal respiratory katie. No S. aureus or Pseudomonas aeruginosa detected. Final report.  --   --      Results from last 7 days   Lab Units 12/14/24  1328   NITRITE UA  Negative   WBC UA /HPF None Seen   BACTERIA UA /HPF None Seen   SQUAM EPITHEL UA /HPF 0-2     Results from last 7 days   Lab Units 12/18/24  0916 12/15/24  0002   ADENOVIRUS DETECTION BY PCR   --  Not Detected   ADENOVIRUS  Not Detected  --    CORONAVIRUS 229E   --  Not Detected   CORONAVIRUS HKU1   --  Not Detected   CORONAVIRUS NL63   --  Not Detected   CORONAVIRUS OC43   --  Not Detected   HUMAN METAPNEUMOVIRUS   --  Not Detected   HUMAN RHINOVIRUS/ENTEROVIRUS   --  Not Detected   INFLUENZA B PCR   --  Not Detected   PARAINFLUENZA 1   --  Not Detected   PARAINFLUENZA VIRUS 2   --  Not Detected   PARAINFLUENZA VIRUS 3   --  Not Detected   PARAINFLUENZA VIRUS 4   --  Not Detected   BORDETELLA PERTUSSIS PCR   --  Not Detected   CHLAMYDOPHILA PNEUMONIAE PCR   --  Not Detected   MYCOPLAMA PNEUMO PCR   --  Not Detected   INFLUENZA A PCR   --  Not Detected   RSV, PCR   --  Not Detected         Radiology:  Imaging Results (Last 24 Hours)       Procedure Component Value Units Date/Time    XR Abdomen Flat & Upright [[id number]] Collected: 12/18/24 0814     Updated: 12/18/24 0821    Narrative:      XR ABDOMEN FLAT AND UPRIGHT    Date of Exam: 12/18/2024 7:27 AM EST    Indication: diarrha    Comparison: CT abdomen pelvis 12/14/2024, chest radiograph 12/16/2024.    Findings:  Bowel gas pattern is nonobstructive. Mild volume of formed stool. Cholecystectomy clips. Total left hip arthroplasty. Cholecystectomy clips. Small left and trace right pleural effusions with pulmonary vascular congestion, unchanged compared to prior   chest radiograph. Right chest port tip terminates over the  cavoatrial junction/right atrium.       Impression:      Impression:  No evidence of bowel obstruction.      Electronically Signed: Mikel Lane MD    12/18/2024 8:18 AM EST    Workstation ID: [id number]            Cardiology:  ECG/EMG Results (last 24 hours)       ** No results found for the last 24 hours. **            Results for orders placed during the hospital encounter of 04/11/24    Adult Transthoracic Echo Complete W/ Cont if Necessary Per Protocol    Interpretation Summary    Left ventricular systolic function is normal. Left ventricular ejection fraction appears to be 56 - 60%.    Left ventricular diastolic function is consistent with (grade I) impaired relaxation.    The interatrial septum appears redundant. The agitated saline study is positive, consistent with a small PFO    Mild tricuspid valve regurgitation is present    Calculated right ventricular systolic pressure from tricuspid regurgitation is 24 mmHg.    There is no evidence of pericardial effusion.      I have reviewed recent labs results and consult notes.    Please note portions of this assessment/plan may have been copied and pasted, but I have personally seen this patient and reviewed each line of this assessment and plan for accuracy and made updates to reflect my necessary changes    Assessment/Plan     Assessment and Plan:      .  Nausea and vomiting from acute pancreatitis/enteritis resolved     .  Diarrhea GI panel and C. difficile negative will monitor for diarrhea today likely discharged    .  Hypoxic respiratory failure walking oximetry and overnight oximetry patient requiring 1 L at home on oxygen    .  Left upper lobe community-acquired atypical pneumonia in immunocompromised patient will change to p.o. antibiotics Pro-Skyler no significant elevation white count continues to be elevated but is trending down she has baseline leukocytosis    .  Acute hypoxic respiratory failure continues require oxygen hold check oximetry on exertion  as well as nocturnal oximetry    .  Sepsis secondary to community-acquired pneumonia improved.  Responded well to current therapies      .  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  Hypokalemia persistent oral potassium ordered a.m. labs pending    .  Hypomagnesemia likely due to GI loss due to diarrhea magnesium level pending magnesium replacement protocol ordered     .  COPD nothing acute continue current therapies     .  Hyperlipidemia on statin     .  Orthostatic hypotension resume p.o. midodrine blood pressure continues to be on the low side but, for this patient     .  GERD with esophagitis continue IV Protonix     .  Major depression anxiety oral Valium and Cymbalta.     .  Lumbar degenerative disease with chronic pain  Much of this encounter note is an electronic transcription/translation of spoken language to printed text. The electronic translation of spoken language may permit erroneous, or at times, nonsensical words or phrases to be inadvertently transcribed; Although I have reviewed the note for such errors, some may still exist.    Note Disclaimer: At [facility], we believe that sharing information builds trust and better relationships. You are receiving this note because you recently visited [facility]. It is possible you will see health information before a provider has talked with you about it. This kind of information can be easy to misunderstand. To help you fully understand what it means for your health, we urge you to discuss this note with your provider.

## 2024-12-19 NOTE — PLAN OF CARE
Goal Outcome Evaluation:  Plan of Care Reviewed With: patient        Progress: no change  Outcome Evaluation: Vital signs stable. Overnight sleep study in progress by RT. IV saline locked. Patient appears to have rested well overnight.

## 2024-12-19 NOTE — CASE MANAGEMENT/SOCIAL WORK
Continued Stay Note  SANDY Pinto     Patient Name: Pili Arechiga  MRN: [id number]  Today's Date: 12/19/2024    Admit Date: 12/14/2024    Plan: home with friends; O2 tank delivered to room   Discharge Plan       Row Name 12/19/24 0921       Plan    Plan home with friends; O2 tank delivered to room    Patient/Family in Agreement with Plan yes    Plan Comments Noted O2 orders at d/c. Provided tank to pt in room, [facility] with [employer] who can deliver O2 to pt's home at d/c. pt denies other d/c needs at this time.                   Discharge Codes    No documentation.                 Expected Discharge Date and Time       Expected Discharge Date Expected Discharge Time    Dec 19, 2024               LUIS Dumas

## 2024-12-22 NOTE — DISCHARGE SUMMARY
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  Orthostatic hypotension  Hyperlipidemia  History of lateral embolic infarct  COPD  Depression with anxiety  Lumbar degenerative disease  Uncomplicated opioid dependence      PCP  Patient Care Team:  Nigel De Paz MD as PCP - General (Family Medicine)  Nikita Burns MD as Consulting Physician (Hematology and Oncology)  Alexia Velásquez MD as Referring Physician (Thoracic Surgery)    Consults:   Consults       Date and Time Order Name Status Description    12/14/2024 10:44 PM Hematology & Oncology Inpatient Consult Completed               History of Present Illness:    69-year-old white female became ill on Tuesday with acute onset of vomiting.  She denied any abdominal pain or diarrhea says she tried to get up and felt so weak she went down to the floor was able to get back up to the bed and slept most of a Tuesday and almost all day Wednesday.  Patient continued to have intolerance to even clear liquids and continue to vomit and finally was convinced by her granddaughter to come into the hospital.  She had any fever chills no abdominal pain dysuria frequency cough chest congestion or shortness of breath.  Is not able to take any of her medications since Tuesday.     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 She has now on maintenance immunotherapy with atezolizumab. Treatments have been complicated with  anemia/thrombocytopenia and significant fatigue.      She was evaluated emergency room and found to have left upper lobe pneumonia and acute pancreatitis and she has been was admitted for further evaluation and treatment.           Hospital Course    Admitted to hospital kept n.p.o. start IV fluids IV antiemetics and bowel rest.  She was started on IV Rocephin Zithromax and vancomycin.  MRSA screen returned negative and her antibiotics were de-escalated appropriately.  Nausea and vomiting improved and IV fluids were decreased and diet was advanced she was tolerating regular diet on discharge.  Hypokalemia resolved with potassium replacement therapy.  She developed significant diarrhea with C. difficile and GI panel were negative she was prescribed Lomotil for the same she was transitioned to p.o. antibiotics and she will finish 5-day course and will follow-up in office approximately 1 week      Operations and Procedures Performed:       XR Abdomen Flat & Upright    Result Date: 12/18/2024  Narrative: XR ABDOMEN FLAT AND UPRIGHT Date of Exam: 12/18/2024 7:27 AM EST Indication: diarrha Comparison: CT abdomen pelvis 12/14/2024, chest radiograph 12/16/2024. Findings: Bowel gas pattern is nonobstructive. Mild volume of formed stool. Cholecystectomy clips. Total left hip arthroplasty. Cholecystectomy clips. Small left and trace right pleural effusions with pulmonary vascular congestion, unchanged compared to prior chest radiograph. Right chest port tip terminates over the cavoatrial junction/right atrium.     Impression: Impression: No evidence of bowel obstruction. Electronically Signed: Mikel Lane MD  12/18/2024 8:18 AM EST  Workstation ID: [id number]    XR Chest 2 View    Result Date: 12/16/2024  Narrative: XR CHEST 2 VW Date of Exam: 12/16/2024 8:31 AM EST Indication: soa Comparison: Chest x-ray dated/11/2024 Findings: Small left and trace right pleural effusions. Left basilar atelectasis or infiltrate. No  pneumothorax is seen. Right chest wall Port-A-Cath terminates overlying the SVC. Cardiac silhouette is unremarkable. No acute osseous lesion is seen.     Impression: Impression: 1.Small left and trace right pleural effusions. Left basilar atelectasis or infiltrate. Electronically Signed: Harry Valentin MD  12/16/2024 8:44 AM EST  Workstation ID: [id number]    CT Chest Without Contrast Diagnostic    Result Date: 12/14/2024  Narrative: CT CHEST WO CONTRAST DIAGNOSTIC Date of Exam: 12/14/2024 5:13 PM EST Indication: Sepsis, history of lung cancer.. Comparison: CT chest with contrast 9/17/2024, PET/CT 6/6/2024, CT abdomen and pelvis with contrast 12/14/2024 Technique: Axial CT images were obtained of the chest without contrast administration.  Sagittal and coronal reconstructions were performed.  Automated exposure control and iterative reconstruction methods were used. Findings: Visualized noncontrast soft tissues of the lower neck are without acute abnormality. There is a right-sided port catheter with the tip at the proximal right atrium. Mild cardiomegaly. Coronary artery calcifications. Negative for pericardial effusion. No suspicious mediastinal adenopathy within limits of a noncontrast exam. Assessment for hilar adenopathy limited due to noncontrast technique. The trachea and mainstem bronchi are patent. Emphysema with scarring at the lung apices. No pneumothorax. Dependent lower lobe groundglass opacities consistent with atelectasis. There are few mild areas of patchy tree-in-bud groundglass opacities involving the anterior left upper lobe which may relate to atypical infectious or inflammatory etiology (3/31). No new or enlarging solid pulmonary nodule. Upper abdomen demonstrates small amount edema surrounding the pancreatic tail in the proximal jejunum which may relate to pancreatitis/enteritis better assessed on the prior CT exam. The gallbladder is absent. No aggressive osseous lesion or acute fracture.      Impression: Impression: 1. Mild new patchy tree-in-bud groundglass opacities involving the anterior left upper lobe which may relate to atypical/viral pneumonia versus nonspecific inflammatory process. 2. Emphysema. No new solid pulmonary nodule. 3. Suspected pancreatitis involving pancreatic tail with wall thickening of the proximal jejunum near the duodenal jejunal junction which may relate to nonspecific enteritis, better assessed on prior abdominal CT. 4. Additional chronic findings above. Electronically Signed: Grayson Guevara MD  12/14/2024 6:19 PM EST  Workstation ID: [id number]    CT Abdomen Pelvis With Contrast    Result Date: 12/14/2024  Narrative: CT ABDOMEN PELVIS W CONTRAST Date of Exam: 12/14/2024 2:48 PM EST Indication: Vomiting. Comparison: CT abdomen and pelvis with contrast 9/17/2023 Technique: Axial CT images were obtained of the abdomen and pelvis following the uneventful intravenous administration of iodinated contrast. Sagittal and coronal reconstructions were performed.  Automated exposure control and iterative reconstruction methods were used. Findings: Visualized lung bases without consolidation. The heart is mildly enlarged, partially imaged. No pericardial effusion or pleural effusion. The liver is normal in size and contour. There is a small area of hepatic parenchymal arterial enhancement which could relate to flash filling hemangioma or perfusional variant, nonspecific measuring 6 mm (2/20). Gallbladder absent. The adrenal glands are normal. Spleen is without acute abnormality. Negative for biliary dilatation. The pancreas demonstrates mild stranding surrounding the pancreatic tail consistent with acute interstitial pancreatitis. No organized peripancreatic fluid collection. There is a small cystic lesion in the pancreatic body which is stable which may relate  to dilated side branch or branch duct type IPMN measuring 5 mm (2/23). The kidneys are symmetrically enhancing. Negative for  hydronephrosis or hydroureter. Several small low-attenuation bilateral renal lesions suggesting tiny cyst, too small to characterize. Negative for renal calculus or ureteral calculus. Urinary bladder is distended and fluid-filled without acute abnormality. Uterus absent. Negative for adnexal mass. Negative for pneumoperitoneum or pneumatosis intestinalis. Sigmoid diverticulosis without diverticulitis. No findings of appendicitis. No dilated small bowel loops or findings to indicate obstruction. The portal vein and superior mesenteric vein are patent. Moderate vascular calcifications of the aorta and branch vasculature. The celiac axis and superior mesenteric artery are patent. The inferior mesenteric artery is patent. Retroaortic left renal vein. No aggressive osseous lesion or acute fracture. Remote  healed fracture of the left inferior pubic ramus. Left hip prosthesis noted. Disc narrowing and vacuum disc at L4-5.     Impression: Impression: 1. Mild acute interstitial pancreatitis involving the pancreatic tail. No biliary dilatation. 2. Colonic diverticulosis without diverticulitis. 3. Prior cholecystectomy and hysterectomy. 4. Additional chronic findings above. Electronically Signed: Grayson Guevara MD  12/14/2024 3:28 PM EST  Workstation ID: [id number]     Labs:  Results from last 7 days   Lab Units 12/19/24  0351 12/18/24  0633 12/17/24  0359 12/16/24  0401   WBC 10*3/mm3 11.57* 12.16* 14.07* 14.91*   HEMOGLOBIN g/dL 11.8* 12.3 12.4 12.3   HEMATOCRIT % 37.5 40.1 38.5 38.2   PLATELETS 10*3/mm3 199 204 194 190     Results from last 7 days   Lab Units 12/19/24  0351 12/18/24  0633 12/17/24  0359 12/16/24  0401   SODIUM mmol/L 137 133* 135* 137   POTASSIUM mmol/L 4.4 4.8 2.7* 3.1*   CHLORIDE mmol/L 102 99 97* 103   CO2 mmol/L 28.4 28.1 29.3* 23.9   ANION GAP mmol/L 6.6 5.9 8.7 10.1   BUN mg/dL 5* 5* 6* 6*   CREATININE mg/dL 0.61 0.69 0.83 0.77   BUN / CREAT RATIO  8.2 7.2 7.2 7.8   EGFR mL/min/1.73 98.1 95.3 77.4 84.7    CALCIUM mg/dL 7.3* 6.8* 6.7* 6.6*   GLUCOSE mg/dL 82 93 85 97       Lab Results (last 24 hours)       Procedure Component Value Units Date/Time    Comprehensive Metabolic Panel [[id number]]  (Abnormal) Collected: 12/19/24 0351    Specimen: Blood Updated: 12/19/24 0526     Glucose 82 mg/dL      BUN 5 mg/dL      Creatinine 0.61 mg/dL      Sodium 137 mmol/L      Potassium 4.4 mmol/L      Chloride 102 mmol/L      CO2 28.4 mmol/L      Calcium 7.3 mg/dL      Total Protein 5.1 g/dL      Albumin 2.7 g/dL      ALT (SGPT) <5 U/L      AST (SGOT) 11 U/L      Alkaline Phosphatase 88 U/L      Total Bilirubin 0.2 mg/dL      Globulin 2.4 gm/dL      A/G Ratio 1.1 g/dL      BUN/Creatinine Ratio 8.2     Anion Gap 6.6 mmol/L      eGFR 98.1 mL/min/1.73     Narrative:      GFR Categories in Chronic Kidney Disease (CKD)      GFR Category          GFR (mL/min/1.73)    Interpretation  G1                     90 or greater         Normal or high (1)  G2                      60-89                Mild decrease (1)  G3a                   45-59                Mild to moderate decrease  G3b                   30-44                Moderate to severe decrease  G4                    15-29                Severe decrease  G5                    14 or less           Kidney failure          (1)In the absence of evidence of kidney disease, neither GFR category G1 or G2 fulfill the criteria for CKD.    eGFR calculation 2021 CKD-EPI creatinine equation, which does not include race as a factor    CBC (No Diff) [[id number]]  (Abnormal) Collected: 12/19/24 0351    Specimen: Blood Updated: 12/19/24 0500     WBC 11.57 10*3/mm3      RBC 3.69 10*6/mm3      Hemoglobin 11.8 g/dL      Hematocrit 37.5 %      .6 fL      MCH 32.0 pg      MCHC 31.5 g/dL      RDW 13.2 %      RDW-SD 49.7 fl      MPV 10.0 fL      Platelets 199 10*3/mm3     Gastrointestinal Panel, PCR - Stool, Per Rectum [[id number]]  (Normal) Collected: 12/18/24 0916    Specimen: Stool from Per Rectum  "Updated: 12/18/24 1651     Campylobacter Not Detected     Plesiomonas shigelloides Not Detected     Salmonella Not Detected     Vibrio Not Detected     Vibrio cholerae Not Detected     Yersinia enterocolitica Not Detected     Enteroaggregative E. coli (EAEC) Not Detected     Enteropathogenic E. coli (EPEC) Not Detected     Enterotoxigenic E. coli (ETEC) lt/st Not Detected     Shiga-like toxin-producing E. coli (STEC) stx1/stx2 Not Detected     Shigella/Enteroinvasive E. coli (EIEC) Not Detected     Cryptosporidium Not Detected     Cyclospora cayetanensis Not Detected     Entamoeba histolytica Not Detected     Giardia lamblia Not Detected     Adenovirus F40/41 Not Detected     Astrovirus Not Detected     Norovirus GI/GII Not Detected     Rotavirus A Not Detected     Sapovirus (I, II, IV or V) Not Detected    Narrative:      If Aeromonas, Staphylococcus aureus or Bacillus cereus are suspected, please order [id number]: Stool Culture, Aeromonas, S aureus, B Cereus.                          Results from last 7 days   Lab Units 12/18/24  0633 12/17/24  0359   MAGNESIUM mg/dL 2.7* 1.2*           Invalid input(s): \"LDLCALC\"          No results found for: \"POCGLU\"              Results from last 7 days   Lab Units 12/18/24  0916   ADENOVIRUS  Not Detected         Radiology:  Imaging Results (Last 24 Hours)       ** No results found for the last 24 hours. **            Tests Pending at Discharge:  Pending Results       None            PROCEDURES          Allergies:  is allergic to codeine and percocet [oxycodone-acetaminophen].      Discharge Medications:     Your medication list        START taking these medications        Instructions Last Dose Given Next Dose Due   cefuroxime 250 MG tablet  Commonly known as: CEFTIN      Take 1 tablet by mouth Every 12 (Twelve) Hours for 6 doses. Indications: Pneumonia       diphenoxylate-atropine 2.5-0.025 MG per tablet  Commonly known as: LOMOTIL      Take 1 tablet by mouth 4 (Four) Times a " Day As Needed for Diarrhea.       doxycycline 100 MG capsule  Commonly known as: MONODOX      Take 1 capsule by mouth Every 12 (Twelve) Hours for 6 doses. Indications: Pneumonia       lactobacillus acidophilus capsule capsule      Take 1 capsule by mouth 2 (Two) Times a Day.              CONTINUE taking these medications        Instructions Last Dose Given Next Dose Due   albuterol sulfate  (90 Base) MCG/ACT inhaler  Commonly known as: PROVENTIL HFA;VENTOLIN HFA;PROAIR HFA      Inhale 2 puffs Every 4 (Four) Hours As Needed for Wheezing. Takes as needed.       apixaban 5 MG tablet tablet  Commonly known as: ELIQUIS      Take 1 tablet by mouth Every 12 (Twelve) Hours. Indications: Other - full anticoagulation       budesonide-formoterol 160-4.5 MCG/ACT inhaler  Commonly known as: SYMBICORT      Inhale 2 puffs 2 (Two) Times a Day.       calcium carbonate 500 MG chewable tablet  Commonly known as: TUMS      Chew 1 tablet 2 (Two) Times a Day.       diazePAM 10 MG tablet  Commonly known as: VALIUM      Take 1 tablet by mouth 2 (Two) Times a Day As Needed for Anxiety (RLS.). Takes 20 mg at bedtime at times when she needs.       DULoxetine 30 MG capsule  Commonly known as: CYMBALTA      Take 1 capsule by mouth Daily.       folic acid 800 MCG tablet  Commonly known as: FOLVITE      TAKE 1 TABLET BY MOUTH DAILY       HYDROcodone-acetaminophen  MG per tablet  Commonly known as: NORCO      Take 1 tablet by mouth 3 (Three) Times a Day As Needed for Moderate Pain.       Klor-Con M20 20 MEQ CR tablet  Generic drug: potassium chloride      TAKE 1 TABLET BY MOUTH DAILY       levETIRAcetam 500 MG tablet  Commonly known as: KEPPRA      TAKE ONE TABLET BY MOUTH EVERY 12 HOURS       midodrine 2.5 MG tablet  Commonly known as: PROAMATINE      Take 1 tablet by mouth 3 (Three) Times a Day Before Meals.       multivitamin with minerals tablet tablet      Take 1 tablet by mouth 2 (Two) Times a Day.       naloxone 4 MG/0.1ML  nasal spray  Commonly known as: NARCAN      Administer 1 spray into the nostril(s) as directed by provider As Needed.       OLANZapine 2.5 MG tablet  Commonly known as: zyPREXA      TAKE ONE TABLET BY MOUTH ONCE NIGHTLY       ondansetron 8 MG tablet  Commonly known as: ZOFRAN      Take 1 tablet by mouth Every 8 (Eight) Hours As Needed for Nausea or Vomiting.       pantoprazole 40 MG EC tablet  Commonly known as: PROTONIX      Take 1 tablet by mouth Daily.       PROLIA SC      Inject  under the skin into the appropriate area as directed Every 6 (Six) Months.       rosuvastatin 10 MG tablet  Commonly known as: Crestor      Take 1 tablet by mouth Every Night.       sucralfate 1 g tablet  Commonly known as: CARAFATE      TAKE 1 TABLET BY MOUTH 4 TIMES A DAY       Vitamin B-12 500 MCG sublingual tablet      PLACE 1 TABLET UNDER THE TONGUE DAILY                 Where to Get Your Medications        These medications were sent to [facility] [id number] [place], [place] - [address] [facility] [address] - [phone]  - [phone]   [address] [address]      Phone: [phone]   cefuroxime 250 MG tablet  diphenoxylate-atropine 2.5-0.025 MG per tablet  doxycycline 100 MG capsule  lactobacillus acidophilus capsule capsule         Home medications and discharge medications reconciled with patient      Condition on Discharge: Stable    Discharge Disposition  Home    Visiting Nurse:    No     Home PT/OT:  No     Home Safety Evaluation:  No     DME  None    Discharge Diet: Regular       Activity at Discharge:  As tolerated      Follow-up Appointments:  Additional Instructions for the Follow-ups that You Need to Schedule       Call MD for problems / concerns.   As directed      Discharge Follow-up with PCP   As directed       Currently Documented PCP:    Nigel De Paz MD    PCP Phone Number:    [phone]     Follow Up Details: [facility]               Follow-up Information       Nigel De Paz MD .     Specialty: Family Medicine  Why: mala  Contact information:  [address]  [place] [address]  [phone]               Nigel De Paz MD .    Specialty: Family Medicine  Contact information:  [address]  [place] [address]  [phone]                             Test Results Pending at Discharge       Nigel De Paz MD  12/22/24  17:24 EST          Much of this encounter note is an electronic transcription/translation of spoken language to printed text. The electronic translation of spoken language may permit erroneous, or at times, nonsensical words or phrases to be inadvertently transcribed; Although I have reviewed the note for such errors, some may still exist.    Note Disclaimer: At [facility], we believe that sharing information builds trust and better relationships. You are receiving this note because you recently visited [facility]. It is possible you will see health information before a provider has talked with you about it. This kind of information can be easy to misunderstand. To help you fully understand what it means for your health, we urge you to discuss this note with your provider.

## 2024-12-22 NOTE — PROGRESS NOTES
"Enter Query Response Below      Query Response: Underweight  Electronically signed by Nigel De Paz MD, 24, 10:30 PM EST.              If applicable, please update the problem list.     Patient: Pili Arechiga \"Ivet\"        : 1955  Account: [id number]           Admit Date: 2024        How to Respond to this query:       a. Click New Note     b. Answer query within the yellow box.                c. Update the Problem List, if applicable.      If you have any questions about this query contact me at: [email or web address]     ,    69 year old female with lung cancer presented  for vomiting and weakness. The patient is described as \"underweight \" with temporal wasting per H&P. BMI 18.1.  Malnutrition Severity Assessment was done  with the dietician assessing the patient to meet criteria for chronic disease-related severe malnutrition as evidence by insufficient energy intake \"less than 75% est energy requirement >= 1 month, moderate to severe muscle loss and moderate to severe loss of subcutaneous fat\" in multiple body regions.     Treatment: Full liquid diet, Boost Plus three times daily    Please clarify diagnosis treated/monitored:    Chronic illness related severe protein calorie malnutrition  Malnutrition  Underweight  Other- specify __________    By submitting this query, we are merely seeking further clarification of documentation to accurately reflect all conditions that you are monitoring, evaluating, treating or that extend the hospitalization or utilize additional resources of care. Please utilize your independent clinical judgment when addressing the question(s) above.     This query and your response, once completed, will be entered into the legal medical record.    Sincerely,  Michelle Mitchell RN CCDS  Clinical Documentation Integrity Program     "

## 2024-12-22 NOTE — PROGRESS NOTES
"Enter Query Response Below      Query Response: Bacterial pneumonia  Electronically signed by Nigel De Paz MD, 24, 10:31 PM EST.              If applicable, please update the problem list.     Patient: Pili Arechiga \"Ivet\"        : 1955  Account: [id number]           Admit Date: 2024        How to Respond to this query:       a. Click New Note     b. Answer query within the yellow box.                c. Update the Problem List, if applicable.      If you have any questions about this query contact me at: [email or web address]     ,    69 year old female with lung cancer and on chemoimmunotherapy was admitted  and diagnosed with left upper lobe pneumonia per the H&P.  Respiratory viral panel and screening for MRSA was negative.    Treatment:  IV Zosyn -, IV vancomycin -12/15, IV doxycycline -. Oral doxycycline and Ceftin - to continue through .    Please clarify the type of pneumonia treated/monitored:    Bacterial pneumonia  Other- specify_____      By submitting this query, we are merely seeking further clarification of documentation to accurately reflect all conditions that you are monitoring, evaluating, treating or that extend the hospitalization or utilize additional resources of care. Please utilize your independent clinical judgment when addressing the question(s) above.     This query and your response, once completed, will be entered into the legal medical record.    Sincerely,  Michelle Mitchell RN CCDS  Clinical Documentation Integrity Program     "

## 2024-12-22 NOTE — CASE MANAGEMENT/SOCIAL WORK
Case Management Discharge Note      Final Note: Home         Selected Continued Care - Discharged on 12/19/2024 Admission date: 12/14/2024 - Discharge disposition: Home or Self Care      Destination    No services have been selected for the patient.                Durable Medical Equipment    No services have been selected for the patient.                Dialysis/Infusion    No services have been selected for the patient.                Home Medical Care    No services have been selected for the patient.                Therapy    No services have been selected for the patient.                Community Resources    No services have been selected for the patient.                Community & DME    No services have been selected for the patient.                         Final Discharge Disposition Code: 01 - home or self-care

## 2024-12-22 NOTE — PROGRESS NOTES
"Enter Query Response Below      Query Response: Sepsis no severe sepsis  Electronically signed by Nigel De Paz MD, 24, 10:32 PM EST.              If applicable, please update the problem list.     Patient: Pili Arechiga \"Ivet\"        : 1955  Account: [id number]           Admit Date: 2024        How to Respond to this query:       a. Click New Note     b. Answer query within the yellow box.                c. Update the Problem List, if applicable.      If you have any questions about this query contact me at: [email or web address]     ,    69 year old female admitted  with sepsis secondary to pneumonia per H&P and progress notes.   Clinical findings: WBC 24.66, lactate 3.6, heart rate 91.     Treatment: IV NS bolus 2443 mL,  IV Zosyn -, IV vancomycin -12/15, IV doxycycline -. Oral doxycycline and Ceftin - to continue through .    Please clarify if patient treated/monitored for the following:     Severe sepsis causing acute organ dysfunction, as evidenced by lactic acidosis  Lactic acidosis not due to sepsis  Other (please specify) ___________  Unable to determine    By submitting this query, we are merely seeking further clarification of documentation to accurately reflect all conditions that you are monitoring, evaluating, treating or that extend the hospitalization or utilize additional resources of care. Please utilize your independent clinical judgment when addressing the question(s) above.     This query and your response, once completed, will be entered into the legal medical record.    Sincerely,  Michelle Mitchell RN CCDS  Clinical Documentation Integrity Program     "

## 2024-12-23 ENCOUNTER — LAB (OUTPATIENT)
Dept: LAB | Facility: HOSPITAL | Age: 69
End: 2024-12-23
Payer: MEDICARE

## 2024-12-23 ENCOUNTER — TRANSCRIBE ORDERS (OUTPATIENT)
Dept: ADMINISTRATIVE | Facility: HOSPITAL | Age: 69
End: 2024-12-23
Payer: MEDICARE

## 2024-12-23 DIAGNOSIS — R19.7 DIARRHEA, UNSPECIFIED TYPE: Primary | ICD-10-CM

## 2024-12-23 DIAGNOSIS — R19.7 DIARRHEA, UNSPECIFIED TYPE: ICD-10-CM

## 2024-12-23 LAB
C DIFF GDH + TOXINS A+B STL QL IA.RAPID: NEGATIVE
C DIFF GDH + TOXINS A+B STL QL IA.RAPID: NEGATIVE

## 2024-12-23 PROCEDURE — 87449 NOS EACH ORGANISM AG IA: CPT

## 2024-12-23 PROCEDURE — 87324 CLOSTRIDIUM AG IA: CPT

## 2025-01-09 NOTE — PROGRESS NOTES
Subjective     REASON FOR CONSULTATION:  small cell lung cancer  Provide an opinion on any further workup or treatment                             REQUESTING PHYSICIAN:  James    RECORDS OBTAINED:  Records of the patients history including those obtained from the referring provider were reviewed and summarized in detail.    HISTORY OF PRESENT ILLNESS:  The patient is a 69 y.o. year old female who is here for an opinion about the above issue.    History of Present Illness   The patient initiated chemoimmunotherapy with carboplatin/etoposide/atezolizumab 3/19/2024 and completed 4 cycles of carboplatin/etoposide/atezolizumab and is now on maintenance atezolizumab.  The patient was recently hospitalized with atypical pneumonia treated with antibiotics.  She had acute hypoxic respiratory failure now on chronic O2 1 L by nasal cannula reduced from 3 L at discharge.  She has weight loss poor appetite.  She complains of fatigue.  She initially had diarrhea now resolved.    Oncology History:  This is a 68-year-old woman with long history of tobacco use and COPD, degenerative disease of the spine on narcotic therapy, depression/anxiety, orthostatic hypotension, hyperlipidemia who has been followed with serial imaging for a left lower lobe lung nodule and mediastinal lymphadenopathy.  A CT of the chest 1/21/2023 showed a masslike consolidation in the left lower lobe 2.5 x 2.6 cm in size with a potential cavitary focus centrally surrounding haziness and otherwise groundglass opacities in the right middle lobe and right lower lobe and enlarged mediastinal lymph nodes up to 10 mm.  The findings were favored to be infectious or inflammatory.  A follow-up CT chest 7/28/2023 showed pleural-based area of density in the left upper lung 11 x 5 mm new from the previous exam and resolution of the consolidation in the left lower lobe.  A PET scan was performed 8/9/2023 which showed the 1.1 cm pleural-based density in the  posterior left lower lobe to blend into dependent atelectasis but have more intense activity than the atelectasis with maximum SUV 2.8.  There was hypermetabolic lymphadenopathy in the left hilum and left lower paratracheal regions for example lymph node posterior to the left pulmonary artery SUV 4.7 measuring 1.6 cm and another area of soft tissue lateral to the left mainstem bronchus SUV 4.4, ill-defined lymphadenopathy left lower paratracheal region SUV 3.7.  She underwent bronchoscopy with biopsies on 8/25/2023 with samples from stations 11 R, 4R, 7, 10 L, 11 L, 12 L all negative for malignancy; station 4R showed rare atypical reactive epithelial cells.  A follow-up CT of the chest on 2/6/2024 showed the left lower lobe nodule to be enlarging at 1.6 x 0.9 cm and enlarging precarinal lymphadenopathy concerning for metastatic disease.  The patient underwent repeat bronchoscopy with Ion navigation on 2/28/2024; biopsies from the left lower lobe nodule were positive for small cell carcinoma and a level 4 lymph node biopsied also positive for small cell carcinoma.    Full body PET scan on 3/7/2024 showed significantly avid bilateral hilar and mediastinal lymph nodes for example kylah conglom[employer] AP window 8.3 SUV measuring 2.5 x 1.8 cm, left hilar lymph node SUV 7 1.4 cm, pleural-based nodularity left major fissure newly hypermetabolic SUV 2.2, pleural-based partially cavitary mass left lower lobe SUV five 1.5 x 1 cm, new right lung nodule 8 mm in the right lower lobe suspicious.  MRI of the brain negative.    The patient has comorbidities of COPD but minimally symptomatic, no known cardiac disease, kidney disease, liver disease, diabetes etc.  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    The patient initiated chemoimmunotherapy with carboplatin/etoposide/atezolizumab 3/19/2024.  She completed 4 cycles of carboplatin/etoposide/atezolizumab on 5/23/2024.    A PET scan on 6/6/2024 showed a residual 6 mm pleural-based nodule  left lower lobe to be photopenic, low-level subpleural activity adjacently and inferiorly SUV 1.9, resolution of left mediastinal and left hilar lymphadenopathy, marked decrease mediastinal and left hilar lymph nodes have low-level activity SUV 2.6, no new hypermetabolic pulmonary opacities, no hypermetabolic activity in the abdomen, pelvis or bones.    CT chest abdomen pelvis 2024-subpleural parenchymal scarring posterior left lower lobe.  No residual pulmonary nodule, stable subcentimeter low-density mass right lobe of the liver favored to be a cyst or hemangioma, stable sclerotic lesion right iliac bone.  MRI brain-old bilateral basal ganglia and occipital infarcts, no metastatic disease.    Past Medical History:   Diagnosis Date    COPD (chronic obstructive pulmonary disease)     COPD with acute exacerbation 2020    Small cell carcinoma 3/13/2024        Past Surgical History:   Procedure Laterality Date    BRONCHOSCOPY N/A 2023    Procedure: BRONCHOSCOPY WITH ENDOBRONCHIAL ULTRASOUND (EBUS) with FNA;  Surgeon: Coy Mccarthy MD;  Location: [facility] ENDOSCOPY;  Service: Pulmonary;  Laterality: N/A;  Pre/Post - mediastinal and hilar lymphadenopathy.    BRONCHOSCOPY WITH ION ROBOTIC ASSIST N/A 2024    Procedure: BRONCHOSCOPY WITH ION ROBOT,  ENDOBRONCHIAL ULTRASOUND, FINE NEEDLE ASPIRATIONS,  CRYOTHERAPY BIOPSIES, AND LEFT LOWER LOBE BRONCHOALVEOLAR LAVAGE.;  Surgeon: Alexia Velásuqez MD;  Location: [facility] ENDOSCOPY;  Service: Robotics - Pulmonary;  Laterality: N/A;     SECTION      CHEST TUBE INSERTION Left 2024    Procedure: CHEST TUBE INSERTION;  Surgeon: Alexia Velásquez MD;  Location: [facility] ENDOSCOPY;  Service: Thoracic;  Laterality: Left;    CHOLECYSTECTOMY      COLONOSCOPY      ECTOPIC PREGNANCY SURGERY      HYSTERECTOMY      TONSILLECTOMY      TOTAL HIP ARTHROPLASTY Left     VENOUS ACCESS DEVICE (PORT) INSERTION N/A 3/14/2024    Procedure: INSERTION VENOUS ACCESS DEVICE;   Surgeon: Jonas Garner MD;  Location: [facility];  Service: General;  Laterality: N/A;        Current Outpatient Medications on File Prior to Visit   Medication Sig Dispense Refill    albuterol sulfate  (90 Base) MCG/ACT inhaler Inhale 2 puffs Every 4 (Four) Hours As Needed for Wheezing. Takes as needed.      apixaban (ELIQUIS) 5 MG tablet tablet Take 1 tablet by mouth Every 12 (Twelve) Hours. Indications: Other - full anticoagulation 180 tablet 0    budesonide-formoterol (SYMBICORT) 160-4.5 MCG/ACT inhaler Inhale 2 puffs 2 (Two) Times a Day.  12    calcium carbonate (TUMS) 500 MG chewable tablet Chew 1 tablet 2 (Two) Times a Day.      Cyanocobalamin (Vitamin B-12) 500 MCG sublingual tablet PLACE 1 TABLET UNDER THE TONGUE DAILY 90 tablet 1    Denosumab (PROLIA SC) Inject  under the skin into the appropriate area as directed Every 6 (Six) Months.      diazePAM (VALIUM) 10 MG tablet Take 1 tablet by mouth 2 (Two) Times a Day As Needed for Anxiety (RLS.). Takes 20 mg at bedtime at times when she needs.      diphenoxylate-atropine (LOMOTIL) 2.5-0.025 MG per tablet Take 1 tablet by mouth 4 (Four) Times a Day As Needed for Diarrhea. 15 tablet 0    DULoxetine (CYMBALTA) 30 MG capsule Take 1 capsule by mouth Daily. 30 capsule 2    folic acid (FOLVITE) 800 MCG tablet TAKE 1 TABLET BY MOUTH DAILY 90 tablet 1    HYDROcodone-acetaminophen (NORCO)  MG per tablet Take 1 tablet by mouth 3 (Three) Times a Day As Needed for Moderate Pain.      Klor-Con M20 20 MEQ CR tablet TAKE 1 TABLET BY MOUTH DAILY 30 tablet 1    lactobacillus acidophilus (RISAQUAD) capsule capsule Take 1 capsule by mouth 2 (Two) Times a Day. 60 capsule 0    levETIRAcetam (KEPPRA) 500 MG tablet TAKE ONE TABLET BY MOUTH EVERY 12 HOURS 180 tablet 0    midodrine (PROAMATINE) 2.5 MG tablet Take 1 tablet by mouth 3 (Three) Times a Day Before Meals.      multivitamin with minerals (PRESERVISION AREDS PO) Take 1 tablet by mouth 2 (Two) Times a  Day.      naloxone (NARCAN) 4 MG/0.1ML nasal spray Administer 1 spray into the nostril(s) as directed by provider As Needed.      OLANZapine (zyPREXA) 2.5 MG tablet TAKE ONE TABLET BY MOUTH ONCE NIGHTLY 30 tablet 2    ondansetron (ZOFRAN) 8 MG tablet Take 1 tablet by mouth Every 8 (Eight) Hours As Needed for Nausea or Vomiting. 30 tablet 1    pantoprazole (PROTONIX) 40 MG EC tablet Take 1 tablet by mouth Daily. 30 tablet 3    rosuvastatin (Crestor) 10 MG tablet Take 1 tablet by mouth Every Night. 30 tablet 11    sucralfate (CARAFATE) 1 g tablet TAKE 1 TABLET BY MOUTH 4 TIMES A  tablet 3     No current facility-administered medications on file prior to visit.        ALLERGIES:    Allergies   Allergen Reactions    Codeine GI Intolerance    Percocet [Oxycodone-Acetaminophen] Hives        Social History     Socioeconomic History    Marital status:    Tobacco Use    Smoking status: Every Day     Current packs/day: 1.00     Average packs/day: 1 pack/day for 30.0 years (30.0 ttl pk-yrs)     Types: Cigarettes     Passive exposure: Current    Smokeless tobacco: Never    Tobacco comments:     Began smoking at age 11.  Smoked .5 ppd for 6 years, 2.5 ppd for a year, 2 ppd for 5 years, and 1 ppd for the past 43 years for a 58.5 pack year history.   Vaping Use    Vaping status: Former    Substances: Nicotine, Flavoring    Devices: Disposable   Substance and Sexual Activity    Alcohol use: Yes     Comment: once a year     Drug use: No    Sexual activity: Defer        Family History   Problem Relation Age of Onset    Lung cancer Niece 50    Throat cancer Father     Breast cancer Neg Hx         Review of Systems   Constitutional:  Positive for activity change, appetite change, fatigue and unexpected weight change.   HENT: Negative.     Eyes: Negative.    Respiratory: Negative.     Cardiovascular: Negative.    Gastrointestinal: Negative.    Musculoskeletal:  Positive for back pain.   Neurological: Negative.   "  Hematological: Negative.    Psychiatric/Behavioral:  Positive for dysphoric mood. The patient is nervous/anxious.    ROS is unchanged-11/26/2024    Objective     Vitals:    01/14/25 1230   BP: 91/63   Pulse: 95   Resp: 16   Temp: 97.6 °F (36.4 °C)   TempSrc: Infrared   SpO2: 96%   Weight: 43.5 kg (95 lb 12.8 oz)   Height: 160 cm (62.99\")   PainSc: 0-No pain             1/14/2025    12:43 PM   Current Status   ECOG score 0       Physical Exam    CONSTITUTIONAL: pleasant chronic ill-appearing woman   HEENT: no icterus, no thrush, moist membranes, anisocoria  LYMPH: no cervical or supraclavicular lad  CV: RRR, S1S2, no murmur  RESP: bilateral wheezing, O2 by nasal cannula 1 L  GI: soft, non-tender, no splenomegaly, +bs  MUSC: no edema, normal gait  NEURO: alert and oriented x3, normal strength  PSYCH: Dysphoric mood and flat affect  Skin: No rash or induration noted        RECENT LABS:  Hematology WBC   Date Value Ref Range Status   01/14/2025 12.33 (H) 3.40 - 10.80 10*3/mm3 Final     RBC   Date Value Ref Range Status   01/14/2025 4.06 3.77 - 5.28 10*6/mm3 Final     Hemoglobin   Date Value Ref Range Status   01/14/2025 12.7 12.0 - 15.9 g/dL Final     Hematocrit   Date Value Ref Range Status   01/14/2025 40.1 34.0 - 46.6 % Final     Platelets   Date Value Ref Range Status   01/14/2025 242 140 - 450 10*3/mm3 Final        Lab Results   Component Value Date    GLUCOSE 82 12/19/2024    BUN 5 (L) 12/19/2024    CREATININE 0.61 12/19/2024    EGFR 98.1 12/19/2024    BCR 8.2 12/19/2024    K 4.4 12/19/2024    CO2 28.4 12/19/2024    CALCIUM 7.3 (L) 12/19/2024    ALBUMIN 2.7 (L) 12/19/2024    BILITOT 0.2 12/19/2024    AST 11 12/19/2024    ALT <5 12/19/2024     NM PET/CT Skull Base to Mid Thigh (06/06/2024 11:52 AM)   FINDINGS: Mediastinal blood pool has a maximal SUV of 2.0, previously 2.3.  1. The residual 6 mm pleural-based nodule at the left lower lobe is photopenic. There is low-level subpleural activity adjacently and " inferiorly with an SUV of 1.9, likely representing radiation pneumonitis. There has been resolution of left mediastinal and left hilar lymphadenopathy. Interval marked decrease in the size of mediastinal and left hilar nodes and the remaining nodes have low-level activity, maximal SUV of 2.6. There are no new hypermetabolic pulmonary opacities.  2. There is no suspicious hypermetabolic activity at the supraclavicular regions or neck.  3. There is no suspicious hypermetabolic activity within the abdomen or pelvis.  4. There is no suspicious bone activity.    MRI Brain With & Without Contrast (09/17/2024 10:49 AM)  Impression:  1. Changes of old bilateral basal ganglia and occipital lobe infarcts.  2. No acute intracranial abnormality.  3. No pathologic contrast enhancement to suggest intracranial metastatic disease.    CT Abdomen Pelvis With Contrast (09/17/2024 11:34 AM)  Impression:  1. Stable exam with subcentimeter low-density mass in the posterior right lobe of the liver favored to be a cyst or hemangioma.  2. Colonic diverticulosis. No evidence of diverticulitis.  3. Stable sclerotic density within the right iliac bone favored to be a bone island. No suspicious lytic or sclerotic bony lesions are seen.    CT Chest With Contrast Diagnostic (09/17/2024 11:34 AM)  Impression:  1. Persistent subpleural parenchymal scarring within the posterior left lower lobe at site of previously demonstrated hypermetabolic pulmonary nodule. No residual pulmonary nodule identified. No evidence of metastatic disease elsewhere within the chest.    DEXA Bone Density Axial (11/15/2024 11:11)   Impression:  Osteopenia. Based upon the National Osteoporosis [employer] Guide, patient's FRAX score does meet criteria for pharmacologic treatment to prevent Osteoporosis.  Individual treatment decisions should be made based upon each patient's full clinical history   and risk factors.    CT Chest Without Contrast Diagnostic (12/14/2024  17:20)  Impression:  1. Mild new patchy tree-in-bud groundglass opacities involving the anterior left upper lobe which may relate to atypical/viral pneumonia versus nonspecific inflammatory process.  2. Emphysema. No new solid pulmonary nodule.  3. Suspected pancreatitis involving pancreatic tail with wall thickening of the proximal jejunum near the duodenal jejunal junction which may relate to nonspecific enteritis, better assessed on prior abdominal CT.  4. Additional chronic findings above.    CT Abdomen Pelvis With Contrast (12/14/2024 15:13)  Impression:  1. Mild acute interstitial pancreatitis involving the pancreatic tail. No biliary dilatation.  2. Colonic diverticulosis without diverticulitis.  3. Prior cholecystectomy and hysterectomy.  4. Additional chronic findings above.    Assessment & Plan     *Small cell lung cancer left lower lobe of the lung with mediastinal involvement, contralateral lung nodule suspicious for metastatic disease/extensive stage  CT chest 2/22/2024-enlarged f4L lymph nodes 2.5 cm, enlarged AP window lymph node 1.4 cm, poorly defined left hilar lymphadenopathy, left lower lobe pulmonary nodule 1.7 cm  Bronchoscopy with Ion navigation performed 2/28/2024-pathology positive for small cell carcinoma from the left lower lobe and station 4L  PET scan on 3/7/2024 showed significantly avid bilateral hilar and mediastinal lymph nodes for example kylah conglomerate AP window 8.3 SUV measuring 2.5 x 1.8 cm, left hilar lymph node SUV 7 1.4 cm, pleural-based nodularity left major fissure newly hypermetabolic SUV 2.2, pleural-based partially cavitary mass left lower lobe SUV five 1.5 x 1 cm, new right lung nodule 8 mm in the right lower lobe suspicious.-Results discussed with Dr. Velásquez and we both feel the contralateral right lung nodule is highly suspicious for metastatic disease  MRI of the brain negative.  3/19/2024.initiated chemoimmunotherapy with carboplatin/etoposide/atezolizumab;  completed 4 cycles of carboplatin/etoposide/atezolizumab 5/23/2024 6/6/2024 PET scan: showed a residual 6 mm pleural-based nodule left lower lobe to be photopenic, low-level subpleural activity adjacently and inferiorly SUV 1.9, resolution of left mediastinal and left hilar lymphadenopathy, marked decrease mediastinal and left hilar lymph nodes have low-level activity SUV 2.6, no new hypermetabolic pulmonary opacities, no hypermetabolic activity in the abdomen, pelvis or bones  CT chest abdomen pelvis 9/17/2024-subpleural parenchymal scarring posterior left lower lobe.  No residual pulmonary nodule, stable subcentimeter low-density mass right lobe of the liver favored to be a cyst or hemangioma, stable sclerotic lesion right iliac bone.  MRI brain-old bilateral basal ganglia and occipital infarcts, no metastatic disease.  12/14/2024 CT chest abdomen pelvis-no evidence of disease progression  *anemia/thrombocytopenia secondary to chemotherapy  Hemoglobin improved 12.7, platelets 242  *Fatigue-normal TSH free T3 free T4 cortisol and ACTH   *Recent pneumonia now with chronic hypoxic respiratory failure on O2  *Comorbidities of tobacco abuse/COPD, anxiety, atherosclerotic calcifications by CT but no definitive diagnosis of CAD, degenerative disease of the spine, hyperlipidemia; no known autoimmune disorders, chronic pain on opioid medicines    Oncology plan/recommendations:  Continue atezolizumab every 3 weeks  Continue  [id number] mg daily for depression; consider dose increase to 60 mg  Prednisone 20 mg daily for 1 month to see if helps her appetite and fatigue  Plan to repeat CT chest abdomen pelvis 3 months from previous

## 2025-01-14 ENCOUNTER — INFUSION (OUTPATIENT)
Dept: ONCOLOGY | Facility: HOSPITAL | Age: 70
End: 2025-01-14
Payer: MEDICARE

## 2025-01-14 ENCOUNTER — OFFICE VISIT (OUTPATIENT)
Dept: ONCOLOGY | Facility: CLINIC | Age: 70
End: 2025-01-14
Payer: COMMERCIAL

## 2025-01-14 VITALS
WEIGHT: 95.8 LBS | BODY MASS INDEX: 16.97 KG/M2 | RESPIRATION RATE: 16 BRPM | TEMPERATURE: 97.6 F | SYSTOLIC BLOOD PRESSURE: 91 MMHG | HEART RATE: 95 BPM | HEIGHT: 63 IN | OXYGEN SATURATION: 96 % | DIASTOLIC BLOOD PRESSURE: 63 MMHG

## 2025-01-14 DIAGNOSIS — Z79.899 NEED FOR PROPHYLACTIC CHEMOTHERAPY: ICD-10-CM

## 2025-01-14 DIAGNOSIS — C80.1 SMALL CELL CARCINOMA: ICD-10-CM

## 2025-01-14 DIAGNOSIS — Z45.2 FITTING AND ADJUSTMENT OF VASCULAR CATHETER: Primary | ICD-10-CM

## 2025-01-14 DIAGNOSIS — C80.1 SMALL CELL CARCINOMA: Primary | ICD-10-CM

## 2025-01-14 DIAGNOSIS — Z45.2 FITTING AND ADJUSTMENT OF VASCULAR CATHETER: ICD-10-CM

## 2025-01-14 DIAGNOSIS — E53.8 VITAMIN B12 DEFICIENCY: ICD-10-CM

## 2025-01-14 LAB
ALBUMIN SERPL-MCNC: 3 G/DL (ref 3.5–5.2)
ALBUMIN/GLOB SERPL: 1.1 G/DL
ALP SERPL-CCNC: 108 U/L (ref 39–117)
ALT SERPL W P-5'-P-CCNC: <5 U/L (ref 1–33)
ANION GAP SERPL CALCULATED.3IONS-SCNC: 9.5 MMOL/L (ref 5–15)
AST SERPL-CCNC: 15 U/L (ref 1–32)
BASOPHILS # BLD AUTO: 0.04 10*3/MM3 (ref 0–0.2)
BASOPHILS NFR BLD AUTO: 0.3 % (ref 0–1.5)
BILIRUB SERPL-MCNC: 0.3 MG/DL (ref 0–1.2)
BUN SERPL-MCNC: 5 MG/DL (ref 8–23)
BUN/CREAT SERPL: 6.4 (ref 7–25)
CALCIUM SPEC-SCNC: 8.5 MG/DL (ref 8.6–10.5)
CHLORIDE SERPL-SCNC: 99 MMOL/L (ref 98–107)
CO2 SERPL-SCNC: 26.5 MMOL/L (ref 22–29)
CREAT SERPL-MCNC: 0.78 MG/DL (ref 0.57–1)
DEPRECATED RDW RBC AUTO: 48.4 FL (ref 37–54)
EGFRCR SERPLBLD CKD-EPI 2021: 83.4 ML/MIN/1.73
EOSINOPHIL # BLD AUTO: 0.07 10*3/MM3 (ref 0–0.4)
EOSINOPHIL NFR BLD AUTO: 0.6 % (ref 0.3–6.2)
ERYTHROCYTE [DISTWIDTH] IN BLOOD BY AUTOMATED COUNT: 13.1 % (ref 12.3–15.4)
GLOBULIN UR ELPH-MCNC: 2.7 GM/DL
GLUCOSE SERPL-MCNC: 114 MG/DL (ref 65–99)
HCT VFR BLD AUTO: 40.1 % (ref 34–46.6)
HGB BLD-MCNC: 12.7 G/DL (ref 12–15.9)
IMM GRANULOCYTES # BLD AUTO: 0.01 10*3/MM3 (ref 0–0.05)
IMM GRANULOCYTES NFR BLD AUTO: 0.1 % (ref 0–0.5)
LYMPHOCYTES # BLD AUTO: 3.1 10*3/MM3 (ref 0.7–3.1)
LYMPHOCYTES NFR BLD AUTO: 25.1 % (ref 19.6–45.3)
MCH RBC QN AUTO: 31.3 PG (ref 26.6–33)
MCHC RBC AUTO-ENTMCNC: 31.7 G/DL (ref 31.5–35.7)
MCV RBC AUTO: 98.8 FL (ref 79–97)
MONOCYTES # BLD AUTO: 0.78 10*3/MM3 (ref 0.1–0.9)
MONOCYTES NFR BLD AUTO: 6.3 % (ref 5–12)
NEUTROPHILS NFR BLD AUTO: 67.6 % (ref 42.7–76)
NEUTROPHILS NFR BLD AUTO: 8.33 10*3/MM3 (ref 1.7–7)
PLATELET # BLD AUTO: 242 10*3/MM3 (ref 140–450)
PMV BLD AUTO: 9.5 FL (ref 6–12)
POTASSIUM SERPL-SCNC: 4.2 MMOL/L (ref 3.5–5.2)
PROT SERPL-MCNC: 5.7 G/DL (ref 6–8.5)
RBC # BLD AUTO: 4.06 10*6/MM3 (ref 3.77–5.28)
SODIUM SERPL-SCNC: 135 MMOL/L (ref 136–145)
WBC NRBC COR # BLD AUTO: 12.33 10*3/MM3 (ref 3.4–10.8)

## 2025-01-14 PROCEDURE — 25010000002 CYANOCOBALAMIN PER 1000 MCG: Performed by: INTERNAL MEDICINE

## 2025-01-14 PROCEDURE — 25010000002 ATEZOLIZUMAB 1200 MG/20ML SOLUTION 20 ML VIAL: Performed by: INTERNAL MEDICINE

## 2025-01-14 PROCEDURE — 99214 OFFICE O/P EST MOD 30 MIN: CPT | Performed by: INTERNAL MEDICINE

## 2025-01-14 PROCEDURE — 25810000003 SODIUM CHLORIDE 0.9 % SOLUTION 250 ML FLEX CONT: Performed by: INTERNAL MEDICINE

## 2025-01-14 PROCEDURE — 85025 COMPLETE CBC W/AUTO DIFF WBC: CPT | Performed by: INTERNAL MEDICINE

## 2025-01-14 PROCEDURE — 96413 CHEMO IV INFUSION 1 HR: CPT

## 2025-01-14 PROCEDURE — 96372 THER/PROPH/DIAG INJ SC/IM: CPT

## 2025-01-14 PROCEDURE — 80053 COMPREHEN METABOLIC PANEL: CPT | Performed by: INTERNAL MEDICINE

## 2025-01-14 PROCEDURE — 25010000002 HEPARIN LOCK FLUSH PER 10 UNITS: Performed by: INTERNAL MEDICINE

## 2025-01-14 RX ORDER — SODIUM CHLORIDE 9 MG/ML
20 INJECTION, SOLUTION INTRAVENOUS ONCE
Status: CANCELLED | OUTPATIENT
Start: 2025-01-14

## 2025-01-14 RX ORDER — PREDNISONE 20 MG/1
20 TABLET ORAL DAILY
Qty: 30 TABLET | Refills: 0 | Status: SHIPPED | OUTPATIENT
Start: 2025-01-14

## 2025-01-14 RX ORDER — HEPARIN SODIUM (PORCINE) LOCK FLUSH IV SOLN 100 UNIT/ML 100 UNIT/ML
500 SOLUTION INTRAVENOUS AS NEEDED
Status: DISCONTINUED | OUTPATIENT
Start: 2025-01-14 | End: 2025-01-14 | Stop reason: HOSPADM

## 2025-01-14 RX ORDER — HEPARIN SODIUM (PORCINE) LOCK FLUSH IV SOLN 100 UNIT/ML 100 UNIT/ML
500 SOLUTION INTRAVENOUS AS NEEDED
OUTPATIENT
Start: 2025-01-14

## 2025-01-14 RX ORDER — SODIUM CHLORIDE 0.9 % (FLUSH) 0.9 %
10 SYRINGE (ML) INJECTION AS NEEDED
OUTPATIENT
Start: 2025-01-14

## 2025-01-14 RX ORDER — CYANOCOBALAMIN 1000 UG/ML
1000 INJECTION, SOLUTION INTRAMUSCULAR; SUBCUTANEOUS ONCE
Status: COMPLETED | OUTPATIENT
Start: 2025-01-14 | End: 2025-01-14

## 2025-01-14 RX ORDER — SODIUM CHLORIDE 0.9 % (FLUSH) 0.9 %
10 SYRINGE (ML) INJECTION AS NEEDED
Status: DISCONTINUED | OUTPATIENT
Start: 2025-01-14 | End: 2025-01-14 | Stop reason: HOSPADM

## 2025-01-14 RX ADMIN — HEPARIN 500 UNITS: 100 SYRINGE at 14:41

## 2025-01-14 RX ADMIN — Medication 10 ML: at 14:41

## 2025-01-14 RX ADMIN — ATEZOLIZUMAB 1200 MG: 1200 INJECTION, SOLUTION INTRAVENOUS at 14:08

## 2025-01-14 RX ADMIN — CYANOCOBALAMIN 1000 MCG: 1000 INJECTION, SOLUTION INTRAMUSCULAR; SUBCUTANEOUS at 14:30

## 2025-01-15 ENCOUNTER — HOSPITAL ENCOUNTER (OUTPATIENT)
Dept: MAMMOGRAPHY | Facility: HOSPITAL | Age: 70
Discharge: HOME | End: 2025-01-15
Admitting: FAMILY MEDICINE
Payer: MEDICARE

## 2025-01-15 DIAGNOSIS — Z12.31 VISIT FOR SCREENING MAMMOGRAM: ICD-10-CM

## 2025-01-15 PROCEDURE — 77067 SCR MAMMO BI INCL CAD: CPT | Performed by: RADIOLOGY

## 2025-01-15 PROCEDURE — 77063 BREAST TOMOSYNTHESIS BI: CPT

## 2025-01-15 PROCEDURE — 77067 SCR MAMMO BI INCL CAD: CPT

## 2025-01-15 PROCEDURE — 77063 BREAST TOMOSYNTHESIS BI: CPT | Performed by: RADIOLOGY

## 2025-01-29 NOTE — PROGRESS NOTES
Subjective     REASON FOR CONSULTATION:  small cell lung cancer  Provide an opinion on any further workup or treatment                             REQUESTING PHYSICIAN:  James    RECORDS OBTAINED:  Records of the patients history including those obtained from the referring provider were reviewed and summarized in detail.    HISTORY OF PRESENT ILLNESS:  The patient is a 69 y.o. year old female who is here for an opinion about the above issue.    History of Present Illness   The patient initiated chemoimmunotherapy with carboplatin/etoposide/atezolizumab 3/19/2024 and completed 4 cycles of carboplatin/etoposide/atezolizumab and is now on maintenance atezolizumab.  She has been on prednisone 20 mg daily for the past 3 weeks with improvement in appetite and 3 pound weight gain.  She has continued fatigue and depression.  She denies worsening shortness of breath cough.  She denies pain.  She had an episode of lightheadedness this morning no associated headaches.    Oncology History:  This is a 68-year-old woman with long history of tobacco use and COPD, degenerative disease of the spine on narcotic therapy, depression/anxiety, orthostatic hypotension, hyperlipidemia who has been followed with serial imaging for a left lower lobe lung nodule and mediastinal lymphadenopathy.  A CT of the chest 1/21/2023 showed a masslike consolidation in the left lower lobe 2.5 x 2.6 cm in size with a potential cavitary focus centrally surrounding haziness and otherwise groundglass opacities in the right middle lobe and right lower lobe and enlarged mediastinal lymph nodes up to 10 mm.  The findings were favored to be infectious or inflammatory.  A follow-up CT chest 7/28/2023 showed pleural-based area of density in the left upper lung 11 x 5 mm new from the previous exam and resolution of the consolidation in the left lower lobe.  A PET scan was performed 8/9/2023 which showed the 1.1 cm pleural-based density in the  posterior left lower lobe to blend into dependent atelectasis but have more intense activity than the atelectasis with maximum SUV 2.8.  There was hypermetabolic lymphadenopathy in the left hilum and left lower paratracheal regions for example lymph node posterior to the left pulmonary artery SUV 4.7 measuring 1.6 cm and another area of soft tissue lateral to the left mainstem bronchus SUV 4.4, ill-defined lymphadenopathy left lower paratracheal region SUV 3.7.  She underwent bronchoscopy with biopsies on 8/25/2023 with samples from stations 11 R, 4R, 7, 10 L, 11 L, 12 L all negative for malignancy; station 4R showed rare atypical reactive epithelial cells.  A follow-up CT of the chest on 2/6/2024 showed the left lower lobe nodule to be enlarging at 1.6 x 0.9 cm and enlarging precarinal lymphadenopathy concerning for metastatic disease.  The patient underwent repeat bronchoscopy with Ion navigation on 2/28/2024; biopsies from the left lower lobe nodule were positive for small cell carcinoma and a level 4 lymph node biopsied also positive for small cell carcinoma.    Full body PET scan on 3/7/2024 showed significantly avid bilateral hilar and mediastinal lymph nodes for example kylah [employer] AP window 8.3 SUV measuring 2.5 x 1.8 cm, left hilar lymph node SUV 7 1.4 cm, pleural-based nodularity left major fissure newly hypermetabolic SUV 2.2, pleural-based partially cavitary mass left lower lobe SUV five 1.5 x 1 cm, new right lung nodule 8 mm in the right lower lobe suspicious.  MRI of the brain negative.    The patient has comorbidities of COPD but minimally symptomatic, no known cardiac disease, kidney disease, liver disease, diabetes etc.  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    The patient initiated chemoimmunotherapy with carboplatin/etoposide/atezolizumab 3/19/2024.  She completed 4 cycles of carboplatin/etoposide/atezolizumab on 5/23/2024.    A PET scan on 6/6/2024 showed a residual 6 mm pleural-based nodule  left lower lobe to be photopenic, low-level subpleural activity adjacently and inferiorly SUV 1.9, resolution of left mediastinal and left hilar lymphadenopathy, marked decrease mediastinal and left hilar lymph nodes have low-level activity SUV 2.6, no new hypermetabolic pulmonary opacities, no hypermetabolic activity in the abdomen, pelvis or bones.    CT chest abdomen pelvis 2024-subpleural parenchymal scarring posterior left lower lobe.  No residual pulmonary nodule, stable subcentimeter low-density mass right lobe of the liver favored to be a cyst or hemangioma, stable sclerotic lesion right iliac bone.  MRI brain-old bilateral basal ganglia and occipital infarcts, no metastatic disease.    Past Medical History:   Diagnosis Date    COPD (chronic obstructive pulmonary disease)     COPD with acute exacerbation 2020    Small cell carcinoma 3/13/2024        Past Surgical History:   Procedure Laterality Date    BRONCHOSCOPY N/A 2023    Procedure: BRONCHOSCOPY WITH ENDOBRONCHIAL ULTRASOUND (EBUS) with FNA;  Surgeon: Coy Mccarthy MD;  Location[facility] ENDOSCOPY;  Service: Pulmonary;  Laterality: N/A;  Pre/Post - mediastinal and hilar lymphadenopathy.    BRONCHOSCOPY WITH ION ROBOTIC ASSIST N/A 2024    Procedure: BRONCHOSCOPY WITH ION ROBOT,  ENDOBRONCHIAL ULTRASOUND, FINE NEEDLE ASPIRATIONS,  CRYOTHERAPY BIOPSIES, AND LEFT LOWER LOBE BRONCHOALVEOLAR LAVAGE.;  Surgeon: Alexia Velásquez MD;  Location: [facility] ENDOSCOPY;  Service: Robotics - Pulmonary;  Laterality: N/A;     SECTION      CHEST TUBE INSERTION Left 2024    Procedure: CHEST TUBE INSERTION;  Surgeon: Alexia Velásquez MD;  Location: [facility] ENDOSCOPY;  Service: Thoracic;  Laterality: Left;    CHOLECYSTECTOMY      COLONOSCOPY      ECTOPIC PREGNANCY SURGERY      HYSTERECTOMY      TONSILLECTOMY      TOTAL HIP ARTHROPLASTY Left     VENOUS ACCESS DEVICE (PORT) INSERTION N/A 3/14/2024    Procedure: INSERTION VENOUS ACCESS DEVICE;   Surgeon: Jonas Garner MD;  Location: [facility];  Service: General;  Laterality: N/A;        Current Outpatient Medications on File Prior to Visit   Medication Sig Dispense Refill    albuterol sulfate  (90 Base) MCG/ACT inhaler Inhale 2 puffs Every 4 (Four) Hours As Needed for Wheezing. Takes as needed.      apixaban (ELIQUIS) 5 MG tablet tablet Take 1 tablet by mouth Every 12 (Twelve) Hours. Indications: Other - full anticoagulation 180 tablet 0    budesonide-formoterol (SYMBICORT) 160-4.5 MCG/ACT inhaler Inhale 2 puffs 2 (Two) Times a Day.  12    calcium carbonate (TUMS) 500 MG chewable tablet Chew 1 tablet 2 (Two) Times a Day.      Cyanocobalamin (Vitamin B-12) 500 MCG sublingual tablet PLACE 1 TABLET UNDER THE TONGUE DAILY 90 tablet 1    Denosumab (PROLIA SC) Inject  under the skin into the appropriate area as directed Every 6 (Six) Months.      diazePAM (VALIUM) 10 MG tablet Take 1 tablet by mouth 2 (Two) Times a Day As Needed for Anxiety (RLS.). Takes 20 mg at bedtime at times when she needs.      diphenoxylate-atropine (LOMOTIL) 2.5-0.025 MG per tablet Take 1 tablet by mouth 4 (Four) Times a Day As Needed for Diarrhea. 15 tablet 0    DULoxetine (CYMBALTA) 30 MG capsule Take 1 capsule by mouth Daily. 30 capsule 2    folic acid (FOLVITE) 800 MCG tablet TAKE 1 TABLET BY MOUTH DAILY 90 tablet 1    HYDROcodone-acetaminophen (NORCO)  MG per tablet Take 1 tablet by mouth 3 (Three) Times a Day As Needed for Moderate Pain.      lactobacillus acidophilus (RISAQUAD) capsule capsule Take 1 capsule by mouth 2 (Two) Times a Day. 60 capsule 0    levETIRAcetam (KEPPRA) 500 MG tablet TAKE ONE TABLET BY MOUTH EVERY 12 HOURS 180 tablet 0    midodrine (PROAMATINE) 2.5 MG tablet Take 1 tablet by mouth 3 (Three) Times a Day Before Meals.      multivitamin with minerals (PRESERVISION AREDS PO) Take 1 tablet by mouth 2 (Two) Times a Day.      naloxone (NARCAN) 4 MG/0.1ML nasal spray Administer 1 spray into  the nostril(s) as directed by provider As Needed.      OLANZapine (zyPREXA) 2.5 MG tablet TAKE ONE TABLET BY MOUTH ONCE NIGHTLY 30 tablet 2    ondansetron (ZOFRAN) 8 MG tablet Take 1 tablet by mouth Every 8 (Eight) Hours As Needed for Nausea or Vomiting. 30 tablet 1    pantoprazole (PROTONIX) 40 MG EC tablet Take 1 tablet by mouth Daily. 30 tablet 3    predniSONE (DELTASONE) 20 MG tablet Take 1 tablet by mouth Daily. 30 tablet 0    rosuvastatin (Crestor) 10 MG tablet Take 1 tablet by mouth Every Night. 30 tablet 11    sucralfate (CARAFATE) 1 g tablet TAKE 1 TABLET BY MOUTH 4 TIMES A  tablet 3    Klor-Con M20 20 MEQ CR tablet TAKE 1 TABLET BY MOUTH DAILY (Patient not taking: Reported on 2/4/2025) 30 tablet 1     No current facility-administered medications on file prior to visit.        ALLERGIES:    Allergies   Allergen Reactions    Codeine GI Intolerance    Percocet [Oxycodone-Acetaminophen] Hives        Social History     Socioeconomic History    Marital status:    Tobacco Use    Smoking status: Every Day     Current packs/day: 1.00     Average packs/day: 1 pack/day for 30.0 years (30.0 ttl pk-yrs)     Types: Cigarettes     Passive exposure: Current    Smokeless tobacco: Never    Tobacco comments:     Began smoking at age 11.  Smoked .5 ppd for 6 years, 2.5 ppd for a year, 2 ppd for 5 years, and 1 ppd for the past 43 years for a 58.5 pack year history.   Vaping Use    Vaping status: Former    Substances: Nicotine, Flavoring    Devices: Disposable   Substance and Sexual Activity    Alcohol use: Yes     Comment: once a year     Drug use: No    Sexual activity: Defer        Family History   Problem Relation Age of Onset    Lung cancer Niece 50    Throat cancer Father     Breast cancer Neg Hx         Review of Systems   Constitutional:  Positive for activity change, appetite change and fatigue. Negative for unexpected weight change.   HENT: Negative.     Eyes: Negative.    Respiratory: Negative.    "  Cardiovascular: Negative.    Gastrointestinal: Negative.    Musculoskeletal:  Positive for back pain.   Neurological:  Positive for light-headedness.   Hematological: Negative.    Psychiatric/Behavioral:  Positive for dysphoric mood. The patient is nervous/anxious.    ROS is unchanged-11/26/2024    Objective     Vitals:    02/04/25 0949   BP: 112/76   Pulse: 93   Resp: 16   Temp: 97.4 °F (36.3 °C)   TempSrc: Infrared   SpO2: 95%   Weight: 44.7 kg (98 lb 9.6 oz)   Height: 160 cm (62.99\")   PainSc: 0-No pain  Comment: just a little dizzy               2/4/2025    10:02 AM   Current Status   ECOG score 0       Physical Exam    CONSTITUTIONAL: pleasant chronic ill-appearing woman   HEENT: no icterus, no thrush, moist membranes, anisocoria  LYMPH: no cervical or supraclavicular lad  CV: RRR, S1S2, no murmur  RESP: bilateral wheezing, O2 by nasal cannula 1 L  GI: soft, non-tender, no splenomegaly, +bs  MUSC: no edema, normal gait  NEURO: alert and oriented x3, normal strength  PSYCH: Dysphoric mood and flat affect  Skin: No rash or induration noted   Unchanged-2/4/2025    RECENT LABS:  Hematology WBC   Date Value Ref Range Status   02/04/2025 17.70 (H) 3.40 - 10.80 10*3/mm3 Final     RBC   Date Value Ref Range Status   02/04/2025 4.18 3.77 - 5.28 10*6/mm3 Final     Hemoglobin   Date Value Ref Range Status   02/04/2025 13.0 12.0 - 15.9 g/dL Final     Hematocrit   Date Value Ref Range Status   02/04/2025 41.3 34.0 - 46.6 % Final     Platelets   Date Value Ref Range Status   02/04/2025 286 140 - 450 10*3/mm3 Final        Lab Results   Component Value Date    GLUCOSE 124 (H) 02/04/2025    BUN 5 (L) 02/04/2025    CREATININE 0.80 02/04/2025    EGFR 80.9 02/04/2025    BCR 6.3 (L) 02/04/2025    K 3.3 (L) 02/04/2025    CO2 31.2 (H) 02/04/2025    CALCIUM 8.5 (L) 02/04/2025    ALBUMIN 3.5 02/04/2025    BILITOT 0.3 02/04/2025    AST 17 02/04/2025    ALT 13 02/04/2025     NM PET/CT Skull Base to Mid Thigh (06/06/2024 11:52 AM) "   FINDINGS: Mediastinal blood pool has a maximal SUV of 2.0, previously 2.3.  1. The residual 6 mm pleural-based nodule at the left lower lobe is photopenic. There is low-level subpleural activity adjacently and inferiorly with an SUV of 1.9, likely representing radiation pneumonitis. There has been resolution of left mediastinal and left hilar lymphadenopathy. Interval marked decrease in the size of mediastinal and left hilar nodes and the remaining nodes have low-level activity, maximal SUV of 2.6. There are no new hypermetabolic pulmonary opacities.  2. There is no suspicious hypermetabolic activity at the supraclavicular regions or neck.  3. There is no suspicious hypermetabolic activity within the abdomen or pelvis.  4. There is no suspicious bone activity.    MRI Brain With & Without Contrast (09/17/2024 10:49 AM)  Impression:  1. Changes of old bilateral basal ganglia and occipital lobe infarcts.  2. No acute intracranial abnormality.  3. No pathologic contrast enhancement to suggest intracranial metastatic disease.    CT Abdomen Pelvis With Contrast (09/17/2024 11:34 AM)  Impression:  1. Stable exam with subcentimeter low-density mass in the posterior right lobe of the liver favored to be a cyst or hemangioma.  2. Colonic diverticulosis. No evidence of diverticulitis.  3. Stable sclerotic density within the right iliac bone favored to be a bone island. No suspicious lytic or sclerotic bony lesions are seen.    CT Chest With Contrast Diagnostic (09/17/2024 11:34 AM)  Impression:  1. Persistent subpleural parenchymal scarring within the posterior left lower lobe at site of previously demonstrated hypermetabolic pulmonary nodule. No residual pulmonary nodule identified. No evidence of metastatic disease elsewhere within the chest.    DEXA Bone Density Axial (11/15/2024 11:11)   Impression:  Osteopenia. Based upon the National Osteoporosis [employer] Guide, patient's FRAX score does meet criteria for  pharmacologic treatment to prevent Osteoporosis.  Individual treatment decisions should be made based upon each patient's full clinical history   and risk factors.    CT Chest Without Contrast Diagnostic (12/14/2024 17:20)  Impression:  1. Mild new patchy tree-in-bud groundglass opacities involving the anterior left upper lobe which may relate to atypical/viral pneumonia versus nonspecific inflammatory process.  2. Emphysema. No new solid pulmonary nodule.  3. Suspected pancreatitis involving pancreatic tail with wall thickening of the proximal jejunum near the duodenal jejunal junction which may relate to nonspecific enteritis, better assessed on prior abdominal CT.  4. Additional chronic findings above.    CT Abdomen Pelvis With Contrast (12/14/2024 15:13)  Impression:  1. Mild acute interstitial pancreatitis involving the pancreatic tail. No biliary dilatation.  2. Colonic diverticulosis without diverticulitis.  3. Prior cholecystectomy and hysterectomy.  4. Additional chronic findings above.    Assessment & Plan     *Small cell lung cancer left lower lobe of the lung with mediastinal involvement, contralateral lung nodule suspicious for metastatic disease/extensive stage  CT chest 2/22/2024-enlarged f4L lymph nodes 2.5 cm, enlarged AP window lymph node 1.4 cm, poorly defined left hilar lymphadenopathy, left lower lobe pulmonary nodule 1.7 cm  Bronchoscopy with Ion navigation performed 2/28/2024-pathology positive for small cell carcinoma from the left lower lobe and station 4L  PET scan on 3/7/2024 showed significantly avid bilateral hilar and mediastinal lymph nodes for example kylah [employer] AP window 8.3 SUV measuring 2.5 x 1.8 cm, left hilar lymph node SUV 7 1.4 cm, pleural-based nodularity left major fissure newly hypermetabolic SUV 2.2, pleural-based partially cavitary mass left lower lobe SUV five 1.5 x 1 cm, new right lung nodule 8 mm in the right lower lobe suspicious.-Results discussed with   Christen and we both feel the contralateral right lung nodule is highly suspicious for metastatic disease  MRI of the brain negative.  3/19/2024.initiated chemoimmunotherapy with carboplatin/etoposide/atezolizumab; completed 4 cycles of carboplatin/etoposide/atezolizumab 5/23/2024 6/6/2024 PET scan: showed a residual 6 mm pleural-based nodule left lower lobe to be photopenic, low-level subpleural activity adjacently and inferiorly SUV 1.9, resolution of left mediastinal and left hilar lymphadenopathy, marked decrease mediastinal and left hilar lymph nodes have low-level activity SUV 2.6, no new hypermetabolic pulmonary opacities, no hypermetabolic activity in the abdomen, pelvis or bones  CT chest abdomen pelvis 9/17/2024-subpleural parenchymal scarring posterior left lower lobe.  No residual pulmonary nodule, stable subcentimeter low-density mass right lobe of the liver favored to be a cyst or hemangioma, stable sclerotic lesion right iliac bone.  MRI brain-old bilateral basal ganglia and occipital infarcts, no metastatic disease.  12/14/2024 CT chest abdomen pelvis-no evidence of disease progression  *anemia/thrombocytopenia secondary to chemotherapy  Hemoglobin improved 13.0, platelets normal  *Fatigue-normal TSH free T3 free T4 cortisol and ACTH   *Depression/Zyprexa 2.5 mg nightly/Cymbalta 30 mg daily  *Recent pneumonia now with chronic hypoxic respiratory failure on O2  *Comorbidities of tobacco abuse/COPD, anxiety, atherosclerotic calcifications by CT but no definitive diagnosis of CAD, degenerative disease of the spine, hyperlipidemia; no known autoimmune disorders, chronic pain on opioid medicines    Oncology plan/recommendations:  Continue atezolizumab every 3 weeks  Increase Cymbalta to 60 mg daily; continue Zyprexa 2.5 mg nightly  Continue prednisone but decrease to 10 mg daily  6-week MD visit with repeat CT chest abdomen pelvis MRI brain restaging

## 2025-02-04 ENCOUNTER — INFUSION (OUTPATIENT)
Dept: ONCOLOGY | Facility: HOSPITAL | Age: 70
End: 2025-02-04
Payer: MEDICARE

## 2025-02-04 ENCOUNTER — OFFICE VISIT (OUTPATIENT)
Dept: ONCOLOGY | Facility: CLINIC | Age: 70
End: 2025-02-04
Payer: MEDICARE

## 2025-02-04 VITALS
HEIGHT: 63 IN | DIASTOLIC BLOOD PRESSURE: 76 MMHG | TEMPERATURE: 97.4 F | OXYGEN SATURATION: 95 % | BODY MASS INDEX: 17.47 KG/M2 | SYSTOLIC BLOOD PRESSURE: 112 MMHG | HEART RATE: 93 BPM | RESPIRATION RATE: 16 BRPM | WEIGHT: 98.6 LBS

## 2025-02-04 DIAGNOSIS — C80.1 SMALL CELL CARCINOMA: ICD-10-CM

## 2025-02-04 DIAGNOSIS — Z45.2 FITTING AND ADJUSTMENT OF VASCULAR CATHETER: ICD-10-CM

## 2025-02-04 DIAGNOSIS — Z79.899 NEED FOR PROPHYLACTIC CHEMOTHERAPY: ICD-10-CM

## 2025-02-04 DIAGNOSIS — Z45.2 FITTING AND ADJUSTMENT OF VASCULAR CATHETER: Primary | ICD-10-CM

## 2025-02-04 DIAGNOSIS — E87.6 HYPOKALEMIA: ICD-10-CM

## 2025-02-04 LAB
ALBUMIN SERPL-MCNC: 3.5 G/DL (ref 3.5–5.2)
ALBUMIN/GLOB SERPL: 1.5 G/DL
ALP SERPL-CCNC: 72 U/L (ref 39–117)
ALT SERPL W P-5'-P-CCNC: 13 U/L (ref 1–33)
ANION GAP SERPL CALCULATED.3IONS-SCNC: 8.8 MMOL/L (ref 5–15)
AST SERPL-CCNC: 17 U/L (ref 1–32)
BASOPHILS # BLD AUTO: 0.03 10*3/MM3 (ref 0–0.2)
BASOPHILS NFR BLD AUTO: 0.2 % (ref 0–1.5)
BILIRUB SERPL-MCNC: 0.3 MG/DL (ref 0–1.2)
BUN SERPL-MCNC: 5 MG/DL (ref 8–23)
BUN/CREAT SERPL: 6.3 (ref 7–25)
CALCIUM SPEC-SCNC: 8.5 MG/DL (ref 8.6–10.5)
CHLORIDE SERPL-SCNC: 96 MMOL/L (ref 98–107)
CO2 SERPL-SCNC: 31.2 MMOL/L (ref 22–29)
CREAT SERPL-MCNC: 0.8 MG/DL (ref 0.57–1)
DEPRECATED RDW RBC AUTO: 54.1 FL (ref 37–54)
EGFRCR SERPLBLD CKD-EPI 2021: 80.9 ML/MIN/1.73
EOSINOPHIL # BLD AUTO: 0.05 10*3/MM3 (ref 0–0.4)
EOSINOPHIL NFR BLD AUTO: 0.3 % (ref 0.3–6.2)
ERYTHROCYTE [DISTWIDTH] IN BLOOD BY AUTOMATED COUNT: 14.7 % (ref 12.3–15.4)
GLOBULIN UR ELPH-MCNC: 2.4 GM/DL
GLUCOSE SERPL-MCNC: 124 MG/DL (ref 65–99)
HCT VFR BLD AUTO: 41.3 % (ref 34–46.6)
HGB BLD-MCNC: 13 G/DL (ref 12–15.9)
IMM GRANULOCYTES # BLD AUTO: 0.03 10*3/MM3 (ref 0–0.05)
IMM GRANULOCYTES NFR BLD AUTO: 0.2 % (ref 0–0.5)
LYMPHOCYTES # BLD AUTO: 2.14 10*3/MM3 (ref 0.7–3.1)
LYMPHOCYTES NFR BLD AUTO: 12.1 % (ref 19.6–45.3)
MCH RBC QN AUTO: 31.1 PG (ref 26.6–33)
MCHC RBC AUTO-ENTMCNC: 31.5 G/DL (ref 31.5–35.7)
MCV RBC AUTO: 98.8 FL (ref 79–97)
MONOCYTES # BLD AUTO: 0.71 10*3/MM3 (ref 0.1–0.9)
MONOCYTES NFR BLD AUTO: 4 % (ref 5–12)
NEUTROPHILS NFR BLD AUTO: 14.74 10*3/MM3 (ref 1.7–7)
NEUTROPHILS NFR BLD AUTO: 83.2 % (ref 42.7–76)
PLATELET # BLD AUTO: 286 10*3/MM3 (ref 140–450)
PMV BLD AUTO: 9.2 FL (ref 6–12)
POTASSIUM SERPL-SCNC: 3.3 MMOL/L (ref 3.5–5.2)
PROT SERPL-MCNC: 5.9 G/DL (ref 6–8.5)
RBC # BLD AUTO: 4.18 10*6/MM3 (ref 3.77–5.28)
SODIUM SERPL-SCNC: 136 MMOL/L (ref 136–145)
T3FREE SERPL-MCNC: 3.29 PG/ML (ref 2–4.4)
T4 FREE SERPL-MCNC: 1.29 NG/DL (ref 0.93–1.7)
TSH SERPL DL<=0.05 MIU/L-ACNC: 4.38 UIU/ML (ref 0.27–4.2)
WBC NRBC COR # BLD AUTO: 17.7 10*3/MM3 (ref 3.4–10.8)

## 2025-02-04 PROCEDURE — 25010000002 HEPARIN LOCK FLUSH PER 10 UNITS: Performed by: INTERNAL MEDICINE

## 2025-02-04 PROCEDURE — 85025 COMPLETE CBC W/AUTO DIFF WBC: CPT | Performed by: INTERNAL MEDICINE

## 2025-02-04 PROCEDURE — 84481 FREE ASSAY (FT-3): CPT | Performed by: INTERNAL MEDICINE

## 2025-02-04 PROCEDURE — 25010000002 ATEZOLIZUMAB 1200 MG/20ML SOLUTION 20 ML VIAL: Performed by: INTERNAL MEDICINE

## 2025-02-04 PROCEDURE — 96413 CHEMO IV INFUSION 1 HR: CPT

## 2025-02-04 PROCEDURE — 25810000003 SODIUM CHLORIDE 0.9 % SOLUTION 250 ML FLEX CONT: Performed by: INTERNAL MEDICINE

## 2025-02-04 PROCEDURE — 84439 ASSAY OF FREE THYROXINE: CPT | Performed by: INTERNAL MEDICINE

## 2025-02-04 PROCEDURE — 80053 COMPREHEN METABOLIC PANEL: CPT | Performed by: INTERNAL MEDICINE

## 2025-02-04 PROCEDURE — 84443 ASSAY THYROID STIM HORMONE: CPT | Performed by: INTERNAL MEDICINE

## 2025-02-04 RX ORDER — HEPARIN SODIUM (PORCINE) LOCK FLUSH IV SOLN 100 UNIT/ML 100 UNIT/ML
500 SOLUTION INTRAVENOUS AS NEEDED
Status: DISCONTINUED | OUTPATIENT
Start: 2025-02-04 | End: 2025-02-04 | Stop reason: HOSPADM

## 2025-02-04 RX ORDER — SODIUM CHLORIDE 0.9 % (FLUSH) 0.9 %
10 SYRINGE (ML) INJECTION AS NEEDED
OUTPATIENT
Start: 2025-02-04

## 2025-02-04 RX ORDER — SODIUM CHLORIDE 0.9 % (FLUSH) 0.9 %
10 SYRINGE (ML) INJECTION AS NEEDED
Status: DISCONTINUED | OUTPATIENT
Start: 2025-02-04 | End: 2025-02-04 | Stop reason: HOSPADM

## 2025-02-04 RX ORDER — DULOXETIN HYDROCHLORIDE 30 MG/1
60 CAPSULE, DELAYED RELEASE ORAL DAILY
Qty: 30 CAPSULE | Refills: 2 | Status: SHIPPED | OUTPATIENT
Start: 2025-02-04

## 2025-02-04 RX ORDER — POTASSIUM CHLORIDE 1500 MG/1
40 TABLET, EXTENDED RELEASE ORAL ONCE
Status: COMPLETED | OUTPATIENT
Start: 2025-02-04 | End: 2025-02-04

## 2025-02-04 RX ORDER — HEPARIN SODIUM (PORCINE) LOCK FLUSH IV SOLN 100 UNIT/ML 100 UNIT/ML
500 SOLUTION INTRAVENOUS AS NEEDED
OUTPATIENT
Start: 2025-02-04

## 2025-02-04 RX ORDER — SODIUM CHLORIDE 9 MG/ML
20 INJECTION, SOLUTION INTRAVENOUS ONCE
Status: CANCELLED | OUTPATIENT
Start: 2025-02-04

## 2025-02-04 RX ORDER — PREDNISONE 20 MG/1
10 TABLET ORAL DAILY
Qty: 30 TABLET | Refills: 0 | Status: SHIPPED | OUTPATIENT
Start: 2025-02-04

## 2025-02-04 RX ORDER — SODIUM CHLORIDE 9 MG/ML
20 INJECTION, SOLUTION INTRAVENOUS ONCE
OUTPATIENT
Start: 2025-02-25

## 2025-02-04 RX ADMIN — Medication 10 ML: at 11:37

## 2025-02-04 RX ADMIN — POTASSIUM CHLORIDE 40 MEQ: 1500 TABLET, EXTENDED RELEASE ORAL at 11:27

## 2025-02-04 RX ADMIN — ATEZOLIZUMAB 1200 MG: 1200 INJECTION, SOLUTION INTRAVENOUS at 11:06

## 2025-02-04 RX ADMIN — HEPARIN 500 UNITS: 100 SYRINGE at 11:37

## 2025-02-04 NOTE — NURSING NOTE
Notified Dr. Burns of potassium 3.3 and pt not currently taking. New order for potassium 40 meq po x1 dose. Notified of tsh results as well.

## 2025-02-25 ENCOUNTER — INFUSION (OUTPATIENT)
Dept: ONCOLOGY | Facility: HOSPITAL | Age: 70
End: 2025-02-25
Payer: MEDICARE

## 2025-02-25 VITALS
BODY MASS INDEX: 17.79 KG/M2 | OXYGEN SATURATION: 93 % | DIASTOLIC BLOOD PRESSURE: 70 MMHG | HEART RATE: 89 BPM | SYSTOLIC BLOOD PRESSURE: 108 MMHG | WEIGHT: 100.4 LBS | TEMPERATURE: 97.8 F

## 2025-02-25 DIAGNOSIS — C80.1 SMALL CELL CARCINOMA: ICD-10-CM

## 2025-02-25 DIAGNOSIS — Z45.2 FITTING AND ADJUSTMENT OF VASCULAR CATHETER: Primary | ICD-10-CM

## 2025-02-25 DIAGNOSIS — Z79.899 NEED FOR PROPHYLACTIC CHEMOTHERAPY: ICD-10-CM

## 2025-02-25 DIAGNOSIS — E87.6 HYPOKALEMIA: Primary | ICD-10-CM

## 2025-02-25 LAB
ALBUMIN SERPL-MCNC: 3.4 G/DL (ref 3.5–5.2)
ALBUMIN/GLOB SERPL: 1.4 G/DL
ALP SERPL-CCNC: 104 U/L (ref 39–117)
ALT SERPL W P-5'-P-CCNC: <5 U/L (ref 1–33)
ANION GAP SERPL CALCULATED.3IONS-SCNC: 10.2 MMOL/L (ref 5–15)
AST SERPL-CCNC: 12 U/L (ref 1–32)
BASOPHILS # BLD AUTO: 0.05 10*3/MM3 (ref 0–0.2)
BASOPHILS NFR BLD AUTO: 0.4 % (ref 0–1.5)
BILIRUB SERPL-MCNC: 0.2 MG/DL (ref 0–1.2)
BUN SERPL-MCNC: 3 MG/DL (ref 8–23)
BUN/CREAT SERPL: 3.9 (ref 7–25)
CALCIUM SPEC-SCNC: 8.5 MG/DL (ref 8.6–10.5)
CHLORIDE SERPL-SCNC: 97 MMOL/L (ref 98–107)
CO2 SERPL-SCNC: 27.8 MMOL/L (ref 22–29)
CREAT SERPL-MCNC: 0.76 MG/DL (ref 0.57–1)
DEPRECATED RDW RBC AUTO: 52.4 FL (ref 37–54)
EGFRCR SERPLBLD CKD-EPI 2021: 86 ML/MIN/1.73
EOSINOPHIL # BLD AUTO: 0.19 10*3/MM3 (ref 0–0.4)
EOSINOPHIL NFR BLD AUTO: 1.6 % (ref 0.3–6.2)
ERYTHROCYTE [DISTWIDTH] IN BLOOD BY AUTOMATED COUNT: 14.4 % (ref 12.3–15.4)
GLOBULIN UR ELPH-MCNC: 2.4 GM/DL
GLUCOSE SERPL-MCNC: 103 MG/DL (ref 65–99)
HCT VFR BLD AUTO: 35.8 % (ref 34–46.6)
HGB BLD-MCNC: 11.6 G/DL (ref 12–15.9)
IMM GRANULOCYTES # BLD AUTO: 0.01 10*3/MM3 (ref 0–0.05)
IMM GRANULOCYTES NFR BLD AUTO: 0.1 % (ref 0–0.5)
LYMPHOCYTES # BLD AUTO: 3.74 10*3/MM3 (ref 0.7–3.1)
LYMPHOCYTES NFR BLD AUTO: 30.6 % (ref 19.6–45.3)
MCH RBC QN AUTO: 31.5 PG (ref 26.6–33)
MCHC RBC AUTO-ENTMCNC: 32.4 G/DL (ref 31.5–35.7)
MCV RBC AUTO: 97.3 FL (ref 79–97)
MONOCYTES # BLD AUTO: 0.59 10*3/MM3 (ref 0.1–0.9)
MONOCYTES NFR BLD AUTO: 4.8 % (ref 5–12)
NEUTROPHILS NFR BLD AUTO: 62.5 % (ref 42.7–76)
NEUTROPHILS NFR BLD AUTO: 7.63 10*3/MM3 (ref 1.7–7)
PLATELET # BLD AUTO: 390 10*3/MM3 (ref 140–450)
PMV BLD AUTO: 8.7 FL (ref 6–12)
POTASSIUM SERPL-SCNC: 3 MMOL/L (ref 3.5–5.2)
PROT SERPL-MCNC: 5.8 G/DL (ref 6–8.5)
RBC # BLD AUTO: 3.68 10*6/MM3 (ref 3.77–5.28)
SODIUM SERPL-SCNC: 135 MMOL/L (ref 136–145)
WBC NRBC COR # BLD AUTO: 12.21 10*3/MM3 (ref 3.4–10.8)

## 2025-02-25 PROCEDURE — 96413 CHEMO IV INFUSION 1 HR: CPT

## 2025-02-25 PROCEDURE — 25810000003 SODIUM CHLORIDE 0.9 % SOLUTION 250 ML FLEX CONT: Performed by: INTERNAL MEDICINE

## 2025-02-25 PROCEDURE — 80053 COMPREHEN METABOLIC PANEL: CPT | Performed by: INTERNAL MEDICINE

## 2025-02-25 PROCEDURE — 85025 COMPLETE CBC W/AUTO DIFF WBC: CPT | Performed by: INTERNAL MEDICINE

## 2025-02-25 PROCEDURE — 25010000002 HEPARIN LOCK FLUSH PER 10 UNITS: Performed by: INTERNAL MEDICINE

## 2025-02-25 PROCEDURE — 25010000002 ATEZOLIZUMAB 1200 MG/20ML SOLUTION 20 ML VIAL: Performed by: INTERNAL MEDICINE

## 2025-02-25 RX ORDER — SODIUM CHLORIDE 0.9 % (FLUSH) 0.9 %
10 SYRINGE (ML) INJECTION AS NEEDED
Status: DISCONTINUED | OUTPATIENT
Start: 2025-02-25 | End: 2025-02-25 | Stop reason: HOSPADM

## 2025-02-25 RX ORDER — HEPARIN SODIUM (PORCINE) LOCK FLUSH IV SOLN 100 UNIT/ML 100 UNIT/ML
500 SOLUTION INTRAVENOUS AS NEEDED
OUTPATIENT
Start: 2025-02-25

## 2025-02-25 RX ORDER — HEPARIN SODIUM (PORCINE) LOCK FLUSH IV SOLN 100 UNIT/ML 100 UNIT/ML
500 SOLUTION INTRAVENOUS AS NEEDED
Status: DISCONTINUED | OUTPATIENT
Start: 2025-02-25 | End: 2025-02-25 | Stop reason: HOSPADM

## 2025-02-25 RX ORDER — POTASSIUM CHLORIDE 1500 MG/1
20 TABLET, EXTENDED RELEASE ORAL 2 TIMES DAILY
Qty: 60 TABLET | Refills: 3 | Status: SHIPPED | OUTPATIENT
Start: 2025-02-25

## 2025-02-25 RX ORDER — SODIUM CHLORIDE 0.9 % (FLUSH) 0.9 %
10 SYRINGE (ML) INJECTION AS NEEDED
OUTPATIENT
Start: 2025-02-25

## 2025-02-25 RX ADMIN — Medication 10 ML: at 15:34

## 2025-02-25 RX ADMIN — HEPARIN 500 UNITS: 100 SYRINGE at 15:34

## 2025-02-25 RX ADMIN — ATEZOLIZUMAB 1200 MG: 1200 INJECTION, SOLUTION INTRAVENOUS at 15:02

## 2025-02-25 NOTE — NURSING NOTE
Lab Results   Component Value Date    GLUCOSE 103 (H) 02/25/2025    BUN 3 (L) 02/25/2025    CREATININE 0.76 02/25/2025     (L) 02/25/2025    K 3.0 (L) 02/25/2025    CL 97 (L) 02/25/2025    CALCIUM 8.5 (L) 02/25/2025    PROTEINTOT 5.8 (L) 02/25/2025    ALBUMIN 3.4 (L) 02/25/2025    ALT <5 02/25/2025    AST 12 02/25/2025    ALKPHOS 104 02/25/2025    BILITOT 0.2 02/25/2025    GLOB 2.4 02/25/2025    AGRATIO 1.4 02/25/2025    BCR 3.9 (L) 02/25/2025    ANIONGAP 10.2 02/25/2025    EGFR 86.0 02/25/2025     Pt to start potassium 40 meq every day per Dr. Burns.

## 2025-03-11 ENCOUNTER — INFUSION (OUTPATIENT)
Dept: ONCOLOGY | Facility: HOSPITAL | Age: 70
End: 2025-03-11
Payer: MEDICARE

## 2025-03-11 ENCOUNTER — HOSPITAL ENCOUNTER (OUTPATIENT)
Dept: CT IMAGING | Facility: HOSPITAL | Age: 70
Discharge: HOME | End: 2025-03-11
Payer: MEDICARE

## 2025-03-11 ENCOUNTER — HOSPITAL ENCOUNTER (OUTPATIENT)
Dept: MRI IMAGING | Facility: HOSPITAL | Age: 70
Discharge: HOME | End: 2025-03-11
Payer: MEDICARE

## 2025-03-11 DIAGNOSIS — C80.1 SMALL CELL CARCINOMA: ICD-10-CM

## 2025-03-11 DIAGNOSIS — Z79.899 NEED FOR PROPHYLACTIC CHEMOTHERAPY: ICD-10-CM

## 2025-03-11 DIAGNOSIS — Z45.2 FITTING AND ADJUSTMENT OF VASCULAR CATHETER: Primary | ICD-10-CM

## 2025-03-11 PROCEDURE — A9577 INJ MULTIHANCE: HCPCS | Performed by: INTERNAL MEDICINE

## 2025-03-11 PROCEDURE — 70553 MRI BRAIN STEM W/O & W/DYE: CPT

## 2025-03-11 PROCEDURE — 25010000002 HEPARIN LOCK FLUSH PER 10 UNITS: Performed by: INTERNAL MEDICINE

## 2025-03-11 PROCEDURE — 74177 CT ABD & PELVIS W/CONTRAST: CPT

## 2025-03-11 PROCEDURE — 25510000002 GADOBENATE DIMEGLUMINE 529 MG/ML SOLUTION: Performed by: INTERNAL MEDICINE

## 2025-03-11 PROCEDURE — 25510000002 DIATRIZOATE MEGLUMINE & SODIUM PER 1 ML: Performed by: INTERNAL MEDICINE

## 2025-03-11 PROCEDURE — 25510000001 IOPAMIDOL PER 1 ML: Performed by: INTERNAL MEDICINE

## 2025-03-11 PROCEDURE — 71250 CT THORAX DX C-: CPT

## 2025-03-11 RX ORDER — SODIUM CHLORIDE 0.9 % (FLUSH) 0.9 %
10 SYRINGE (ML) INJECTION AS NEEDED
OUTPATIENT
Start: 2025-03-11

## 2025-03-11 RX ORDER — IOPAMIDOL 755 MG/ML
100 INJECTION, SOLUTION INTRAVASCULAR
Status: COMPLETED | OUTPATIENT
Start: 2025-03-11 | End: 2025-03-11

## 2025-03-11 RX ORDER — HEPARIN SODIUM (PORCINE) LOCK FLUSH IV SOLN 100 UNIT/ML 100 UNIT/ML
500 SOLUTION INTRAVENOUS AS NEEDED
OUTPATIENT
Start: 2025-03-11

## 2025-03-11 RX ORDER — HEPARIN SODIUM (PORCINE) LOCK FLUSH IV SOLN 100 UNIT/ML 100 UNIT/ML
500 SOLUTION INTRAVENOUS AS NEEDED
Status: DISCONTINUED | OUTPATIENT
Start: 2025-03-11 | End: 2025-03-11 | Stop reason: HOSPADM

## 2025-03-11 RX ORDER — DIATRIZOATE MEGLUMINE AND DIATRIZOATE SODIUM 660; 100 MG/ML; MG/ML
30 SOLUTION ORAL; RECTAL ONCE
Status: COMPLETED | OUTPATIENT
Start: 2025-03-11 | End: 2025-03-11

## 2025-03-11 RX ORDER — SODIUM CHLORIDE 0.9 % (FLUSH) 0.9 %
10 SYRINGE (ML) INJECTION AS NEEDED
Status: DISCONTINUED | OUTPATIENT
Start: 2025-03-11 | End: 2025-03-11 | Stop reason: HOSPADM

## 2025-03-11 RX ADMIN — IOPAMIDOL 100 ML: 755 INJECTION, SOLUTION INTRAVENOUS at 09:30

## 2025-03-11 RX ADMIN — Medication 10 ML: at 09:27

## 2025-03-11 RX ADMIN — DIATRIZOATE MEGLUMINE AND DIATRIZOATE SODIUM 30 ML: 660; 100 LIQUID ORAL; RECTAL at 07:57

## 2025-03-11 RX ADMIN — GADOBENATE DIMEGLUMINE 9 ML: 529 INJECTION, SOLUTION INTRAVENOUS at 08:32

## 2025-03-11 RX ADMIN — HEPARIN 500 UNITS: 100 SYRINGE at 09:27

## 2025-03-12 NOTE — PROGRESS NOTES
Subjective     REASON FOR CONSULTATION:  small cell lung cancer  Provide an opinion on any further workup or treatment                             REQUESTING PHYSICIAN:  James    RECORDS OBTAINED:  Records of the patients history including those obtained from the referring provider were reviewed and summarized in detail.    HISTORY OF PRESENT ILLNESS:  The patient is a 69 y.o. year old female who is here for an opinion about the above issue.    History of Present Illness   The patient initiated chemoimmunotherapy with carboplatin/etoposide/atezolizumab 3/19/2024 and completed 4 cycles of carboplatin/etoposide/atezolizumab and is now on maintenance atezolizumab.  The patient is doing about the same with continued fatigue and decreased appetite with weight loss although no overt nausea vomiting diarrhea.    Oncology History:  This is a 68-year-old woman with long history of tobacco use and COPD, degenerative disease of the spine on narcotic therapy, depression/anxiety, orthostatic hypotension, hyperlipidemia who has been followed with serial imaging for a left lower lobe lung nodule and mediastinal lymphadenopathy.  A CT of the chest 1/21/2023 showed a masslike consolidation in the left lower lobe 2.5 x 2.6 cm in size with a potential cavitary focus centrally surrounding haziness and otherwise groundglass opacities in the right middle lobe and right lower lobe and enlarged mediastinal lymph nodes up to 10 mm.  The findings were favored to be infectious or inflammatory.  A follow-up CT chest 7/28/2023 showed pleural-based area of density in the left upper lung 11 x 5 mm new from the previous exam and resolution of the consolidation in the left lower lobe.  A PET scan was performed 8/9/2023 which showed the 1.1 cm pleural-based density in the posterior left lower lobe to blend into dependent atelectasis but have more intense activity than the atelectasis with maximum SUV 2.8.  There was hypermetabolic  lymphadenopathy in the left hilum and left lower paratracheal regions for example lymph node posterior to the left pulmonary artery SUV 4.7 measuring 1.6 cm and another area of soft tissue lateral to the left mainstem bronchus SUV 4.4, ill-defined lymphadenopathy left lower paratracheal region SUV 3.7.  She underwent bronchoscopy with biopsies on 8/25/2023 with samples from stations 11 R, 4R, 7, 10 L, 11 L, 12 L all negative for malignancy; station 4R showed rare atypical reactive epithelial cells.  A follow-up CT of the chest on 2/6/2024 showed the left lower lobe nodule to be enlarging at 1.6 x 0.9 cm and enlarging precarinal lymphadenopathy concerning for metastatic disease.  The patient underwent repeat bronchoscopy with Ion navigation on 2/28/2024; biopsies from the left lower lobe nodule were positive for small cell carcinoma and a level 4 lymph node biopsied also positive for small cell carcinoma.    Full body PET scan on 3/7/2024 showed significantly avid bilateral hilar and mediastinal lymph nodes for example kylah conglomerate AP window 8.3 SUV measuring 2.5 x 1.8 cm, left hilar lymph node SUV 7 1.4 cm, pleural-based nodularity left major fissure newly hypermetabolic SUV 2.2, pleural-based partially cavitary mass left lower lobe SUV five 1.5 x 1 cm, new right lung nodule 8 mm in the right lower lobe suspicious.  MRI of the brain negative.    The patient has comorbidities of COPD but minimally symptomatic, no known cardiac disease, kidney disease, liver disease, diabetes etc.  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    The patient initiated chemoimmunotherapy with carboplatin/etoposide/atezolizumab 3/19/2024.  She completed 4 cycles of carboplatin/etoposide/atezolizumab on 5/23/2024.    A PET scan on 6/6/2024 showed a residual 6 mm pleural-based nodule left lower lobe to be photopenic, low-level subpleural activity adjacently and inferiorly SUV 1.9, resolution of left mediastinal and left hilar lymphadenopathy,  marked decrease mediastinal and left hilar lymph nodes have low-level activity SUV 2.6, no new hypermetabolic pulmonary opacities, no hypermetabolic activity in the abdomen, pelvis or bones.    CT chest abdomen pelvis 2024-subpleural parenchymal scarring posterior left lower lobe.  No residual pulmonary nodule, stable subcentimeter low-density mass right lobe of the liver favored to be a cyst or hemangioma, stable sclerotic lesion right iliac bone.  MRI brain-old bilateral basal ganglia and occipital infarcts, no metastatic disease.    Past Medical History:   Diagnosis Date    COPD (chronic obstructive pulmonary disease)     COPD with acute exacerbation 2020    Small cell carcinoma 3/13/2024        Past Surgical History:   Procedure Laterality Date    BRONCHOSCOPY N/A 2023    Procedure: BRONCHOSCOPY WITH ENDOBRONCHIAL ULTRASOUND (EBUS) with FNA;  Surgeon: Coy Mccarthy MD;  Location: [facility] ENDOSCOPY;  Service: Pulmonary;  Laterality: N/A;  Pre/Post - mediastinal and hilar lymphadenopathy.    BRONCHOSCOPY WITH ION ROBOTIC ASSIST N/A 2024    Procedure: BRONCHOSCOPY WITH ION ROBOT,  ENDOBRONCHIAL ULTRASOUND, FINE NEEDLE ASPIRATIONS,  CRYOTHERAPY BIOPSIES, AND LEFT LOWER LOBE BRONCHOALVEOLAR LAVAGE.;  Surgeon: Alexia Velásquez MD;  Location: [facility] ENDOSCOPY;  Service: Robotics - Pulmonary;  Laterality: N/A;     SECTION      CHEST TUBE INSERTION Left 2024    Procedure: CHEST TUBE INSERTION;  Surgeon: Alexia Velásquez MD;  Location: [facility] ENDOSCOPY;  Service: Thoracic;  Laterality: Left;    CHOLECYSTECTOMY      COLONOSCOPY      ECTOPIC PREGNANCY SURGERY      HYSTERECTOMY      TONSILLECTOMY      TOTAL HIP ARTHROPLASTY Left     VENOUS ACCESS DEVICE (PORT) INSERTION N/A 3/14/2024    Procedure: INSERTION VENOUS ACCESS DEVICE;  Surgeon: Jonas Garner MD;  Location: [facility] OR;  Service: General;  Laterality: N/A;        Current Outpatient Medications on File Prior to Visit    Medication Sig Dispense Refill    albuterol sulfate  (90 Base) MCG/ACT inhaler Inhale 2 puffs Every 4 (Four) Hours As Needed for Wheezing. Takes as needed.      apixaban (ELIQUIS) 5 MG tablet tablet Take 1 tablet by mouth Every 12 (Twelve) Hours. Indications: Other - full anticoagulation 180 tablet 0    budesonide-formoterol (SYMBICORT) 160-4.5 MCG/ACT inhaler Inhale 2 puffs 2 (Two) Times a Day.  12    calcium carbonate (TUMS) 500 MG chewable tablet Chew 1 tablet 2 (Two) Times a Day.      Cyanocobalamin (Vitamin B-12) 500 MCG sublingual tablet PLACE 1 TABLET UNDER THE TONGUE DAILY 90 tablet 1    Denosumab (PROLIA SC) Inject  under the skin into the appropriate area as directed Every 6 (Six) Months.      diazePAM (VALIUM) 10 MG tablet Take 1 tablet by mouth 2 (Two) Times a Day As Needed for Anxiety (RLS.). Takes 20 mg at bedtime at times when she needs.      diphenoxylate-atropine (LOMOTIL) 2.5-0.025 MG per tablet Take 1 tablet by mouth 4 (Four) Times a Day As Needed for Diarrhea. 15 tablet 0    DULoxetine (CYMBALTA) 30 MG capsule Take 2 capsules by mouth Daily. 30 capsule 2    folic acid (FOLVITE) 800 MCG tablet TAKE 1 TABLET BY MOUTH DAILY 90 tablet 1    HYDROcodone-acetaminophen (NORCO)  MG per tablet Take 1 tablet by mouth 3 (Three) Times a Day As Needed for Moderate Pain.      lactobacillus acidophilus (RISAQUAD) capsule capsule Take 1 capsule by mouth 2 (Two) Times a Day. 60 capsule 0    levETIRAcetam (KEPPRA) 500 MG tablet TAKE ONE TABLET BY MOUTH EVERY 12 HOURS 180 tablet 0    midodrine (PROAMATINE) 2.5 MG tablet Take 1 tablet by mouth 3 (Three) Times a Day Before Meals.      multivitamin with minerals (PRESERVISION AREDS PO) Take 1 tablet by mouth 2 (Two) Times a Day.      naloxone (NARCAN) 4 MG/0.1ML nasal spray Administer 1 spray into the nostril(s) as directed by provider As Needed.      ondansetron (ZOFRAN) 8 MG tablet Take 1 tablet by mouth Every 8 (Eight) Hours As Needed for Nausea or  Vomiting. 30 tablet 1    pantoprazole (PROTONIX) 40 MG EC tablet Take 1 tablet by mouth Daily. 30 tablet 3    potassium chloride (Klor-Con M20) 20 MEQ CR tablet Take 1 tablet by mouth 2 (Two) Times a Day. 60 tablet 3    predniSONE (DELTASONE) 20 MG tablet Take 0.5 tablets by mouth Daily. 30 tablet 0    rosuvastatin (Crestor) 10 MG tablet Take 1 tablet by mouth Every Night. 30 tablet 11    sucralfate (CARAFATE) 1 g tablet TAKE 1 TABLET BY MOUTH 4 TIMES A  tablet 3    [DISCONTINUED] OLANZapine (zyPREXA) 2.5 MG tablet TAKE ONE TABLET BY MOUTH ONCE NIGHTLY 30 tablet 2     No current facility-administered medications on file prior to visit.        ALLERGIES:    Allergies   Allergen Reactions    Codeine GI Intolerance    Percocet [Oxycodone-Acetaminophen] Hives        Social History     Socioeconomic History    Marital status:    Tobacco Use    Smoking status: Every Day     Current packs/day: 1.00     Average packs/day: 1 pack/day for 30.0 years (30.0 ttl pk-yrs)     Types: Cigarettes     Passive exposure: Current    Smokeless tobacco: Never    Tobacco comments:     Began smoking at age 11.  Smoked .5 ppd for 6 years, 2.5 ppd for a year, 2 ppd for 5 years, and 1 ppd for the past 43 years for a 58.5 pack year history.   Vaping Use    Vaping status: Former    Substances: Nicotine, Flavoring    Devices: Disposable   Substance and Sexual Activity    Alcohol use: Yes     Comment: once a year     Drug use: No    Sexual activity: Defer        Family History   Problem Relation Age of Onset    Lung cancer Niece 50    Throat cancer Father     Breast cancer Neg Hx         Review of Systems   Constitutional:  Positive for activity change, appetite change and fatigue. Negative for unexpected weight change.   HENT: Negative.     Eyes: Negative.    Respiratory: Negative.     Cardiovascular: Negative.    Gastrointestinal: Negative.    Musculoskeletal:  Positive for back pain.   Neurological:  Negative for light-headedness.  "  Hematological: Negative.    Psychiatric/Behavioral:  Positive for dysphoric mood. The patient is nervous/anxious.    ROS is unchanged-11/26/2024    Objective     Vitals:    03/18/25 0817   BP: 109/77   Pulse: 90   Resp: 16   Temp: 97.3 °F (36.3 °C)   TempSrc: Infrared   SpO2: 94%   Weight: 42.5 kg (93 lb 9.6 oz)   Height: 160 cm (62.99\")   PainSc: 0-No pain                 3/18/2025     8:31 AM   Current Status   ECOG score 0       Physical Exam    CONSTITUTIONAL: pleasant chronic ill-appearing woman   HEENT: no icterus, no thrush, moist membranes, anisocoria  LYMPH: no cervical or supraclavicular lad  CV: RRR, S1S2, no murmur  RESP: bilateral wheezing, O2 by nasal cannula 1 L  GI: soft, non-tender, no splenomegaly, +bs  MUSC: no edema, normal gait  NEURO: alert and oriented x3, normal strength  PSYCH: Dysphoric mood and flat affect  Skin: No rash or induration noted   Unchanged-3/18/2025    RECENT LABS:  Hematology WBC   Date Value Ref Range Status   03/18/2025 11.63 (H) 3.40 - 10.80 10*3/mm3 Final     RBC   Date Value Ref Range Status   03/18/2025 4.39 3.77 - 5.28 10*6/mm3 Final     Hemoglobin   Date Value Ref Range Status   03/18/2025 14.1 12.0 - 15.9 g/dL Final     Hematocrit   Date Value Ref Range Status   03/18/2025 43.8 34.0 - 46.6 % Final     Platelets   Date Value Ref Range Status   03/18/2025 303 140 - 450 10*3/mm3 Final        Lab Results   Component Value Date    GLUCOSE 103 (H) 02/25/2025    BUN 3 (L) 02/25/2025    CREATININE 0.76 02/25/2025    EGFR 86.0 02/25/2025    BCR 3.9 (L) 02/25/2025    K 3.0 (L) 02/25/2025    CO2 27.8 02/25/2025    CALCIUM 8.5 (L) 02/25/2025    ALBUMIN 3.4 (L) 02/25/2025    BILITOT 0.2 02/25/2025    AST 12 02/25/2025    ALT <5 02/25/2025     NM PET/CT Skull Base to Mid Thigh (06/06/2024 11:52 AM)   FINDINGS: Mediastinal blood pool has a maximal SUV of 2.0, previously 2.3.  1. The residual 6 mm pleural-based nodule at the left lower lobe is photopenic. There is low-level " subpleural activity adjacently and inferiorly with an SUV of 1.9, likely representing radiation pneumonitis. There has been resolution of left mediastinal and left hilar lymphadenopathy. Interval marked decrease in the size of mediastinal and left hilar nodes and the remaining nodes have low-level activity, maximal SUV of 2.6. There are no new hypermetabolic pulmonary opacities.  2. There is no suspicious hypermetabolic activity at the supraclavicular regions or neck.  3. There is no suspicious hypermetabolic activity within the abdomen or pelvis.  4. There is no suspicious bone activity.    MRI Brain With & Without Contrast (09/17/2024 10:49 AM)  Impression:  1. Changes of old bilateral basal ganglia and occipital lobe infarcts.  2. No acute intracranial abnormality.  3. No pathologic contrast enhancement to suggest intracranial metastatic disease.    CT Abdomen Pelvis With Contrast (09/17/2024 11:34 AM)  Impression:  1. Stable exam with subcentimeter low-density mass in the posterior right lobe of the liver favored to be a cyst or hemangioma.  2. Colonic diverticulosis. No evidence of diverticulitis.  3. Stable sclerotic density within the right iliac bone favored to be a bone island. No suspicious lytic or sclerotic bony lesions are seen.    CT Chest With Contrast Diagnostic (09/17/2024 11:34 AM)  Impression:  1. Persistent subpleural parenchymal scarring within the posterior left lower lobe at site of previously demonstrated hypermetabolic pulmonary nodule. No residual pulmonary nodule identified. No evidence of metastatic disease elsewhere within the chest.    DEXA Bone Density Axial (11/15/2024 11:11)   Impression:  Osteopenia. Based upon the National Osteoporosis [employer] Guide, patient's FRAX score does meet criteria for pharmacologic treatment to prevent Osteoporosis.  Individual treatment decisions should be made based upon each patient's full clinical history   and risk factors.    CT Chest Without  Contrast Diagnostic (12/14/2024 17:20)  Impression:  1. Mild new patchy tree-in-bud groundglass opacities involving the anterior left upper lobe which may relate to atypical/viral pneumonia versus nonspecific inflammatory process.  2. Emphysema. No new solid pulmonary nodule.  3. Suspected pancreatitis involving pancreatic tail with wall thickening of the proximal jejunum near the duodenal jejunal junction which may relate to nonspecific enteritis, better assessed on prior abdominal CT.  4. Additional chronic findings above.    CT Abdomen Pelvis With Contrast (12/14/2024 15:13)  Impression:  1. Mild acute interstitial pancreatitis involving the pancreatic tail. No biliary dilatation.  2. Colonic diverticulosis without diverticulitis.  3. Prior cholecystectomy and hysterectomy.  4. Additional chronic findings above.    CT Chest Without Contrast Diagnostic (03/11/2025 09:28)  CT Abdomen Pelvis With Contrast (03/11/2025 09:28)  Impression:  1.No evidence of recurrent disease in the chest.  Previously seen patchy airspace opacities in the left upper lobe have resolved.  2.Poor distribution of intravenous contrast on CT abdomen pelvis, potentially a component of heart dysfunction. Exam is essentially a CT abdomen/pelvis without contrast.  3.No evidence of metastatic disease in the chest, abdomen, or pelvis.  4.There is peripancreatic fat stranding about the pancreatic head. Correlate with serum amylase/lipase.  5.Chronic and incidental findings as noted above.    MRI Brain With & Without Contrast (03/11/2025 08:51)    Assessment & Plan     *Small cell lung cancer left lower lobe of the lung with mediastinal involvement, contralateral lung nodule suspicious for metastatic disease/extensive stage  CT chest 2/22/2024-enlarged f4L lymph nodes 2.5 cm, enlarged AP window lymph node 1.4 cm, poorly defined left hilar lymphadenopathy, left lower lobe pulmonary nodule 1.7 cm  Bronchoscopy with Ion navigation performed  2/28/2024-pathology positive for small cell carcinoma from the left lower lobe and station 4L  PET scan on 3/7/2024 showed significantly avid bilateral hilar and mediastinal lymph nodes for example kylah [employer] AP window 8.3 SUV measuring 2.5 x 1.8 cm, left hilar lymph node SUV 7 1.4 cm, pleural-based nodularity left major fissure newly hypermetabolic SUV 2.2, pleural-based partially cavitary mass left lower lobe SUV five 1.5 x 1 cm, new right lung nodule 8 mm in the right lower lobe suspicious.-Results discussed with Dr. Velásquez and we both feel the contralateral right lung nodule is highly suspicious for metastatic disease  MRI of the brain negative.  3/19/2024.initiated chemoimmunotherapy with carboplatin/etoposide/atezolizumab; completed 4 cycles of carboplatin/etoposide/atezolizumab 5/23/2024 6/6/2024 PET scan: showed a residual 6 mm pleural-based nodule left lower lobe to be photopenic, low-level subpleural activity adjacently and inferiorly SUV 1.9, resolution of left mediastinal and left hilar lymphadenopathy, marked decrease mediastinal and left hilar lymph nodes have low-level activity SUV 2.6, no new hypermetabolic pulmonary opacities, no hypermetabolic activity in the abdomen, pelvis or bones  CT chest abdomen pelvis 9/17/2024-subpleural parenchymal scarring posterior left lower lobe.  No residual pulmonary nodule, stable subcentimeter low-density mass right lobe of the liver favored to be a cyst or hemangioma, stable sclerotic lesion right iliac bone.  MRI brain-old bilateral basal ganglia and occipital infarcts, no metastatic disease.  12/14/2024 CT chest abdomen pelvis-no evidence of disease progression  CT chest abdomen pelvis and MRI brain 3/11/2025-no evidence of disease progression  *anemia/thrombocytopenia secondary to chemotherapy  Hemoglobin improved 14.1, platelets normal  *Fatigue-normal TSH free T3 free T4 cortisol and ACTH   *Depression/Zyprexa 2.5 mg nightly/Cymbalta 30 mg  daily  *Recent pneumonia now with chronic hypoxic respiratory failure on O2  *Comorbidities of tobacco abuse/COPD, anxiety, atherosclerotic calcifications by CT but no definitive diagnosis of CAD, degenerative disease of the spine, hyperlipidemia; no known autoimmune disorders, chronic pain on opioid medicines    Oncology plan/recommendations:  Continue atezolizumab every 3 weeks  Continue Cymbalta to 60 mg daily; increase Zyprexa 5 mg nightly  Continue prednisone 10 mg daily  6-week MD visit with lab and atelina

## 2025-03-18 ENCOUNTER — INFUSION (OUTPATIENT)
Dept: ONCOLOGY | Facility: HOSPITAL | Age: 70
End: 2025-03-18
Payer: MEDICARE

## 2025-03-18 ENCOUNTER — OFFICE VISIT (OUTPATIENT)
Dept: ONCOLOGY | Facility: CLINIC | Age: 70
End: 2025-03-18
Payer: MEDICARE

## 2025-03-18 VITALS
RESPIRATION RATE: 16 BRPM | HEART RATE: 90 BPM | OXYGEN SATURATION: 94 % | BODY MASS INDEX: 16.59 KG/M2 | TEMPERATURE: 97.3 F | DIASTOLIC BLOOD PRESSURE: 77 MMHG | SYSTOLIC BLOOD PRESSURE: 109 MMHG | HEIGHT: 63 IN | WEIGHT: 93.6 LBS

## 2025-03-18 DIAGNOSIS — Z79.899 NEED FOR PROPHYLACTIC CHEMOTHERAPY: ICD-10-CM

## 2025-03-18 DIAGNOSIS — E53.8 VITAMIN B12 DEFICIENCY: ICD-10-CM

## 2025-03-18 DIAGNOSIS — Z45.2 FITTING AND ADJUSTMENT OF VASCULAR CATHETER: ICD-10-CM

## 2025-03-18 DIAGNOSIS — C80.1 SMALL CELL CARCINOMA: Primary | ICD-10-CM

## 2025-03-18 DIAGNOSIS — C80.1 SMALL CELL CARCINOMA: ICD-10-CM

## 2025-03-18 DIAGNOSIS — F32.A DEPRESSION, UNSPECIFIED DEPRESSION TYPE: ICD-10-CM

## 2025-03-18 LAB
ALBUMIN SERPL-MCNC: 3.4 G/DL (ref 3.5–5.2)
ALBUMIN/GLOB SERPL: 1.2 G/DL
ALP SERPL-CCNC: 120 U/L (ref 39–117)
ALT SERPL W P-5'-P-CCNC: <5 U/L (ref 1–33)
ANION GAP SERPL CALCULATED.3IONS-SCNC: 10.8 MMOL/L (ref 5–15)
AST SERPL-CCNC: 13 U/L (ref 1–32)
BASOPHILS # BLD AUTO: 0.04 10*3/MM3 (ref 0–0.2)
BASOPHILS NFR BLD AUTO: 0.3 % (ref 0–1.5)
BILIRUB SERPL-MCNC: 0.2 MG/DL (ref 0–1.2)
BUN SERPL-MCNC: 6 MG/DL (ref 8–23)
BUN/CREAT SERPL: 5.9 (ref 7–25)
CALCIUM SPEC-SCNC: 9.2 MG/DL (ref 8.6–10.5)
CHLORIDE SERPL-SCNC: 96 MMOL/L (ref 98–107)
CO2 SERPL-SCNC: 27.2 MMOL/L (ref 22–29)
CREAT SERPL-MCNC: 1.01 MG/DL (ref 0.57–1)
DEPRECATED RDW RBC AUTO: 51.2 FL (ref 37–54)
EGFRCR SERPLBLD CKD-EPI 2021: 61.1 ML/MIN/1.73
EOSINOPHIL # BLD AUTO: 0.44 10*3/MM3 (ref 0–0.4)
EOSINOPHIL NFR BLD AUTO: 3.8 % (ref 0.3–6.2)
ERYTHROCYTE [DISTWIDTH] IN BLOOD BY AUTOMATED COUNT: 13.8 % (ref 12.3–15.4)
GLOBULIN UR ELPH-MCNC: 2.8 GM/DL
GLUCOSE SERPL-MCNC: 170 MG/DL (ref 65–99)
HCT VFR BLD AUTO: 43.8 % (ref 34–46.6)
HGB BLD-MCNC: 14.1 G/DL (ref 12–15.9)
IMM GRANULOCYTES # BLD AUTO: 0.01 10*3/MM3 (ref 0–0.05)
IMM GRANULOCYTES NFR BLD AUTO: 0.1 % (ref 0–0.5)
LYMPHOCYTES # BLD AUTO: 3.44 10*3/MM3 (ref 0.7–3.1)
LYMPHOCYTES NFR BLD AUTO: 29.6 % (ref 19.6–45.3)
MCH RBC QN AUTO: 32.1 PG (ref 26.6–33)
MCHC RBC AUTO-ENTMCNC: 32.2 G/DL (ref 31.5–35.7)
MCV RBC AUTO: 99.8 FL (ref 79–97)
MONOCYTES # BLD AUTO: 0.85 10*3/MM3 (ref 0.1–0.9)
MONOCYTES NFR BLD AUTO: 7.3 % (ref 5–12)
NEUTROPHILS NFR BLD AUTO: 58.9 % (ref 42.7–76)
NEUTROPHILS NFR BLD AUTO: 6.85 10*3/MM3 (ref 1.7–7)
PLATELET # BLD AUTO: 303 10*3/MM3 (ref 140–450)
PMV BLD AUTO: 9.6 FL (ref 6–12)
POTASSIUM SERPL-SCNC: 3.8 MMOL/L (ref 3.5–5.2)
PROT SERPL-MCNC: 6.2 G/DL (ref 6–8.5)
RBC # BLD AUTO: 4.39 10*6/MM3 (ref 3.77–5.28)
SODIUM SERPL-SCNC: 134 MMOL/L (ref 136–145)
T3FREE SERPL-MCNC: 2.84 PG/ML (ref 2–4.4)
T4 FREE SERPL-MCNC: 1.07 NG/DL (ref 0.93–1.7)
TSH SERPL DL<=0.05 MIU/L-ACNC: 5.24 UIU/ML (ref 0.27–4.2)
WBC NRBC COR # BLD AUTO: 11.63 10*3/MM3 (ref 3.4–10.8)

## 2025-03-18 PROCEDURE — 25010000002 ATEZOLIZUMAB 840 MG/14ML SOLUTION 14 ML VIAL: Performed by: INTERNAL MEDICINE

## 2025-03-18 PROCEDURE — 85025 COMPLETE CBC W/AUTO DIFF WBC: CPT | Performed by: INTERNAL MEDICINE

## 2025-03-18 PROCEDURE — 84481 FREE ASSAY (FT-3): CPT | Performed by: INTERNAL MEDICINE

## 2025-03-18 PROCEDURE — 25010000002 HEPARIN LOCK FLUSH PER 10 UNITS: Performed by: INTERNAL MEDICINE

## 2025-03-18 PROCEDURE — 84439 ASSAY OF FREE THYROXINE: CPT | Performed by: INTERNAL MEDICINE

## 2025-03-18 PROCEDURE — 80053 COMPREHEN METABOLIC PANEL: CPT | Performed by: INTERNAL MEDICINE

## 2025-03-18 PROCEDURE — 84443 ASSAY THYROID STIM HORMONE: CPT | Performed by: INTERNAL MEDICINE

## 2025-03-18 PROCEDURE — 96413 CHEMO IV INFUSION 1 HR: CPT

## 2025-03-18 PROCEDURE — 25810000003 SODIUM CHLORIDE 0.9 % SOLUTION: Performed by: INTERNAL MEDICINE

## 2025-03-18 PROCEDURE — 25810000003 SODIUM CHLORIDE 0.9 % SOLUTION 250 ML FLEX CONT: Performed by: INTERNAL MEDICINE

## 2025-03-18 RX ORDER — OLANZAPINE 5 MG/1
5 TABLET ORAL NIGHTLY
Qty: 30 TABLET | Refills: 3 | Status: SHIPPED | OUTPATIENT
Start: 2025-03-18

## 2025-03-18 RX ORDER — SODIUM CHLORIDE 0.9 % (FLUSH) 0.9 %
10 SYRINGE (ML) INJECTION AS NEEDED
Status: DISCONTINUED | OUTPATIENT
Start: 2025-03-18 | End: 2025-03-18 | Stop reason: HOSPADM

## 2025-03-18 RX ORDER — SODIUM CHLORIDE 9 MG/ML
20 INJECTION, SOLUTION INTRAVENOUS ONCE
OUTPATIENT
Start: 2025-04-08

## 2025-03-18 RX ORDER — SODIUM CHLORIDE 9 MG/ML
20 INJECTION, SOLUTION INTRAVENOUS ONCE
Status: COMPLETED | OUTPATIENT
Start: 2025-03-18 | End: 2025-03-18

## 2025-03-18 RX ORDER — SODIUM CHLORIDE 9 MG/ML
20 INJECTION, SOLUTION INTRAVENOUS ONCE
Status: CANCELLED | OUTPATIENT
Start: 2025-03-18

## 2025-03-18 RX ORDER — HEPARIN SODIUM (PORCINE) LOCK FLUSH IV SOLN 100 UNIT/ML 100 UNIT/ML
500 SOLUTION INTRAVENOUS AS NEEDED
Status: DISCONTINUED | OUTPATIENT
Start: 2025-03-18 | End: 2025-03-18 | Stop reason: HOSPADM

## 2025-03-18 RX ORDER — SODIUM CHLORIDE 0.9 % (FLUSH) 0.9 %
10 SYRINGE (ML) INJECTION AS NEEDED
OUTPATIENT
Start: 2025-03-18

## 2025-03-18 RX ORDER — HEPARIN SODIUM (PORCINE) LOCK FLUSH IV SOLN 100 UNIT/ML 100 UNIT/ML
500 SOLUTION INTRAVENOUS AS NEEDED
OUTPATIENT
Start: 2025-03-18

## 2025-03-18 RX ADMIN — HEPARIN 500 UNITS: 100 SYRINGE at 11:05

## 2025-03-18 RX ADMIN — SODIUM CHLORIDE 20 ML/HR: 900 INJECTION, SOLUTION INTRAVENOUS at 09:36

## 2025-03-18 RX ADMIN — Medication 10 ML: at 11:05

## 2025-03-18 RX ADMIN — ATEZOLIZUMAB 1200 MG: 840 INJECTION, SOLUTION INTRAVENOUS at 10:30

## 2025-04-01 RX ORDER — OLANZAPINE 2.5 MG/1
2.5 TABLET, FILM COATED ORAL NIGHTLY
Qty: 30 TABLET | Refills: 2 | OUTPATIENT
Start: 2025-04-01

## 2025-04-01 RX ORDER — LEVETIRACETAM 500 MG/1
500 TABLET ORAL EVERY 12 HOURS
Qty: 180 TABLET | Refills: 0 | Status: SHIPPED | OUTPATIENT
Start: 2025-04-01

## 2025-04-08 ENCOUNTER — INFUSION (OUTPATIENT)
Dept: ONCOLOGY | Facility: HOSPITAL | Age: 70
End: 2025-04-08
Payer: MEDICARE

## 2025-04-08 VITALS
DIASTOLIC BLOOD PRESSURE: 80 MMHG | HEART RATE: 97 BPM | SYSTOLIC BLOOD PRESSURE: 116 MMHG | RESPIRATION RATE: 20 BRPM | WEIGHT: 94.8 LBS | TEMPERATURE: 97.8 F | OXYGEN SATURATION: 94 % | BODY MASS INDEX: 16.8 KG/M2

## 2025-04-08 DIAGNOSIS — Z45.2 FITTING AND ADJUSTMENT OF VASCULAR CATHETER: Primary | ICD-10-CM

## 2025-04-08 DIAGNOSIS — C80.1 SMALL CELL CARCINOMA: ICD-10-CM

## 2025-04-08 DIAGNOSIS — Z79.69 NEED FOR PROPHYLACTIC CHEMOTHERAPY: ICD-10-CM

## 2025-04-08 LAB
ALBUMIN SERPL-MCNC: 3.2 G/DL (ref 3.5–5.2)
ALBUMIN/GLOB SERPL: 1.1 G/DL
ALP SERPL-CCNC: 115 U/L (ref 39–117)
ALT SERPL W P-5'-P-CCNC: <5 U/L (ref 1–33)
ANION GAP SERPL CALCULATED.3IONS-SCNC: 11.2 MMOL/L (ref 5–15)
AST SERPL-CCNC: 15 U/L (ref 1–32)
BASOPHILS # BLD AUTO: 0.09 10*3/MM3 (ref 0–0.2)
BASOPHILS NFR BLD AUTO: 0.6 % (ref 0–1.5)
BILIRUB SERPL-MCNC: 0.2 MG/DL (ref 0–1.2)
BUN SERPL-MCNC: 6 MG/DL (ref 8–23)
BUN/CREAT SERPL: 6.4 (ref 7–25)
CALCIUM SPEC-SCNC: 9.4 MG/DL (ref 8.6–10.5)
CHLORIDE SERPL-SCNC: 97 MMOL/L (ref 98–107)
CO2 SERPL-SCNC: 26.8 MMOL/L (ref 22–29)
CREAT SERPL-MCNC: 0.94 MG/DL (ref 0.57–1)
DEPRECATED RDW RBC AUTO: 46.6 FL (ref 37–54)
EGFRCR SERPLBLD CKD-EPI 2021: 66.6 ML/MIN/1.73
EOSINOPHIL # BLD AUTO: 0.38 10*3/MM3 (ref 0–0.4)
EOSINOPHIL NFR BLD AUTO: 2.5 % (ref 0.3–6.2)
ERYTHROCYTE [DISTWIDTH] IN BLOOD BY AUTOMATED COUNT: 12.8 % (ref 12.3–15.4)
GLOBULIN UR ELPH-MCNC: 3 GM/DL
GLUCOSE SERPL-MCNC: 117 MG/DL (ref 65–99)
HCT VFR BLD AUTO: 44 % (ref 34–46.6)
HGB BLD-MCNC: 14.1 G/DL (ref 12–15.9)
IMM GRANULOCYTES # BLD AUTO: 0.05 10*3/MM3 (ref 0–0.05)
IMM GRANULOCYTES NFR BLD AUTO: 0.3 % (ref 0–0.5)
LYMPHOCYTES # BLD AUTO: 5.15 10*3/MM3 (ref 0.7–3.1)
LYMPHOCYTES NFR BLD AUTO: 34.3 % (ref 19.6–45.3)
MCH RBC QN AUTO: 31.8 PG (ref 26.6–33)
MCHC RBC AUTO-ENTMCNC: 32 G/DL (ref 31.5–35.7)
MCV RBC AUTO: 99.1 FL (ref 79–97)
MONOCYTES # BLD AUTO: 0.86 10*3/MM3 (ref 0.1–0.9)
MONOCYTES NFR BLD AUTO: 5.7 % (ref 5–12)
NEUTROPHILS NFR BLD AUTO: 56.6 % (ref 42.7–76)
NEUTROPHILS NFR BLD AUTO: 8.5 10*3/MM3 (ref 1.7–7)
NRBC BLD AUTO-RTO: 0 /100 WBC (ref 0–0.2)
PLATELET # BLD AUTO: 298 10*3/MM3 (ref 140–450)
PMV BLD AUTO: 9.6 FL (ref 6–12)
POTASSIUM SERPL-SCNC: 4.5 MMOL/L (ref 3.5–5.2)
PROT SERPL-MCNC: 6.2 G/DL (ref 6–8.5)
RBC # BLD AUTO: 4.44 10*6/MM3 (ref 3.77–5.28)
SODIUM SERPL-SCNC: 135 MMOL/L (ref 136–145)
WBC NRBC COR # BLD AUTO: 15.03 10*3/MM3 (ref 3.4–10.8)

## 2025-04-08 PROCEDURE — 80053 COMPREHEN METABOLIC PANEL: CPT | Performed by: INTERNAL MEDICINE

## 2025-04-08 PROCEDURE — 25010000002 HEPARIN LOCK FLUSH PER 10 UNITS: Performed by: INTERNAL MEDICINE

## 2025-04-08 PROCEDURE — 85025 COMPLETE CBC W/AUTO DIFF WBC: CPT | Performed by: INTERNAL MEDICINE

## 2025-04-08 PROCEDURE — 96413 CHEMO IV INFUSION 1 HR: CPT

## 2025-04-08 PROCEDURE — 25810000003 SODIUM CHLORIDE 0.9 % SOLUTION 250 ML FLEX CONT: Performed by: INTERNAL MEDICINE

## 2025-04-08 PROCEDURE — 25010000002 ATEZOLIZUMAB 1200 MG/20ML SOLUTION 20 ML VIAL: Performed by: INTERNAL MEDICINE

## 2025-04-08 RX ORDER — HEPARIN SODIUM (PORCINE) LOCK FLUSH IV SOLN 100 UNIT/ML 100 UNIT/ML
500 SOLUTION INTRAVENOUS AS NEEDED
Status: DISCONTINUED | OUTPATIENT
Start: 2025-04-08 | End: 2025-04-08 | Stop reason: HOSPADM

## 2025-04-08 RX ORDER — SODIUM CHLORIDE 0.9 % (FLUSH) 0.9 %
10 SYRINGE (ML) INJECTION AS NEEDED
Status: DISCONTINUED | OUTPATIENT
Start: 2025-04-08 | End: 2025-04-08 | Stop reason: HOSPADM

## 2025-04-08 RX ADMIN — ATEZOLIZUMAB 1200 MG: 1200 INJECTION, SOLUTION INTRAVENOUS at 14:53

## 2025-04-08 RX ADMIN — HEPARIN 500 UNITS: 100 SYRINGE at 15:27

## 2025-04-08 RX ADMIN — Medication 10 ML: at 15:26

## 2025-04-23 NOTE — PROGRESS NOTES
Subjective     REASON FOR CONSULTATION:  small cell lung cancer  Provide an opinion on any further workup or treatment                             REQUESTING PHYSICIAN:  James    RECORDS OBTAINED:  Records of the patients history including those obtained from the referring provider were reviewed and summarized in detail.    HISTORY OF PRESENT ILLNESS:  The patient is a 69 y.o. year old female who is here for an opinion about the above issue.    History of Present Illness   The patient initiated chemoimmunotherapy with carboplatin/etoposide/atezolizumab 3/19/2024 and completed 4 cycles of carboplatin/etoposide/atezolizumab and is now on maintenance atezolizumab.  The patient is doing about the same.  She is on continuous O2 now.  She stopped anticoagulation because of high cost (PCP prescribed for history of stroke).    Oncology History:  This is a 68-year-old woman with long history of tobacco use and COPD, degenerative disease of the spine on narcotic therapy, depression/anxiety, orthostatic hypotension, hyperlipidemia who has been followed with serial imaging for a left lower lobe lung nodule and mediastinal lymphadenopathy.  A CT of the chest 1/21/2023 showed a masslike consolidation in the left lower lobe 2.5 x 2.6 cm in size with a potential cavitary focus centrally surrounding haziness and otherwise groundglass opacities in the right middle lobe and right lower lobe and enlarged mediastinal lymph nodes up to 10 mm.  The findings were favored to be infectious or inflammatory.  A follow-up CT chest 7/28/2023 showed pleural-based area of density in the left upper lung 11 x 5 mm new from the previous exam and resolution of the consolidation in the left lower lobe.  A PET scan was performed 8/9/2023 which showed the 1.1 cm pleural-based density in the posterior left lower lobe to blend into dependent atelectasis but have more intense activity than the atelectasis with maximum SUV 2.8.  There was  hypermetabolic lymphadenopathy in the left hilum and left lower paratracheal regions for example lymph node posterior to the left pulmonary artery SUV 4.7 measuring 1.6 cm and another area of soft tissue lateral to the left mainstem bronchus SUV 4.4, ill-defined lymphadenopathy left lower paratracheal region SUV 3.7.  She underwent bronchoscopy with biopsies on 8/25/2023 with samples from stations 11 R, 4R, 7, 10 L, 11 L, 12 L all negative for malignancy; station 4R showed rare atypical reactive epithelial cells.  A follow-up CT of the chest on 2/6/2024 showed the left lower lobe nodule to be enlarging at 1.6 x 0.9 cm and enlarging precarinal lymphadenopathy concerning for metastatic disease.  The patient underwent repeat bronchoscopy with Ion navigation on 2/28/2024; biopsies from the left lower lobe nodule were positive for small cell carcinoma and a level 4 lymph node biopsied also positive for small cell carcinoma.    Full body PET scan on 3/7/2024 showed significantly avid bilateral hilar and mediastinal lymph nodes for example kylah conglom[employer] AP window 8.3 SUV measuring 2.5 x 1.8 cm, left hilar lymph node SUV 7 1.4 cm, pleural-based nodularity left major fissure newly hypermetabolic SUV 2.2, pleural-based partially cavitary mass left lower lobe SUV five 1.5 x 1 cm, new right lung nodule 8 mm in the right lower lobe suspicious.  MRI of the brain negative.    The patient has comorbidities of COPD but minimally symptomatic, no known cardiac disease, kidney disease, liver disease, diabetes etc.  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    The patient initiated chemoimmunotherapy with carboplatin/etoposide/atezolizumab 3/19/2024.  She completed 4 cycles of carboplatin/etoposide/atezolizumab on 5/23/2024.    A PET scan on 6/6/2024 showed a residual 6 mm pleural-based nodule left lower lobe to be photopenic, low-level subpleural activity adjacently and inferiorly SUV 1.9, resolution of left mediastinal and left hilar  lymphadenopathy, marked decrease mediastinal and left hilar lymph nodes have low-level activity SUV 2.6, no new hypermetabolic pulmonary opacities, no hypermetabolic activity in the abdomen, pelvis or bones.    CT chest abdomen pelvis 2024-subpleural parenchymal scarring posterior left lower lobe.  No residual pulmonary nodule, stable subcentimeter low-density mass right lobe of the liver favored to be a cyst or hemangioma, stable sclerotic lesion right iliac bone.  MRI brain-old bilateral basal ganglia and occipital infarcts, no metastatic disease.    Past Medical History:   Diagnosis Date    COPD (chronic obstructive pulmonary disease)     COPD with acute exacerbation 2020    Small cell carcinoma 3/13/2024        Past Surgical History:   Procedure Laterality Date    BRONCHOSCOPY N/A 2023    Procedure: BRONCHOSCOPY WITH ENDOBRONCHIAL ULTRASOUND (EBUS) with FNA;  Surgeon: Coy Mccarthy MD;  [facility] END[facility];  Service: Pulmonary;  Laterality: N/A;  Pre/Post - mediastinal and hilar lymphadenopathy.    BRONCHOSCOPY WITH ION ROBOTIC ASSIST N/A 2024    Procedure: BRONCHOSCOPY WITH ION ROBOT,  ENDOBRONCHIAL ULTRASOUND, FINE NEEDLE ASPIRATIONS,  CRYOTHERAPY BIOPSIES, AND LEFT LOWER LOBE BRONCHOALVEOLAR LAVAGE.;  Surgeon: Alexia Velásquez MD;  Location: [facility] ENDOSCOPY;  Service: Robotics - Pulmonary;  Laterality: N/A;     SECTION      CHEST TUBE INSERTION Left 2024    Procedure: CHEST TUBE INSERTION;  Surgeon: Alexia Velásquez MD;  Location: [facility] ENDOSCOPY;  Service: Thoracic;  Laterality: Left;    CHOLECYSTECTOMY      COLONOSCOPY      ECTOPIC PREGNANCY SURGERY      HYSTERECTOMY      TONSILLECTOMY      TOTAL HIP ARTHROPLASTY Left     VENOUS ACCESS DEVICE (PORT) INSERTION N/A 3/14/2024    Procedure: INSERTION VENOUS ACCESS DEVICE;  Surgeon: Jonas Garner MD;  Location: [facility] OR;  Service: General;  Laterality: N/A;        Current Outpatient Medications on File  Prior to Visit   Medication Sig Dispense Refill    albuterol sulfate  (90 Base) MCG/ACT inhaler Inhale 2 puffs Every 4 (Four) Hours As Needed for Wheezing. Takes as needed.      budesonide-formoterol (SYMBICORT) 160-4.5 MCG/ACT inhaler Inhale 2 puffs 2 (Two) Times a Day.  12    calcium carbonate (TUMS) 500 MG chewable tablet Chew 1 tablet 2 (Two) Times a Day.      Cyanocobalamin (Vitamin B-12) 500 MCG sublingual tablet PLACE 1 TABLET UNDER THE TONGUE DAILY 90 tablet 1    Denosumab (PROLIA SC) Inject  under the skin into the appropriate area as directed Every 6 (Six) Months.      diazePAM (VALIUM) 10 MG tablet Take 1 tablet by mouth 2 (Two) Times a Day As Needed for Anxiety (RLS.). Takes 20 mg at bedtime at times when she needs.      diphenoxylate-atropine (LOMOTIL) 2.5-0.025 MG per tablet Take 1 tablet by mouth 4 (Four) Times a Day As Needed for Diarrhea. 15 tablet 0    DULoxetine (CYMBALTA) 30 MG capsule Take 2 capsules by mouth Daily. 30 capsule 2    folic acid (FOLVITE) 800 MCG tablet TAKE 1 TABLET BY MOUTH DAILY 90 tablet 1    HYDROcodone-acetaminophen (NORCO)  MG per tablet Take 1 tablet by mouth 3 (Three) Times a Day As Needed for Moderate Pain.      lactobacillus acidophilus (RISAQUAD) capsule capsule Take 1 capsule by mouth 2 (Two) Times a Day. 60 capsule 0    levETIRAcetam (KEPPRA) 500 MG tablet Take 1 tablet by mouth Every 12 (Twelve) Hours. 180 tablet 0    midodrine (PROAMATINE) 2.5 MG tablet Take 1 tablet by mouth 3 (Three) Times a Day Before Meals.      multivitamin with minerals (PRESERVISION AREDS PO) Take 1 tablet by mouth 2 (Two) Times a Day.      naloxone (NARCAN) 4 MG/0.1ML nasal spray Administer 1 spray into the nostril(s) as directed by provider As Needed.      OLANZapine (zyPREXA) 5 MG tablet Take 1 tablet by mouth Every Night. 30 tablet 3    ondansetron (ZOFRAN) 8 MG tablet Take 1 tablet by mouth Every 8 (Eight) Hours As Needed for Nausea or Vomiting. 30 tablet 1    pantoprazole  (PROTONIX) 40 MG EC tablet Take 1 tablet by mouth Daily. 30 tablet 3    potassium chloride (Klor-Con M20) 20 MEQ CR tablet Take 1 tablet by mouth 2 (Two) Times a Day. 60 tablet 3    predniSONE (DELTASONE) 20 MG tablet Take 0.5 tablets by mouth Daily. 30 tablet 0    sucralfate (CARAFATE) 1 g tablet TAKE 1 TABLET BY MOUTH 4 TIMES A  tablet 3    apixaban (ELIQUIS) 5 MG tablet tablet Take 1 tablet by mouth Every 12 (Twelve) Hours. Indications: Other - full anticoagulation (Patient not taking: Reported on 4/29/2025) 180 tablet 0    rosuvastatin (Crestor) 10 MG tablet Take 1 tablet by mouth Every Night. (Patient not taking: Reported on 4/29/2025) 30 tablet 11     No current facility-administered medications on file prior to visit.        ALLERGIES:    Allergies   Allergen Reactions    Codeine GI Intolerance    Percocet [Oxycodone-Acetaminophen] Hives        Social History     Socioeconomic History    Marital status:    Tobacco Use    Smoking status: Every Day     Current packs/day: 1.00     Average packs/day: 1 pack/day for 30.0 years (30.0 ttl pk-yrs)     Types: Cigarettes     Passive exposure: Current    Smokeless tobacco: Never    Tobacco comments:     Began smoking at age 11.  Smoked .5 ppd for 6 years, 2.5 ppd for a year, 2 ppd for 5 years, and 1 ppd for the past 43 years for a 58.5 pack year history.   Vaping Use    Vaping status: Former    Substances: Nicotine, Flavoring    Devices: Disposable   Substance and Sexual Activity    Alcohol use: Yes     Comment: once a year     Drug use: No    Sexual activity: Defer        Family History   Problem Relation Age of Onset    Lung cancer Niece 50    Throat cancer Father     Breast cancer Neg Hx         Review of Systems   Constitutional:  Positive for activity change, appetite change and fatigue. Negative for unexpected weight change.   HENT: Negative.     Eyes: Negative.    Respiratory:  Positive for shortness of breath.    Cardiovascular: Negative.   "  Gastrointestinal: Negative.    Musculoskeletal:  Positive for back pain.   Neurological:  Negative for light-headedness.   Hematological: Negative.    Psychiatric/Behavioral:  Positive for dysphoric mood. The patient is nervous/anxious.        Objective     Vitals:    04/29/25 1319   BP: 100/74   Pulse: 92   Resp: 16   Temp: 97.4 °F (36.3 °C)   TempSrc: Infrared   SpO2: 96%  Comment: oxygen on 2   Weight: 42.9 kg (94 lb 9.6 oz)   Height: 160 cm (62.99\")   PainSc: 0-No pain                   4/29/2025     1:29 PM   Current Status   ECOG score 0       Physical Exam    CONSTITUTIONAL: pleasant chronic ill-appearing woman   HEENT: no icterus, no thrush, moist membranes, anisocoria  LYMPH: no cervical or supraclavicular lad  CV: RRR, S1S2, no murmur  RESP: bilateral wheezing, O2 by nasal cannula 1 L  GI: soft, non-tender, no splenomegaly, +bs  MUSC: no edema, normal gait  NEURO: alert and oriented x3, normal strength  PSYCH: Dysphoric mood and flat affect  Skin: No rash or induration noted   Unchanged-4/29/25    RECENT LABS:  Hematology WBC   Date Value Ref Range Status   04/29/2025 12.46 (H) 3.40 - 10.80 10*3/mm3 Final     RBC   Date Value Ref Range Status   04/29/2025 4.39 3.77 - 5.28 10*6/mm3 Final     Hemoglobin   Date Value Ref Range Status   04/29/2025 14.0 12.0 - 15.9 g/dL Final     Hematocrit   Date Value Ref Range Status   04/29/2025 43.1 34.0 - 46.6 % Final     Platelets   Date Value Ref Range Status   04/29/2025 289 140 - 450 10*3/mm3 Final        Lab Results   Component Value Date    GLUCOSE 113 (H) 04/29/2025    BUN 9 04/29/2025    CREATININE 1.12 (H) 04/29/2025    EGFR 54.0 (L) 04/29/2025    BCR 8.0 04/29/2025    K 4.0 04/29/2025    CO2 29.9 (H) 04/29/2025    CALCIUM 9.3 04/29/2025    ALBUMIN 3.5 04/29/2025    BILITOT 0.2 04/29/2025    AST 24 04/29/2025    ALT <5 04/29/2025     NM PET/CT Skull Base to Mid Thigh (06/06/2024 11:52 AM)   FINDINGS: Mediastinal blood pool has a maximal SUV of 2.0, previously " 2.3.  1. The residual 6 mm pleural-based nodule at the left lower lobe is photopenic. There is low-level subpleural activity adjacently and inferiorly with an SUV of 1.9, likely representing radiation pneumonitis. There has been resolution of left mediastinal and left hilar lymphadenopathy. Interval marked decrease in the size of mediastinal and left hilar nodes and the remaining nodes have low-level activity, maximal SUV of 2.6. There are no new hypermetabolic pulmonary opacities.  2. There is no suspicious hypermetabolic activity at the supraclavicular regions or neck.  3. There is no suspicious hypermetabolic activity within the abdomen or pelvis.  4. There is no suspicious bone activity.    MRI Brain With & Without Contrast (09/17/2024 10:49 AM)  Impression:  1. Changes of old bilateral basal ganglia and occipital lobe infarcts.  2. No acute intracranial abnormality.  3. No pathologic contrast enhancement to suggest intracranial metastatic disease.    CT Abdomen Pelvis With Contrast (09/17/2024 11:34 AM)  Impression:  1. Stable exam with subcentimeter low-density mass in the posterior right lobe of the liver favored to be a cyst or hemangioma.  2. Colonic diverticulosis. No evidence of diverticulitis.  3. Stable sclerotic density within the right iliac bone favored to be a bone island. No suspicious lytic or sclerotic bony lesions are seen.    CT Chest With Contrast Diagnostic (09/17/2024 11:34 AM)  Impression:  1. Persistent subpleural parenchymal scarring within the posterior left lower lobe at site of previously demonstrated hypermetabolic pulmonary nodule. No residual pulmonary nodule identified. No evidence of metastatic disease elsewhere within the chest.    DEXA Bone Density Axial (11/15/2024 11:11)   Impression:  Osteopenia. Based upon the National Osteoporosis [employer] Guide, patient's FRAX score does meet criteria for pharmacologic treatment to prevent Osteoporosis.  Individual treatment decisions  should be made based upon each patient's full clinical history   and risk factors.    CT Chest Without Contrast Diagnostic (12/14/2024 17:20)  Impression:  1. Mild new patchy tree-in-bud groundglass opacities involving the anterior left upper lobe which may relate to atypical/viral pneumonia versus nonspecific inflammatory process.  2. Emphysema. No new solid pulmonary nodule.  3. Suspected pancreatitis involving pancreatic tail with wall thickening of the proximal jejunum near the duodenal jejunal junction which may relate to nonspecific enteritis, better assessed on prior abdominal CT.  4. Additional chronic findings above.    CT Abdomen Pelvis With Contrast (12/14/2024 15:13)  Impression:  1. Mild acute interstitial pancreatitis involving the pancreatic tail. No biliary dilatation.  2. Colonic diverticulosis without diverticulitis.  3. Prior cholecystectomy and hysterectomy.  4. Additional chronic findings above.    CT Chest Without Contrast Diagnostic (03/11/2025 09:28)  CT Abdomen Pelvis With Contrast (03/11/2025 09:28)  Impression:  1.No evidence of recurrent disease in the chest.  Previously seen patchy airspace opacities in the left upper lobe have resolved.  2.Poor distribution of intravenous contrast on CT abdomen pelvis, potentially a component of heart dysfunction. Exam is essentially a CT abdomen/pelvis without contrast.  3.No evidence of metastatic disease in the chest, abdomen, or pelvis.  4.There is peripancreatic fat stranding about the pancreatic head. Correlate with serum amylase/lipase.  5.Chronic and incidental findings as noted above.    MRI Brain With & Without Contrast (03/11/2025 08:51)    Assessment & Plan     *Small cell lung cancer left lower lobe of the lung with mediastinal involvement, contralateral lung nodule suspicious for metastatic disease/extensive stage  CT chest 2/22/2024-enlarged f4L lymph nodes 2.5 cm, enlarged AP window lymph node 1.4 cm, poorly defined left hilar  lymphadenopathy, left lower lobe pulmonary nodule 1.7 cm  Bronchoscopy with Ion navigation performed 2/28/2024-pathology positive for small cell carcinoma from the left lower lobe and station 4L  PET scan on 3/7/2024 showed significantly avid bilateral hilar and mediastinal lymph nodes for example kylah [employer] AP window 8.3 SUV measuring 2.5 x 1.8 cm, left hilar lymph node SUV 7 1.4 cm, pleural-based nodularity left major fissure newly hypermetabolic SUV 2.2, pleural-based partially cavitary mass left lower lobe SUV five 1.5 x 1 cm, new right lung nodule 8 mm in the right lower lobe suspicious.-Results discussed with Dr. Velásquez and we both feel the contralateral right lung nodule is highly suspicious for metastatic disease  MRI of the brain negative.  3/19/2024.initiated chemoimmunotherapy with carboplatin/etoposide/atezolizumab; completed 4 cycles of carboplatin/etoposide/atezolizumab 5/23/2024 6/6/2024 PET scan: showed a residual 6 mm pleural-based nodule left lower lobe to be photopenic, low-level subpleural activity adjacently and inferiorly SUV 1.9, resolution of left mediastinal and left hilar lymphadenopathy, marked decrease mediastinal and left hilar lymph nodes have low-level activity SUV 2.6, no new hypermetabolic pulmonary opacities, no hypermetabolic activity in the abdomen, pelvis or bones  CT chest abdomen pelvis 9/17/2024-subpleural parenchymal scarring posterior left lower lobe.  No residual pulmonary nodule, stable subcentimeter low-density mass right lobe of the liver favored to be a cyst or hemangioma, stable sclerotic lesion right iliac bone.  MRI brain-old bilateral basal ganglia and occipital infarcts, no metastatic disease.  12/14/2024 CT chest abdomen pelvis-no evidence of disease progression  CT chest abdomen pelvis and MRI brain 3/11/2025-no evidence of disease progression  *anemia/thrombocytopenia secondary to chemotherapy  Hemoglobin improved 14.0, platelets normal  *Fatigue-normal  TSH free T3 free T4 cortisol and ACTH   *Depression/Zyprexa 5 mg nightly/Cymbalta 30 mg daily  *Recent pneumonia now with chronic hypoxic respiratory failure on O2  *Comorbidities of tobacco abuse/COPD, anxiety, atherosclerotic calcifications by CT but no definitive diagnosis of CAD, degenerative disease of the spine, hyperlipidemia; no known autoimmune disorders, chronic pain on opioid medicines    Oncology plan/recommendations:  Continue atezolizumab every 3 weeks  Continue Cymbalta to 60 mg daily and Zyprexa 5 mg nightly  Continue prednisone 10 mg daily  6-week MD visit with lab and atezo restaging CT chest abdomen pelvis

## 2025-04-28 ENCOUNTER — OFFICE VISIT (OUTPATIENT)
Dept: NEUROLOGY | Facility: CLINIC | Age: 70
End: 2025-04-28
Payer: MEDICARE

## 2025-04-28 VITALS
HEIGHT: 63 IN | DIASTOLIC BLOOD PRESSURE: 64 MMHG | WEIGHT: 93 LBS | HEART RATE: 90 BPM | BODY MASS INDEX: 16.48 KG/M2 | SYSTOLIC BLOOD PRESSURE: 98 MMHG | OXYGEN SATURATION: 100 %

## 2025-04-28 DIAGNOSIS — F17.210 SMOKING GREATER THAN 40 PACK YEARS: ICD-10-CM

## 2025-04-28 DIAGNOSIS — R56.9 SEIZURE-LIKE ACTIVITY: ICD-10-CM

## 2025-04-28 DIAGNOSIS — E53.8 VITAMIN B12 DEFICIENCY: ICD-10-CM

## 2025-04-28 DIAGNOSIS — C80.1 SMALL CELL CARCINOMA: ICD-10-CM

## 2025-04-28 DIAGNOSIS — Z86.73 HISTORY OF STROKE: Primary | ICD-10-CM

## 2025-04-28 DIAGNOSIS — I65.22 CAROTID STENOSIS, LEFT: ICD-10-CM

## 2025-04-28 PROCEDURE — 1160F RVW MEDS BY RX/DR IN RCRD: CPT | Performed by: NURSE PRACTITIONER

## 2025-04-28 PROCEDURE — 1159F MED LIST DOCD IN RCRD: CPT | Performed by: NURSE PRACTITIONER

## 2025-04-28 PROCEDURE — 99214 OFFICE O/P EST MOD 30 MIN: CPT | Performed by: NURSE PRACTITIONER

## 2025-04-28 RX ORDER — LEVETIRACETAM 500 MG/1
500 TABLET ORAL EVERY 12 HOURS
Qty: 180 TABLET | Refills: 0 | Status: SHIPPED | OUTPATIENT
Start: 2025-04-28

## 2025-04-28 NOTE — PROGRESS NOTES
Patient or patient representative verbalized consent for the use of Ambient Listening during the visit with  MITESH Vee for chart documentation. 4/28/2025  11:27 EDT      Notes by LPN:  Patient presents for 6 mo follow up. Not taking Crestor. Caused muscle aches. On O2 24/7 now. No seizure like activity.       History of Present Illness  The patient is a 69-year-old female with known recurrent small cell carcinoma, status post chemotherapy, who has a Mediport for receiving infusions and is followed by hematology/oncology, Dr. Burns, and COPD. She is being seen today in follow-up for chronic strokes.    She was recently admitted for chronic strokes, hypoxemia, hyponatremia, metabolic encephalopathy, and a witnessed seizure in the setting of hypotension. There is a known history of bilateral basal ganglia and occipital lobe strokes, believed to be secondary to the hypercoagulable state of malignancy. Since the last visit, Crestor was discontinued due to myalgias. She is now on chronic oxygen 24/7 and remains on Keppra 500 mg twice daily for witnessed seizure-like activity. Smoking cessation has been advised, but she continues to smoke. The initial visit was in 10/2024, primarily for cataract surgery clearance. Hematology/oncology recommended a Lovenox bridge of 1 mg/kg twice daily instead of discontinuing Eliquis for 14 days.    She has non-small cell carcinoma and continues to receive infusions every 3 weeks as part of her cancer treatment.    A recent hospitalization at [facility] was reported due to dehydration, with no associated infection recalled. Continuous oxygen therapy is now required, previously only needed at night. She is on 2 L oxygen at home without increasing it at night.    Eliquis was discontinued due to financial constraints, and the possibility of Coumadin has not yet been discussed with Dr. Burns. Lovenox injections were not received prior to cataract surgery, and blood thinners have  been absent for approximately 2 weeks.    No seizure-like activity is reported, and she believes Keppra is not currently being taken, having run out of the medication about a week ago.    A carotid ultrasound was performed on 11/06/2024, but there is no consultation with a vascular specialist. Mobility aids such as a cane or walker are not used, and driving has not been recent but is planned to resume. Primary care is provided by Dr. Eloy Jimenez, who conducts blood work every 3 weeks.    A good appetite is reported, but there has been a weight loss of 6 pounds over the past month. A recent checkup with the ophthalmologist following cataract surgery showed improved vision, with a recommendation to return in a year.    INTERVAL: Since last visit, Crestor has been discontinued due to myalgias. Continuous oxygen therapy is now required, and Keppra has not been taken for about a week.    SOCIAL HISTORY  Tobacco: Continues to smoke, approximately a pack a day.      MEDICATIONS  CURRENT MEDS:  Keppra 500 mg Oral Twice daily  Oxygen 2 L 24/7  PREVIOUS MEDS:  Crestor  Reason for Discontinuation: Myalgias  Eliquis  End Date: 04/14/2025  Reason for Discontinuation: Cost ($407 per prescription)  Levetiracetam 500 mg Oral Twice daily  End Date: 04/21/2025  Reason for Discontinuation: Ran out of medication    Past Medical History:   Diagnosis Date    COPD (chronic obstructive pulmonary disease)     COPD with acute exacerbation 09/28/2020    Small cell carcinoma 3/13/2024         Past Surgical History:   Procedure Laterality Date    BRONCHOSCOPY N/A 8/25/2023    Procedure: BRONCHOSCOPY WITH ENDOBRONCHIAL ULTRASOUND (EBUS) with FNA;  Surgeon: Coy Mccarthy MD;  Location: [facility] ENDOSCOPY;  Service: Pulmonary;  Laterality: N/A;  Pre/Post - mediastinal and hilar lymphadenopathy.    BRONCHOSCOPY WITH ION ROBOTIC ASSIST N/A 2/28/2024    Procedure: BRONCHOSCOPY WITH ION ROBOT,  ENDOBRONCHIAL ULTRASOUND, FINE NEEDLE ASPIRATIONS,   CRYOTHERAPY BIOPSIES, AND LEFT LOWER LOBE BRONCHOALVEOLAR LAVAGE.;  Surgeon: Alexia Velásquez MD;  Location: [facility] ENDOSCOPY;  Service: Robotics - Pulmonary;  Laterality: N/A;     SECTION      CHEST TUBE INSERTION Left 2024    Procedure: CHEST TUBE INSERTION;  Surgeon: Alexia Velásquez MD;  Location: [facility] ENDOSCOPY;  Service: Thoracic;  Laterality: Left;    CHOLECYSTECTOMY      COLONOSCOPY      ECTOPIC PREGNANCY SURGERY      HYSTERECTOMY      TONSILLECTOMY      TOTAL HIP ARTHROPLASTY Left     VENOUS ACCESS DEVICE (PORT) INSERTION N/A 3/14/2024    Procedure: INSERTION VENOUS ACCESS DEVICE;  Surgeon: Jonas Garner MD;  Location: [facility] OR;  Service: General;  Laterality: N/A;           Current Outpatient Medications:     levETIRAcetam (KEPPRA) 500 MG tablet, Take 1 tablet by mouth Every 12 (Twelve) Hours., Disp: 180 tablet, Rfl: 0    albuterol sulfate  (90 Base) MCG/ACT inhaler, Inhale 2 puffs Every 4 (Four) Hours As Needed for Wheezing. Takes as needed., Disp: , Rfl:     apixaban (ELIQUIS) 5 MG tablet tablet, Take 1 tablet by mouth Every 12 (Twelve) Hours. Indications: Other - full anticoagulation, Disp: 180 tablet, Rfl: 0    budesonide-formoterol (SYMBICORT) 160-4.5 MCG/ACT inhaler, Inhale 2 puffs 2 (Two) Times a Day., Disp: , Rfl: 12    calcium carbonate (TUMS) 500 MG chewable tablet, Chew 1 tablet 2 (Two) Times a Day., Disp: , Rfl:     Cyanocobalamin (Vitamin B-12) 500 MCG sublingual tablet, PLACE 1 TABLET UNDER THE TONGUE DAILY, Disp: 90 tablet, Rfl: 1    Denosumab (PROLIA SC), Inject  under the skin into the appropriate area as directed Every 6 (Six) Months., Disp: , Rfl:     diazePAM (VALIUM) 10 MG tablet, Take 1 tablet by mouth 2 (Two) Times a Day As Needed for Anxiety (RLS.). Takes 20 mg at bedtime at times when she needs., Disp: , Rfl:     diphenoxylate-atropine (LOMOTIL) 2.5-0.025 MG per tablet, Take 1 tablet by mouth 4 (Four) Times a Day As Needed for Diarrhea., Disp: 15  tablet, Rfl: 0    DULoxetine (CYMBALTA) 30 MG capsule, Take 2 capsules by mouth Daily., Disp: 30 capsule, Rfl: 2    folic acid (FOLVITE) 800 MCG tablet, TAKE 1 TABLET BY MOUTH DAILY, Disp: 90 tablet, Rfl: 1    HYDROcodone-acetaminophen (NORCO)  MG per tablet, Take 1 tablet by mouth 3 (Three) Times a Day As Needed for Moderate Pain., Disp: , Rfl:     lactobacillus acidophilus (RISAQUAD) capsule capsule, Take 1 capsule by mouth 2 (Two) Times a Day., Disp: 60 capsule, Rfl: 0    midodrine (PROAMATINE) 2.5 MG tablet, Take 1 tablet by mouth 3 (Three) Times a Day Before Meals., Disp: , Rfl:     multivitamin with minerals (PRESERVISION AREDS PO), Take 1 tablet by mouth 2 (Two) Times a Day., Disp: , Rfl:     naloxone (NARCAN) 4 MG/0.1ML nasal spray, Administer 1 spray into the nostril(s) as directed by provider As Needed., Disp: , Rfl:     OLANZapine (zyPREXA) 5 MG tablet, Take 1 tablet by mouth Every Night., Disp: 30 tablet, Rfl: 3    ondansetron (ZOFRAN) 8 MG tablet, Take 1 tablet by mouth Every 8 (Eight) Hours As Needed for Nausea or Vomiting., Disp: 30 tablet, Rfl: 1    pantoprazole (PROTONIX) 40 MG EC tablet, Take 1 tablet by mouth Daily., Disp: 30 tablet, Rfl: 3    potassium chloride (Klor-Con M20) 20 MEQ CR tablet, Take 1 tablet by mouth 2 (Two) Times a Day., Disp: 60 tablet, Rfl: 3    predniSONE (DELTASONE) 20 MG tablet, Take 0.5 tablets by mouth Daily., Disp: 30 tablet, Rfl: 0    rosuvastatin (Crestor) 10 MG tablet, Take 1 tablet by mouth Every Night. (Patient not taking: Reported on 4/28/2025), Disp: 30 tablet, Rfl: 11    sucralfate (CARAFATE) 1 g tablet, TAKE 1 TABLET BY MOUTH 4 TIMES A DAY, Disp: 120 tablet, Rfl: 3      Family History   Problem Relation Age of Onset    Lung cancer Niece 50    Throat cancer Father     Breast cancer Neg Hx          Social History     Socioeconomic History    Marital status:    Tobacco Use    Smoking status: Every Day     Current packs/day: 1.00     Average packs/day:  "1 pack/day for 30.0 years (30.0 ttl pk-yrs)     Types: Cigarettes     Passive exposure: Current    Smokeless tobacco: Never    Tobacco comments:     Began smoking at age 11.  Smoked .5 ppd for 6 years, 2.5 ppd for a year, 2 ppd for 5 years, and 1 ppd for the past 43 years for a 58.5 pack year history.   Vaping Use    Vaping status: Former    Substances: Nicotine, Flavoring    Devices: Disposable   Substance and Sexual Activity    Alcohol use: Yes     Comment: once a year     Drug use: No    Sexual activity: Defer         Allergies   Allergen Reactions    Codeine GI Intolerance    Percocet [Oxycodone-Acetaminophen] Hives       ROS:  Review of Systems   Musculoskeletal:  Negative for gait problem.   Neurological:  Negative for dizziness, tremors, seizures, syncope, facial asymmetry, speech difficulty, weakness, light-headedness, numbness and headaches.   Psychiatric/Behavioral:  Negative for agitation, behavioral problems, confusion, decreased concentration, dysphoric mood, hallucinations, self-injury, sleep disturbance and suicidal ideas. The patient is not nervous/anxious and is not hyperactive.            Physical Exam:  Vitals:    04/28/25 1044   BP: 98/64   BP Location: Right arm   Patient Position: Sitting   Cuff Size: Adult   Pulse: 90   SpO2: 100%   Weight: 42.2 kg (93 lb)   Height: 160 cm (62.99\")     Body mass index is 16.48 kg/m².      Physical Exam  General: Thin/frail. BMI 16.48  Head: Head is erect and midline: skull is normocephalic, symmetrical and smooth without deformity. Facial features are symmetrical.   Neck:  Trachea is midline; no JVD.   Eyes: conjunctivae pink; sclerae white; PERRL. No tearing noted. Pupils constricted 4mm to 2mm   Ears:  Normal position.  Chest:  The trachea is midline. The chest is normal in appearance. The chest is clear to percussion and auscultation.  Heart:  HR 90 beats per minute, S1 and S2 noted with regular rhythm. BP 98/64. No abnormal jugular venous distention. No " rubs, murmurs, or gallops noted upon auscultation.   Musculoskeletal:  The extremities are normal in color, size, and temperature. All pulses in the upper and lower extremities are grade II and equal. No clubbing, cyanosis, or edema is present.   Neurological:  Alert, relaxed, cooperative. Thought process coherent. Oriented to person, place and time. CN II- XII intact. Good muscle bulk and tone. Strength at least 4+/5 throughout. Cerebellar: Rapid alternating movements:finger-to-nose intact. Gait with some mild imbalance; unsteady. Sensory: light touch intact.       Other spoken exam findings.   Cranial Nerve Examination    CN II: Left pupil 5 mm, right pupil 2 mm.    CN III IV VI: Anisocoria noted, left pupil larger than right.    Motor Examination    Strength: Strength in upper and lower extremities normal.      Results  Imaging   - Carotid ultrasound: 11/06/2024, Left side was 50 to 69% stenosis and the right showed no significant stenosis      Lab Results   Component Value Date    GLUCOSE 117 (H) 04/08/2025    BUN 6 (L) 04/08/2025    CREATININE 0.94 04/08/2025    EGFRIFNONA 70 09/27/2020    BCR 6.4 (L) 04/08/2025    CO2 26.8 04/08/2025    CALCIUM 9.4 04/08/2025    ALBUMIN 3.2 (L) 04/08/2025    AST 15 04/08/2025    ALT <5 04/08/2025       Lab Results   Component Value Date    WBC 15.03 (H) 04/08/2025    HGB 14.1 04/08/2025    HCT 44.0 04/08/2025    MCV 99.1 (H) 04/08/2025     04/08/2025         Lab Results   Component Value Date    TSH 5.240 (H) 03/18/2025         Lab Results   Component Value Date    JBBHXYSE62 275 05/21/2024         Lab Results   Component Value Date    FOLATE 5.28 05/21/2024         Lab Results   Component Value Date    HGBA1C 5.30 04/11/2024         Assessment & Plan  Small cell carcinoma.  She is status post chemotherapy and has a Mediport for infusions. She is followed by hematology/oncology, Dr. Burns. She continues to smoke despite smoking cessation being advised. She is currently  on chronic oxygen 24/7 and receives infusions every 3 weeks to prevent recurrence. She is advised to continue her current treatment regimen and follow up with Dr. Burns as scheduled.    2. Chronic obstructive pulmonary disease (COPD).  She is on chronic oxygen 24/7. She continues to smoke approximately a pack a day. Smoking cessation has been strongly recommended. She is advised to gradually reduce her cigarette intake, aiming for a reduction of one cigarette per day.    3. Chronic strokes.  She has a history of bilateral basal ganglia and occipital lobe strokes secondary to a hypercoagulable state from malignancy. She was recently admitted for chronic strokes, hypoxemia, hyponatremia, metabolic encephalopathy, and a witnessed seizure in the setting of hypotension. She is currently not on any anticoagulant due to the cost of Eliquis. A discussion with Dr. Burns will be initiated to consider starting Coumadin. She is advised to avoid dehydration and maintain adequate fluid intake to prevent hypotension.    4. Seizure disorder.  She had a witnessed seizure and was on Keppra 500 mg twice daily but has been off it for about a week. A prescription for Keppra will be sent to her pharmacy. She is advised not to drive for 90 days due to being off her seizure medication.    5. Hyperlipidemia.  She discontinued Crestor due to myalgias. Repatha will be considered as an alternative to lower her cholesterol and prevent further stenosis and strokes.    6. Anisocoria.  This is a chronic finding with the left pupil being larger than the right. No new symptoms have been reported. She is advised to continue regular follow-ups with her eye doctor.    7. Weight loss.  She has lost approximately 6 pounds over the past month. This will be discussed with Dr. Burns and her primary care physician.    Follow-up  The patient will follow up in 3 months.      Plan:   Would advise against driving for 90 days as patient has not been on Keppra  called [facility] pharmacy last prescription for Keppra filled 11/24 for 90 days  Would recommend beginning an anticoagulant possibly Coumadin given costs restrictions with Eliquis last filled 11/22/2024 patient reports she has been off medication for several weeks that she received a 90-day supply on this date-will discuss with heme-onc Dr. Burns today-patient has planned appointment tomorrow  Patient reportedly on aspirin 81 mg daily  Avoid hypotension/dehydration  O2 continuous  Consider Repatha or similar agent due to prior statin intolerance  Follow-up with vascular 11/2025 per their last recommendation  Smoking cessation advised  Follow-up with me in 3 months; sooner if needed    Diagnoses and all orders for this visit:    1. History of stroke (Primary)    2. Seizure-like activity  -     levETIRAcetam (KEPPRA) 500 MG tablet; Take 1 tablet by mouth Every 12 (Twelve) Hours.  Dispense: 180 tablet; Refill: 0    3. Carotid stenosis, left    4. Small cell carcinoma    5. Vitamin B12 deficiency    6. Smoking greater than 40 pack years                                      Dictated utilizing Dragon dictation.

## 2025-04-29 ENCOUNTER — INFUSION (OUTPATIENT)
Dept: ONCOLOGY | Facility: HOSPITAL | Age: 70
End: 2025-04-29
Payer: MEDICARE

## 2025-04-29 ENCOUNTER — OFFICE VISIT (OUTPATIENT)
Dept: ONCOLOGY | Facility: CLINIC | Age: 70
End: 2025-04-29
Payer: MEDICARE

## 2025-04-29 VITALS
HEIGHT: 63 IN | BODY MASS INDEX: 16.76 KG/M2 | HEART RATE: 92 BPM | OXYGEN SATURATION: 96 % | WEIGHT: 94.6 LBS | RESPIRATION RATE: 16 BRPM | DIASTOLIC BLOOD PRESSURE: 74 MMHG | TEMPERATURE: 97.4 F | SYSTOLIC BLOOD PRESSURE: 100 MMHG

## 2025-04-29 DIAGNOSIS — Z45.2 FITTING AND ADJUSTMENT OF VASCULAR CATHETER: ICD-10-CM

## 2025-04-29 DIAGNOSIS — Z79.69 NEED FOR PROPHYLACTIC CHEMOTHERAPY: ICD-10-CM

## 2025-04-29 DIAGNOSIS — Z45.2 FITTING AND ADJUSTMENT OF VASCULAR CATHETER: Primary | ICD-10-CM

## 2025-04-29 DIAGNOSIS — C80.1 SMALL CELL CARCINOMA: ICD-10-CM

## 2025-04-29 LAB
ALBUMIN SERPL-MCNC: 3.5 G/DL (ref 3.5–5.2)
ALBUMIN/GLOB SERPL: 1.3 G/DL
ALP SERPL-CCNC: 118 U/L (ref 39–117)
ALT SERPL W P-5'-P-CCNC: <5 U/L (ref 1–33)
ANION GAP SERPL CALCULATED.3IONS-SCNC: 10.1 MMOL/L (ref 5–15)
AST SERPL-CCNC: 24 U/L (ref 1–32)
BASOPHILS # BLD AUTO: 0.04 10*3/MM3 (ref 0–0.2)
BASOPHILS NFR BLD AUTO: 0.3 % (ref 0–1.5)
BILIRUB SERPL-MCNC: 0.2 MG/DL (ref 0–1.2)
BUN SERPL-MCNC: 9 MG/DL (ref 8–23)
BUN/CREAT SERPL: 8 (ref 7–25)
CALCIUM SPEC-SCNC: 9.3 MG/DL (ref 8.6–10.5)
CHLORIDE SERPL-SCNC: 96 MMOL/L (ref 98–107)
CO2 SERPL-SCNC: 29.9 MMOL/L (ref 22–29)
CREAT SERPL-MCNC: 1.12 MG/DL (ref 0.57–1)
DEPRECATED RDW RBC AUTO: 44.9 FL (ref 37–54)
EGFRCR SERPLBLD CKD-EPI 2021: 54 ML/MIN/1.73
EOSINOPHIL # BLD AUTO: 0.3 10*3/MM3 (ref 0–0.4)
EOSINOPHIL NFR BLD AUTO: 2.4 % (ref 0.3–6.2)
ERYTHROCYTE [DISTWIDTH] IN BLOOD BY AUTOMATED COUNT: 12.1 % (ref 12.3–15.4)
GLOBULIN UR ELPH-MCNC: 2.8 GM/DL
GLUCOSE SERPL-MCNC: 113 MG/DL (ref 65–99)
HCT VFR BLD AUTO: 43.1 % (ref 34–46.6)
HGB BLD-MCNC: 14 G/DL (ref 12–15.9)
IMM GRANULOCYTES # BLD AUTO: 0.01 10*3/MM3 (ref 0–0.05)
IMM GRANULOCYTES NFR BLD AUTO: 0.1 % (ref 0–0.5)
LYMPHOCYTES # BLD AUTO: 4.72 10*3/MM3 (ref 0.7–3.1)
LYMPHOCYTES NFR BLD AUTO: 37.9 % (ref 19.6–45.3)
MCH RBC QN AUTO: 31.9 PG (ref 26.6–33)
MCHC RBC AUTO-ENTMCNC: 32.5 G/DL (ref 31.5–35.7)
MCV RBC AUTO: 98.2 FL (ref 79–97)
MONOCYTES # BLD AUTO: 0.82 10*3/MM3 (ref 0.1–0.9)
MONOCYTES NFR BLD AUTO: 6.6 % (ref 5–12)
NEUTROPHILS NFR BLD AUTO: 52.7 % (ref 42.7–76)
NEUTROPHILS NFR BLD AUTO: 6.57 10*3/MM3 (ref 1.7–7)
PLATELET # BLD AUTO: 289 10*3/MM3 (ref 140–450)
PMV BLD AUTO: 9.8 FL (ref 6–12)
POTASSIUM SERPL-SCNC: 4 MMOL/L (ref 3.5–5.2)
PROT SERPL-MCNC: 6.3 G/DL (ref 6–8.5)
RBC # BLD AUTO: 4.39 10*6/MM3 (ref 3.77–5.28)
SODIUM SERPL-SCNC: 136 MMOL/L (ref 136–145)
WBC NRBC COR # BLD AUTO: 12.46 10*3/MM3 (ref 3.4–10.8)

## 2025-04-29 PROCEDURE — 80053 COMPREHEN METABOLIC PANEL: CPT | Performed by: INTERNAL MEDICINE

## 2025-04-29 PROCEDURE — 96413 CHEMO IV INFUSION 1 HR: CPT

## 2025-04-29 PROCEDURE — 85025 COMPLETE CBC W/AUTO DIFF WBC: CPT | Performed by: INTERNAL MEDICINE

## 2025-04-29 PROCEDURE — 25010000002 HEPARIN LOCK FLUSH PER 10 UNITS: Performed by: INTERNAL MEDICINE

## 2025-04-29 PROCEDURE — 25010000002 ATEZOLIZUMAB 1200 MG/20ML SOLUTION 20 ML VIAL: Performed by: INTERNAL MEDICINE

## 2025-04-29 PROCEDURE — 25810000003 SODIUM CHLORIDE 0.9 % SOLUTION 250 ML FLEX CONT: Performed by: INTERNAL MEDICINE

## 2025-04-29 RX ORDER — SODIUM CHLORIDE 9 MG/ML
20 INJECTION, SOLUTION INTRAVENOUS ONCE
Status: CANCELLED | OUTPATIENT
Start: 2025-04-29

## 2025-04-29 RX ORDER — SODIUM CHLORIDE 0.9 % (FLUSH) 0.9 %
10 SYRINGE (ML) INJECTION AS NEEDED
Status: DISCONTINUED | OUTPATIENT
Start: 2025-04-29 | End: 2025-04-29 | Stop reason: HOSPADM

## 2025-04-29 RX ORDER — SODIUM CHLORIDE 0.9 % (FLUSH) 0.9 %
10 SYRINGE (ML) INJECTION AS NEEDED
OUTPATIENT
Start: 2025-04-29

## 2025-04-29 RX ORDER — HEPARIN SODIUM (PORCINE) LOCK FLUSH IV SOLN 100 UNIT/ML 100 UNIT/ML
500 SOLUTION INTRAVENOUS AS NEEDED
Status: DISCONTINUED | OUTPATIENT
Start: 2025-04-29 | End: 2025-04-29 | Stop reason: HOSPADM

## 2025-04-29 RX ORDER — SODIUM CHLORIDE 9 MG/ML
20 INJECTION, SOLUTION INTRAVENOUS ONCE
OUTPATIENT
Start: 2025-05-20

## 2025-04-29 RX ORDER — HEPARIN SODIUM (PORCINE) LOCK FLUSH IV SOLN 100 UNIT/ML 100 UNIT/ML
500 SOLUTION INTRAVENOUS AS NEEDED
OUTPATIENT
Start: 2025-04-29

## 2025-04-29 RX ADMIN — HEPARIN 500 UNITS: 100 SYRINGE at 15:42

## 2025-04-29 RX ADMIN — Medication 10 ML: at 15:42

## 2025-04-29 RX ADMIN — ATEZOLIZUMAB 1200 MG: 1200 INJECTION, SOLUTION INTRAVENOUS at 15:05

## 2025-05-16 ENCOUNTER — TELEPHONE (OUTPATIENT)
Dept: ONCOLOGY | Facility: CLINIC | Age: 70
End: 2025-05-16
Payer: COMMERCIAL

## 2025-05-16 NOTE — TELEPHONE ENCOUNTER
Returned call and advised that we do not advise atezo be administered at home. Pharmacist I spoke with updated note in their system to reflect this.

## 2025-05-16 NOTE — TELEPHONE ENCOUNTER
Caller: HUMANJOAQUIM    Relationship:     Best call back number: [phone]    Do you know the name of the person who called: BG    What was the call regarding: CALLING TO SEE IF PATIENT IS A GOOD CANDIDATE  FOR THE HOME INFUSION TECENTRIQ?    CALL TO ADVISE

## 2025-05-20 ENCOUNTER — INFUSION (OUTPATIENT)
Dept: ONCOLOGY | Facility: HOSPITAL | Age: 70
End: 2025-05-20
Payer: MEDICARE

## 2025-05-20 VITALS
DIASTOLIC BLOOD PRESSURE: 71 MMHG | TEMPERATURE: 97.8 F | WEIGHT: 94.4 LBS | BODY MASS INDEX: 16.73 KG/M2 | OXYGEN SATURATION: 99 % | SYSTOLIC BLOOD PRESSURE: 103 MMHG | HEART RATE: 72 BPM

## 2025-05-20 DIAGNOSIS — Z45.2 FITTING AND ADJUSTMENT OF VASCULAR CATHETER: Primary | ICD-10-CM

## 2025-05-20 DIAGNOSIS — Z79.69 NEED FOR PROPHYLACTIC CHEMOTHERAPY: ICD-10-CM

## 2025-05-20 DIAGNOSIS — C80.1 SMALL CELL CARCINOMA: ICD-10-CM

## 2025-05-20 LAB
ALBUMIN SERPL-MCNC: 3.4 G/DL (ref 3.5–5.2)
ALBUMIN/GLOB SERPL: 1.3 G/DL
ALP SERPL-CCNC: 99 U/L (ref 39–117)
ALT SERPL W P-5'-P-CCNC: <5 U/L (ref 1–33)
ANION GAP SERPL CALCULATED.3IONS-SCNC: 8.3 MMOL/L (ref 5–15)
AST SERPL-CCNC: 17 U/L (ref 1–32)
BASOPHILS # BLD AUTO: 0.04 10*3/MM3 (ref 0–0.2)
BASOPHILS NFR BLD AUTO: 0.3 % (ref 0–1.5)
BILIRUB SERPL-MCNC: 0.2 MG/DL (ref 0–1.2)
BUN SERPL-MCNC: 8 MG/DL (ref 8–23)
BUN/CREAT SERPL: 8.2 (ref 7–25)
CALCIUM SPEC-SCNC: 9.3 MG/DL (ref 8.6–10.5)
CHLORIDE SERPL-SCNC: 94 MMOL/L (ref 98–107)
CO2 SERPL-SCNC: 29.7 MMOL/L (ref 22–29)
CREAT SERPL-MCNC: 0.97 MG/DL (ref 0.57–1)
DEPRECATED RDW RBC AUTO: 45.2 FL (ref 37–54)
EGFRCR SERPLBLD CKD-EPI 2021: 64.2 ML/MIN/1.73
EOSINOPHIL # BLD AUTO: 0.34 10*3/MM3 (ref 0–0.4)
EOSINOPHIL NFR BLD AUTO: 2.9 % (ref 0.3–6.2)
ERYTHROCYTE [DISTWIDTH] IN BLOOD BY AUTOMATED COUNT: 12.5 % (ref 12.3–15.4)
GLOBULIN UR ELPH-MCNC: 2.7 GM/DL
GLUCOSE SERPL-MCNC: 102 MG/DL (ref 65–99)
HCT VFR BLD AUTO: 40.8 % (ref 34–46.6)
HGB BLD-MCNC: 13.2 G/DL (ref 12–15.9)
IMM GRANULOCYTES # BLD AUTO: 0.01 10*3/MM3 (ref 0–0.05)
IMM GRANULOCYTES NFR BLD AUTO: 0.1 % (ref 0–0.5)
LYMPHOCYTES # BLD AUTO: 4.12 10*3/MM3 (ref 0.7–3.1)
LYMPHOCYTES NFR BLD AUTO: 34.9 % (ref 19.6–45.3)
MCH RBC QN AUTO: 31.6 PG (ref 26.6–33)
MCHC RBC AUTO-ENTMCNC: 32.4 G/DL (ref 31.5–35.7)
MCV RBC AUTO: 97.6 FL (ref 79–97)
MONOCYTES # BLD AUTO: 0.8 10*3/MM3 (ref 0.1–0.9)
MONOCYTES NFR BLD AUTO: 6.8 % (ref 5–12)
NEUTROPHILS NFR BLD AUTO: 55 % (ref 42.7–76)
NEUTROPHILS NFR BLD AUTO: 6.51 10*3/MM3 (ref 1.7–7)
PLATELET # BLD AUTO: 297 10*3/MM3 (ref 140–450)
PMV BLD AUTO: 9.3 FL (ref 6–12)
POTASSIUM SERPL-SCNC: 3.9 MMOL/L (ref 3.5–5.2)
PROT SERPL-MCNC: 6.1 G/DL (ref 6–8.5)
RBC # BLD AUTO: 4.18 10*6/MM3 (ref 3.77–5.28)
SODIUM SERPL-SCNC: 132 MMOL/L (ref 136–145)
WBC NRBC COR # BLD AUTO: 11.82 10*3/MM3 (ref 3.4–10.8)

## 2025-05-20 PROCEDURE — 96413 CHEMO IV INFUSION 1 HR: CPT

## 2025-05-20 PROCEDURE — 25010000002 ATEZOLIZUMAB 1200 MG/20ML SOLUTION 20 ML VIAL: Performed by: INTERNAL MEDICINE

## 2025-05-20 PROCEDURE — 25810000003 SODIUM CHLORIDE 0.9 % SOLUTION 250 ML FLEX CONT: Performed by: INTERNAL MEDICINE

## 2025-05-20 PROCEDURE — 85025 COMPLETE CBC W/AUTO DIFF WBC: CPT | Performed by: INTERNAL MEDICINE

## 2025-05-20 PROCEDURE — 25010000002 HEPARIN LOCK FLUSH PER 10 UNITS: Performed by: INTERNAL MEDICINE

## 2025-05-20 PROCEDURE — 80053 COMPREHEN METABOLIC PANEL: CPT | Performed by: INTERNAL MEDICINE

## 2025-05-20 RX ORDER — HEPARIN SODIUM (PORCINE) LOCK FLUSH IV SOLN 100 UNIT/ML 100 UNIT/ML
500 SOLUTION INTRAVENOUS AS NEEDED
Status: DISCONTINUED | OUTPATIENT
Start: 2025-05-20 | End: 2025-05-20 | Stop reason: HOSPADM

## 2025-05-20 RX ORDER — HEPARIN SODIUM (PORCINE) LOCK FLUSH IV SOLN 100 UNIT/ML 100 UNIT/ML
500 SOLUTION INTRAVENOUS AS NEEDED
OUTPATIENT
Start: 2025-05-20

## 2025-05-20 RX ORDER — SODIUM CHLORIDE 0.9 % (FLUSH) 0.9 %
10 SYRINGE (ML) INJECTION AS NEEDED
OUTPATIENT
Start: 2025-05-20

## 2025-05-20 RX ORDER — SODIUM CHLORIDE 0.9 % (FLUSH) 0.9 %
10 SYRINGE (ML) INJECTION AS NEEDED
Status: DISCONTINUED | OUTPATIENT
Start: 2025-05-20 | End: 2025-05-20 | Stop reason: HOSPADM

## 2025-05-20 RX ADMIN — HEPARIN 500 UNITS: 100 SYRINGE at 15:40

## 2025-05-20 RX ADMIN — ATEZOLIZUMAB 1200 MG: 1200 INJECTION, SOLUTION INTRAVENOUS at 15:05

## 2025-05-20 RX ADMIN — Medication 10 ML: at 15:40

## 2025-06-03 ENCOUNTER — INFUSION (OUTPATIENT)
Dept: ONCOLOGY | Facility: HOSPITAL | Age: 70
End: 2025-06-03
Payer: MEDICARE

## 2025-06-03 ENCOUNTER — HOSPITAL ENCOUNTER (OUTPATIENT)
Dept: CT IMAGING | Facility: HOSPITAL | Age: 70
Discharge: HOME | End: 2025-06-03
Payer: MEDICARE

## 2025-06-03 DIAGNOSIS — Z45.2 FITTING AND ADJUSTMENT OF VASCULAR CATHETER: Primary | ICD-10-CM

## 2025-06-03 DIAGNOSIS — C80.1 SMALL CELL CARCINOMA: ICD-10-CM

## 2025-06-03 PROCEDURE — 25010000002 HEPARIN LOCK FLUSH PER 10 UNITS: Performed by: INTERNAL MEDICINE

## 2025-06-03 PROCEDURE — 25510000001 IOPAMIDOL PER 1 ML: Performed by: INTERNAL MEDICINE

## 2025-06-03 PROCEDURE — 74177 CT ABD & PELVIS W/CONTRAST: CPT

## 2025-06-03 PROCEDURE — 71260 CT THORAX DX C+: CPT

## 2025-06-03 RX ORDER — IOPAMIDOL 755 MG/ML
100 INJECTION, SOLUTION INTRAVASCULAR
Status: COMPLETED | OUTPATIENT
Start: 2025-06-03 | End: 2025-06-03

## 2025-06-03 RX ORDER — SODIUM CHLORIDE 0.9 % (FLUSH) 0.9 %
10 SYRINGE (ML) INJECTION AS NEEDED
OUTPATIENT
Start: 2025-06-03

## 2025-06-03 RX ORDER — SODIUM CHLORIDE 0.9 % (FLUSH) 0.9 %
10 SYRINGE (ML) INJECTION AS NEEDED
Status: DISCONTINUED | OUTPATIENT
Start: 2025-06-03 | End: 2025-06-03 | Stop reason: HOSPADM

## 2025-06-03 RX ORDER — HEPARIN SODIUM (PORCINE) LOCK FLUSH IV SOLN 100 UNIT/ML 100 UNIT/ML
500 SOLUTION INTRAVENOUS AS NEEDED
OUTPATIENT
Start: 2025-06-03

## 2025-06-03 RX ORDER — HEPARIN SODIUM (PORCINE) LOCK FLUSH IV SOLN 100 UNIT/ML 100 UNIT/ML
500 SOLUTION INTRAVENOUS AS NEEDED
Status: DISCONTINUED | OUTPATIENT
Start: 2025-06-03 | End: 2025-06-03 | Stop reason: HOSPADM

## 2025-06-03 RX ADMIN — IOPAMIDOL 100 ML: 755 INJECTION, SOLUTION INTRAVENOUS at 14:20

## 2025-06-03 RX ADMIN — HEPARIN 500 UNITS: 100 SYRINGE at 14:12

## 2025-06-03 RX ADMIN — Medication 10 ML: at 14:12

## 2025-06-10 ENCOUNTER — APPOINTMENT (OUTPATIENT)
Dept: ONCOLOGY | Facility: HOSPITAL | Age: 70
End: 2025-06-10
Payer: MEDICARE

## 2025-06-10 ENCOUNTER — OFFICE VISIT (OUTPATIENT)
Dept: ONCOLOGY | Facility: CLINIC | Age: 70
End: 2025-06-10
Payer: COMMERCIAL

## 2025-06-10 ENCOUNTER — INFUSION (OUTPATIENT)
Dept: ONCOLOGY | Facility: HOSPITAL | Age: 70
End: 2025-06-10
Payer: MEDICARE

## 2025-06-10 VITALS
HEIGHT: 63 IN | HEART RATE: 84 BPM | RESPIRATION RATE: 16 BRPM | SYSTOLIC BLOOD PRESSURE: 103 MMHG | TEMPERATURE: 98.7 F | OXYGEN SATURATION: 93 % | BODY MASS INDEX: 16.73 KG/M2 | DIASTOLIC BLOOD PRESSURE: 70 MMHG | WEIGHT: 94.4 LBS

## 2025-06-10 DIAGNOSIS — Z79.69 NEED FOR PROPHYLACTIC CHEMOTHERAPY: ICD-10-CM

## 2025-06-10 DIAGNOSIS — C80.1 SMALL CELL CARCINOMA: ICD-10-CM

## 2025-06-10 DIAGNOSIS — Z45.2 FITTING AND ADJUSTMENT OF VASCULAR CATHETER: Primary | ICD-10-CM

## 2025-06-10 DIAGNOSIS — C34.32 SMALL CELL CARCINOMA OF LOWER LOBE OF LEFT LUNG: Primary | ICD-10-CM

## 2025-06-10 LAB
ALBUMIN SERPL-MCNC: 3.4 G/DL (ref 3.5–5.2)
ALBUMIN/GLOB SERPL: 1.2 G/DL
ALP SERPL-CCNC: 101 U/L (ref 39–117)
ALT SERPL W P-5'-P-CCNC: <5 U/L (ref 1–33)
ANION GAP SERPL CALCULATED.3IONS-SCNC: 13.2 MMOL/L (ref 5–15)
AST SERPL-CCNC: 19 U/L (ref 1–32)
BASOPHILS # BLD AUTO: 0.04 10*3/MM3 (ref 0–0.2)
BASOPHILS NFR BLD AUTO: 0.3 % (ref 0–1.5)
BILIRUB SERPL-MCNC: 0.2 MG/DL (ref 0–1.2)
BUN SERPL-MCNC: 5.6 MG/DL (ref 8–23)
BUN/CREAT SERPL: 5.6 (ref 7–25)
CALCIUM SPEC-SCNC: 9.2 MG/DL (ref 8.6–10.5)
CHLORIDE SERPL-SCNC: 94 MMOL/L (ref 98–107)
CO2 SERPL-SCNC: 27.8 MMOL/L (ref 22–29)
CREAT SERPL-MCNC: 1 MG/DL (ref 0.57–1)
DEPRECATED RDW RBC AUTO: 46.1 FL (ref 37–54)
EGFRCR SERPLBLD CKD-EPI 2021: 61.9 ML/MIN/1.73
EOSINOPHIL # BLD AUTO: 0.33 10*3/MM3 (ref 0–0.4)
EOSINOPHIL NFR BLD AUTO: 2.5 % (ref 0.3–6.2)
ERYTHROCYTE [DISTWIDTH] IN BLOOD BY AUTOMATED COUNT: 12.7 % (ref 12.3–15.4)
GLOBULIN UR ELPH-MCNC: 2.8 GM/DL
GLUCOSE SERPL-MCNC: 143 MG/DL (ref 65–99)
HCT VFR BLD AUTO: 42.3 % (ref 34–46.6)
HGB BLD-MCNC: 13.7 G/DL (ref 12–15.9)
IMM GRANULOCYTES # BLD AUTO: 0.02 10*3/MM3 (ref 0–0.05)
IMM GRANULOCYTES NFR BLD AUTO: 0.2 % (ref 0–0.5)
LYMPHOCYTES # BLD AUTO: 4.78 10*3/MM3 (ref 0.7–3.1)
LYMPHOCYTES NFR BLD AUTO: 36.7 % (ref 19.6–45.3)
MCH RBC QN AUTO: 31.6 PG (ref 26.6–33)
MCHC RBC AUTO-ENTMCNC: 32.4 G/DL (ref 31.5–35.7)
MCV RBC AUTO: 97.5 FL (ref 79–97)
MONOCYTES # BLD AUTO: 0.83 10*3/MM3 (ref 0.1–0.9)
MONOCYTES NFR BLD AUTO: 6.4 % (ref 5–12)
NEUTROPHILS NFR BLD AUTO: 53.9 % (ref 42.7–76)
NEUTROPHILS NFR BLD AUTO: 7.01 10*3/MM3 (ref 1.7–7)
PLATELET # BLD AUTO: 285 10*3/MM3 (ref 140–450)
PMV BLD AUTO: 9.5 FL (ref 6–12)
POTASSIUM SERPL-SCNC: 3.9 MMOL/L (ref 3.5–5.2)
PROT SERPL-MCNC: 6.2 G/DL (ref 6–8.5)
RBC # BLD AUTO: 4.34 10*6/MM3 (ref 3.77–5.28)
SODIUM SERPL-SCNC: 135 MMOL/L (ref 136–145)
T4 FREE SERPL-MCNC: 0.99 NG/DL (ref 0.92–1.68)
TSH SERPL DL<=0.05 MIU/L-ACNC: 5.52 UIU/ML (ref 0.27–4.2)
WBC NRBC COR # BLD AUTO: 13.01 10*3/MM3 (ref 3.4–10.8)

## 2025-06-10 PROCEDURE — 84443 ASSAY THYROID STIM HORMONE: CPT | Performed by: INTERNAL MEDICINE

## 2025-06-10 PROCEDURE — 80053 COMPREHEN METABOLIC PANEL: CPT | Performed by: INTERNAL MEDICINE

## 2025-06-10 PROCEDURE — 85025 COMPLETE CBC W/AUTO DIFF WBC: CPT | Performed by: INTERNAL MEDICINE

## 2025-06-10 PROCEDURE — 25010000002 HEPARIN LOCK FLUSH PER 10 UNITS: Performed by: INTERNAL MEDICINE

## 2025-06-10 PROCEDURE — 99215 OFFICE O/P EST HI 40 MIN: CPT | Performed by: NURSE PRACTITIONER

## 2025-06-10 PROCEDURE — G2212 PROLONG OUTPT/OFFICE VIS: HCPCS | Performed by: NURSE PRACTITIONER

## 2025-06-10 PROCEDURE — 84439 ASSAY OF FREE THYROXINE: CPT | Performed by: INTERNAL MEDICINE

## 2025-06-10 PROCEDURE — 36591 DRAW BLOOD OFF VENOUS DEVICE: CPT

## 2025-06-10 RX ORDER — SODIUM CHLORIDE 0.9 % (FLUSH) 0.9 %
10 SYRINGE (ML) INJECTION AS NEEDED
Status: ACTIVE | OUTPATIENT
Start: 2025-06-10

## 2025-06-10 RX ORDER — HEPARIN SODIUM (PORCINE) LOCK FLUSH IV SOLN 100 UNIT/ML 100 UNIT/ML
500 SOLUTION INTRAVENOUS AS NEEDED
Status: DISPENSED | OUTPATIENT
Start: 2025-06-10

## 2025-06-10 RX ORDER — PREDNISONE 10 MG/1
10 TABLET ORAL DAILY
Qty: 30 TABLET | Refills: 0 | Status: SHIPPED | OUTPATIENT
Start: 2025-06-10

## 2025-06-10 RX ORDER — SODIUM CHLORIDE 0.9 % (FLUSH) 0.9 %
10 SYRINGE (ML) INJECTION AS NEEDED
OUTPATIENT
Start: 2025-06-10

## 2025-06-10 RX ORDER — HEPARIN SODIUM (PORCINE) LOCK FLUSH IV SOLN 100 UNIT/ML 100 UNIT/ML
500 SOLUTION INTRAVENOUS AS NEEDED
Status: CANCELLED | OUTPATIENT
Start: 2025-06-10

## 2025-06-10 RX ORDER — ONDANSETRON 8 MG/1
8 TABLET, FILM COATED ORAL 3 TIMES DAILY PRN
Qty: 30 TABLET | Refills: 5 | Status: SHIPPED | OUTPATIENT
Start: 2025-06-10

## 2025-06-10 RX ADMIN — HEPARIN 500 UNITS: 100 SYRINGE at 14:48

## 2025-06-10 RX ADMIN — Medication 10 ML: at 14:48

## 2025-06-10 NOTE — PROGRESS NOTES
TREATMENT  PREPARATION    Pili Arechiga  [id number]  1955    Chief Complaint: Treatment preparation and needs assessment    History of present illness:  Pili Arechiga is a 69 y.o. year old female who is here today for treatment preparation and needs assessment.  The patient has been diagnosed with   Encounter Diagnoses   Name Primary?    Small cell carcinoma     Small cell carcinoma of lower lobe of left lung Yes    and is scheduled to begin treatment with:     Oncology History:    Oncology/Hematology History   Small cell carcinoma of lower lobe of left lung   3/13/2024 Initial Diagnosis    Small cell carcinoma     3/19/2024 - 5/20/2025 Chemotherapy    OP LUNG Atezolizumab / CARBOplatin AUC=5 / Etoposide (SCLC)     6/17/2025 -  Chemotherapy    OP LUNG Lurbinectedin          The current medication list and allergy list were reviewed and reconciled.     Past Medical History, Past Surgical History, Social History, Family History have been reviewed and are without significant changes except as mentioned.    Physical Exam:    Vitals:    06/10/25 1312   BP: 103/70   Pulse: 84   Resp: 16   Temp: 98.7 °F (37.1 °C)   SpO2: 93%     Vitals:    06/10/25 1312   PainSc: 0-No pain        ECOG score: 0                   NEEDS ASSESSMENTS    Genetics  The patient's new diagnosis and family history have been reviewed for genetic counseling needs. The patient will not be referred..     Psychosocial and Barriers to care  The patient has completed a PHQ-9 Depression Screening and the Distress Thermometer (DT) today.  PHQ-9 results show PHQ-2 Total Score:   PHQ-9 Total Score:        The patient scored their distress today as Distress Level: 1 on a scale of 0-10 with 0 being no distress and 10 being extreme distress. Problems marked by the patient as being an issue for them within the last week include   .      Results were reviewed along with psychosocial resources offered by our cancer center.  Our Supportive Oncology team will be flagged  for a score of 4 or above, and a same day call will be made for a score of 9 or 10.  A mental health referral is offered at that time. Patients who score less than 4 have been educated on our support services and can be referred to our  upon request.  The patient will not be referred to our .     Nutrition  The patient has completed the malnutrition screening today. They scored Malnutrition Screening Tool  Have you recently lost weight without trying?  If yes, how much weight have you lost?: 0--> No  Have you been eating poorly because of a decreased appetite?: 0--> No  MST score: 0   with a score of 0-1 meaning not at risk in a score of 2 or greater meaning at risk.  Patients with a score of 3 or higher will be referred to our oncology dietitian for support. Patients beginning at risk treatment regimens or who have dietary concerns will also be referred to our oncology dietitian. The patient will not be referred.    Intravenous Access Assessment  The patient and I discussed planned intravenous chemo/biotherapy as well as other IV treatments that are often needed throughout the course of treatment. These may include, but are not limited to blood transfusions, antibiotics, and IV hydration. Discussed that depending on selected treatment and vein assessment, patient may require venous access device (VAD) which could include but not limited to a Mediport or PICC line. Risks and benefits of VADs reviewed. The patient will be treated via Port.    Reproductive/Sexual Activity   People should avoid becoming pregnant and should not get a partner pregnant while undergoing chemo/biotherapy.  People of childbearing age should use effective contraception during active therapy. The best recommendation for all people is to use a barrier method for a minimum of 1 week after the last infusion of chemo/biotherapy to prevent your partner being exposed to byproducts from treatment medications in bodily  "fluids. Effective contraception should be discussed with your oncology team to make sure it is safe to take based on your diagnosis. Possible options include oral contraceptives, barrier methods. Chemo/biotherapy can change your ability to reproduce children in the future.  There are options for fertility preservation. NOT APPLICABLE    Advanced Care Planning  Advance Care Planning   The patient and I discussed advanced care planning, \"Conversations that Matter\".   This service is offered for development of advance directives with a certified ACP facilitator.  The patient does  have an up-to-date advanced directive. This document is on file with our office. The patient is not interested in an appointment with one of our facilitators to create or update their advanced directives.    Have you reviewed your Advance Directive and is it valid for this stay?: Not applicable          Smoking cessation  Tobacco Use: High Risk (6/10/2025)    Patient History     Smoking Tobacco Use: Every Day     Smokeless Tobacco Use: Never     Passive Exposure: Current       Patient and I discussed their tobacco use history. Referral will not be made for smoking cessation.      Palliative Care  When appropriate, the patient and I discussed the availability palliative care services and when appropriate Hospice care. Palliative care is not the same as Hospice care which was explained to the patient.The patient is not interested in additional information from our  on these services.    Survivorship   When appropriate, we discussed that we will refer the patient to survivorship clinic to discuss next steps following completion of planned treatment.  Reviewed this visit will include assessment of your physical, psychological, functional, and spiritual needs as a survivor and the need at attend this visit when scheduled.    TREATMENT EDUCATION    Today I met with the patient to discuss the chemo/biotherapy regimen recommended for " treatment of Small cell carcinoma  - MRI Brain With & Without Contrast  - TSH  - T4, Free    Small cell carcinoma of lower lobe of left lung  - ondansetron (ZOFRAN) 8 MG tablet  .  The patient was given explanation of treatment premed side effects including office policy that prohibits patients to drive if sedating medications are administered, MD explanation given regarding benefits, side effects, toxicities and goals of treatment.  The patient received a Chemotherapy/Biotherapy Plan Summary including diagnosis and explanation of specific treatment plan.    SIDE EFFECTS:  Common side effects were discussed with the patient and/or significant other.  Discussion included where applicable hair loss/discoloration, anemia/fatigue, infection/chills/fever, appetite, bleeding risk/precautions, constipation, diarrhea, mouth sores, taste alteration, loss of appetite, nausea/vomiting, peripheral neuropathy, skin/nail changes, rash, muscle aches/weakness, photosensitivity, weight gain/loss, hearing loss, dizziness, menopausal symptoms, menstrual irregularity, sterility, high blood pressure, heart damage, liver damage, lung damage, kidney damage, DVT/PE risk, fluid retention, pleural/pericardial effusion, somnolence, electrolyte/LFT imbalance, vein exercises and/or the possible need for vascular access/port placement.  The patient was advised that although uncommon, leakage of an infused medication from the vein or venous access device may lead to skin breakdown and/or other tissue damage.  The patient was advised that he/she may have pain, bleeding, and/or bruising from the insertion of a needle in their vein or venous access device (port).  The patient was further advised that, in spite of proper technique, infection with redness and irritation may rarely occur at the site where the needle was inserted.  The patient was advised that if complications occur, additional medical treatment is available.  Finally, where applicable  we have reviewed rare but potential immune mediated side effects including shortness of breath, cough, chest pain (pneumonitis), abdominal pain, diarrhea (colitis), thyroiditis (hypothyroid or hyperthyroid), hepatitis and liver dysfunction, nephritis and renal dysfunction.    Discussion also included side effects specific to drugs in the treatment plan, specifically:    Treatment Plans       Name Type Plan Dates Plan Provider         Active    OP SUPPORTIVE Cyanocobalamin (Vitamin B12) Q21D ONCOLOGY SUPPORTIVE CARE 1 6/11/2024 - Present MITESH Serrano     OP LUNG Lurbinectedin  ONCOLOGY TREATMENT 6/15/2025 - Present Nikita Burns MD                      Questions answered and additional information discussed on topics including:  Anemia, Thrombocytopenia, Neutropenia, Nutrition and appetite changes, Constipation, Diarrhea, Nausea & vomiting, Pain, Skin & nail changes, and Organ toxicities       Assessment and Plan:    Diagnoses and all orders for this visit:    1. Small cell carcinoma of lower lobe of left lung (Primary)  -     ondansetron (ZOFRAN) 8 MG tablet; Take 1 tablet by mouth 3 (Three) Times a Day As Needed for Nausea or Vomiting.  Dispense: 30 tablet; Refill: 5    2. Small cell carcinoma  -     MRI Brain With & Without Contrast; Future  -     TSH; Future  -     T4, Free    Other orders  -     predniSONE (DELTASONE) 10 MG tablet; Take 1 tablet by mouth Daily.  Dispense: 30 tablet; Refill: 0      Orders Placed This Encounter   Procedures    MRI Brain With & Without Contrast     Standing Status:   Future     Expected Date:   6/15/2025     Expiration Date:   6/10/2026     Reason for Exam::   lung cancer, recent progression in lungs     Release to patient:   Routine Release [[id number]]    TSH     Standing Status:   Future     Number of Occurrences:   1     Expected Date:   6/15/2025     Expiration Date:   9/10/2026     Release to patient:   Routine Release [[id number]]    T4, Free     Release to  patient:   Routine Release [[id number]]         The patient and I have reviewed their diagnosis and scheduled treatment plan. Needs assessment was completed where applicable including genetics, psychosocial needs, barriers to care, VAD evaluation, advanced care planning, survivorship, and palliative care services where indicated. Referrals have been ordered as appropriate based upon evaluation today and patient desires.   Chemo/biotherapy teaching was completed today and consent obtained. See separate documentation for further details.  Adequate time was given to answer questions.  Patient made aware of their care team members and contact information if they have questions or problems throughout the treatment course.  Discussion held and written information provided describing frequency of office visits and ongoing monitoring throughout the treatment plan.     Reviewed with patient any prescribed medication sent to pharmacy.  Education provided regarding proper storage, safe handling, and proper disposal of unused medication.  Proper handling of body fluids and waste discussed and written information provided.  If appropriate, patient had pretreatment labs drawn today.    Learning assessment completed at initial patient encounter. See separate flowsheet. Chemo/biotherapy education comprehension assessed at today's visit.        MITESH Serrano   06/10/25

## 2025-06-10 NOTE — PROGRESS NOTES
Subjective     REASON FOR CONSULTATION:  small cell lung cancer  Provide an opinion on any further workup or treatment                             REQUESTING PHYSICIAN:  James    RECORDS OBTAINED:  Records of the patients history including those obtained from the referring provider were reviewed and summarized in detail.    HISTORY OF PRESENT ILLNESS:  The patient is a 69 y.o. year old female who is here for an opinion about the above issue.    History of Present Illness   The patient initiated chemoimmunotherapy with carboplatin/etoposide/atezolizumab 3/19/2024 and completed 4 cycles of carboplatin/etoposide/atezolizumab and is now on maintenance atezolizumab.  She is on continuous O2 now.  She stopped anticoagulation because of high cost (PCP prescribed for history of stroke).  She comes in today for scan review and possible continued atezolizumab.  She reports increased fatigue recently.  She denies any increased shortness of breath, new pain, or bowel changes.  She does report her appetite is down though her weight is stable.  She is unsure if she is taking the prednisone she previously was prescribed.  She does think she is continuing the olanzapine every night.    Oncology History:  This is a 68-year-old woman with long history of tobacco use and COPD, degenerative disease of the spine on narcotic therapy, depression/anxiety, orthostatic hypotension, hyperlipidemia who has been followed with serial imaging for a left lower lobe lung nodule and mediastinal lymphadenopathy.  A CT of the chest 1/21/2023 showed a masslike consolidation in the left lower lobe 2.5 x 2.6 cm in size with a potential cavitary focus centrally surrounding haziness and otherwise groundglass opacities in the right middle lobe and right lower lobe and enlarged mediastinal lymph nodes up to 10 mm.  The findings were favored to be infectious or inflammatory.  A follow-up CT chest 7/28/2023 showed pleural-based area of density in  the left upper lung 11 x 5 mm new from the previous exam and resolution of the consolidation in the left lower lobe.  A PET scan was performed 8/9/2023 which showed the 1.1 cm pleural-based density in the posterior left lower lobe to blend into dependent atelectasis but have more intense activity than the atelectasis with maximum SUV 2.8.  There was hypermetabolic lymphadenopathy in the left hilum and left lower paratracheal regions for example lymph node posterior to the left pulmonary artery SUV 4.7 measuring 1.6 cm and another area of soft tissue lateral to the left mainstem bronchus SUV 4.4, ill-defined lymphadenopathy left lower paratracheal region SUV 3.7.  She underwent bronchoscopy with biopsies on 8/25/2023 with samples from stations 11 R, 4R, 7, 10 L, 11 L, 12 L all negative for malignancy; station 4R showed rare atypical reactive epithelial cells.  A follow-up CT of the chest on 2/6/2024 showed the left lower lobe nodule to be enlarging at 1.6 x 0.9 cm and enlarging precarinal lymphadenopathy concerning for metastatic disease.  The patient underwent repeat bronchoscopy with Ion navigation on 2/28/2024; biopsies from the left lower lobe nodule were positive for small cell carcinoma and a level 4 lymph node biopsied also positive for small cell carcinoma.    Full body PET scan on 3/7/2024 showed significantly avid bilateral hilar and mediastinal lymph nodes for example kylah conglomerate AP window 8.3 SUV measuring 2.5 x 1.8 cm, left hilar lymph node SUV 7 1.4 cm, pleural-based nodularity left major fissure newly hypermetabolic SUV 2.2, pleural-based partially cavitary mass left lower lobe SUV five 1.5 x 1 cm, new right lung nodule 8 mm in the right lower lobe suspicious.  MRI of the brain negative.    The patient has comorbidities of COPD but minimally symptomatic, no known cardiac disease, kidney disease, liver disease, diabetes etc.  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    The patient initiated  chemoimmunotherapy with carboplatin/etoposide/atezolizumab 3/19/2024.  She completed 4 cycles of carboplatin/etoposide/atezolizumab on 2024.    A PET scan on 2024 showed a residual 6 mm pleural-based nodule left lower lobe to be photopenic, low-level subpleural activity adjacently and inferiorly SUV 1.9, resolution of left mediastinal and left hilar lymphadenopathy, marked decrease mediastinal and left hilar lymph nodes have low-level activity SUV 2.6, no new hypermetabolic pulmonary opacities, no hypermetabolic activity in the abdomen, pelvis or bones.    CT chest abdomen pelvis 2024-subpleural parenchymal scarring posterior left lower lobe.  No residual pulmonary nodule, stable subcentimeter low-density mass right lobe of the liver favored to be a cyst or hemangioma, stable sclerotic lesion right iliac bone.  MRI brain-old bilateral basal ganglia and occipital infarcts, no metastatic disease.    Past Medical History:   Diagnosis Date    COPD (chronic obstructive pulmonary disease)     COPD with acute exacerbation 2020    Small cell carcinoma 3/13/2024        Past Surgical History:   Procedure Laterality Date    BRONCHOSCOPY N/A 2023    Procedure: BRONCHOSCOPY WITH ENDOBRONCHIAL ULTRASOUND (EBUS) with FNA;  Surgeon: Coy Mccarthy MD;  Location: [facility] ENDOSCOPY;  Service: Pulmonary;  Laterality: N/A;  Pre/Post - mediastinal and hilar lymphadenopathy.    BRONCHOSCOPY WITH ION ROBOTIC ASSIST N/A 2024    Procedure: BRONCHOSCOPY WITH ION ROBOT,  ENDOBRONCHIAL ULTRASOUND, FINE NEEDLE ASPIRATIONS,  CRYOTHERAPY BIOPSIES, AND LEFT LOWER LOBE BRONCHOALVEOLAR LAVAGE.;  Surgeon: Alexia Velásquez MD;  Location: [facility] ENDOSCOPY;  Service: Robotics - Pulmonary;  Laterality: N/A;     SECTION      CHEST TUBE INSERTION Left 2024    Procedure: CHEST TUBE INSERTION;  Surgeon: Alexia Velásquez MD;  Location: [facility] ENDOSCOPY;  Service: Thoracic;  Laterality: Left;    CHOLECYSTECTOMY       COLONOSCOPY      ECTOPIC PREGNANCY SURGERY      HYSTERECTOMY      TONSILLECTOMY      TOTAL HIP ARTHROPLASTY Left     VENOUS ACCESS DEVICE (PORT) INSERTION N/A 3/14/2024    Procedure: INSERTION VENOUS ACCESS DEVICE;  Surgeon: Jonas Garner MD;  Location: [facility];  Service: General;  Laterality: N/A;        Current Outpatient Medications on File Prior to Visit   Medication Sig Dispense Refill    albuterol sulfate  (90 Base) MCG/ACT inhaler Inhale 2 puffs Every 4 (Four) Hours As Needed for Wheezing. Takes as needed.      apixaban (ELIQUIS) 5 MG tablet tablet Take 1 tablet by mouth Every 12 (Twelve) Hours. Indications: Other - full anticoagulation (Patient taking differently: Take 1 tablet by mouth Every 12 (Twelve) Hours.) 180 tablet 0    budesonide-formoterol (SYMBICORT) 160-4.5 MCG/ACT inhaler Inhale 2 puffs 2 (Two) Times a Day.  12    calcium carbonate (TUMS) 500 MG chewable tablet Chew 1 tablet 2 (Two) Times a Day.      Cyanocobalamin (Vitamin B-12) 500 MCG sublingual tablet PLACE 1 TABLET UNDER THE TONGUE DAILY 90 tablet 1    Denosumab (PROLIA SC) Inject  under the skin into the appropriate area as directed Every 6 (Six) Months.      diazePAM (VALIUM) 10 MG tablet Take 1 tablet by mouth 2 (Two) Times a Day As Needed for Anxiety (RLS.). Takes 20 mg at bedtime at times when she needs.      diphenoxylate-atropine (LOMOTIL) 2.5-0.025 MG per tablet Take 1 tablet by mouth 4 (Four) Times a Day As Needed for Diarrhea. 15 tablet 0    DULoxetine (CYMBALTA) 30 MG capsule Take 2 capsules by mouth Daily. 30 capsule 2    folic acid (FOLVITE) 800 MCG tablet TAKE 1 TABLET BY MOUTH DAILY 90 tablet 1    HYDROcodone-acetaminophen (NORCO)  MG per tablet Take 1 tablet by mouth 3 (Three) Times a Day As Needed for Moderate Pain.      lactobacillus acidophilus (RISAQUAD) capsule capsule Take 1 capsule by mouth 2 (Two) Times a Day. 60 capsule 0    levETIRAcetam (KEPPRA) 500 MG tablet Take 1 tablet by mouth  Every 12 (Twelve) Hours. 180 tablet 0    midodrine (PROAMATINE) 2.5 MG tablet Take 1 tablet by mouth 3 (Three) Times a Day Before Meals.      multivitamin with minerals (PRESERVISION AREDS PO) Take 1 tablet by mouth 2 (Two) Times a Day.      naloxone (NARCAN) 4 MG/0.1ML nasal spray Administer 1 spray into the nostril(s) as directed by provider As Needed.      OLANZapine (zyPREXA) 5 MG tablet Take 1 tablet by mouth Every Night. 30 tablet 3    ondansetron (ZOFRAN) 8 MG tablet Take 1 tablet by mouth Every 8 (Eight) Hours As Needed for Nausea or Vomiting. 30 tablet 1    pantoprazole (PROTONIX) 40 MG EC tablet Take 1 tablet by mouth Daily. 30 tablet 3    potassium chloride (Klor-Con M20) 20 MEQ CR tablet Take 1 tablet by mouth 2 (Two) Times a Day. 60 tablet 3    rosuvastatin (Crestor) 10 MG tablet Take 1 tablet by mouth Every Night. 30 tablet 11    sucralfate (CARAFATE) 1 g tablet TAKE 1 TABLET BY MOUTH 4 TIMES A  tablet 3    [DISCONTINUED] predniSONE (DELTASONE) 20 MG tablet Take 0.5 tablets by mouth Daily. 30 tablet 0     Current Facility-Administered Medications on File Prior to Visit   Medication Dose Route Frequency Provider Last Rate Last Admin    heparin injection 500 Units  500 Units Intravenous PRN Nikita Burns MD   500 Units at 06/10/25 1448    sodium chloride 0.9 % flush 10 mL  10 mL Intravenous PRN Nikita Burns MD   10 mL at 06/10/25 1448        ALLERGIES:    Allergies   Allergen Reactions    Codeine GI Intolerance    Percocet [Oxycodone-Acetaminophen] Hives        Social History     Socioeconomic History    Marital status:    Tobacco Use    Smoking status: Every Day     Current packs/day: 1.00     Average packs/day: 1 pack/day for 30.0 years (30.0 ttl pk-yrs)     Types: Cigarettes     Passive exposure: Current    Smokeless tobacco: Never    Tobacco comments:     Began smoking at age 11.  Smoked .5 ppd for 6 years, 2.5 ppd for a year, 2 ppd for 5 years, and 1 ppd for the past 43 years  "for a 58.5 pack year history.   Vaping Use    Vaping status: Former    Substances: Nicotine, Flavoring    Devices: Disposable   Substance and Sexual Activity    Alcohol use: Yes     Comment: once a year     Drug use: No    Sexual activity: Defer        Family History   Problem Relation Age of Onset    Lung cancer Niece 50    Throat cancer Father     Breast cancer Neg Hx         Review of Systems   Constitutional:  Positive for activity change, appetite change and fatigue. Negative for unexpected weight change.   HENT: Negative.     Eyes: Negative.    Respiratory:  Positive for shortness of breath.    Cardiovascular: Negative.    Gastrointestinal: Negative.    Musculoskeletal:  Positive for back pain.   Neurological:  Negative for light-headedness.   Hematological: Negative.    Psychiatric/Behavioral:  Positive for dysphoric mood. The patient is nervous/anxious.        Objective     Vitals:    06/10/25 1312   BP: 103/70   Pulse: 84   Resp: 16   Temp: 98.7 °F (37.1 °C)   TempSrc: Infrared   SpO2: 93%  Comment: oxygen on 2   Weight: 42.8 kg (94 lb 6.4 oz)   Height: 160 cm (62.99\")   PainSc: 0-No pain                     6/10/2025     1:19 PM   Current Status   ECOG score 0       Physical Exam    CONSTITUTIONAL: pleasant chronic ill-appearing woman   HEENT: no icterus, no thrush, moist membranes, anisocoria  LYMPH: no cervical or supraclavicular lad  CV: RRR, S1S2, no murmur  RESP: bilateral wheezing, O2 by nasal cannula 2 L  GI: soft, non-tender, no splenomegaly, +bs  MUSC: no edema, normal gait  NEURO: alert and oriented x3, normal strength  PSYCH: Dysphoric mood and flat affect  Skin: No rash or induration noted   Unchanged-6/10/25    RECENT LABS:  Hematology WBC   Date Value Ref Range Status   06/10/2025 13.01 (H) 3.40 - 10.80 10*3/mm3 Final     RBC   Date Value Ref Range Status   06/10/2025 4.34 3.77 - 5.28 10*6/mm3 Final     Hemoglobin   Date Value Ref Range Status   06/10/2025 13.7 12.0 - 15.9 g/dL Final "     Hematocrit   Date Value Ref Range Status   06/10/2025 42.3 34.0 - 46.6 % Final     Platelets   Date Value Ref Range Status   06/10/2025 285 140 - 450 10*3/mm3 Final        Lab Results   Component Value Date    GLUCOSE 143 (H) 06/10/2025    BUN 5.6 (L) 06/10/2025    CREATININE 1.00 06/10/2025    EGFR 61.9 06/10/2025    BCR 5.6 (L) 06/10/2025    K 3.9 06/10/2025    CO2 27.8 06/10/2025    CALCIUM 9.2 06/10/2025    ALBUMIN 3.4 (L) 06/10/2025    BILITOT 0.2 06/10/2025    AST 19 06/10/2025    ALT <5 06/10/2025     NM PET/CT Skull Base to Mid Thigh (06/06/2024 11:52 AM)   FINDINGS: Mediastinal blood pool has a maximal SUV of 2.0, previously 2.3.  1. The residual 6 mm pleural-based nodule at the left lower lobe is photopenic. There is low-level subpleural activity adjacently and inferiorly with an SUV of 1.9, likely representing radiation pneumonitis. There has been resolution of left mediastinal and left hilar lymphadenopathy. Interval marked decrease in the size of mediastinal and left hilar nodes and the remaining nodes have low-level activity, maximal SUV of 2.6. There are no new hypermetabolic pulmonary opacities.  2. There is no suspicious hypermetabolic activity at the supraclavicular regions or neck.  3. There is no suspicious hypermetabolic activity within the abdomen or pelvis.  4. There is no suspicious bone activity.    MRI Brain With & Without Contrast (09/17/2024 10:49 AM)  Impression:  1. Changes of old bilateral basal ganglia and occipital lobe infarcts.  2. No acute intracranial abnormality.  3. No pathologic contrast enhancement to suggest intracranial metastatic disease.    CT Abdomen Pelvis With Contrast (09/17/2024 11:34 AM)  Impression:  1. Stable exam with subcentimeter low-density mass in the posterior right lobe of the liver favored to be a cyst or hemangioma.  2. Colonic diverticulosis. No evidence of diverticulitis.  3. Stable sclerotic density within the right iliac bone favored to be a bone  island. No suspicious lytic or sclerotic bony lesions are seen.    CT Chest With Contrast Diagnostic (09/17/2024 11:34 AM)  Impression:  1. Persistent subpleural parenchymal scarring within the posterior left lower lobe at site of previously demonstrated hypermetabolic pulmonary nodule. No residual pulmonary nodule identified. No evidence of metastatic disease elsewhere within the chest.    DEXA Bone Density Axial (11/15/2024 11:11)   Impression:  Osteopenia. Based upon the National Osteoporosis Foundation Guide, patient's FRAX score does meet criteria for pharmacologic treatment to prevent Osteoporosis.  Individual treatment decisions should be made based upon each patient's full clinical history   and risk factors.    CT Chest Without Contrast Diagnostic (12/14/2024 17:20)  Impression:  1. Mild new patchy tree-in-bud groundglass opacities involving the anterior left upper lobe which may relate to atypical/viral pneumonia versus nonspecific inflammatory process.  2. Emphysema. No new solid pulmonary nodule.  3. Suspected pancreatitis involving pancreatic tail with wall thickening of the proximal jejunum near the duodenal jejunal junction which may relate to nonspecific enteritis, better assessed on prior abdominal CT.  4. Additional chronic findings above.    CT Abdomen Pelvis With Contrast (12/14/2024 15:13)  Impression:  1. Mild acute interstitial pancreatitis involving the pancreatic tail. No biliary dilatation.  2. Colonic diverticulosis without diverticulitis.  3. Prior cholecystectomy and hysterectomy.  4. Additional chronic findings above.    CT Chest Without Contrast Diagnostic (03/11/2025 09:28)  CT Abdomen Pelvis With Contrast (03/11/2025 09:28)  Impression:  1.No evidence of recurrent disease in the chest.  Previously seen patchy airspace opacities in the left upper lobe have resolved.  2.Poor distribution of intravenous contrast on CT abdomen pelvis, potentially a component of heart dysfunction. Exam is  essentially a CT abdomen/pelvis without contrast.  3.No evidence of metastatic disease in the chest, abdomen, or pelvis.  4.There is peripancreatic fat stranding about the pancreatic head. Correlate with serum amylase/lipase.  5.Chronic and incidental findings as noted above.    MRI Brain With & Without Contrast (03/11/2025 08:51)    CT Chest With Contrast Diagnostic (06/03/2025 2:23 PM)  CT Abdomen Pelvis With Contrast (06/03/2025 2:23 PM)  IMPRESSION:  Impression:  1.Interval development of extensive mediastinal and left hilar adenopathy consistent with metastatic disease.  2.No evidence of metastatic disease within the abdomen or pelvis.    Assessment & Plan     *Small cell lung cancer left lower lobe of the lung with mediastinal involvement, contralateral lung nodule suspicious for metastatic disease/extensive stage  CT chest 2/22/2024-enlarged f4L lymph nodes 2.5 cm, enlarged AP window lymph node 1.4 cm, poorly defined left hilar lymphadenopathy, left lower lobe pulmonary nodule 1.7 cm  Bronchoscopy with Ion navigation performed 2/28/2024-pathology positive for small cell carcinoma from the left lower lobe and station 4L  PET scan on 3/7/2024 showed significantly avid bilateral hilar and mediastinal lymph nodes for example kylah conglomerate AP window 8.3 SUV measuring 2.5 x 1.8 cm, left hilar lymph node SUV 7 1.4 cm, pleural-based nodularity left major fissure newly hypermetabolic SUV 2.2, pleural-based partially cavitary mass left lower lobe SUV five 1.5 x 1 cm, new right lung nodule 8 mm in the right lower lobe suspicious.-Results discussed with Dr. Velásquez and we both feel the contralateral right lung nodule is highly suspicious for metastatic disease  MRI of the brain negative.  3/19/2024.initiated chemoimmunotherapy with carboplatin/etoposide/atezolizumab; completed 4 cycles of carboplatin/etoposide/atezolizumab 5/23/2024 6/6/2024 PET scan: showed a residual 6 mm pleural-based nodule left lower lobe to be  photopenic, low-level subpleural activity adjacently and inferiorly SUV 1.9, resolution of left mediastinal and left hilar lymphadenopathy, marked decrease mediastinal and left hilar lymph nodes have low-level activity SUV 2.6, no new hypermetabolic pulmonary opacities, no hypermetabolic activity in the abdomen, pelvis or bones  CT chest abdomen pelvis 9/17/2024-subpleural parenchymal scarring posterior left lower lobe.  No residual pulmonary nodule, stable subcentimeter low-density mass right lobe of the liver favored to be a cyst or hemangioma, stable sclerotic lesion right iliac bone.  MRI brain-old bilateral basal ganglia and occipital infarcts, no metastatic disease.  12/14/2024 CT chest abdomen pelvis-no evidence of disease progression  CT chest abdomen pelvis and MRI brain 3/11/2025-no evidence of disease progression  6/10/2025 patient returns for follow-up having had CT chest, abdomen, pelvis on 6/3/2025 showing extensive mediastinal and left hilar adenopathy consistent with metastatic disease.  Per Dr. Burns's review would discontinue atezolizumab.  Options of treatment change would be BiTE versus lurbinectedin versus hospice.  Patient does not have ability to stay within 1 hour of [facility] for administration of bite therapy and does not live with anyone who would be able to monitor her for iCANS or CRS.  Patient reports she does wish to go on with other treatment and is not yet ready for hospice.  We reviewed lurbinectedin today and she does wish to proceed.  Treatment education performed today.  Will request insurance approval.      *anemia/thrombocytopenia secondary to chemotherapy  Hemoglobin improved 13.7, platelets normal    *Fatigue-normal TSH free T3 free T4 cortisol and ACTH   *Depression/Zyprexa 5 mg nightly/Cymbalta 30 mg daily  *Recent pneumonia now with chronic hypoxic respiratory failure on O2  *Comorbidities of tobacco abuse/COPD, anxiety, atherosclerotic calcifications by  CT but no definitive diagnosis of CAD, degenerative disease of the spine, hyperlipidemia; no known autoimmune disorders, chronic pain on opioid medicines  *Poor appetite-patient continues olanzapine 5 mg nightly.  Resume prednisone 10 mg daily to see if this helps with appetite and energy.      Oncology plan/recommendations:  Discontinue atezolizumab secondary to progression of disease  Return in 1 week for review by Dr. Burns with repeat labs and initiation of lurbinectedin  Education for lurbinectedin completed today  Continue Cymbalta to 60 mg daily and Zyprexa 5 mg nightly  Resume prednisone 10 mg daily, prescription sent to pharmacy    Patient with progression of her cancer discussed today including CT scans and treatment options along with education performed.  I spent 90 minutes caring for Pili on this date of service. This time includes time spent by me in the following activities: preparing for the visit, reviewing tests, obtaining and/or reviewing a separately obtained history, performing a medically appropriate examination and/or evaluation, counseling and educating the patient/family/caregiver, ordering medications, tests, or procedures, documenting information in the medical record, independently interpreting results and communicating that information with the patient/family/caregiver, and care coordination.

## 2025-06-13 ENCOUNTER — HOSPITAL ENCOUNTER (OUTPATIENT)
Dept: MRI IMAGING | Facility: HOSPITAL | Age: 70
Discharge: HOME | End: 2025-06-13
Payer: MEDICARE

## 2025-06-13 DIAGNOSIS — Z45.2 FITTING AND ADJUSTMENT OF VASCULAR CATHETER: Primary | ICD-10-CM

## 2025-06-13 DIAGNOSIS — C80.1 SMALL CELL CARCINOMA: ICD-10-CM

## 2025-06-13 PROCEDURE — A9573 GADOPICLENOL 0.5 MMOL/ML SOLUTION: HCPCS | Performed by: NURSE PRACTITIONER

## 2025-06-13 PROCEDURE — 25510000001 GADOPICLENOL 0.5 MMOL/ML SOLUTION: Performed by: NURSE PRACTITIONER

## 2025-06-13 PROCEDURE — 70553 MRI BRAIN STEM W/O & W/DYE: CPT

## 2025-06-13 RX ORDER — HEPARIN SODIUM (PORCINE) LOCK FLUSH IV SOLN 100 UNIT/ML 100 UNIT/ML
500 SOLUTION INTRAVENOUS AS NEEDED
Status: DISCONTINUED | OUTPATIENT
Start: 2025-06-13 | End: 2025-06-14 | Stop reason: HOSPADM

## 2025-06-13 RX ORDER — HEPARIN SODIUM (PORCINE) LOCK FLUSH IV SOLN 100 UNIT/ML 100 UNIT/ML
500 SOLUTION INTRAVENOUS AS NEEDED
OUTPATIENT
Start: 2025-06-13

## 2025-06-13 RX ORDER — SODIUM CHLORIDE 0.9 % (FLUSH) 0.9 %
10 SYRINGE (ML) INJECTION AS NEEDED
OUTPATIENT
Start: 2025-06-13

## 2025-06-13 RX ADMIN — GADOPICLENOL 4 ML: 485.1 INJECTION INTRAVENOUS at 16:36

## 2025-06-13 NOTE — PROGRESS NOTES
On call note:  Contacted by radiology this evening regarding MRI brain which showed a punctate 3 mm abnormality that is felt to represent an acute or subacute stroke.  Attempting to contact patient regarding result, left message for call back.

## 2025-06-14 NOTE — NURSING NOTE
I received a callfrom MRI to deaccess port per [facility] has already left for the day. Verified allergies with patient, flushed port with 500 units/ 5ml heparin, deaccess port with no issues. Patient discharged wheelchair via family in private auto.

## 2025-06-17 ENCOUNTER — OFFICE VISIT (OUTPATIENT)
Dept: ONCOLOGY | Facility: CLINIC | Age: 70
End: 2025-06-17
Payer: COMMERCIAL

## 2025-06-17 ENCOUNTER — INFUSION (OUTPATIENT)
Dept: ONCOLOGY | Facility: HOSPITAL | Age: 70
End: 2025-06-17
Payer: MEDICARE

## 2025-06-17 VITALS
TEMPERATURE: 98 F | HEART RATE: 78 BPM | WEIGHT: 101.6 LBS | OXYGEN SATURATION: 99 % | SYSTOLIC BLOOD PRESSURE: 105 MMHG | HEIGHT: 63 IN | DIASTOLIC BLOOD PRESSURE: 70 MMHG | BODY MASS INDEX: 18 KG/M2 | RESPIRATION RATE: 16 BRPM

## 2025-06-17 DIAGNOSIS — Z45.2 FITTING AND ADJUSTMENT OF VASCULAR CATHETER: ICD-10-CM

## 2025-06-17 DIAGNOSIS — C34.32 SMALL CELL CARCINOMA OF LOWER LOBE OF LEFT LUNG: ICD-10-CM

## 2025-06-17 DIAGNOSIS — C34.32 SMALL CELL CARCINOMA OF LOWER LOBE OF LEFT LUNG: Primary | ICD-10-CM

## 2025-06-17 LAB
ALBUMIN SERPL-MCNC: 3.5 G/DL (ref 3.5–5.2)
ALBUMIN/GLOB SERPL: 1.5 G/DL
ALP SERPL-CCNC: 83 U/L (ref 39–117)
ALT SERPL W P-5'-P-CCNC: 8 U/L (ref 1–33)
ANION GAP SERPL CALCULATED.3IONS-SCNC: 9.1 MMOL/L (ref 5–15)
AST SERPL-CCNC: 22 U/L (ref 1–32)
BASOPHILS # BLD AUTO: 0.04 10*3/MM3 (ref 0–0.2)
BASOPHILS NFR BLD AUTO: 0.3 % (ref 0–1.5)
BILIRUB SERPL-MCNC: <0.2 MG/DL (ref 0–1.2)
BUN SERPL-MCNC: 6.7 MG/DL (ref 8–23)
BUN/CREAT SERPL: 8 (ref 7–25)
CALCIUM SPEC-SCNC: 8.8 MG/DL (ref 8.6–10.5)
CHLORIDE SERPL-SCNC: 89 MMOL/L (ref 98–107)
CK MB SERPL-CCNC: 1.93 NG/ML
CK SERPL-CCNC: 70 U/L (ref 20–180)
CO2 SERPL-SCNC: 28.9 MMOL/L (ref 22–29)
CREAT SERPL-MCNC: 0.84 MG/DL (ref 0.57–1)
DEPRECATED RDW RBC AUTO: 44.1 FL (ref 37–54)
EGFRCR SERPLBLD CKD-EPI 2021: 76.3 ML/MIN/1.73
EOSINOPHIL # BLD AUTO: 0.01 10*3/MM3 (ref 0–0.4)
EOSINOPHIL NFR BLD AUTO: 0.1 % (ref 0.3–6.2)
ERYTHROCYTE [DISTWIDTH] IN BLOOD BY AUTOMATED COUNT: 12.8 % (ref 12.3–15.4)
GLOBULIN UR ELPH-MCNC: 2.4 GM/DL
GLUCOSE SERPL-MCNC: 120 MG/DL (ref 65–99)
HCT VFR BLD AUTO: 36.3 % (ref 34–46.6)
HGB BLD-MCNC: 12.5 G/DL (ref 12–15.9)
IMM GRANULOCYTES # BLD AUTO: 0.05 10*3/MM3 (ref 0–0.05)
IMM GRANULOCYTES NFR BLD AUTO: 0.4 % (ref 0–0.5)
LYMPHOCYTES # BLD AUTO: 1.93 10*3/MM3 (ref 0.7–3.1)
LYMPHOCYTES NFR BLD AUTO: 14 % (ref 19.6–45.3)
MCH RBC QN AUTO: 32.6 PG (ref 26.6–33)
MCHC RBC AUTO-ENTMCNC: 34.4 G/DL (ref 31.5–35.7)
MCV RBC AUTO: 94.5 FL (ref 79–97)
MONOCYTES # BLD AUTO: 0.51 10*3/MM3 (ref 0.1–0.9)
MONOCYTES NFR BLD AUTO: 3.7 % (ref 5–12)
NEUTROPHILS NFR BLD AUTO: 11.28 10*3/MM3 (ref 1.7–7)
NEUTROPHILS NFR BLD AUTO: 81.5 % (ref 42.7–76)
NRBC BLD AUTO-RTO: 0 /100 WBC (ref 0–0.2)
PLATELET # BLD AUTO: 303 10*3/MM3 (ref 140–450)
PMV BLD AUTO: 8.8 FL (ref 6–12)
POTASSIUM SERPL-SCNC: 3.8 MMOL/L (ref 3.5–5.2)
PROT SERPL-MCNC: 5.9 G/DL (ref 6–8.5)
RBC # BLD AUTO: 3.84 10*6/MM3 (ref 3.77–5.28)
SODIUM SERPL-SCNC: 127 MMOL/L (ref 136–145)
WBC NRBC COR # BLD AUTO: 13.82 10*3/MM3 (ref 3.4–10.8)

## 2025-06-17 PROCEDURE — 25010000002 GRANISETRON PER 100 MCG: Performed by: INTERNAL MEDICINE

## 2025-06-17 PROCEDURE — 85025 COMPLETE CBC W/AUTO DIFF WBC: CPT | Performed by: INTERNAL MEDICINE

## 2025-06-17 PROCEDURE — 82553 CREATINE MB FRACTION: CPT | Performed by: INTERNAL MEDICINE

## 2025-06-17 PROCEDURE — 96375 TX/PRO/DX INJ NEW DRUG ADDON: CPT

## 2025-06-17 PROCEDURE — 96367 TX/PROPH/DG ADDL SEQ IV INF: CPT

## 2025-06-17 PROCEDURE — 80053 COMPREHEN METABOLIC PANEL: CPT | Performed by: INTERNAL MEDICINE

## 2025-06-17 PROCEDURE — 25010000002 DEXAMETHASONE SODIUM PHOSPHATE 100 MG/10ML SOLUTION: Performed by: INTERNAL MEDICINE

## 2025-06-17 PROCEDURE — 25010000002 HEPARIN LOCK FLUSH PER 10 UNITS: Performed by: INTERNAL MEDICINE

## 2025-06-17 PROCEDURE — 96413 CHEMO IV INFUSION 1 HR: CPT

## 2025-06-17 PROCEDURE — 25010000002 LURBINECTEDIN 4 MG RECONSTITUTED SOLUTION 1 EACH VIAL: Performed by: INTERNAL MEDICINE

## 2025-06-17 PROCEDURE — 25810000003 SODIUM CHLORIDE 0.9 % SOLUTION: Performed by: INTERNAL MEDICINE

## 2025-06-17 PROCEDURE — 82550 ASSAY OF CK (CPK): CPT | Performed by: INTERNAL MEDICINE

## 2025-06-17 PROCEDURE — 25810000003 SODIUM CHLORIDE 0.9 % SOLUTION 250 ML FLEX CONT: Performed by: INTERNAL MEDICINE

## 2025-06-17 RX ORDER — HEPARIN SODIUM (PORCINE) LOCK FLUSH IV SOLN 100 UNIT/ML 100 UNIT/ML
500 SOLUTION INTRAVENOUS AS NEEDED
Status: DISCONTINUED | OUTPATIENT
Start: 2025-06-17 | End: 2025-06-17 | Stop reason: HOSPADM

## 2025-06-17 RX ORDER — GRANISETRON HYDROCHLORIDE 1 MG/ML
1 INJECTION INTRAVENOUS ONCE
Status: CANCELLED | OUTPATIENT
Start: 2025-06-19

## 2025-06-17 RX ORDER — HEPARIN SODIUM (PORCINE) LOCK FLUSH IV SOLN 100 UNIT/ML 100 UNIT/ML
500 SOLUTION INTRAVENOUS AS NEEDED
OUTPATIENT
Start: 2025-06-17

## 2025-06-17 RX ORDER — SODIUM CHLORIDE 0.9 % (FLUSH) 0.9 %
10 SYRINGE (ML) INJECTION AS NEEDED
Status: DISCONTINUED | OUTPATIENT
Start: 2025-06-17 | End: 2025-06-17 | Stop reason: HOSPADM

## 2025-06-17 RX ORDER — GRANISETRON HYDROCHLORIDE 1 MG/ML
1 INJECTION INTRAVENOUS ONCE
Status: COMPLETED | OUTPATIENT
Start: 2025-06-17 | End: 2025-06-17

## 2025-06-17 RX ORDER — SODIUM CHLORIDE 9 MG/ML
20 INJECTION, SOLUTION INTRAVENOUS ONCE
Status: CANCELLED | OUTPATIENT
Start: 2025-06-19

## 2025-06-17 RX ORDER — SODIUM CHLORIDE 0.9 % (FLUSH) 0.9 %
10 SYRINGE (ML) INJECTION AS NEEDED
OUTPATIENT
Start: 2025-06-17

## 2025-06-17 RX ORDER — SODIUM CHLORIDE 9 MG/ML
20 INJECTION, SOLUTION INTRAVENOUS ONCE
Status: COMPLETED | OUTPATIENT
Start: 2025-06-17 | End: 2025-06-17

## 2025-06-17 RX ADMIN — Medication 10 ML: at 15:58

## 2025-06-17 RX ADMIN — LURBINECTEDIN 4.5 MG: 0.5 INJECTION, POWDER, LYOPHILIZED, FOR SOLUTION INTRAVENOUS at 14:58

## 2025-06-17 RX ADMIN — GRANISETRON HYDROCHLORIDE 1 MG: 1 INJECTION INTRAVENOUS at 14:34

## 2025-06-17 RX ADMIN — DEXAMETHASONE SODIUM PHOSPHATE 12 MG: 10 INJECTION, SOLUTION INTRAMUSCULAR; INTRAVENOUS at 14:37

## 2025-06-17 RX ADMIN — HEPARIN 500 UNITS: 100 SYRINGE at 15:58

## 2025-06-17 RX ADMIN — SODIUM CHLORIDE 20 ML/HR: 9 INJECTION, SOLUTION INTRAVENOUS at 14:34

## 2025-06-17 NOTE — PROGRESS NOTES
Subjective     REASON FOR CONSULTATION:  small cell lung cancer  Provide an opinion on any further workup or treatment                             REQUESTING PHYSICIAN:  James    RECORDS OBTAINED:  Records of the patients history including those obtained from the referring provider were reviewed and summarized in detail.    HISTORY OF PRESENT ILLNESS:  The patient is a 69 y.o. year old female who is here for an opinion about the above issue.    History of Present Illness   The patient initiated chemoimmunotherapy with carboplatin/etoposide/atezolizumab 3/19/2024 and completed 4 cycles of carboplatin/etoposide/atezolizumab and is now on maintenance atezolizumab.  Recent CT scan showed progression of disease involving hilar and mediastinal lymph nodes.  She is asymptomatic.    Oncology History:  This is a 68-year-old woman with long history of tobacco use and COPD, degenerative disease of the spine on narcotic therapy, depression/anxiety, orthostatic hypotension, hyperlipidemia who has been followed with serial imaging for a left lower lobe lung nodule and mediastinal lymphadenopathy.  A CT of the chest 1/21/2023 showed a masslike consolidation in the left lower lobe 2.5 x 2.6 cm in size with a potential cavitary focus centrally surrounding haziness and otherwise groundglass opacities in the right middle lobe and right lower lobe and enlarged mediastinal lymph nodes up to 10 mm.  The findings were favored to be infectious or inflammatory.  A follow-up CT chest 7/28/2023 showed pleural-based area of density in the left upper lung 11 x 5 mm new from the previous exam and resolution of the consolidation in the left lower lobe.  A PET scan was performed 8/9/2023 which showed the 1.1 cm pleural-based density in the posterior left lower lobe to blend into dependent atelectasis but have more intense activity than the atelectasis with maximum SUV 2.8.  There was hypermetabolic lymphadenopathy in the left hilum  and left lower paratracheal regions for example lymph node posterior to the left pulmonary artery SUV 4.7 measuring 1.6 cm and another area of soft tissue lateral to the left mainstem bronchus SUV 4.4, ill-defined lymphadenopathy left lower paratracheal region SUV 3.7.  She underwent bronchoscopy with biopsies on 8/25/2023 with samples from stations 11 R, 4R, 7, 10 L, 11 L, 12 L all negative for malignancy; station 4R showed rare atypical reactive epithelial cells.  A follow-up CT of the chest on 2/6/2024 showed the left lower lobe nodule to be enlarging at 1.6 x 0.9 cm and enlarging precarinal lymphadenopathy concerning for metastatic disease.  The patient underwent repeat bronchoscopy with Ion navigation on 2/28/2024; biopsies from the left lower lobe nodule were positive for small cell carcinoma and a level 4 lymph node biopsied also positive for small cell carcinoma.    Full body PET scan on 3/7/2024 showed significantly avid bilateral hilar and mediastinal lymph nodes for example kylah [employer]glom[employer] AP window 8.3 SUV measuring 2.5 x 1.8 cm, left hilar lymph node SUV 7 1.4 cm, pleural-based nodularity left major fissure newly hypermetabolic SUV 2.2, pleural-based partially cavitary mass left lower lobe SUV five 1.5 x 1 cm, new right lung nodule 8 mm in the right lower lobe suspicious.  MRI of the brain negative.    The patient has comorbidities of COPD but minimally symptomatic, no known cardiac disease, kidney disease, liver disease, diabetes etc.  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    The patient initiated chemoimmunotherapy with carboplatin/etoposide/atezolizumab 3/19/2024.  She completed 4 cycles of carboplatin/etoposide/atezolizumab on 5/23/2024.    A PET scan on 6/6/2024 showed a residual 6 mm pleural-based nodule left lower lobe to be photopenic, low-level subpleural activity adjacently and inferiorly SUV 1.9, resolution of left mediastinal and left hilar lymphadenopathy, marked decrease mediastinal and  left hilar lymph nodes have low-level activity SUV 2.6, no new hypermetabolic pulmonary opacities, no hypermetabolic activity in the abdomen, pelvis or bones.    CT chest abdomen pelvis 2024-subpleural parenchymal scarring posterior left lower lobe.  No residual pulmonary nodule, stable subcentimeter low-density mass right lobe of the liver favored to be a cyst or hemangioma, stable sclerotic lesion right iliac bone.  MRI brain-old bilateral basal ganglia and occipital infarcts, no metastatic disease.    CT chest abdomen pelvis 6/3/2025 showed progression of disease involving mediastinal and hilar lymph nodes.  MRI brain showed probable 3 mm focus of acute or subacute infarct in the left frontal operculum.    Past Medical History:   Diagnosis Date    COPD (chronic obstructive pulmonary disease)     COPD with acute exacerbation 2020    Small cell carcinoma 3/13/2024        Past Surgical History:   Procedure Laterality Date    BRONCHOSCOPY N/A 2023    Procedure: BRONCHOSCOPY WITH ENDOBRONCHIAL ULTRASOUND (EBUS) with FNA;  Surgeon: Coy Mccarthy MD;  Location: [facility] ENDOSCOPY;  Service: Pulmonary;  Laterality: N/A;  Pre/Post - mediastinal and hilar lymphadenopathy.    BRONCHOSCOPY WITH ION ROBOTIC ASSIST N/A 2024    Procedure: BRONCHOSCOPY WITH ION ROBOT,  ENDOBRONCHIAL ULTRASOUND, FINE NEEDLE ASPIRATIONS,  CRYOTHERAPY BIOPSIES, AND LEFT LOWER LOBE BRONCHOALVEOLAR LAVAGE.;  Surgeon: Alexia Velásquez MD;  Location: [facility] ENDOSCOPY;  Service: Robotics - Pulmonary;  Laterality: N/A;     SECTION      CHEST TUBE INSERTION Left 2024    Procedure: CHEST TUBE INSERTION;  Surgeon: Alexia Velásquez MD;  Location: [facility] ENDOSCOPY;  Service: Thoracic;  Laterality: Left;    CHOLECYSTECTOMY      COLONOSCOPY      ECTOPIC PREGNANCY SURGERY      HYSTERECTOMY      TONSILLECTOMY      TOTAL HIP ARTHROPLASTY Left     VENOUS ACCESS DEVICE (PORT) INSERTION N/A 3/14/2024    Procedure: INSERTION VENOUS  ACCESS DEVICE;  Surgeon: Jonas Garner MD;  Location: [facility];  Service: General;  Laterality: N/A;        Current Outpatient Medications on File Prior to Visit   Medication Sig Dispense Refill    albuterol sulfate  (90 Base) MCG/ACT inhaler Inhale 2 puffs Every 4 (Four) Hours As Needed for Wheezing. Takes as needed.      apixaban (ELIQUIS) 5 MG tablet tablet Take 1 tablet by mouth Every 12 (Twelve) Hours. Indications: Other - full anticoagulation (Patient taking differently: Take 1 tablet by mouth Every 12 (Twelve) Hours.) 180 tablet 0    budesonide-formoterol (SYMBICORT) 160-4.5 MCG/ACT inhaler Inhale 2 puffs 2 (Two) Times a Day.  12    calcium carbonate (TUMS) 500 MG chewable tablet Chew 1 tablet 2 (Two) Times a Day.      Cyanocobalamin (Vitamin B-12) 500 MCG sublingual tablet PLACE 1 TABLET UNDER THE TONGUE DAILY 90 tablet 1    Denosumab (PROLIA SC) Inject  under the skin into the appropriate area as directed Every 6 (Six) Months.      diazePAM (VALIUM) 10 MG tablet Take 1 tablet by mouth 2 (Two) Times a Day As Needed for Anxiety (RLS.). Takes 20 mg at bedtime at times when she needs.      diphenoxylate-atropine (LOMOTIL) 2.5-0.025 MG per tablet Take 1 tablet by mouth 4 (Four) Times a Day As Needed for Diarrhea. 15 tablet 0    DULoxetine (CYMBALTA) 30 MG capsule Take 2 capsules by mouth Daily. 30 capsule 2    folic acid (FOLVITE) 800 MCG tablet TAKE 1 TABLET BY MOUTH DAILY 90 tablet 1    HYDROcodone-acetaminophen (NORCO)  MG per tablet Take 1 tablet by mouth 3 (Three) Times a Day As Needed for Moderate Pain.      lactobacillus acidophilus (RISAQUAD) capsule capsule Take 1 capsule by mouth 2 (Two) Times a Day. 60 capsule 0    levETIRAcetam (KEPPRA) 500 MG tablet Take 1 tablet by mouth Every 12 (Twelve) Hours. 180 tablet 0    midodrine (PROAMATINE) 2.5 MG tablet Take 1 tablet by mouth 3 (Three) Times a Day Before Meals.      multivitamin with minerals (PRESERVISION AREDS PO) Take 1 tablet  by mouth 2 (Two) Times a Day.      naloxone (NARCAN) 4 MG/0.1ML nasal spray Administer 1 spray into the nostril(s) as directed by provider As Needed.      OLANZapine (zyPREXA) 5 MG tablet Take 1 tablet by mouth Every Night. 30 tablet 3    ondansetron (ZOFRAN) 8 MG tablet Take 1 tablet by mouth 3 (Three) Times a Day As Needed for Nausea or Vomiting. 30 tablet 5    pantoprazole (PROTONIX) 40 MG EC tablet Take 1 tablet by mouth Daily. 30 tablet 3    potassium chloride (Klor-Con M20) 20 MEQ CR tablet Take 1 tablet by mouth 2 (Two) Times a Day. 60 tablet 3    predniSONE (DELTASONE) 10 MG tablet Take 1 tablet by mouth Daily. 30 tablet 0    rosuvastatin (Crestor) 10 MG tablet Take 1 tablet by mouth Every Night. 30 tablet 11    sucralfate (CARAFATE) 1 g tablet TAKE 1 TABLET BY MOUTH 4 TIMES A  tablet 3     Current Facility-Administered Medications on File Prior to Visit   Medication Dose Route Frequency Provider Last Rate Last Admin    heparin injection 500 Units  500 Units Intravenous PRN Nikita Burns MD   500 Units at 06/10/25 1448    sodium chloride 0.9 % flush 10 mL  10 mL Intravenous PRN Nikita Burns MD   10 mL at 06/10/25 1448        ALLERGIES:    Allergies   Allergen Reactions    Codeine GI Intolerance    Percocet [Oxycodone-Acetaminophen] Hives        Social History     Socioeconomic History    Marital status:    Tobacco Use    Smoking status: Every Day     Current packs/day: 1.00     Average packs/day: 1 pack/day for 30.0 years (30.0 ttl pk-yrs)     Types: Cigarettes     Passive exposure: Current    Smokeless tobacco: Never    Tobacco comments:     Began smoking at age 11.  Smoked .5 ppd for 6 years, 2.5 ppd for a year, 2 ppd for 5 years, and 1 ppd for the past 43 years for a 58.5 pack year history.   Vaping Use    Vaping status: Former    Substances: Nicotine, Flavoring    Devices: Disposable   Substance and Sexual Activity    Alcohol use: Yes     Comment: once a year     Drug use: No     "Sexual activity: Defer        Family History   Problem Relation Age of Onset    Lung cancer Niece 50    Throat cancer Father     Breast cancer Neg Hx         Review of Systems   Constitutional:  Positive for activity change, appetite change and fatigue. Negative for unexpected weight change.   HENT: Negative.     Eyes: Negative.    Respiratory:  Positive for shortness of breath.    Cardiovascular: Negative.    Gastrointestinal: Negative.    Musculoskeletal:  Positive for back pain.   Neurological:  Negative for light-headedness.   Hematological: Negative.    Psychiatric/Behavioral:  Positive for dysphoric mood. The patient is nervous/anxious.    Unchanged 6/17/2025    Objective     Vitals:    06/17/25 1307   BP: 105/70   Pulse: 78   Resp: 16   Temp: 98 °F (36.7 °C)   TempSrc: Infrared   SpO2: 99%   Weight: 46.1 kg (101 lb 9.6 oz)   Height: 160 cm (62.99\")   PainSc: 0-No pain                   6/17/2025     1:39 PM   Current Status   ECOG score 0       Physical Exam    CONSTITUTIONAL: pleasant chronic ill-appearing woman   HEENT: no icterus, no thrush, moist membranes, anisocoria  LYMPH: no cervical or supraclavicular lad  CV: RRR, S1S2, no murmur  RESP: bilateral wheezing, O2 by nasal cannula 1 L  GI: soft, non-tender, no splenomegaly, +bs  MUSC: no edema, normal gait  NEURO: alert and oriented x3, normal strength  PSYCH: Dysphoric mood and flat affect  Skin: No rash or induration noted   Unchanged 6/17/2025    RECENT LABS:  Hematology WBC   Date Value Ref Range Status   06/17/2025 13.82 (H) 3.40 - 10.80 10*3/mm3 Final     RBC   Date Value Ref Range Status   06/17/2025 3.84 3.77 - 5.28 10*6/mm3 Final     Hemoglobin   Date Value Ref Range Status   06/17/2025 12.5 12.0 - 15.9 g/dL Final     Hematocrit   Date Value Ref Range Status   06/17/2025 36.3 34.0 - 46.6 % Final     Platelets   Date Value Ref Range Status   06/17/2025 303 140 - 450 10*3/mm3 Final        Lab Results   Component Value Date    GLUCOSE 143 (H) " 06/10/2025    BUN 5.6 (L) 06/10/2025    CREATININE 1.00 06/10/2025    EGFR 61.9 06/10/2025    BCR 5.6 (L) 06/10/2025    K 3.9 06/10/2025    CO2 27.8 06/10/2025    CALCIUM 9.2 06/10/2025    ALBUMIN 3.4 (L) 06/10/2025    BILITOT 0.2 06/10/2025    AST 19 06/10/2025    ALT <5 06/10/2025     NM PET/CT Skull Base to Mid Thigh (06/06/2024 11:52 AM)   FINDINGS: Mediastinal blood pool has a maximal SUV of 2.0, previously 2.3.  1. The residual 6 mm pleural-based nodule at the left lower lobe is photopenic. There is low-level subpleural activity adjacently and inferiorly with an SUV of 1.9, likely representing radiation pneumonitis. There has been resolution of left mediastinal and left hilar lymphadenopathy. Interval marked decrease in the size of mediastinal and left hilar nodes and the remaining nodes have low-level activity, maximal SUV of 2.6. There are no new hypermetabolic pulmonary opacities.  2. There is no suspicious hypermetabolic activity at the supraclavicular regions or neck.  3. There is no suspicious hypermetabolic activity within the abdomen or pelvis.  4. There is no suspicious bone activity.    MRI Brain With & Without Contrast (09/17/2024 10:49 AM)  Impression:  1. Changes of old bilateral basal ganglia and occipital lobe infarcts.  2. No acute intracranial abnormality.  3. No pathologic contrast enhancement to suggest intracranial metastatic disease.    CT Abdomen Pelvis With Contrast (09/17/2024 11:34 AM)  Impression:  1. Stable exam with subcentimeter low-density mass in the posterior right lobe of the liver favored to be a cyst or hemangioma.  2. Colonic diverticulosis. No evidence of diverticulitis.  3. Stable sclerotic density within the right iliac bone favored to be a bone island. No suspicious lytic or sclerotic bony lesions are seen.    CT Chest With Contrast Diagnostic (09/17/2024 11:34 AM)  Impression:  1. Persistent subpleural parenchymal scarring within the posterior left lower lobe at site of  previously demonstrated hypermetabolic pulmonary nodule. No residual pulmonary nodule identified. No evidence of metastatic disease elsewhere within the chest.    DEXA Bone Density Axial (11/15/2024 11:11)   Impression:  Osteopenia. Based upon the National Osteoporosis [employer] Guide, patient's FRAX score does meet criteria for pharmacologic treatment to prevent Osteoporosis.  Individual treatment decisions should be made based upon each patient's full clinical history   and risk factors.    CT Chest Without Contrast Diagnostic (12/14/2024 17:20)  Impression:  1. Mild new patchy tree-in-bud groundglass opacities involving the anterior left upper lobe which may relate to atypical/viral pneumonia versus nonspecific inflammatory process.  2. Emphysema. No new solid pulmonary nodule.  3. Suspected pancreatitis involving pancreatic tail with wall thickening of the proximal jejunum near the duodenal jejunal junction which may relate to nonspecific enteritis, better assessed on prior abdominal CT.  4. Additional chronic findings above.    CT Abdomen Pelvis With Contrast (12/14/2024 15:13)  Impression:  1. Mild acute interstitial pancreatitis involving the pancreatic tail. No biliary dilatation.  2. Colonic diverticulosis without diverticulitis.  3. Prior cholecystectomy and hysterectomy.  4. Additional chronic findings above.    CT Chest Without Contrast Diagnostic (03/11/2025 09:28)  CT Abdomen Pelvis With Contrast (03/11/2025 09:28)  Impression:  1.No evidence of recurrent disease in the chest.  Previously seen patchy airspace opacities in the left upper lobe have resolved.  2.Poor distribution of intravenous contrast on CT abdomen pelvis, potentially a component of heart dysfunction. Exam is essentially a CT abdomen/pelvis without contrast.  3.No evidence of metastatic disease in the chest, abdomen, or pelvis.  4.There is peripancreatic fat stranding about the pancreatic head. Correlate with serum  amylase/lipase.  5.Chronic and incidental findings as noted above.    MRI Brain With & Without Contrast (03/11/2025 08:51)    Assessment & Plan     *Small cell lung cancer left lower lobe of the lung with mediastinal involvement, contralateral lung nodule suspicious for metastatic disease/extensive stage  CT chest 2/22/2024-enlarged f4L lymph nodes 2.5 cm, enlarged AP window lymph node 1.4 cm, poorly defined left hilar lymphadenopathy, left lower lobe pulmonary nodule 1.7 cm  Bronchoscopy with Ion navigation performed 2/28/2024-pathology positive for small cell carcinoma from the left lower lobe and station 4L  PET scan on 3/7/2024 showed significantly avid bilateral hilar and mediastinal lymph nodes for example kylah conglom[employer] AP window 8.3 SUV measuring 2.5 x 1.8 cm, left hilar lymph node SUV 7 1.4 cm, pleural-based nodularity left major fissure newly hypermetabolic SUV 2.2, pleural-based partially cavitary mass left lower lobe SUV five 1.5 x 1 cm, new right lung nodule 8 mm in the right lower lobe suspicious.-Results discussed with Dr. Velásquez and we both feel the contralateral right lung nodule is highly suspicious for metastatic disease  MRI of the brain negative.  3/19/2024.initiated chemoimmunotherapy with carboplatin/etoposide/atezolizumab; completed 4 cycles of carboplatin/etoposide/atezolizumab 5/23/2024 6/6/2024 PET scan: showed a residual 6 mm pleural-based nodule left lower lobe to be photopenic, low-level subpleural activity adjacently and inferiorly SUV 1.9, resolution of left mediastinal and left hilar lymphadenopathy, marked decrease mediastinal and left hilar lymph nodes have low-level activity SUV 2.6, no new hypermetabolic pulmonary opacities, no hypermetabolic activity in the abdomen, pelvis or bones  CT chest abdomen pelvis 9/17/2024-subpleural parenchymal scarring posterior left lower lobe.  No residual pulmonary nodule, stable subcentimeter low-density mass right lobe of the liver favored  to be a cyst or hemangioma, stable sclerotic lesion right iliac bone.  MRI brain-old bilateral basal ganglia and occipital infarcts, no metastatic disease.  12/14/2024 CT chest abdomen pelvis-no evidence of disease progression  CT chest abdomen pelvis and MRI brain 3/11/2025-no evidence of disease progression  CT chest abdomen pelvis 6/3/2025-progression of disease involving mediastinal and hilar lymph nodes  *anemia/thrombocytopenia secondary to chemotherapy  Hemoglobin improved 12.5  *Fatigue-normal TSH free T3 free T4 cortisol and ACTH   *Depression/Zyprexa 5 mg nightly/Cymbalta 30 mg daily  *Recent pneumonia now with chronic hypoxic respiratory failure on O2  *Stroke-patient recently noncompliant with her anticoagulation secondary to cost  *Comorbidities of tobacco abuse/COPD, anxiety, atherosclerotic calcifications by CT but no definitive diagnosis of CAD, degenerative disease of the spine, hyperlipidemia; no known autoimmune disorders, chronic pain on opioid medicines    Oncology plan/recommendations:  Discontinue atezolizumab  Second line therapy discussed with the patient utilizing lurbinectedin including risk and side effects versus options of bytes therapy (poor social support to monitor side effects) or palliative/hospice care.  She chose to proceed with lurbinectedin.  We will give cycle 1 today plan to repeat scan after 2 months of therapy.  Continue Cymbalta to 60 mg daily and Zyprexa 5 mg nightly  Continue prednisone 10 mg daily  Weekly CBC to monitor platelet count-hold anticoagulation if platelets below 50,000  I recommended the patient to be more compliant with her Eliquis to prevent strokes or if too expensive at least take aspirin 81 mg daily  50 minutes of time today spent on this case reviewing the patient's recent scan discussing with the patient and her granddaughter progression of disease treatment options writing orders and documenting the care.

## 2025-06-23 RX ORDER — OLANZAPINE 2.5 MG/1
2.5 TABLET, FILM COATED ORAL NIGHTLY
Qty: 30 TABLET | Refills: 2 | Status: SHIPPED | OUTPATIENT
Start: 2025-06-23

## 2025-06-24 ENCOUNTER — LAB (OUTPATIENT)
Dept: LAB | Facility: HOSPITAL | Age: 70
End: 2025-06-24
Payer: MEDICARE

## 2025-06-24 ENCOUNTER — CLINICAL SUPPORT (OUTPATIENT)
Dept: ONCOLOGY | Facility: HOSPITAL | Age: 70
End: 2025-06-24
Payer: MEDICARE

## 2025-06-24 VITALS
TEMPERATURE: 97.7 F | HEART RATE: 75 BPM | SYSTOLIC BLOOD PRESSURE: 122 MMHG | OXYGEN SATURATION: 99 % | DIASTOLIC BLOOD PRESSURE: 77 MMHG

## 2025-06-24 DIAGNOSIS — C34.32 SMALL CELL CARCINOMA OF LOWER LOBE OF LEFT LUNG: ICD-10-CM

## 2025-06-24 LAB
BASOPHILS # BLD AUTO: 0.06 10*3/MM3 (ref 0–0.2)
BASOPHILS NFR BLD AUTO: 0.5 % (ref 0–1.5)
DEPRECATED RDW RBC AUTO: 45.5 FL (ref 37–54)
EOSINOPHIL # BLD AUTO: 0.01 10*3/MM3 (ref 0–0.4)
EOSINOPHIL NFR BLD AUTO: 0.1 % (ref 0.3–6.2)
ERYTHROCYTE [DISTWIDTH] IN BLOOD BY AUTOMATED COUNT: 13.4 % (ref 12.3–15.4)
HCT VFR BLD AUTO: 39.7 % (ref 34–46.6)
HGB BLD-MCNC: 13.9 G/DL (ref 12–15.9)
IMM GRANULOCYTES # BLD AUTO: 0.12 10*3/MM3 (ref 0–0.05)
IMM GRANULOCYTES NFR BLD AUTO: 0.9 % (ref 0–0.5)
LYMPHOCYTES # BLD AUTO: 2.05 10*3/MM3 (ref 0.7–3.1)
LYMPHOCYTES NFR BLD AUTO: 15.7 % (ref 19.6–45.3)
MCH RBC QN AUTO: 32.6 PG (ref 26.6–33)
MCHC RBC AUTO-ENTMCNC: 35 G/DL (ref 31.5–35.7)
MCV RBC AUTO: 93 FL (ref 79–97)
MONOCYTES # BLD AUTO: 0.14 10*3/MM3 (ref 0.1–0.9)
MONOCYTES NFR BLD AUTO: 1.1 % (ref 5–12)
NEUTROPHILS NFR BLD AUTO: 10.66 10*3/MM3 (ref 1.7–7)
NEUTROPHILS NFR BLD AUTO: 81.7 % (ref 42.7–76)
NRBC BLD AUTO-RTO: 0 /100 WBC (ref 0–0.2)
PLATELET # BLD AUTO: 235 10*3/MM3 (ref 140–450)
PMV BLD AUTO: 8.8 FL (ref 6–12)
RBC # BLD AUTO: 4.27 10*6/MM3 (ref 3.77–5.28)
WBC NRBC COR # BLD AUTO: 13.04 10*3/MM3 (ref 3.4–10.8)

## 2025-06-24 PROCEDURE — 85025 COMPLETE CBC W/AUTO DIFF WBC: CPT | Performed by: INTERNAL MEDICINE

## 2025-06-24 PROCEDURE — 36416 COLLJ CAPILLARY BLOOD SPEC: CPT

## 2025-06-24 NOTE — NURSING NOTE
The pt returned 1 week after receiving C1D1 Lurbinectidin. She has no complaints except for persistent dizziness which seems to occur while she is walking. VSS. The pt was encouraged to use safety equipment or walk with another person for safety. She was informed that today's CBC returned stable for her at this time. She was instructed to continue to take Eliquis and return as scheduled in one week for lab review. The pt v/u of the above.    Lab Results   Component Value Date    WBC 13.04 (H) 06/24/2025    HGB 13.9 06/24/2025    HCT 39.7 06/24/2025    MCV 93.0 06/24/2025     06/24/2025   ANC 10.66

## 2025-07-01 ENCOUNTER — LAB (OUTPATIENT)
Dept: LAB | Facility: HOSPITAL | Age: 70
End: 2025-07-01
Payer: MEDICARE

## 2025-07-01 ENCOUNTER — CLINICAL SUPPORT (OUTPATIENT)
Dept: ONCOLOGY | Facility: HOSPITAL | Age: 70
End: 2025-07-01
Payer: MEDICARE

## 2025-07-01 DIAGNOSIS — C34.32 SMALL CELL CARCINOMA OF LOWER LOBE OF LEFT LUNG: ICD-10-CM

## 2025-07-01 LAB
BASOPHILS # BLD AUTO: 0.05 10*3/MM3 (ref 0–0.2)
BASOPHILS NFR BLD AUTO: 0.6 % (ref 0–1.5)
DEPRECATED RDW RBC AUTO: 47.5 FL (ref 37–54)
EOSINOPHIL # BLD AUTO: 0.03 10*3/MM3 (ref 0–0.4)
EOSINOPHIL NFR BLD AUTO: 0.4 % (ref 0.3–6.2)
ERYTHROCYTE [DISTWIDTH] IN BLOOD BY AUTOMATED COUNT: 13.7 % (ref 12.3–15.4)
HCT VFR BLD AUTO: 38.3 % (ref 34–46.6)
HGB BLD-MCNC: 13.2 G/DL (ref 12–15.9)
IMM GRANULOCYTES # BLD AUTO: 0.02 10*3/MM3 (ref 0–0.05)
IMM GRANULOCYTES NFR BLD AUTO: 0.3 % (ref 0–0.5)
LYMPHOCYTES # BLD AUTO: 1.85 10*3/MM3 (ref 0.7–3.1)
LYMPHOCYTES NFR BLD AUTO: 23.3 % (ref 19.6–45.3)
MCH RBC QN AUTO: 32.8 PG (ref 26.6–33)
MCHC RBC AUTO-ENTMCNC: 34.5 G/DL (ref 31.5–35.7)
MCV RBC AUTO: 95 FL (ref 79–97)
MONOCYTES # BLD AUTO: 0.48 10*3/MM3 (ref 0.1–0.9)
MONOCYTES NFR BLD AUTO: 6 % (ref 5–12)
NEUTROPHILS NFR BLD AUTO: 5.51 10*3/MM3 (ref 1.7–7)
NEUTROPHILS NFR BLD AUTO: 69.4 % (ref 42.7–76)
NRBC BLD AUTO-RTO: 0 /100 WBC (ref 0–0.2)
PLATELET # BLD AUTO: 328 10*3/MM3 (ref 140–450)
PMV BLD AUTO: 9.2 FL (ref 6–12)
RBC # BLD AUTO: 4.03 10*6/MM3 (ref 3.77–5.28)
WBC NRBC COR # BLD AUTO: 7.94 10*3/MM3 (ref 3.4–10.8)

## 2025-07-01 PROCEDURE — 36416 COLLJ CAPILLARY BLOOD SPEC: CPT

## 2025-07-01 PROCEDURE — 85025 COMPLETE CBC W/AUTO DIFF WBC: CPT | Performed by: INTERNAL MEDICINE

## 2025-07-01 NOTE — NURSING NOTE
Lab results discusses with patient. Pt without complaints at this time.  Lab Results   Component Value Date    WBC 7.94 07/01/2025    HGB 13.2 07/01/2025    HCT 38.3 07/01/2025    MCV 95.0 07/01/2025     07/01/2025

## 2025-07-08 ENCOUNTER — INFUSION (OUTPATIENT)
Dept: ONCOLOGY | Facility: HOSPITAL | Age: 70
End: 2025-07-08
Payer: MEDICARE

## 2025-07-08 ENCOUNTER — OFFICE VISIT (OUTPATIENT)
Dept: ONCOLOGY | Facility: CLINIC | Age: 70
End: 2025-07-08
Payer: MEDICARE

## 2025-07-08 VITALS
OXYGEN SATURATION: 96 % | DIASTOLIC BLOOD PRESSURE: 71 MMHG | SYSTOLIC BLOOD PRESSURE: 112 MMHG | HEIGHT: 63 IN | WEIGHT: 99.2 LBS | RESPIRATION RATE: 16 BRPM | BODY MASS INDEX: 17.58 KG/M2 | TEMPERATURE: 98.3 F | HEART RATE: 84 BPM

## 2025-07-08 DIAGNOSIS — C34.32 SMALL CELL CARCINOMA OF LOWER LOBE OF LEFT LUNG: Primary | ICD-10-CM

## 2025-07-08 DIAGNOSIS — Z45.2 FITTING AND ADJUSTMENT OF VASCULAR CATHETER: ICD-10-CM

## 2025-07-08 DIAGNOSIS — E87.6 HYPOKALEMIA: ICD-10-CM

## 2025-07-08 DIAGNOSIS — C34.32 SMALL CELL CARCINOMA OF LOWER LOBE OF LEFT LUNG: ICD-10-CM

## 2025-07-08 LAB
ALBUMIN SERPL-MCNC: 3.8 G/DL (ref 3.5–5.2)
ALBUMIN/GLOB SERPL: 1.7 G/DL
ALP SERPL-CCNC: 86 U/L (ref 39–117)
ALT SERPL W P-5'-P-CCNC: 14 U/L (ref 1–33)
ANION GAP SERPL CALCULATED.3IONS-SCNC: 13.1 MMOL/L (ref 5–15)
AST SERPL-CCNC: 26 U/L (ref 1–32)
BASOPHILS # BLD AUTO: 0.01 10*3/MM3 (ref 0–0.2)
BASOPHILS NFR BLD AUTO: 0.1 % (ref 0–1.5)
BILIRUB SERPL-MCNC: 0.2 MG/DL (ref 0–1.2)
BUN SERPL-MCNC: 4.3 MG/DL (ref 8–23)
BUN/CREAT SERPL: 4.6 (ref 7–25)
CALCIUM SPEC-SCNC: 8.9 MG/DL (ref 8.6–10.5)
CHLORIDE SERPL-SCNC: 96 MMOL/L (ref 98–107)
CO2 SERPL-SCNC: 25.9 MMOL/L (ref 22–29)
CREAT SERPL-MCNC: 0.93 MG/DL (ref 0.57–1)
DEPRECATED RDW RBC AUTO: 52.1 FL (ref 37–54)
EGFRCR SERPLBLD CKD-EPI 2021: 67.5 ML/MIN/1.73
EOSINOPHIL # BLD AUTO: 0.06 10*3/MM3 (ref 0–0.4)
EOSINOPHIL NFR BLD AUTO: 0.4 % (ref 0.3–6.2)
ERYTHROCYTE [DISTWIDTH] IN BLOOD BY AUTOMATED COUNT: 13.9 % (ref 12.3–15.4)
GLOBULIN UR ELPH-MCNC: 2.2 GM/DL
GLUCOSE SERPL-MCNC: 157 MG/DL (ref 65–99)
HCT VFR BLD AUTO: 38.5 % (ref 34–46.6)
HGB BLD-MCNC: 12.4 G/DL (ref 12–15.9)
IMM GRANULOCYTES # BLD AUTO: 0.08 10*3/MM3 (ref 0–0.05)
IMM GRANULOCYTES NFR BLD AUTO: 0.5 % (ref 0–0.5)
LYMPHOCYTES # BLD AUTO: 4.5 10*3/MM3 (ref 0.7–3.1)
LYMPHOCYTES NFR BLD AUTO: 26.5 % (ref 19.6–45.3)
MAGNESIUM SERPL-MCNC: 1.3 MG/DL (ref 1.6–2.4)
MCH RBC QN AUTO: 32.3 PG (ref 26.6–33)
MCHC RBC AUTO-ENTMCNC: 32.2 G/DL (ref 31.5–35.7)
MCV RBC AUTO: 100.3 FL (ref 79–97)
MONOCYTES # BLD AUTO: 1.26 10*3/MM3 (ref 0.1–0.9)
MONOCYTES NFR BLD AUTO: 7.4 % (ref 5–12)
NEUTROPHILS NFR BLD AUTO: 11.07 10*3/MM3 (ref 1.7–7)
NEUTROPHILS NFR BLD AUTO: 65.1 % (ref 42.7–76)
PLATELET # BLD AUTO: 466 10*3/MM3 (ref 140–450)
PMV BLD AUTO: 8.6 FL (ref 6–12)
POTASSIUM SERPL-SCNC: 2.9 MMOL/L (ref 3.5–5.2)
PROT SERPL-MCNC: 6 G/DL (ref 6–8.5)
RBC # BLD AUTO: 3.84 10*6/MM3 (ref 3.77–5.28)
SODIUM SERPL-SCNC: 135 MMOL/L (ref 136–145)
WBC NRBC COR # BLD AUTO: 16.98 10*3/MM3 (ref 3.4–10.8)

## 2025-07-08 PROCEDURE — 25810000003 SODIUM CHLORIDE 0.9 % SOLUTION: Performed by: INTERNAL MEDICINE

## 2025-07-08 PROCEDURE — 25810000003 SODIUM CHLORIDE 0.9 % SOLUTION 250 ML FLEX CONT: Performed by: INTERNAL MEDICINE

## 2025-07-08 PROCEDURE — 25010000002 MAGNESIUM SULFATE PER 500 MG OF MAGNESIUM: Performed by: INTERNAL MEDICINE

## 2025-07-08 PROCEDURE — 80053 COMPREHEN METABOLIC PANEL: CPT | Performed by: INTERNAL MEDICINE

## 2025-07-08 PROCEDURE — 25010000002 DEXAMETHASONE SODIUM PHOSPHATE 100 MG/10ML SOLUTION: Performed by: INTERNAL MEDICINE

## 2025-07-08 PROCEDURE — 25810000003 SODIUM CHLORIDE 0.9 % SOLUTION 500 ML FLEX CONT: Performed by: INTERNAL MEDICINE

## 2025-07-08 PROCEDURE — 25010000002 GRANISETRON PER 100 MCG: Performed by: INTERNAL MEDICINE

## 2025-07-08 PROCEDURE — 99214 OFFICE O/P EST MOD 30 MIN: CPT | Performed by: INTERNAL MEDICINE

## 2025-07-08 PROCEDURE — 96375 TX/PRO/DX INJ NEW DRUG ADDON: CPT

## 2025-07-08 PROCEDURE — 96366 THER/PROPH/DIAG IV INF ADDON: CPT

## 2025-07-08 PROCEDURE — 25010000002 LURBINECTEDIN 4 MG RECONSTITUTED SOLUTION 1 EACH VIAL: Performed by: INTERNAL MEDICINE

## 2025-07-08 PROCEDURE — 25010000002 POTASSIUM CHLORIDE PER 2 MEQ OF POTASSIUM: Performed by: INTERNAL MEDICINE

## 2025-07-08 PROCEDURE — 25010000002 HEPARIN LOCK FLUSH PER 10 UNITS: Performed by: INTERNAL MEDICINE

## 2025-07-08 PROCEDURE — 85025 COMPLETE CBC W/AUTO DIFF WBC: CPT | Performed by: INTERNAL MEDICINE

## 2025-07-08 PROCEDURE — 96413 CHEMO IV INFUSION 1 HR: CPT

## 2025-07-08 PROCEDURE — 83735 ASSAY OF MAGNESIUM: CPT | Performed by: INTERNAL MEDICINE

## 2025-07-08 PROCEDURE — 96367 TX/PROPH/DG ADDL SEQ IV INF: CPT

## 2025-07-08 RX ORDER — SODIUM CHLORIDE 9 MG/ML
20 INJECTION, SOLUTION INTRAVENOUS ONCE
Status: COMPLETED | OUTPATIENT
Start: 2025-07-08 | End: 2025-07-08

## 2025-07-08 RX ORDER — SODIUM CHLORIDE 9 MG/ML
20 INJECTION, SOLUTION INTRAVENOUS ONCE
Status: CANCELLED | OUTPATIENT
Start: 2025-07-08

## 2025-07-08 RX ORDER — HEPARIN SODIUM (PORCINE) LOCK FLUSH IV SOLN 100 UNIT/ML 100 UNIT/ML
500 SOLUTION INTRAVENOUS AS NEEDED
OUTPATIENT
Start: 2025-07-08

## 2025-07-08 RX ORDER — GRANISETRON HYDROCHLORIDE 1 MG/ML
1 INJECTION INTRAVENOUS ONCE
Status: CANCELLED | OUTPATIENT
Start: 2025-07-08

## 2025-07-08 RX ORDER — SODIUM CHLORIDE 0.9 % (FLUSH) 0.9 %
10 SYRINGE (ML) INJECTION AS NEEDED
Status: DISCONTINUED | OUTPATIENT
Start: 2025-07-08 | End: 2025-07-08 | Stop reason: HOSPADM

## 2025-07-08 RX ORDER — GRANISETRON HYDROCHLORIDE 1 MG/ML
1 INJECTION INTRAVENOUS ONCE
Status: COMPLETED | OUTPATIENT
Start: 2025-07-08 | End: 2025-07-08

## 2025-07-08 RX ORDER — POTASSIUM CHLORIDE 1500 MG/1
20 TABLET, EXTENDED RELEASE ORAL 2 TIMES DAILY
Qty: 60 TABLET | Refills: 3 | Status: SHIPPED | OUTPATIENT
Start: 2025-07-08

## 2025-07-08 RX ORDER — SODIUM CHLORIDE 0.9 % (FLUSH) 0.9 %
10 SYRINGE (ML) INJECTION AS NEEDED
OUTPATIENT
Start: 2025-07-08

## 2025-07-08 RX ORDER — HEPARIN SODIUM (PORCINE) LOCK FLUSH IV SOLN 100 UNIT/ML 100 UNIT/ML
500 SOLUTION INTRAVENOUS AS NEEDED
Status: DISCONTINUED | OUTPATIENT
Start: 2025-07-08 | End: 2025-07-08 | Stop reason: HOSPADM

## 2025-07-08 RX ADMIN — GRANISETRON HYDROCHLORIDE 1 MG: 1 INJECTION INTRAVENOUS at 10:44

## 2025-07-08 RX ADMIN — DEXAMETHASONE SODIUM PHOSPHATE 12 MG: 10 INJECTION, SOLUTION INTRAMUSCULAR; INTRAVENOUS at 10:44

## 2025-07-08 RX ADMIN — Medication 10 ML: at 14:22

## 2025-07-08 RX ADMIN — Medication 500 UNITS: at 14:22

## 2025-07-08 RX ADMIN — SODIUM CHLORIDE 20 ML/HR: 9 INJECTION, SOLUTION INTRAVENOUS at 10:44

## 2025-07-08 RX ADMIN — MAGNESIUM SULFATE HEPTAHYDRATE: 500 INJECTION, SOLUTION INTRAMUSCULAR; INTRAVENOUS at 12:11

## 2025-07-08 RX ADMIN — LURBINECTEDIN 4.5 MG: 0.5 INJECTION, POWDER, LYOPHILIZED, FOR SOLUTION INTRAVENOUS at 11:06

## 2025-07-08 NOTE — PROGRESS NOTES
Subjective     REASON FOR CONSULTATION:  small cell lung cancer  Provide an opinion on any further workup or treatment                             REQUESTING PHYSICIAN:  James    RECORDS OBTAINED:  Records of the patients history including those obtained from the referring provider were reviewed and summarized in detail.    HISTORY OF PRESENT ILLNESS:  The patient is a 69 y.o. year old female who is here for an opinion about the above issue.    History of Present Illness   The patient initiated chemoimmunotherapy with carboplatin/etoposide/atezolizumab 3/19/2024 and completed 4 cycles of carboplatin/etoposide/atezolizumab followed by maintenance atezolizumab.  Recent CT scan showed progression of disease involving hilar and mediastinal lymph nodes.  She is asymptomatic.  Treatment was changed to lurbinectedin which was initiated with cycle 1 on 6/17/2025.  She tolerated the first treatment well other than 2 days of moderate fatigue.  She denies nausea vomiting diarrhea worsening shortness of breath or pain.    Oncology History:  This is a 68-year-old woman with long history of tobacco use and COPD, degenerative disease of the spine on narcotic therapy, depression/anxiety, orthostatic hypotension, hyperlipidemia who has been followed with serial imaging for a left lower lobe lung nodule and mediastinal lymphadenopathy.  A CT of the chest 1/21/2023 showed a masslike consolidation in the left lower lobe 2.5 x 2.6 cm in size with a potential cavitary focus centrally surrounding haziness and otherwise groundglass opacities in the right middle lobe and right lower lobe and enlarged mediastinal lymph nodes up to 10 mm.  The findings were favored to be infectious or inflammatory.  A follow-up CT chest 7/28/2023 showed pleural-based area of density in the left upper lung 11 x 5 mm new from the previous exam and resolution of the consolidation in the left lower lobe.  A PET scan was performed 8/9/2023 which  showed the 1.1 cm pleural-based density in the posterior left lower lobe to blend into dependent atelectasis but have more intense activity than the atelectasis with maximum SUV 2.8.  There was hypermetabolic lymphadenopathy in the left hilum and left lower paratracheal regions for example lymph node posterior to the left pulmonary artery SUV 4.7 measuring 1.6 cm and another area of soft tissue lateral to the left mainstem bronchus SUV 4.4, ill-defined lymphadenopathy left lower paratracheal region SUV 3.7.  She underwent bronchoscopy with biopsies on 8/25/2023 with samples from stations 11 R, 4R, 7, 10 L, 11 L, 12 L all negative for malignancy; station 4R showed rare atypical reactive epithelial cells.  A follow-up CT of the chest on 2/6/2024 showed the left lower lobe nodule to be enlarging at 1.6 x 0.9 cm and enlarging precarinal lymphadenopathy concerning for metastatic disease.  The patient underwent repeat bronchoscopy with Ion navigation on 2/28/2024; biopsies from the left lower lobe nodule were positive for small cell carcinoma and a level 4 lymph node biopsied also positive for small cell carcinoma.    Full body PET scan on 3/7/2024 showed significantly avid bilateral hilar and mediastinal lymph nodes for example [employer] AP window 8.3 SUV measuring 2.5 x 1.8 cm, left hilar lymph node SUV 7 1.4 cm, pleural-based nodularity left major fissure newly hypermetabolic SUV 2.2, pleural-based partially cavitary mass left lower lobe SUV five 1.5 x 1 cm, new right lung nodule 8 mm in the right lower lobe suspicious.  MRI of the brain negative.    The patient has comorbidities of COPD but minimally symptomatic, no known cardiac disease, kidney disease, liver disease, diabetes etc.  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    The patient initiated chemoimmunotherapy with carboplatin/etoposide/atezolizumab 3/19/2024.  She completed 4 cycles of carboplatin/etoposide/atezolizumab on 5/23/2024.    A PET scan on  2024 showed a residual 6 mm pleural-based nodule left lower lobe to be photopenic, low-level subpleural activity adjacently and inferiorly SUV 1.9, resolution of left mediastinal and left hilar lymphadenopathy, marked decrease mediastinal and left hilar lymph nodes have low-level activity SUV 2.6, no new hypermetabolic pulmonary opacities, no hypermetabolic activity in the abdomen, pelvis or bones.    CT chest abdomen pelvis 2024-subpleural parenchymal scarring posterior left lower lobe.  No residual pulmonary nodule, stable subcentimeter low-density mass right lobe of the liver favored to be a cyst or hemangioma, stable sclerotic lesion right iliac bone.  MRI brain-old bilateral basal ganglia and occipital infarcts, no metastatic disease.    CT chest abdomen pelvis 6/3/2025 showed progression of disease involving mediastinal and hilar lymph nodes.  MRI brain showed probable 3 mm focus of acute or subacute infarct in the left frontal operculum.    Patient initiated lurbinectedin on 2025.    Past Medical History:   Diagnosis Date    COPD (chronic obstructive pulmonary disease)     COPD with acute exacerbation 2020    Small cell carcinoma 3/13/2024        Past Surgical History:   Procedure Laterality Date    BRONCHOSCOPY N/A 2023    Procedure: BRONCHOSCOPY WITH ENDOBRONCHIAL ULTRASOUND (EBUS) with FNA;  Surgeon: Coy Mccarthy MD;  Location: [facility] ENDOSCOPY;  Service: Pulmonary;  Laterality: N/A;  Pre/Post - mediastinal and hilar lymphadenopathy.    BRONCHOSCOPY WITH ION ROBOTIC ASSIST N/A 2024    Procedure: BRONCHOSCOPY WITH ION ROBOT,  ENDOBRONCHIAL ULTRASOUND, FINE NEEDLE ASPIRATIONS,  CRYOTHERAPY BIOPSIES, AND LEFT LOWER LOBE BRONCHOALVEOLAR LAVAGE.;  Surgeon: Alexia Velásquez MD;  Location: [facility] ENDOSCOPY;  Service: Robotics - Pulmonary;  Laterality: N/A;     SECTION      CHEST TUBE INSERTION Left 2024    Procedure: CHEST TUBE INSERTION;  Surgeon: Alexia Velásquez  MD;  Location: [facility] ENDOSCOPY;  Service: Thoracic;  Laterality: Left;    CHOLECYSTECTOMY      COLONOSCOPY      ECTOPIC PREGNANCY SURGERY      HYSTERECTOMY      TONSILLECTOMY      TOTAL HIP ARTHROPLASTY Left     VENOUS ACCESS DEVICE (PORT) INSERTION N/A 3/14/2024    Procedure: INSERTION VENOUS ACCESS DEVICE;  Surgeon: Jonas Garner MD;  Location: [facility];  Service: General;  Laterality: N/A;        Current Outpatient Medications on File Prior to Visit   Medication Sig Dispense Refill    albuterol sulfate  (90 Base) MCG/ACT inhaler Inhale 2 puffs Every 4 (Four) Hours As Needed for Wheezing. Takes as needed.      apixaban (ELIQUIS) 5 MG tablet tablet Take 1 tablet by mouth Every 12 (Twelve) Hours. Indications: Other - full anticoagulation (Patient taking differently: Take 1 tablet by mouth Every 12 (Twelve) Hours.) 180 tablet 0    budesonide-formoterol (SYMBICORT) 160-4.5 MCG/ACT inhaler Inhale 2 puffs 2 (Two) Times a Day.  12    calcium carbonate (TUMS) 500 MG chewable tablet Chew 1 tablet 2 (Two) Times a Day.      Cyanocobalamin (Vitamin B-12) 500 MCG sublingual tablet PLACE 1 TABLET UNDER THE TONGUE DAILY 90 tablet 1    Denosumab (PROLIA SC) Inject  under the skin into the appropriate area as directed Every 6 (Six) Months.      diazePAM (VALIUM) 10 MG tablet Take 1 tablet by mouth 2 (Two) Times a Day As Needed for Anxiety (RLS.). Takes 20 mg at bedtime at times when she needs.      diphenoxylate-atropine (LOMOTIL) 2.5-0.025 MG per tablet Take 1 tablet by mouth 4 (Four) Times a Day As Needed for Diarrhea. 15 tablet 0    DULoxetine (CYMBALTA) 30 MG capsule Take 2 capsules by mouth Daily. 30 capsule 2    folic acid (FOLVITE) 800 MCG tablet TAKE 1 TABLET BY MOUTH DAILY 90 tablet 1    HYDROcodone-acetaminophen (NORCO)  MG per tablet Take 1 tablet by mouth 3 (Three) Times a Day As Needed for Moderate Pain.      lactobacillus acidophilus (RISAQUAD) capsule capsule Take 1 capsule by mouth 2 (Two)  Times a Day. 60 capsule 0    levETIRAcetam (KEPPRA) 500 MG tablet Take 1 tablet by mouth Every 12 (Twelve) Hours. 180 tablet 0    midodrine (PROAMATINE) 2.5 MG tablet Take 1 tablet by mouth 3 (Three) Times a Day Before Meals.      multivitamin with minerals (PRESERVISION AREDS PO) Take 1 tablet by mouth 2 (Two) Times a Day.      naloxone (NARCAN) 4 MG/0.1ML nasal spray Administer 1 spray into the nostril(s) as directed by provider As Needed.      OLANZapine (zyPREXA) 2.5 MG tablet TAKE ONE TABLET BY MOUTH ONCE NIGHTLY 30 tablet 2    OLANZapine (zyPREXA) 5 MG tablet Take 1 tablet by mouth Every Night. 30 tablet 3    ondansetron (ZOFRAN) 8 MG tablet Take 1 tablet by mouth 3 (Three) Times a Day As Needed for Nausea or Vomiting. 30 tablet 5    pantoprazole (PROTONIX) 40 MG EC tablet Take 1 tablet by mouth Daily. 30 tablet 3    potassium chloride (Klor-Con M20) 20 MEQ CR tablet Take 1 tablet by mouth 2 (Two) Times a Day. 60 tablet 3    predniSONE (DELTASONE) 10 MG tablet Take 1 tablet by mouth Daily. 30 tablet 0    rosuvastatin (Crestor) 10 MG tablet Take 1 tablet by mouth Every Night. 30 tablet 11    sucralfate (CARAFATE) 1 g tablet TAKE 1 TABLET BY MOUTH 4 TIMES A  tablet 3     Current Facility-Administered Medications on File Prior to Visit   Medication Dose Route Frequency Provider Last Rate Last Admin    heparin injection 500 Units  500 Units Intravenous PRN Nikita Burns MD   500 Units at 06/10/25 1448    sodium chloride 0.9 % flush 10 mL  10 mL Intravenous PRN Nikita Burns MD   10 mL at 06/10/25 1448        ALLERGIES:    Allergies   Allergen Reactions    Codeine GI Intolerance    Percocet [Oxycodone-Acetaminophen] Hives        Social History     Socioeconomic History    Marital status:    Tobacco Use    Smoking status: Every Day     Current packs/day: 1.00     Average packs/day: 1 pack/day for 30.0 years (30.0 ttl pk-yrs)     Types: Cigarettes     Passive exposure: Current    Smokeless  "tobacco: Never    Tobacco comments:     Began smoking at age 11.  Smoked .5 ppd for 6 years, 2.5 ppd for a year, 2 ppd for 5 years, and 1 ppd for the past 43 years for a 58.5 pack year history.   Vaping Use    Vaping status: Former    Substances: Nicotine, Flavoring    Devices: Disposable   Substance and Sexual Activity    Alcohol use: Yes     Comment: once a year     Drug use: No    Sexual activity: Defer        Family History   Problem Relation Age of Onset    Lung cancer Niece 50    Throat cancer Father     Breast cancer Neg Hx         Review of Systems   Constitutional:  Positive for activity change, appetite change and fatigue. Negative for unexpected weight change.   HENT: Negative.     Eyes: Negative.    Respiratory:  Positive for shortness of breath.    Cardiovascular: Negative.    Gastrointestinal: Negative.    Musculoskeletal:  Positive for back pain.   Neurological:  Negative for light-headedness.   Hematological: Negative.    Psychiatric/Behavioral:  Positive for dysphoric mood. The patient is nervous/anxious.    Unchanged 7/8/25    Objective     Vitals:    07/08/25 0918   BP: 112/71   Pulse: 84   Resp: 16   Temp: 98.3 °F (36.8 °C)   TempSrc: Infrared   SpO2: 96%  Comment: oxygen on 2   Weight: 45 kg (99 lb 3.2 oz)   Height: 160 cm (62.99\")   PainSc: 0-No pain                     7/8/2025     9:19 AM   Current Status   ECOG score 0       Physical Exam    CONSTITUTIONAL: pleasant chronic ill-appearing woman   HEENT: no icterus, no thrush, moist membranes, anisocoria  LYMPH: no cervical or supraclavicular lad  CV: RRR, S1S2, no murmur  RESP: bilateral wheezing, O2 by nasal cannula 1 L  GI: soft, non-tender, no splenomegaly, +bs  MUSC: no edema, normal gait  NEURO: alert and oriented x3, normal strength  PSYCH: Dysphoric mood and flat affect  Skin: No rash or induration noted   Unchanged 7/8/25025    RECENT LABS:  Hematology WBC   Date Value Ref Range Status   07/08/2025 16.98 (H) 3.40 - 10.80 10*3/mm3 Final "     RBC   Date Value Ref Range Status   07/08/2025 3.84 3.77 - 5.28 10*6/mm3 Final     Hemoglobin   Date Value Ref Range Status   07/08/2025 12.4 12.0 - 15.9 g/dL Final     Hematocrit   Date Value Ref Range Status   07/08/2025 38.5 34.0 - 46.6 % Final     Platelets   Date Value Ref Range Status   07/08/2025 466 (H) 140 - 450 10*3/mm3 Final        Lab Results   Component Value Date    GLUCOSE 157 (H) 07/08/2025    BUN 4.3 (L) 07/08/2025    CREATININE 0.93 07/08/2025    EGFR 67.5 07/08/2025    BCR 4.6 (L) 07/08/2025    K 2.9 (L) 07/08/2025    CO2 25.9 07/08/2025    CALCIUM 8.9 07/08/2025    ALBUMIN 3.8 07/08/2025    BILITOT 0.2 07/08/2025    AST 26 07/08/2025    ALT 14 07/08/2025         Assessment & Plan     *Small cell lung cancer left lower lobe of the lung with mediastinal involvement, contralateral lung nodule suspicious for metastatic disease/extensive stage  CT chest 2/22/2024-enlarged f4L lymph nodes 2.5 cm, enlarged AP window lymph node 1.4 cm, poorly defined left hilar lymphadenopathy, left lower lobe pulmonary nodule 1.7 cm  Bronchoscopy with Ion navigation performed 2/28/2024-pathology positive for small cell carcinoma from the left lower lobe and station 4L  PET scan on 3/7/2024 showed significantly avid bilateral hilar and mediastinal lymph nodes for example [employer] AP window 8.3 SUV measuring 2.5 x 1.8 cm, left hilar lymph node SUV 7 1.4 cm, pleural-based nodularity left major fissure newly hypermetabolic SUV 2.2, pleural-based partially cavitary mass left lower lobe SUV five 1.5 x 1 cm, new right lung nodule 8 mm in the right lower lobe suspicious.-Results discussed with Dr. Velásquez and we both feel the contralateral right lung nodule is highly suspicious for metastatic disease  MRI of the brain negative.  3/19/2024.initiated chemoimmunotherapy with carboplatin/etoposide/atezolizumab; completed 4 cycles of carboplatin/etoposide/atezolizumab 5/23/2024 6/6/2024 PET scan: showed a residual 6 mm  pleural-based nodule left lower lobe to be photopenic, low-level subpleural activity adjacently and inferiorly SUV 1.9, resolution of left mediastinal and left hilar lymphadenopathy, marked decrease mediastinal and left hilar lymph nodes have low-level activity SUV 2.6, no new hypermetabolic pulmonary opacities, no hypermetabolic activity in the abdomen, pelvis or bones  CT chest abdomen pelvis 9/17/2024-subpleural parenchymal scarring posterior left lower lobe.  No residual pulmonary nodule, stable subcentimeter low-density mass right lobe of the liver favored to be a cyst or hemangioma, stable sclerotic lesion right iliac bone.  MRI brain-old bilateral basal ganglia and occipital infarcts, no metastatic disease.  12/14/2024 CT chest abdomen pelvis-no evidence of disease progression  CT chest abdomen pelvis and MRI brain 3/11/2025-no evidence of disease progression  CT chest abdomen pelvis 6/3/2025-progression of disease involving mediastinal and hilar lymph nodes  6/17/2025-C1 lurbinectedin  *anemia/thrombocytopenia secondary to chemotherapy  Hemoglobin improved 12.4  *Fatigue-normal TSH free T3 free T4 cortisol and ACTH   *Depression/Zyprexa 5 mg nightly/Cymbalta 30 mg daily  *Recent pneumonia now with chronic hypoxic respiratory failure on O2  *Stroke-patient recently noncompliant with her anticoagulation secondary to cost  *Comorbidities of tobacco abuse/COPD, anxiety, atherosclerotic calcifications by CT but no definitive diagnosis of CAD, degenerative disease of the spine, hyperlipidemia; no known autoimmune disorders, chronic pain on opioid medicines    Oncology plan/recommendations:  Proceed with cycle 2 lurbinectedin today  Continue Cymbalta to 60 mg daily and Zyprexa 5 mg nightly  Continue prednisone 10 mg daily  3-week follow-up with lab and CT chest if improvement in disease continuation of lurbinectedin  I recommended the patient to be more compliant with her Eliquis to prevent strokes or if too  expensive at least take aspirin 81 mg daily

## 2025-07-22 ENCOUNTER — HOSPITAL ENCOUNTER (OUTPATIENT)
Dept: CT IMAGING | Facility: HOSPITAL | Age: 70
Discharge: HOME | End: 2025-07-22
Payer: MEDICARE

## 2025-07-22 ENCOUNTER — INFUSION (OUTPATIENT)
Dept: ONCOLOGY | Facility: HOSPITAL | Age: 70
End: 2025-07-22
Payer: MEDICARE

## 2025-07-22 DIAGNOSIS — Z45.2 FITTING AND ADJUSTMENT OF VASCULAR CATHETER: Primary | ICD-10-CM

## 2025-07-22 DIAGNOSIS — C34.32 SMALL CELL CARCINOMA OF LOWER LOBE OF LEFT LUNG: ICD-10-CM

## 2025-07-22 PROCEDURE — 25010000002 HEPARIN LOCK FLUSH PER 10 UNITS: Performed by: INTERNAL MEDICINE

## 2025-07-22 PROCEDURE — 71260 CT THORAX DX C+: CPT

## 2025-07-22 PROCEDURE — 25510000001 IOPAMIDOL 61 % SOLUTION: Performed by: INTERNAL MEDICINE

## 2025-07-22 RX ORDER — HEPARIN SODIUM (PORCINE) LOCK FLUSH IV SOLN 100 UNIT/ML 100 UNIT/ML
500 SOLUTION INTRAVENOUS AS NEEDED
OUTPATIENT
Start: 2025-07-22

## 2025-07-22 RX ORDER — SODIUM CHLORIDE 0.9 % (FLUSH) 0.9 %
10 SYRINGE (ML) INJECTION AS NEEDED
Status: DISCONTINUED | OUTPATIENT
Start: 2025-07-22 | End: 2025-07-22 | Stop reason: HOSPADM

## 2025-07-22 RX ORDER — SODIUM CHLORIDE 0.9 % (FLUSH) 0.9 %
10 SYRINGE (ML) INJECTION AS NEEDED
OUTPATIENT
Start: 2025-07-22

## 2025-07-22 RX ORDER — HEPARIN SODIUM (PORCINE) LOCK FLUSH IV SOLN 100 UNIT/ML 100 UNIT/ML
500 SOLUTION INTRAVENOUS AS NEEDED
Status: DISCONTINUED | OUTPATIENT
Start: 2025-07-22 | End: 2025-07-22 | Stop reason: HOSPADM

## 2025-07-22 RX ORDER — IOPAMIDOL 612 MG/ML
100 INJECTION, SOLUTION INTRAVASCULAR
Status: COMPLETED | OUTPATIENT
Start: 2025-07-22 | End: 2025-07-22

## 2025-07-22 RX ADMIN — Medication 500 UNITS: at 11:09

## 2025-07-22 RX ADMIN — IOPAMIDOL 100 ML: 612 INJECTION, SOLUTION INTRAVENOUS at 10:33

## 2025-07-22 RX ADMIN — Medication 10 ML: at 11:09

## 2025-07-29 ENCOUNTER — INFUSION (OUTPATIENT)
Dept: ONCOLOGY | Facility: HOSPITAL | Age: 70
End: 2025-07-29
Payer: MEDICARE

## 2025-07-29 ENCOUNTER — OFFICE VISIT (OUTPATIENT)
Dept: ONCOLOGY | Facility: CLINIC | Age: 70
End: 2025-07-29
Payer: MEDICARE

## 2025-07-29 VITALS
DIASTOLIC BLOOD PRESSURE: 74 MMHG | OXYGEN SATURATION: 98 % | WEIGHT: 94.8 LBS | HEIGHT: 63 IN | TEMPERATURE: 98.8 F | HEART RATE: 88 BPM | SYSTOLIC BLOOD PRESSURE: 97 MMHG | BODY MASS INDEX: 16.8 KG/M2 | RESPIRATION RATE: 16 BRPM

## 2025-07-29 DIAGNOSIS — C34.32 SMALL CELL CARCINOMA OF LOWER LOBE OF LEFT LUNG: Primary | ICD-10-CM

## 2025-07-29 DIAGNOSIS — Z45.2 FITTING AND ADJUSTMENT OF VASCULAR CATHETER: ICD-10-CM

## 2025-07-29 DIAGNOSIS — C34.32 SMALL CELL CARCINOMA OF LOWER LOBE OF LEFT LUNG: ICD-10-CM

## 2025-07-29 LAB
ALBUMIN SERPL-MCNC: 3.7 G/DL (ref 3.5–5.2)
ALBUMIN/GLOB SERPL: 1.5 G/DL
ALP SERPL-CCNC: 110 U/L (ref 39–117)
ALT SERPL W P-5'-P-CCNC: 6 U/L (ref 1–33)
ANION GAP SERPL CALCULATED.3IONS-SCNC: 13.1 MMOL/L (ref 5–15)
AST SERPL-CCNC: 21 U/L (ref 1–32)
BASOPHILS # BLD AUTO: 0.02 10*3/MM3 (ref 0–0.2)
BASOPHILS NFR BLD AUTO: 0.2 % (ref 0–1.5)
BILIRUB SERPL-MCNC: 0.2 MG/DL (ref 0–1.2)
BUN SERPL-MCNC: 4.6 MG/DL (ref 8–23)
BUN/CREAT SERPL: 4.3 (ref 7–25)
CALCIUM SPEC-SCNC: 9.4 MG/DL (ref 8.6–10.5)
CHLORIDE SERPL-SCNC: 94 MMOL/L (ref 98–107)
CO2 SERPL-SCNC: 24.9 MMOL/L (ref 22–29)
CREAT SERPL-MCNC: 1.08 MG/DL (ref 0.57–1)
DEPRECATED RDW RBC AUTO: 51.9 FL (ref 37–54)
EGFRCR SERPLBLD CKD-EPI 2021: 56.4 ML/MIN/1.73
EOSINOPHIL # BLD AUTO: 0.15 10*3/MM3 (ref 0–0.4)
EOSINOPHIL NFR BLD AUTO: 1.7 % (ref 0.3–6.2)
ERYTHROCYTE [DISTWIDTH] IN BLOOD BY AUTOMATED COUNT: 13.6 % (ref 12.3–15.4)
GLOBULIN UR ELPH-MCNC: 2.5 GM/DL
GLUCOSE SERPL-MCNC: 189 MG/DL (ref 65–99)
HCT VFR BLD AUTO: 41.7 % (ref 34–46.6)
HGB BLD-MCNC: 13.6 G/DL (ref 12–15.9)
IMM GRANULOCYTES # BLD AUTO: 0.03 10*3/MM3 (ref 0–0.05)
IMM GRANULOCYTES NFR BLD AUTO: 0.3 % (ref 0–0.5)
LYMPHOCYTES # BLD AUTO: 3.46 10*3/MM3 (ref 0.7–3.1)
LYMPHOCYTES NFR BLD AUTO: 38.8 % (ref 19.6–45.3)
MAGNESIUM SERPL-MCNC: 1.5 MG/DL (ref 1.6–2.4)
MCH RBC QN AUTO: 32.8 PG (ref 26.6–33)
MCHC RBC AUTO-ENTMCNC: 32.6 G/DL (ref 31.5–35.7)
MCV RBC AUTO: 100.5 FL (ref 79–97)
MONOCYTES # BLD AUTO: 0.85 10*3/MM3 (ref 0.1–0.9)
MONOCYTES NFR BLD AUTO: 9.5 % (ref 5–12)
NEUTROPHILS NFR BLD AUTO: 4.4 10*3/MM3 (ref 1.7–7)
NEUTROPHILS NFR BLD AUTO: 49.5 % (ref 42.7–76)
PLATELET # BLD AUTO: 488 10*3/MM3 (ref 140–450)
PMV BLD AUTO: 9 FL (ref 6–12)
POTASSIUM SERPL-SCNC: 3.9 MMOL/L (ref 3.5–5.2)
PROT SERPL-MCNC: 6.2 G/DL (ref 6–8.5)
RBC # BLD AUTO: 4.15 10*6/MM3 (ref 3.77–5.28)
SODIUM SERPL-SCNC: 132 MMOL/L (ref 136–145)
WBC NRBC COR # BLD AUTO: 8.91 10*3/MM3 (ref 3.4–10.8)

## 2025-07-29 PROCEDURE — 25810000003 SODIUM CHLORIDE 0.9 % SOLUTION 250 ML FLEX CONT: Performed by: INTERNAL MEDICINE

## 2025-07-29 PROCEDURE — 1126F AMNT PAIN NOTED NONE PRSNT: CPT | Performed by: INTERNAL MEDICINE

## 2025-07-29 PROCEDURE — 96375 TX/PRO/DX INJ NEW DRUG ADDON: CPT

## 2025-07-29 PROCEDURE — 25010000002 HEPARIN LOCK FLUSH PER 10 UNITS: Performed by: INTERNAL MEDICINE

## 2025-07-29 PROCEDURE — G2211 COMPLEX E/M VISIT ADD ON: HCPCS | Performed by: INTERNAL MEDICINE

## 2025-07-29 PROCEDURE — 80053 COMPREHEN METABOLIC PANEL: CPT | Performed by: INTERNAL MEDICINE

## 2025-07-29 PROCEDURE — 96413 CHEMO IV INFUSION 1 HR: CPT

## 2025-07-29 PROCEDURE — 25010000002 GRANISETRON PER 100 MCG: Performed by: INTERNAL MEDICINE

## 2025-07-29 PROCEDURE — 25010000002 LURBINECTEDIN 4 MG RECONSTITUTED SOLUTION 1 EACH VIAL: Performed by: INTERNAL MEDICINE

## 2025-07-29 PROCEDURE — 83735 ASSAY OF MAGNESIUM: CPT | Performed by: INTERNAL MEDICINE

## 2025-07-29 PROCEDURE — 25010000002 DEXAMETHASONE SODIUM PHOSPHATE 100 MG/10ML SOLUTION: Performed by: INTERNAL MEDICINE

## 2025-07-29 PROCEDURE — 85025 COMPLETE CBC W/AUTO DIFF WBC: CPT | Performed by: INTERNAL MEDICINE

## 2025-07-29 PROCEDURE — 25810000003 SODIUM CHLORIDE 0.9 % SOLUTION: Performed by: INTERNAL MEDICINE

## 2025-07-29 PROCEDURE — 99214 OFFICE O/P EST MOD 30 MIN: CPT | Performed by: INTERNAL MEDICINE

## 2025-07-29 RX ORDER — HEPARIN SODIUM (PORCINE) LOCK FLUSH IV SOLN 100 UNIT/ML 100 UNIT/ML
500 SOLUTION INTRAVENOUS AS NEEDED
OUTPATIENT
Start: 2025-07-29

## 2025-07-29 RX ORDER — SODIUM CHLORIDE 0.9 % (FLUSH) 0.9 %
10 SYRINGE (ML) INJECTION AS NEEDED
OUTPATIENT
Start: 2025-07-29

## 2025-07-29 RX ORDER — SODIUM CHLORIDE 9 MG/ML
20 INJECTION, SOLUTION INTRAVENOUS ONCE
OUTPATIENT
Start: 2025-08-19

## 2025-07-29 RX ORDER — GRANISETRON HYDROCHLORIDE 1 MG/ML
1 INJECTION INTRAVENOUS ONCE
Status: CANCELLED | OUTPATIENT
Start: 2025-07-29

## 2025-07-29 RX ORDER — SODIUM CHLORIDE 9 MG/ML
20 INJECTION, SOLUTION INTRAVENOUS ONCE
Status: COMPLETED | OUTPATIENT
Start: 2025-07-29 | End: 2025-07-29

## 2025-07-29 RX ORDER — SODIUM CHLORIDE 9 MG/ML
20 INJECTION, SOLUTION INTRAVENOUS ONCE
Status: CANCELLED | OUTPATIENT
Start: 2025-07-29

## 2025-07-29 RX ORDER — GRANISETRON HYDROCHLORIDE 1 MG/ML
1 INJECTION INTRAVENOUS ONCE
Status: COMPLETED | OUTPATIENT
Start: 2025-07-29 | End: 2025-07-29

## 2025-07-29 RX ORDER — SODIUM CHLORIDE 0.9 % (FLUSH) 0.9 %
10 SYRINGE (ML) INJECTION AS NEEDED
Status: DISCONTINUED | OUTPATIENT
Start: 2025-07-29 | End: 2025-07-29 | Stop reason: HOSPADM

## 2025-07-29 RX ORDER — POTASSIUM CHLORIDE 750 MG/1
10 CAPSULE, EXTENDED RELEASE ORAL 2 TIMES DAILY
COMMUNITY

## 2025-07-29 RX ORDER — GRANISETRON HYDROCHLORIDE 1 MG/ML
1 INJECTION INTRAVENOUS ONCE
OUTPATIENT
Start: 2025-08-19

## 2025-07-29 RX ORDER — HEPARIN SODIUM (PORCINE) LOCK FLUSH IV SOLN 100 UNIT/ML 100 UNIT/ML
500 SOLUTION INTRAVENOUS AS NEEDED
Status: DISCONTINUED | OUTPATIENT
Start: 2025-07-29 | End: 2025-07-29 | Stop reason: HOSPADM

## 2025-07-29 RX ADMIN — GRANISETRON HYDROCHLORIDE 1 MG: 1 INJECTION, SOLUTION INTRAVENOUS at 10:17

## 2025-07-29 RX ADMIN — Medication 500 UNITS: at 11:45

## 2025-07-29 RX ADMIN — DEXAMETHASONE SODIUM PHOSPHATE 12 MG: 10 INJECTION, SOLUTION INTRAMUSCULAR; INTRAVENOUS at 10:18

## 2025-07-29 RX ADMIN — LURBINECTEDIN 4.5 MG: 0.5 INJECTION, POWDER, LYOPHILIZED, FOR SOLUTION INTRAVENOUS at 10:42

## 2025-07-29 RX ADMIN — SODIUM CHLORIDE 20 ML/HR: 900 INJECTION, SOLUTION INTRAVENOUS at 10:17

## 2025-07-29 NOTE — PROGRESS NOTES
Subjective     REASON FOR CONSULTATION:  small cell lung cancer  Provide an opinion on any further workup or treatment                             REQUESTING PHYSICIAN:  James    RECORDS OBTAINED:  Records of the patients history including those obtained from the referring provider were reviewed and summarized in detail.    HISTORY OF PRESENT ILLNESS:  The patient is a 69 y.o. year old female who is here for an opinion about the above issue.    History of Present Illness   The patient initiated chemoimmunotherapy with carboplatin/etoposide/atezolizumab 3/19/2024 and completed 4 cycles of carboplatin/etoposide/atezolizumab followed by maintenance atezolizumab.  Recent CT scan showed progression of disease involving hilar and mediastinal lymph nodes.   Treatment was changed to lurbinectedin which was initiated with cycle 1 on 6/17/2025.  She has completed 2 cycles of treatment.  She is tolerating the treatments reasonably well.  She has fatigue after treatment for about a week.  She denies uncontrolled nausea vomiting diarrhea.    Oncology History:  This is a 68-year-old woman with long history of tobacco use and COPD, degenerative disease of the spine on narcotic therapy, depression/anxiety, orthostatic hypotension, hyperlipidemia who has been followed with serial imaging for a left lower lobe lung nodule and mediastinal lymphadenopathy.  A CT of the chest 1/21/2023 showed a masslike consolidation in the left lower lobe 2.5 x 2.6 cm in size with a potential cavitary focus centrally surrounding haziness and otherwise groundglass opacities in the right middle lobe and right lower lobe and enlarged mediastinal lymph nodes up to 10 mm.  The findings were favored to be infectious or inflammatory.  A follow-up CT chest 7/28/2023 showed pleural-based area of density in the left upper lung 11 x 5 mm new from the previous exam and resolution of the consolidation in the left lower lobe.  A PET scan was performed  8/9/2023 which showed the 1.1 cm pleural-based density in the posterior left lower lobe to blend into dependent atelectasis but have more intense activity than the atelectasis with maximum SUV 2.8.  There was hypermetabolic lymphadenopathy in the left hilum and left lower paratracheal regions for example lymph node posterior to the left pulmonary artery SUV 4.7 measuring 1.6 cm and another area of soft tissue lateral to the left mainstem bronchus SUV 4.4, ill-defined lymphadenopathy left lower paratracheal region SUV 3.7.  She underwent bronchoscopy with biopsies on 8/25/2023 with samples from stations 11 R, 4R, 7, 10 L, 11 L, 12 L all negative for malignancy; station 4R showed rare atypical reactive epithelial cells.  A follow-up CT of the chest on 2/6/2024 showed the left lower lobe nodule to be enlarging at 1.6 x 0.9 cm and enlarging precarinal lymphadenopathy concerning for metastatic disease.  The patient underwent repeat bronchoscopy with Ion navigation on 2/28/2024; biopsies from the left lower lobe nodule were positive for small cell carcinoma and a level 4 lymph node biopsied also positive for small cell carcinoma.    Full body PET scan on 3/7/2024 showed significantly avid bilateral hilar and mediastinal lymph nodes for example kylah [employer] AP window 8.3 SUV measuring 2.5 x 1.8 cm, left hilar lymph node SUV 7 1.4 cm, pleural-based nodularity left major fissure newly hypermetabolic SUV 2.2, pleural-based partially cavitary mass left lower lobe SUV five 1.5 x 1 cm, new right lung nodule 8 mm in the right lower lobe suspicious.  MRI of the brain negative.    The patient has comorbidities of COPD but minimally symptomatic, no known cardiac disease, kidney disease, liver disease, diabetes etc.  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    The patient initiated chemoimmunotherapy with carboplatin/etoposide/atezolizumab 3/19/2024.  She completed 4 cycles of carboplatin/etoposide/atezolizumab on 5/23/2024.    A  PET scan on 6/6/2024 showed a residual 6 mm pleural-based nodule left lower lobe to be photopenic, low-level subpleural activity adjacently and inferiorly SUV 1.9, resolution of left mediastinal and left hilar lymphadenopathy, marked decrease mediastinal and left hilar lymph nodes have low-level activity SUV 2.6, no new hypermetabolic pulmonary opacities, no hypermetabolic activity in the abdomen, pelvis or bones.    CT chest abdomen pelvis 9/17/2024-subpleural parenchymal scarring posterior left lower lobe.  No residual pulmonary nodule, stable subcentimeter low-density mass right lobe of the liver favored to be a cyst or hemangioma, stable sclerotic lesion right iliac bone.  MRI brain-old bilateral basal ganglia and occipital infarcts, no metastatic disease.    CT chest abdomen pelvis 6/3/2025 showed progression of disease involving mediastinal and hilar lymph nodes.  MRI brain showed probable 3 mm focus of acute or subacute infarct in the left frontal operculum.    Patient initiated lurbinectedin on 6/17/2025.    After 2 cycles of lurbinectedin a repeat CT of the chest on 7/22/2025 showed interval resolution of mediastinal and hilar lymphadenopathy and stable cystic mass in the tail of the pancreas.    Past Medical History:   Diagnosis Date    COPD (chronic obstructive pulmonary disease)     COPD with acute exacerbation 09/28/2020    Small cell carcinoma 3/13/2024        Past Surgical History:   Procedure Laterality Date    BRONCHOSCOPY N/A 8/25/2023    Procedure: BRONCHOSCOPY WITH ENDOBRONCHIAL ULTRASOUND (EBUS) with FNA;  Surgeon: Coy Mccarthy MD;  Location: [facility] ENDOSCOPY;  Service: Pulmonary;  Laterality: N/A;  Pre/Post - mediastinal and hilar lymphadenopathy.    BRONCHOSCOPY WITH ION ROBOTIC ASSIST N/A 2/28/2024    Procedure: BRONCHOSCOPY WITH ION ROBOT,  ENDOBRONCHIAL ULTRASOUND, FINE NEEDLE ASPIRATIONS,  CRYOTHERAPY BIOPSIES, AND LEFT LOWER LOBE BRONCHOALVEOLAR LAVAGE.;  Surgeon: Alexia Velásquez  MD;  Location: [facility] ENDOSCOPY;  Service: Robotics - Pulmonary;  Laterality: N/A;     SECTION      CHEST TUBE INSERTION Left 2024    Procedure: CHEST TUBE INSERTION;  Surgeon: Alexia Velásquez MD;  Location: [facility] ENDOSCOPY;  Service: Thoracic;  Laterality: Left;    CHOLECYSTECTOMY      COLONOSCOPY      ECTOPIC PREGNANCY SURGERY      HYSTERECTOMY      TONSILLECTOMY      TOTAL HIP ARTHROPLASTY Left     VENOUS ACCESS DEVICE (PORT) INSERTION N/A 3/14/2024    Procedure: INSERTION VENOUS ACCESS DEVICE;  Surgeon: Jonas Garner MD;  Location: [facility] OR;  Service: General;  Laterality: N/A;        Current Outpatient Medications on File Prior to Visit   Medication Sig Dispense Refill    apixaban (ELIQUIS) 5 MG tablet tablet Take 1 tablet by mouth Every 12 (Twelve) Hours. Indications: Other - full anticoagulation (Patient taking differently: Take 1 tablet by mouth Every 12 (Twelve) Hours.) 180 tablet 0    diazePAM (VALIUM) 10 MG tablet Take 1 tablet by mouth 2 (Two) Times a Day As Needed for Anxiety (RLS.). Takes 20 mg at bedtime at times when she needs.      HYDROcodone-acetaminophen (NORCO)  MG per tablet Take 1 tablet by mouth 3 (Three) Times a Day As Needed for Moderate Pain.      levETIRAcetam (KEPPRA) 500 MG tablet Take 1 tablet by mouth Every 12 (Twelve) Hours. 180 tablet 0    OLANZapine (zyPREXA) 5 MG tablet Take 1 tablet by mouth Every Night. 30 tablet 3    ondansetron (ZOFRAN) 8 MG tablet Take 1 tablet by mouth 3 (Three) Times a Day As Needed for Nausea or Vomiting. 30 tablet 5    pantoprazole (PROTONIX) 40 MG EC tablet Take 1 tablet by mouth Daily. 30 tablet 3    potassium chloride (MICRO-K) 10 MEQ CR capsule Take 1 capsule by mouth 2 (Two) Times a Day.      Cyanocobalamin (Vitamin B-12) 500 MCG sublingual tablet PLACE 1 TABLET UNDER THE TONGUE DAILY (Patient not taking: Reported on 2025) 90 tablet 1    [DISCONTINUED] albuterol sulfate  (90 Base) MCG/ACT inhaler Inhale 2  puffs Every 4 (Four) Hours As Needed for Wheezing. Takes as needed. (Patient not taking: Reported on 7/29/2025)      [DISCONTINUED] budesonide-formoterol (SYMBICORT) 160-4.5 MCG/ACT inhaler Inhale 2 puffs 2 (Two) Times a Day. (Patient not taking: Reported on 7/29/2025)  12    [DISCONTINUED] calcium carbonate (TUMS) 500 MG chewable tablet Chew 1 tablet 2 (Two) Times a Day. (Patient not taking: Reported on 7/29/2025)      [DISCONTINUED] Denosumab (PROLIA SC) Inject  under the skin into the appropriate area as directed Every 6 (Six) Months. (Patient not taking: Reported on 7/29/2025)      [DISCONTINUED] diphenoxylate-atropine (LOMOTIL) 2.5-0.025 MG per tablet Take 1 tablet by mouth 4 (Four) Times a Day As Needed for Diarrhea. (Patient not taking: Reported on 7/29/2025) 15 tablet 0    [DISCONTINUED] DULoxetine (CYMBALTA) 30 MG capsule Take 2 capsules by mouth Daily. (Patient not taking: Reported on 7/29/2025) 30 capsule 2    [DISCONTINUED] folic acid (FOLVITE) 800 MCG tablet TAKE 1 TABLET BY MOUTH DAILY (Patient not taking: Reported on 7/29/2025) 90 tablet 1    [DISCONTINUED] lactobacillus acidophilus (RISAQUAD) capsule capsule Take 1 capsule by mouth 2 (Two) Times a Day. (Patient not taking: Reported on 7/29/2025) 60 capsule 0    [DISCONTINUED] midodrine (PROAMATINE) 2.5 MG tablet Take 1 tablet by mouth 3 (Three) Times a Day Before Meals. (Patient not taking: Reported on 7/29/2025)      [DISCONTINUED] multivitamin with minerals (PRESERVISION AREDS PO) Take 1 tablet by mouth 2 (Two) Times a Day. (Patient not taking: Reported on 7/29/2025)      [DISCONTINUED] naloxone (NARCAN) 4 MG/0.1ML nasal spray Administer 1 spray into the nostril(s) as directed by provider As Needed. (Patient not taking: Reported on 7/29/2025)      [DISCONTINUED] OLANZapine (zyPREXA) 2.5 MG tablet TAKE ONE TABLET BY MOUTH ONCE NIGHTLY (Patient not taking: Reported on 7/29/2025) 30 tablet 2    [DISCONTINUED] potassium chloride (Klor-Con M20) 20 MEQ  CR tablet Take 1 tablet by mouth 2 (Two) Times a Day. (Patient not taking: Reported on 7/29/2025) 60 tablet 3    [DISCONTINUED] rosuvastatin (Crestor) 10 MG tablet Take 1 tablet by mouth Every Night. (Patient not taking: Reported on 7/29/2025) 30 tablet 11    [DISCONTINUED] sucralfate (CARAFATE) 1 g tablet TAKE 1 TABLET BY MOUTH 4 TIMES A DAY (Patient not taking: Reported on 7/29/2025) 120 tablet 3     Current Facility-Administered Medications on File Prior to Visit   Medication Dose Route Frequency Provider Last Rate Last Admin    heparin injection 500 Units  500 Units Intravenous PRN Nikita Burns MD   500 Units at 06/10/25 1448    sodium chloride 0.9 % flush 10 mL  10 mL Intravenous PRN Nikita Burns MD   10 mL at 06/10/25 1448        ALLERGIES:    Allergies   Allergen Reactions    Codeine GI Intolerance    Percocet [Oxycodone-Acetaminophen] Hives        Social History     Socioeconomic History    Marital status:    Tobacco Use    Smoking status: Every Day     Current packs/day: 1.00     Average packs/day: 1 pack/day for 30.0 years (30.0 ttl pk-yrs)     Types: Cigarettes     Passive exposure: Current    Smokeless tobacco: Never    Tobacco comments:     Began smoking at age 11.  Smoked .5 ppd for 6 years, 2.5 ppd for a year, 2 ppd for 5 years, and 1 ppd for the past 43 years for a 58.5 pack year history.   Vaping Use    Vaping status: Former    Substances: Nicotine, Flavoring    Devices: Disposable   Substance and Sexual Activity    Alcohol use: Yes     Comment: once a year     Drug use: No    Sexual activity: Defer        Family History   Problem Relation Age of Onset    Lung cancer Niece 50    Throat cancer Father     Breast cancer Neg Hx         Review of Systems   Constitutional:  Positive for activity change, appetite change and fatigue. Negative for unexpected weight change.   HENT: Negative.     Eyes: Negative.    Respiratory:  Positive for shortness of breath.    Cardiovascular: Negative.   "  Gastrointestinal: Negative.    Musculoskeletal:  Positive for back pain.   Neurological:  Negative for light-headedness.   Hematological: Negative.    Psychiatric/Behavioral:  Positive for dysphoric mood. The patient is nervous/anxious.    Unchanged 7/29/2025    Objective     Vitals:    07/29/25 0918   BP: 97/74   Pulse: 88   Resp: 16   Temp: 98.8 °F (37.1 °C)   TempSrc: Infrared   SpO2: 98%  Comment: oxygen on 2   Weight: 43 kg (94 lb 12.8 oz)   Height: 160 cm (62.99\")   PainSc: 0-No pain           7/29/2025     9:21 AM   Current Status   ECOG score 0       Physical Exam    CONSTITUTIONAL: pleasant chronic ill-appearing woman   HEENT: no icterus, no thrush, moist membranes, anisocoria  LYMPH: no cervical or supraclavicular lad  CV: RRR, S1S2, no murmur  RESP: bilateral wheezing, O2 by nasal cannula 1 L  GI: soft, non-tender, no splenomegaly, +bs  MUSC: no edema, normal gait  NEURO: alert and oriented x3, normal strength  PSYCH: Dysphoric mood and flat affect  Skin: No rash or induration noted   Unchanged 7/29/2025    RECENT LABS:  Hematology WBC   Date Value Ref Range Status   07/29/2025 8.91 3.40 - 10.80 10*3/mm3 Final     RBC   Date Value Ref Range Status   07/29/2025 4.15 3.77 - 5.28 10*6/mm3 Final     Hemoglobin   Date Value Ref Range Status   07/29/2025 13.6 12.0 - 15.9 g/dL Final     Hematocrit   Date Value Ref Range Status   07/29/2025 41.7 34.0 - 46.6 % Final     Platelets   Date Value Ref Range Status   07/29/2025 488 (H) 140 - 450 10*3/mm3 Final        Lab Results   Component Value Date    GLUCOSE 189 (H) 07/29/2025    BUN 4.6 (L) 07/29/2025    CREATININE 1.08 (H) 07/29/2025    EGFR 56.4 (L) 07/29/2025    BCR 4.3 (L) 07/29/2025    K 3.9 07/29/2025    CO2 24.9 07/29/2025    CALCIUM 9.4 07/29/2025    ALBUMIN 3.7 07/29/2025    BILITOT 0.2 07/29/2025    AST 21 07/29/2025    ALT 6 07/29/2025     CT chest 7/22/2025:  Findings:  Right internal jugular Port-A-Cath remains in place and unchanged in position. " There is mild coronary artery calcification     There is been resolution of previously demonstrated adenopathy. For example a prevascular node now measures 1.0 x 0.4 cm, previously 2.6 cm in greatest dimension. 4 cm left subcarinal node has completely resolved. Left hilar adenopathy is also resolved.   Previously demonstrated lymph node lying between the left common carotid and left subclavian arteries now measures 0.5 x 0.4 cm, previously 1.5 cm.     No pleural effusion.     There is biapical pleural and parenchymal scarring. No suspicious noncalcified pulmonary nodule identified. No focal airspace consolidation. There is minimal dependent atelectasis in the bilateral lower lobes.     Bilateral adrenal glands are within normal limits. Again seen is a cystic mass within the tail of pancreas measuring 2.9 x 2.4 cm in size. This is unchanged.     Bones: No suspicious lytic or sclerotic bony lesion identified.     IMPRESSION:  Impression:     1. Interval resolution of previously demonstrated mediastinal and hilar adenopathy.  2. No suspicious pulmonary nodule.  3. Stable cystic mass in the tail the pancreas  Assessment & Plan     *Small cell lung cancer left lower lobe of the lung with mediastinal involvement, contralateral lung nodule suspicious for metastatic disease/extensive stage  CT chest 2/22/2024-enlarged f4L lymph nodes 2.5 cm, enlarged AP window lymph node 1.4 cm, poorly defined left hilar lymphadenopathy, left lower lobe pulmonary nodule 1.7 cm  Bronchoscopy with Ion navigation performed 2/28/2024-pathology positive for small cell carcinoma from the left lower lobe and station 4L  PET scan on 3/7/2024 showed significantly avid bilateral hilar and mediastinal lymph nodes for example kylah [employer] AP window 8.3 SUV measuring 2.5 x 1.8 cm, left hilar lymph node SUV 7 1.4 cm, pleural-based nodularity left major fissure newly hypermetabolic SUV 2.2, pleural-based partially cavitary mass left lower lobe SUV  five 1.5 x 1 cm, new right lung nodule 8 mm in the right lower lobe suspicious.-Results discussed with Dr. Velásquez and we both feel the contralateral right lung nodule is highly suspicious for metastatic disease  MRI of the brain negative.  3/19/2024.initiated chemoimmunotherapy with carboplatin/etoposide/atezolizumab; completed 4 cycles of carboplatin/etoposide/atezolizumab 5/23/2024 6/6/2024 PET scan: showed a residual 6 mm pleural-based nodule left lower lobe to be photopenic, low-level subpleural activity adjacently and inferiorly SUV 1.9, resolution of left mediastinal and left hilar lymphadenopathy, marked decrease mediastinal and left hilar lymph nodes have low-level activity SUV 2.6, no new hypermetabolic pulmonary opacities, no hypermetabolic activity in the abdomen, pelvis or bones  CT chest abdomen pelvis 9/17/2024-subpleural parenchymal scarring posterior left lower lobe.  No residual pulmonary nodule, stable subcentimeter low-density mass right lobe of the liver favored to be a cyst or hemangioma, stable sclerotic lesion right iliac bone.  MRI brain-old bilateral basal ganglia and occipital infarcts, no metastatic disease.  12/14/2024 CT chest abdomen pelvis-no evidence of disease progression  CT chest abdomen pelvis and MRI brain 3/11/2025-no evidence of disease progression  CT chest abdomen pelvis 6/3/2025-progression of disease involving mediastinal and hilar lymph nodes  6/17/2025-C1 lurbinectedin  7/22/2025-CT chest after 2 cycles of lurbinectedin showed resolution of mediastinal and hilar lymphadenopathy  *anemia/thrombocytopenia secondary to chemotherapy  Hemoglobin improved 13.6  *Fatigue-normal TSH free T3 free T4 cortisol and ACTH   *Depression/Zyprexa 5 mg nightly   *Previous pneumonia now with chronic hypoxic respiratory failure on O2  *Stroke-patient noncompliant with her anticoagulation secondary to cost  *Comorbidities of tobacco abuse/COPD, anxiety, atherosclerotic calcifications by CT  but no definitive diagnosis of CAD, degenerative disease of the spine, hyperlipidemia; no known autoimmune disorders, chronic pain on opioid medicines    Oncology plan/recommendations:  Continue lurbinectedin every 3 weeks  Continue Zyprexa 5 mg nightly  6-week practitioner visit  Plan to repeat CT chest abdomen pelvis 3 months  I recommended the patient to be more compliant with her Eliquis to prevent strokes or if too expensive at least take aspirin 81 mg daily

## 2025-08-05 ENCOUNTER — OFFICE VISIT (OUTPATIENT)
Dept: NEUROLOGY | Facility: CLINIC | Age: 70
End: 2025-08-05
Payer: MEDICARE

## 2025-08-05 VITALS
BODY MASS INDEX: 16.62 KG/M2 | SYSTOLIC BLOOD PRESSURE: 100 MMHG | HEART RATE: 106 BPM | HEIGHT: 63 IN | OXYGEN SATURATION: 97 % | WEIGHT: 93.8 LBS | DIASTOLIC BLOOD PRESSURE: 70 MMHG

## 2025-08-05 DIAGNOSIS — R56.9 SEIZURE-LIKE ACTIVITY: ICD-10-CM

## 2025-08-05 DIAGNOSIS — F17.210 SMOKING GREATER THAN 40 PACK YEARS: ICD-10-CM

## 2025-08-05 DIAGNOSIS — I65.22 CAROTID STENOSIS, LEFT: ICD-10-CM

## 2025-08-05 DIAGNOSIS — E53.8 VITAMIN B12 DEFICIENCY: ICD-10-CM

## 2025-08-05 DIAGNOSIS — Z86.73 HISTORY OF STROKE: Primary | ICD-10-CM

## 2025-08-05 DIAGNOSIS — C80.1 SMALL CELL CARCINOMA: ICD-10-CM

## 2025-08-19 ENCOUNTER — INFUSION (OUTPATIENT)
Dept: ONCOLOGY | Facility: HOSPITAL | Age: 70
End: 2025-08-19
Payer: MEDICARE

## 2025-08-19 VITALS
HEART RATE: 80 BPM | WEIGHT: 93.4 LBS | DIASTOLIC BLOOD PRESSURE: 67 MMHG | TEMPERATURE: 98 F | OXYGEN SATURATION: 98 % | BODY MASS INDEX: 16.55 KG/M2 | SYSTOLIC BLOOD PRESSURE: 94 MMHG

## 2025-08-19 DIAGNOSIS — Z45.2 FITTING AND ADJUSTMENT OF VASCULAR CATHETER: ICD-10-CM

## 2025-08-19 DIAGNOSIS — C34.32 SMALL CELL CARCINOMA OF LOWER LOBE OF LEFT LUNG: Primary | ICD-10-CM

## 2025-08-19 LAB
ALBUMIN SERPL-MCNC: 3.3 G/DL (ref 3.5–5.2)
ALBUMIN/GLOB SERPL: 1.4 G/DL
ALP SERPL-CCNC: 103 U/L (ref 39–117)
ALT SERPL W P-5'-P-CCNC: <5 U/L (ref 1–33)
ANION GAP SERPL CALCULATED.3IONS-SCNC: 9.1 MMOL/L (ref 5–15)
AST SERPL-CCNC: 16 U/L (ref 1–32)
BASOPHILS # BLD AUTO: 0.09 10*3/MM3 (ref 0–0.2)
BASOPHILS NFR BLD AUTO: 1 % (ref 0–1.5)
BILIRUB SERPL-MCNC: 0.2 MG/DL (ref 0–1.2)
BUN SERPL-MCNC: 5.8 MG/DL (ref 8–23)
BUN/CREAT SERPL: 6.2 (ref 7–25)
CALCIUM SPEC-SCNC: 9 MG/DL (ref 8.6–10.5)
CHLORIDE SERPL-SCNC: 99 MMOL/L (ref 98–107)
CO2 SERPL-SCNC: 24.9 MMOL/L (ref 22–29)
CREAT SERPL-MCNC: 0.94 MG/DL (ref 0.57–1)
DEPRECATED RDW RBC AUTO: 49.7 FL (ref 37–54)
EGFRCR SERPLBLD CKD-EPI 2021: 66.6 ML/MIN/1.73
EOSINOPHIL # BLD AUTO: 0.18 10*3/MM3 (ref 0–0.4)
EOSINOPHIL NFR BLD AUTO: 2.1 % (ref 0.3–6.2)
ERYTHROCYTE [DISTWIDTH] IN BLOOD BY AUTOMATED COUNT: 13.5 % (ref 12.3–15.4)
GLOBULIN UR ELPH-MCNC: 2.3 GM/DL
GLUCOSE SERPL-MCNC: 137 MG/DL (ref 65–99)
HCT VFR BLD AUTO: 36.2 % (ref 34–46.6)
HGB BLD-MCNC: 12 G/DL (ref 12–15.9)
IMM GRANULOCYTES # BLD AUTO: 0.02 10*3/MM3 (ref 0–0.05)
IMM GRANULOCYTES NFR BLD AUTO: 0.2 % (ref 0–0.5)
LYMPHOCYTES # BLD AUTO: 4.03 10*3/MM3 (ref 0.7–3.1)
LYMPHOCYTES NFR BLD AUTO: 46.1 % (ref 19.6–45.3)
MAGNESIUM SERPL-MCNC: 1.5 MG/DL (ref 1.6–2.4)
MCH RBC QN AUTO: 33.1 PG (ref 26.6–33)
MCHC RBC AUTO-ENTMCNC: 33.1 G/DL (ref 31.5–35.7)
MCV RBC AUTO: 100 FL (ref 79–97)
MONOCYTES # BLD AUTO: 0.88 10*3/MM3 (ref 0.1–0.9)
MONOCYTES NFR BLD AUTO: 10.1 % (ref 5–12)
NEUTROPHILS NFR BLD AUTO: 3.54 10*3/MM3 (ref 1.7–7)
NEUTROPHILS NFR BLD AUTO: 40.5 % (ref 42.7–76)
NRBC BLD AUTO-RTO: 0 /100 WBC (ref 0–0.2)
PLATELET # BLD AUTO: 312 10*3/MM3 (ref 140–450)
PMV BLD AUTO: 9.6 FL (ref 6–12)
POTASSIUM SERPL-SCNC: 4.7 MMOL/L (ref 3.5–5.2)
PROT SERPL-MCNC: 5.6 G/DL (ref 6–8.5)
RBC # BLD AUTO: 3.62 10*6/MM3 (ref 3.77–5.28)
SODIUM SERPL-SCNC: 133 MMOL/L (ref 136–145)
WBC NRBC COR # BLD AUTO: 8.74 10*3/MM3 (ref 3.4–10.8)

## 2025-08-19 PROCEDURE — 96413 CHEMO IV INFUSION 1 HR: CPT

## 2025-08-19 PROCEDURE — 25010000002 DEXAMETHASONE SODIUM PHOSPHATE 100 MG/10ML SOLUTION: Performed by: INTERNAL MEDICINE

## 2025-08-19 PROCEDURE — 25010000002 HEPARIN LOCK FLUSH PER 10 UNITS: Performed by: INTERNAL MEDICINE

## 2025-08-19 PROCEDURE — 25810000003 SODIUM CHLORIDE 0.9 % SOLUTION 250 ML FLEX CONT: Performed by: INTERNAL MEDICINE

## 2025-08-19 PROCEDURE — 25010000002 GRANISETRON PER 100 MCG: Performed by: INTERNAL MEDICINE

## 2025-08-19 PROCEDURE — 96375 TX/PRO/DX INJ NEW DRUG ADDON: CPT

## 2025-08-19 PROCEDURE — 85025 COMPLETE CBC W/AUTO DIFF WBC: CPT | Performed by: INTERNAL MEDICINE

## 2025-08-19 PROCEDURE — 80053 COMPREHEN METABOLIC PANEL: CPT | Performed by: INTERNAL MEDICINE

## 2025-08-19 PROCEDURE — 25810000003 SODIUM CHLORIDE 0.9 % SOLUTION: Performed by: INTERNAL MEDICINE

## 2025-08-19 PROCEDURE — 25010000002 LURBINECTEDIN 4 MG RECONSTITUTED SOLUTION 1 EACH VIAL: Performed by: INTERNAL MEDICINE

## 2025-08-19 PROCEDURE — 83735 ASSAY OF MAGNESIUM: CPT | Performed by: INTERNAL MEDICINE

## 2025-08-19 RX ORDER — HEPARIN SODIUM (PORCINE) LOCK FLUSH IV SOLN 100 UNIT/ML 100 UNIT/ML
500 SOLUTION INTRAVENOUS AS NEEDED
Status: DISCONTINUED | OUTPATIENT
Start: 2025-08-19 | End: 2025-08-19 | Stop reason: HOSPADM

## 2025-08-19 RX ORDER — SODIUM CHLORIDE 0.9 % (FLUSH) 0.9 %
10 SYRINGE (ML) INJECTION AS NEEDED
OUTPATIENT
Start: 2025-08-19

## 2025-08-19 RX ORDER — SODIUM CHLORIDE 0.9 % (FLUSH) 0.9 %
10 SYRINGE (ML) INJECTION AS NEEDED
Status: DISCONTINUED | OUTPATIENT
Start: 2025-08-19 | End: 2025-08-19 | Stop reason: HOSPADM

## 2025-08-19 RX ORDER — GRANISETRON HYDROCHLORIDE 1 MG/ML
1 INJECTION INTRAVENOUS ONCE
Status: COMPLETED | OUTPATIENT
Start: 2025-08-19 | End: 2025-08-19

## 2025-08-19 RX ORDER — POTASSIUM CHLORIDE 1500 MG/1
20 TABLET, EXTENDED RELEASE ORAL DAILY
COMMUNITY
Start: 2025-08-06

## 2025-08-19 RX ORDER — SODIUM CHLORIDE 9 MG/ML
20 INJECTION, SOLUTION INTRAVENOUS ONCE
Status: COMPLETED | OUTPATIENT
Start: 2025-08-19 | End: 2025-08-19

## 2025-08-19 RX ORDER — MIRTAZAPINE 7.5 MG/1
7.5 TABLET, FILM COATED ORAL NIGHTLY
COMMUNITY
Start: 2025-08-07

## 2025-08-19 RX ORDER — MIDODRINE HYDROCHLORIDE 2.5 MG/1
2.5 TABLET ORAL
COMMUNITY
Start: 2025-08-15

## 2025-08-19 RX ORDER — HEPARIN SODIUM (PORCINE) LOCK FLUSH IV SOLN 100 UNIT/ML 100 UNIT/ML
500 SOLUTION INTRAVENOUS AS NEEDED
OUTPATIENT
Start: 2025-08-19

## 2025-08-19 RX ADMIN — DEXAMETHASONE SODIUM PHOSPHATE 12 MG: 10 INJECTION, SOLUTION INTRAMUSCULAR; INTRAVENOUS at 15:32

## 2025-08-19 RX ADMIN — Medication 500 UNITS: at 16:54

## 2025-08-19 RX ADMIN — SODIUM CHLORIDE 20 ML/HR: 900 INJECTION, SOLUTION INTRAVENOUS at 15:32

## 2025-08-19 RX ADMIN — Medication 10 ML: at 16:54

## 2025-08-19 RX ADMIN — GRANISETRON HYDROCHLORIDE 1 MG: 1 INJECTION, SOLUTION INTRAVENOUS at 15:32

## 2025-08-19 RX ADMIN — LURBINECTEDIN 4.5 MG: 0.5 INJECTION, POWDER, LYOPHILIZED, FOR SOLUTION INTRAVENOUS at 15:52

## (undated) DEVICE — APPL CHLORAPREP HI/LITE 26ML ORNG

## (undated) DEVICE — PROB VSN REBUS ION IF1000 PERIPH

## (undated) DEVICE — SINGLE USE BIOPSY VALVE MAJ-210: Brand: SINGLE USE BIOPSY VALVE (STERILE)

## (undated) DEVICE — GAS SAMPLING LINE,10,MM,0.047ID,CO-EX: Brand: MEDLINE

## (undated) DEVICE — SINGLE USE ASPIRATION NEEDLE NA-U401SX-4025N: Brand: SINGLE USE ASPIRATION NEEDLE

## (undated) DEVICE — VITAL SIGNS™ JACKSON-REES CIRCUITS: Brand: VITAL SIGNS™

## (undated) DEVICE — MSK AIRWY LARYNG LMA PILOT SZ4

## (undated) DEVICE — CVR PROB 96IN LF STRL

## (undated) DEVICE — GUIDE CATH ION REUS/15X

## (undated) DEVICE — ADAPT VSN REBUS ION PROB/SXN 1/PU

## (undated) DEVICE — CONN SWVL REBUS ION 1/PU

## (undated) DEVICE — TBG PENCL TELESCP MEGADYNE SMOKE EVAC 10FT

## (undated) DEVICE — SYRINGE, LUER LOCK, 10ML: Brand: MEDLINE

## (undated) DEVICE — SUT PDS 3/0 SH 27IN DYED Z316H

## (undated) DEVICE — SENSR O2 OXIMAX FNGR A/ 18IN NONSTR

## (undated) DEVICE — NDL BIOP REBUS FLEXISION ION W/SYR/STPCK 21G 1/PU

## (undated) DEVICE — DRP C/ARM 41X74IN

## (undated) DEVICE — ANTIBACTERIAL UNDYED BRAIDED (POLYGLACTIN 910), SYNTHETIC ABSORBABLE SUTURE: Brand: COATED VICRYL

## (undated) DEVICE — SINGLE USE SUCTION VALVE MAJ-209: Brand: SINGLE USE SUCTION VALVE (STERILE)

## (undated) DEVICE — Device

## (undated) DEVICE — ADAPT CLN BIOGUARD AIR/H2O DISP

## (undated) DEVICE — ADAPT SWVL FIBROPTIC BRONCH

## (undated) DEVICE — TRAP FLD MINIVAC MEGADYNE 100ML

## (undated) DEVICE — TRY DRN PNEUMO WAYNE ST 14F 19SD/PRT 29CM W/SUT

## (undated) DEVICE — CONTAINER,SPECIMEN,OR STERILE,4OZ: Brand: MEDLINE

## (undated) DEVICE — TUBING, SUCTION, 1/4" X 10', STRAIGHT: Brand: MEDLINE

## (undated) DEVICE — BITEBLOCK OMNI BLOC

## (undated) DEVICE — GLV SURG PREMIERPRO ORTHO LTX PF SZ7 BRN

## (undated) DEVICE — PROB BIOP CRYO ERBECRYO/2 FLEX W/OVR/SHETH 1.1MM 1.15M 1P/U

## (undated) DEVICE — TRAP,MUCUS SPECIMEN, 80CC: Brand: MEDLINE

## (undated) DEVICE — SUT PDS 2/0 SH 27IN Z317H

## (undated) DEVICE — BG PROB ION VISION 1/PU

## (undated) DEVICE — ST NDL BRONCH ASP VIZISHOT 2 FLX 21GA

## (undated) DEVICE — CANNULA,OXY,ADULT,SUPER SOFT,W/14'TUB,UC: Brand: MEDLINE INDUSTRIES, INC.

## (undated) DEVICE — ADHS SKIN SURG TISS VISC PREMIERPRO EXOFIN HI/VISC FAST/DRY

## (undated) DEVICE — PK BRONCHOSCOPY 50

## (undated) DEVICE — SUT MNCRYL PLS ANTIB UD 4/0 PS2 18IN

## (undated) DEVICE — PATIENT RETURN ELECTRODE, SINGLE-USE, CONTACT QUALITY MONITORING, ADULT, WITH 9FT CORD, FOR PATIENTS WEIGING OVER 33LBS. (15KG): Brand: MEGADYNE

## (undated) DEVICE — LAG MINOR PROCEDURE: Brand: MEDLINE INDUSTRIES, INC.

## (undated) DEVICE — INTENDED FOR TISSUE SEPARATION, AND OTHER PROCEDURES THAT REQUIRE A SHARP SURGICAL BLADE TO PUNCTURE OR CUT.: Brand: BARD-PARKER ® CARBON RIB-BACK BLADES